# Patient Record
Sex: MALE | Race: WHITE | Employment: OTHER | URBAN - METROPOLITAN AREA
[De-identification: names, ages, dates, MRNs, and addresses within clinical notes are randomized per-mention and may not be internally consistent; named-entity substitution may affect disease eponyms.]

---

## 2017-04-04 ENCOUNTER — ALLSCRIPTS OFFICE VISIT (OUTPATIENT)
Dept: OTHER | Facility: OTHER | Age: 67
End: 2017-04-04

## 2017-04-08 ENCOUNTER — GENERIC CONVERSION - ENCOUNTER (OUTPATIENT)
Dept: OTHER | Facility: OTHER | Age: 67
End: 2017-04-08

## 2017-05-08 ENCOUNTER — ALLSCRIPTS OFFICE VISIT (OUTPATIENT)
Dept: OTHER | Facility: OTHER | Age: 67
End: 2017-05-08

## 2017-05-25 ENCOUNTER — GENERIC CONVERSION - ENCOUNTER (OUTPATIENT)
Dept: OTHER | Facility: OTHER | Age: 67
End: 2017-05-25

## 2017-05-26 ENCOUNTER — ALLSCRIPTS OFFICE VISIT (OUTPATIENT)
Dept: OTHER | Facility: OTHER | Age: 67
End: 2017-05-26

## 2017-05-26 ENCOUNTER — HOSPITAL ENCOUNTER (OUTPATIENT)
Dept: RADIOLOGY | Facility: CLINIC | Age: 67
Discharge: HOME | End: 2017-05-26
Payer: MEDICARE

## 2017-05-26 ENCOUNTER — TRANSCRIBE ORDERS (OUTPATIENT)
Dept: ADMINISTRATIVE | Facility: HOSPITAL | Age: 67
End: 2017-05-26

## 2017-05-26 DIAGNOSIS — E66.01 MORBID OBESITY, UNSPECIFIED OBESITY TYPE (HCC): ICD-10-CM

## 2017-05-26 DIAGNOSIS — R07.9 CHEST PAIN: ICD-10-CM

## 2017-05-26 DIAGNOSIS — H90.2 CONDUCTIVE HEARING LOSS: ICD-10-CM

## 2017-05-26 DIAGNOSIS — Z12.5 ENCOUNTER FOR SCREENING FOR MALIGNANT NEOPLASM OF PROSTATE: ICD-10-CM

## 2017-05-26 DIAGNOSIS — R06.02 SOB (SHORTNESS OF BREATH): Primary | ICD-10-CM

## 2017-05-26 DIAGNOSIS — E11.65 TYPE 2 DIABETES MELLITUS WITH HYPERGLYCEMIA (HCC): ICD-10-CM

## 2017-05-26 DIAGNOSIS — F41.9 ANXIETY DISORDER: ICD-10-CM

## 2017-05-26 DIAGNOSIS — E66.01 MORBID (SEVERE) OBESITY DUE TO EXCESS CALORIES (HCC): ICD-10-CM

## 2017-05-26 DIAGNOSIS — K21.9 GASTRO-ESOPHAGEAL REFLUX DISEASE WITHOUT ESOPHAGITIS: ICD-10-CM

## 2017-05-26 DIAGNOSIS — J45.901 ASTHMA WITH ACUTE EXACERBATION: ICD-10-CM

## 2017-05-26 DIAGNOSIS — J45.909 UNCOMPLICATED ASTHMA: ICD-10-CM

## 2017-05-26 DIAGNOSIS — I10 ESSENTIAL (PRIMARY) HYPERTENSION: ICD-10-CM

## 2017-05-26 DIAGNOSIS — J45.901 ASTHMA WITH ACUTE EXACERBATION, UNSPECIFIED ASTHMA SEVERITY: ICD-10-CM

## 2017-05-26 DIAGNOSIS — I47.1 SUPRAVENTRICULAR TACHYCARDIA (HCC): ICD-10-CM

## 2017-05-26 DIAGNOSIS — R05.9 COUGH: ICD-10-CM

## 2017-05-26 PROCEDURE — 71020 HB CHEST X-RAY 2VW FRONTAL&LATL: CPT

## 2017-05-27 ENCOUNTER — APPOINTMENT (OUTPATIENT)
Dept: LAB | Facility: CLINIC | Age: 67
End: 2017-05-27
Payer: MEDICARE

## 2017-05-27 DIAGNOSIS — E11.65 TYPE 2 DIABETES MELLITUS WITH HYPERGLYCEMIA (HCC): ICD-10-CM

## 2017-05-27 LAB
CREAT UR-MCNC: 120 MG/DL
EST. AVERAGE GLUCOSE BLD GHB EST-MCNC: 189 MG/DL
HBA1C MFR BLD: 8.2 % (ref 4.2–6.3)
MICROALBUMIN UR-MCNC: 20.7 MG/L (ref 0–20)
MICROALBUMIN/CREAT 24H UR: 17 MG/G CREATININE (ref 0–30)

## 2017-05-27 PROCEDURE — 82570 ASSAY OF URINE CREATININE: CPT

## 2017-05-27 PROCEDURE — 83036 HEMOGLOBIN GLYCOSYLATED A1C: CPT

## 2017-05-27 PROCEDURE — 36415 COLL VENOUS BLD VENIPUNCTURE: CPT

## 2017-05-27 PROCEDURE — 82043 UR ALBUMIN QUANTITATIVE: CPT

## 2017-05-30 ENCOUNTER — GENERIC CONVERSION - ENCOUNTER (OUTPATIENT)
Dept: OTHER | Facility: OTHER | Age: 67
End: 2017-05-30

## 2017-06-01 ENCOUNTER — ALLSCRIPTS OFFICE VISIT (OUTPATIENT)
Dept: OTHER | Facility: OTHER | Age: 67
End: 2017-06-01

## 2017-06-05 ENCOUNTER — GENERIC CONVERSION - ENCOUNTER (OUTPATIENT)
Dept: OTHER | Facility: OTHER | Age: 67
End: 2017-06-05

## 2017-06-06 ENCOUNTER — ALLSCRIPTS OFFICE VISIT (OUTPATIENT)
Dept: OTHER | Facility: OTHER | Age: 67
End: 2017-06-06

## 2017-06-13 ENCOUNTER — HOSPITAL ENCOUNTER (OUTPATIENT)
Dept: NON INVASIVE DIAGNOSTICS | Facility: CLINIC | Age: 67
Discharge: HOME | End: 2017-06-13
Payer: MEDICARE

## 2017-06-13 DIAGNOSIS — E66.01 MORBID (SEVERE) OBESITY DUE TO EXCESS CALORIES (HCC): ICD-10-CM

## 2017-06-13 DIAGNOSIS — K21.9 GASTRO-ESOPHAGEAL REFLUX DISEASE WITHOUT ESOPHAGITIS: ICD-10-CM

## 2017-06-13 DIAGNOSIS — R07.9 CHEST PAIN: ICD-10-CM

## 2017-06-13 DIAGNOSIS — J45.901 ASTHMA WITH ACUTE EXACERBATION: ICD-10-CM

## 2017-06-13 PROCEDURE — 93350 STRESS TTE ONLY: CPT

## 2017-06-14 LAB
MAX DIASTOLIC BP: 86 MMHG
MAX HEART RATE: 136 BPM
MAX PREDICTED HEART RATE: 153 BPM
MAX. SYSTOLIC BP: 188 MMHG
PROTOCOL NAME: NORMAL
TARGET HR FORMULA: NORMAL
TEST INDICATION: NORMAL
TIME IN EXERCISE PHASE: 359 S

## 2017-06-19 ENCOUNTER — GENERIC CONVERSION - ENCOUNTER (OUTPATIENT)
Dept: OTHER | Facility: OTHER | Age: 67
End: 2017-06-19

## 2017-06-20 ENCOUNTER — GENERIC CONVERSION - ENCOUNTER (OUTPATIENT)
Dept: OTHER | Facility: OTHER | Age: 67
End: 2017-06-20

## 2017-06-20 ENCOUNTER — ALLSCRIPTS OFFICE VISIT (OUTPATIENT)
Dept: OTHER | Facility: OTHER | Age: 67
End: 2017-06-20

## 2017-06-20 ENCOUNTER — APPOINTMENT (OUTPATIENT)
Dept: LAB | Facility: CLINIC | Age: 67
End: 2017-06-20
Payer: MEDICARE

## 2017-06-20 DIAGNOSIS — F41.9 ANXIETY DISORDER: ICD-10-CM

## 2017-06-20 DIAGNOSIS — J45.909 UNCOMPLICATED ASTHMA: ICD-10-CM

## 2017-06-20 DIAGNOSIS — I10 ESSENTIAL (PRIMARY) HYPERTENSION: ICD-10-CM

## 2017-06-20 DIAGNOSIS — I47.1 SUPRAVENTRICULAR TACHYCARDIA (HCC): ICD-10-CM

## 2017-06-20 DIAGNOSIS — H90.2 CONDUCTIVE HEARING LOSS: ICD-10-CM

## 2017-06-20 DIAGNOSIS — Z12.5 ENCOUNTER FOR SCREENING FOR MALIGNANT NEOPLASM OF PROSTATE: ICD-10-CM

## 2017-06-20 DIAGNOSIS — E11.65 TYPE 2 DIABETES MELLITUS WITH HYPERGLYCEMIA (HCC): ICD-10-CM

## 2017-06-20 LAB
ANION GAP SERPL CALCULATED.3IONS-SCNC: 8 MMOL/L (ref 4–13)
BUN SERPL-MCNC: 12 MG/DL (ref 5–25)
CALCIUM SERPL-MCNC: 9.1 MG/DL (ref 8.3–10.1)
CHLORIDE SERPL-SCNC: 103 MMOL/L (ref 100–108)
CO2 SERPL-SCNC: 28 MMOL/L (ref 21–32)
CREAT SERPL-MCNC: 0.97 MG/DL (ref 0.6–1.3)
GFR SERPL CREATININE-BSD FRML MDRD: >60 ML/MIN/1.73SQ M
GLUCOSE SERPL-MCNC: 308 MG/DL (ref 65–140)
POTASSIUM SERPL-SCNC: 4.2 MMOL/L (ref 3.5–5.3)
PSA SERPL-MCNC: 0.8 NG/ML (ref 0–4)
SODIUM SERPL-SCNC: 139 MMOL/L (ref 136–145)

## 2017-06-20 PROCEDURE — G0103 PSA SCREENING: HCPCS

## 2017-06-20 PROCEDURE — 80048 BASIC METABOLIC PNL TOTAL CA: CPT

## 2017-06-20 PROCEDURE — 36415 COLL VENOUS BLD VENIPUNCTURE: CPT

## 2017-06-30 ENCOUNTER — GENERIC CONVERSION - ENCOUNTER (OUTPATIENT)
Dept: OTHER | Facility: OTHER | Age: 67
End: 2017-06-30

## 2017-06-30 ENCOUNTER — HOSPITAL ENCOUNTER (OUTPATIENT)
Dept: PULMONOLOGY | Facility: HOSPITAL | Age: 67
Discharge: HOME | End: 2017-06-30
Attending: UROLOGY
Payer: MEDICARE

## 2017-06-30 DIAGNOSIS — R06.02 SOB (SHORTNESS OF BREATH): ICD-10-CM

## 2017-06-30 DIAGNOSIS — R05.9 COUGH: ICD-10-CM

## 2017-06-30 PROCEDURE — 94727 GAS DIL/WSHOT DETER LNG VOL: CPT

## 2017-06-30 PROCEDURE — 94729 DIFFUSING CAPACITY: CPT

## 2017-06-30 PROCEDURE — 94760 N-INVAS EAR/PLS OXIMETRY 1: CPT

## 2017-06-30 PROCEDURE — 94060 EVALUATION OF WHEEZING: CPT

## 2017-06-30 RX ORDER — ALBUTEROL SULFATE 2.5 MG/3ML
2.5 SOLUTION RESPIRATORY (INHALATION) ONCE
Status: COMPLETED | OUTPATIENT
Start: 2017-06-30 | End: 2017-06-30

## 2017-06-30 RX ADMIN — ALBUTEROL SULFATE 2.5 MG: 2.5 SOLUTION RESPIRATORY (INHALATION) at 09:43

## 2017-07-25 ENCOUNTER — ALLSCRIPTS OFFICE VISIT (OUTPATIENT)
Dept: OTHER | Facility: OTHER | Age: 67
End: 2017-07-25

## 2017-08-02 RX ORDER — CLOTRIMAZOLE AND BETAMETHASONE DIPROPIONATE 10; .64 MG/G; MG/G
CREAM TOPICAL 2 TIMES DAILY
COMMUNITY
End: 2018-01-06 | Stop reason: ALTCHOICE

## 2017-08-02 RX ORDER — ALBUTEROL SULFATE 90 UG/1
2 AEROSOL, METERED RESPIRATORY (INHALATION) EVERY 6 HOURS PRN
COMMUNITY
End: 2019-01-03 | Stop reason: SDUPTHER

## 2017-08-02 RX ORDER — FLUTICASONE PROPIONATE 50 MCG
1 SPRAY, SUSPENSION (ML) NASAL AS NEEDED
COMMUNITY
End: 2020-11-20

## 2017-08-02 RX ORDER — TRIAMCINOLONE ACETONIDE 0.25 MG/G
CREAM TOPICAL AS NEEDED
COMMUNITY

## 2017-08-08 ENCOUNTER — ANESTHESIA EVENT (OUTPATIENT)
Dept: PERIOP | Facility: HOSPITAL | Age: 67
End: 2017-08-08
Payer: MEDICARE

## 2017-08-09 ENCOUNTER — HOSPITAL ENCOUNTER (OUTPATIENT)
Facility: HOSPITAL | Age: 67
Setting detail: OUTPATIENT SURGERY
Discharge: HOME | End: 2017-08-09
Attending: INTERNAL MEDICINE | Admitting: INTERNAL MEDICINE
Payer: MEDICARE

## 2017-08-09 ENCOUNTER — ANESTHESIA (OUTPATIENT)
Dept: PERIOP | Facility: HOSPITAL | Age: 67
End: 2017-08-09
Payer: MEDICARE

## 2017-08-09 VITALS
OXYGEN SATURATION: 95 % | TEMPERATURE: 97.1 F | HEART RATE: 69 BPM | HEIGHT: 65 IN | DIASTOLIC BLOOD PRESSURE: 88 MMHG | RESPIRATION RATE: 20 BRPM | BODY MASS INDEX: 31.96 KG/M2 | WEIGHT: 191.8 LBS | SYSTOLIC BLOOD PRESSURE: 123 MMHG

## 2017-08-09 DIAGNOSIS — K21.9 GERD (GASTROESOPHAGEAL REFLUX DISEASE): ICD-10-CM

## 2017-08-09 LAB — GLUCOSE SERPL-MCNC: 178 MG/DL (ref 65–140)

## 2017-08-09 PROCEDURE — 88342 IMHCHEM/IMCYTCHM 1ST ANTB: CPT | Performed by: INTERNAL MEDICINE

## 2017-08-09 PROCEDURE — 82948 REAGENT STRIP/BLOOD GLUCOSE: CPT

## 2017-08-09 PROCEDURE — 88305 TISSUE EXAM BY PATHOLOGIST: CPT | Performed by: INTERNAL MEDICINE

## 2017-08-09 RX ORDER — SODIUM CHLORIDE, SODIUM LACTATE, POTASSIUM CHLORIDE, CALCIUM CHLORIDE 600; 310; 30; 20 MG/100ML; MG/100ML; MG/100ML; MG/100ML
75 INJECTION, SOLUTION INTRAVENOUS CONTINUOUS
Status: DISCONTINUED | OUTPATIENT
Start: 2017-08-09 | End: 2017-08-09 | Stop reason: HOSPADM

## 2017-08-09 RX ORDER — LIDOCAINE HYDROCHLORIDE 10 MG/ML
INJECTION, SOLUTION INFILTRATION; PERINEURAL AS NEEDED
Status: DISCONTINUED | OUTPATIENT
Start: 2017-08-09 | End: 2017-08-09 | Stop reason: SURG

## 2017-08-09 RX ORDER — SODIUM CHLORIDE, SODIUM LACTATE, POTASSIUM CHLORIDE, CALCIUM CHLORIDE 600; 310; 30; 20 MG/100ML; MG/100ML; MG/100ML; MG/100ML
125 INJECTION, SOLUTION INTRAVENOUS CONTINUOUS
Status: CANCELLED | OUTPATIENT
Start: 2017-08-09

## 2017-08-09 RX ORDER — PROPOFOL 10 MG/ML
INJECTION, EMULSION INTRAVENOUS AS NEEDED
Status: DISCONTINUED | OUTPATIENT
Start: 2017-08-09 | End: 2017-08-09 | Stop reason: SURG

## 2017-08-09 RX ADMIN — PROPOFOL 20 MG: 10 INJECTION, EMULSION INTRAVENOUS at 07:47

## 2017-08-09 RX ADMIN — PROPOFOL 20 MG: 10 INJECTION, EMULSION INTRAVENOUS at 07:48

## 2017-08-09 RX ADMIN — PROPOFOL 100 MG: 10 INJECTION, EMULSION INTRAVENOUS at 07:45

## 2017-08-09 RX ADMIN — LIDOCAINE HYDROCHLORIDE 50 MG: 10 INJECTION, SOLUTION INFILTRATION; PERINEURAL at 07:45

## 2017-08-09 RX ADMIN — SODIUM CHLORIDE, POTASSIUM CHLORIDE, SODIUM LACTATE AND CALCIUM CHLORIDE 75 ML/HR: 600; 310; 30; 20 INJECTION, SOLUTION INTRAVENOUS at 07:23

## 2017-08-10 ENCOUNTER — GENERIC CONVERSION - ENCOUNTER (OUTPATIENT)
Dept: OTHER | Facility: OTHER | Age: 67
End: 2017-08-10

## 2017-10-04 ENCOUNTER — ALLSCRIPTS OFFICE VISIT (OUTPATIENT)
Dept: OTHER | Facility: OTHER | Age: 67
End: 2017-10-04

## 2017-10-04 DIAGNOSIS — N64.4 MASTODYNIA: ICD-10-CM

## 2017-10-04 DIAGNOSIS — L98.9 DISORDER OF SKIN OR SUBCUTANEOUS TISSUE: ICD-10-CM

## 2017-10-05 NOTE — PROGRESS NOTES
Assessment  1  Breast pain (611 71) (N64 4)   2  Anxiety disorder (300 00) (F41 9)   3  Asthma (493 90) (J45 909)   4  Diabetes mellitus type 2, uncontrolled (250 02) (E11 65)   5  Organic impotence (607 84) (N52 9)   6  Paroxysmal supraventricular tachycardia (427 0) (I47 1)   7  Hypercholesterolemia (272 0) (E78 00)    Plan  Breast pain    · MAMMO DIAGNOSTIC BILATERAL W 3D & CAD; Status:Active; Requested MARGRET:[id number];   Diabetes mellitus type 2, uncontrolled    · Follow-up as previously scheduled Evaluation and Treatment  Follow-up  Status: Complete  Done:  99RCM1685    Discussion/Summary  Discussion Summary:   I can detect no masses his breasts are generally tender slightly  We'll check a mammogram continue regular follow-up  Medication SE Review and Pt Understands Tx: Possible side effects of new medications were reviewed with the patient/guardian today  The treatment plan was reviewed with the patient/guardian  The patient/guardian understands and agrees with the treatment plan      Chief Complaint  Chief Complaint Free Text Note Form: Lumpiness soreness right breast      History of Present Illness  HPI: He has noticed some lumpiness and soreness of his right breast over the past month or so no discrete masses  No nipple discharge  He thinks the soreness may be from his suspenders  He also has some irritation of his right breast  He is stable otherwise  Sugars in the 100s   Anxiety Disorder (Follow-Up): The patient is being seen for follow-up of anxiety  The patient reports doing well  He has no comorbid illnesses  He has had no significant interval events  Disease management:  the patient is doing well with his goals  Asthma (Follow-Up): The patient is being seen for a routine clinic follow-up of asthma  The patient's long-term asthma pattern has been classified as intermittent  The patient states he has been doing well with his asthma control since the last visit  He has no comorbid illnesses   He has no significant interval events  Asthma Control: Well controlled  Interval Symptoms: The patient is currently asymptomatic  Hyperlipidemia (Follow-Up): The patient states his hyperlipidemia has been under good control since the last visit  Comorbid Illnesses: diabetes mellitus  He has no significant interval events  Symptoms: The patient is currently asymptomatic  The patient is doing well with his hyperlipidemia goals  Diabetes Type II (Follow-Up): The patient states he has been doing well with his Type II Diabetes control since the last visit  He has no comorbid illnesses  He has no known diabetic complications  He has no significant interval events  Symptoms:   Home monitoring: The patient checks his blood sugars regularly  The patient is doing well with his diabetes goals  Review of Systems  Complete-Male:   Constitutional: as noted in HPI  Eyes: No complaints of eye pain, no red eyes, no discharge from eyes, no itchy eyes  ENT: no complaints of earache, no hearing loss, no nosebleeds, no nasal discharge, no sore throat, no hoarseness  Cardiovascular: No complaints of slow heart rate, no fast heart rate, no chest pain, no palpitations, no leg claudication, no lower extremity  Respiratory: No complaints of shortness of breath, no wheezing, no cough, no SOB on exertion, no orthopnea or PND  Gastrointestinal: No complaints of abdominal pain, no constipation, no nausea or vomiting, no diarrhea or bloody stools  Genitourinary: No complaints of dysuria, no incontinence, no hesitancy, no nocturia, no genital lesion, no testicular pain  Musculoskeletal: No complaints of arthralgia, no myalgias, no joint swelling or stiffness, no limb pain or swelling  Integumentary: as noted in HPI  Neurological: No compliants of headache, no confusion, no convulsions, no numbness or tingling, no dizziness or fainting, no limb weakness, no difficulty walking     Psychiatric: Is not suicidal, no sleep disturbances, no anxiety or depression, no change in personality, no emotional problems  Endocrine: No complaints of proptosis, no hot flashes, no muscle weakness, no erectile dysfunction, no deepening of the voice, no feelings of weakness  Hematologic/Lymphatic: No complaints of swollen glands, no swollen glands in the neck, does not bleed easily, no easy bruising  ROS Reviewed:   ROS reviewed  Active Problems  1  Actinic keratosis (702 0) (L57 0)   2  Acute URI (465 9) (J06 9)   3  Allergic rhinitis (477 9) (J30 9)   4  Anxiety disorder (300 00) (F41 9)   5  Asthma (493 90) (J45 909)   6  Asthma with exacerbation (493 92) (J45 901)   7  Benign essential hypertension (401 1) (I10)   8  Cellulitis of right upper extremity (682 3) (L03 113)   9  Cervical radiculopathy (723 4) (M54 12)   10  Changing skin lesion (709 9) (L98 9)   11  Chest pain (786 50) (R07 9)   12  Diabetes mellitus type 2, uncontrolled (250 02) (E11 65)   13  Encounter for prostate cancer screening (V76 44) (Z12 5)   14  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)   15  Esophageal reflux (530 81) (K21 9)   16  Fatigue (780 79) (R53 83)   17  Flu vaccine need (V04 81) (Z23)   18  Foot tendinitis (727 06) (M77 50)   19  Hearing loss, conductive (389 00) (H90 2)   20  Hypercholesterolemia (272 0) (E78 00)   21  Mitral valve disorder (424 0) (I05 9)   22  Morbid or severe obesity due to excess calories (278 01) (E66 01)   23  Need for prophylactic vaccination against Streptococcus pneumoniae (pneumococcus) (V03 82)    (Z23)   24  No advance directive on file (V49 89) (Z78 9)   25  Obesity (278 00) (E66 9)   26  Organic impotence (607 84) (N52 9)   27  Paroxysmal supraventricular tachycardia (427 0) (I47 1)   28  Peripheral neuropathy (356 9) (G62 9)   29  Rosacea (695 3) (L71 9)   30  Screening for skin condition (V82 0) (Z13 89)   31  Seborrheic dermatitis (690 10) (L21 9)   32  Seborrheic keratosis (702 19) (L82 1)   33  Sleep apnea (780 57) (G47 30)   34  Spasm of lumbar paraspinous muscle (724 8) (M62 830)   35  Supraventricular tachycardia (427 89) (I47 1)   36  Thoracic radiculopathy (724 4) (M54 14)   37  Tick bite (919 4,E906 4) (W57 XXXA)   38  Tinea pedis of left foot (110 4) (B35 3)   39  Urinary frequency (788 41) (R35 0)   40  Xerostomia (527 7) (R68 2)    Past Medical History  1  History of Abdominal pain, RUQ (789 01) (R10 11)   2  History of Acute sinusitis (461 9) (J01 90)   3  History of Adhesive capsulitis of shoulder, unspecified laterality (726 0) (M75 00)   4  History of Arthralgia Of Hand (719 44)   5  History of Back pain (724 5) (M54 9)   6  History of Chronic maxillary sinusitis (473 0) (J32 0)   7  History of Colonoscopy (Fiberoptic) Screening   8  History of Contact dermatitis (692 9) (L25 9)   9  History of Cough (786 2) (R05)   10  History of Diarrhea (787 91) (R19 7)   11  History of Dysuria (788 1) (R30 0)   12  History of Flu vaccine need (V04 81) (Z23)   13  History of Gout (274 9) (M10 9)   14  H/O nonmelanoma skin cancer (V10 83) (Z85 828)   15  History of atopic dermatitis (V13 3) (Z87 2)   16  History of chest pain (V13 89) (Z87 898)   17  History of herpes zoster (V12 09) (Z86 19)   18  History of sciatica (V12 49) (Z86 69)   19  History of shortness of breath (V13 89) (Z87 898)   20  History of Incisional hernia (553 21) (K43 2)   21  History of Malignant Skin Neoplasm (V10 83)   22  History of Palpitations (785 1) (R00 2)   23  History of Plantar fasciitis (728 71) (M72 2)   24  History of Squamous Cell Carcinoma Of The Skin Of The Scalp (173 42)   25  History of Tinnitus, unspecified laterality (388 30) (H93 19)   26  History of Type 2 diabetes mellitus with hyperglycemia (250 00) (E11 65)   27  History of Umbilical hernia (656 0) (K42 9)   28  History of Worms in stool (128 9) (B83 9)  Active Problems And Past Medical History Reviewed:    The active problems and past medical history were reviewed and updated today  Surgical History  1  History of Appendectomy   2  History of Excision Of Lesion Scalp Malignant   3  History of Incisional Hernia Repair   4  History of Partial Colectomy - Sigmoid   5  History of Tonsillectomy  Surgical History Reviewed: The surgical history was reviewed and updated today  Family History  Mother    1  Family history of Acute Myocardial Infarction (V17 3)   2  Family history of Adenocarcinoma Of The Breast (V16 3)   3  Family history of Adenocarcinoma Of The Skin   4  Family history of Congestive Heart Failure   5  Family history of Type 2 Diabetes Mellitus   6  Family history of Varicose Veins Of Lower Extremities  Father    7  Family history of Adenocarcinoma Of The Prostate Gland (V16 42)   8  Family history of Carcinoma Of The Skin   9  Family history of Non-Hodgkin's Lymphoma  Family History Reviewed: The family history was reviewed and updated today  Social History   · Being A Social Drinker   · Marital History - Currently    · Never A Smoker   · Never Used Drugs   · No advance directive on file (V49 89) (Z78 9)   · Social alcohol use (Z78 9)   · Tobacco non-user (V49 89) (Z78 9)   · Working Part-time  Social History Reviewed: The social history was reviewed and updated today  Current Meds   1  Aspir-Low 81 MG Oral Tablet Delayed Release; TAKE 1 TABLET DAILY; Therapy: 11CBP4995 to Recorded   2  Clotrimazole-Betamethasone 1-0 05 % External Cream; APPLY  AND RUB  IN A THIN FILM TO   AFFECTED AREAS TWICE DAILY  (AM AND PM); Therapy: 15CVW7330 to (Last Rx:28Nov2016)  Requested for: 28Nov2016 Ordered   3  Cyclobenzaprine HCl - 10 MG Oral Tablet; TAKE 1 TABLET THREE TIMES A DAY AS NEEDED FOR   BACK SPASM; Therapy: 45XTY5250 to (Jayesh Gave)  Requested for: 29Awb1842; Last Rx:39Ifa4591   Ordered   4  EpiPen 2-David 0 3 MG/0 3ML Injection Solution Auto-injector; Therapy: 13ICM1544 to (Evaluate:07Nov2014) Recorded   5   Fluticasone Propionate 50 MCG/ACT Nasal Suspension; PLACE 2 SPRAYS IN EACH NOSTRIL DAILY; Therapy: 79VXJ8827 to (Antonino Stewart)  Requested for: 03ECJ5803; Last Rx:28Ngh5396   Ordered   6  FreeStyle Lite Test In Vitro Strip; TEST THREE TIMES DAILY,DX E11 65; Therapy: 39DVB3642 to (Evaluate:86Jht8251)  Requested for: 62Rku2675; Last Rx:51Hew8679   Ordered   7  FreeStyle Unistick II Lancets Miscellaneous; TEST TWICE A DAY; Therapy: 57GCT2134 to (Last D62SHN9920)  Requested for: 61YJP3134 Ordered   8  Glimepiride 4 MG Oral Tablet; take one tablet by mouth twice daily; Therapy: 48GAV4580 to (Evaluate:2018)  Requested for: 2017; Last Rx:2017   Ordered   9  Januvia 100 MG Oral Tablet; take 1 tablet by mouth daily; Therapy: 88ACU1972 to (Evaluate:2017)  Requested for: 99AHD1238; Last Rx:60Cjn6481   Ordered   10  Ketoconazole 2 % External Cream; APPLY TO AFFECTED AREA TWICE A DAY; Therapy: 79YRR9519 to (Evaluate:39Svd5479)  Requested for: 95LVM0870; Last Rx:86Iyv3307    Ordered   11  Levalbuterol Tartrate 45 MCG/ACT Inhalation Aerosol; INHALE 1 PUFF EVERY 4 HOURS AS NEEDED; Therapy: 19Hrp1520 to (Last Rx:48Yrx0540)  Requested for: 11Jgz9513 Ordered   12  Loratadine 10 MG Oral Tablet Recorded   13  LORazepam 1 MG Oral Tablet; TAKE 1 TABLET BY MOUTH EVERY DAY AS NEEDED FOR ANXIETY; Therapy: 97ACY0263 to ([phone])  Requested for: 54NMR9757; Last Rx:2015    Ordered   14  MetFORMIN HCl - 1000 MG Oral Tablet; take 1 tablet by mouth twice a day with food; Therapy: 84OWU3456 to (Evaluate:2017)  Requested for: 01RYL4232; Last Rx:32Ovp9071    Ordered   15  Montelukast Sodium 10 MG Oral Tablet; take 1 tablet every day; Therapy: 23QCL4466 to [phone])  Requested for: 10WIM2608; Last OH:07ENJ1333    Ordered   16  Multivitamins Oral Capsule; TAKE 1 CAPSULE DAILY; Therapy: 66SPI6563 to Recorded   17   Omeprazole 20 MG Oral Capsule Delayed Release; TAKE ONE CAPSULE BY MOUTH EVERY DAY; Therapy: 26YCM8689 to (Evaluate:01Fao3122)  Requested for: 40Qiq1846; Last Rx:43Fuk0285    Ordered   18  ProAir  (90 Base) MCG/ACT Inhalation Aerosol Solution; INHALE 1 TO 2 PUFFS EVERY 4 TO    6 HOURS AS NEEDED; Therapy: 22ZFI8562 to (Last SI:52FLW7185)  Requested for: 95MVA7025 Ordered   19  Triamcinolone Acetonide 0 1 % External Ointment; APPLY AND GENTLY MASSAGE INTO AFFECTED    AREA(S) TWICE DAILY; Therapy: 82GEF9455 to (Last Rx:92Pvs8878)  Requested for: 66NWN6740 Ordered   20  Verapamil HCl  MG Oral Capsule Extended Release 24 Hour; TAKE ONE CAPSULE BY MOUTH    TWICE A DAY; Therapy: 93JAA8376 to (XVAJXTYP:37TOE8319)  Requested for: 42HBN8984; Last Rx:91Ahw4110    Ordered   21  Viagra 100 MG Oral Tablet; take as directed; Therapy: 17Rcy3081 to (Evaluate:23Mar2017)  Requested for: 38Rpt5561; Last Rx:25Gyt4380    Ordered  Medication List Reviewed: The medication list was reviewed and updated today  Allergies  1  IVP Dye   2  Shellfish    Vitals  Vital Signs    Recorded: 76AVK2245 11:47AM   Temperature 97 8 F   Heart Rate 76   Systolic 782   Diastolic 80   Height 5 ft 6 in   Weight 200 lb 6 oz   BMI Calculated 32 34   BSA Calculated 2     Physical Exam    Constitutional   General appearance: Abnormal   obese  Eyes   Conjunctiva and lids: No swelling, erythema, or discharge  Pupils and irises: Equal, round and reactive to light  Ears, Nose, Mouth, and Throat   External inspection of ears and nose: Normal     Otoscopic examination: Tympanic membrance translucent with normal light reflex  Canals patent without erythema  Nasal mucosa, septum, and turbinates: Normal without edema or erythema  Oropharynx: Normal with no erythema, edema, exudate or lesions  Pulmonary   Respiratory effort: No increased work of breathing or signs of respiratory distress      Auscultation of lungs: Clear to auscultation, equal breath sounds bilaterally, no wheezes, no rales, no rhonci  Cardiovascular   Palpation of heart: Normal PMI, no thrills  Auscultation of heart: Normal rate and rhythm, normal S1 and S2, without murmurs  Examination of extremities for edema and/or varicosities: Normal     Carotid pulses: Normal     Abdomen   Abdomen: Non-tender, no masses  Liver and spleen: No hepatomegaly or splenomegaly  Lymphatic   Palpation of lymph nodes in neck: No lymphadenopathy  Musculoskeletal   Gait and station: Normal     Digits and nails: Normal without clubbing or cyanosis  Inspection/palpation of joints, bones, and muscles: Normal     Skin   Skin and subcutaneous tissue: Normal without rashes or lesions  Neurologic   Cranial nerves: Cranial nerves 2-12 intact  Reflexes: 2+ and symmetric  Sensation: No sensory loss  Psychiatric   Orientation to person, place and time: Normal     Mood and affect: Normal          Health Management  History of Secondary DM with renal manifestations   *VB - Eye Exam; every 1 year; Last 06VPE9438; Next Due: 96Vtb6398; Near Due  Health Maintenance   COLONOSCOPY; every 6 years; Last 26CQR4757; Next Due: 07WTS8537; Active  Influenza (Split); every 1 year; Last 72JRD7378; Next Due: 07KCV5623; Overdue  Tdap (Adacel); every 10 years; Last 21IZG4083; Next Due: 00Kad9398; Active    Future Appointments    Date/Time Provider Specialty Site   11/16/2017 08:45 AM Brandon Cochran [facility]   12/04/2017 12:45 PM ELSA Burkett   Dermatology [facility]     Signatures   Electronically signed by : Dave Ugalde [facility]; Oct  4 2017  5:35PM EST                       (Author)    Electronically signed by : ELSA Mullen ; Oct  4 2017  6:48PM EST

## 2017-10-09 ENCOUNTER — HOSPITAL ENCOUNTER (OUTPATIENT)
Dept: MAMMOGRAPHY | Facility: CLINIC | Age: 67
Discharge: HOME | End: 2017-10-09
Payer: MEDICARE

## 2017-10-09 ENCOUNTER — HOSPITAL ENCOUNTER (OUTPATIENT)
Dept: ULTRASOUND IMAGING | Facility: CLINIC | Age: 67
Discharge: HOME | End: 2017-10-09
Payer: MEDICARE

## 2017-10-09 DIAGNOSIS — N63.0 LUMP OR MASS IN BREAST: ICD-10-CM

## 2017-10-09 DIAGNOSIS — N64.4 MASTODYNIA: ICD-10-CM

## 2017-10-09 PROCEDURE — G0204 DX MAMMO INCL CAD BI: HCPCS

## 2017-10-09 PROCEDURE — 76642 ULTRASOUND BREAST LIMITED: CPT

## 2017-10-20 ENCOUNTER — GENERIC CONVERSION - ENCOUNTER (OUTPATIENT)
Dept: OTHER | Facility: OTHER | Age: 67
End: 2017-10-20

## 2017-10-26 ENCOUNTER — ALLSCRIPTS OFFICE VISIT (OUTPATIENT)
Dept: OTHER | Facility: OTHER | Age: 67
End: 2017-10-26

## 2017-10-27 NOTE — PROGRESS NOTES
Assessment  1  Actinic keratosis (702 0) (L57 0)   2  Seborrheic dermatitis (690 10) (L21 9)   3  Seborrheic keratosis (702 19) (L82 1)   4  Screening for skin condition (V82 0) (Z13 89)   5  H/O nonmelanoma skin cancer (V10 83) (Z85 828)    Plan   · Ketoconazole 2 % External Cream; APPLY TO AFFECTED AREA SPARINGLY TWICE  DAILY Face and scalp for 3 weeks   · Wound care as instructed ; Status:Complete;   Done: 93QPT6228   · Use a sun block product with an SPF of 15 or more ; Status:Complete;   Done:  26Oct2017    Discussion/Summary  Discussion Summary- [facility]:   Assessment #1: Actinic keratosis  Care Plan:   Lesion on the scalp was biopsied before considering field therapy with 5% fluorouracil difficult for me to discern if there is more of a psoriasis picture versus actinic keratoses and will await results of biopsy before considering further treatment  Assessment #2: Seborrheic dermatitis  Care Plan:   Markedly involved also noted areas of involvement on his elbows and feet which may be more consistent with psoriasis suggested again with the ketoconazole cream to the facial areas and will re-evaluate again once we get the results of the biopsy  Assessment #3: Seborrheic keratosis  Care Plan:   Patient reassured these are normal growths we acquire with age no treatment needed  Assessment #4: History of skin cancer  Care Plan:   No recurrence nothing else atypical sunblock recommended continue to follow yearly  Assessment #5: Screening for dermatologic disorders  Care Plan:   Nothing else of concern noted on exam await results of biopsy before proceeding with further treatment as indicated        Chief Complaint  Chief Complaint Free Text Note Form: 6 month check up      History of Present Illness  HPI: 71-year-old male with history of seborrheic dermatitis and actinic keratosis and previous history of skin cancer presents for overall checkup notes increased scaling areas on his scalp and face especially when intermittently wearing a beard  also having a rash on his right foot which he is using a cream given to him by podiatrists      Review of Systems  Complete Male Dermatology Kiran Smith Patient:   Constitutional: Denies constitutional symptoms  Eyes: Denies eye symptoms  ENT:  denies ear symptoms, nasal symptoms, mouth or throat symptoms  Cardiovascular: Denies cardiovascular symptoms  Respiratory: Denies respiratory symptoms  Gastrointestinal: Denies gastrointestinal symptoms  Musculoskeletal: Denies musculoskeletal symptoms  Integumentary: Denies skin, hair and nail symptoms  Neurological: Denies neurologic symptoms  Psychiatric: Denies psychiatric symptoms  Endocrine: Denies endocrine symptoms  Hematologic/Lymphatic: Denies hematologic symptoms  Active Problems  1  Actinic keratosis (702 0) (L57 0)   2  Acute URI (465 9) (J06 9)   3  Allergic rhinitis (477 9) (J30 9)   4  Anxiety disorder (300 00) (F41 9)   5  Asthma (493 90) (J45 909)   6  Asthma with exacerbation (493 92) (J45 901)   7  Benign essential hypertension (401 1) (I10)   8  Breast pain (611 71) (N64 4)   9  Cellulitis of right upper extremity (682 3) (L03 113)   10  Cervical radiculopathy (723 4) (M54 12)   11  Changing skin lesion (709 9) (L98 9)   12  Chest pain (786 50) (R07 9)   13  Diabetes mellitus type 2, uncontrolled (250 02) (E11 65)   14  Encounter for prostate cancer screening (V76 44) (Z12 5)   15  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)   16  Esophageal reflux (530 81) (K21 9)   17  Fatigue (780 79) (R53 83)   18  Flu vaccine need (V04 81) (Z23)   19  Foot tendinitis (727 06) (M77 50)   20  Hearing loss, conductive (389 00) (H90 2)   21  Hypercholesterolemia (272 0) (E78 00)   22  Mitral valve disorder (424 0) (I05 9)   23  Morbid or severe obesity due to excess calories (278 01) (E66 01)   24   Need for prophylactic vaccination against Streptococcus pneumoniae (pneumococcus)    (V03 82) (Z23)   25  No advance directive on file (V49 89) (Z78 9)   26  Obesity (278 00) (E66 9)   27  Organic impotence (607 84) (N52 9)   28  Paroxysmal supraventricular tachycardia (427 0) (I47 1)   29  Peripheral neuropathy (356 9) (G62 9)   30  Rosacea (695 3) (L71 9)   31  Screening for skin condition (V82 0) (Z13 89)   32  Seborrheic dermatitis (690 10) (L21 9)   33  Seborrheic keratosis (702 19) (L82 1)   34  Sleep apnea (780 57) (G47 30)   35  Spasm of lumbar paraspinous muscle (724 8) (M62 830)   36  Supraventricular tachycardia (427 89) (I47 1)   37  Thoracic radiculopathy (724 4) (M54 14)   38  Tick bite (919 4,E906 4) (W57 XXXA)   39  Tinea pedis of left foot (110 4) (B35 3)   40  Urinary frequency (788 41) (R35 0)   41  Xerostomia (527 7) (R68 2)    Past Medical History  1  History of Abdominal pain, RUQ (789 01) (R10 11)   2  History of Acute sinusitis (461 9) (J01 90)   3  History of Adhesive capsulitis of shoulder, unspecified laterality (726 0) (M75 00)   4  History of Arthralgia Of Hand (719 44)   5  History of Back pain (724 5) (M54 9)   6  History of Chronic maxillary sinusitis (473 0) (J32 0)   7  History of Colonoscopy (Fiberoptic) Screening   8  History of Contact dermatitis (692 9) (L25 9)   9  History of Cough (786 2) (R05)   10  History of Diarrhea (787 91) (R19 7)   11  History of Dysuria (788 1) (R30 0)   12  History of Flu vaccine need (V04 81) (Z23)   13  History of Gout (274 9) (M10 9)   14  H/O nonmelanoma skin cancer (V10 83) (Z85 828)   15  History of atopic dermatitis (V13 3) (Z87 2)   16  History of chest pain (V13 89) (Z87 898)   17  History of herpes zoster (V12 09) (Z86 19)   18  History of sciatica (V12 49) (Z86 69)   19  History of shortness of breath (V13 89) (Z87 898)   20  History of Incisional hernia (553 21) (K43 2)   21  History of Malignant Skin Neoplasm (V10 83)   22  History of Palpitations (785 1) (R00 2)   23   History of Plantar fasciitis (728 71) (M72 2)   24  History of Squamous Cell Carcinoma Of The Skin Of The Scalp (173 42)   25  History of Tinnitus, unspecified laterality (388 30) (H93 19)   26  History of Type 2 diabetes mellitus with hyperglycemia (250 00) (E11 65)   27  History of Umbilical hernia (061 8) (K42 9)   28  History of Worms in stool (128 9) (B83 9)  Past Medical History Reviewed- Derm:   The past medical history was reviewed  Surgical History  1  History of Appendectomy   2  History of Excision Of Lesion Scalp Malignant   3  History of Incisional Hernia Repair   4  History of Partial Colectomy - Sigmoid   5  History of Tonsillectomy  Surgical History Reviewed [facility]:   Surgical History reviewed      Family History  Mother    1  Family history of Acute Myocardial Infarction (V17 3)   2  Family history of Adenocarcinoma Of The Breast (V16 3)   3  Family history of Adenocarcinoma Of The Skin   4  Family history of Congestive Heart Failure   5  Family history of Type 2 Diabetes Mellitus   6  Family history of Varicose Veins Of Lower Extremities  Father    7  Family history of Adenocarcinoma Of The Prostate Gland (V16 42)   8  Family history of Carcinoma Of The Skin   9  Family history of Non-Hodgkin's Lymphoma  Family History Reviewed- Derm:   Family History was reviewed      Social History   · Being A Social Drinker   · Marital History - Currently    · Never A Smoker   · Never Used Drugs   · No advance directive on file (V49 89) (Z78 9)   · Social alcohol use (Z78 9)   · Tobacco non-user (V49 89) (Z78 9)   · Working Part-time  Social History Reviewed [facility]: The social history was reviewed      Current Meds   1  Aspir-Low 81 MG Oral Tablet Delayed Release; TAKE 1 TABLET DAILY; Therapy: 09XZC0723 to Recorded   2  Clotrimazole-Betamethasone 1-0 05 % External Cream; APPLY  AND RUB  IN A THIN   FILM TO AFFECTED AREAS TWICE DAILY  (AM AND PM);    Therapy: 09HVH5049 to (Last Rx:28Nov2016)  Requested for: 58TNT5034 Ordered   3  Cyclobenzaprine HCl - 10 MG Oral Tablet; TAKE 1 TABLET THREE TIMES A DAY AS   NEEDED FOR BACK SPASM; Therapy: 73YHR8822 to (Agata Taylor)  Requested for: 24Ycn3690; Last   Rx:10Xva5472 Ordered   4  Doxycycline Hyclate 100 MG Oral Capsule; Take 1 capsule twice daily; Therapy: 23IDM1993 to (Evaluate:10Nov2017)  Requested for: 56Xay8174; Last   Rx:71Ljr7013 Ordered   5  EpiPen 2-David 0 3 MG/0 3ML Injection Solution Auto-injector; Therapy: 23LIB6853 to (Evaluate:07Nov2014) Recorded   6  Fluticasone Propionate 50 MCG/ACT Nasal Suspension; PLACE 2 SPRAYS IN EACH   NOSTRIL DAILY; Therapy: 94WVW7077 to (Bhavana Gonzalez)  Requested for: 04NXC2763; Last   Rx:95Ndd9580 Ordered   7  FreeStyle Lite Test In Vitro Strip; TEST THREE TIMES DAILY,DX E11 65; Therapy: 42ZSP6195 to (Evaluate:56Hth9964)  Requested for: 96Ldx4616; Last   Rx:55Iwm4419 Ordered   8  FreeStyle Unistick II Lancets Miscellaneous; TEST TWICE A DAY; Therapy: 50GMA0001 to (Last BV:15HOE4158)  Requested for: 88MMT2256 Ordered   9  Glimepiride 4 MG Oral Tablet; take one tablet by mouth twice daily; Therapy: 74FGL3187 to (Evaluate:16Jan2018)  Requested for: 20Jun2017; Last   Rx:35Lpv7056 Ordered   10  Januvia 100 MG Oral Tablet; take 1 tablet by mouth every day; Therapy: 84OCI0469 to (Evaluate:19Uxm5369)  Requested for: 04TVP0618; Last    Rx:57Gjm7405 Ordered   11  Ketoconazole 2 % External Cream; APPLY TO AFFECTED AREA TWICE A DAY; Therapy: 41UPO2865 to (Evaluate:64Iva5941)  Requested for: 32RDD7544; Last    Rx:47Wbj5410 Ordered   12  Levalbuterol Tartrate 45 MCG/ACT Inhalation Aerosol; INHALE 1 PUFF EVERY 4 HOURS    AS NEEDED; Therapy: 12Apv8030 to (Last Rx:50Ttv4973)  Requested for: 87Kvt2233 Ordered   13  Loratadine 10 MG Oral Tablet Recorded   14  LORazepam 1 MG Oral Tablet; TAKE 1 TABLET BY MOUTH EVERY DAY AS NEEDED FOR    ANXIETY;     Therapy: 51BSE4836 to ([phone])  Requested for: 85NEV0490; Last    Rx:10Nov2015 Ordered   15  MetFORMIN HCl - 1000 MG Oral Tablet; take 1 tablet by mouth twice a day with food; Therapy: 74LKM6609 to (Evaluate:04Gqu5381)  Requested for: 60GTC2182; Last    Rx:18Oct2017 Ordered   16  Montelukast Sodium 10 MG Oral Tablet; take 1 tablet every day; Therapy: 36HTJ2019 to (Evaluate:08Piv8988)  Requested for: 27HMU6250; Last    Rx:18Oct2017 Ordered   17  Multivitamins Oral Capsule; TAKE 1 CAPSULE DAILY; Therapy: 36FWW7640 to Recorded   18  Omeprazole 20 MG Oral Capsule Delayed Release; TAKE ONE CAPSULE BY MOUTH    EVERY DAY; Therapy: 73YXL3165 to (Evaluate:81Oyy2989)  Requested for: 55Tkj0181; Last    Rx:20Wyn2787 Ordered   19  ProAir  (90 Base) MCG/ACT Inhalation Aerosol Solution; INHALE 1 TO 2 PUFFS    EVERY 4 TO 6 HOURS AS NEEDED; Therapy: 84KGY2511 to (Last FAB:02FPC1361)  Requested for: 98OPD2450 Ordered   20  Triamcinolone Acetonide 0 1 % External Ointment; APPLY AND GENTLY MASSAGE INTO    AFFECTED AREA(S) TWICE DAILY; Therapy: 24TJL3469 to (Last Rx:58Xaa6371)  Requested for: 07AZG9841 Ordered   21  Verapamil HCl  MG Oral Capsule Extended Release 24 Hour; TAKE ONE    CAPSULE BY MOUTH TWICE A DAY; Therapy: 46PCJ5890 to (Evaluate:16Apr2018)  Requested for: 10UFX7746; Last    Rx:18Oct2017 Ordered   22  Viagra 100 MG Oral Tablet; take as directed; Therapy: 36Qqu2040 to (Evaluate:23Mar2017)  Requested for: 67Zuz5601; Last    Rx:69Ccr4929 Ordered  Medication List Reviewed: The medication list was reviewed and updated today  Allergies  1  IVP Dye   2  Shellfish    Physical Exam    Constitutional   General appearance: Appears healthy and well developed  Lymphatic   No visible disturbance  Musculoskeletal   Digits and nails: No clubbing, cyanosis or edema  Cutaneous and nail exam normal     Skin   Scalp skin texture and hair distribution: Abnormal     Head: Abnormal     Neck: Normal turgor, no rashes, no lesions      Chest: Normal turgor, no rashes, no lesions  Abdomen: Normal turgor, no rashes, no lesions  Back: Normal turgor, no rashes, no lesions  Right upper extremity: Abnormal     Left upper extremity: Abnormal     Right lower extremity: Abnormal     Left lower extremity: Normal turgor, no rashes, no lesions  Neuro/Psych   Alert and oriented x 3  Displays comfort and cooperation during encounterl  Affect is normal     Finding Scaling erythematous well-demarcated patches noted on the elbows and plantar surface of the right foot diffuse erythema scaling noted on the face and scalp some appear more papular nothing else remarkable noted exam normal keratotic papules with greasy stuck on appearance previous sites of skin cancer well healed  Procedure    Procedure: skin biopsy  Indications for the procedure include To differentiate between actinic keratosis versus seborrheic dermatitis  Risks, benefits, alternatives, infection risk, bleeding risk, risk of allergic reaction and risk of scarring were discussed with the patient--   verbal consent was obtained prior to the procedure  Procedure Note:   Anesthesia: 1 ml of lidocaine 1% with epinephrine  The lesion was located on the Scalp  The patient was prepped using alcohol  a shave biopsy of the lesion was taken  The hemostasis of the wound was achieved with aluminum chloride  Dressing: The wound was cleaned and petroleum jelly was applied and a sterile dressing was placed  Specimen: the excised lesion was place in buffered formalin and sent for pathology  Post-Procedure:   Patient Status: the patient tolerated the procedure well  Complications: there were no complications  Follow-up in the office  patient will be called in event skin cancer is found  -- Patient can call the office in 7-10 days for results if not contacted  Health Management  History of Secondary DM with renal manifestations   *VB - Eye Exam; every 1 year;  Last 42TSJ2006; Next Due: 80Rap5880; Near Due  Health Maintenance   COLONOSCOPY; every 6 years; Last 96UPN3247; Next Due: 12GPH1846; Active  Influenza (Split); every 1 year; Last 98QAG6168; Next Due: 72IML7298; Overdue  Tdap (Adacel); every 10 years; Last 83HHL2659; Next Due: 75Gpe8635; Active    Future Appointments    Date/Time Provider Specialty Site   11/16/2017 09:00 AM Minna Dominguez [facility] Internal Medicine [facility]   12/01/2017 08:40 ELSA Gonzalez  [facility]   01/23/2018 10:30 AM ELSA Mosqueda   Internal Medicine [facility]     Signatures   Electronically signed by : ELSA Tomlinson ; Oct 26 2017 10:56AM EST                       (Author)

## 2017-11-06 DIAGNOSIS — E78.00 PURE HYPERCHOLESTEROLEMIA: ICD-10-CM

## 2017-11-06 DIAGNOSIS — Z12.5 ENCOUNTER FOR SCREENING FOR MALIGNANT NEOPLASM OF PROSTATE: ICD-10-CM

## 2017-11-06 DIAGNOSIS — E11.65 TYPE 2 DIABETES MELLITUS WITH HYPERGLYCEMIA (HCC): ICD-10-CM

## 2017-11-09 ENCOUNTER — APPOINTMENT (OUTPATIENT)
Dept: LAB | Facility: CLINIC | Age: 67
End: 2017-11-09
Payer: MEDICARE

## 2017-11-09 DIAGNOSIS — Z12.5 ENCOUNTER FOR SCREENING FOR MALIGNANT NEOPLASM OF PROSTATE: ICD-10-CM

## 2017-11-09 DIAGNOSIS — E11.65 TYPE 2 DIABETES MELLITUS WITH HYPERGLYCEMIA (HCC): ICD-10-CM

## 2017-11-09 DIAGNOSIS — E78.00 PURE HYPERCHOLESTEROLEMIA: ICD-10-CM

## 2017-11-09 LAB
ALBUMIN SERPL BCP-MCNC: 3.9 G/DL (ref 3.5–5)
ALP SERPL-CCNC: 49 U/L (ref 46–116)
ALT SERPL W P-5'-P-CCNC: 29 U/L (ref 12–78)
ANION GAP SERPL CALCULATED.3IONS-SCNC: 8 MMOL/L (ref 4–13)
AST SERPL W P-5'-P-CCNC: 16 U/L (ref 5–45)
BASOPHILS # BLD AUTO: 0.03 THOUSANDS/ΜL (ref 0–0.1)
BASOPHILS NFR BLD AUTO: 0 % (ref 0–1)
BILIRUB SERPL-MCNC: 0.87 MG/DL (ref 0.2–1)
BILIRUB UR QL STRIP: NEGATIVE
BUN SERPL-MCNC: 17 MG/DL (ref 5–25)
CALCIUM SERPL-MCNC: 9.2 MG/DL (ref 8.3–10.1)
CHLORIDE SERPL-SCNC: 103 MMOL/L (ref 100–108)
CHOLEST SERPL-MCNC: 185 MG/DL (ref 50–200)
CLARITY UR: CLEAR
CO2 SERPL-SCNC: 28 MMOL/L (ref 21–32)
COLOR UR: YELLOW
CREAT SERPL-MCNC: 1.01 MG/DL (ref 0.6–1.3)
CREAT UR-MCNC: 96.2 MG/DL
EOSINOPHIL # BLD AUTO: 0.34 THOUSAND/ΜL (ref 0–0.61)
EOSINOPHIL NFR BLD AUTO: 4 % (ref 0–6)
ERYTHROCYTE [DISTWIDTH] IN BLOOD BY AUTOMATED COUNT: 12.8 % (ref 11.6–15.1)
EST. AVERAGE GLUCOSE BLD GHB EST-MCNC: 174 MG/DL
GFR SERPL CREATININE-BSD FRML MDRD: 77 ML/MIN/1.73SQ M
GLUCOSE P FAST SERPL-MCNC: 163 MG/DL (ref 65–99)
GLUCOSE UR STRIP-MCNC: NEGATIVE MG/DL
HBA1C MFR BLD: 7.7 % (ref 4.2–6.3)
HCT VFR BLD AUTO: 46.2 % (ref 36.5–49.3)
HDLC SERPL-MCNC: 42 MG/DL (ref 40–60)
HGB BLD-MCNC: 15.8 G/DL (ref 12–17)
HGB UR QL STRIP.AUTO: NEGATIVE
KETONES UR STRIP-MCNC: ABNORMAL MG/DL
LDLC SERPL CALC-MCNC: 113 MG/DL (ref 0–100)
LEUKOCYTE ESTERASE UR QL STRIP: NEGATIVE
LYMPHOCYTES # BLD AUTO: 2.08 THOUSANDS/ΜL (ref 0.6–4.47)
LYMPHOCYTES NFR BLD AUTO: 27 % (ref 14–44)
MCH RBC QN AUTO: 29.8 PG (ref 26.8–34.3)
MCHC RBC AUTO-ENTMCNC: 34.2 G/DL (ref 31.4–37.4)
MCV RBC AUTO: 87 FL (ref 82–98)
MICROALBUMIN UR-MCNC: 7.7 MG/L (ref 0–20)
MICROALBUMIN/CREAT 24H UR: 8 MG/G CREATININE (ref 0–30)
MONOCYTES # BLD AUTO: 0.71 THOUSAND/ΜL (ref 0.17–1.22)
MONOCYTES NFR BLD AUTO: 9 % (ref 4–12)
NEUTROPHILS # BLD AUTO: 4.67 THOUSANDS/ΜL (ref 1.85–7.62)
NEUTS SEG NFR BLD AUTO: 60 % (ref 43–75)
NITRITE UR QL STRIP: NEGATIVE
NRBC BLD AUTO-RTO: 0 /100 WBCS
PH UR STRIP.AUTO: 6.5 [PH] (ref 4.5–8)
PLATELET # BLD AUTO: 205 THOUSANDS/UL (ref 149–390)
PMV BLD AUTO: 11.1 FL (ref 8.9–12.7)
POTASSIUM SERPL-SCNC: 4.1 MMOL/L (ref 3.5–5.3)
PROT SERPL-MCNC: 7.5 G/DL (ref 6.4–8.2)
PROT UR STRIP-MCNC: NEGATIVE MG/DL
PSA SERPL-MCNC: 0.6 NG/ML (ref 0–4)
RBC # BLD AUTO: 5.3 MILLION/UL (ref 3.88–5.62)
SODIUM SERPL-SCNC: 139 MMOL/L (ref 136–145)
SP GR UR STRIP.AUTO: 1.02 (ref 1–1.03)
TRIGL SERPL-MCNC: 149 MG/DL
TSH SERPL DL<=0.05 MIU/L-ACNC: 1.02 UIU/ML (ref 0.36–3.74)
UROBILINOGEN UR QL STRIP.AUTO: 1 E.U./DL
WBC # BLD AUTO: 7.85 THOUSAND/UL (ref 4.31–10.16)

## 2017-11-09 PROCEDURE — 82043 UR ALBUMIN QUANTITATIVE: CPT

## 2017-11-09 PROCEDURE — 84443 ASSAY THYROID STIM HORMONE: CPT

## 2017-11-09 PROCEDURE — 82570 ASSAY OF URINE CREATININE: CPT

## 2017-11-09 PROCEDURE — 80061 LIPID PANEL: CPT

## 2017-11-09 PROCEDURE — 81003 URINALYSIS AUTO W/O SCOPE: CPT

## 2017-11-09 PROCEDURE — 36415 COLL VENOUS BLD VENIPUNCTURE: CPT

## 2017-11-09 PROCEDURE — 80053 COMPREHEN METABOLIC PANEL: CPT

## 2017-11-09 PROCEDURE — 83036 HEMOGLOBIN GLYCOSYLATED A1C: CPT

## 2017-11-09 PROCEDURE — 85025 COMPLETE CBC W/AUTO DIFF WBC: CPT

## 2017-11-09 PROCEDURE — G0103 PSA SCREENING: HCPCS

## 2017-11-16 ENCOUNTER — ALLSCRIPTS OFFICE VISIT (OUTPATIENT)
Dept: OTHER | Facility: OTHER | Age: 67
End: 2017-11-16

## 2017-12-01 ENCOUNTER — ALLSCRIPTS OFFICE VISIT (OUTPATIENT)
Dept: OTHER | Facility: OTHER | Age: 67
End: 2017-12-01

## 2017-12-05 NOTE — PROGRESS NOTES
Assessment  Assessed    1  Benign essential hypertension (401 1) (I10)   2  Hypercholesterolemia (272 0) (E78 00)   3  Paroxysmal supraventricular tachycardia (427 0) (I47 1)   4  Obesity (278 00) (E66 9)    Plan  Benign essential hypertension    · Follow-up visit in 6 months Evaluation and Treatment  Follow-up  Status: Hold For -  Scheduling  Requested for: 67KVL0497   Ordered; For: Benign essential hypertension; Ordered By: Zen Gross Performed:  Due: 99BIM3496    Discussion/Summary  Cardiology Discussion Summary Free Text Note Form [facility]:   Pt is cardiovascularly stable  fu with pcp  Continue all meds  Report any symptoms  All questions answered  Previous studies reviewed with patient, medications reviewed and possible side effects discussed  Continue risk factor modification  All questions answered  Safety measures reviewed  Patient advised to report any problems prompting medical attention  Return for follow up visit in 6 months or earlier if needed  Goals and Barriers: The patient has the current Goals: Good bp  good cholesterol  The patent has the current Barriers: None  Patient's Capacity to Self-Care: Patient is able to Self-Care  Patient Education: Educational resources provided: See [facility]    Medication SE Review and Pt Understands Tx: Possible side effects of new medications were reviewed with the patient/guardian today  The treatment plan was reviewed with the patient/guardian  The patient/guardian understands and agrees with the treatment plan   Counseling Documentation With Imm: The patient was counseled regarding diagnostic results, instructions for management, risk factor reductions, prognosis, patient and family education, risks and benefits of treatment options, importance of compliance with treatment        Chief Complaint  Chief Complaint Free Text Note Form: followup visit     Chief Complaint Chronic Condition [facility]: Patient is here today for follow up of chronic conditions described in HPI  History of Present Illness  Cardiology HPI Free Text Note Form [facility]: Patient presents for follow-up visit with history of hypertension, hyperlipidemia, paroxysmal SVT  Patient states he has been feeling well denies any chest pain, chest pressure, chest heaviness, shortness of breath  He denies any dizziness, syncope, presyncope  Patient is taking all medications and does not need refills at this time  He is planning a cross-country trip for next year  He follows with Dr Loc Nunez for primary care  Review of Systems  Cardiology Male ROS:     Cardiac: No complaints of chest pain, no palpitations, no fainiting  and as noted in HPI  Skin: No complaints of nonhealing sores or skin rash  Genitourinary: No complaints of recurrent urinary tract infections, frequent urination at night, difficult urination, blood in urine, kidney stones, loss of bladder control, no kidney or prostate problems, no erectile dysfunction  Psychological: No complaints of feeling depressed, anxiety, panic attacks, or difficulty concentrating  General: No complaints of trouble sleeping, lack of energy, fatigue, appetite changes, weight changes, fever, frequent infections, or night sweats  Respiratory: No complaints of shortness of breath, cough with sputum, or wheezing  HEENT: No complaints of serious problems, hearing problems, nose problems, throat problems, or snoring  Gastrointestinal: No complaints of liver problems, nausea, vomiting, heartburn, constipation, bloody stools, diarrhea, problems swallowing, adbominal pain, or rectal bleeding  Hematologic: No complaints of bleeding disorders, anemia, blood clots, or excessive brusing  Neurological: No complaints of numbness, tingling, dizziness, weakness, seizures, headaches, syncope or fainting, AM fatigue, daytime sleepiness, no witnessed apnea episodes  Musculoskeletal: No complaints of arthritis, back pain, or painfull swelling     ROS Reviewed:   ROS reviewed  Active Problems  Problems    1  Actinic keratosis (702 0) (L57 0)   2  Active advance directive (V49 89) (Z78 9)   3  Acute URI (465 9) (J06 9)   4  Allergic rhinitis (477 9) (J30 9)   5  Anxiety disorder (300 00) (F41 9)   6  Asthma (493 90) (J45 909)   7  Asthma with exacerbation (493 92) (J45 901)   8  Benign essential hypertension (401 1) (I10)   9  Breast pain (611 71) (N64 4)   10  Cellulitis of right upper extremity (682 3) (L03 113)   11  Cervical radiculopathy (723 4) (M54 12)   12  Changing skin lesion (709 9) (L98 9)   13  Chest pain (786 50) (R07 9)   14  Diabetes mellitus type 2, uncontrolled (250 02) (E11 65)   15  Encounter for prostate cancer screening (V76 44) (Z12 5)   16  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)   17  Esophageal reflux (530 81) (K21 9)   18  Fatigue (780 79) (R53 83)   19  Flu vaccine need (V04 81) (Z23)   20  Foot tendinitis (727 06) (M77 50)   21  Hearing loss, conductive (389 00) (H90 2)   22  Hypercholesterolemia (272 0) (E78 00)   23  Mitral valve disorder (424 0) (I05 9)   24  Morbid or severe obesity due to excess calories (278 01) (E66 01)   25  Need for prophylactic vaccination against Streptococcus pneumoniae (pneumococcus)    (V03 82) (Z23)   26  Obesity (278 00) (E66 9)   27  Organic impotence (607 84) (N52 9)   28  Paroxysmal supraventricular tachycardia (427 0) (I47 1)   29  Peripheral neuropathy (356 9) (G62 9)   30  Rosacea (695 3) (L71 9)   31  Screening for genitourinary condition (V81 6) (Z13 89)   32  Screening for skin condition (V82 0) (Z13 89)   33  Seborrheic dermatitis (690 10) (L21 9)   34  Seborrheic keratosis (702 19) (L82 1)   35  Sleep apnea (780 57) (G47 30)   36  Spasm of lumbar paraspinous muscle (724 8) (M62 830)   37  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 (232 4) (D04 4)   38  Supraventricular tachycardia (427 89) (I47 1)   39  Thoracic radiculopathy (724 4) (M54 14)   40   Tick bite (919 4,E906 4) (W57 XXXA)   41  Tinea pedis of left foot (110 4) (B35 3)   42  Urinary frequency (788 41) (R35 0)   43  Xerostomia (527 7) (R68 2)    Past Medical History  Problems    1  History of Abdominal pain, RUQ (789 01) (R10 11)   2  History of Acute sinusitis (461 9) (J01 90)   3  History of Adhesive capsulitis of shoulder, unspecified laterality (726 0) (M75 00)   4  History of Arthralgia Of Hand (719 44)   5  History of Back pain (724 5) (M54 9)   6  History of Chronic maxillary sinusitis (473 0) (J32 0)   7  History of Colonoscopy (Fiberoptic) Screening   8  History of Contact dermatitis (692 9) (L25 9)   9  History of Cough (786 2) (R05)   10  History of Diarrhea (787 91) (R19 7)   11  History of Dysuria (788 1) (R30 0)   12  History of Flu vaccine need (V04 81) (Z23)   13  History of Gout (274 9) (M10 9)   14  H/O nonmelanoma skin cancer (V10 83) (Z85 828)   15  History of atopic dermatitis (V13 3) (Z87 2)   16  History of chest pain (V13 89) (Z87 898)   17  History of herpes zoster (V12 09) (Z86 19)   18  History of sciatica (V12 49) (Z86 69)   19  History of shortness of breath (V13 89) (Z87 898)   20  History of Incisional hernia (553 21) (K43 2)   21  History of Malignant Skin Neoplasm (V10 83)   22  History of Palpitations (785 1) (R00 2)   23  History of Plantar fasciitis (728 71) (M72 2)   24  History of Squamous Cell Carcinoma Of The Skin Of The Scalp (173 42)   25  History of Tinnitus, unspecified laterality (388 30) (H93 19)   26  History of Type 2 diabetes mellitus with hyperglycemia (250 00) (E11 65)   27  History of Umbilical hernia (082 1) (K42 9)   28  History of Worms in stool (128 9) (B83 9)  Active Problems And Past Medical History Reviewed: The active problems and past medical history were reviewed and updated today  Surgical History  Problems    1  History of Appendectomy   2  History of Excision Of Lesion Scalp Malignant   3  History of Incisional Hernia Repair   4   History of Partial Colectomy - Sigmoid   5  History of Tonsillectomy  Surgical History Reviewed: The surgical history was reviewed and updated today  Family History  Mother    1  Family history of Acute Myocardial Infarction (V17 3)   2  Family history of Adenocarcinoma Of The Breast (V16 3)   3  Family history of Adenocarcinoma Of The Skin   4  Family history of Congestive Heart Failure   5  Family history of Type 2 Diabetes Mellitus   6  Family history of Varicose Veins Of Lower Extremities  Father    7  Family history of Adenocarcinoma Of The Prostate Gland (V16 42)   8  Family history of Carcinoma Of The Skin   9  Family history of Non-Hodgkin's Lymphoma  Family History Reviewed: The family history was reviewed and updated today  Social History  Problems    · Active advance directive (V49 89) (Z78 9)   · Being A Social Drinker   · Marital History - Currently    · Never A Smoker   · Never Used Drugs   · Non-tobacco user (V49 89) (Z78 9)   · Social alcohol use (Z78 9)   · Tobacco non-user (V49 89) (Z78 9)   · Working Part-time  Social History Reviewed: The social history was reviewed and updated today  The social history was reviewed and is unchanged  Current Meds   1  Aspir-Low 81 MG Oral Tablet Delayed Release; TAKE 1 TABLET DAILY; Therapy: 92UBW7886 to Recorded   2  Clotrimazole-Betamethasone 1-0 05 % External Cream; APPLY  AND RUB  IN A THIN   FILM TO AFFECTED AREAS TWICE DAILY  (AM AND PM); Therapy: 47KMZ4830 to (Last Rx:28Nov2016)  Requested for: 28Nov2016 Ordered   3  Cyclobenzaprine HCl - 10 MG Oral Tablet; TAKE 1 TABLET THREE TIMES A DAY AS   NEEDED FOR BACK SPASM; Therapy: 69KJE5202 to (Yong Duane)  Requested for: 42Lip2871; Last   Rx:11Eey5163 Ordered   4  Doxycycline Hyclate 100 MG Oral Capsule; Take 1 capsule twice daily; Therapy: 88XEQ8695 to (Evaluate:68Iob7862)  Requested for: 14DFA6824; Last   Rx:13Nov2017 Ordered   5   EpiPen 2-David 0 3 MG/0 3ML Injection Solution Auto-injector; Therapy: 23UEB4120 to (Evaluate:07Nov2014) Recorded   6  Fluticasone Propionate 50 MCG/ACT Nasal Suspension; PLACE 2 SPRAYS IN EACH   NOSTRIL DAILY; Therapy: 52XEZ8216 to (Kerrie Narayanan)  Requested for: 65HEI2328; Last   Rx:07Kgd3892 Ordered   7  FreeStyle Lite Test In Vitro Strip; TEST THREE TIMES DAILY,DX E11 65; Therapy: 72AUV8225 to (Evaluate:76Ksb4981)  Requested for: 07Ttx6873; Last   Rx:66Saq1539 Ordered   8  FreeStyle Unistick II Lancets Miscellaneous; TEST TWICE A DAY; Therapy: 56MXQ3056 to (Last GP:89ENZ8229)  Requested for: 99XHU2593 Ordered   9  Glimepiride 4 MG Oral Tablet; take one tablet by mouth twice daily; Therapy: 13BZD2815 to (Evaluate:16Jan2018)  Requested for: 36Rqg6465; Last   Rx:29Lei8014 Ordered   10  Januvia 100 MG Oral Tablet; take 1 tablet by mouth every day; Therapy: 49AZB1874 to (Evaluate:54Kxt4919)  Requested for: 66HAJ9991; Last    Rx:81Ttl2724 Ordered   11  Ketoconazole 2 % External Cream; APPLY TO AFFECTED AREA SPARINGLY TWICE    DAILY Face and scalp for 3 weeks; Therapy: 57GXD0137 to (Last Rx:54Nsw1066)  Requested for: 43Wyl6532 Ordered   12  Ketoconazole 2 % External Cream; APPLY TO AFFECTED AREA TWICE A DAY; Therapy: 72QRL1288 to (Evaluate:07Epo0236)  Requested for: 47TBN3591; Last    Rx:72Bzp4786 Ordered   13  Levalbuterol Tartrate 45 MCG/ACT Inhalation Aerosol; INHALE 1 PUFF EVERY 4 HOURS    AS NEEDED; Therapy: 45Vpv0966 to (Last Rx:87Qck4641)  Requested for: 99Dqr4007 Ordered   14  Loratadine 10 MG Oral Tablet Recorded   15  LORazepam 1 MG Oral Tablet; TAKE 1 TABLET BY MOUTH EVERY DAY AS NEEDED FOR    ANXIETY; Therapy: 25OGI6007 to (Dominick Saint)  Requested for: 82HOK5932; Last    Rx:10Nov2015 Ordered   16  MetFORMIN HCl - 1000 MG Oral Tablet; take 1 tablet by mouth twice a day with food; Therapy: 83UEN6220 to (Evaluate:16Apr2018)  Requested for: 28JLS7559; Last    Rx:18Oct2017 Ordered   17   Montelukast Sodium 10 MG Oral Tablet; take 1 tablet every day; Therapy: 75KFV1512 to (Evaluate:47Fad0759)  Requested for: 57JJB2306; Last    Rx:92Svj6485 Ordered   18  Multivitamins Oral Capsule; TAKE 1 CAPSULE DAILY; Therapy: 64JOQ9868 to Recorded   19  Omeprazole 20 MG Oral Capsule Delayed Release; TAKE ONE CAPSULE BY MOUTH    EVERY DAY; Therapy: 43FRP5315 to (Evaluate:28Hbn7955)  Requested for: 28Ufb9460; Last    Rx:64Dzb0211 Ordered   20  ProAir  (90 Base) MCG/ACT Inhalation Aerosol Solution; INHALE 1 TO 2 PUFFS    EVERY 4 TO 6 HOURS AS NEEDED; Therapy: 10KFL1199 to (Last XK:50GIL1222)  Requested for: 34QBC3365 Ordered   21  Triamcinolone Acetonide 0 1 % External Ointment; APPLY AND GENTLY MASSAGE INTO    AFFECTED AREA(S) TWICE DAILY; Therapy: 95ESI7162 to (Last Rx:13Qxx8754)  Requested for: 51NDA9236 Ordered   22  Verapamil HCl  MG Oral Capsule Extended Release 24 Hour; TAKE ONE    CAPSULE BY MOUTH TWICE A DAY; Therapy: 48IJH4890 to (Evaluate:05Uaq6404)  Requested for: 58JFE4969; Last    Rx:09Dqt6375 Ordered   23  Viagra 100 MG Oral Tablet; take as directed; Therapy: 32Ucx9282 to (Evaluate:23Mar2017)  Requested for: 27Qif9121; Last    Rx:10Fps1987 Ordered  Medication List Reviewed: The medication list was reviewed and updated today  Allergies  Non-Medication    1  IVP Dye   2  No Known Environmental Allergies   3  Shellfish    Vitals  Vital Signs    Recorded: 03CTF2031 08:45AM   Heart Rate 93   Systolic 689   Diastolic 88   Height 5 ft 6 in   Weight 197 lb    BMI Calculated 31 8   BSA Calculated 1 99   O2 Saturation 97     Physical Exam    Constitutional   General appearance: No acute distress, well appearing and well nourished  Eyes   Conjunctiva and Sclera examination: Conjunctiva pink, sclera anicteric  Ears, Nose, Mouth, and Throat - Oropharynx: Clear, nares are clear, mucous membranes are moist    Neck   Neck and thyroid: Normal, supple, trachea midline, no thyromegaly      Pulmonary Respiratory effort: No increased work of breathing or signs of respiratory distress  Auscultation of lungs: Clear to auscultation, no rales, no rhonchi, no wheezing, good air movement  Cardiovascular   Auscultation of heart: Normal rate and rhythm, normal S1 and S2, no murmurs  Carotid pulses: Normal, 2+ bilaterally  Peripheral vascular exam: Normal pulses throughout, no tenderness, erythema or swelling  Pedal pulses: Normal, 2+ bilaterally  Examination of extremities for edema and/or varicosities: Normal     Abdomen   Abdomen: Non-tender and no distention  Liver and spleen: No hepatomegaly or splenomegaly  Musculoskeletal Gait and station: Normal gait  Digits and nails: Normal without clubbing or cyanosis  Inspection/palpation of joints, bones, and muscles: Normal, ROM normal     Skin - Skin and subcutaneous tissue: Normal without rashes or lesions  Skin is warm and well perfused, normal turgor  Neurologic - Cranial nerves: II - XII intact  Speech: Normal     Psychiatric - Orientation to person, place, and time: Normal  Mood and affect: Normal       Health Management  History of Secondary DM with renal manifestations   *VB - Eye Exam; every 1 year; Last 70EHN0073; Next Due: 20WEI6232; Active  Health Maintenance   COLONOSCOPY; every 6 years; Last 46ZGK4986; Next Due: 01KBG4499; Active  Influenza (Split); every 1 year; Last 91YAM7247; Next Due: 52WXP0196; Overdue  Tdap (Adacel); every 10 years; Last 24PGU6074; Next Due: 85Erj7446; Active    Future Appointments    Date/Time Provider Specialty Site   01/23/2018 10:30 AM ELSA Hoyt   Internal Medicine [facility]   12/28/2017 09:00 AM Nurse Mercy Roberts  [facility]     Signatures   Electronically signed by : Kemi Weaver [facility]; Dec  1 2017  9:02AM EST                       (Author)

## 2017-12-13 LAB
LEFT EYE DIABETIC RETINOPATHY: NORMAL
RIGHT EYE DIABETIC RETINOPATHY: NORMAL

## 2017-12-14 DIAGNOSIS — E11.65 TYPE 2 DIABETES MELLITUS WITH HYPERGLYCEMIA (HCC): ICD-10-CM

## 2018-01-06 ENCOUNTER — HOSPITAL ENCOUNTER (EMERGENCY)
Facility: HOSPITAL | Age: 68
Discharge: HOME | End: 2018-01-06
Attending: EMERGENCY MEDICINE | Admitting: EMERGENCY MEDICINE
Payer: MEDICARE

## 2018-01-06 VITALS
HEART RATE: 107 BPM | TEMPERATURE: 97.9 F | SYSTOLIC BLOOD PRESSURE: 148 MMHG | HEIGHT: 66 IN | RESPIRATION RATE: 18 BRPM | WEIGHT: 190 LBS | OXYGEN SATURATION: 96 % | BODY MASS INDEX: 30.53 KG/M2 | DIASTOLIC BLOOD PRESSURE: 81 MMHG

## 2018-01-06 DIAGNOSIS — S60.455A FOREIGN BODY OF LEFT RING FINGER: Primary | ICD-10-CM

## 2018-01-06 PROCEDURE — 90715 TDAP VACCINE 7 YRS/> IM: CPT | Performed by: EMERGENCY MEDICINE

## 2018-01-06 PROCEDURE — 90471 IMMUNIZATION ADMIN: CPT

## 2018-01-06 PROCEDURE — 99283 EMERGENCY DEPT VISIT LOW MDM: CPT

## 2018-01-06 RX ORDER — LIDOCAINE HYDROCHLORIDE 10 MG/ML
5 INJECTION, SOLUTION EPIDURAL; INFILTRATION; INTRACAUDAL; PERINEURAL ONCE
Status: COMPLETED | OUTPATIENT
Start: 2018-01-06 | End: 2018-01-06

## 2018-01-06 RX ORDER — CEPHALEXIN 500 MG/1
500 CAPSULE ORAL 3 TIMES DAILY
Qty: 20 CAPSULE | Refills: 0 | Status: SHIPPED | OUTPATIENT
Start: 2018-01-06 | End: 2018-01-13

## 2018-01-06 RX ORDER — CEPHALEXIN 250 MG/1
500 CAPSULE ORAL ONCE
Status: COMPLETED | OUTPATIENT
Start: 2018-01-06 | End: 2018-01-06

## 2018-01-06 RX ADMIN — CEPHALEXIN 500 MG: 250 CAPSULE ORAL at 19:28

## 2018-01-06 RX ADMIN — LIDOCAINE HYDROCHLORIDE 5 ML: 10 INJECTION, SOLUTION EPIDURAL; INFILTRATION; INTRACAUDAL; PERINEURAL at 18:38

## 2018-01-06 RX ADMIN — TETANUS TOXOID, REDUCED DIPHTHERIA TOXOID AND ACELLULAR PERTUSSIS VACCINE, ADSORBED 0.5 ML: 5; 2.5; 8; 8; 2.5 SUSPENSION INTRAMUSCULAR at 19:29

## 2018-01-07 NOTE — DISCHARGE INSTRUCTIONS
Soak your hand in warm, soapy water for 10 minutes at a time twice a day until scabs over  Cover it with topical antibiotic and a bandage when you are doing anything where it might get dirty  Follow up with your primary care doctor to make sure it is doing better  Soft Tissue Foreign Body   WHAT YOU NEED TO KNOW:   A foreign body may dissolve or come out of your skin without treatment  It may take weeks or months for this to happen  Your skin may feel stretched and sore after the foreign body is removed  This is normal and should get better within a few days  DISCHARGE INSTRUCTIONS:   Return to the emergency department if:   · Blood soaks through your bandage  · Your stitches come apart  · You see red streaks on the skin near your wound  · You have bleeding that does not stop after 10 minutes of holding firm, direct pressure over the wound  Contact your healthcare provider if:   · You have a fever  · Your wound is red, swollen, and draining pus  · Your symptoms, such as pain, do not get better or get worse  · You have questions or concerns about your condition or care  Medicines: You may  need any of the following:  · Antibiotics  help prevent a bacterial infection  · Acetaminophen  decreases pain and fever  It is available without a doctor's order  Ask how much to take and how often to take it  Follow directions  Acetaminophen can cause liver damage if not taken correctly  · NSAIDs , such as ibuprofen, help decrease swelling, pain, and fever  This medicine is available with or without a doctor's order  NSAIDs can cause stomach bleeding or kidney problems in certain people  If you take blood thinner medicine, always ask your healthcare provider if NSAIDs are safe for you  Always read the medicine label and follow directions  · Prescription pain medicine  may be given  Ask your healthcare provider how to take this medicine safely   Some prescription pain medicines contain acetaminophen  Do not take other medicines that contain acetaminophen without talking to your healthcare provider  Too much acetaminophen may cause liver damage  Prescription pain medicine may cause constipation  Ask your healthcare provider how to prevent or treat constipation  · Take your medicine as directed  Contact your healthcare provider if you think your medicine is not helping or if you have side effects  Tell him of her if you are allergic to any medicine  Keep a list of the medicines, vitamins, and herbs you take  Include the amounts, and when and why you take them  Bring the list or the pill bottles to follow-up visits  Carry your medicine list with you in case of an emergency  Elevate  the injured area above the level of your heart as often as you can  This will help decrease swelling and pain  Prop the injured area on pillows or blankets to keep it elevated comfortably  Apply ice  on your wound for 15 to 20 minutes every hour or as directed  Use an ice pack, or put crushed ice in a plastic bag  Cover it with a towel before you apply it to your skin  Ice helps prevent tissue damage and decreases swelling and pain  Care for your wound as directed: Your skin may feel stretched and sore after the foreign body is removed  This is normal and should get better within a few days  Keep your wound clean and dry  Do not get your wound wet  Do not change the bandage for 48 hours or as directed  You can change your bandages before 48 hours if they get wet or dirty  If your wound is packed, remove and change the packing as directed  Cover the area with a bandage as directed  Apply firm, steady pressure for 5 to 10 minutes if your wound bleeds  Use a clean gauze or towel to apply pressure  Bathing:  Ask your healthcare provider when your wound can get wet  When he says it is okay, carefully wash around the wound with soap and water  Let soap and water run over your wound  Do not scrub your wound   Dry the area and put on new, clean bandages as directed  Follow up with your healthcare provider as directed: You may need to return in 48 hours to have your wound checked for infection  You may need x-ray, ultrasound, or CT pictures to make sure all of the foreign body has been removed  Write down your questions so you remember to ask them during your visits  © 2017 [employer] Information is for End User's use only and may not be sold, redistributed or otherwise used for commercial purposes  All illustrations and images included in CareNotes® are the copyrighted property of [employer] , [employer]  or Vnice Monsalve  The above information is an  only  It is not intended as medical advice for individual conditions or treatments  Talk to your doctor, nurse or pharmacist before following any medical regimen to see if it is safe and effective for you

## 2018-01-07 NOTE — ED PROVIDER NOTES
History  Chief Complaint   Patient presents with    Foreign Body in Skin     Patient states he has a splinter on his ring finger of his left hand, that he is unable to remove  HPI    Prior to Admission Medications   Prescriptions Last Dose Informant Patient Reported? Taking?    EPINEPHrine (EPIPEN 2-DAVID) 0 3 mg/0 3 mL SOAJ   Yes No   Sig: as needed   HYDROcodone-acetaminophen (NORCO) 5-325 mg per tablet   No No   Sig: Take 1-2 tablets by mouth every 6 (six) hours as needed for pain Max Daily Amount: 8 tablets   LEVALBUTEROL HCL IN   Yes No   Sig: Take 1 ampule by nebulization every 4 (four) hours as needed for wheezing   LORazepam (ATIVAN) 1 mg tablet   Yes No   Sig: Take by mouth as needed   Multiple Vitamin (MULTIVITAMINS PO)   Yes No   Sig: Take by mouth daily   Omeprazole 20 MG TBEC   Yes No   Sig: Take by mouth daily   albuterol (PROVENTIL HFA,VENTOLIN HFA) 90 mcg/act inhaler   Yes No   Sig: Inhale 2 puffs every 6 (six) hours as needed for wheezing   aspirin (ASPIR-LOW) 81 mg EC tablet   Yes No   Sig: Take by mouth daily   clotrimazole-betamethasone (LOTRISONE) 1-0 05 % cream   Yes No   Sig: Apply topically 2 (two) times a day   cyclobenzaprine (FLEXERIL) 10 mg tablet   Yes No   Sig: Take by mouth daily as needed   fluticasone (FLONASE) 50 mcg/act nasal spray   Yes No   Si spray into each nostril daily   glimepiride (AMARYL) 4 mg tablet   Yes No   Sig: Take by mouth 2 (two) times a day   ibuprofen (MOTRIN) 800 mg tablet   No No   Sig: Take 1 tablet by mouth every 6 (six) hours as needed (pain)   ketoconazole (NIZORAL) 2 % cream   Yes No   Sig: as needed   loratadine (CLARITIN) 10 mg tablet   Yes No   Sig: Take by mouth daily   metFORMIN (GLUCOPHAGE) 1000 MG tablet   Yes No   Sig: Take by mouth 2 (two) times a day   montelukast (SINGULAIR) 10 mg tablet   Yes No   Sig: Take by mouth daily   sildenafil (VIAGRA) 100 mg tablet   Yes No   Sig: Take by mouth as needed   sitaGLIPtin (JANUVIA) 100 mg tablet Yes No   Sig: Take 100 mg by mouth daily   triamcinolone (KENALOG) 0 025 % cream   Yes No   Sig: Apply topically 2 (two) times a day   verapamil (VERELAN PM) 180 MG 24 hr capsule   Yes No   Sig: Take by mouth 2 (two) times a day      Facility-Administered Medications: None       Past Medical History:   Diagnosis Date    Actinic keratosis     Anxiety     Asthma     Cardiac disease     Cellulitis of right upper extremity     Cervical radiculopathy     Diabetes mellitus (HCC)     Erectile dysfunction of non-organic origin     Esophageal reflux     Gout     Hearing loss, conductive     Heart murmur     Mitral Valve disorder    History of paroxysmal supraventricular tachycardia     Hypertension     Neoplasm of skin, malignant     Obesity     Sleep apnea        Past Surgical History:   Procedure Laterality Date    APPENDECTOMY      COLECTOMY      Partial, Sigmoid    COLONOSCOPY      HERNIA REPAIR      [place] ESOPHAGOGASTRODUODENOSCOPY TRANSORAL DIAGNOSTIC N/A 8/9/2017    Procedure: ESOPHAGOGASTRODUODENOSCOPY (EGD); Surgeon: Meka Ernst MD;  Location: [facility]; Service: Gastroenterology    SKIN BIOPSY      TONSILLECTOMY         Family History   Problem Relation Age of Onset    Cancer Mother     Diabetes Mother     Heart disease Mother     Cancer Father      I have reviewed and agree with the history as documented      Social History   Substance Use Topics    Smoking status: Former Smoker    Smokeless tobacco: Never Used    Alcohol use Yes      Comment: occasional        Review of Systems    Physical Exam  ED Triage Vitals   Temperature Pulse Respirations Blood Pressure SpO2   01/06/18 1818 01/06/18 1819 01/06/18 1819 01/06/18 1819 01/06/18 1819   97 9 °F (36 6 °C) (!) 107 18 148/81 96 %      Temp Source Heart Rate Source Patient Position - Orthostatic VS BP Location FiO2 (%)   01/06/18 1818 01/06/18 1819 -- -- --   Oral Monitor         Pain Score       01/06/18 1819       1 Orthostatic Vital Signs  Vitals:    01/06/18 1819   BP: 148/81   Pulse: (!) 107       Physical Exam   Constitutional: He is oriented to person, place, and time  He appears well-developed and well-nourished  No distress  HENT:   Head: Normocephalic and atraumatic  Eyes: Conjunctivae are normal  Pupils are equal, round, and reactive to light  Neck: Normal range of motion  No tracheal deviation present  Cardiovascular: Normal rate, regular rhythm and intact distal pulses  Pulses:       Radial pulses are 2+ on the left side  Pulmonary/Chest: Effort normal  No respiratory distress  Musculoskeletal:        Hands:  Neurological: He is alert and oriented to person, place, and time  GCS eye subscore is 4  GCS verbal subscore is 5  GCS motor subscore is 6  Skin: Skin is warm and dry  Psychiatric: He has a normal mood and affect  His behavior is normal    Nursing note and vitals reviewed        ED Medications  Medications   cephalexin (KEFLEX) capsule 500 mg (not administered)   tetanus-diphtheria-acellular pertussis (BOOSTRIX) IM injection 0 5 mL (not administered)   lidocaine (PF) (XYLOCAINE-MPF) 1 % injection 5 mL (5 mL Infiltration Given 1/6/18 1838)       Diagnostic Studies  Results Reviewed     None                 No orders to display              Procedures  Foreign Body - Embedded  Date/Time: 1/6/2018 7:00 PM  Performed by: Michel Antonio  Authorized by: Michel Antonio     Patient location:  ED and bedside  Other Assisting Provider: No    Consent:     Consent obtained:  Verbal    Consent given by:  Patient    Risks discussed:  Bleeding, incomplete removal, infection, nerve damage, pain, poor cosmetic result and worsening of condition    Alternatives discussed:  No treatment  Universal protocol:     Procedure explained and questions answered to patient or proxy's satisfaction: yes      Site/side marked: yes      Immediately prior to procedure, a time out was called: yes      Patient identity confirmed:  Verbally with patient  Location:     Location:  Finger    Finger location:  L ring finger    Depth:  Subcutaneous    Tendon involvement:  None  Pre-procedure details:     Imaging:  Ultrasound    Neurovascular status: intact      Preparation: Patient was prepped and draped in usual sterile fashion    Anesthesia (see MAR for exact dosages): Anesthesia method:  Nerve block    Block location:  Left ring finger    Block anesthetic:  Lidocaine 1% w/o epi    Block technique:  Digital block    Block injection procedure:  Anatomic landmarks identified, negative aspiration for blood, introduced needle, incremental injection and anatomic landmarks palpated    Block outcome:  Anesthesia achieved  Procedure details:     Scalpel size:  11    Incision length:  Puncture    Localization method:  Visualized    Bloodless field: no      Removal mechanism: Forceps    Removal Method:  Open    Guidance: ultrasound      Procedure complexity:  Simple    Foreign bodies recovered:  1    Description:  Wood    Intact foreign body removal: yes    Post-procedure details:     Neurovascular status: intact      Confirmation:  No additional foreign bodies on visualization and complete removal verified with imaging    Skin closure:  None    Dressing:  Antibiotic ointment and adhesive bandage    Patient tolerance of procedure: Tolerated well, no immediate complications  General Procedure  Date/Time: 1/6/2018 7:00 PM  Performed by: Sukhi Knight  Authorized by: Suhki Knight     Patient location:  ED and bedside  Assisting Provider(s): No    Consent:     Consent obtained:  Verbal    Consent given by:  Patient    Alternatives discussed:  No treatment  Indications:     Indications:  Evaluation of foreign body  Procedure Detail:     Equipment used:  Linear ultrasound probe    Procedure note (site, laterality, method, findings):  Left ring finger middle phalanx    0 4 centimeter linear foreign body with posterior shadowing visualized in the superficial soft tissues, 0 07 centimeters from the skin surface           Phone Contacts  ED Phone Contact    ED Course  ED Course                                MDM  Number of Diagnoses or Management Options  Foreign body of left ring finger: new and does not require workup  Diagnosis management comments: This is a 58-year-old male who presents here today with a foreign body in his left fourth finger  He states just prior to arrival he was working with [employer] when he got a splinter into his finger  He tried to remove it at home but was unsuccessful  He came here for removal   He states his last tetanus shot was about eight years ago  He does take baby aspirin, but does not take any other blood thinning medications  He has no other injuries or complaints  He is a diabetic, but denies problems with wound healing  ROS: Otherwise negative, unless stated as above  He is well-appearing, in no acute distress  He has visible wood on the radial side, with dried blood but no obvious puncture wound on the ulnar side of the finger  Initial attempts at removal with for sepsis resulted in sure riding of the wood  A puncture wound was made over the piece of wood with successful removal as above  Complete removal was confirmed with repeat ultrasound imaging  We will update his tetanus shot  Given as this was an organic foreign body, we will treat him with antibiotics to prevent infection  I discussed with the patient treatment at home, follow-up, and indications for return, and he expresses understanding with this plan         Amount and/or Complexity of Data Reviewed  Independent visualization of images, tracings, or specimens: yes      CritCare Time    Disposition  Final diagnoses:   Foreign body of left ring finger     Time reflects when diagnosis was documented in both MDM as applicable and the Disposition within this note     Time User Action Codes Description Comment    1/6/2018  7:20 PM Manny Holguin Add [C57 051U] Foreign body of left ring finger       ED Disposition     ED Disposition Condition Comment    Discharge  [place] discharge to home/self care  Condition at discharge: Good        Follow-up Information     Follow up With Specialties Details Why Contact Info    Jo Munoz MD Internal Medicine Schedule an appointment as soon as possible for a visit As needed, for wound re-check [address]          Patient's Medications   Discharge Prescriptions    CEPHALEXIN (KEFLEX) 500 MG CAPSULE    Take 1 capsule by mouth 3 (three) times a day for 7 days       Start Date: 1/6/2018  End Date: 1/13/2018       Order Dose: 500 mg       Quantity: 20 capsule    Refills: 0     No discharge procedures on file      ED Provider  Electronically Signed by           Estrella Perez MD  01/06/18 5170

## 2018-01-12 VITALS
BODY MASS INDEX: 31.04 KG/M2 | SYSTOLIC BLOOD PRESSURE: 132 MMHG | WEIGHT: 193.13 LBS | DIASTOLIC BLOOD PRESSURE: 70 MMHG | HEART RATE: 80 BPM | HEIGHT: 66 IN

## 2018-01-13 VITALS
BODY MASS INDEX: 30.28 KG/M2 | HEIGHT: 66 IN | HEART RATE: 60 BPM | DIASTOLIC BLOOD PRESSURE: 76 MMHG | WEIGHT: 188.38 LBS | TEMPERATURE: 97.8 F | SYSTOLIC BLOOD PRESSURE: 130 MMHG

## 2018-01-13 VITALS
TEMPERATURE: 98.3 F | BODY MASS INDEX: 30.9 KG/M2 | OXYGEN SATURATION: 95 % | SYSTOLIC BLOOD PRESSURE: 128 MMHG | HEIGHT: 66 IN | WEIGHT: 192.25 LBS | HEART RATE: 86 BPM | DIASTOLIC BLOOD PRESSURE: 90 MMHG

## 2018-01-13 VITALS
OXYGEN SATURATION: 97 % | DIASTOLIC BLOOD PRESSURE: 80 MMHG | HEART RATE: 67 BPM | SYSTOLIC BLOOD PRESSURE: 122 MMHG | BODY MASS INDEX: 30.37 KG/M2 | WEIGHT: 189 LBS | HEIGHT: 66 IN

## 2018-01-13 VITALS
HEIGHT: 66 IN | WEIGHT: 200.25 LBS | BODY MASS INDEX: 32.18 KG/M2 | DIASTOLIC BLOOD PRESSURE: 78 MMHG | SYSTOLIC BLOOD PRESSURE: 128 MMHG | TEMPERATURE: 96.9 F | OXYGEN SATURATION: 95 % | HEART RATE: 69 BPM

## 2018-01-13 NOTE — MISCELLANEOUS
Message   Date: 08 Apr 2017 1:54 PM EST, Recorded By: Estuardo Leigh For: Danilo Mckeon, Self   Phone: [phone] (Home), [phone] (Work)   Reason: Medical Complaint   Answering service 4/8//2017  1:54 pm   Pt  saw Dr  earlier in the week and he said he is not better to call back and a prescription would be called in  Discussion/Summary  Discussion Summary:   A lot of coughing of yellow phlegm for the past 4 days  No chest pain hemoptysis I sent in Zithromax  Signatures   Electronically signed by : TRESA Recio;  Apr 10 2017 11:45AM EST                       (Author)

## 2018-01-14 VITALS
TEMPERATURE: 97.8 F | SYSTOLIC BLOOD PRESSURE: 140 MMHG | DIASTOLIC BLOOD PRESSURE: 80 MMHG | WEIGHT: 200.38 LBS | HEART RATE: 76 BPM | HEIGHT: 66 IN | BODY MASS INDEX: 32.2 KG/M2

## 2018-01-14 VITALS
BODY MASS INDEX: 31.26 KG/M2 | HEART RATE: 84 BPM | DIASTOLIC BLOOD PRESSURE: 82 MMHG | HEIGHT: 66 IN | WEIGHT: 194.5 LBS | SYSTOLIC BLOOD PRESSURE: 132 MMHG | OXYGEN SATURATION: 96 % | TEMPERATURE: 98.3 F

## 2018-01-14 VITALS
OXYGEN SATURATION: 95 % | HEIGHT: 66 IN | DIASTOLIC BLOOD PRESSURE: 90 MMHG | SYSTOLIC BLOOD PRESSURE: 138 MMHG | BODY MASS INDEX: 30.53 KG/M2 | HEART RATE: 88 BPM | WEIGHT: 190 LBS | TEMPERATURE: 98.4 F

## 2018-01-15 VITALS
BODY MASS INDEX: 30.7 KG/M2 | HEIGHT: 66 IN | SYSTOLIC BLOOD PRESSURE: 128 MMHG | WEIGHT: 191 LBS | OXYGEN SATURATION: 96 % | HEART RATE: 81 BPM | DIASTOLIC BLOOD PRESSURE: 80 MMHG

## 2018-01-15 NOTE — RESULT NOTES
Verified Results  (1) HEMOGLOBIN A1C [id number] 08:46AM José Miguel Frieda Order Number: [id number]     Test Name Result Flag Reference   HEMOGLOBIN A1C 8 2 % H 4 2-6 3   EST  AVG   GLUCOSE 189 mg/dl       (1) MICROALBUMIN CREATININE RATIO, RANDOM URINE [id number] 08:46AM Adonis CHOI Order Number: [id number]     Test Name Result Flag Reference   MICROALBUMIN/ CREAT R 17 mg/g creatinine  0-30   MICROALBUMIN,URINE 20 7 mg/L H 0 0-20 0   CREATININE URINE 120 0 mg/dL

## 2018-01-15 NOTE — MISCELLANEOUS
Message  Message Free Text Note Form: The patient was in earlier today  His blood sugar was over 300  I told him to start checking his blood sugars regularly at least twice a day  He is to see me in 2 or 3 weeks with a list of blood sugar readings        Signatures   Electronically signed by : Adán Perdomo DO; Jun 20 2017  7:06PM EST                       (Author)

## 2018-01-22 VITALS
DIASTOLIC BLOOD PRESSURE: 84 MMHG | BODY MASS INDEX: 32.47 KG/M2 | SYSTOLIC BLOOD PRESSURE: 122 MMHG | TEMPERATURE: 97.7 F | HEART RATE: 69 BPM | HEIGHT: 66 IN | WEIGHT: 202 LBS | OXYGEN SATURATION: 95 %

## 2018-01-23 ENCOUNTER — GENERIC CONVERSION - ENCOUNTER (OUTPATIENT)
Dept: OTHER | Facility: OTHER | Age: 68
End: 2018-01-23

## 2018-01-23 VITALS
HEART RATE: 93 BPM | WEIGHT: 197 LBS | SYSTOLIC BLOOD PRESSURE: 132 MMHG | OXYGEN SATURATION: 97 % | BODY MASS INDEX: 31.66 KG/M2 | HEIGHT: 66 IN | DIASTOLIC BLOOD PRESSURE: 88 MMHG

## 2018-02-11 DIAGNOSIS — E11.9 TYPE 2 DIABETES MELLITUS WITHOUT COMPLICATION, UNSPECIFIED LONG TERM INSULIN USE STATUS: Primary | ICD-10-CM

## 2018-02-12 RX ORDER — SITAGLIPTIN 100 MG/1
TABLET, FILM COATED ORAL
Qty: 30 TABLET | Refills: 3 | Status: SHIPPED | OUTPATIENT
Start: 2018-02-12 | End: 2018-06-14 | Stop reason: SDUPTHER

## 2018-02-13 DIAGNOSIS — J44.9 CHRONIC OBSTRUCTIVE PULMONARY DISEASE, UNSPECIFIED COPD TYPE (HCC): Primary | ICD-10-CM

## 2018-02-13 RX ORDER — MONTELUKAST SODIUM 10 MG/1
TABLET ORAL
Qty: 30 TABLET | Refills: 3 | Status: SHIPPED | OUTPATIENT
Start: 2018-02-13 | End: 2018-04-12 | Stop reason: SDUPTHER

## 2018-04-12 DIAGNOSIS — E11.8 TYPE 2 DIABETES MELLITUS WITH COMPLICATION, UNSPECIFIED WHETHER LONG TERM INSULIN USE: Primary | ICD-10-CM

## 2018-04-12 DIAGNOSIS — J44.9 CHRONIC OBSTRUCTIVE PULMONARY DISEASE, UNSPECIFIED COPD TYPE (HCC): ICD-10-CM

## 2018-04-12 RX ORDER — GLIMEPIRIDE 4 MG/1
TABLET ORAL
Qty: 60 TABLET | Refills: 3 | Status: SHIPPED | OUTPATIENT
Start: 2018-04-12 | End: 2018-07-10 | Stop reason: SDUPTHER

## 2018-04-12 RX ORDER — MONTELUKAST SODIUM 10 MG/1
TABLET ORAL
Qty: 30 TABLET | Refills: 2 | Status: SHIPPED | OUTPATIENT
Start: 2018-04-12 | End: 2018-07-11 | Stop reason: SDUPTHER

## 2018-04-13 DIAGNOSIS — I47.1 SUPRAVENTRICULAR TACHYCARDIA (HCC): Primary | ICD-10-CM

## 2018-04-13 RX ORDER — VERAPAMIL HYDROCHLORIDE 180 MG/1
CAPSULE, EXTENDED RELEASE ORAL
Qty: 60 CAPSULE | Refills: 1 | Status: SHIPPED | OUTPATIENT
Start: 2018-04-13 | End: 2018-07-10 | Stop reason: SDUPTHER

## 2018-06-11 DIAGNOSIS — E78.00 PURE HYPERCHOLESTEROLEMIA: ICD-10-CM

## 2018-06-11 DIAGNOSIS — K21.9 GASTRO-ESOPHAGEAL REFLUX DISEASE WITHOUT ESOPHAGITIS: ICD-10-CM

## 2018-06-11 DIAGNOSIS — E66.9 OBESITY: ICD-10-CM

## 2018-06-11 DIAGNOSIS — I10 ESSENTIAL (PRIMARY) HYPERTENSION: ICD-10-CM

## 2018-06-11 DIAGNOSIS — Z12.11 ENCOUNTER FOR SCREENING FOR MALIGNANT NEOPLASM OF COLON: ICD-10-CM

## 2018-06-11 DIAGNOSIS — E11.65 TYPE 2 DIABETES MELLITUS WITH HYPERGLYCEMIA (HCC): ICD-10-CM

## 2018-06-14 DIAGNOSIS — E11.9 TYPE 2 DIABETES MELLITUS WITHOUT COMPLICATION (HCC): ICD-10-CM

## 2018-06-14 RX ORDER — SITAGLIPTIN 100 MG/1
TABLET, FILM COATED ORAL
Qty: 30 TABLET | Refills: 3 | Status: SHIPPED | OUTPATIENT
Start: 2018-06-14 | End: 2018-10-10 | Stop reason: SDUPTHER

## 2018-06-29 ENCOUNTER — OFFICE VISIT (OUTPATIENT)
Dept: DERMATOLOGY | Facility: CLINIC | Age: 68
End: 2018-06-29
Payer: MEDICARE

## 2018-06-29 DIAGNOSIS — Z85.828 HISTORY OF SKIN CANCER: ICD-10-CM

## 2018-06-29 DIAGNOSIS — L82.1 SEBORRHEIC KERATOSIS: ICD-10-CM

## 2018-06-29 DIAGNOSIS — L57.0 ACTINIC KERATOSIS: Primary | ICD-10-CM

## 2018-06-29 DIAGNOSIS — Z12.83 SCREENING FOR SKIN CANCER: ICD-10-CM

## 2018-06-29 PROCEDURE — 99213 OFFICE O/P EST LOW 20 MIN: CPT | Performed by: DERMATOLOGY

## 2018-06-29 RX ORDER — FLUOROURACIL 50 MG/G
CREAM TOPICAL 2 TIMES DAILY
Qty: 40 G | Refills: 0 | Status: SHIPPED | OUTPATIENT
Start: 2018-06-29 | End: 2019-06-24 | Stop reason: CLARIF

## 2018-06-29 NOTE — PROGRESS NOTES
[address] [facility] [address] [facility] DERMATOLOGY  [address] [address]     MRN: [id number] : 1950  Encounter: [id number]  Patient Information: Pablito Foote  Chief complaint: facial skin scaling    History of present illness:  31-year-old with history of actinic keratosis  Skin cancer and recent treatment with 5% fluorouracil to the scalp now complains of more increase scaling on the face at present have a was considered the face as a manifestation of either seborrheic dermatitis of psoriasis but has not improved at all with ketoconazole cream and continues to be problematic    Nothing else of concern noted  Past Medical History:   Diagnosis Date    Actinic keratosis     Adhesive capsulitis of unspecified shoulder     Anxiety     Asthma     Atopic dermatitis     Cardiac disease     Cellulitis of right upper extremity     Cervical radiculopathy     Chest pain     Chronic maxillary sinusitis     last assessed 14    Diabetes mellitus ([address] )     Erectile dysfunction of non-organic origin     Esophageal reflux     Gout     last assessed 08/26/15    Hearing loss, conductive     Heart murmur     Mitral Valve disorder    Herpes zoster     History of paroxysmal supraventricular tachycardia     Hypertension     Neoplasm of skin, malignant     Non-melanoma skin cancer     last assessed 10/26/17    Obesity     Palpitations     last assessed 03/05/15    Plantar fasciitis     last assessed 05/28/15    Sciatica     Sleep apnea     Squamous cell carcinoma of skin of scalp     Tinnitus, unspecified ear     Type 2 diabetes mellitus with hyperglycemia ([address] )     last assessed     Umbilical hernia     Worms in stool     last assessed 08/26/15     Past Surgical History:   Procedure Laterality Date    APPENDECTOMY      COLECTOMY      Partial, Sigmoid    COLONOSCOPY      HEAD & NECK SKIN LESION EXCISIONAL BIOPSY      10/11/10 SCC -- 10/17/11 SCC  --  Location Scalp    HERNIA REPAIR      [place] ESOPHAGOGASTRODUODENOSCOPY TRANSORAL DIAGNOSTIC N/A 8/9/2017    Procedure: ESOPHAGOGASTRODUODENOSCOPY (EGD); Surgeon: Ana Manriquez MD;  Location: [facility]; Service: Gastroenterology    SKIN BIOPSY      TONSILLECTOMY       Social History   History   Alcohol Use    Yes     Comment: occasional     History   Drug Use No     History   Smoking Status    Former Smoker   Smokeless Tobacco    Never Used     Comment: never smoker as per AS     Family History   Problem Relation Age of Onset    Cancer Mother     Diabetes Mother     Heart disease Mother     Heart attack Mother     Breast cancer Mother         Adenocarcinoma    Skin cancer Mother         Adenocarcinoma    Heart failure Mother     Diabetes type II Mother     Varicose Veins Mother         Lower Extremities    Cancer Father     Prostate cancer Father         Adenocarcinoma    Skin cancer Father     Lymphoma Father         Non-Hodgkin's     Meds/Allergies   Allergies   Allergen Reactions    Iodinated Diagnostic Agents Anaphylaxis    Shellfish Allergy Anaphylaxis    Shellfish-Derived Products        Meds:  Prior to Admission medications    Medication Sig Start Date End Date Taking?  Authorizing Provider   albuterol (PROVENTIL HFA,VENTOLIN HFA) 90 mcg/act inhaler Inhale 2 puffs every 6 (six) hours as needed for wheezing   Yes Historical Provider, MD   aspirin (ASPIR-LOW) 81 mg EC tablet Take by mouth daily 1/21/14  Yes Historical Provider, MD   cyclobenzaprine (FLEXERIL) 10 mg tablet Take by mouth daily as needed 1/14/15  Yes Historical Provider, MD   glimepiride (AMARYL) 4 mg tablet TAKE 1 TABLET BY MOUTH TWICE A DAY 4/12/18  Yes Cal Mercedes MD   ibuprofen (MOTRIN) 800 mg tablet Take 1 tablet by mouth every 6 (six) hours as needed (pain) 11/23/16  Yes Bhupinder Dougherty MD   JANUVIA 100 MG tablet TAKE 1 TABLET BY MOUTH DAILY 6/14/18  Yes Camille Garcia PA-C   ketoconazole (NIZORAL) 2 % cream as needed 1/21/14  Yes Historical Provider, MD   loratadine (CLARITIN) 10 mg tablet Take by mouth daily   Yes Historical Provider, MD   LORazepam (ATIVAN) 1 mg tablet Take by mouth as needed 3/26/14  Yes Historical Provider, MD   metFORMIN (GLUCOPHAGE) 1000 MG tablet Take by mouth 2 (two) times a day 3/10/14  Yes Historical Provider, MD   metFORMIN (GLUCOPHAGE) 500 mg tablet TAKE 1 TABLET BY MOUTH EVERY MORNING WITH 1,000 MG TABLET ** WITH FOOD 4/12/18  Yes Rula Avilez MD   montelukast (SINGULAIR) 10 mg tablet TAKE 1 TABLET EVERY DAY 4/12/18  Yes Traci Jimenez PA-C   Multiple Vitamin (MULTIVITAMINS PO) Take by mouth daily 1/21/14  Yes Historical Provider, MD   Omeprazole 20 MG TBEC Take by mouth daily 1/16/14  Yes Historical Provider, MD   sildenafil (VIAGRA) 100 mg tablet Take by mouth as needed 8/16/16  Yes Historical Provider, MD   verapamil (VERELAN PM) 180 MG 24 hr capsule TAKE ONE CAPSULE BY MOUTH TWICE A DAY 4/13/18  Yes Traci Jimenez PA-C   fluticasone (FLONASE) 50 mcg/act nasal spray 1 spray into each nostril daily    Historical Provider, MD   HYDROcodone-acetaminophen (NORCO) 5-325 mg per tablet Take 1-2 tablets by mouth every 6 (six) hours as needed for pain Max Daily Amount: 8 tablets 11/23/16   Jacob Rodríguez MD   LEVALBUTEROL HCL IN Take 1 ampule by nebulization every 4 (four) hours as needed for wheezing    Historical Provider, MD   triamcinolone (KENALOG) 0 025 % cream Apply topically 2 (two) times a day    Historical Provider, MD       Subjective:     Review of Systems:    General: negative for - chills, fatigue, fever,  weight gain or weight loss  Psychological: negative for - anxiety, behavioral disorder, concentration difficulties, decreased libido, depression, irritability, memory difficulties, mood swings, sleep disturbances or suicidal ideation  ENT: negative for - hearing difficulties , nasal congestion, nasal discharge, oral lesions, sinus pain, sneezing, sore throat  Allergy and Immunology: negative for - hives, insect bite sensitivity,  Hematological and Lymphatic: negative for - bleeding problems, blood clots,bruising, swollen lymph nodes  Endocrine: negative for - hair pattern changes, hot flashes, malaise/lethargy, mood swings, palpitations, polydipsia/polyuria, skin changes, temperature intolerance or unexpected weight change  Respiratory: negative for - cough, hemoptysis, orthopnea, shortness of breath, or wheezing  Cardiovascular: negative for - chest pain, dyspnea on exertion, edema,  Gastrointestinal: negative for - abdominal pain, nausea/vomiting  Genito-Urinary: negative for - dysuria, incontinence, irregular/heavy menses or urinary frequency/urgency  Musculoskeletal: negative for - gait disturbance, joint pain, joint stiffness, joint swelling, muscle pain, muscular weakness  Dermatological:  As in HPI  Neurological: negative for confusion, dizziness, headaches, impaired coordination/balance, memory loss, numbness/tingling, seizures, speech problems, tremors or weakness       Objective: There were no vitals taken for this visit  Physical Exam:    General Appearance:    Alert, cooperative, no distress   Head:    Normocephalic, without obvious abnormality, atraumatic           Skin:   A full skin exam was performed including scalp, head scalp, eyes, ears, nose, lips, neck, chest, axilla, abdomen, back, buttocks, bilateral upper extremities, bilateral lower extremities, hands, feet, fingers, toes, fingernails, and toenails  Scaling erythematous areas diffusely present on a good portion of his face is scalp is clear from his previous treatment normal keratotic papules greasy stuck on appearance previous sites of skin cancer well healed without recurrence nothing else atypical noted on exam     Assessment:     1  Actinic keratosis  fluorouracil (EFUDEX) 5 % cream   2  Screening for skin cancer     3  Seborrheic keratosis     4   History of skin cancer Plan:    actinic keratosis since the scaling has not responded to ketoconazole and patient had an excellent results with the Efudex the scalp will go ahead and treat this area also with Efudex to see if this will resolve will recheck in 3 weeks  Seborrheic keratosis patient reassured these are normal growths we acquire with age no treatment needed  History of skin cancer in no recurrence nothing else atypical sunblock recommended follow-up in 6 months  Screening for dermatologic disorders nothing else of concern noted on complete exam follow-up in 6 months  Tess Lynch MD  6/29/2018,10:12 AM    Portions of the record may have been created with voice recognition software   Occasional wrong word or "sound a like" substitutions may have occurred due to the inherent limitations of voice recognition software   Read the chart carefully and recognize, using context, where substitutions have occurred

## 2018-06-29 NOTE — PATIENT INSTRUCTIONS
actinic keratosis since the scaling has not responded to ketoconazole and patient had an excellent results with the Efudex the scalp will go ahead and treat this area also with Efudex to see if this will resolve will recheck in 3 weeks    Seborrheic keratosis patient reassured these are normal growths we acquire with age no treatment needed  History of skin cancer in no recurrence nothing else atypical sunblock recommended follow-up in 6 months  Screening for dermatologic disorders nothing else of concern noted on complete exam follow-up in 6 months

## 2018-07-05 ENCOUNTER — APPOINTMENT (OUTPATIENT)
Dept: LAB | Facility: CLINIC | Age: 68
End: 2018-07-05
Payer: MEDICARE

## 2018-07-05 ENCOUNTER — TRANSCRIBE ORDERS (OUTPATIENT)
Dept: LAB | Facility: CLINIC | Age: 68
End: 2018-07-05

## 2018-07-05 DIAGNOSIS — K21.9 GASTRO-ESOPHAGEAL REFLUX DISEASE WITHOUT ESOPHAGITIS: ICD-10-CM

## 2018-07-05 DIAGNOSIS — E78.00 PURE HYPERCHOLESTEROLEMIA: ICD-10-CM

## 2018-07-05 DIAGNOSIS — E66.9 OBESITY, UNSPECIFIED CLASSIFICATION, UNSPECIFIED OBESITY TYPE, UNSPECIFIED WHETHER SERIOUS COMORBIDITY PRESENT: ICD-10-CM

## 2018-07-05 DIAGNOSIS — I10 ESSENTIAL HYPERTENSION, MALIGNANT: ICD-10-CM

## 2018-07-05 DIAGNOSIS — I10 ESSENTIAL (PRIMARY) HYPERTENSION: ICD-10-CM

## 2018-07-05 DIAGNOSIS — E11.65 TYPE 2 DIABETES MELLITUS WITH HYPERGLYCEMIA (HCC): ICD-10-CM

## 2018-07-05 DIAGNOSIS — E66.9 OBESITY, UNSPECIFIED CLASSIFICATION, UNSPECIFIED OBESITY TYPE, UNSPECIFIED WHETHER SERIOUS COMORBIDITY PRESENT: Primary | ICD-10-CM

## 2018-07-05 DIAGNOSIS — Z12.11 ENCOUNTER FOR SCREENING FOR MALIGNANT NEOPLASM OF COLON: ICD-10-CM

## 2018-07-05 DIAGNOSIS — E66.9 OBESITY: ICD-10-CM

## 2018-07-05 LAB
ALBUMIN SERPL BCP-MCNC: 3.7 G/DL (ref 3.5–5)
ALP SERPL-CCNC: 59 U/L (ref 46–116)
ALT SERPL W P-5'-P-CCNC: 31 U/L (ref 12–78)
ANION GAP SERPL CALCULATED.3IONS-SCNC: 6 MMOL/L (ref 4–13)
AST SERPL W P-5'-P-CCNC: 14 U/L (ref 5–45)
BILIRUB SERPL-MCNC: 0.46 MG/DL (ref 0.2–1)
BUN SERPL-MCNC: 17 MG/DL (ref 5–25)
CALCIUM SERPL-MCNC: 8.9 MG/DL (ref 8.3–10.1)
CHLORIDE SERPL-SCNC: 104 MMOL/L (ref 100–108)
CHOLEST SERPL-MCNC: 169 MG/DL (ref 50–200)
CO2 SERPL-SCNC: 30 MMOL/L (ref 21–32)
CREAT SERPL-MCNC: 1.03 MG/DL (ref 0.6–1.3)
EST. AVERAGE GLUCOSE BLD GHB EST-MCNC: 183 MG/DL
GFR SERPL CREATININE-BSD FRML MDRD: 74 ML/MIN/1.73SQ M
GLUCOSE P FAST SERPL-MCNC: 168 MG/DL (ref 65–99)
HBA1C MFR BLD: 8 % (ref 4.2–6.3)
HDLC SERPL-MCNC: 37 MG/DL (ref 40–60)
HEMOCCULT STL QL IA: NEGATIVE
LDLC SERPL CALC-MCNC: 95 MG/DL (ref 0–100)
POTASSIUM SERPL-SCNC: 4 MMOL/L (ref 3.5–5.3)
PROT SERPL-MCNC: 6.9 G/DL (ref 6.4–8.2)
SODIUM SERPL-SCNC: 140 MMOL/L (ref 136–145)
TRIGL SERPL-MCNC: 183 MG/DL

## 2018-07-05 PROCEDURE — G0328 FECAL BLOOD SCRN IMMUNOASSAY: HCPCS

## 2018-07-05 PROCEDURE — 80053 COMPREHEN METABOLIC PANEL: CPT

## 2018-07-05 PROCEDURE — 83036 HEMOGLOBIN GLYCOSYLATED A1C: CPT

## 2018-07-05 PROCEDURE — 36415 COLL VENOUS BLD VENIPUNCTURE: CPT

## 2018-07-05 PROCEDURE — 80061 LIPID PANEL: CPT

## 2018-07-06 RX ORDER — LANCETS 21 GAUGE
EACH MISCELLANEOUS 2 TIMES DAILY
COMMUNITY
Start: 2017-01-24 | End: 2018-08-24 | Stop reason: SDUPTHER

## 2018-07-06 RX ORDER — DOXYCYCLINE HYCLATE 100 MG/1
1 CAPSULE ORAL 2 TIMES DAILY
COMMUNITY
Start: 2017-05-12 | End: 2018-12-24 | Stop reason: ALTCHOICE

## 2018-07-06 RX ORDER — OMEPRAZOLE 20 MG/1
1 CAPSULE, DELAYED RELEASE ORAL DAILY
COMMUNITY
Start: 2014-01-16 | End: 2018-07-18

## 2018-07-06 RX ORDER — CLOTRIMAZOLE AND BETAMETHASONE DIPROPIONATE 10; .64 MG/G; MG/G
CREAM TOPICAL AS NEEDED
COMMUNITY
Start: 2016-11-28 | End: 2021-08-30 | Stop reason: SDUPTHER

## 2018-07-06 RX ORDER — EPINEPHRINE 0.3 MG/.3ML
INJECTION SUBCUTANEOUS
COMMUNITY
Start: 2014-07-13 | End: 2020-12-14 | Stop reason: SDUPTHER

## 2018-07-06 RX ORDER — LEVALBUTEROL TARTRATE 45 UG/1
1 AEROSOL, METERED ORAL EVERY 4 HOURS PRN
COMMUNITY
Start: 2017-04-24 | End: 2018-12-24 | Stop reason: ALTCHOICE

## 2018-07-10 ENCOUNTER — OFFICE VISIT (OUTPATIENT)
Dept: INTERNAL MEDICINE CLINIC | Facility: CLINIC | Age: 68
End: 2018-07-10
Payer: MEDICARE

## 2018-07-10 VITALS
DIASTOLIC BLOOD PRESSURE: 72 MMHG | SYSTOLIC BLOOD PRESSURE: 128 MMHG | HEART RATE: 78 BPM | HEIGHT: 66 IN | OXYGEN SATURATION: 93 % | WEIGHT: 195 LBS | BODY MASS INDEX: 31.34 KG/M2

## 2018-07-10 DIAGNOSIS — E78.00 HYPERCHOLESTEROLEMIA: ICD-10-CM

## 2018-07-10 DIAGNOSIS — Z20.89 RECENTLY IN LYME DISEASE ENDEMIC AREA: ICD-10-CM

## 2018-07-10 DIAGNOSIS — I47.1 PAROXYSMAL SUPRAVENTRICULAR TACHYCARDIA (HCC): ICD-10-CM

## 2018-07-10 DIAGNOSIS — J00 ACUTE NASOPHARYNGITIS: ICD-10-CM

## 2018-07-10 DIAGNOSIS — Z00.00 ENCOUNTER FOR MEDICARE ANNUAL WELLNESS EXAM: ICD-10-CM

## 2018-07-10 DIAGNOSIS — F41.9 ANXIETY DISORDER, UNSPECIFIED TYPE: ICD-10-CM

## 2018-07-10 DIAGNOSIS — E11.8 TYPE 2 DIABETES MELLITUS WITH COMPLICATION, UNSPECIFIED WHETHER LONG TERM INSULIN USE: ICD-10-CM

## 2018-07-10 DIAGNOSIS — E66.01 MORBID OBESITY (HCC): ICD-10-CM

## 2018-07-10 DIAGNOSIS — N52.1 ERECTILE DYSFUNCTION DUE TO DISEASES CLASSIFIED ELSEWHERE: ICD-10-CM

## 2018-07-10 DIAGNOSIS — IMO0001 UNCONTROLLED TYPE 2 DIABETES MELLITUS WITHOUT COMPLICATION, WITHOUT LONG-TERM CURRENT USE OF INSULIN: Primary | ICD-10-CM

## 2018-07-10 DIAGNOSIS — I10 BENIGN ESSENTIAL HYPERTENSION: ICD-10-CM

## 2018-07-10 DIAGNOSIS — I47.1 SUPRAVENTRICULAR TACHYCARDIA (HCC): ICD-10-CM

## 2018-07-10 PROBLEM — D04.4 SQUAMOUS CELL CARCINOMA IN SITU OF SCALP: Status: ACTIVE | Noted: 2017-11-02

## 2018-07-10 PROBLEM — Z91.89 RECENTLY IN LYME DISEASE ENDEMIC AREA: Status: ACTIVE | Noted: 2018-07-10

## 2018-07-10 PROCEDURE — G0438 PPPS, INITIAL VISIT: HCPCS | Performed by: INTERNAL MEDICINE

## 2018-07-10 PROCEDURE — 99214 OFFICE O/P EST MOD 30 MIN: CPT | Performed by: INTERNAL MEDICINE

## 2018-07-10 RX ORDER — SILDENAFIL 100 MG/1
100 TABLET, FILM COATED ORAL AS NEEDED
Qty: 30 TABLET | Refills: 3 | Status: SHIPPED | OUTPATIENT
Start: 2018-07-10 | End: 2018-07-31 | Stop reason: SDUPTHER

## 2018-07-10 RX ORDER — AZITHROMYCIN 250 MG/1
TABLET, FILM COATED ORAL
Qty: 6 TABLET | Refills: 0 | Status: SHIPPED | OUTPATIENT
Start: 2018-07-10 | End: 2018-07-14

## 2018-07-10 RX ORDER — GLIMEPIRIDE 4 MG/1
4 TABLET ORAL 2 TIMES DAILY
Qty: 180 TABLET | Refills: 3 | Status: SHIPPED | OUTPATIENT
Start: 2018-07-10 | End: 2019-06-24

## 2018-07-10 RX ORDER — LORAZEPAM 1 MG/1
1 TABLET ORAL
Qty: 90 TABLET | Refills: 1 | Status: SHIPPED | OUTPATIENT
Start: 2018-07-10 | End: 2019-06-07 | Stop reason: SDUPTHER

## 2018-07-10 RX ORDER — PIOGLITAZONEHYDROCHLORIDE 15 MG/1
15 TABLET ORAL DAILY
Qty: 90 TABLET | Refills: 3 | Status: SHIPPED | OUTPATIENT
Start: 2018-07-10 | End: 2018-12-24 | Stop reason: SDUPTHER

## 2018-07-10 RX ORDER — VERAPAMIL HYDROCHLORIDE 180 MG/1
180 CAPSULE, EXTENDED RELEASE ORAL 2 TIMES DAILY
Qty: 180 CAPSULE | Refills: 3 | Status: SHIPPED | OUTPATIENT
Start: 2018-07-10 | End: 2019-06-24 | Stop reason: SDUPTHER

## 2018-07-10 NOTE — PROGRESS NOTES
Assessment and Plan:    Problem List Items Addressed This Visit     None        Health Maintenance Due   Topic Date Due    Hepatitis C Screening  1950    Medicare Annual Wellness Visit (AWV)  1950    CRC Screening: Colonoscopy  1950    ABDOMINAL AORTIC ANEURYSM (AAA) SCREEN  02/04/2015    PNEUMOCOCCAL POLYSACCHARIDE VACCINE AGE 72 AND OVER  02/04/2015    GLAUCOMA SCREENING 72 + YR  02/04/2017    DM Eye Exam  12/01/2017         HPI:  Jane Chung is a 76 y o  male here for his Subsequent Wellness Visit      Patient Active Problem List   Diagnosis    Actinic keratosis    Screening for skin cancer    Seborrheic keratosis    History of skin cancer    Allergic rhinitis    Anxiety disorder    Asthma    Benign essential hypertension    Diabetes mellitus type 2, uncontrolled ([address] )    Erectile dysfunction of non-organic origin    Esophageal reflux    Hearing loss, conductive    Hypercholesterolemia    Mitral valve disorder    Morbid obesity ([address] )    Paroxysmal supraventricular tachycardia (HCC)    Peripheral neuropathy    Rosacea    Sleep apnea    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    Thoracic radiculopathy    Xerostomia     Past Medical History:   Diagnosis Date    Actinic keratosis     Adhesive capsulitis of unspecified shoulder     Anxiety     Asthma     Atopic dermatitis     Cardiac disease     Cellulitis of right upper extremity     Cervical radiculopathy     Chest pain     Chronic maxillary sinusitis     last assessed 01/21/14    Diabetes mellitus ([address] )     Erectile dysfunction of non-organic origin     Esophageal reflux     Gout     last assessed 08/26/15    Hearing loss, conductive     Heart murmur     Mitral Valve disorder    Herpes zoster     History of paroxysmal supraventricular tachycardia     Hypertension     Mitral valve disorder     Neoplasm of skin, malignant     Non-melanoma skin cancer     last assessed 10/26/17    Obesity     Palpitations     last assessed 03/05/15    Plantar fasciitis     last assessed 05/28/15    PSVT (paroxysmal supraventricular tachycardia) (HCC)     Sciatica     Sleep apnea     Squamous cell carcinoma of skin of scalp     Tinnitus, unspecified ear     Type 2 diabetes mellitus with hyperglycemia ([facility] [address] )     last assessed 62/82/92    Umbilical hernia     Worms in stool     last assessed 08/26/15     Past Surgical History:   Procedure Laterality Date    APPENDECTOMY      COLECTOMY      Partial, Sigmoid    COLONOSCOPY      HEAD & NECK SKIN LESION EXCISIONAL BIOPSY      10/11/10 SCC --  10/17/11 SCC  --  Location Scalp    HERNIA REPAIR      SD ESOPHAGOGASTRODUODENOSCOPY TRANSORAL DIAGNOSTIC N/A 8/9/2017    Procedure: ESOPHAGOGASTRODUODENOSCOPY (EGD); Surgeon: Kristine Liriano MD;  Location: [facility];   Service: Gastroenterology    SKIN BIOPSY      TONSILLECTOMY       Family History   Problem Relation Age of Onset    Cancer Mother     Diabetes Mother     Heart disease Mother     Heart attack Mother     Breast cancer Mother         Adenocarcinoma    Skin cancer Mother         Adenocarcinoma    Heart failure Mother     Diabetes type II Mother     Varicose Veins Mother         Lower Extremities    Cancer Father     Prostate cancer Father         Adenocarcinoma    Skin cancer Father     Lymphoma Father         Non-Hodgkin's     History   Smoking Status    Former Smoker   Smokeless Tobacco    Never Used     Comment: never smoker as per AS     History   Alcohol Use    Yes     Comment: occasional      History   Drug Use No       Current Outpatient Prescriptions   Medication Sig Dispense Refill    albuterol (PROVENTIL HFA,VENTOLIN HFA) 90 mcg/act inhaler Inhale 2 puffs every 6 (six) hours as needed for wheezing      aspirin (ASPIR-LOW) 81 mg EC tablet Take by mouth daily      clotrimazole-betamethasone (LOTRISONE) 1-0 05 % cream Apply topically Twice daily      cyclobenzaprine (FLEXERIL) 10 mg tablet Take by mouth daily as needed      doxycycline hyclate (VIBRAMYCIN) 100 mg capsule Take 1 capsule by mouth 2 (two) times a day      EPINEPHrine (EPIPEN 2-DAVID) 0 3 mg/0 3 mL SOAJ Inject as directed      fluorouracil (EFUDEX) 5 % cream Apply topically 2 (two) times a day To face twice daily 40 g 0    fluticasone (FLONASE) 50 mcg/act nasal spray 1 spray into each nostril daily      FREESTYLE UNISTICK II LANCETS MISC by Does not apply route 2 (two) times a day      glimepiride (AMARYL) 4 mg tablet TAKE 1 TABLET BY MOUTH TWICE A DAY 60 tablet 3    glucose blood (FREESTYLE LITE) test strip by In Vitro route      HYDROcodone-acetaminophen (NORCO) 5-325 mg per tablet Take 1-2 tablets by mouth every 6 (six) hours as needed for pain Max Daily Amount: 8 tablets 20 tablet 0    ibuprofen (MOTRIN) 800 mg tablet Take 1 tablet by mouth every 6 (six) hours as needed (pain) 30 tablet 0    JANUVIA 100 MG tablet TAKE 1 TABLET BY MOUTH DAILY 30 tablet 3    ketoconazole (NIZORAL) 2 % cream as needed      levalbuterol (XOPENEX HFA) 45 mcg/act inhaler Inhale 1 puff every 4 (four) hours as needed      LEVALBUTEROL HCL IN Take 1 ampule by nebulization every 4 (four) hours as needed for wheezing      loratadine (CLARITIN) 10 mg tablet Take by mouth daily      LORazepam (ATIVAN) 1 mg tablet Take by mouth as needed      metFORMIN (GLUCOPHAGE) 1000 MG tablet Take by mouth 2 (two) times a day      metFORMIN (GLUCOPHAGE) 500 mg tablet TAKE 1 TABLET BY MOUTH EVERY MORNING WITH 1,000 MG TABLET ** WITH FOOD 30 tablet 3    montelukast (SINGULAIR) 10 mg tablet TAKE 1 TABLET EVERY DAY 30 tablet 2    Multiple Vitamin (MULTIVITAMINS PO) Take by mouth daily      Multiple Vitamin (MULTIVITAMINS PO) Take 1 capsule by mouth daily      omeprazole (PriLOSEC) 20 mg delayed release capsule Take 1 capsule by mouth daily      Omeprazole 20 MG TBEC Take by mouth daily      sildenafil (VIAGRA) 100 mg tablet Take by mouth as needed  triamcinolone (KENALOG) 0 025 % cream Apply topically 2 (two) times a day      verapamil (VERELAN PM) 180 MG 24 hr capsule TAKE ONE CAPSULE BY MOUTH TWICE A DAY 60 capsule 1     No current facility-administered medications for this visit        Allergies   Allergen Reactions    Iodinated Diagnostic Agents Anaphylaxis    Shellfish Allergy Anaphylaxis    Shellfish-Derived Products     Shrimp Extract Allergy Skin Test      Immunization History   Administered Date(s) Administered    Influenza 10/28/2013, 11/25/2013, 10/27/2015, 10/21/2016, 10/18/2017    Influenza Quadrivalent, 6-35 Months IM 10/22/2014, 10/27/2015    Influenza Split High Dose Preservative Free IM 10/21/2016, 10/18/2017    Pneumococcal Conjugate 13-Valent 11/16/2016    Pneumococcal Polysaccharide PPV23 11/30/2017    Tdap 12/15/2009, 01/06/2018    Zoster 06/15/2012       Patient Care Team:  Karissa Edwards MD as PCP - General  MD Min Sandoval MD      Medicare Screening Tests and Risk Assessments:  AWV Clinical     ISAR:       Once in a Lifetime Medicare Screening:       Medicare Screening Tests and Risk Assessment:   AAA Risk Assessment    None Indicated:  Yes    Osteoporosis Risk Assessment    None indicated:  Yes    HIV Risk Assessment    None indicated:  Yes        Drug and Alcohol Use:   Tobacco use    Cigarettes:  former smoker    Quit date:  1/1/1979   Tobacco use duration    Tobacco Cessation Readiness    Alcohol use    Alcohol use:  occasional use    Concern about alcohol use:  No Tolerance to alcohol:  No   Attempts to cut down:  No Guilt about use:  No   Patient concern:  No Annoyed by criticism:  No   Morning drinking:  No Family concern:  No   Alcohol Treatment Readiness   Readiness to quit:  declined    Illicit Drug Use    Drug use:  never    Drug type:  no sedative use       Diet & Exercise:   Diet   What is your diet?:  Regular   How many servings a day of the following:   Fruits and Vegetables:  1-2 Meat: 1-2   Whole Grains:  2     Soda:  2   Coffee:  2    Exercise    Do you currently exercise?:  yes    Frequency:  occasional   Times per week:  2     Type of exercise:  walking   water exercise        Cognitive Impairment Screening:   Depression screening preformed:  Yes     PHQ-9 Depression scale score:  5   Cognitive Impairment Screening    Do you have difficulty learning or retaining new information?:  No Do you have difficulty handling new tasks?:  No   Do you have difficulty with reasoning?:  No Do you have difficulty with spatial ability and orientation?:  No   Do you have difficulty with language?:  No Do you have difficulty with behavior?:  No       Functional Ability/Level of Safety:   Hearing    Hearing difficulties:  Yes Bilateral:  significantly decreased   Hearing aid:  Yes    Hearing Impairment Assessment    Hearing status:  Hard of hearing    Do you find that other people have to repeat themselves when talking to you?:  No   Do you have difficulty hearing while talking on the phone?:  Yes Has anyone ever told you that you are speaking too loudly when talking with them?:  No   Do you have trouble hearing the doorbell or phone ringing?:  No Do you have difficulty hearing such that you feel frustrated talking to people?:  No   Do you feel sad because you cannot hear well?:  No Do you feel inconvienced due to your hearing problem?:  No   Do you think you would be a happier person if you could hear better?:  No    Current Activities    Status:  unlimited ADL's, unlimited driving, unlimited IADL's   Help needed with the folllowing:    Using the phone:  No Transportation:  No   Shopping:  No Preparing Meals:  No   Doing Housework:  No Doing Laundry:  No   Managing Medications:  No Managing Money:  No   ADL    Fall Risk   Have you fallen in the last 12 months?:  No Are you unsteady on your feet?:  No    Are you taking any medications that may cause fatigue or dizziness?:  No    Do you rush to the bathroom potentially risking a fall?:  No   Injury History   Polypharmacy:  No Antidepressant Use:  No   Sedative Use:  No Antihypertensive Use:  No   Previous Fall:  No Alcohol Use:  No   Deconditioning:  No Visual Impairment:  No   Cogitive Impairment:  No    Postural Hypotension:  No Urinary Incontinence:  No       Home Safety:   Are there hazards in your environment?:  No   If you fell, would you need help to get back up from the ground?:  No Do you have problems or concerns getting in/out of a bed, chair, tub, or toilet?:  No   Do you feel unsteady when walking?:  No Is your activity limited by pain?:  No   Do you have handrails and grab-bars in the home?:  No Are emergency numbers kept by the phone and regularly updated?:  No   Are you and/or family members aware of the dangers of smoking in bed?:  No Are firearms stored securely?:  No   Do you have working smoke alarms and fire extinguisher?:  No    Have you left the stove on unsupervised?:  No    Home Safety Risk Factors   Unfamilar with surroundings:  No Uneven floors:  No   Stairs or handrail saftey risk:  No Loose rugs:  No   Household clutter:  No Poor household lighting:  No   No grab bars in bathroom:  No Further evaluation needed:  No       Advanced Directives:   Advanced Directives    Living Will:  Yes Durable POA for healthcare:  No   Advanced directive:  No    Patient's End of Life Decisions        Urinary Incontinence:       Glaucoma:            Provider Screening    No data filed

## 2018-07-10 NOTE — PROGRESS NOTES
Assessment/Plan:    Recently in Lyme disease endemic area  Check lyme titer    Morbid obesity ([address] )  He needs to commit to a diet and exercise regimen  Anxiety disorder  Doing well with prn lorazepam     Hypercholesterolemia  ldl at goal  Ck and lfts wnl  Triglycerides elevated due to uncontrolled dm    Acute nasopharyngitis  claritin and nasal spray and will start abx if he fails to improve  Encounter for Medicare annual wellness exam  Wellness form reviewed and visit completed       Diagnoses and all orders for this visit:    Uncontrolled type 2 diabetes mellitus without complication, without long-term current use of insulin (HCC)  -     pioglitazone (ACTOS) 15 mg tablet; Take 1 tablet (15 mg total) by mouth daily  -     Comprehensive metabolic panel; Future  -     Microalbumin / creatinine urine ratio  -     Hemoglobin A1C; Future    Benign essential hypertension  -     Comprehensive metabolic panel; Future  -     TSH, 3rd generation; Future  -     CBC and differential; Future    Paroxysmal supraventricular tachycardia (HCC)    Morbid obesity (HCC)    Type 2 diabetes mellitus with complication, unspecified whether long term insulin use (HCC)  -     glimepiride (AMARYL) 4 mg tablet; Take 1 tablet (4 mg total) by mouth 2 (two) times a day    Supraventricular tachycardia (HCC)  -     verapamil (VERELAN PM) 180 MG 24 hr capsule; Take 1 capsule (180 mg total) by mouth 2 (two) times a day    Erectile dysfunction due to diseases classified elsewhere  -     sildenafil (VIAGRA) 100 mg tablet; Take 1 tablet (100 mg total) by mouth as needed for erectile dysfunction    Recently in Lyme disease endemic area  -     Lyme Antibody Profile with reflex to WB; Future    Anxiety disorder, unspecified type  -     LORazepam (ATIVAN) 1 mg tablet; Take 1 tablet (1 mg total) by mouth daily at bedtime    Hypercholesterolemia  -     Comprehensive metabolic panel; Future  -     CK;  Future  -     Lipid Panel with Direct LDL reflex; Future    Acute nasopharyngitis  -     azithromycin (ZITHROMAX) 250 mg tablet; 2 pills day one then one pill daily    Encounter for Medicare annual wellness exam    Other orders  -     levalbuterol (XOPENEX HFA) 45 mcg/act inhaler; Inhale 1 puff every 4 (four) hours as needed  -     clotrimazole-betamethasone (LOTRISONE) 1-0 05 % cream; Apply topically Twice daily  -     doxycycline hyclate (VIBRAMYCIN) 100 mg capsule; Take 1 capsule by mouth 2 (two) times a day  -     EPINEPHrine (EPIPEN 2-DAVID) 0 3 mg/0 3 mL SOAJ; Inject as directed  -     glucose blood (FREESTYLE LITE) test strip; by In Vitro route  -     FREESTYLE UNISTICK II LANCETS MISC; by Does not apply route 2 (two) times a day  -     Multiple Vitamin (MULTIVITAMINS PO); Take 1 capsule by mouth daily  -     omeprazole (PriLOSEC) 20 mg delayed release capsule; Take 1 capsule by mouth daily          Subjective:      Patient ID: Basil Patino is a 76 y o  male  Patient presents in follow up for his medical problems and to review recent lab work  He complains of a several day hx of subjective fever, cough which is non productive with postnasal drip and no sob   He took benadryl which helped dry up his secretions        The following portions of the patient's history were reviewed and updated as appropriate: allergies, current medications, past family history, past medical history, past social history, past surgical history and problem list     Review of Systems      Objective:      /72   Pulse 78   Ht 5' 6" (1 676 m)   Wt 88 5 kg (195 lb)   SpO2 93%   BMI 31 47 kg/m²          Physical Exam

## 2018-07-11 DIAGNOSIS — J44.9 CHRONIC OBSTRUCTIVE PULMONARY DISEASE, UNSPECIFIED COPD TYPE (HCC): ICD-10-CM

## 2018-07-11 RX ORDER — MONTELUKAST SODIUM 10 MG/1
TABLET ORAL
Qty: 30 TABLET | Refills: 0 | Status: SHIPPED | OUTPATIENT
Start: 2018-07-11 | End: 2018-08-07 | Stop reason: SDUPTHER

## 2018-07-17 ENCOUNTER — TELEPHONE (OUTPATIENT)
Dept: INTERNAL MEDICINE CLINIC | Facility: CLINIC | Age: 68
End: 2018-07-17

## 2018-07-18 ENCOUNTER — TELEPHONE (OUTPATIENT)
Dept: INTERNAL MEDICINE CLINIC | Facility: CLINIC | Age: 68
End: 2018-07-18

## 2018-07-18 ENCOUNTER — OFFICE VISIT (OUTPATIENT)
Dept: CARDIOLOGY CLINIC | Facility: CLINIC | Age: 68
End: 2018-07-18
Payer: MEDICARE

## 2018-07-18 VITALS
HEART RATE: 96 BPM | WEIGHT: 189.8 LBS | BODY MASS INDEX: 30.5 KG/M2 | SYSTOLIC BLOOD PRESSURE: 124 MMHG | HEIGHT: 66 IN | OXYGEN SATURATION: 96 % | DIASTOLIC BLOOD PRESSURE: 80 MMHG

## 2018-07-18 DIAGNOSIS — I05.9 MITRAL VALVE DISORDER: ICD-10-CM

## 2018-07-18 DIAGNOSIS — I47.1 PAROXYSMAL SUPRAVENTRICULAR TACHYCARDIA (HCC): ICD-10-CM

## 2018-07-18 DIAGNOSIS — I10 BENIGN ESSENTIAL HYPERTENSION: Primary | ICD-10-CM

## 2018-07-18 PROCEDURE — 99213 OFFICE O/P EST LOW 20 MIN: CPT | Performed by: INTERNAL MEDICINE

## 2018-07-18 NOTE — PROGRESS NOTES
[facility] [place]  [address] [facility]  [address]  Cardiology Follow Up    Allison Dural  1950  [id number]      1  Benign essential hypertension     2  Mitral valve disorder     3  Paroxysmal supraventricular tachycardia [facility])         Chief Complaint   Patient presents with    Follow-up       Interval History:   Patient presents for follow-up visit  Patient denies any history of chest pain shortness of breath  Patient denies any history of leg edema or orthopnea PND  No history of presyncope syncope  Patient states compliance with the present list of medications    No history of palpitations    Patient Active Problem List   Diagnosis    Actinic keratosis    Screening for skin cancer    Seborrheic keratosis    History of skin cancer    Allergic rhinitis    Anxiety disorder    Asthma    Benign essential hypertension    Diabetes mellitus type 2, uncontrolled ([address] )    Erectile dysfunction of non-organic origin    Esophageal reflux    Hearing loss, conductive    Hypercholesterolemia    Mitral valve disorder    Morbid obesity ([address] )    Paroxysmal supraventricular tachycardia (HCC)    Peripheral neuropathy    Rosacea    Sleep apnea    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    Thoracic radiculopathy    Xerostomia    Recently in Lyme disease endemic area    Acute nasopharyngitis    Encounter for Medicare annual wellness exam     Past Medical History:   Diagnosis Date    Actinic keratosis     Adhesive capsulitis of unspecified shoulder     Anxiety     Asthma     Atopic dermatitis     Cardiac disease     Cellulitis of right upper extremity     Cervical radiculopathy     Chest pain     Chronic maxillary sinusitis     last assessed 01/21/14    Diabetes mellitus ([address] )     Erectile dysfunction of non-organic origin     Esophageal reflux     Gout     last assessed 08/26/15    Hearing loss, conductive     Heart murmur     Mitral Valve disorder    Herpes zoster     History of paroxysmal supraventricular tachycardia     Hypertension     Mitral valve disorder     Neoplasm of skin, malignant     Non-melanoma skin cancer     last assessed 10/26/17    Obesity     Palpitations     last assessed 03/05/15    Plantar fasciitis     last assessed 05/28/15    PSVT (paroxysmal supraventricular tachycardia) (HCC)     Sciatica     Sleep apnea     Squamous cell carcinoma of skin of scalp     Tinnitus, unspecified ear     Type 2 diabetes mellitus with hyperglycemia ([facility] [address] )     last assessed 25/74/43    Umbilical hernia     Worms in stool     last assessed 08/26/15     Social History     Social History    Marital status: /Civil Union     Spouse name: N/A    Number of children: N/A    Years of education: N/A     Occupational History    Working Part Time       Social History Main Topics    Smoking status: Former Smoker    Smokeless tobacco: Never Used      Comment: never smoker as per AS    Alcohol use Yes      Comment: occasional    Drug use: No    Sexual activity: Not on file     Other Topics Concern    Not on file     Social History Narrative    Active Advance Directive      Family History   Problem Relation Age of Onset    Cancer Mother     Diabetes Mother     Heart disease Mother     Heart attack Mother     Breast cancer Mother         Adenocarcinoma    Skin cancer Mother         Adenocarcinoma    Heart failure Mother     Diabetes type II Mother     Varicose Veins Mother         Lower Extremities    Cancer Father     Prostate cancer Father         Adenocarcinoma    Skin cancer Father     Lymphoma Father         Non-Hodgkin's     Past Surgical History:   Procedure Laterality Date    APPENDECTOMY      COLECTOMY      Partial, Sigmoid    COLONOSCOPY      HEAD & NECK SKIN LESION EXCISIONAL BIOPSY      10/11/10 SCC --  10/17/11 SCC  --  Location Scalp    HERNIA REPAIR      OH ESOPHAGOGASTRODUODENOSCOPY TRANSORAL DIAGNOSTIC N/A 8/9/2017    Procedure: ESOPHAGOGASTRODUODENOSCOPY (EGD); Surgeon: Ilene Carey MD;  Location: [facility];   Service: Gastroenterology    SKIN BIOPSY      TONSILLECTOMY         Current Outpatient Prescriptions:     albuterol (PROVENTIL HFA,VENTOLIN HFA) 90 mcg/act inhaler, Inhale 2 puffs every 6 (six) hours as needed for wheezing, Disp: , Rfl:     aspirin (ASPIR-LOW) 81 mg EC tablet, Take by mouth daily, Disp: , Rfl:     clotrimazole-betamethasone (LOTRISONE) 1-0 05 % cream, Apply topically Twice daily, Disp: , Rfl:     cyclobenzaprine (FLEXERIL) 10 mg tablet, Take by mouth daily as needed, Disp: , Rfl:     doxycycline hyclate (VIBRAMYCIN) 100 mg capsule, Take 1 capsule by mouth 2 (two) times a day, Disp: , Rfl:     EPINEPHrine (EPIPEN 2-DAVID) 0 3 mg/0 3 mL SOAJ, Inject as directed, Disp: , Rfl:     fluorouracil (EFUDEX) 5 % cream, Apply topically 2 (two) times a day To face twice daily, Disp: 40 g, Rfl: 0    fluticasone (FLONASE) 50 mcg/act nasal spray, 1 spray into each nostril daily, Disp: , Rfl:     FREESTYLE UNISTICK II LANCETS MISC, by Does not apply route 2 (two) times a day, Disp: , Rfl:     glimepiride (AMARYL) 4 mg tablet, Take 1 tablet (4 mg total) by mouth 2 (two) times a day, Disp: 180 tablet, Rfl: 3    glucose blood (FREESTYLE LITE) test strip, by In Vitro route, Disp: , Rfl:     HYDROcodone-acetaminophen (NORCO) 5-325 mg per tablet, Take 1-2 tablets by mouth every 6 (six) hours as needed for pain Max Daily Amount: 8 tablets, Disp: 20 tablet, Rfl: 0    ibuprofen (MOTRIN) 800 mg tablet, Take 1 tablet by mouth every 6 (six) hours as needed (pain), Disp: 30 tablet, Rfl: 0    JANUVIA 100 MG tablet, TAKE 1 TABLET BY MOUTH DAILY, Disp: 30 tablet, Rfl: 3    ketoconazole (NIZORAL) 2 % cream, as needed, Disp: , Rfl:     levalbuterol (XOPENEX HFA) 45 mcg/act inhaler, Inhale 1 puff every 4 (four) hours as needed, Disp: , Rfl:     LEVALBUTEROL HCL IN, Take 1 ampule by nebulization every 4 (four) hours as needed for wheezing, Disp: , Rfl:     loratadine (CLARITIN) 10 mg tablet, Take by mouth daily, Disp: , Rfl:     LORazepam (ATIVAN) 1 mg tablet, Take 1 tablet (1 mg total) by mouth daily at bedtime, Disp: 90 tablet, Rfl: 1    metFORMIN (GLUCOPHAGE) 1000 MG tablet, Take by mouth 2 (two) times a day, Disp: , Rfl:     metFORMIN (GLUCOPHAGE) 500 mg tablet, TAKE 1 TABLET BY MOUTH EVERY MORNING WITH 1,000 MG TABLET ** WITH FOOD, Disp: 30 tablet, Rfl: 3    montelukast (SINGULAIR) 10 mg tablet, TAKE 1 TABLET BY MOUTH EVERY DAY, Disp: 30 tablet, Rfl: 0    Multiple Vitamin (MULTIVITAMINS PO), Take by mouth daily, Disp: , Rfl:     Multiple Vitamin (MULTIVITAMINS PO), Take 1 capsule by mouth daily, Disp: , Rfl:     Omeprazole 20 MG TBEC, Take by mouth daily, Disp: , Rfl:     pioglitazone (ACTOS) 15 mg tablet, Take 1 tablet (15 mg total) by mouth daily, Disp: 90 tablet, Rfl: 3    sildenafil (VIAGRA) 100 mg tablet, Take 1 tablet (100 mg total) by mouth as needed for erectile dysfunction, Disp: 30 tablet, Rfl: 3    triamcinolone (KENALOG) 0 025 % cream, Apply topically 2 (two) times a day, Disp: , Rfl:     verapamil (VERELAN PM) 180 MG 24 hr capsule, Take 1 capsule (180 mg total) by mouth 2 (two) times a day, Disp: 180 capsule, Rfl: 3  Allergies   Allergen Reactions    Iodinated Diagnostic Agents Anaphylaxis    Shellfish Allergy Anaphylaxis    Shellfish-Derived Products     Shrimp Extract Allergy Skin Test        Labs:  Appointment on 07/05/2018   Component Date Value    OCCULT BLD, FECAL IMMUNO* 07/05/2018 Negative    Appointment on 07/05/2018   Component Date Value    Hemoglobin A1C 07/05/2018 8 0*    EAG 07/05/2018 183     Sodium 07/05/2018 140     Potassium 07/05/2018 4 0     Chloride 07/05/2018 104     CO2 07/05/2018 30     Anion Gap 07/05/2018 6     BUN 07/05/2018 17     Creatinine 07/05/2018 1 03     Glucose, Fasting 07/05/2018 168*    Calcium 07/05/2018 8 9     AST 07/05/2018 14  ALT 07/05/2018 31     Alkaline Phosphatase 07/05/2018 59     Total Protein 07/05/2018 6 9     Albumin 07/05/2018 3 7     Total Bilirubin 07/05/2018 0 46     eGFR 07/05/2018 74     Cholesterol 07/05/2018 169     Triglycerides 07/05/2018 183*    HDL, Direct 07/05/2018 37*    LDL Calculated 07/05/2018 95      Imaging: No results found  Review of Systems:  Review of Systems  REVIEW OF SYSTEMS:  Constitutional:  Denies fever or chills   Eyes:  Denies change in visual acuity   HENT:  Denies nasal congestion or sore throat   Respiratory:  Denies cough or shortness of breath   Cardiovascular:  Denies chest pain or edema   GI:  Denies abdominal pain, nausea, vomiting, bloody stools or diarrhea   :  Denies dysuria, frequency, difficulty in micturition and nocturia  Musculoskeletal:  Denies back pain or joint pain   Neurologic:  Denies headache, focal weakness or sensory changes   Endocrine:  Denies polyuria or polydipsia   Lymphatic:  Denies swollen glands   Psychiatric:  Denies depression or anxiety   Physical Exam:    /80   Pulse 96   Ht 5' 6" (1 676 m)   Wt 86 1 kg (189 lb 12 8 oz)   SpO2 96%   BMI 30 63 kg/m²     Physical Exam   PHYSICAL EXAM:  General:  Patient is not in acute distress   Head: Normocephalic, Atraumatic  HEENT:  Both pupils normal-size atraumatic, normocephalic, nonicteric  Neck:  JVP not raised  Trachea central  No carotid bruit  Respiratory:  normal breath sounds no crackles  no rhonchi  Cardiovascular:  Regular rate and rhythm no S3 no murmurs  GI:  Abdomen soft nontender  No organomegaly  Lymphatic:  No cervical or inguinal lymphadenopathy  Neurologic:  Patient is awake alert, oriented   Grossly nonfocal    Discussion/Summary:  Patient overall doing well from a cardiovascular standpoint  Continue present medications  Patient does have history of supraventricular tachycardia which is well controlled on verapamil    Patient did have a stress test last year which was reviewed with the patient  Symptoms to watch out from cardiac standpoint which would indicate the need for further cardiac evaluation also discussed with patient  Follow-up in 6 months or earlier as needed  Follow-up with primary care physician

## 2018-07-19 ENCOUNTER — TELEPHONE (OUTPATIENT)
Dept: INTERNAL MEDICINE CLINIC | Facility: CLINIC | Age: 68
End: 2018-07-19

## 2018-07-19 NOTE — TELEPHONE ENCOUNTER
Have him call insurance first and see if cialis is covered    If not, get other options from insurance company

## 2018-07-19 NOTE — TELEPHONE ENCOUNTER
Pt called back, he spoke to his insurance company  Only other medication would have a copay of $100   Sildenafil is only a $10 copay  He would rather wait until medication is in stock       cb -[phone]

## 2018-07-20 ENCOUNTER — CLINICAL SUPPORT (OUTPATIENT)
Dept: DERMATOLOGY | Facility: CLINIC | Age: 68
End: 2018-07-20
Payer: MEDICARE

## 2018-07-20 DIAGNOSIS — L57.0 ACTINIC KERATOSIS: Primary | ICD-10-CM

## 2018-07-20 PROCEDURE — 99213 OFFICE O/P EST LOW 20 MIN: CPT | Performed by: DERMATOLOGY

## 2018-07-20 NOTE — PROGRESS NOTES
[facility]  [address] [address]     MRN: [id number] : 1950  Encounter: [id number]  Patient Information: Brendan Goldstein    Subjective:     80-year-old male presents for follow-up for  actinic keratosis  Patient notes that he had a severe  reaction and stop therapy after 2 weeks                       Objective: There were no vitals taken for this visit  Physical Exam:    General Appearance:    Alert, cooperative, no distress   Skin:     Erythema still noted on the face no scaling patches noted at this time     Assessment:     1  Actinic keratosis           Plan:    patient is status post treatment with 5% fluorouracil for 2 weeks hopefully this will be  A no further treatment that will eradicate most if not all the actinic keratosis however since he did not complete a full course possibility of this reccurring quickly is possible will recheck in 6 months      Prior to Admission medications    Medication Sig Start Date End Date Taking?  Authorizing Provider   albuterol (PROVENTIL HFA,VENTOLIN HFA) 90 mcg/act inhaler Inhale 2 puffs every 6 (six) hours as needed for wheezing    Historical Provider, MD   aspirin (ASPIR-LOW) 81 mg EC tablet Take by mouth daily 14   Historical Provider, MD   clotrimazole-betamethasone (LOTRISONE) 1-0 05 % cream Apply topically Twice daily 16   Historical Provider, MD   cyclobenzaprine (FLEXERIL) 10 mg tablet Take by mouth daily as needed 1/14/15   Historical Provider, MD   doxycycline hyclate (VIBRAMYCIN) 100 mg capsule Take 1 capsule by mouth 2 (two) times a day 17   Historical Provider, MD   EPINEPHrine (EPIPEN 2-DAVID) 0 3 mg/0 3 mL SOAJ Inject as directed 14   Historical Provider, MD   fluorouracil (EFUDEX) 5 % cream Apply topically 2 (two) times a day To face twice daily 18   Mazin Deshpande MD   fluticasone (FLONASE) 50 mcg/act nasal spray 1 spray into each nostril daily    Historical Provider, MD MORENOYLE UNISTICK II LANCETS MISC by Does not apply route 2 (two) times a day 1/24/17   Historical Provider, MD   glimepiride (AMARYL) 4 mg tablet Take 1 tablet (4 mg total) by mouth 2 (two) times a day 7/10/18   Aide Marion MD   glucose blood (FREESTYLE LITE) test strip by In Vitro route 1/21/14   Historical Provider, MD   HYDROcodone-acetaminophen (NORCO) 5-325 mg per tablet Take 1-2 tablets by mouth every 6 (six) hours as needed for pain Max Daily Amount: 8 tablets 11/23/16   Dorian Runner, MD   ibuprofen (MOTRIN) 800 mg tablet Take 1 tablet by mouth every 6 (six) hours as needed (pain) 11/23/16   Dorian Runner, MD   JANUVIA 100 MG tablet TAKE 1 TABLET BY MOUTH DAILY 6/14/18   Tez Sánchez PA-C   ketoconazole (NIZORAL) 2 % cream as needed 1/21/14   Historical Provider, MD   levalbuterol [place] HFA) 45 mcg/act inhaler Inhale 1 puff every 4 (four) hours as needed 4/24/17   Historical Provider, MD   LEVALBUTEROL HCL IN Take 1 ampule by nebulization every 4 (four) hours as needed for wheezing    Historical Provider, MD   loratadine (CLARITIN) 10 mg tablet Take by mouth daily    Historical Provider, MD   LORazepam (ATIVAN) 1 mg tablet Take 1 tablet (1 mg total) by mouth daily at bedtime 7/10/18   Aide Marion MD   metFORMIN (GLUCOPHAGE) 1000 MG tablet Take by mouth 2 (two) times a day 3/10/14   Historical Provider, MD   metFORMIN (GLUCOPHAGE) 500 mg tablet TAKE 1 TABLET BY MOUTH EVERY MORNING WITH 1,000 MG TABLET ** WITH FOOD 4/12/18   Aide Marion MD   montelukast (SINGULAIR) 10 mg tablet TAKE 1 TABLET BY MOUTH EVERY DAY 7/11/18   Aide Marion MD   Multiple Vitamin (MULTIVITAMINS PO) Take by mouth daily 1/21/14   Historical Provider, MD   Multiple Vitamin (MULTIVITAMINS PO) Take 1 capsule by mouth daily 1/21/14   Historical Provider, MD   Omeprazole 20 MG TBEC Take by mouth daily 1/16/14   Historical Provider, MD   pioglitazone (ACTOS) 15 mg tablet Take 1 tablet (15 mg total) by mouth daily 7/10/18   Sabas Vang MD   sildenafil (VIAGRA) 100 mg tablet Take 1 tablet (100 mg total) by mouth as needed for erectile dysfunction 7/10/18   Sabas Vang MD   triamcinolone (KENALOG) 0 025 % cream Apply topically 2 (two) times a day    Historical Provider, MD   verapamil (VERELAN PM) 180 MG 24 hr capsule Take 1 capsule (180 mg total) by mouth 2 (two) times a day 7/10/18   Sabas Vang MD     Allergies   Allergen Reactions    Iodinated Diagnostic Agents Anaphylaxis    Shellfish Allergy Anaphylaxis    Shellfish-Derived Products     Shrimp Extract Allergy Skin Test        Kenia Castellano MD  8/81/5649,3:72 AM    Portions of the record may have been created with voice recognition software   Occasional wrong word or "sound a like" substitutions may have occurred due to the inherent limitations of voice recognition software   Read the chart carefully and recognize, using context, where substitutions have occurred

## 2018-07-20 NOTE — PATIENT INSTRUCTIONS
patient is status post treatment with 5% fluorouracil for 2 weeks hopefully this will be  A no further treatment that will eradicate most if not all the actinic keratosis however since he did not complete a full course possibility of this reccurring quickly is possible will recheck in 6 months

## 2018-07-30 ENCOUNTER — TELEPHONE (OUTPATIENT)
Dept: INTERNAL MEDICINE CLINIC | Facility: CLINIC | Age: 68
End: 2018-07-30

## 2018-07-30 NOTE — TELEPHONE ENCOUNTER
Patient says that [facility] does not have the l00 mg of sildenafil it is on back order  They do have 50 mg   Patient requesting that the script be changed to the 50 mg dosage so that it can be filled at [facility]

## 2018-07-31 DIAGNOSIS — N52.1 ERECTILE DYSFUNCTION DUE TO DISEASES CLASSIFIED ELSEWHERE: ICD-10-CM

## 2018-07-31 RX ORDER — SILDENAFIL 50 MG/1
50 TABLET, FILM COATED ORAL AS NEEDED
Qty: 60 TABLET | Refills: 4 | Status: SHIPPED | OUTPATIENT
Start: 2018-07-31 | End: 2019-04-07 | Stop reason: SDUPTHER

## 2018-08-06 DIAGNOSIS — E11.8 TYPE 2 DIABETES MELLITUS WITH COMPLICATION, UNSPECIFIED WHETHER LONG TERM INSULIN USE: ICD-10-CM

## 2018-08-07 DIAGNOSIS — J44.9 CHRONIC OBSTRUCTIVE PULMONARY DISEASE, UNSPECIFIED COPD TYPE (HCC): ICD-10-CM

## 2018-08-07 RX ORDER — MONTELUKAST SODIUM 10 MG/1
TABLET ORAL
Qty: 30 TABLET | Refills: 0 | Status: SHIPPED | OUTPATIENT
Start: 2018-08-07 | End: 2018-09-08 | Stop reason: SDUPTHER

## 2018-08-24 DIAGNOSIS — E11.8 TYPE 2 DIABETES MELLITUS WITH COMPLICATION, UNSPECIFIED WHETHER LONG TERM INSULIN USE: Primary | ICD-10-CM

## 2018-08-24 RX ORDER — LANCETS 28 GAUGE
EACH MISCELLANEOUS
Qty: 200 EACH | Refills: 0 | Status: SHIPPED | OUTPATIENT
Start: 2018-08-24 | End: 2018-11-19 | Stop reason: SDUPTHER

## 2018-08-24 RX ORDER — BLOOD-GLUCOSE METER
KIT MISCELLANEOUS
Qty: 100 EACH | Refills: 3 | Status: SHIPPED | OUTPATIENT
Start: 2018-08-24 | End: 2018-12-05 | Stop reason: SDUPTHER

## 2018-08-28 ENCOUNTER — TELEPHONE (OUTPATIENT)
Dept: INTERNAL MEDICINE CLINIC | Facility: CLINIC | Age: 68
End: 2018-08-28

## 2018-09-08 DIAGNOSIS — J44.9 CHRONIC OBSTRUCTIVE PULMONARY DISEASE, UNSPECIFIED COPD TYPE (HCC): ICD-10-CM

## 2018-09-08 DIAGNOSIS — E11.65 UNCONTROLLED TYPE 2 DIABETES MELLITUS WITH HYPERGLYCEMIA, WITHOUT LONG-TERM CURRENT USE OF INSULIN (HCC): Primary | ICD-10-CM

## 2018-09-10 RX ORDER — MONTELUKAST SODIUM 10 MG/1
TABLET ORAL
Qty: 30 TABLET | Refills: 0 | Status: SHIPPED | OUTPATIENT
Start: 2018-09-10 | End: 2018-10-10 | Stop reason: SDUPTHER

## 2018-09-19 LAB
LEFT EYE DIABETIC RETINOPATHY: NORMAL
RIGHT EYE DIABETIC RETINOPATHY: NORMAL

## 2018-10-02 ENCOUNTER — CLINICAL SUPPORT (OUTPATIENT)
Dept: INTERNAL MEDICINE CLINIC | Facility: CLINIC | Age: 68
End: 2018-10-02
Payer: MEDICARE

## 2018-10-02 DIAGNOSIS — Z23 NEED FOR INFLUENZA VACCINATION: Primary | ICD-10-CM

## 2018-10-02 PROCEDURE — 90662 IIV NO PRSV INCREASED AG IM: CPT

## 2018-10-02 PROCEDURE — G0008 ADMIN INFLUENZA VIRUS VAC: HCPCS

## 2018-10-10 DIAGNOSIS — K21.9 GASTROESOPHAGEAL REFLUX DISEASE, ESOPHAGITIS PRESENCE NOT SPECIFIED: Primary | ICD-10-CM

## 2018-10-10 DIAGNOSIS — E11.8 TYPE 2 DIABETES MELLITUS WITH COMPLICATION, UNSPECIFIED WHETHER LONG TERM INSULIN USE: ICD-10-CM

## 2018-10-10 DIAGNOSIS — E11.9 TYPE 2 DIABETES MELLITUS WITHOUT COMPLICATION (HCC): ICD-10-CM

## 2018-10-10 DIAGNOSIS — J44.9 CHRONIC OBSTRUCTIVE PULMONARY DISEASE, UNSPECIFIED COPD TYPE (HCC): ICD-10-CM

## 2018-10-10 RX ORDER — OMEPRAZOLE 20 MG/1
CAPSULE, DELAYED RELEASE ORAL
Qty: 90 CAPSULE | Refills: 0 | Status: SHIPPED | OUTPATIENT
Start: 2018-10-10 | End: 2018-12-31 | Stop reason: SDUPTHER

## 2018-10-10 RX ORDER — SITAGLIPTIN 100 MG/1
TABLET, FILM COATED ORAL
Qty: 30 TABLET | Refills: 3 | Status: SHIPPED | OUTPATIENT
Start: 2018-10-10 | End: 2018-12-05 | Stop reason: SDUPTHER

## 2018-10-10 RX ORDER — MONTELUKAST SODIUM 10 MG/1
TABLET ORAL
Qty: 30 TABLET | Refills: 0 | Status: SHIPPED | OUTPATIENT
Start: 2018-10-10 | End: 2018-11-03 | Stop reason: SDUPTHER

## 2018-10-15 ENCOUNTER — TELEPHONE (OUTPATIENT)
Dept: INTERNAL MEDICINE CLINIC | Facility: CLINIC | Age: 68
End: 2018-10-15

## 2018-10-15 NOTE — TELEPHONE ENCOUNTER
Patient is going to be out of town Jan to March 2019    But his A1C is not do till Jan  inquiry if he can get a new order for Dec instead   Pal Jin Please call and advice        [phone]

## 2018-10-16 DIAGNOSIS — E11.65 UNCONTROLLED TYPE 2 DIABETES MELLITUS WITH HYPERGLYCEMIA (HCC): Primary | ICD-10-CM

## 2018-10-16 DIAGNOSIS — E78.2 MIXED HYPERLIPIDEMIA: ICD-10-CM

## 2018-11-03 DIAGNOSIS — E11.65 UNCONTROLLED TYPE 2 DIABETES MELLITUS WITH HYPERGLYCEMIA, WITHOUT LONG-TERM CURRENT USE OF INSULIN (HCC): ICD-10-CM

## 2018-11-03 DIAGNOSIS — J44.9 CHRONIC OBSTRUCTIVE PULMONARY DISEASE, UNSPECIFIED COPD TYPE (HCC): ICD-10-CM

## 2018-11-05 RX ORDER — MONTELUKAST SODIUM 10 MG/1
TABLET ORAL
Qty: 30 TABLET | Refills: 0 | Status: SHIPPED | OUTPATIENT
Start: 2018-11-05 | End: 2019-03-31 | Stop reason: SDUPTHER

## 2018-11-08 ENCOUNTER — TELEPHONE (OUTPATIENT)
Dept: INTERNAL MEDICINE CLINIC | Facility: CLINIC | Age: 68
End: 2018-11-08

## 2018-11-08 DIAGNOSIS — E11.65 UNCONTROLLED TYPE 2 DIABETES MELLITUS WITH HYPERGLYCEMIA, WITHOUT LONG-TERM CURRENT USE OF INSULIN (HCC): ICD-10-CM

## 2018-11-08 NOTE — TELEPHONE ENCOUNTER
Metformin 1000mg we need a new script for it the insurance only covers if its 2tablet per day the one we have is 2 5 daily

## 2018-11-19 DIAGNOSIS — E11.8 TYPE 2 DIABETES MELLITUS WITH COMPLICATION, UNSPECIFIED WHETHER LONG TERM INSULIN USE: ICD-10-CM

## 2018-11-19 RX ORDER — LANCETS 28 GAUGE
EACH MISCELLANEOUS
Qty: 200 EACH | Refills: 0 | Status: SHIPPED | OUTPATIENT
Start: 2018-11-19 | End: 2019-02-20 | Stop reason: SDUPTHER

## 2018-12-05 DIAGNOSIS — E11.9 TYPE 2 DIABETES MELLITUS WITHOUT COMPLICATION, UNSPECIFIED WHETHER LONG TERM INSULIN USE (HCC): ICD-10-CM

## 2018-12-05 DIAGNOSIS — E11.8 TYPE 2 DIABETES MELLITUS WITH COMPLICATION, UNSPECIFIED WHETHER LONG TERM INSULIN USE: ICD-10-CM

## 2018-12-17 ENCOUNTER — APPOINTMENT (OUTPATIENT)
Dept: LAB | Facility: CLINIC | Age: 68
End: 2018-12-17
Payer: MEDICARE

## 2018-12-17 DIAGNOSIS — E11.65 UNCONTROLLED TYPE 2 DIABETES MELLITUS WITH HYPERGLYCEMIA (HCC): ICD-10-CM

## 2018-12-17 DIAGNOSIS — I10 BENIGN ESSENTIAL HYPERTENSION: ICD-10-CM

## 2018-12-17 DIAGNOSIS — E78.2 MIXED HYPERLIPIDEMIA: ICD-10-CM

## 2018-12-17 DIAGNOSIS — E78.00 HYPERCHOLESTEROLEMIA: ICD-10-CM

## 2018-12-17 DIAGNOSIS — IMO0001 UNCONTROLLED TYPE 2 DIABETES MELLITUS WITHOUT COMPLICATION, WITHOUT LONG-TERM CURRENT USE OF INSULIN: ICD-10-CM

## 2018-12-17 LAB
ALBUMIN SERPL BCP-MCNC: 3.9 G/DL (ref 3.5–5)
ALP SERPL-CCNC: 48 U/L (ref 46–116)
ALT SERPL W P-5'-P-CCNC: 30 U/L (ref 12–78)
ANION GAP SERPL CALCULATED.3IONS-SCNC: 7 MMOL/L (ref 4–13)
AST SERPL W P-5'-P-CCNC: 17 U/L (ref 5–45)
BILIRUB SERPL-MCNC: 0.66 MG/DL (ref 0.2–1)
BUN SERPL-MCNC: 17 MG/DL (ref 5–25)
CALCIUM SERPL-MCNC: 9.5 MG/DL (ref 8.3–10.1)
CHLORIDE SERPL-SCNC: 104 MMOL/L (ref 100–108)
CHOLEST SERPL-MCNC: 172 MG/DL (ref 50–200)
CK SERPL-CCNC: 64 U/L (ref 39–308)
CO2 SERPL-SCNC: 27 MMOL/L (ref 21–32)
CREAT SERPL-MCNC: 1.09 MG/DL (ref 0.6–1.3)
CREAT UR-MCNC: 106 MG/DL
EST. AVERAGE GLUCOSE BLD GHB EST-MCNC: 171 MG/DL
GFR SERPL CREATININE-BSD FRML MDRD: 69 ML/MIN/1.73SQ M
GLUCOSE P FAST SERPL-MCNC: 160 MG/DL (ref 65–99)
HBA1C MFR BLD: 7.6 % (ref 4.2–6.3)
HDLC SERPL-MCNC: 42 MG/DL (ref 40–60)
LDLC SERPL CALC-MCNC: 105 MG/DL (ref 0–100)
MICROALBUMIN UR-MCNC: 9.5 MG/L (ref 0–20)
MICROALBUMIN/CREAT 24H UR: 9 MG/G CREATININE (ref 0–30)
POTASSIUM SERPL-SCNC: 4.1 MMOL/L (ref 3.5–5.3)
PROT SERPL-MCNC: 7.6 G/DL (ref 6.4–8.2)
SODIUM SERPL-SCNC: 138 MMOL/L (ref 136–145)
TRIGL SERPL-MCNC: 125 MG/DL

## 2018-12-17 PROCEDURE — 83036 HEMOGLOBIN GLYCOSYLATED A1C: CPT

## 2018-12-17 PROCEDURE — 80061 LIPID PANEL: CPT

## 2018-12-17 PROCEDURE — 80053 COMPREHEN METABOLIC PANEL: CPT

## 2018-12-17 PROCEDURE — 36415 COLL VENOUS BLD VENIPUNCTURE: CPT

## 2018-12-17 PROCEDURE — 82550 ASSAY OF CK (CPK): CPT

## 2018-12-17 PROCEDURE — 82043 UR ALBUMIN QUANTITATIVE: CPT | Performed by: INTERNAL MEDICINE

## 2018-12-17 PROCEDURE — 82570 ASSAY OF URINE CREATININE: CPT | Performed by: INTERNAL MEDICINE

## 2018-12-21 ENCOUNTER — OFFICE VISIT (OUTPATIENT)
Dept: DERMATOLOGY | Facility: CLINIC | Age: 68
End: 2018-12-21
Payer: MEDICARE

## 2018-12-21 DIAGNOSIS — L82.1 SEBORRHEIC KERATOSIS: ICD-10-CM

## 2018-12-21 DIAGNOSIS — L21.9 SEBORRHEIC DERMATITIS: ICD-10-CM

## 2018-12-21 DIAGNOSIS — Z13.89 SCREENING FOR SKIN CONDITION: ICD-10-CM

## 2018-12-21 DIAGNOSIS — Z85.828 HISTORY OF SKIN CANCER: ICD-10-CM

## 2018-12-21 DIAGNOSIS — L98.9 UNKNOWN SKIN LESION: Primary | ICD-10-CM

## 2018-12-21 PROCEDURE — 11100 PR BIOPSY OF SKIN LESION: CPT | Performed by: DERMATOLOGY

## 2018-12-21 PROCEDURE — 99213 OFFICE O/P EST LOW 20 MIN: CPT | Performed by: DERMATOLOGY

## 2018-12-21 PROCEDURE — 88305 TISSUE EXAM BY PATHOLOGIST: CPT | Performed by: PATHOLOGY

## 2018-12-21 RX ORDER — KETOCONAZOLE 20 MG/G
CREAM TOPICAL 2 TIMES DAILY
Qty: 30 G | Refills: 3 | Status: SHIPPED | OUTPATIENT
Start: 2018-12-21 | End: 2019-04-07 | Stop reason: SDUPTHER

## 2018-12-21 NOTE — PROGRESS NOTES
[address]  [address] [place] [address]  [phone]     MRN: [id number] : 1950  Encounter: [id number]  Patient Information: Zulay Davidson  Chief complaint:  6 month skin checkup    History of present illness:  80-year-old male with previous history of skin cancer actinic keratosis presents for overall checkup patient notably concerned regarding lesion on left cheek  Patient notes that this lesion has been present despite using ketoconazole cream without any improvement    Past Medical History:   Diagnosis Date    Actinic keratosis     Adhesive capsulitis of unspecified shoulder     Anxiety     Asthma     Atopic dermatitis     Cardiac disease     Cellulitis of right upper extremity     Cervical radiculopathy     Chest pain     Chronic maxillary sinusitis     last assessed 14    Diabetes mellitus ([address] )     Erectile dysfunction of non-organic origin     Esophageal reflux     Gout     last assessed 08/26/15    Hearing loss, conductive     Heart murmur     Mitral Valve disorder    Herpes zoster     History of paroxysmal supraventricular tachycardia     Hypertension     Mitral valve disorder     Neoplasm of skin, malignant     Non-melanoma skin cancer     last assessed 10/26/17    Obesity     Palpitations     last assessed 03/05/15    Plantar fasciitis     last assessed 05/28/15    PSVT (paroxysmal supraventricular tachycardia) (HCC)     Sciatica     Sleep apnea     Squamous cell carcinoma of skin of scalp     Tinnitus, unspecified ear     Type 2 diabetes mellitus with hyperglycemia ([address] )     last assessed     Umbilical hernia     Worms in stool     last assessed 08/26/15     Past Surgical History:   Procedure Laterality Date    APPENDECTOMY      COLECTOMY      Partial, Sigmoid    COLONOSCOPY      HEAD & NECK SKIN LESION EXCISIONAL BIOPSY      10/11/10 SCC --  10/17/11 SCC  -- Location Scalp    HERNIA REPAIR      [facility] ESOPHAGOGASTRODUODENOSCOPY TRANSORAL DIAGNOSTIC N/A 8/9/2017    Procedure: ESOPHAGOGASTRODUODENOSCOPY (EGD); Surgeon: Dago Cohen MD;  Location: [facility]; Service: Gastroenterology    SKIN BIOPSY      TONSILLECTOMY       Social History   History   Alcohol Use    Yes     Comment: occasional     History   Drug Use No     History   Smoking Status    Former Smoker   Smokeless Tobacco    Never Used     Comment: never smoker as per AS     Family History   Problem Relation Age of Onset    Cancer Mother     Diabetes Mother     Heart disease Mother     Heart attack Mother     Breast cancer Mother         Adenocarcinoma    Skin cancer Mother         Adenocarcinoma    Heart failure Mother     Diabetes type II Mother     Varicose Veins Mother         Lower Extremities    Cancer Father     Prostate cancer Father         Adenocarcinoma    Skin cancer Father     Lymphoma Father         Non-Hodgkin's     Meds/Allergies   Allergies   Allergen Reactions    Iodinated Diagnostic Agents Anaphylaxis    Shellfish Allergy Anaphylaxis    Shellfish-Derived Products     Shrimp Extract Allergy Skin Test        Meds:  Prior to Admission medications    Medication Sig Start Date End Date Taking?  Authorizing Provider   albuterol (PROVENTIL HFA,VENTOLIN HFA) 90 mcg/act inhaler Inhale 2 puffs every 6 (six) hours as needed for wheezing   Yes Historical Provider, MD   aspirin (ASPIR-LOW) 81 mg EC tablet Take by mouth daily 1/21/14  Yes Historical Provider, MD   clotrimazole-betamethasone (Miguel Beagle) 1-0 05 % cream Apply topically Twice daily 11/28/16  Yes Historical Provider, MD   cyclobenzaprine (FLEXERIL) 10 mg tablet Take by mouth daily as needed 1/14/15  Yes Historical Provider, MD   doxycycline hyclate (VIBRAMYCIN) 100 mg capsule Take 1 capsule by mouth 2 (two) times a day 5/12/17  Yes Historical Provider, MD   EPINEPHrine (EPIPEN 2-DAVID) 0 3 mg/0 3 mL SOAJ Inject as directed 7/13/14  Yes Historical Provider, MD   fluorouracil (EFUDEX) 5 % cream Apply topically 2 (two) times a day To face twice daily 6/29/18  Yes Cali Meyers MD   fluticasone (FLONASE) 50 mcg/act nasal spray 1 spray into each nostril daily   Yes Historical Provider, MD   glimepiride (AMARYL) 4 mg tablet Take 1 tablet (4 mg total) by mouth 2 (two) times a day 7/10/18  Yes Ariana Hills MD   glucose blood (FREESTYLE LITE) test strip 1 each by Other route as needed (sugar) Use as instructed 12/5/18  Yes Bettina Ramos PA-C   HYDROcodone-acetaminophen [facility]) 5-325 mg per tablet Take 1-2 tablets by mouth every 6 (six) hours as needed for pain Max Daily Amount: 8 tablets 11/23/16  Yes Chuy Sue MD   ibuprofen (MOTRIN) 800 mg tablet Take 1 tablet by mouth every 6 (six) hours as needed (pain) 11/23/16  Yes Chuy Sue MD   ketoconazole (NIZORAL) 2 % cream as needed 1/21/14  Yes Historical Provider, MD   Lancets (FREESTYLE) lancets TEST 2 TIMES A DAY 11/19/18  Yes Bettina Ramos PA-C   levalbuterol Margarita Momo HFA) 45 mcg/act inhaler Inhale 1 puff every 4 (four) hours as needed 4/24/17  Yes Historical Provider, MD   LEVALBUTEROL HCL IN Take 1 ampule by nebulization every 4 (four) hours as needed for wheezing   Yes Historical Provider, MD   loratadine (CLARITIN) 10 mg tablet Take by mouth daily   Yes Historical Provider, MD   LORazepam (ATIVAN) 1 mg tablet Take 1 tablet (1 mg total) by mouth daily at bedtime 7/10/18  Yes Ariana Hills MD   metFORMIN (GLUCOPHAGE) 1000 MG tablet Take 1 tablet (1,000 mg total) by mouth 2 (two) times a day with meals 11/8/18  Yes Bettina Ramos PA-C   montelukast (SINGULAIR) 10 mg tablet TAKE 1 TABLET BY MOUTH EVERY DAY 11/5/18  Yes Alferd Dials, PA-C   Multiple Vitamin (MULTIVITAMINS PO) Take by mouth daily 1/21/14  Yes Historical Provider, MD   Multiple Vitamin (MULTIVITAMINS PO) Take 1 capsule by mouth daily 1/21/14  Yes Historical Provider, MD   omeprazole (PriLOSEC) 20 mg delayed release capsule TAKE ONE CAPSULE BY MOUTH EVERY DAY 10/10/18  Yes Tylor Webster PA-C   Omeprazole 20 MG TBEC Take by mouth daily 1/16/14  Yes Historical Provider, MD   pioglitazone (ACTOS) 15 mg tablet Take 1 tablet (15 mg total) by mouth daily 7/10/18  Yes Tavares Shepard MD   sildenafil (VIAGRA) 50 MG tablet Take 1 tablet (50 mg total) by mouth as needed for erectile dysfunction 7/31/18  Yes Tavares Shepard MD   sitaGLIPtin (JANUVIA) 100 mg tablet Take 1 tablet (100 mg total) by mouth daily 12/5/18  Yes Tylor Webster PA-C   triamcinolone (KENALOG) 0 025 % cream Apply topically 2 (two) times a day   Yes Historical Provider, MD   verapamil (VERELAN PM) 180 MG 24 hr capsule Take 1 capsule (180 mg total) by mouth 2 (two) times a day 7/10/18  Yes Tavares Shepard MD       Subjective:     Review of Systems:    General: negative for - chills, fatigue, fever,  weight gain or weight loss  Psychological: negative for - anxiety, behavioral disorder, concentration difficulties, decreased libido, depression, irritability, memory difficulties, mood swings, sleep disturbances or suicidal ideation  ENT: negative for - hearing difficulties , nasal congestion, nasal discharge, oral lesions, sinus pain, sneezing, sore throat  Allergy and Immunology: negative for - hives, insect bite sensitivity,  Hematological and Lymphatic: negative for - bleeding problems, blood clots,bruising, swollen lymph nodes  Endocrine: negative for - hair pattern changes, hot flashes, malaise/lethargy, mood swings, palpitations, polydipsia/polyuria, skin changes, temperature intolerance or unexpected weight change  Respiratory: negative for - cough, hemoptysis, orthopnea, shortness of breath, or wheezing  Cardiovascular: negative for - chest pain, dyspnea on exertion, edema,  Gastrointestinal: negative for - abdominal pain, nausea/vomiting  Genito-Urinary: negative for - dysuria, incontinence, irregular/heavy menses or urinary frequency/urgency  Musculoskeletal: negative for - gait disturbance, joint pain, joint stiffness, joint swelling, muscle pain, muscular weakness  Dermatological:  As in HPI  Neurological: negative for confusion, dizziness, headaches, impaired coordination/balance, memory loss, numbness/tingling, seizures, speech problems, tremors or weakness       Objective: There were no vitals taken for this visit  Physical Exam:    General Appearance:    Alert, cooperative, no distress   Head:    Normocephalic, without obvious abnormality, atraumatic           Skin:   A full skin exam was performed including scalp, head scalp, eyes, ears, nose, lips, neck, chest, axilla, abdomen, back, buttocks, bilateral upper extremities, bilateral lower extremities, hands, feet, fingers, toes, fingernails, and toenails 11 mm a shiny scaly macule noted on the left cheek scaling erythematous areas noted on the eyebrows a scattered on the scalp normal keratotic papules with greasy stuck on appearance previous sites of skin cancer well healed without recurrence    Shave Biopsy Procedure Note    Pre-operative Diagnosis:  Rule out basal cell carcinoma    Plan:  1  Instructed to keep the wound dry and covered for 24 and clean thereafter  2  Warning signs of infection were reviewed  3  Recommended that the patient use OTC acetaminophen as needed for pain  4  Return  Pending results of biopsy(ies)    Locations:  Left cheek   Indications:  Nonhealing lesion    Anesthesia: Lidocaine 1% without epinephrine without added sodium bicarbonate    Procedure Details     Patient informed of the risks (including bleeding and infection) and benefits of the   procedure and Verbal informed consent obtained  The lesion and surrounding area were given a sterile prep using alcohol and draped in the usual sterile fashion  A Blue blade razor was used to obtain a specimen  Hemostasis achieved with aluminum chloride  Petrolatum and a sterile dressing applied    The specimen was sent for pathologic examination  The patient tolerated the procedure(s) well  Complications:  none  Assessment:     1  Unknown skin lesion     2  Seborrheic dermatitis     3  Seborrheic keratosis     4  Screening for skin condition     5  History of skin cancer           Plan:   Skin lesion probable psoriasiform process will await results of biopsy if further treatment needed  Seborrheic dermatitis still active continue ketoconazole cream use it twice daily for about 2-3 weeks to get this under    Control then discontinue until next episode  Seborrheic keratosis patient reassured these are normal growths we acquire with age no treatment needed  History of skin cancer in no recurrence nothing else atypical sunblock recommended follow-up in 6 months  Screening for dermatologic disorders nothing else of concern noted on complete exam follow-up in 6 months    Blanca Braswell MD  12/21/2018,8:44 AM    Portions of the record may have been created with voice recognition software   Occasional wrong word or "sound a like" substitutions may have occurred due to the inherent limitations of voice recognition software   Read the chart carefully and recognize, using context, where substitutions have occurred

## 2018-12-21 NOTE — PATIENT INSTRUCTIONS
Skin lesion probable psoriasiform process will await results of biopsy if further treatment needed  Seborrheic dermatitis still active continue ketoconazole cream use it twice daily for about 2-3 weeks to get this under      Control then discontinue until next episode  Seborrheic keratosis patient reassured these are normal growths we acquire with age no treatment needed  History of skin cancer in no recurrence nothing else atypical sunblock recommended follow-up in 6 months  Screening for dermatologic disorders nothing else of concern noted on complete exam follow-up in 6 months  Wound care instructions given to patient

## 2018-12-24 ENCOUNTER — OFFICE VISIT (OUTPATIENT)
Dept: INTERNAL MEDICINE CLINIC | Facility: CLINIC | Age: 68
End: 2018-12-24
Payer: MEDICARE

## 2018-12-24 VITALS
SYSTOLIC BLOOD PRESSURE: 128 MMHG | DIASTOLIC BLOOD PRESSURE: 76 MMHG | HEART RATE: 88 BPM | OXYGEN SATURATION: 97 % | BODY MASS INDEX: 31.34 KG/M2 | WEIGHT: 195 LBS | HEIGHT: 66 IN

## 2018-12-24 DIAGNOSIS — I10 BENIGN ESSENTIAL HYPERTENSION: ICD-10-CM

## 2018-12-24 DIAGNOSIS — M54.6 CHRONIC LEFT-SIDED THORACIC BACK PAIN: ICD-10-CM

## 2018-12-24 DIAGNOSIS — F41.9 ANXIETY DISORDER, UNSPECIFIED TYPE: ICD-10-CM

## 2018-12-24 DIAGNOSIS — Z12.5 SCREENING FOR PROSTATE CANCER: ICD-10-CM

## 2018-12-24 DIAGNOSIS — E11.65 UNCONTROLLED TYPE 2 DIABETES MELLITUS WITH HYPERGLYCEMIA (HCC): Primary | ICD-10-CM

## 2018-12-24 DIAGNOSIS — G89.29 CHRONIC LEFT-SIDED THORACIC BACK PAIN: ICD-10-CM

## 2018-12-24 DIAGNOSIS — K59.1 FUNCTIONAL DIARRHEA: ICD-10-CM

## 2018-12-24 DIAGNOSIS — IMO0001 UNCONTROLLED TYPE 2 DIABETES MELLITUS WITHOUT COMPLICATION, WITHOUT LONG-TERM CURRENT USE OF INSULIN: ICD-10-CM

## 2018-12-24 DIAGNOSIS — E78.00 HYPERCHOLESTEROLEMIA: ICD-10-CM

## 2018-12-24 PROCEDURE — 99214 OFFICE O/P EST MOD 30 MIN: CPT | Performed by: INTERNAL MEDICINE

## 2018-12-24 RX ORDER — PIOGLITAZONEHYDROCHLORIDE 15 MG/1
30 TABLET ORAL DAILY
Qty: 90 TABLET | Refills: 3 | Status: SHIPPED | OUTPATIENT
Start: 2018-12-24 | End: 2019-03-12 | Stop reason: SDUPTHER

## 2018-12-24 RX ORDER — CYCLOBENZAPRINE HCL 10 MG
10 TABLET ORAL 3 TIMES DAILY PRN
Qty: 90 TABLET | Refills: 0 | Status: SHIPPED | OUTPATIENT
Start: 2018-12-24 | End: 2020-11-24

## 2018-12-24 NOTE — ASSESSMENT & PLAN NOTE
Lab Results   Component Value Date    HGBA1C 7 6 (H) 12/17/2018       No results for input(s): POCGLU in the last 72 hours  Blood Sugar Average: Last 72 hrs:    a1c 7 6   Will increase actos to 30 mg daily

## 2018-12-24 NOTE — ASSESSMENT & PLAN NOTE
ldl elevated  He doesn't want to take meds and would like to do dietary modification  Will give a low fat low chol diet

## 2018-12-24 NOTE — PROGRESS NOTES
Assessment/Plan:    Diabetes mellitus type 2, uncontrolled ([facility] )  Lab Results   Component Value Date    HGBA1C 7 6 (H) 12/17/2018       No results for input(s): POCGLU in the last 72 hours  Blood Sugar Average: Last 72 hrs:    a1c 7 6  Will increase actos to 30 mg daily       Benign essential hypertension  Well controlled  Bmp wnl     Hypercholesterolemia  ldl elevated  He doesn't want to take meds and would like to do dietary modification  Will give a low fat low chol diet  Anxiety disorder  Continue with lorazepam prn     Screening for prostate cancer  psa and michele at follow up    Functional diarrhea  Likely due to metformin  I reassured        Diagnoses and all orders for this visit:    Uncontrolled type 2 diabetes mellitus with hyperglycemia ([facility] )  -     Comprehensive metabolic panel; Future  -     Hemoglobin A1C; Future  -     Microalbumin / creatinine urine ratio  -     POCT diabetic eye exam    Benign essential hypertension  -     Comprehensive metabolic panel; Future    Hypercholesterolemia  -     Comprehensive metabolic panel; Future  -     Lipid Panel with Direct LDL reflex; Future    Uncontrolled type 2 diabetes mellitus without complication, without long-term current use of insulin (HCC)  -     pioglitazone (ACTOS) 15 mg tablet; Take 2 tablets (30 mg total) by mouth daily    Chronic left-sided thoracic back pain  -     cyclobenzaprine (FLEXERIL) 10 mg tablet; Take 1 tablet (10 mg total) by mouth 3 (three) times a day as needed for muscle spasms    Screening for prostate cancer  -     PSA, Total Screen; Future    Anxiety disorder, unspecified type    Functional diarrhea          Subjective:      Patient ID: Bisi Paula is a 76 y o  male  Patient presents in follow up for his medical problems and to review recent lab work  He complains that every 4-5 days he has some loose stools  They start out as formed stools and then has several loose bowel movements   He says that after that one day everything returns to nl  No brbpr or black tarry stools         The following portions of the patient's history were reviewed and updated as appropriate: allergies, current medications, past family history, past medical history, past social history, past surgical history and problem list     Review of Systems   Constitutional: Negative for activity change, appetite change, chills, diaphoresis, fatigue, fever and unexpected weight change  HENT: Negative for congestion, dental problem, drooling, ear discharge, ear pain, facial swelling, hearing loss, mouth sores, nosebleeds, postnasal drip, rhinorrhea, sinus pain, sinus pressure, sneezing, sore throat, tinnitus, trouble swallowing and voice change  Eyes: Negative for photophobia, pain, discharge, redness, itching and visual disturbance  Respiratory: Negative for apnea, cough, choking, chest tightness, shortness of breath, wheezing and stridor  Cardiovascular: Negative for chest pain, palpitations and leg swelling  Gastrointestinal: Positive for diarrhea  Negative for abdominal distention, abdominal pain, anal bleeding, blood in stool, constipation, nausea, rectal pain and vomiting  Endocrine: Negative for cold intolerance, heat intolerance, polydipsia, polyphagia and polyuria  Genitourinary: Negative for decreased urine volume, difficulty urinating, dysuria, enuresis, flank pain, frequency, genital sores, hematuria and urgency  Musculoskeletal: Negative for arthralgias, back pain, gait problem, joint swelling, myalgias, neck pain and neck stiffness  Skin: Negative for color change, pallor, rash and wound  Allergic/Immunologic: Negative for environmental allergies, food allergies and immunocompromised state  Neurological: Negative for dizziness, tremors, seizures, syncope, facial asymmetry, speech difficulty, weakness, light-headedness, numbness and headaches  Hematological: Negative for adenopathy  Does not bruise/bleed easily  Psychiatric/Behavioral: Negative for agitation, self-injury, sleep disturbance and suicidal ideas  The patient is not nervous/anxious  Objective:      /76   Pulse 88   Ht 5' 6" (1 676 m)   Wt 88 5 kg (195 lb)   SpO2 97%   BMI 31 47 kg/m²          Physical Exam   Constitutional: He is oriented to person, place, and time  He appears well-developed and well-nourished  No distress  HENT:   Head: Normocephalic and atraumatic  Right Ear: External ear normal    Left Ear: External ear normal    Mouth/Throat: Oropharynx is clear and moist    Eyes: Pupils are equal, round, and reactive to light  Conjunctivae and EOM are normal  No scleral icterus  Neck: Normal range of motion  Neck supple  No JVD present  No tracheal deviation present  No thyromegaly present  Cardiovascular: Normal rate, regular rhythm and intact distal pulses  Exam reveals no gallop and no friction rub  No murmur heard  Pulmonary/Chest: Effort normal and breath sounds normal  No respiratory distress  He has no wheezes  He has no rales  He exhibits no tenderness  Abdominal: Soft  Bowel sounds are normal  He exhibits no distension and no mass  There is no tenderness  There is no rebound and no guarding  Musculoskeletal: Normal range of motion  He exhibits no edema, tenderness or deformity  Lymphadenopathy:     He has no cervical adenopathy  Neurological: He is alert and oriented to person, place, and time  He has normal reflexes  No cranial nerve deficit  Coordination normal    Skin: Skin is warm and dry  No rash noted  He is not diaphoretic  No erythema  No pallor  Psychiatric: He has a normal mood and affect   His behavior is normal  Judgment and thought content normal

## 2018-12-31 DIAGNOSIS — K21.9 GASTROESOPHAGEAL REFLUX DISEASE, ESOPHAGITIS PRESENCE NOT SPECIFIED: ICD-10-CM

## 2019-01-02 RX ORDER — OMEPRAZOLE 20 MG/1
CAPSULE, DELAYED RELEASE ORAL
Qty: 90 CAPSULE | Refills: 0 | Status: SHIPPED | OUTPATIENT
Start: 2019-01-02 | End: 2019-04-02 | Stop reason: SDUPTHER

## 2019-01-03 ENCOUNTER — OFFICE VISIT (OUTPATIENT)
Dept: INTERNAL MEDICINE CLINIC | Facility: CLINIC | Age: 69
End: 2019-01-03
Payer: MEDICARE

## 2019-01-03 ENCOUNTER — TELEPHONE (OUTPATIENT)
Dept: INTERNAL MEDICINE CLINIC | Facility: CLINIC | Age: 69
End: 2019-01-03

## 2019-01-03 VITALS
DIASTOLIC BLOOD PRESSURE: 84 MMHG | OXYGEN SATURATION: 92 % | BODY MASS INDEX: 30.67 KG/M2 | TEMPERATURE: 98.4 F | HEART RATE: 100 BPM | SYSTOLIC BLOOD PRESSURE: 120 MMHG | HEIGHT: 66 IN | WEIGHT: 190.8 LBS

## 2019-01-03 DIAGNOSIS — J40 BRONCHITIS: Primary | ICD-10-CM

## 2019-01-03 DIAGNOSIS — E11.65 UNCONTROLLED TYPE 2 DIABETES MELLITUS WITH HYPERGLYCEMIA (HCC): ICD-10-CM

## 2019-01-03 DIAGNOSIS — I10 BENIGN ESSENTIAL HYPERTENSION: ICD-10-CM

## 2019-01-03 DIAGNOSIS — J45.901 EXACERBATION OF ASTHMA, UNSPECIFIED ASTHMA SEVERITY, UNSPECIFIED WHETHER PERSISTENT: ICD-10-CM

## 2019-01-03 PROCEDURE — 99214 OFFICE O/P EST MOD 30 MIN: CPT | Performed by: PHYSICIAN ASSISTANT

## 2019-01-03 RX ORDER — PREDNISONE 10 MG/1
TABLET ORAL
Qty: 20 TABLET | Refills: 0 | Status: SHIPPED | OUTPATIENT
Start: 2019-01-03 | End: 2019-01-11 | Stop reason: CLARIF

## 2019-01-03 RX ORDER — ALBUTEROL SULFATE 90 UG/1
2 AEROSOL, METERED RESPIRATORY (INHALATION) EVERY 6 HOURS PRN
Qty: 1 INHALER | Refills: 0 | Status: SHIPPED | OUTPATIENT
Start: 2019-01-03 | End: 2019-01-31 | Stop reason: SDUPTHER

## 2019-01-03 RX ORDER — AZITHROMYCIN 250 MG/1
TABLET, FILM COATED ORAL
Qty: 6 TABLET | Refills: 0 | Status: SHIPPED | OUTPATIENT
Start: 2019-01-03 | End: 2019-01-07

## 2019-01-03 NOTE — TELEPHONE ENCOUNTER
Patient saw Chantal Franco today, He was prescribed     PREDNISONE 10MG &  ZITHROMAX 250MG    Pharmacy is telling him that the insurance company is requesting more info before giving these meds    Uses [facility] # [phone]    Patients contact info # [phone]

## 2019-01-03 NOTE — TELEPHONE ENCOUNTER
I have reviewed both [employer] and [employer] and need your assistance in locating Diabetic Eye exam  Per Patient, testing was done by [address] RITESH Galan on 2018 Thank you

## 2019-01-03 NOTE — PROGRESS NOTES
Assessment/Plan:  Asthmatic bronchitis management as below  Return if he worsens       Diagnoses and all orders for this visit:    Bronchitis  -     predniSONE 10 mg tablet; Take 40 milligrams for 2 days, 30 milligrams for 2 days, 20 milligrams for 2 days, 10 milligrams for 2 days, then stop  -     azithromycin (ZITHROMAX) 250 mg tablet; Take 2 tablets day 1 then  1 a day for 4 days    Exacerbation of asthma, unspecified asthma severity, unspecified whether persistent  -     predniSONE 10 mg tablet; Take 40 milligrams for 2 days, 30 milligrams for 2 days, 20 milligrams for 2 days, 10 milligrams for 2 days, then stop  -     azithromycin (ZITHROMAX) 250 mg tablet; Take 2 tablets day 1 then  1 a day for 4 days  -     albuterol (PROVENTIL HFA,VENTOLIN HFA) 90 mcg/act inhaler; Inhale 2 puffs every 6 (six) hours as needed for wheezing    Uncontrolled type 2 diabetes mellitus with hyperglycemia (HCC)    Benign essential hypertension    Other orders  -     metFORMIN (GLUCOPHAGE) 500 mg tablet; Take 500 mg by mouth daily with breakfast        No problem-specific Assessment & Plan notes found for this encounter  Subjective:      Patient ID: Taylor Monroe is a 76 y o  male  He has had cold symptoms for 2 weeks with scratchy throat head congestion and coughing over the past week he has had increased wheezing and coughing of dark yellow phlegm sometimes short of breath with exertion no orthopnea PND or hemoptysis  He has a previous history of asthma and bronchitis  History of diabetes on oral agents sugars in the 100s he has been normally active and well        The following portions of the patient's history were reviewed and updated as appropriate:   He has a past medical history of Actinic keratosis; Adhesive capsulitis of unspecified shoulder; Anxiety; Asthma;  Atopic dermatitis; Cardiac disease; Cellulitis of right upper extremity; Cervical radiculopathy; Chest pain; Chronic maxillary sinusitis; Diabetes mellitus ([facility] ); Erectile dysfunction of non-organic origin; Esophageal reflux; Gout; Hearing loss, conductive; Heart murmur; Herpes zoster; History of paroxysmal supraventricular tachycardia; Hypertension; Mitral valve disorder; Neoplasm of skin, malignant; Non-melanoma skin cancer; Obesity; Palpitations; Plantar fasciitis; PSVT (paroxysmal supraventricular tachycardia) ([facility] [address] ); Sciatica; Sleep apnea; Squamous cell carcinoma of skin of scalp; Tinnitus, unspecified ear; Type 2 diabetes mellitus with hyperglycemia ([facility] ); Umbilical hernia; and Worms in stool  ,   does not have any pertinent problems on file  ,   has a past surgical history that includes Appendectomy; Hernia repair; Colonoscopy; Colectomy; Skin biopsy; Tonsillectomy; pr esophagogastroduodenoscopy transoral diagnostic (N/A, 8/9/2017); and Head & neck skin lesion excisional biopsy  ,  family history includes Breast cancer in his mother; Cancer in his father and mother; Diabetes in his mother; Diabetes type II in his mother; Heart attack in his mother; Heart disease in his mother; Heart failure in his mother; Lymphoma in his father; Prostate cancer in his father; Skin cancer in his father and mother; Varicose Veins in his mother  ,   reports that he quit smoking about 42 years ago  He has a 10 00 pack-year smoking history  He has never used smokeless tobacco  He reports that he drinks alcohol  He reports that he does not use drugs  ,  is allergic to iodinated diagnostic agents; shellfish allergy; shellfish-derived products; and shrimp extract allergy skin test   Current Outpatient Prescriptions   Medication Sig Dispense Refill    albuterol (PROVENTIL HFA,VENTOLIN HFA) 90 mcg/act inhaler Inhale 2 puffs every 6 (six) hours as needed for wheezing 1 Inhaler 0    aspirin (ASPIR-LOW) 81 mg EC tablet Take by mouth daily      clotrimazole-betamethasone (LOTRISONE) 1-0 05 % cream Apply topically Twice daily      cyclobenzaprine (FLEXERIL) 10 mg tablet Take 1 tablet (10 mg total) by mouth 3 (three) times a day as needed for muscle spasms 90 tablet 0    fluorouracil (EFUDEX) 5 % cream Apply topically 2 (two) times a day To face twice daily 40 g 0    fluticasone (FLONASE) 50 mcg/act nasal spray 1 spray into each nostril daily      glimepiride (AMARYL) 4 mg tablet Take 1 tablet (4 mg total) by mouth 2 (two) times a day 180 tablet 3    glucose blood (FREESTYLE LITE) test strip 1 each by Other route as needed (sugar) Use as instructed 100 each 0    ibuprofen (MOTRIN) 800 mg tablet Take 1 tablet by mouth every 6 (six) hours as needed (pain) 30 tablet 0    ketoconazole (NIZORAL) 2 % cream Apply topically 2 (two) times a day To rash on face and scalp until resolved 30 g 3    Lancets (FREESTYLE) lancets TEST 2 TIMES A  each 0    loratadine (CLARITIN) 10 mg tablet Take by mouth daily      LORazepam (ATIVAN) 1 mg tablet Take 1 tablet (1 mg total) by mouth daily at bedtime 90 tablet 1    metFORMIN (GLUCOPHAGE) 1000 MG tablet Take 1 tablet (1,000 mg total) by mouth 2 (two) times a day with meals 60 tablet 0    metFORMIN (GLUCOPHAGE) 500 mg tablet Take 500 mg by mouth daily with breakfast      montelukast (SINGULAIR) 10 mg tablet TAKE 1 TABLET BY MOUTH EVERY DAY 30 tablet 0    Multiple Vitamin (MULTIVITAMINS PO) Take by mouth daily      Multiple Vitamin (MULTIVITAMINS PO) Take 1 capsule by mouth daily      pioglitazone (ACTOS) 15 mg tablet Take 2 tablets (30 mg total) by mouth daily 90 tablet 3    sildenafil (VIAGRA) 50 MG tablet Take 1 tablet (50 mg total) by mouth as needed for erectile dysfunction 60 tablet 4    sitaGLIPtin (JANUVIA) 100 mg tablet Take 1 tablet (100 mg total) by mouth daily 90 tablet 0    triamcinolone (KENALOG) 0 025 % cream Apply topically 2 (two) times a day      verapamil (VERELAN PM) 180 MG 24 hr capsule Take 1 capsule (180 mg total) by mouth 2 (two) times a day 180 capsule 3    azithromycin (ZITHROMAX) 250 mg tablet Take 2 tablets day 1 then  1 a day for 4 days 6 tablet 0    EPINEPHrine (EPIPEN 2-DAVID) 0 3 mg/0 3 mL SOAJ Inject as directed      omeprazole (PriLOSEC) 20 mg delayed release capsule TAKE ONE CAPSULE BY MOUTH EVERY DAY 90 capsule 0    predniSONE 10 mg tablet Take 40 milligrams for 2 days, 30 milligrams for 2 days, 20 milligrams for 2 days, 10 milligrams for 2 days, then stop 20 tablet 0     No current facility-administered medications for this visit  Review of Systems   Constitutional: Negative for activity change, appetite change, chills, diaphoresis, fatigue, fever and unexpected weight change  HENT: Negative for congestion, dental problem, drooling, ear discharge, ear pain, facial swelling, hearing loss, nosebleeds, postnasal drip, rhinorrhea, sinus pain, sinus pressure, sneezing, sore throat, tinnitus, trouble swallowing and voice change  Eyes: Negative for photophobia, pain, discharge, redness, itching and visual disturbance  Respiratory: Positive for cough, shortness of breath and wheezing  Negative for apnea, choking, chest tightness and stridor  Cardiovascular: Negative for chest pain, palpitations and leg swelling  Gastrointestinal: Negative for abdominal distention, abdominal pain, anal bleeding, blood in stool, constipation, diarrhea, nausea, rectal pain and vomiting  Endocrine: Negative for cold intolerance, heat intolerance, polydipsia, polyphagia and polyuria  Genitourinary: Negative for decreased urine volume, difficulty urinating, dysuria, enuresis, flank pain, frequency, genital sores, hematuria and urgency  Musculoskeletal: Negative for arthralgias, back pain, gait problem, joint swelling, myalgias, neck pain and neck stiffness  Skin: Negative for color change, pallor, rash and wound  Neurological: Negative for dizziness, tremors, seizures, syncope, facial asymmetry, speech difficulty, weakness, light-headedness, numbness and headaches  Hematological: Negative for adenopathy   Does not bruise/bleed easily  Psychiatric/Behavioral: Negative for agitation, behavioral problems, confusion, decreased concentration, dysphoric mood, hallucinations, self-injury, sleep disturbance and suicidal ideas  The patient is not nervous/anxious and is not hyperactive  Objective:  Vitals:    01/03/19 0841   BP: 120/84   Pulse: 100   Temp: 98 4 °F (36 9 °C)   TempSrc: Tympanic   SpO2: 92%   Weight: 86 5 kg (190 lb 12 8 oz)   Height: 5' 6" (1 676 m)     Body mass index is 30 8 kg/m²  Physical Exam   Constitutional: He is oriented to person, place, and time  He appears well-developed  Obese   HENT:   Head: Normocephalic  Right Ear: External ear normal    Left Ear: External ear normal    Mouth/Throat: Oropharynx is clear and moist    Eyes: Pupils are equal, round, and reactive to light  Conjunctivae are normal    Neck: Neck supple  No thyromegaly present  Cardiovascular: Normal rate, regular rhythm, normal heart sounds and intact distal pulses  Pulses:       Dorsalis pedis pulses are 2+ on the right side, and 2+ on the left side  Posterior tibial pulses are 2+ on the right side, and 2+ on the left side  Pulmonary/Chest: Effort normal  No respiratory distress  He has wheezes (Scattered wheezing and rhonchi)  He has no rales  He exhibits no tenderness  Abdominal: Soft  Bowel sounds are normal  He exhibits no distension  There is no tenderness  Musculoskeletal: Normal range of motion  Feet:   Right Foot:   Skin Integrity: Negative for ulcer, skin breakdown, erythema, warmth, callus or dry skin  Left Foot:   Skin Integrity: Negative for ulcer, skin breakdown, erythema, warmth, callus or dry skin  Lymphadenopathy:     He has no cervical adenopathy  Neurological: He is alert and oriented to person, place, and time  He has normal reflexes  Skin: Skin is warm and dry  Patient's shoes and socks removed  Right Foot/Ankle   Right Foot Inspection  Skin Exam: skin normal and skin intact no dry skin, no warmth, no callus, no erythema, no maceration, no abnormal color, no pre-ulcer, no ulcer and no callus                          Toe Exam: ROM and strength within normal limits  Sensory       Monofilament testing: intact  Vascular    The right DP pulse is 2+  The right PT pulse is 2+  Left Foot/Ankle  Left Foot Inspection  Skin Exam: skin normal and skin intactno dry skin, no warmth, no erythema, no maceration, normal color, no pre-ulcer, no ulcer and no callus                         Toe Exam: ROM and strength within normal limits                   Sensory       Monofilament: intact  Vascular    The left DP pulse is 2+  The left PT pulse is 2+  Assign Risk Category:  No deformity present;  No loss of protective sensation;        Risk: 0

## 2019-01-11 ENCOUNTER — OFFICE VISIT (OUTPATIENT)
Dept: INTERNAL MEDICINE CLINIC | Facility: CLINIC | Age: 69
End: 2019-01-11
Payer: MEDICARE

## 2019-01-11 VITALS
SYSTOLIC BLOOD PRESSURE: 130 MMHG | OXYGEN SATURATION: 96 % | WEIGHT: 199.4 LBS | HEIGHT: 66 IN | HEART RATE: 90 BPM | BODY MASS INDEX: 32.05 KG/M2 | TEMPERATURE: 98.3 F | DIASTOLIC BLOOD PRESSURE: 90 MMHG

## 2019-01-11 DIAGNOSIS — J45.901 EXACERBATION OF ASTHMA, UNSPECIFIED ASTHMA SEVERITY, UNSPECIFIED WHETHER PERSISTENT: ICD-10-CM

## 2019-01-11 DIAGNOSIS — E11.65 UNCONTROLLED TYPE 2 DIABETES MELLITUS WITH HYPERGLYCEMIA (HCC): ICD-10-CM

## 2019-01-11 DIAGNOSIS — J40 BRONCHITIS: Primary | ICD-10-CM

## 2019-01-11 DIAGNOSIS — I10 BENIGN ESSENTIAL HYPERTENSION: ICD-10-CM

## 2019-01-11 PROCEDURE — 99214 OFFICE O/P EST MOD 30 MIN: CPT | Performed by: PHYSICIAN ASSISTANT

## 2019-01-11 RX ORDER — PROMETHAZINE HYDROCHLORIDE AND CODEINE PHOSPHATE 6.25; 1 MG/5ML; MG/5ML
5 SYRUP ORAL EVERY 4 HOURS PRN
Qty: 240 ML | Refills: 0 | Status: SHIPPED | OUTPATIENT
Start: 2019-01-11 | End: 2019-06-10

## 2019-01-11 NOTE — PROGRESS NOTES
Assessment/Plan:  Persistent cough although it is getting better his keeping it up at night physical exam is unremarkable cough syrup for his symptoms       Diagnoses and all orders for this visit:    Bronchitis  -     promethazine-codeine (PHENERGAN WITH CODEINE) 6 25-10 mg/5 mL syrup; Take 5 mL by mouth every 4 (four) hours as needed for cough    Exacerbation of asthma, unspecified asthma severity, unspecified whether persistent  -     promethazine-codeine (PHENERGAN WITH CODEINE) 6 25-10 mg/5 mL syrup; Take 5 mL by mouth every 4 (four) hours as needed for cough    Uncontrolled type 2 diabetes mellitus with hyperglycemia (HCC)    Benign essential hypertension        No problem-specific Assessment & Plan notes found for this encounter  Subjective:      Patient ID: Teresa Burt is a 76 y o  male  He has a persistent wheezy cough especially at night  He is treated for asthmatic bronchitis with steroids antibiotics 2 weeks ago his coughing will go away it bothers him mostly at night and keeps him up no shortness of breath or chest pain coughing generally whitish phlegm if any  No chest pain hemoptysis shortness of breath sugars have been in the 100s        The following portions of the patient's history were reviewed and updated as appropriate:   He has a past medical history of Actinic keratosis; Adhesive capsulitis of unspecified shoulder; Anxiety; Asthma; Atopic dermatitis; Cardiac disease; Cellulitis of right upper extremity; Cervical radiculopathy; Chest pain; Chronic maxillary sinusitis; Diabetes mellitus ([address] ); Erectile dysfunction of non-organic origin; Esophageal reflux; Gout; Hearing loss, conductive; Heart murmur; Herpes zoster; History of paroxysmal supraventricular tachycardia; Hypertension; Mitral valve disorder; Neoplasm of skin, malignant; Non-melanoma skin cancer; Obesity; Palpitations; Plantar fasciitis; PSVT (paroxysmal supraventricular tachycardia) ([address] ); Sciatica;  Sleep apnea; Squamous cell carcinoma of skin of scalp; Tinnitus, unspecified ear; Type 2 diabetes mellitus with hyperglycemia ([facility] ); Umbilical hernia; and Worms in stool  ,   does not have any pertinent problems on file  ,   has a past surgical history that includes Appendectomy; Hernia repair; Colonoscopy; Colectomy; Skin biopsy; Tonsillectomy; pr esophagogastroduodenoscopy transoral diagnostic (N/A, 8/9/2017); and Head & neck skin lesion excisional biopsy  ,  family history includes Breast cancer in his mother; Cancer in his father and mother; Diabetes in his mother; Diabetes type II in his mother; Heart attack in his mother; Heart disease in his mother; Heart failure in his mother; Lymphoma in his father; Prostate cancer in his father; Skin cancer in his father and mother; Varicose Veins in his mother  ,   reports that he quit smoking about 42 years ago  He has a 10 00 pack-year smoking history  He has never used smokeless tobacco  He reports that he drinks alcohol  He reports that he does not use drugs  ,  is allergic to iodinated diagnostic agents; shellfish allergy; shellfish-derived products; and shrimp extract allergy skin test   Current Outpatient Prescriptions   Medication Sig Dispense Refill    albuterol (PROVENTIL HFA,VENTOLIN HFA) 90 mcg/act inhaler Inhale 2 puffs every 6 (six) hours as needed for wheezing 1 Inhaler 0    aspirin (ASPIR-LOW) 81 mg EC tablet Take by mouth daily      clotrimazole-betamethasone (LOTRISONE) 1-0 05 % cream Apply topically Twice daily      cyclobenzaprine (FLEXERIL) 10 mg tablet Take 1 tablet (10 mg total) by mouth 3 (three) times a day as needed for muscle spasms 90 tablet 0    EPINEPHrine (EPIPEN 2-DAVID) 0 3 mg/0 3 mL SOAJ Inject as directed      fluorouracil (EFUDEX) 5 % cream Apply topically 2 (two) times a day To face twice daily 40 g 0    fluticasone (FLONASE) 50 mcg/act nasal spray 1 spray into each nostril daily      glimepiride (AMARYL) 4 mg tablet Take 1 tablet (4 mg total) by mouth 2 (two) times a day 180 tablet 3    glucose blood (FREESTYLE LITE) test strip 1 each by Other route as needed (sugar) Use as instructed 100 each 0    ketoconazole (NIZORAL) 2 % cream Apply topically 2 (two) times a day To rash on face and scalp until resolved 30 g 3    Lancets (FREESTYLE) lancets TEST 2 TIMES A  each 0    loratadine (CLARITIN) 10 mg tablet Take by mouth daily      LORazepam (ATIVAN) 1 mg tablet Take 1 tablet (1 mg total) by mouth daily at bedtime 90 tablet 1    metFORMIN (GLUCOPHAGE) 1000 MG tablet Take 1 tablet (1,000 mg total) by mouth 2 (two) times a day with meals 60 tablet 0    metFORMIN (GLUCOPHAGE) 500 mg tablet Take 500 mg by mouth daily with breakfast      montelukast (SINGULAIR) 10 mg tablet TAKE 1 TABLET BY MOUTH EVERY DAY 30 tablet 0    Multiple Vitamin (MULTIVITAMINS PO) Take by mouth daily      Multiple Vitamin (MULTIVITAMINS PO) Take 1 capsule by mouth daily      omeprazole (PriLOSEC) 20 mg delayed release capsule TAKE ONE CAPSULE BY MOUTH EVERY DAY 90 capsule 0    pioglitazone (ACTOS) 15 mg tablet Take 2 tablets (30 mg total) by mouth daily 90 tablet 3    sildenafil (VIAGRA) 50 MG tablet Take 1 tablet (50 mg total) by mouth as needed for erectile dysfunction 60 tablet 4    sitaGLIPtin (JANUVIA) 100 mg tablet Take 1 tablet (100 mg total) by mouth daily 90 tablet 0    triamcinolone (KENALOG) 0 025 % cream Apply topically 2 (two) times a day      verapamil (VERELAN PM) 180 MG 24 hr capsule Take 1 capsule (180 mg total) by mouth 2 (two) times a day 180 capsule 3    ibuprofen (MOTRIN) 800 mg tablet Take 1 tablet by mouth every 6 (six) hours as needed (pain) 30 tablet 0    promethazine-codeine (PHENERGAN WITH CODEINE) 6 25-10 mg/5 mL syrup Take 5 mL by mouth every 4 (four) hours as needed for cough 240 mL 0     No current facility-administered medications for this visit          Review of Systems   Constitutional: Negative for activity change, appetite change, chills, diaphoresis, fatigue, fever and unexpected weight change  HENT: Negative for congestion, dental problem, drooling, ear discharge, ear pain, facial swelling, hearing loss, nosebleeds, postnasal drip, rhinorrhea, sinus pain, sinus pressure, sneezing, sore throat, tinnitus, trouble swallowing and voice change  Eyes: Negative for photophobia, pain, discharge, redness, itching and visual disturbance  Respiratory: Positive for cough  Negative for apnea, choking, chest tightness, shortness of breath, wheezing and stridor  Cardiovascular: Negative for chest pain, palpitations and leg swelling  Gastrointestinal: Negative for abdominal distention, abdominal pain, anal bleeding, blood in stool, constipation, diarrhea, nausea, rectal pain and vomiting  Endocrine: Negative for cold intolerance, heat intolerance, polydipsia, polyphagia and polyuria  Genitourinary: Negative for decreased urine volume, difficulty urinating, dysuria, enuresis, flank pain, frequency, genital sores, hematuria and urgency  Musculoskeletal: Negative for arthralgias, back pain, gait problem, joint swelling, myalgias, neck pain and neck stiffness  Skin: Negative for color change, pallor, rash and wound  Neurological: Negative for dizziness, tremors, seizures, syncope, facial asymmetry, speech difficulty, weakness, light-headedness, numbness and headaches  Hematological: Negative for adenopathy  Does not bruise/bleed easily  Psychiatric/Behavioral: Negative for agitation, behavioral problems, confusion, decreased concentration, dysphoric mood, hallucinations, self-injury, sleep disturbance and suicidal ideas  The patient is not nervous/anxious and is not hyperactive            Objective:  Vitals:    01/11/19 1553   BP: 130/90   BP Location: Left arm   Patient Position: Sitting   Pulse: 90   Temp: 98 3 °F (36 8 °C)   TempSrc: Oral   SpO2: 96%   Weight: 90 4 kg (199 lb 6 4 oz)   Height: 5' 6" (1 676 m)     Body mass index is 32 18 kg/m²  Physical Exam   Constitutional: He is oriented to person, place, and time  He appears well-developed  Obese   HENT:   Head: Normocephalic  Right Ear: External ear normal    Left Ear: External ear normal    Mouth/Throat: Oropharynx is clear and moist    Eyes: Pupils are equal, round, and reactive to light  Conjunctivae are normal    Neck: Neck supple  No thyromegaly present  Cardiovascular: Normal rate, regular rhythm, normal heart sounds and intact distal pulses  Pulmonary/Chest: Effort normal and breath sounds normal    Abdominal: Soft  Bowel sounds are normal    Musculoskeletal: Normal range of motion  Neurological: He is alert and oriented to person, place, and time  He has normal reflexes  Skin: Skin is warm and dry

## 2019-01-12 ENCOUNTER — TELEPHONE (OUTPATIENT)
Dept: INTERNAL MEDICINE CLINIC | Facility: CLINIC | Age: 69
End: 2019-01-12

## 2019-01-12 DIAGNOSIS — G89.29 CHRONIC LEFT-SIDED THORACIC BACK PAIN: ICD-10-CM

## 2019-01-12 DIAGNOSIS — M54.6 CHRONIC LEFT-SIDED THORACIC BACK PAIN: ICD-10-CM

## 2019-01-12 NOTE — TELEPHONE ENCOUNTER
If this warning is coming up it is most likely related to all muscle relaxers and there is no replacement   This will have to wait for Dr pepper to evaluate  It is likely that it is safe to take, but I will leave this up to Dr Jacky Dale  Please send to him

## 2019-01-12 NOTE — TELEPHONE ENCOUNTER
They received an RX for cyclobenzaprine     and it is coming up that this shouldn't be taken by patient with cardiac arhythmia   Shara Mcgregor Which he has had and is asking for a replacement RX to be sent    Patient called pharmacy today to get this filled

## 2019-01-14 RX ORDER — CYCLOBENZAPRINE HCL 10 MG
10 TABLET ORAL 3 TIMES DAILY PRN
Qty: 90 TABLET | Refills: 0 | Status: CANCELLED | OUTPATIENT
Start: 2019-01-14

## 2019-01-31 DIAGNOSIS — J45.901 EXACERBATION OF ASTHMA, UNSPECIFIED ASTHMA SEVERITY, UNSPECIFIED WHETHER PERSISTENT: ICD-10-CM

## 2019-01-31 RX ORDER — ALBUTEROL SULFATE 90 UG/1
AEROSOL, METERED RESPIRATORY (INHALATION)
Qty: 8.5 INHALER | Refills: 0 | Status: SHIPPED | OUTPATIENT
Start: 2019-01-31 | End: 2019-05-02 | Stop reason: CLARIF

## 2019-02-03 DIAGNOSIS — E11.65 UNCONTROLLED TYPE 2 DIABETES MELLITUS WITH HYPERGLYCEMIA, WITHOUT LONG-TERM CURRENT USE OF INSULIN (HCC): ICD-10-CM

## 2019-02-20 DIAGNOSIS — E11.8 TYPE 2 DIABETES MELLITUS WITH COMPLICATION, UNSPECIFIED WHETHER LONG TERM INSULIN USE: ICD-10-CM

## 2019-02-21 RX ORDER — LANCETS 28 GAUGE
EACH MISCELLANEOUS
Qty: 200 EACH | Refills: 0 | Status: SHIPPED | OUTPATIENT
Start: 2019-02-21 | End: 2020-11-24

## 2019-03-07 DIAGNOSIS — E11.9 TYPE 2 DIABETES MELLITUS WITHOUT COMPLICATION, UNSPECIFIED WHETHER LONG TERM INSULIN USE (HCC): ICD-10-CM

## 2019-03-07 RX ORDER — SITAGLIPTIN 100 MG/1
TABLET, FILM COATED ORAL
Qty: 90 TABLET | Refills: 0 | Status: SHIPPED | OUTPATIENT
Start: 2019-03-07 | End: 2019-03-12 | Stop reason: ALTCHOICE

## 2019-03-12 ENCOUNTER — OFFICE VISIT (OUTPATIENT)
Dept: INTERNAL MEDICINE CLINIC | Facility: CLINIC | Age: 69
End: 2019-03-12
Payer: MEDICARE

## 2019-03-12 VITALS
BODY MASS INDEX: 32.78 KG/M2 | DIASTOLIC BLOOD PRESSURE: 80 MMHG | OXYGEN SATURATION: 92 % | SYSTOLIC BLOOD PRESSURE: 130 MMHG | HEIGHT: 66 IN | WEIGHT: 204 LBS | HEART RATE: 96 BPM

## 2019-03-12 DIAGNOSIS — E11.65 UNCONTROLLED TYPE 2 DIABETES MELLITUS WITH HYPERGLYCEMIA (HCC): Primary | ICD-10-CM

## 2019-03-12 DIAGNOSIS — IMO0001 UNCONTROLLED TYPE 2 DIABETES MELLITUS WITHOUT COMPLICATION, WITHOUT LONG-TERM CURRENT USE OF INSULIN: ICD-10-CM

## 2019-03-12 DIAGNOSIS — I10 BENIGN ESSENTIAL HYPERTENSION: ICD-10-CM

## 2019-03-12 PROCEDURE — 99213 OFFICE O/P EST LOW 20 MIN: CPT | Performed by: INTERNAL MEDICINE

## 2019-03-12 RX ORDER — PIOGLITAZONEHYDROCHLORIDE 15 MG/1
45 TABLET ORAL DAILY
Qty: 90 TABLET | Refills: 3 | Status: SHIPPED | OUTPATIENT
Start: 2019-03-12 | End: 2019-03-13 | Stop reason: SDUPTHER

## 2019-03-12 NOTE — ASSESSMENT & PLAN NOTE
Lab Results   Component Value Date    HGBA1C 7 6 (H) 12/17/2018       No results for input(s): POCGLU in the last 72 hours  Blood Sugar Average: Last 72 hrs: Will dc januvia and try him on actos 45 mg daily

## 2019-03-12 NOTE — PROGRESS NOTES
Assessment/Plan:    Diabetes mellitus type 2, uncontrolled ([facility] )  Lab Results   Component Value Date    HGBA1C 7 6 (H) 12/17/2018       No results for input(s): POCGLU in the last 72 hours  Blood Sugar Average: Last 72 hrs: Will dc januvia and try him on actos 45 mg daily  Benign essential hypertension  Well controlled on current med  Diagnoses and all orders for this visit:    Uncontrolled type 2 diabetes mellitus with hyperglycemia ([facility] )    Benign essential hypertension    Uncontrolled type 2 diabetes mellitus without complication, without long-term current use of insulin (HCC)  -     pioglitazone (ACTOS) 15 mg tablet; Take 3 tablets (45 mg total) by mouth daily           Subjective:      Patient ID: Romina Francis is a 71 y o  male  Patient presents in follow up for his medical problems  He has been feeling well and is without acute complaints  He says that his Joanne Rad is very expensive and he wonders if he can have a change in his regimen  The following portions of the patient's history were reviewed and updated as appropriate: allergies, current medications, past family history, past medical history, past social history, past surgical history and problem list     Review of Systems   Constitutional: Negative for activity change, appetite change, chills, diaphoresis, fatigue, fever and unexpected weight change  HENT: Negative for congestion, dental problem, drooling, ear discharge, ear pain, facial swelling, hearing loss, mouth sores, nosebleeds, postnasal drip, rhinorrhea, sinus pressure, sinus pain, sneezing, sore throat, tinnitus, trouble swallowing and voice change  Eyes: Negative for photophobia, pain, discharge, redness, itching and visual disturbance  Respiratory: Negative for apnea, cough, choking, chest tightness, shortness of breath, wheezing and stridor  Cardiovascular: Negative for chest pain, palpitations and leg swelling     Gastrointestinal: Negative for abdominal distention, abdominal pain, anal bleeding, blood in stool, constipation, diarrhea, nausea, rectal pain and vomiting  Endocrine: Negative for cold intolerance, heat intolerance, polydipsia, polyphagia and polyuria  Genitourinary: Negative for decreased urine volume, difficulty urinating, dysuria, enuresis, flank pain, frequency, genital sores, hematuria and urgency  Musculoskeletal: Negative for arthralgias, back pain, gait problem, joint swelling, myalgias, neck pain and neck stiffness  Skin: Negative for color change, pallor, rash and wound  Allergic/Immunologic: Negative for environmental allergies, food allergies and immunocompromised state  Neurological: Negative for dizziness, tremors, seizures, syncope, facial asymmetry, speech difficulty, weakness, light-headedness, numbness and headaches  Hematological: Negative for adenopathy  Does not bruise/bleed easily  Psychiatric/Behavioral: Negative for agitation, self-injury, sleep disturbance and suicidal ideas  The patient is not nervous/anxious  Objective:      /80   Pulse 96   Ht 5' 6" (1 676 m)   Wt 92 5 kg (204 lb)   SpO2 92%   BMI 32 93 kg/m²          Physical Exam   Constitutional: He is oriented to person, place, and time  He appears well-developed and well-nourished  No distress  HENT:   Head: Normocephalic and atraumatic  Right Ear: External ear normal    Left Ear: External ear normal    Mouth/Throat: Oropharynx is clear and moist    Eyes: Pupils are equal, round, and reactive to light  Conjunctivae and EOM are normal  No scleral icterus  Neck: Normal range of motion  Neck supple  No JVD present  No tracheal deviation present  No thyromegaly present  Cardiovascular: Normal rate, regular rhythm and intact distal pulses  Exam reveals no gallop and no friction rub  No murmur heard  Pulmonary/Chest: Effort normal and breath sounds normal  No respiratory distress  He has no wheezes  He has no rales   He exhibits no tenderness  Abdominal: Soft  Bowel sounds are normal  He exhibits no distension and no mass  There is no tenderness  There is no rebound and no guarding  Musculoskeletal: Normal range of motion  He exhibits no edema, tenderness or deformity  Lymphadenopathy:     He has no cervical adenopathy  Neurological: He is alert and oriented to person, place, and time  He has normal reflexes  No cranial nerve deficit  Coordination normal    Skin: Skin is warm and dry  No rash noted  He is not diaphoretic  No erythema  No pallor  Psychiatric: He has a normal mood and affect   His behavior is normal  Judgment and thought content normal

## 2019-03-13 ENCOUNTER — TELEPHONE (OUTPATIENT)
Dept: INTERNAL MEDICINE CLINIC | Facility: CLINIC | Age: 69
End: 2019-03-13

## 2019-03-13 DIAGNOSIS — IMO0001 UNCONTROLLED TYPE 2 DIABETES MELLITUS WITHOUT COMPLICATION, WITHOUT LONG-TERM CURRENT USE OF INSULIN: ICD-10-CM

## 2019-03-13 RX ORDER — PIOGLITAZONEHYDROCHLORIDE 45 MG/1
45 TABLET ORAL DAILY
Qty: 90 TABLET | Refills: 3 | Status: SHIPPED | OUTPATIENT
Start: 2019-03-13 | End: 2019-06-24

## 2019-03-13 RX ORDER — PIOGLITAZONEHYDROCHLORIDE 15 MG/1
45 TABLET ORAL DAILY
Qty: 270 TABLET | Refills: 0 | Status: SHIPPED | OUTPATIENT
Start: 2019-03-13 | End: 2019-03-13 | Stop reason: SDUPTHER

## 2019-03-15 DIAGNOSIS — IMO0001 UNCONTROLLED TYPE 2 DIABETES MELLITUS WITHOUT COMPLICATION, WITHOUT LONG-TERM CURRENT USE OF INSULIN: Primary | ICD-10-CM

## 2019-03-31 DIAGNOSIS — J44.9 CHRONIC OBSTRUCTIVE PULMONARY DISEASE, UNSPECIFIED COPD TYPE (HCC): ICD-10-CM

## 2019-04-01 RX ORDER — MONTELUKAST SODIUM 10 MG/1
TABLET ORAL
Qty: 30 TABLET | Refills: 4 | Status: SHIPPED | OUTPATIENT
Start: 2019-04-01 | End: 2019-07-03 | Stop reason: SDUPTHER

## 2019-04-02 DIAGNOSIS — K21.9 GASTROESOPHAGEAL REFLUX DISEASE, ESOPHAGITIS PRESENCE NOT SPECIFIED: ICD-10-CM

## 2019-04-03 RX ORDER — OMEPRAZOLE 20 MG/1
CAPSULE, DELAYED RELEASE ORAL
Qty: 90 CAPSULE | Refills: 0 | Status: SHIPPED | OUTPATIENT
Start: 2019-04-03 | End: 2019-07-02 | Stop reason: SDUPTHER

## 2019-04-07 DIAGNOSIS — N52.1 ERECTILE DYSFUNCTION DUE TO DISEASES CLASSIFIED ELSEWHERE: ICD-10-CM

## 2019-04-07 DIAGNOSIS — E11.65 UNCONTROLLED TYPE 2 DIABETES MELLITUS WITH HYPERGLYCEMIA, WITHOUT LONG-TERM CURRENT USE OF INSULIN (HCC): ICD-10-CM

## 2019-04-07 DIAGNOSIS — L21.9 SEBORRHEIC DERMATITIS: ICD-10-CM

## 2019-04-08 RX ORDER — SILDENAFIL 50 MG/1
TABLET, FILM COATED ORAL
Qty: 60 TABLET | Refills: 0 | Status: SHIPPED | OUTPATIENT
Start: 2019-04-08 | End: 2020-12-08

## 2019-04-09 RX ORDER — KETOCONAZOLE 20 MG/G
CREAM TOPICAL
Qty: 30 G | Refills: 3 | Status: SHIPPED | OUTPATIENT
Start: 2019-04-09 | End: 2019-11-13 | Stop reason: SDUPTHER

## 2019-04-17 ENCOUNTER — OFFICE VISIT (OUTPATIENT)
Dept: CARDIOLOGY CLINIC | Facility: CLINIC | Age: 69
End: 2019-04-17
Payer: MEDICARE

## 2019-04-17 VITALS
HEART RATE: 99 BPM | DIASTOLIC BLOOD PRESSURE: 80 MMHG | SYSTOLIC BLOOD PRESSURE: 132 MMHG | BODY MASS INDEX: 32.47 KG/M2 | WEIGHT: 202 LBS | HEIGHT: 66 IN | OXYGEN SATURATION: 96 %

## 2019-04-17 DIAGNOSIS — I47.1 PAROXYSMAL SUPRAVENTRICULAR TACHYCARDIA (HCC): ICD-10-CM

## 2019-04-17 DIAGNOSIS — I10 BENIGN ESSENTIAL HYPERTENSION: Primary | ICD-10-CM

## 2019-04-17 DIAGNOSIS — E78.00 HYPERCHOLESTEROLEMIA: ICD-10-CM

## 2019-04-17 DIAGNOSIS — I05.9 MITRAL VALVE DISORDER: ICD-10-CM

## 2019-04-17 PROCEDURE — 99214 OFFICE O/P EST MOD 30 MIN: CPT | Performed by: INTERNAL MEDICINE

## 2019-05-02 ENCOUNTER — OFFICE VISIT (OUTPATIENT)
Dept: INTERNAL MEDICINE CLINIC | Facility: CLINIC | Age: 69
End: 2019-05-02
Payer: MEDICARE

## 2019-05-02 VITALS
BODY MASS INDEX: 31.88 KG/M2 | WEIGHT: 198.4 LBS | HEIGHT: 66 IN | TEMPERATURE: 97.3 F | SYSTOLIC BLOOD PRESSURE: 130 MMHG | OXYGEN SATURATION: 96 % | HEART RATE: 90 BPM | DIASTOLIC BLOOD PRESSURE: 80 MMHG

## 2019-05-02 DIAGNOSIS — K59.1 FUNCTIONAL DIARRHEA: Primary | ICD-10-CM

## 2019-05-02 DIAGNOSIS — E11.65 UNCONTROLLED TYPE 2 DIABETES MELLITUS WITH HYPERGLYCEMIA (HCC): ICD-10-CM

## 2019-05-02 DIAGNOSIS — E11.65 UNCONTROLLED TYPE 2 DIABETES MELLITUS WITH HYPERGLYCEMIA, WITHOUT LONG-TERM CURRENT USE OF INSULIN (HCC): ICD-10-CM

## 2019-05-02 DIAGNOSIS — I10 BENIGN ESSENTIAL HYPERTENSION: ICD-10-CM

## 2019-05-02 DIAGNOSIS — E66.01 MORBID OBESITY (HCC): ICD-10-CM

## 2019-05-02 DIAGNOSIS — E78.00 HYPERCHOLESTEROLEMIA: ICD-10-CM

## 2019-05-02 PROCEDURE — 99214 OFFICE O/P EST MOD 30 MIN: CPT | Performed by: PHYSICIAN ASSISTANT

## 2019-05-08 ENCOUNTER — OFFICE VISIT (OUTPATIENT)
Dept: DERMATOLOGY | Facility: CLINIC | Age: 69
End: 2019-05-08
Payer: MEDICARE

## 2019-05-08 DIAGNOSIS — L82.1 SEBORRHEIC KERATOSIS: ICD-10-CM

## 2019-05-08 DIAGNOSIS — Z13.89 SCREENING FOR SKIN CONDITION: ICD-10-CM

## 2019-05-08 DIAGNOSIS — L57.0 ACTINIC KERATOSIS: Primary | ICD-10-CM

## 2019-05-08 DIAGNOSIS — L21.9 SEBORRHEIC DERMATITIS: ICD-10-CM

## 2019-05-08 DIAGNOSIS — Z85.828 HISTORY OF SKIN CANCER: ICD-10-CM

## 2019-05-08 PROCEDURE — 99213 OFFICE O/P EST LOW 20 MIN: CPT | Performed by: DERMATOLOGY

## 2019-05-08 PROCEDURE — 17000 DESTRUCT PREMALG LESION: CPT | Performed by: DERMATOLOGY

## 2019-05-09 ENCOUNTER — OFFICE VISIT (OUTPATIENT)
Dept: INTERNAL MEDICINE CLINIC | Facility: CLINIC | Age: 69
End: 2019-05-09
Payer: MEDICARE

## 2019-05-09 VITALS
HEART RATE: 85 BPM | HEIGHT: 64 IN | BODY MASS INDEX: 33.39 KG/M2 | DIASTOLIC BLOOD PRESSURE: 82 MMHG | SYSTOLIC BLOOD PRESSURE: 138 MMHG | OXYGEN SATURATION: 96 % | WEIGHT: 195.6 LBS

## 2019-05-09 DIAGNOSIS — IMO0001 UNCONTROLLED TYPE 2 DIABETES MELLITUS WITHOUT COMPLICATION, WITHOUT LONG-TERM CURRENT USE OF INSULIN: ICD-10-CM

## 2019-05-09 DIAGNOSIS — I10 BENIGN ESSENTIAL HYPERTENSION: ICD-10-CM

## 2019-05-09 DIAGNOSIS — E11.65 UNCONTROLLED TYPE 2 DIABETES MELLITUS WITH HYPERGLYCEMIA (HCC): Primary | ICD-10-CM

## 2019-05-09 DIAGNOSIS — I47.1 PAROXYSMAL SUPRAVENTRICULAR TACHYCARDIA (HCC): ICD-10-CM

## 2019-05-09 DIAGNOSIS — E66.01 MORBID OBESITY (HCC): ICD-10-CM

## 2019-05-09 DIAGNOSIS — R04.0 BLEEDING FROM THE NOSE: ICD-10-CM

## 2019-05-09 PROCEDURE — 99214 OFFICE O/P EST MOD 30 MIN: CPT | Performed by: PHYSICIAN ASSISTANT

## 2019-05-28 DIAGNOSIS — E11.65 UNCONTROLLED TYPE 2 DIABETES MELLITUS WITH HYPERGLYCEMIA, WITHOUT LONG-TERM CURRENT USE OF INSULIN (HCC): ICD-10-CM

## 2019-06-03 ENCOUNTER — TRANSCRIBE ORDERS (OUTPATIENT)
Dept: NEUROSURGERY | Facility: CLINIC | Age: 69
End: 2019-06-03

## 2019-06-03 DIAGNOSIS — R04.0 BLEEDING FROM THE NOSE: Primary | ICD-10-CM

## 2019-06-05 PROCEDURE — 99283 EMERGENCY DEPT VISIT LOW MDM: CPT | Performed by: EMERGENCY MEDICINE

## 2019-06-05 PROCEDURE — 99283 EMERGENCY DEPT VISIT LOW MDM: CPT

## 2019-06-06 ENCOUNTER — HOSPITAL ENCOUNTER (EMERGENCY)
Facility: HOSPITAL | Age: 69
Discharge: HOME | End: 2019-06-06
Attending: EMERGENCY MEDICINE | Admitting: EMERGENCY MEDICINE
Payer: MEDICARE

## 2019-06-06 VITALS
BODY MASS INDEX: 31.82 KG/M2 | OXYGEN SATURATION: 97 % | SYSTOLIC BLOOD PRESSURE: 159 MMHG | RESPIRATION RATE: 20 BRPM | DIASTOLIC BLOOD PRESSURE: 85 MMHG | TEMPERATURE: 98 F | WEIGHT: 191 LBS | HEIGHT: 65 IN | HEART RATE: 90 BPM

## 2019-06-06 DIAGNOSIS — R04.0 LEFT-SIDED EPISTAXIS: Primary | ICD-10-CM

## 2019-06-06 RX ORDER — OXYMETAZOLINE HYDROCHLORIDE 0.05 G/100ML
2 SPRAY NASAL ONCE
Status: COMPLETED | OUTPATIENT
Start: 2019-06-06 | End: 2019-06-06

## 2019-06-06 RX ADMIN — OXYMETAZOLINE HYDROCHLORIDE 2 SPRAY: 0.05 SPRAY NASAL at 02:45

## 2019-06-07 DIAGNOSIS — F41.9 ANXIETY DISORDER, UNSPECIFIED TYPE: ICD-10-CM

## 2019-06-10 ENCOUNTER — CONSULT (OUTPATIENT)
Dept: NEUROSURGERY | Facility: CLINIC | Age: 69
End: 2019-06-10
Payer: MEDICARE

## 2019-06-10 VITALS
BODY MASS INDEX: 31.49 KG/M2 | DIASTOLIC BLOOD PRESSURE: 82 MMHG | RESPIRATION RATE: 16 BRPM | HEART RATE: 74 BPM | SYSTOLIC BLOOD PRESSURE: 123 MMHG | TEMPERATURE: 98 F | HEIGHT: 65 IN | WEIGHT: 189 LBS

## 2019-06-10 DIAGNOSIS — R04.0 BLEEDING FROM THE NOSE: Primary | ICD-10-CM

## 2019-06-10 PROCEDURE — 99205 OFFICE O/P NEW HI 60 MIN: CPT | Performed by: RADIOLOGY

## 2019-06-10 RX ORDER — LORAZEPAM 1 MG/1
TABLET ORAL
Qty: 90 TABLET | Refills: 1 | Status: SHIPPED | OUTPATIENT
Start: 2019-06-10 | End: 2020-05-01 | Stop reason: SDUPTHER

## 2019-06-10 RX ORDER — ECHINACEA PURPUREA EXTRACT 125 MG
1 TABLET ORAL AS NEEDED
COMMUNITY

## 2019-06-11 DIAGNOSIS — E11.8 TYPE 2 DIABETES MELLITUS WITH COMPLICATION, UNSPECIFIED WHETHER LONG TERM INSULIN USE: ICD-10-CM

## 2019-06-12 DIAGNOSIS — E11.8 TYPE 2 DIABETES MELLITUS WITH COMPLICATION, UNSPECIFIED WHETHER LONG TERM INSULIN USE: ICD-10-CM

## 2019-06-24 ENCOUNTER — OFFICE VISIT (OUTPATIENT)
Dept: INTERNAL MEDICINE CLINIC | Facility: CLINIC | Age: 69
End: 2019-06-24
Payer: MEDICARE

## 2019-06-24 ENCOUNTER — APPOINTMENT (OUTPATIENT)
Dept: LAB | Facility: HOSPITAL | Age: 69
End: 2019-06-24
Payer: MEDICARE

## 2019-06-24 ENCOUNTER — APPOINTMENT (OUTPATIENT)
Dept: LAB | Facility: CLINIC | Age: 69
End: 2019-06-24
Payer: MEDICARE

## 2019-06-24 ENCOUNTER — TRANSCRIBE ORDERS (OUTPATIENT)
Dept: LAB | Facility: CLINIC | Age: 69
End: 2019-06-24

## 2019-06-24 VITALS
DIASTOLIC BLOOD PRESSURE: 72 MMHG | WEIGHT: 181 LBS | SYSTOLIC BLOOD PRESSURE: 122 MMHG | HEIGHT: 64 IN | OXYGEN SATURATION: 94 % | HEART RATE: 85 BPM | BODY MASS INDEX: 30.9 KG/M2 | TEMPERATURE: 97 F

## 2019-06-24 DIAGNOSIS — E11.65 UNCONTROLLED TYPE 2 DIABETES MELLITUS WITH HYPERGLYCEMIA (HCC): ICD-10-CM

## 2019-06-24 DIAGNOSIS — E78.00 HYPERCHOLESTEROLEMIA: ICD-10-CM

## 2019-06-24 DIAGNOSIS — E11.65 TYPE 2 DIABETES MELLITUS WITH HYPERGLYCEMIA, WITHOUT LONG-TERM CURRENT USE OF INSULIN (HCC): Chronic | ICD-10-CM

## 2019-06-24 DIAGNOSIS — I10 BENIGN ESSENTIAL HYPERTENSION: Chronic | ICD-10-CM

## 2019-06-24 DIAGNOSIS — L95.8 HISTIOCYTIC VASCULITIS OF SKIN: ICD-10-CM

## 2019-06-24 DIAGNOSIS — R23.3 PETECHIAL RASH: Primary | ICD-10-CM

## 2019-06-24 DIAGNOSIS — R21 RASH AND OTHER NONSPECIFIC SKIN ERUPTION: Primary | ICD-10-CM

## 2019-06-24 DIAGNOSIS — E78.2 MIXED HYPERLIPIDEMIA: ICD-10-CM
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
## 2019-06-24 DIAGNOSIS — R21 CUTANEOUS ERUPTION: ICD-10-CM

## 2019-06-24 DIAGNOSIS — Z12.5 SCREENING FOR PROSTATE CANCER: ICD-10-CM

## 2019-06-24 DIAGNOSIS — S30.860A TICK BITE OF BACK, INITIAL ENCOUNTER: ICD-10-CM

## 2019-06-24 DIAGNOSIS — R21 RASH AND OTHER NONSPECIFIC SKIN ERUPTION: ICD-10-CM

## 2019-06-24 DIAGNOSIS — I10 BENIGN ESSENTIAL HYPERTENSION: ICD-10-CM

## 2019-06-24 PROBLEM — Z91.89 RECENTLY IN LYME DISEASE ENDEMIC AREA: Status: RESOLVED | Noted: 2018-07-10 | Resolved: 2019-06-24

## 2019-06-24 PROBLEM — J00 ACUTE NASOPHARYNGITIS: Status: RESOLVED | Noted: 2018-07-10 | Resolved: 2019-06-24

## 2019-06-24 PROBLEM — Z00.00 ENCOUNTER FOR MEDICARE ANNUAL WELLNESS EXAM: Status: RESOLVED | Noted: 2018-07-10 | Resolved: 2019-06-24

## 2019-06-24 PROBLEM — K59.1 FUNCTIONAL DIARRHEA: Status: RESOLVED | Noted: 2018-12-24 | Resolved: 2019-06-24

## 2019-06-24 PROBLEM — Z20.89 RECENTLY IN LYME DISEASE ENDEMIC AREA: Status: RESOLVED | Noted: 2018-07-10 | Resolved: 2019-06-24

## 2019-06-24 PROBLEM — Z85.828 HISTORY OF SKIN CANCER: Chronic | Status: ACTIVE | Noted: 2018-06-29

## 2019-06-24 PROBLEM — Z12.83 SCREENING FOR SKIN CANCER: Status: RESOLVED | Noted: 2018-06-29 | Resolved: 2019-06-24

## 2019-06-24 LAB
ALBUMIN SERPL BCP-MCNC: 3.9 G/DL (ref 3.5–5)
ALBUMIN SERPL BCP-MCNC: 4.2 G/DL (ref 3.5–5)
ALP SERPL-CCNC: 55 U/L (ref 46–116)
ALP SERPL-CCNC: 60 U/L (ref 46–116)
ALT SERPL W P-5'-P-CCNC: 27 U/L (ref 12–78)
ALT SERPL W P-5'-P-CCNC: 28 U/L (ref 12–78)
ANION GAP SERPL CALCULATED.3IONS-SCNC: 10 MMOL/L (ref 4–13)
AST SERPL W P-5'-P-CCNC: 14 U/L (ref 5–45)
AST SERPL W P-5'-P-CCNC: 19 U/L (ref 5–45)
BACTERIA UR QL AUTO: ABNORMAL /HPF
BASOPHILS # BLD AUTO: 0.05 THOUSANDS/ΜL (ref 0–0.1)
BASOPHILS NFR BLD AUTO: 1 % (ref 0–1)
BILIRUB DIRECT SERPL-MCNC: 0.21 MG/DL (ref 0–0.2)
BILIRUB SERPL-MCNC: 0.8 MG/DL (ref 0.2–1)
BILIRUB SERPL-MCNC: 0.84 MG/DL (ref 0.2–1)
BILIRUB UR QL STRIP: NEGATIVE
BUN SERPL-MCNC: 13 MG/DL (ref 5–25)
BUN SERPL-MCNC: 15 MG/DL (ref 5–25)
C3 SERPL-MCNC: 133 MG/DL (ref 90–180)
C4 SERPL-MCNC: 32 MG/DL (ref 10–40)
CALCIUM SERPL-MCNC: 9.5 MG/DL (ref 8.3–10.1)
CHLORIDE SERPL-SCNC: 103 MMOL/L (ref 100–108)
CHOLEST SERPL-MCNC: 145 MG/DL (ref 50–200)
CK SERPL-CCNC: 140 U/L (ref 39–308)
CLARITY UR: CLEAR
CO2 SERPL-SCNC: 24 MMOL/L (ref 21–32)
COLOR UR: ABNORMAL
CREAT SERPL-MCNC: 1.07 MG/DL (ref 0.6–1.3)
CREAT SERPL-MCNC: 1.1 MG/DL (ref 0.6–1.3)
CREAT UR-MCNC: 88.5 MG/DL
EOSINOPHIL # BLD AUTO: 0.51 THOUSAND/ΜL (ref 0–0.61)
EOSINOPHIL NFR BLD AUTO: 6 % (ref 0–6)
ERYTHROCYTE [DISTWIDTH] IN BLOOD BY AUTOMATED COUNT: 12.3 % (ref 11.6–15.1)
ERYTHROCYTE [SEDIMENTATION RATE] IN BLOOD: 10 MM/HOUR (ref 0–10)
EST. AVERAGE GLUCOSE BLD GHB EST-MCNC: 183 MG/DL
GFR SERPL CREATININE-BSD FRML MDRD: 68 ML/MIN/1.73SQ M
GFR SERPL CREATININE-BSD FRML MDRD: 70 ML/MIN/1.73SQ M
GLUCOSE P FAST SERPL-MCNC: 135 MG/DL (ref 65–99)
GLUCOSE UR STRIP-MCNC: ABNORMAL MG/DL
HAV AB SER QL IA: NORMAL
HBA1C MFR BLD: 8 % (ref 4.2–6.3)
HBV SURFACE AG SER QL: NORMAL
HCT VFR BLD AUTO: 45.7 % (ref 36.5–49.3)
HCV AB SER QL: NORMAL
HDLC SERPL-MCNC: 37 MG/DL (ref 40–60)
HGB BLD-MCNC: 15.3 G/DL (ref 12–17)
HGB UR QL STRIP.AUTO: NEGATIVE
IMM GRANULOCYTES # BLD AUTO: 0.05 THOUSAND/UL (ref 0–0.2)
IMM GRANULOCYTES NFR BLD AUTO: 1 % (ref 0–2)
KETONES UR STRIP-MCNC: ABNORMAL MG/DL
LDLC SERPL CALC-MCNC: 83 MG/DL (ref 0–100)
LEUKOCYTE ESTERASE UR QL STRIP: ABNORMAL
LYMPHOCYTES # BLD AUTO: 1.88 THOUSANDS/ΜL (ref 0.6–4.47)
LYMPHOCYTES NFR BLD AUTO: 24 % (ref 14–44)
MCH RBC QN AUTO: 30.4 PG (ref 26.8–34.3)
MCHC RBC AUTO-ENTMCNC: 33.5 G/DL (ref 31.4–37.4)
MCV RBC AUTO: 91 FL (ref 82–98)
MICROALBUMIN UR-MCNC: <5 MG/L (ref 0–20)
MICROALBUMIN/CREAT 24H UR: <6 MG/G CREATININE (ref 0–30)
MONOCYTES # BLD AUTO: 0.54 THOUSAND/ΜL (ref 0.17–1.22)
MONOCYTES NFR BLD AUTO: 7 % (ref 4–12)
NEUTROPHILS # BLD AUTO: 4.98 THOUSANDS/ΜL (ref 1.85–7.62)
NEUTS SEG NFR BLD AUTO: 61 % (ref 43–75)
NITRITE UR QL STRIP: NEGATIVE
NON-SQ EPI CELLS URNS QL MICRO: ABNORMAL /HPF
NRBC BLD AUTO-RTO: 0 /100 WBCS
PH UR STRIP.AUTO: 5.5 [PH]
PLATELET # BLD AUTO: 237 THOUSANDS/UL (ref 149–390)
PMV BLD AUTO: 11.3 FL (ref 8.9–12.7)
POTASSIUM SERPL-SCNC: 3.7 MMOL/L (ref 3.5–5.3)
PROT SERPL-MCNC: 7.4 G/DL (ref 6.4–8.2)
PROT SERPL-MCNC: 8.1 G/DL (ref 6.4–8.2)
PROT UR STRIP-MCNC: NEGATIVE MG/DL
PSA SERPL-MCNC: 0.9 NG/ML (ref 0–4)
RBC # BLD AUTO: 5.03 MILLION/UL (ref 3.88–5.62)
RBC #/AREA URNS AUTO: ABNORMAL /HPF
SODIUM SERPL-SCNC: 137 MMOL/L (ref 136–145)
SP GR UR STRIP.AUTO: <=1.005 (ref 1–1.03)
TRIGL SERPL-MCNC: 127 MG/DL
UROBILINOGEN UR QL STRIP.AUTO: 0.2 E.U./DL
WBC # BLD AUTO: 8.01 THOUSAND/UL (ref 4.31–10.16)
WBC #/AREA URNS AUTO: ABNORMAL /HPF

## 2019-06-24 PROCEDURE — 82565 ASSAY OF CREATININE: CPT

## 2019-06-24 PROCEDURE — 86708 HEPATITIS A ANTIBODY: CPT

## 2019-06-24 PROCEDURE — 36415 COLL VENOUS BLD VENIPUNCTURE: CPT

## 2019-06-24 PROCEDURE — 80061 LIPID PANEL: CPT

## 2019-06-24 PROCEDURE — 85652 RBC SED RATE AUTOMATED: CPT

## 2019-06-24 PROCEDURE — 99214 OFFICE O/P EST MOD 30 MIN: CPT | Performed by: INTERNAL MEDICINE

## 2019-06-24 PROCEDURE — 85025 COMPLETE CBC W/AUTO DIFF WBC: CPT

## 2019-06-24 PROCEDURE — 82550 ASSAY OF CK (CPK): CPT

## 2019-06-24 PROCEDURE — 86038 ANTINUCLEAR ANTIBODIES: CPT

## 2019-06-24 PROCEDURE — G0103 PSA SCREENING: HCPCS

## 2019-06-24 PROCEDURE — 80076 HEPATIC FUNCTION PANEL: CPT

## 2019-06-24 PROCEDURE — 84520 ASSAY OF UREA NITROGEN: CPT

## 2019-06-24 PROCEDURE — 82595 ASSAY OF CRYOGLOBULIN: CPT

## 2019-06-24 PROCEDURE — 86430 RHEUMATOID FACTOR TEST QUAL: CPT

## 2019-06-24 PROCEDURE — 81001 URINALYSIS AUTO W/SCOPE: CPT | Performed by: ALLERGY & IMMUNOLOGY

## 2019-06-24 PROCEDURE — 82570 ASSAY OF URINE CREATININE: CPT | Performed by: INTERNAL MEDICINE

## 2019-06-24 PROCEDURE — 86162 COMPLEMENT TOTAL (CH50): CPT

## 2019-06-24 PROCEDURE — 83036 HEMOGLOBIN GLYCOSYLATED A1C: CPT

## 2019-06-24 PROCEDURE — 86255 FLUORESCENT ANTIBODY SCREEN: CPT

## 2019-06-24 PROCEDURE — 80053 COMPREHEN METABOLIC PANEL: CPT

## 2019-06-24 PROCEDURE — 82043 UR ALBUMIN QUANTITATIVE: CPT | Performed by: INTERNAL MEDICINE

## 2019-06-24 PROCEDURE — 86160 COMPLEMENT ANTIGEN: CPT

## 2019-06-24 PROCEDURE — 87340 HEPATITIS B SURFACE AG IA: CPT

## 2019-06-24 PROCEDURE — 86803 HEPATITIS C AB TEST: CPT

## 2019-06-24 RX ORDER — VERAPAMIL HYDROCHLORIDE 180 MG/1
180 CAPSULE, EXTENDED RELEASE ORAL 2 TIMES DAILY
Qty: 180 CAPSULE | Refills: 3 | Status: SHIPPED | OUTPATIENT
Start: 2019-06-24 | End: 2020-06-10

## 2019-06-24 RX ORDER — EMPAGLIFLOZIN 25 MG/1
25 TABLET, FILM COATED ORAL DAILY
COMMUNITY
Start: 2019-06-20 | End: 2019-10-15 | Stop reason: CLARIF

## 2019-06-24 RX ORDER — METFORMIN HYDROCHLORIDE 500 MG/1
1000 TABLET, EXTENDED RELEASE ORAL 2 TIMES DAILY
COMMUNITY
End: 2020-05-14

## 2019-06-25 ENCOUNTER — OFFICE VISIT (OUTPATIENT)
Dept: DERMATOLOGY | Facility: CLINIC | Age: 69
End: 2019-06-25
Payer: MEDICARE

## 2019-06-25 DIAGNOSIS — M31.0 LEUKOCYTOCLASTIC VASCULITIS (HCC): Primary | ICD-10-CM

## 2019-06-25 LAB — RHEUMATOID FACT SER QL LA: NEGATIVE

## 2019-06-25 PROCEDURE — 99214 OFFICE O/P EST MOD 30 MIN: CPT | Performed by: DERMATOLOGY

## 2019-06-25 PROCEDURE — 11104 PUNCH BX SKIN SINGLE LESION: CPT | Performed by: DERMATOLOGY

## 2019-06-25 PROCEDURE — 88305 TISSUE EXAM BY PATHOLOGIST: CPT | Performed by: PATHOLOGY

## 2019-06-26 LAB
C-ANCA TITR SER IF: NORMAL TITER
CH50 SERPL-ACNC: >60 U/ML
MYELOPEROXIDASE AB SER IA-ACNC: <9 U/ML (ref 0–9)
P-ANCA ATYPICAL TITR SER IF: NORMAL TITER
P-ANCA TITR SER IF: NORMAL TITER
PROTEINASE3 AB SER IA-ACNC: <3.5 U/ML (ref 0–3.5)
RYE IGE QN: NEGATIVE

## 2019-06-27 ENCOUNTER — CLINICAL SUPPORT (OUTPATIENT)
Dept: DERMATOLOGY | Facility: CLINIC | Age: 69
End: 2019-06-27
Payer: MEDICARE

## 2019-06-27 DIAGNOSIS — M31.0 LEUKOCYTOCLASTIC VASCULITIS (HCC): Primary | ICD-10-CM

## 2019-06-27 PROCEDURE — 99213 OFFICE O/P EST LOW 20 MIN: CPT | Performed by: DERMATOLOGY

## 2019-06-27 PROCEDURE — 88300 SURGICAL PATH GROSS: CPT | Performed by: PATHOLOGY

## 2019-06-27 PROCEDURE — 11104 PUNCH BX SKIN SINGLE LESION: CPT | Performed by: DERMATOLOGY

## 2019-06-28 ENCOUNTER — APPOINTMENT (OUTPATIENT)
Dept: LAB | Facility: HOSPITAL | Age: 69
End: 2019-06-28
Payer: MEDICARE

## 2019-06-28 LAB — CRYOGLOB SER QL 1D COLD INC: NORMAL

## 2019-06-28 PROCEDURE — 88346 IMFLUOR 1ST 1ANTB STAIN PX: CPT

## 2019-07-02 ENCOUNTER — APPOINTMENT (OUTPATIENT)
Dept: LAB | Facility: HOSPITAL | Age: 69
End: 2019-07-02
Payer: MEDICARE

## 2019-07-02 DIAGNOSIS — R21 CUTANEOUS ERUPTION: ICD-10-CM

## 2019-07-02 DIAGNOSIS — K21.9 GASTROESOPHAGEAL REFLUX DISEASE, ESOPHAGITIS PRESENCE NOT SPECIFIED: ICD-10-CM

## 2019-07-02 DIAGNOSIS — L95.8 HISTIOCYTIC VASCULITIS OF SKIN: ICD-10-CM

## 2019-07-02 DIAGNOSIS — R21 RASH AND OTHER NONSPECIFIC SKIN ERUPTION: ICD-10-CM

## 2019-07-02 PROCEDURE — 82565 ASSAY OF CREATININE: CPT

## 2019-07-02 PROCEDURE — 83516 IMMUNOASSAY NONANTIBODY: CPT

## 2019-07-02 PROCEDURE — 36415 COLL VENOUS BLD VENIPUNCTURE: CPT

## 2019-07-02 RX ORDER — OMEPRAZOLE 20 MG/1
CAPSULE, DELAYED RELEASE ORAL
Qty: 90 CAPSULE | Refills: 0 | Status: SHIPPED | OUTPATIENT
Start: 2019-07-02 | End: 2019-09-29 | Stop reason: SDUPTHER

## 2019-07-03 ENCOUNTER — OFFICE VISIT (OUTPATIENT)
Dept: DERMATOLOGY | Facility: CLINIC | Age: 69
End: 2019-07-03
Payer: MEDICARE

## 2019-07-03 DIAGNOSIS — M31.0 LEUKOCYTOCLASTIC VASCULITIS (HCC): Primary | ICD-10-CM

## 2019-07-03 DIAGNOSIS — J44.9 CHRONIC OBSTRUCTIVE PULMONARY DISEASE, UNSPECIFIED COPD TYPE (HCC): ICD-10-CM

## 2019-07-03 PROCEDURE — 99213 OFFICE O/P EST LOW 20 MIN: CPT | Performed by: DERMATOLOGY

## 2019-07-03 RX ORDER — MONTELUKAST SODIUM 10 MG/1
10 TABLET ORAL AS NEEDED
Qty: 90 TABLET | Refills: 3 | Status: SHIPPED | OUTPATIENT
Start: 2019-07-03 | End: 2019-10-15 | Stop reason: CLARIF

## 2019-07-03 RX ORDER — REPAGLINIDE 2 MG/1
2 TABLET ORAL DAILY
Refills: 3 | COMMUNITY
Start: 2019-06-27 | End: 2020-08-05 | Stop reason: SDUPTHER

## 2019-07-03 NOTE — PATIENT INSTRUCTIONS
Appears relatively stable will continue same treatment await results of biopsy to confirm diagnosis no need to consider systemic steroids at this time since there appears to be improving

## 2019-07-03 NOTE — PROGRESS NOTES
[address] [facility] [address]  [phone]  [phone]     MRN: [id number] : 1950  Encounter: [id number]  Patient Information: Ancil Files  Chief complaint:  Follow-up for vasculitis    History of present illness:  28-year-old male presents for follow-up secondary to his vasculitis patient has been taking the Aleve which is causing  less symptomatology however getting new lesions but  still appears appears to be less   Biopsy confirms diagnosis and immunofluorescence studies are still not available    Past Medical History:   Diagnosis Date    Actinic keratosis     Adhesive capsulitis of unspecified shoulder     Anxiety     Asthma     Atopic dermatitis     Cardiac disease     Cellulitis of right upper extremity     Cervical radiculopathy     Chest pain     Chronic maxillary sinusitis     last assessed 14    Diabetes mellitus ([address] )     Erectile dysfunction of non-organic origin     Esophageal reflux     Gout     last assessed 08/26/15    Hearing loss, conductive     Heart murmur     Mitral Valve disorder    Herpes zoster     History of paroxysmal supraventricular tachycardia     Hypertension     Mitral valve disorder     Neoplasm of skin, malignant     Non-melanoma skin cancer     last assessed 10/26/17    Obesity     Palpitations     last assessed 03/05/15    Paroxysmal supraventricular tachycardia ([address] ) 2014    Plantar fasciitis     last assessed 05/28/15    PSVT (paroxysmal supraventricular tachycardia) ([facility])     Sciatica     Sleep apnea     Squamous cell carcinoma of skin of scalp     Tinnitus, unspecified ear     Type 2 diabetes mellitus with hyperglycemia ([address] )     last assessed     Umbilical hernia     Worms in stool     last assessed 08/26/15     Past Surgical History:   Procedure Laterality Date    APPENDECTOMY      COLECTOMY      Partial, Sigmoid    COLONOSCOPY  HEAD & NECK SKIN LESION EXCISIONAL BIOPSY      10/11/10 SCC --  10/17/11 SCC  --  Location Scalp    HERNIA REPAIR      CA ESOPHAGOGASTRODUODENOSCOPY TRANSORAL DIAGNOSTIC N/A 2017    Procedure: ESOPHAGOGASTRODUODENOSCOPY (EGD); Surgeon: Aung Mejia MD;  Location: [facility]; Service: Gastroenterology    SKIN BIOPSY      TONSILLECTOMY       Social History   Social History     Substance and Sexual Activity   Alcohol Use Yes    Frequency: Monthly or less    Drinks per session: 1 or 2    Binge frequency: Never     Social History     Substance and Sexual Activity   Drug Use No     Social History     Tobacco Use   Smoking Status Former Smoker    Packs/day:     Years: 10 00    Pack years: 10 00    Types: Cigarettes    Last attempt to quit:     Years since quittin 5   Smokeless Tobacco Never Used     Family History   Problem Relation Age of Onset    Cancer Mother     Diabetes Mother     Heart disease Mother     Heart attack Mother     Breast cancer Mother         Adenocarcinoma    Skin cancer Mother         Adenocarcinoma    Heart failure Mother     Diabetes type II Mother     Varicose Veins Mother         Lower Extremities    Cancer Father     Prostate cancer Father         Adenocarcinoma    Skin cancer Father     Lymphoma Father         Non-Hodgkin's    Arthritis Family      Meds/Allergies   Allergies   Allergen Reactions    Iodinated Diagnostic Agents Anaphylaxis    Shellfish Allergy Anaphylaxis    Shellfish-Derived Products     Shrimp Extract Allergy Skin Test Anaphylaxis    Empagliflozin Hives       Meds:  Prior to Admission medications    Medication Sig Start Date End Date Taking?  Authorizing Provider   clotrimazole-betamethasone (LOTRISONE) 1-0 05 % cream Apply topically as needed  16  Yes Historical Provider, MD   cyclobenzaprine (FLEXERIL) 10 mg tablet Take 1 tablet (10 mg total) by mouth 3 (three) times a day as needed for muscle spasms 18 Yes Poncho Stanford MD   fluticasone [facility]) 50 mcg/act nasal spray 1 spray into each nostril as needed    Yes Historical Provider, MD   glucose blood (FREESTYLE LITE) test strip CHECK BLOOD SUGAR ONCE DAILY DX: E11 65 6/12/19  Yes David De La Garza,    ketoconazole (NIZORAL) 2 % cream APPLY TOPICALLY TWICE DAILY TO RASH ON FACE AND SCALP UNTIL RESOLVED 4/9/19  Yes Cristi Law MD   Lancets (FREESTYLE) lancets TEST 2 TIMES A DAY 2/21/19  Yes Evelyn Grimes PA-C   LORazepam (ATIVAN) 1 mg tablet TAKE 1 TABLET BY MOUTH EVERY DAY AT BEDTIME  Patient taking differently: TAKE 1 TABLET BY MOUTH EVERY DAY AT BEDTIME PRN 6/10/19  Yes David De La Garza DO   metFORMIN (GLUCOPHAGE-XR) 500 mg 24 hr tablet Take 1 tablet by mouth 2 (two) times a day   Yes Historical Provider, MD   montelukast (SINGULAIR) 10 mg tablet Take 1 tablet (10 mg total) by mouth as needed (sob) 7/3/19  Yes Evelyn Grimes PA-C   Multiple Vitamin (MULTIVITAMINS PO) Take by mouth daily 1/21/14  Yes Historical Provider, MD   omeprazole (PriLOSEC) 20 mg delayed release capsule TAKE ONE CAPSULE BY MOUTH EVERY DAY 7/2/19  Yes Evelyn Grimes PA-C   repaglinide (PRANDIN) 1 mg tablet TAKE 1 TABLET (1 MG TOTAL) BY MOUTH 3 (THREE) TIMES A DAY BEFORE MEALS   6/27/19  Yes Historical Provider, MD   sildenafil (VIAGRA) 50 MG tablet TAKE 1 TABLET BY MOUTH AS NEEDED FOR ERECTILE DYSFUNCTION 4/8/19  Yes Evelyn Grimes PA-C   sodium chloride (OCEAN) 0 65 % nasal spray 1 spray into each nostril as needed for congestion   Yes Historical Provider, MD   triamcinolone (KENALOG) 0 025 % cream Apply topically 2 (two) times a day   Yes Historical Provider, MD   verapamil (VERELAN PM) 180 MG 24 hr capsule Take 1 capsule (180 mg total) by mouth 2 (two) times a day 6/24/19  Yes David De La Garza DO   aspirin (ASPIR-LOW) 81 mg EC tablet Take by mouth daily 1/21/14   Historical Provider, MD   EPINEPHrine (EPIPEN 2-DAVID) 0 3 mg/0 3 mL SOAJ Inject as directed 7/13/14   Historical Provider, MD JARDIANCE 25 MG TABS Take 25 mg by mouth daily  6/20/19   Historical Provider, MD álvarez (SINGULAIR) 10 mg tablet TAKE 1 TABLET BY MOUTH EVERY DAY  Patient taking differently: TAKE 1 TABLET BY MOUTH AS NEEDED 4/1/19 7/3/19  Dave Ugalde PA-C       Subjective:     Review of Systems:    General: negative for - chills, fatigue, fever,  weight gain or weight loss  Psychological: negative for - anxiety, behavioral disorder, concentration difficulties, decreased libido, depression, irritability, memory difficulties, mood swings, sleep disturbances or suicidal ideation  ENT: negative for - hearing difficulties , nasal congestion, nasal discharge, oral lesions, sinus pain, sneezing, sore throat  Allergy and Immunology: negative for - hives, insect bite sensitivity,  Hematological and Lymphatic: negative for - bleeding problems, blood clots,bruising, swollen lymph nodes  Endocrine: negative for - hair pattern changes, hot flashes, malaise/lethargy, mood swings, palpitations, polydipsia/polyuria, skin changes, temperature intolerance or unexpected weight change  Respiratory: negative for - cough, hemoptysis, orthopnea, shortness of breath, or wheezing  Cardiovascular: negative for - chest pain, dyspnea on exertion, edema,  Gastrointestinal: negative for - abdominal pain, nausea/vomiting  Genito-Urinary: negative for - dysuria, incontinence, irregular/heavy menses or urinary frequency/urgency  Musculoskeletal: negative for - gait disturbance, joint pain, joint stiffness, joint swelling, muscle pain, muscular weakness  Dermatological:  As in HPI  Neurological: negative for confusion, dizziness, headaches, impaired coordination/balance, memory loss, numbness/tingling, seizures, speech problems, tremors or weakness       Objective: There were no vitals taken for this visit      Physical Exam:    General Appearance:    Alert, cooperative, no distress   Head:    Normocephalic, without obvious abnormality, atraumatic Skin:   A full skin exam was performed including scalp, head scalp, eyes, ears, nose, lips, neck, chest, axilla, abdomen, back, buttocks, bilateral upper extremities, bilateral lower extremities, hands, feet, fingers, toes, fingernails, and toenails palpable purpura seen appears to be less active     Assessment:     1  Leukocytoclastic vasculitis ([address] )           Plan:   Appears relatively stable will continue same treatment await results of biopsy to confirm diagnosis no need to consider systemic steroids at this time since there appears to be improving  Deniz Haynes MD  7/3/2019,11:10 AM    Portions of the record may have been created with voice recognition software   Occasional wrong word or "sound a like" substitutions may have occurred due to the inherent limitations of voice recognition software   Read the chart carefully and recognize, using context, where substitutions have occurred

## 2019-07-06 LAB — MISCELLANEOUS LAB TEST RESULT: NORMAL

## 2019-07-08 ENCOUNTER — OFFICE VISIT (OUTPATIENT)
Dept: INTERNAL MEDICINE CLINIC | Facility: CLINIC | Age: 69
End: 2019-07-08
Payer: MEDICARE

## 2019-07-08 VITALS
WEIGHT: 182.4 LBS | BODY MASS INDEX: 31.14 KG/M2 | DIASTOLIC BLOOD PRESSURE: 74 MMHG | HEIGHT: 64 IN | HEART RATE: 62 BPM | OXYGEN SATURATION: 96 % | SYSTOLIC BLOOD PRESSURE: 116 MMHG

## 2019-07-08 DIAGNOSIS — N18.2 TYPE 2 DIABETES MELLITUS WITH STAGE 2 CHRONIC KIDNEY DISEASE, WITHOUT LONG-TERM CURRENT USE OF INSULIN (HCC): Primary | Chronic | ICD-10-CM

## 2019-07-08 DIAGNOSIS — M31.0 LEUKOCYTOCLASTIC VASCULITIS (HCC): Chronic | ICD-10-CM

## 2019-07-08 DIAGNOSIS — E78.2 MIXED HYPERLIPIDEMIA: Chronic | ICD-10-CM

## 2019-07-08 DIAGNOSIS — I10 BENIGN ESSENTIAL HYPERTENSION: Chronic | ICD-10-CM

## 2019-07-08 DIAGNOSIS — E11.22 TYPE 2 DIABETES MELLITUS WITH STAGE 2 CHRONIC KIDNEY DISEASE, WITHOUT LONG-TERM CURRENT USE OF INSULIN (HCC): Primary | Chronic | ICD-10-CM

## 2019-07-08 PROCEDURE — 99214 OFFICE O/P EST MOD 30 MIN: CPT | Performed by: INTERNAL MEDICINE

## 2019-07-08 NOTE — PATIENT INSTRUCTIONS
Type 2 Diabetes in Adults   AMBULATORY CARE:   Type 2 diabetes  is a disease that affects how your body uses glucose (sugar)  Normally, when the blood sugar level increases, the pancreas makes more insulin  Insulin helps move sugar out of the blood so it can be used for energy  Type 2 diabetes develops because either the body cannot make enough insulin, or it cannot use the insulin correctly  After many years, your pancreas may stop making insulin  Common symptoms include the following:   · More hunger or thirst than usual     · Frequent urination     · Weight loss without trying     · Blurred vision  Call 911 if you have any of the following:   · You have any of the following signs of a stroke:      ¨ Numbness or drooping on one side of your face     ¨ Weakness in an arm or leg    ¨ Confusion or difficulty speaking    ¨ Dizziness, a severe headache, or vision loss    · You have any of the following signs of a heart attack:      ¨ Squeezing, pressure, or pain in your chest that lasts longer than 5 minutes or returns    ¨ Discomfort or pain in your back, neck, jaw, stomach, or arm     ¨ Trouble breathing    ¨ Nausea or vomiting    ¨ Lightheadedness or a sudden cold sweat, especially with chest pain or trouble breathing  Seek care immediately if:   · You have severe abdominal pain, or the pain spreads to your back  You may also be vomiting  · You have trouble staying awake or focusing  · You are shaking or sweating  · You have blurred or double vision  · Your breath has a fruity, sweet smell  · Your breathing is deep and labored, or rapid and shallow  · Your heartbeat is fast and weak  Contact your healthcare provider if:   · You are vomiting or have diarrhea  · You have an upset stomach and cannot eat the foods on your meal plan  · You feel weak or more tired than usual      · You feel dizzy, have headaches, or are easily irritated  · Your skin is red, warm, dry, or swollen  · You have a wound that does not heal      · You have numbness in your arms or legs  · You have trouble coping with your illness, or you feel anxious or depressed  · You have questions or concerns about your condition or care  Treatment for type 2 diabetes  includes keeping your blood sugar at a normal level  You must eat the right foods, and exercise regularly  You may need medicine if you cannot control your blood sugar level with nutrition and exercise  You may also need medicine to prevent heart disease, a complication of type 2 diabetes  You may  need any of the following:  · Hypoglycemic medicines or insulin  may be given to decrease the amount of sugar in your blood  · Blood pressure medicine  may be given to lower your blood pressure  Your blood pressure should be less than 140/90  · Cholesterol lowering medicine  may be given to prevent heart disease  · Antiplatelets , such as aspirin, help prevent blood clots  Take your antiplatelet medicine exactly as directed  These medicines make it more likely for you to bleed or bruise  If you are told to take aspirin, do not take acetaminophen or ibuprofen instead  · Take your medicine as directed  Contact your healthcare provider if you think your medicine is not helping or if you have side effects  Tell him or her if you are allergic to any medicine  Keep a list of the medicines, vitamins, and herbs you take  Include the amounts, and when and why you take them  Bring the list or the pill bottles to follow-up visits  Carry your medicine list with you in case of an emergency  Check your blood sugar level: You will be taught how to check a small drop of blood in a glucose monitor  You will need to check your blood sugar level at least 3 times each day if you are on insulin  Ask your healthcare provider when and how often to check during the day  If you check your blood sugar level before a meal , it should be between 80 and 130 mg/dL   If you check your blood sugar level 1 to 2 hours after a meal , it should be less than 180 mg/dL  Ask your healthcare provider if these are good goals for you  Write down your results, and show them to your healthcare provider  He may use the results to make changes to your medicine, food, and exercise schedules  If your blood sugar level is too low: Your blood sugar level is too low if it goes below 70 mg/dL  If the level is too low, eat or drink 15 grams of fast-acting carbohydrate  These are found naturally in fruits  Fast-acting carbohydrates will raise your blood sugar level quickly  Examples of 15 grams of fast-acting carbohydrate are 4 ounces (½ cup) of fruit juice or 4 ounces of regular soda  Other examples are 2 tablespoons of raisins or 3 to 4 glucose tablets  Check your blood sugar level 15 minutes later  If the level is still low (less than 100 mg/dL), eat another 15 grams of carbohydrate  When the level returns to 100 mg/dL, eat a snack or meal that contains carbohydrates  This will help prevent another drop in blood sugar  Always carefully follow your healthcare provider's instructions on how to treat low blood sugar levels  Check your feet each day for sores:  Wear shoes and socks that fit correctly  Do not trim your toenails  Ask your healthcare provider for more information about foot care  Follow your meal plan:  A dietitian will help you make a meal plan to keep your blood sugar level steady  Do not skip meals  Your blood sugar level may drop too low if you have taken diabetes medicine and do not eat  · Keep track of carbohydrates (sugar and starchy foods)  Your blood sugar level can get too high if you eat too many carbohydrates  Your dietitian will help you plan meals and snacks that have the right amount of carbohydrates  · Eat low-fat foods , such as skinless chicken and low-fat milk  · Eat less sodium (salt)    Limit high-sodium foods, such as soy sauce, potato chips, and soup  Do not add salt to food you cook  Limit your use of table salt  You should have less than 2,300 mg of sodium per day  · Eat high-fiber foods , such as vegetables, whole grain breads, and beans  · Limit alcohol  Alcohol affects your blood sugar level and can make it harder to manage your diabetes  Limit alcohol to 1 drink a day if you are a woman  Limit alcohol to 2 drinks a day if you are a man  A drink of alcohol is 12 ounces of beer, 5 ounces of wine, or 1½ ounces of liquor  Maintain a healthy weight:  Ask your healthcare provider how much you should weigh  A healthy weight can help you control your diabetes  Ask your provider to help you create a weight loss plan if you are overweight  Together you can set manageable weight loss goals  Exercise as directed:  Exercise can help keep your blood sugar level steady, decrease your risk of heart disease, and help you lose weight  Stretch before and after you exercise  Exercise for at least 150 minutes every week  Spread this amount of exercise over at least 3 days a week  Do not skip exercise more than 2 days in a row  Include muscle strengthening activities 2 to 3 days each week  Older adults should include balance training 2 to 3 times each week  Activities that help increase balance include yoga and cheo chi  Work with your healthcare provider to create an exercise plan  · Check your blood sugar level before and after exercise  Healthcare providers may tell you to change the amount of insulin you take or food you eat  If your blood sugar level is high, check your blood or urine for ketones before you exercise  Do not exercise if your blood sugar level is high and you have ketones  · If your blood sugar level is less than 100 mg/dL, have a carbohydrate snack before you exercise  Examples are 4 to 6 crackers, ½ banana, 8 ounces (1 cup) of milk, or 4 ounces (½ cup) of juice   Drink water or liquids that do not contain sugar before, during, and after exercise  Ask your dietitian or healthcare provider which liquids you should drink when you exercise  · Do not sit for longer than 30 minutes  If you cannot walk around, at least stand up  This will help you stay active and keep your blood circulating  Do not smoke:  Nicotine and other chemicals in cigarettes and cigars can cause lung damage and make it more difficult to manage your diabetes  Ask your healthcare provider for information if you currently smoke and need help to quit  Do not use e-cigarettes or smokeless tobacco in place of cigarettes or to help you quit  They still contain nicotine  Check your blood pressure as directed:  Ask your healthcare provider what your blood pressure should be  Most adults with diabetes and high blood pressure should have a systolic blood pressure (first number) less than 140  Your diastolic blood pressure (second number) should be less than 90  Wear medical alert identification:  Wear medical alert jewelry or carry a card that says you have diabetes  Ask your healthcare provider where to get these items  Ask about vaccines: You have a higher risk for serious illness if you get the flu, pneumonia, or hepatitis  Ask your healthcare provider if you should get a flu, pneumonia, or hepatitis B vaccine, and when to get the vaccine  Follow up with your healthcare provider as directed: You may need to return to have your A1c checked every 3 months  You will need to return at least once each year to have your feet checked  You will need an eye exam once a year to check for retinopathy  You will also need urine tests every year to check for kidney problems  You may need tests to monitor for heart disease such as an EKG, stress test, blood pressure monitoring, and blood tests  Write down your questions so you remember to ask them during your visits     © 2017 [employer] Information is for End User's use only and may not be sold, redistributed or otherwise used for commercial purposes  All illustrations and images included in CareNotes® are the copyrighted property of [employer]  or Vince Monsalve  The above information is an  only  It is not intended as medical advice for individual conditions or treatments  Talk to your doctor, nurse or pharmacist before following any medical regimen to see if it is safe and effective for you

## 2019-07-08 NOTE — ASSESSMENT & PLAN NOTE
Lab Results   Component Value Date    HGBA1C 8 0 (H) 06/24/2019       Continue prandin and metformin  Follow-up with endocrinology  May consider restarting jardiance as rash not caused from jardiance

## 2019-07-08 NOTE — PROGRESS NOTES
INTERNAL MEDICINE FOLLOW-UP OFFICE VISIT  [facility] - [facility] [address] [place] [address]    NAME: Jane Chung  AGE: 71 y o  SEX: male  : 1950     DATE: 2019     Assessment and Plan:     Problem List Items Addressed This Visit        Endocrine    Type 2 diabetes mellitus with stage 2 chronic kidney disease, without long-term current use of insulin (HCC) - Primary (Chronic)     Lab Results   Component Value Date    HGBA1C 8 0 (H) 2019       Continue prandin and metformin  Follow-up with endocrinology  May consider restarting jardiance as rash not caused from jardiance  Relevant Medications    glucose blood (FREESTYLE LITE) test strip       Cardiovascular and Mediastinum    Benign essential hypertension (Chronic)     Patient's blood pressure is well controlled on current medication         Leukocytoclastic vasculitis (HCC) (Chronic)     Awaiting some further pathology studies to come back  Follow-up with Dr Ashok Grant  Other    Mixed hyperlipidemia (Chronic)     Lab Results   Component Value Date    LDLCALC 83 2019     Continue current diet  BMI Counseling: Body mass index is 31 07 kg/m²  Discussed the patient's BMI with him  The BMI is above average  BMI counseling and education was provided to the patient  Nutrition recommendations include reducing portion sizes, decreasing overall calorie intake, moderation in carbohydrate intake and increasing intake of lean protein  Chief Complaint:     Chief Complaint   Patient presents with    Follow-up     3 month      History of Present Illness:     Patient presents for follow-up  Diagnosed with leukocytoclastic vasculitis  So far serologies not revealing an exact cause  He worries about what the cause of it is  Further biopsy results still pending  Dr Ashok Grant not recommending steroids at this time   Endocrin[facility] from [address] [place] [address] [facility] though I discussed with patient that jardiance is a better drug and was not the cause of his rash  The following portions of the patient's history were reviewed and updated as appropriate: allergies, current medications, past family history, past medical history, past social history, past surgical history and problem list      Review of Systems:     Review of Systems   Constitutional: Negative for activity change, appetite change and fatigue  Respiratory: Negative for apnea, cough, chest tightness, shortness of breath and wheezing  Cardiovascular: Negative for chest pain, palpitations and leg swelling  Gastrointestinal: Negative for abdominal distention, abdominal pain, blood in stool, constipation, diarrhea, nausea and vomiting  Musculoskeletal: Negative for arthralgias, back pain, gait problem, joint swelling and myalgias  Skin: Positive for rash  Negative for wound  Neurological: Negative for dizziness, weakness, light-headedness, numbness and headaches  Psychiatric/Behavioral: Negative for behavioral problems, confusion, hallucinations, sleep disturbance and suicidal ideas  The patient is not nervous/anxious         Problem List:     Patient Active Problem List   Diagnosis    Actinic keratosis    Seborrheic keratosis    History of skin cancer    Anxiety disorder    Benign essential hypertension    Erectile dysfunction of non-organic origin    Esophageal reflux    Mixed hyperlipidemia    Mitral valve disorder    Rosacea    Sleep apnea    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    Thoracic radiculopathy    Bleeding from the nose    Type 2 diabetes mellitus with stage 2 chronic kidney disease, without long-term current use of insulin (HCC)    Leukocytoclastic vasculitis (HCC)      Objective:     /74 (BP Location: Left arm, Patient Position: Sitting, Cuff Size: Standard)   Pulse 62   Ht 5' 4 25" (1 632 m)   Wt 82 7 kg (182 lb 6 4 oz)   SpO2 96%   BMI 31 07 kg/m²     Physical Exam   Constitutional: He is oriented to person, place, and time  He appears well-developed and well-nourished  No distress  Neck: Neck supple  No JVD present  No thyromegaly present  Cardiovascular: Normal rate, regular rhythm and normal heart sounds  No murmur heard  Pulmonary/Chest: Effort normal and breath sounds normal  No respiratory distress  He has no wheezes  He has no rales  He exhibits no tenderness  Abdominal: Soft  Bowel sounds are normal  He exhibits no distension  There is no tenderness  Musculoskeletal: He exhibits no edema  Lymphadenopathy:     He has no cervical adenopathy  Neurological: He is alert and oriented to person, place, and time  Skin: Skin is warm and dry  Rash (petechial rash) noted  He is not diaphoretic  Psychiatric: He has a normal mood and affect  His behavior is normal    Vitals reviewed      Francie Jaimes DO  [facility]

## 2019-07-12 ENCOUNTER — TELEPHONE (OUTPATIENT)
Dept: INTERNAL MEDICINE CLINIC | Facility: CLINIC | Age: 69
End: 2019-07-12

## 2019-07-12 DIAGNOSIS — E11.22 TYPE 2 DIABETES MELLITUS WITH STAGE 2 CHRONIC KIDNEY DISEASE, WITHOUT LONG-TERM CURRENT USE OF INSULIN (HCC): Chronic | ICD-10-CM

## 2019-07-12 DIAGNOSIS — N18.2 TYPE 2 DIABETES MELLITUS WITH STAGE 2 CHRONIC KIDNEY DISEASE, WITHOUT LONG-TERM CURRENT USE OF INSULIN (HCC): Chronic | ICD-10-CM

## 2019-07-12 NOTE — TELEPHONE ENCOUNTER
Patient called back and was notified  He stated that it is his endocrinologist that says he should be testing 3 times a day  I advised patient to have them submit documentation to insurance company and see if they will approve for more test strips   Did also inform patient that test strips were sent over this afternoon

## 2019-07-12 NOTE — TELEPHONE ENCOUNTER
Patient called for refill on glucose blood (FREESTYLE LITE) test strip     States that Medicare only pays for strips to test 1 x per day, but he has to test 3 to 4 times a day  Would like to know if his supplemental insurance which is [email or web address] will cover the difference so that he can get more test strips  Currently he has about 10 strips left    Please advise patient at [phone]

## 2019-07-12 NOTE — TELEPHONE ENCOUNTER
I can't answer this question  I don't know the answer  Medicare sometimes covers more than twice per day if you have fluctuating blood sugars  But won't pay for 3-4 times since he isn't on insulin  I will send in to check twice per day  I don't think he needs to check it 3-4 times per day

## 2019-07-15 ENCOUNTER — TELEPHONE (OUTPATIENT)
Dept: INTERNAL MEDICINE CLINIC | Facility: CLINIC | Age: 69
End: 2019-07-15

## 2019-07-15 DIAGNOSIS — N18.2 TYPE 2 DIABETES MELLITUS WITH STAGE 2 CHRONIC KIDNEY DISEASE, WITHOUT LONG-TERM CURRENT USE OF INSULIN (HCC): Primary | Chronic | ICD-10-CM

## 2019-07-15 DIAGNOSIS — M31.0 LEUKOCYTOCLASTIC VASCULITIS (HCC): Chronic | ICD-10-CM

## 2019-07-15 DIAGNOSIS — I10 BENIGN ESSENTIAL HYPERTENSION: Chronic | ICD-10-CM

## 2019-07-15 DIAGNOSIS — E11.22 TYPE 2 DIABETES MELLITUS WITH STAGE 2 CHRONIC KIDNEY DISEASE, WITHOUT LONG-TERM CURRENT USE OF INSULIN (HCC): Primary | Chronic | ICD-10-CM

## 2019-07-15 NOTE — TELEPHONE ENCOUNTER
As it is now, there really isn't a diagnosis reason for him to check more than once per day by Medicare guidelines

## 2019-07-15 NOTE — TELEPHONE ENCOUNTER
CALLED PT  AND WAS NOTIFIED AND WILL CONTACT HIS INSURANCE COMPANY TO SEE IF THEY WILL COVER FOR 3X'S A DAY AS DENIS   WANTS THIS AND WILL SWITCH PHARMACY NEED TO

## 2019-07-15 NOTE — TELEPHONE ENCOUNTER
PT  CALLED BACK AFTER SPEAKING TO HIS INSURANCE COMPANY BC/BS HE NEEDS TO GO THRU A DURABLE MEDICAL SUPPLIER FOR TEST STRIPS TO BE TID, SCRIPT NEEDS TO BE SENT TO [facility] [address] [facility] [address] [place]  [id number] [phone], THEY WILL PAY  STRIPS UP TO 34 DAYS

## 2019-07-15 NOTE — TELEPHONE ENCOUNTER
Normally they approve 2-3x daily if pt on insulin, only one if on po med  Need a diagnosis and/or documentation  in an office note w/ reason pt requires 3x daily  We can try a preauth with the dx in chart

## 2019-07-15 NOTE — TELEPHONE ENCOUNTER
[facility] called stating that Medicare will only pay for test strips for 1x per day  They cannot do an override on their end  Patient is telling pharmacy that he needs more strips because he tests 3 x per day  Pharmacy is asking that the doctor inform patient that insurance only pays for 1 x per day

## 2019-07-17 ENCOUNTER — APPOINTMENT (OUTPATIENT)
Dept: LAB | Facility: CLINIC | Age: 69
End: 2019-07-17
Payer: MEDICARE
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
## 2019-07-17 DIAGNOSIS — S30.860A TICK BITE OF BACK, INITIAL ENCOUNTER: ICD-10-CM

## 2019-07-17 PROCEDURE — 86618 LYME DISEASE ANTIBODY: CPT

## 2019-07-17 PROCEDURE — 36415 COLL VENOUS BLD VENIPUNCTURE: CPT

## 2019-07-20 LAB
B BURGDOR IGG SER IA-ACNC: 0.14
B BURGDOR IGM SER IA-ACNC: 0.15

## 2019-08-04 ENCOUNTER — HOSPITAL ENCOUNTER (EMERGENCY)
Facility: HOSPITAL | Age: 69
Discharge: HOME | End: 2019-08-04
Attending: EMERGENCY MEDICINE | Admitting: EMERGENCY MEDICINE
Payer: MEDICARE

## 2019-08-04 VITALS
SYSTOLIC BLOOD PRESSURE: 133 MMHG | WEIGHT: 174 LBS | RESPIRATION RATE: 18 BRPM | TEMPERATURE: 98.3 F | HEART RATE: 92 BPM | OXYGEN SATURATION: 97 % | BODY MASS INDEX: 28.73 KG/M2 | DIASTOLIC BLOOD PRESSURE: 81 MMHG

## 2019-08-04 DIAGNOSIS — R04.0 EPISTAXIS: Primary | ICD-10-CM

## 2019-08-04 PROCEDURE — 99283 EMERGENCY DEPT VISIT LOW MDM: CPT | Performed by: OTOLARYNGOLOGY

## 2019-08-04 PROCEDURE — 99283 EMERGENCY DEPT VISIT LOW MDM: CPT | Performed by: EMERGENCY MEDICINE

## 2019-08-04 PROCEDURE — 30903 CONTROL OF NOSEBLEED: CPT | Performed by: OTOLARYNGOLOGY

## 2019-08-04 PROCEDURE — 99281 EMR DPT VST MAYX REQ PHY/QHP: CPT

## 2019-08-04 RX ORDER — GINSENG 100 MG
1 CAPSULE ORAL 2 TIMES DAILY
Qty: 28 G | Refills: 0 | Status: SHIPPED | OUTPATIENT
Start: 2019-08-04

## 2019-08-04 RX ORDER — LIDOCAINE HYDROCHLORIDE AND EPINEPHRINE 10; 10 MG/ML; UG/ML
1 INJECTION, SOLUTION INFILTRATION; PERINEURAL ONCE
Status: COMPLETED | OUTPATIENT
Start: 2019-08-04 | End: 2019-08-04

## 2019-08-04 RX ADMIN — LIDOCAINE HYDROCHLORIDE AND EPINEPHRINE 1 ML: 10; 10 INJECTION, SOLUTION INFILTRATION; PERINEURAL at 15:15

## 2019-08-04 RX ADMIN — SILVER NITRATE APPLICATORS 1 APPLICATOR: 25; 75 STICK TOPICAL at 15:10

## 2019-08-04 NOTE — ED PROVIDER NOTES
History  No chief complaint on file  History provided by:  Patient   used: No      Patient is a 80-year-old male presenting to ER for recurrent nosebleed  Called ENT and was told to tony Campo is here to evaluate patient  Patient has no chest pain, shortness of breath, lightheadedness or dizziness  No trauma to the nose  Not On blood thinners  MDM ENT here to cauterize bleeding  Recommends follow-up with Hematology for evaluation of purpura      Prior to Admission Medications   Prescriptions Last Dose Informant Patient Reported? Taking?    EPINEPHrine (EPIPEN 2-DAVID) 0 3 mg/0 3 mL SOAJ  Self Yes No   Sig: Inject as directed   JARDIANCE 25 MG TABS  Self Yes No   Sig: Take 25 mg by mouth daily    LORazepam (ATIVAN) 1 mg tablet  Self No No   Sig: TAKE 1 TABLET BY MOUTH EVERY DAY AT BEDTIME   Patient taking differently: TAKE 1 TABLET BY MOUTH EVERY DAY AT BEDTIME PRN   Lancets (FREESTYLE) lancets  Self No No   Sig: TEST 2 TIMES A DAY   Multiple Vitamin (MULTIVITAMINS PO)  Self Yes No   Sig: Take by mouth daily   clotrimazole-betamethasone (LOTRISONE) 1-0 05 % cream  Self Yes No   Sig: Apply topically as needed    cyclobenzaprine (FLEXERIL) 10 mg tablet  Self No No   Sig: Take 1 tablet (10 mg total) by mouth 3 (three) times a day as needed for muscle spasms   fluticasone (FLONASE) 50 mcg/act nasal spray  Self Yes No   Si spray into each nostril as needed    glucose blood (FREESTYLE LITE) test strip   No No   Sig: CHECK BLOOD SUGAR THREE TIMES DAILY (DX: E11 65, FLUCTUATING BLOOD SUGARS)   ketoconazole (NIZORAL) 2 % cream  Self No No   Sig: APPLY TOPICALLY TWICE DAILY TO RASH ON FACE AND SCALP UNTIL RESOLVED   Patient taking differently: APPLY TOPICALLY TWICE DAILY TO RASH ON FACE AND SCALP UNTIL RESOLVED PRN   metFORMIN (GLUCOPHAGE-XR) 500 mg 24 hr tablet  Self Yes No   Sig: Take 1 tablet by mouth 2 (two) times a day   montelukast (SINGULAIR) 10 mg tablet  Self No No Sig: Take 1 tablet (10 mg total) by mouth as needed (sob)   omeprazole (PriLOSEC) 20 mg delayed release capsule  Self No No   Sig: TAKE ONE CAPSULE BY MOUTH EVERY DAY   repaglinide (PRANDIN) 1 mg tablet  Self Yes No   Sig: TAKE 1 TABLET (1 MG TOTAL) BY MOUTH 3 (THREE) TIMES A DAY BEFORE MEALS    sildenafil (VIAGRA) 50 MG tablet  Self No No   Sig: TAKE 1 TABLET BY MOUTH AS NEEDED FOR ERECTILE DYSFUNCTION   sodium chloride (OCEAN) 0 65 % nasal spray  Self Yes No   Si spray into each nostril as needed for congestion   triamcinolone (KENALOG) 0 025 % cream  Self Yes No   Sig: Apply topically 2 (two) times a day   verapamil (VERELAN PM) 180 MG 24 hr capsule  Self No No   Sig: Take 1 capsule (180 mg total) by mouth 2 (two) times a day      Facility-Administered Medications: None       Past Medical History:   Diagnosis Date    Actinic keratosis     Adhesive capsulitis of unspecified shoulder     Anxiety     Asthma     Atopic dermatitis     Cardiac disease     Cellulitis of right upper extremity     Cervical radiculopathy     Chest pain     Chronic maxillary sinusitis     last assessed 14    Diabetes mellitus ([facility] [address] )     Erectile dysfunction of non-organic origin     Esophageal reflux     Gout     last assessed 08/26/15    Hearing loss, conductive     Heart murmur     Mitral Valve disorder    Herpes zoster     History of paroxysmal supraventricular tachycardia     Hypertension     Mitral valve disorder     Neoplasm of skin, malignant     Non-melanoma skin cancer     last assessed 10/26/17    Obesity     Palpitations     last assessed 03/05/15    Paroxysmal supraventricular tachycardia ([facility] [address] ) 2014    Plantar fasciitis     last assessed 05/28/15    PSVT (paroxysmal supraventricular tachycardia) ([facility])     Sciatica     Sleep apnea     Squamous cell carcinoma of skin of scalp     Tinnitus, unspecified ear     Type 2 diabetes mellitus with hyperglycemia (HCC)     last assessed     Umbilical hernia     Worms in stool     last assessed 08/26/15       Past Surgical History:   Procedure Laterality Date    APPENDECTOMY      COLECTOMY      Partial, Sigmoid    COLONOSCOPY      HEAD & NECK SKIN LESION EXCISIONAL BIOPSY      10/11/10 SCC --  10/17/11 SCC  --  Location Scalp    HERNIA REPAIR      DE ESOPHAGOGASTRODUODENOSCOPY TRANSORAL DIAGNOSTIC N/A 2017    Procedure: ESOPHAGOGASTRODUODENOSCOPY (EGD); Surgeon: Robbie Velasquez MD;  Location: [facility]; Service: Gastroenterology    SKIN BIOPSY      TONSILLECTOMY         Family History   Problem Relation Age of Onset    Cancer Mother     Diabetes Mother     Heart disease Mother     Heart attack Mother     Breast cancer Mother         Adenocarcinoma    Skin cancer Mother         Adenocarcinoma    Heart failure Mother     Diabetes type II Mother     Varicose Veins Mother         Lower Extremities    Cancer Father     Prostate cancer Father         Adenocarcinoma    Skin cancer Father     Lymphoma Father         Non-Hodgkin's    Arthritis Family      I have reviewed and agree with the history as documented  Social History     Tobacco Use    Smoking status: Former Smoker     Packs/day: 1 00     Years: 10      Pack years: 10 00     Types: Cigarettes     Last attempt to quit:      Years since quittin 6    Smokeless tobacco: Never Used   Substance Use Topics    Alcohol use: Yes     Frequency: Monthly or less     Drinks per session: 1 or 2     Binge frequency: Never    Drug use: No        Review of Systems   Constitutional: Negative for chills, diaphoresis and fever  HENT: Positive for nosebleeds  Respiratory: Negative for cough, shortness of breath, wheezing and stridor  Cardiovascular: Negative for chest pain, palpitations and leg swelling  Gastrointestinal: Negative for abdominal pain, blood in stool, diarrhea, nausea and vomiting     Genitourinary: Negative for dysuria, frequency and urgency  Musculoskeletal: Negative for neck stiffness  Skin: Negative for pallor and rash  Neurological: Negative for dizziness, syncope, weakness, light-headedness and headaches  All other systems reviewed and are negative  Physical Exam  Physical Exam   Constitutional: He is oriented to person, place, and time  He appears well-developed and well-nourished  No distress  HENT:   Head: Normocephalic and atraumatic  Eyes: Pupils are equal, round, and reactive to light  EOM are normal    Neck: Neck supple  Cardiovascular: Normal rate and regular rhythm  Pulmonary/Chest: Effort normal    Musculoskeletal: Normal range of motion  He exhibits no tenderness or deformity  Neurological: He is alert and oriented to person, place, and time  Skin: Skin is warm  Capillary refill takes less than 2 seconds  No rash noted  He is not diaphoretic  No erythema  No pallor         Vital Signs  ED Triage Vitals [08/04/19 1459]   Temperature Pulse Respirations Blood Pressure SpO2   98 3 °F (36 8 °C) 92 18 133/81 97 %      Temp Source Heart Rate Source Patient Position - Orthostatic VS BP Location FiO2 (%)   Oral Monitor -- Right arm --      Pain Score       No Pain           Vitals:    08/04/19 1459   BP: 133/81   Pulse: 92         Visual Acuity      ED Medications  Medications   silver nitrate-potassium nitrate (ARZOL SILVER NITRATE) 31-80 % applicator 1 applicator (1 applicator Topical Given 8/4/19 1510)   lidocaine-epinephrine (XYLOCAINE/EPINEPHRINE) 1 %-1:100,000 injection 1 mL (1 mL Infiltration Given 8/4/19 1515)       Diagnostic Studies  Results Reviewed     None                 No orders to display              Procedures  Procedures       ED Course                               MDM    Disposition  Final diagnoses:   Epistaxis     Time reflects when diagnosis was documented in both MDM as applicable and the Disposition within this note     Time User Action Codes Description Comment    8/4/2019  3:48 PM Luke Fraser Add [R04 0] Epistaxis       ED Disposition     ED Disposition Condition Date/Time Comment    Discharge Stable Sun Aug 4, 2019  3:48 PM Luis Alfredo Mishra discharge to home/self care  Follow-up Information     Follow up With Specialties Details Why Contact Info Additional Information    Jaswinder Coe, DO Internal Medicine  If symptoms worsen [address] [address] Emergency Department Emergency Medicine  As needed, If symptoms worsen [address] [address]  [phone] [facility] ED, [address], [place], [place], [address] [facility] Ent Otolaryngology Schedule an appointment as soon as possible for a visit in 1 week Re-evaluation [address] [place] [address] [address]       [facility] Hematology and Oncology Schedule an appointment as soon as possible for a visit in 3 days ally, ent recommendation [address] [phone]  [address] [facility] Hematology [facility], [address] [facility], [place], [place], [address]          Discharge Medication List as of 8/4/2019  3:52 PM      START taking these medications    Details   !! sodium chloride (OCEAN) 0 65 % nasal spray 1 spray into each nostril 2 (two) times a day, Starting Sun 8/4/2019, Print      bacitracin-polymyxin b (POLYSPORIN) ophthalmic ointment Apply to eye 2 (two) times a day, Starting Sun 8/4/2019, Print       !! - Potential duplicate medications found  Please discuss with provider        CONTINUE these medications which have NOT CHANGED    Details   clotrimazole-betamethasone (LOTRISONE) 1-0 05 % cream Apply topically as needed , Starting Mon 11/28/2016, Historical Med      cyclobenzaprine (FLEXERIL) 10 mg tablet Take 1 tablet (10 mg total) by mouth 3 (three) times a day as needed for muscle spasms, Starting Mon 12/24/2018, Normal      EPINEPHrine (EPIPEN 2-DAVID) 0 3 mg/0 3 mL SOAJ Inject as directed, Starting Sun 7/13/2014, Historical Med      fluticasone (FLONASE) 50 mcg/act nasal spray 1 spray into each nostril as needed , Historical Med      glucose blood (FREESTYLE LITE) test strip CHECK BLOOD SUGAR THREE TIMES DAILY (DX: E11 65, FLUCTUATING BLOOD SUGARS), Normal      JARDIANCE 25 MG TABS Take 25 mg by mouth daily , Starting Thu 6/20/2019, Historical Med      ketoconazole (NIZORAL) 2 % cream APPLY TOPICALLY TWICE DAILY TO RASH ON FACE AND SCALP UNTIL RESOLVED, Normal      Lancets (FREESTYLE) lancets TEST 2 TIMES A DAY, Normal      LORazepam (ATIVAN) 1 mg tablet TAKE 1 TABLET BY MOUTH EVERY DAY AT BEDTIME, Normal      metFORMIN (GLUCOPHAGE-XR) 500 mg 24 hr tablet Take 1 tablet by mouth 2 (two) times a day, Historical Med      montelukast (SINGULAIR) 10 mg tablet Take 1 tablet (10 mg total) by mouth as needed (sob), Starting Wed 7/3/2019, Normal      Multiple Vitamin (MULTIVITAMINS PO) Take by mouth daily, Starting Tue 1/21/2014, Historical Med      omeprazole (PriLOSEC) 20 mg delayed release capsule TAKE ONE CAPSULE BY MOUTH EVERY DAY, Normal      repaglinide (PRANDIN) 1 mg tablet TAKE 1 TABLET (1 MG TOTAL) BY MOUTH 3 (THREE) TIMES A DAY BEFORE MEALS , Historical Med      sildenafil (VIAGRA) 50 MG tablet TAKE 1 TABLET BY MOUTH AS NEEDED FOR ERECTILE DYSFUNCTION, Normal      !! sodium chloride (OCEAN) 0 65 % nasal spray 1 spray into each nostril as needed for congestion, Historical Med      triamcinolone (KENALOG) 0 025 % cream Apply topically 2 (two) times a day, Historical Med      verapamil (VERELAN PM) 180 MG 24 hr capsule Take 1 capsule (180 mg total) by mouth 2 (two) times a day, Starting Mon 6/24/2019, Normal       !! - Potential duplicate medications found  Please discuss with provider  No discharge procedures on file      ED Provider  Electronically Signed by           Oscar Raines MD  08/04/19 [address]

## 2019-08-04 NOTE — PROGRESS NOTES
Otolaryngology HN/FPRS Progress Note:    Subjective: Pt with history of severe epistaxis left nasal cavity, treated with cautery on 6/19/19 (Jermain Parham)  No epistaxis since then, In the interval he has developed petechiae type lesions and w/u is consistent with possible Henoch Schonlein purpura  Has been having skin ecchymoses with minor trauma  Objective:   /81 (BP Location: Right arm)   Pulse 92   Temp 98 3 °F (36 8 °C) (Oral)   Resp 18   Wt 78 9 kg (174 lb)   SpO2 97%   BMI 28 73 kg/m²     Physical Exam   Gen: NAD, A/O x 3  Nose : left nasal cavity clear, no clots or trace of blood, no discharge  Right nasal cavity with cloths/blood  Topical anesthesia ( 1% lido with epi 1: 100 000) applied  multiple small telangiectatic vessels noted, with slow bleed from one vessel  All small dilated vessels cauterized  Neuro: CN 2-12 intact except as below  Lungs: Breathing easily  No stertor or stridor  CV: RRR  Good distal perfusion  Neck: Soft and flat  Abd: Soft NTND      Assessment/Plan: epistaxis in the context of purpura (henoch Schonlein)   1  Topical lubrication   Patient needs follow up with hematology/ immunology    2   Follow up in office

## 2019-08-05 ENCOUNTER — TELEPHONE (OUTPATIENT)
Dept: HEMATOLOGY ONCOLOGY | Facility: CLINIC | Age: 69
End: 2019-08-05

## 2019-08-05 NOTE — TELEPHONE ENCOUNTER
New Patient Encounter      Allison Dural  1950  [id number]      Calling Information:  Caller: RENITA    Relationship to Patient: Grant Clark     Is Caller Listed On Consent Form: yes     Diagnosis: Dean Stagers,   EPISTAXIS    When was the diagnosis?: COLLECTED 2019,  FINAL 2019      Referring Provider: Sophia Hurst    Reason for Visit: New Diagnosis    Have you had any testing done?: yes    If yes, where was testing done: [facility]    Date of testin2019    Are records in [address] [place] [address] Rd?: yes    Was patient told to bring disk?: no    Preferred [place]: [place]    Requesting Specific Provider?: Yes    Are there any dates/time the patient cannot be seen?: No      Insurance:  Insurance checked: yes    RTE ran: yes    Insurance Accepted: yes    Miscellaneous:  Any day is fine, and they're willing to come down to [place] or [place]   To see Rony Gillette or Ab

## 2019-08-06 ENCOUNTER — ANESTHESIA EVENT (EMERGENCY)
Dept: PERIOP | Facility: HOSPITAL | Age: 69
End: 2019-08-06
Payer: MEDICARE

## 2019-08-06 ENCOUNTER — HOSPITAL ENCOUNTER (EMERGENCY)
Facility: HOSPITAL | Age: 69
Discharge: HOME | End: 2019-08-06
Admitting: OTOLARYNGOLOGY
Payer: MEDICARE

## 2019-08-06 ENCOUNTER — CONSULT (OUTPATIENT)
Dept: HEMATOLOGY ONCOLOGY | Facility: CLINIC | Age: 69
End: 2019-08-06
Payer: MEDICARE

## 2019-08-06 ENCOUNTER — ANESTHESIA (EMERGENCY)
Dept: PERIOP | Facility: HOSPITAL | Age: 69
End: 2019-08-06
Payer: MEDICARE

## 2019-08-06 VITALS
OXYGEN SATURATION: 94 % | SYSTOLIC BLOOD PRESSURE: 132 MMHG | HEART RATE: 94 BPM | DIASTOLIC BLOOD PRESSURE: 84 MMHG | RESPIRATION RATE: 18 BRPM | TEMPERATURE: 97.1 F | BODY MASS INDEX: 30.52 KG/M2 | HEIGHT: 65 IN | WEIGHT: 183.2 LBS

## 2019-08-06 VITALS
HEART RATE: 95 BPM | SYSTOLIC BLOOD PRESSURE: 130 MMHG | BODY MASS INDEX: 28.98 KG/M2 | WEIGHT: 173.94 LBS | OXYGEN SATURATION: 94 % | HEIGHT: 65 IN | DIASTOLIC BLOOD PRESSURE: 70 MMHG | RESPIRATION RATE: 16 BRPM | TEMPERATURE: 97 F

## 2019-08-06 DIAGNOSIS — I10 BENIGN ESSENTIAL HYPERTENSION: Chronic | ICD-10-CM

## 2019-08-06 DIAGNOSIS — R04.0 BLEEDING FROM THE NOSE: Primary | ICD-10-CM

## 2019-08-06 DIAGNOSIS — D69.0 HENOCH-SCHONLEIN PURPURA (HCC): Primary | ICD-10-CM

## 2019-08-06 DIAGNOSIS — N18.2 TYPE 2 DIABETES MELLITUS WITH STAGE 2 CHRONIC KIDNEY DISEASE, WITHOUT LONG-TERM CURRENT USE OF INSULIN (HCC): Chronic | ICD-10-CM

## 2019-08-06 DIAGNOSIS — R63.4 WEIGHT LOSS: ICD-10-CM

## 2019-08-06 DIAGNOSIS — D69.0 IGA MEDIATED LEUKOCYTOCLASTIC VASCULITIS (HCC): ICD-10-CM

## 2019-08-06 DIAGNOSIS — E11.22 TYPE 2 DIABETES MELLITUS WITH STAGE 2 CHRONIC KIDNEY DISEASE, WITHOUT LONG-TERM CURRENT USE OF INSULIN (HCC): Chronic | ICD-10-CM

## 2019-08-06 DIAGNOSIS — R04.0 EPISTAXIS: ICD-10-CM

## 2019-08-06 DIAGNOSIS — D50.0 CHRONIC BLOOD LOSS ANEMIA: ICD-10-CM

## 2019-08-06 LAB
ABO GROUP BLD: NORMAL
APTT PPP: 27 SECONDS (ref 23–37)
BASOPHILS # BLD AUTO: 0.06 THOUSANDS/ΜL (ref 0–0.1)
BASOPHILS NFR BLD AUTO: 1 % (ref 0–1)
BLD GP AB SCN SERPL QL: NEGATIVE
EOSINOPHIL # BLD AUTO: 0.88 THOUSAND/ΜL (ref 0–0.61)
EOSINOPHIL NFR BLD AUTO: 7 % (ref 0–6)
ERYTHROCYTE [DISTWIDTH] IN BLOOD BY AUTOMATED COUNT: 12.6 % (ref 11.6–15.1)
GLUCOSE SERPL-MCNC: 190 MG/DL (ref 65–140)
HCT VFR BLD AUTO: 44.2 % (ref 36.5–49.3)
HGB BLD-MCNC: 14.7 G/DL (ref 12–17)
IMM GRANULOCYTES # BLD AUTO: 0.04 THOUSAND/UL (ref 0–0.2)
IMM GRANULOCYTES NFR BLD AUTO: 0 % (ref 0–2)
INR PPP: 0.99 (ref 0.84–1.19)
LYMPHOCYTES # BLD AUTO: 2.9 THOUSANDS/ΜL (ref 0.6–4.47)
LYMPHOCYTES NFR BLD AUTO: 24 % (ref 14–44)
MCH RBC QN AUTO: 30.3 PG (ref 26.8–34.3)
MCHC RBC AUTO-ENTMCNC: 33.3 G/DL (ref 31.4–37.4)
MCV RBC AUTO: 91 FL (ref 82–98)
MONOCYTES # BLD AUTO: 1.02 THOUSAND/ΜL (ref 0.17–1.22)
MONOCYTES NFR BLD AUTO: 8 % (ref 4–12)
NEUTROPHILS # BLD AUTO: 7.25 THOUSANDS/ΜL (ref 1.85–7.62)
NEUTS SEG NFR BLD AUTO: 60 % (ref 43–75)
NRBC BLD AUTO-RTO: 0 /100 WBCS
PLATELET # BLD AUTO: 245 THOUSANDS/UL (ref 149–390)
PMV BLD AUTO: 11 FL (ref 8.9–12.7)
PROTHROMBIN TIME: 12.7 SECONDS (ref 11.6–14.5)
RBC # BLD AUTO: 4.85 MILLION/UL (ref 3.88–5.62)
RH BLD: POSITIVE
SPECIMEN EXPIRATION DATE: NORMAL
WBC # BLD AUTO: 12.15 THOUSAND/UL (ref 4.31–10.16)

## 2019-08-06 PROCEDURE — 82948 REAGENT STRIP/BLOOD GLUCOSE: CPT

## 2019-08-06 PROCEDURE — 31238 NSL/SINS NDSC SRG NSL HEMRRG: CPT | Performed by: OTOLARYNGOLOGY

## 2019-08-06 PROCEDURE — 99221 1ST HOSP IP/OBS SF/LOW 40: CPT | Performed by: OTOLARYNGOLOGY

## 2019-08-06 PROCEDURE — 36415 COLL VENOUS BLD VENIPUNCTURE: CPT | Performed by: OTOLARYNGOLOGY

## 2019-08-06 PROCEDURE — 99204 OFFICE O/P NEW MOD 45 MIN: CPT | Performed by: INTERNAL MEDICINE

## 2019-08-06 PROCEDURE — 99284 EMERGENCY DEPT VISIT MOD MDM: CPT

## 2019-08-06 PROCEDURE — 85730 THROMBOPLASTIN TIME PARTIAL: CPT | Performed by: OTOLARYNGOLOGY

## 2019-08-06 PROCEDURE — 86900 BLOOD TYPING SEROLOGIC ABO: CPT | Performed by: OTOLARYNGOLOGY

## 2019-08-06 PROCEDURE — 86850 RBC ANTIBODY SCREEN: CPT | Performed by: OTOLARYNGOLOGY

## 2019-08-06 PROCEDURE — 85610 PROTHROMBIN TIME: CPT | Performed by: OTOLARYNGOLOGY

## 2019-08-06 PROCEDURE — 86901 BLOOD TYPING SEROLOGIC RH(D): CPT | Performed by: OTOLARYNGOLOGY

## 2019-08-06 PROCEDURE — 85025 COMPLETE CBC W/AUTO DIFF WBC: CPT | Performed by: OTOLARYNGOLOGY

## 2019-08-06 RX ORDER — MEPERIDINE HYDROCHLORIDE 25 MG/ML
12.5 INJECTION INTRAMUSCULAR; INTRAVENOUS; SUBCUTANEOUS AS NEEDED
Status: DISCONTINUED | OUTPATIENT
Start: 2019-08-06 | End: 2019-08-06 | Stop reason: HOSPADM

## 2019-08-06 RX ORDER — FENTANYL CITRATE 50 UG/ML
INJECTION, SOLUTION INTRAMUSCULAR; INTRAVENOUS AS NEEDED
Status: DISCONTINUED | OUTPATIENT
Start: 2019-08-06 | End: 2019-08-06 | Stop reason: SURG

## 2019-08-06 RX ORDER — MAGNESIUM HYDROXIDE 1200 MG/15ML
LIQUID ORAL AS NEEDED
Status: DISCONTINUED | OUTPATIENT
Start: 2019-08-06 | End: 2019-08-06 | Stop reason: HOSPADM

## 2019-08-06 RX ORDER — ROCURONIUM BROMIDE 10 MG/ML
INJECTION, SOLUTION INTRAVENOUS AS NEEDED
Status: DISCONTINUED | OUTPATIENT
Start: 2019-08-06 | End: 2019-08-06 | Stop reason: SURG

## 2019-08-06 RX ORDER — SODIUM CHLORIDE, SODIUM LACTATE, POTASSIUM CHLORIDE, CALCIUM CHLORIDE 600; 310; 30; 20 MG/100ML; MG/100ML; MG/100ML; MG/100ML
INJECTION, SOLUTION INTRAVENOUS CONTINUOUS PRN
Status: DISCONTINUED | OUTPATIENT
Start: 2019-08-06 | End: 2019-08-06 | Stop reason: SURG

## 2019-08-06 RX ORDER — PROPOFOL 10 MG/ML
INJECTION, EMULSION INTRAVENOUS AS NEEDED
Status: DISCONTINUED | OUTPATIENT
Start: 2019-08-06 | End: 2019-08-06 | Stop reason: SURG

## 2019-08-06 RX ORDER — OXYMETAZOLINE HYDROCHLORIDE 0.05 G/100ML
SPRAY NASAL AS NEEDED
Status: DISCONTINUED | OUTPATIENT
Start: 2019-08-06 | End: 2019-08-06 | Stop reason: HOSPADM

## 2019-08-06 RX ORDER — NEOSTIGMINE METHYLSULFATE 1 MG/ML
INJECTION INTRAVENOUS AS NEEDED
Status: DISCONTINUED | OUTPATIENT
Start: 2019-08-06 | End: 2019-08-06 | Stop reason: SURG

## 2019-08-06 RX ORDER — SODIUM CHLORIDE, SODIUM LACTATE, POTASSIUM CHLORIDE, CALCIUM CHLORIDE 600; 310; 30; 20 MG/100ML; MG/100ML; MG/100ML; MG/100ML
125 INJECTION, SOLUTION INTRAVENOUS CONTINUOUS
Status: DISCONTINUED | OUTPATIENT
Start: 2019-08-06 | End: 2019-08-06 | Stop reason: HOSPADM

## 2019-08-06 RX ORDER — LIDOCAINE HYDROCHLORIDE 10 MG/ML
INJECTION, SOLUTION INFILTRATION; PERINEURAL AS NEEDED
Status: DISCONTINUED | OUTPATIENT
Start: 2019-08-06 | End: 2019-08-06 | Stop reason: SURG

## 2019-08-06 RX ORDER — ONDANSETRON 2 MG/ML
INJECTION INTRAMUSCULAR; INTRAVENOUS AS NEEDED
Status: DISCONTINUED | OUTPATIENT
Start: 2019-08-06 | End: 2019-08-06 | Stop reason: SURG

## 2019-08-06 RX ORDER — ONDANSETRON 2 MG/ML
4 INJECTION INTRAMUSCULAR; INTRAVENOUS EVERY 6 HOURS PRN
Status: CANCELLED | OUTPATIENT
Start: 2019-08-06

## 2019-08-06 RX ORDER — LIDOCAINE HYDROCHLORIDE AND EPINEPHRINE 10; 10 MG/ML; UG/ML
INJECTION, SOLUTION INFILTRATION; PERINEURAL AS NEEDED
Status: DISCONTINUED | OUTPATIENT
Start: 2019-08-06 | End: 2019-08-06 | Stop reason: HOSPADM

## 2019-08-06 RX ORDER — FENTANYL CITRATE/PF 50 MCG/ML
25 SYRINGE (ML) INJECTION
Status: DISCONTINUED | OUTPATIENT
Start: 2019-08-06 | End: 2019-08-06 | Stop reason: HOSPADM

## 2019-08-06 RX ORDER — ACETAMINOPHEN 325 MG/1
650 TABLET ORAL EVERY 6 HOURS PRN
Status: DISCONTINUED | OUTPATIENT
Start: 2019-08-06 | End: 2019-08-06 | Stop reason: HOSPADM

## 2019-08-06 RX ORDER — CEFAZOLIN SODIUM 1 G/3ML
INJECTION, POWDER, FOR SOLUTION INTRAMUSCULAR; INTRAVENOUS AS NEEDED
Status: DISCONTINUED | OUTPATIENT
Start: 2019-08-06 | End: 2019-08-06 | Stop reason: SURG

## 2019-08-06 RX ORDER — ACETAMINOPHEN 500 MG
500 TABLET ORAL EVERY 6 HOURS PRN
Qty: 30 TABLET | Refills: 0 | Status: SHIPPED | OUTPATIENT
Start: 2019-08-06 | End: 2019-08-11

## 2019-08-06 RX ORDER — ALBUTEROL SULFATE 2.5 MG/3ML
2.5 SOLUTION RESPIRATORY (INHALATION) ONCE AS NEEDED
Status: DISCONTINUED | OUTPATIENT
Start: 2019-08-06 | End: 2019-08-06 | Stop reason: HOSPADM

## 2019-08-06 RX ORDER — PROMETHAZINE HYDROCHLORIDE 25 MG/ML
12.5 INJECTION, SOLUTION INTRAMUSCULAR; INTRAVENOUS ONCE AS NEEDED
Status: DISCONTINUED | OUTPATIENT
Start: 2019-08-06 | End: 2019-08-06 | Stop reason: HOSPADM

## 2019-08-06 RX ORDER — LABETALOL 20 MG/4 ML (5 MG/ML) INTRAVENOUS SYRINGE
5
Status: DISCONTINUED | OUTPATIENT
Start: 2019-08-06 | End: 2019-08-06 | Stop reason: HOSPADM

## 2019-08-06 RX ORDER — SODIUM CHLORIDE, SODIUM LACTATE, POTASSIUM CHLORIDE, CALCIUM CHLORIDE 600; 310; 30; 20 MG/100ML; MG/100ML; MG/100ML; MG/100ML
125 INJECTION, SOLUTION INTRAVENOUS CONTINUOUS
Status: CANCELLED | OUTPATIENT
Start: 2019-08-06

## 2019-08-06 RX ORDER — GLYCOPYRROLATE 0.2 MG/ML
INJECTION INTRAMUSCULAR; INTRAVENOUS AS NEEDED
Status: DISCONTINUED | OUTPATIENT
Start: 2019-08-06 | End: 2019-08-06 | Stop reason: SURG

## 2019-08-06 RX ORDER — SUCCINYLCHOLINE/SOD CL,ISO/PF 100 MG/5ML
SYRINGE (ML) INTRAVENOUS AS NEEDED
Status: DISCONTINUED | OUTPATIENT
Start: 2019-08-06 | End: 2019-08-06 | Stop reason: SURG

## 2019-08-06 RX ORDER — PROMETHAZINE HYDROCHLORIDE 25 MG/ML
12.5 INJECTION, SOLUTION INTRAMUSCULAR; INTRAVENOUS ONCE AS NEEDED
Status: DISCONTINUED | OUTPATIENT
Start: 2019-08-06 | End: 2019-08-06

## 2019-08-06 RX ORDER — HYDROMORPHONE HCL/PF 1 MG/ML
0.5 SYRINGE (ML) INJECTION
Status: DISCONTINUED | OUTPATIENT
Start: 2019-08-06 | End: 2019-08-06 | Stop reason: HOSPADM

## 2019-08-06 RX ORDER — ONDANSETRON 2 MG/ML
4 INJECTION INTRAMUSCULAR; INTRAVENOUS ONCE AS NEEDED
Status: DISCONTINUED | OUTPATIENT
Start: 2019-08-06 | End: 2019-08-06 | Stop reason: HOSPADM

## 2019-08-06 RX ORDER — DEXAMETHASONE SODIUM PHOSPHATE 10 MG/ML
INJECTION, SOLUTION INTRAMUSCULAR; INTRAVENOUS AS NEEDED
Status: DISCONTINUED | OUTPATIENT
Start: 2019-08-06 | End: 2019-08-06 | Stop reason: SURG

## 2019-08-06 RX ADMIN — CEFAZOLIN 1000 MG: 1 INJECTION, POWDER, FOR SOLUTION INTRAVENOUS at 04:55

## 2019-08-06 RX ADMIN — ONDANSETRON 4 MG: 2 INJECTION INTRAMUSCULAR; INTRAVENOUS at 04:58

## 2019-08-06 RX ADMIN — ROCURONIUM BROMIDE 10 MG: 10 INJECTION INTRAVENOUS at 05:14

## 2019-08-06 RX ADMIN — Medication 100 MG: at 04:45

## 2019-08-06 RX ADMIN — FENTANYL CITRATE 50 MCG: 50 INJECTION, SOLUTION INTRAMUSCULAR; INTRAVENOUS at 04:53

## 2019-08-06 RX ADMIN — SODIUM CHLORIDE, SODIUM LACTATE, POTASSIUM CHLORIDE, AND CALCIUM CHLORIDE: .6; .31; .03; .02 INJECTION, SOLUTION INTRAVENOUS at 04:36

## 2019-08-06 RX ADMIN — LIDOCAINE HYDROCHLORIDE 50 MG: 10 INJECTION, SOLUTION INFILTRATION; PERINEURAL at 04:45

## 2019-08-06 RX ADMIN — ROCURONIUM BROMIDE 20 MG: 10 INJECTION INTRAVENOUS at 04:55

## 2019-08-06 RX ADMIN — NEOSTIGMINE METHYLSULFATE 3 MG: 1 INJECTION, SOLUTION INTRAVENOUS at 05:37

## 2019-08-06 RX ADMIN — FENTANYL CITRATE 50 MCG: 50 INJECTION, SOLUTION INTRAMUSCULAR; INTRAVENOUS at 05:03

## 2019-08-06 RX ADMIN — GLYCOPYRROLATE 0.4 MG: 0.2 INJECTION, SOLUTION INTRAMUSCULAR; INTRAVENOUS at 05:37

## 2019-08-06 RX ADMIN — PROPOFOL 180 MG: 10 INJECTION, EMULSION INTRAVENOUS at 04:45

## 2019-08-06 RX ADMIN — DEXAMETHASONE SODIUM PHOSPHATE 10 MG: 10 INJECTION, SOLUTION INTRAMUSCULAR; INTRAVENOUS at 04:58

## 2019-08-06 NOTE — ANESTHESIA PREPROCEDURE EVALUATION
Review of Systems/Medical History  Patient summary reviewed  Chart reviewed  No history of anesthetic complications     Cardiovascular  Negative cardio ROS Hyperlipidemia, Hypertension , Dysrhythmias , atrial fibrillation,   Comment: Recent negative stress test 6/17  ,  Pulmonary  Negative pulmonary ROS Asthma , Sleep apnea ,        GI/Hepatic  Negative GI/hepatic ROS   GERD poorly controlled,        Negative  ROS        Endo/Other  Negative endo/other ROS Diabetes well controlled type 2 Oral agent,      GYN  Negative gynecology ROS          Hematology  Negative hematology ROS      Musculoskeletal  Negative musculoskeletal ROS Back pain ,        Neurology  Negative neurology ROS   Paresthesias,    Psychology   Negative psychology ROS Anxiety,              Physical Exam    Airway    Mallampati score: II  TM Distance: >3 FB  Neck ROM: full     Dental   No notable dental hx     Cardiovascular  Comment: Negative ROS, Rhythm: regular, Rate: normal,     Pulmonary  Pulmonary exam normal     Other Findings        Anesthesia Plan  ASA Score- 3 Emergent    Anesthesia Type- general with ASA Monitors  Additional Monitors:   Airway Plan: ETT  Comment: Patient seen and examined  History reviewed  Patient to be done under general anesthesia with ETT and routine monitors  Risks discussed with the patient  Consent obtained        Plan Factors-    Induction- intravenous and rapid sequence induction  Postoperative Plan-     Informed Consent- Anesthetic plan and risks discussed with patient  I personally reviewed this patient with the CRNA  Discussed and agreed on the Anesthesia Plan with the CRNA  Sarwat Khan

## 2019-08-06 NOTE — OP NOTE
OPERATIVE REPORT  PATIENT NAME: Mireille Lewis    :  1950  MRN: [id number]  Pt Location: [facility] OR ROOM 09    SURGERY DATE: 2019    Surgeon(s) and Role:     Arabella Martinez MD - Primary    Preop Diagnosis:  Epistaxis [R04 0]    Post-Op Diagnosis Codes:     * Epistaxis [R04 0]    Procedure(s) (LRB):  FUNCTIONAL ENDOSCOPIC SINUS SURGERY (FESS) IMAGED GUIDED for control of epistaxis (N/A)    Specimen(s):  * No specimens in log *    Estimated Blood Loss:   Minimal    Drains:  * No LDAs found *    Anesthesia Type:   General    Operative Indications:  Epistaxis [R04 0]  Henoch schoelein purpura  71years old man with severe recurrent epistaxis alternating sides  Different locations have been found to bleed on the each episode  Today bleeding from right nasal cavity, failure to reduce for control the bleeding with topical vasoconstriction applied multiple times  The bleeding is brisk with bright red blood  Endoscopic control of bleeding versus packing reviewed with the patient  Risks benefits and alternatives reviewed in detail with the patient, questions answered as needed  Patient opts for surgery and informed consent is obtained  Operative Findings:  Arterial bleed on posterior lateral wall of the right nasal cavity  Additional small bleed on the anterior septum right side,    Complications:   None    Procedure and Technique:    Patient was met in holding area, positively identified and risks, benefits and alternatives discussed, Patient signed informed consent  The patient was transferred onto the operating table in the supine position  Appropriate monitoring devices were put in place, general anesthesia was administered by anesthesiologist and patient intubated without complications, tubetaped in midline  The patient was prepped and draped in the usual clean fashion    Before proceeding further, a time-out was taken during which the patients identification and planned surgical procedure were confirmed  Examination with a speculum confirmed significant bleed on the right nasal cavity that was draining down the right side of the face and into the posterior neck  Right nasal cavity was suction trying to remove as much blood and clots as possible and subsequently pledgets with Afrin were placed, after waiting for vasoconstriction  to take effect, the pledgets were removed additional examination with a 0 degree endoscope revealed pulsatile blood on the posterior half of the nose  New pledgets with Afrin were then placed under endoscopic visualization on the posterior nose advancing a pledget to the superior meatus and another pledget into the middle meatus  Two additional pledgets were placed along the septum and inferior turbinates  Pledgets of the left nasal cavity were removed and under endoscopic visualization blood was suctioned of the nasal cavity  This was done to the level of the nasopharynx where adenoidectomy scar was noticed  Clots were removed from the nasopharynx  The endoscope was then withdrawn and a new pledgets were placed on the left nasal cavity again  Attention was directed again to the right nasal cavity  All the pledgets were then removed, and examination revealed active bleeding from the posterior right nasal cavity  The bleeding area was then cauterized with a suction Bovie until proper hemostasis was obtained  Examination of the nose at this point revealed small trickle of blood coming along the septum, the endoscope was carefully withdrawn with direct visualization of the suction and a bleeder was noticed on anterior septum near the floor of the nose, this was also cauterized  The nasal cavity was noticed to be relatively clear  Nasal cavity was then irrigated with saline solution and subsequently the solution was suctioned  No additional bleed was noticed from the right nasal cavity  Despite this, blood slowly accumulated on the nasopharynx      Attention was then directed to the left nasal cavity, all pledgets were removed, areas previously cauterized were noticed to be slowly bleeding, most significantly tail of inferior turbinate, the area was also cauterized with suction cautery until bleeding subsided  Nasal cavities were then irrigated with saline and subsequently the saline suction confirming proper hemostasis  Judith hemostatic powder was then insufflated to both nasal cavities  The nasopharynx was then suctioned free of blood and secretions  All counts were correct at the end of the case, and no complications were encountered  Patient was weaned from anesthesia, drapes were removed, patient awakened, extubated and taken to the recovery room in stable condition       I was present for the entire procedure    Patient Disposition:  PACU     SIGNATURE: Al Correa MD  DATE: August 6, 2019  TIME: 6:01 AM

## 2019-08-06 NOTE — DISCHARGE INSTRUCTIONS
Epistaxis   WHAT YOU SHOULD KNOW:   Epistaxis is a nosebleed  A nosebleed occurs when the blood vessels near the surface of the nasal cavity are injured or damaged  AFTER YOU LEAVE:   Medicines:   · Nasal sprays:  Vasoconstrictor nasal spray is a medicine that helps make nasal blood vessels narrower  This limits the blood flow and stops the bleeding  This medicine also decreases the swelling inside your nose and helps you breathe easier  You may also be directed to use saline or other nasal sprays to add moisture to your nose  · Antibiotics: This medicine is given to help treat or prevent an infection caused by bacteria  · Take your medicine as directed  Call your healthcare provider if you think your medicine is not helping or if you have side effects  Tell him if you are allergic to any medicine  Keep a list of the medicines, vitamins, and herbs you take  Include the amounts, and when and why you take them  Bring the list or the pill bottles to follow-up visits  Carry your medicine list with you in case of an emergency  Follow up with your primary healthcare provider or otolaryngologist within 2 to 3 days or as directed: Any packing in your nose should be removed within 2 to 3 days  Write down your questions so you remember to ask them during your visits  First aid:   · Sit up and lean forward: This will help prevent you from swallowing blood  Spit blood and saliva into a bowl  · Apply pressure to your nose:  Use 2 fingers to pinch your nose shut for 10 minutes  This will help stop the bleeding  Breathe through your mouth  · Apply ice:  Use a cold pack or put crushed ice in a bag, cover with a towel, and place on the bridge of your nose  · Nasal packing:  Pack your nose with a cotton ball, tissue, tampon, or gauze bandage to stop the bleeding  Prevent epistaxis:  · Avoid nose picking and blowing your nose too hard: You can irritate or damage your nose if you pick it  Blowing your nose too hard may cause the bleeding to start again  · Avoid irritants:  Substances that can irritate your nose should be avoided  These include tobacco smoke and chemical sprays such as  that contain ammonia  · Use a cool mist humidifier in your home: This will add the moisture to the air and help keep your nose moist      · Put a small amount of petroleum jelly inside your nostrils: You may apply a small amount of petroleum jelly if you do not have a nasal packing  This will help keep your nose from drying out or getting irritated  Do not put anything else inside your nose unless your primary healthcare provider tells you to do so  Contact your primary healthcare provider or otolaryngologist if:   · You have a fever and are vomiting  · You have pain in and around your nose that is getting worse even after you take pain medicines  · Your nasal pack is loose  · You have questions or concerns about your condition or care  Seek care immediately if:   · Your nasal packing is soaked with blood  · Your nose is still bleeding after 20 minutes, even after you pinch it  · You have a foul-smelling discharge coming out of your nose  · You feel so weak and dizzy that you have trouble standing up  · You have trouble breathing or talking  © 2014 [employer] is for End User's use only and may not be sold, redistributed or otherwise used for commercial purposes  All illustrations and images included in CareNotes® are the copyrighted property of [employer]  or Vince Monsalve  The above information is an  only  It is not intended as medical advice for individual conditions or treatments  Talk to your doctor, nurse or pharmacist before following any medical regimen to see if it is safe and effective for you

## 2019-08-06 NOTE — H&P
Angel Arias 71 y o  male MRN: [id number]  Unit/Bed#: OR [place] Encounter: [id number]            History of Present Illness     Reason for Visit:epistaxis  HPI: Angel Arias is a 71y o  year old male who presents with new severe epistaxis, intermittent since 10 pm, now right nasal cavity  He has Henoch Schonlein type purpura  Cauterized L nose 6/19/19, then right 8/4/19, yesterday morning left side again, now right nasal cavity       Review of Systems  Revision of Systems:    Complete review done, only positive for the symptoms described in the H&P section above      Historical Information   Past Medical History:   Diagnosis Date    Actinic keratosis     Adhesive capsulitis of unspecified shoulder     Anxiety     Asthma     Atopic dermatitis     Cardiac disease     Cellulitis of right upper extremity     Cervical radiculopathy     Chest pain     Chronic maxillary sinusitis     last assessed 01/21/14    Diabetes mellitus ([address] )     Erectile dysfunction of non-organic origin     Esophageal reflux     Gout     last assessed 08/26/15    Hearing loss, conductive     Heart murmur     Mitral Valve disorder    Herpes zoster     History of paroxysmal supraventricular tachycardia     Hypertension     Mitral valve disorder     Neoplasm of skin, malignant     Non-melanoma skin cancer     last assessed 10/26/17    Obesity     Palpitations     last assessed 03/05/15    Paroxysmal supraventricular tachycardia ([address] ) 1/21/2014    Plantar fasciitis     last assessed 05/28/15    PSVT (paroxysmal supraventricular tachycardia) (HCC)     Sciatica     Sleep apnea     Squamous cell carcinoma of skin of scalp     Tinnitus, unspecified ear     Type 2 diabetes mellitus with hyperglycemia ([address] )     last assessed 42/99/35    Umbilical hernia     Worms in stool     last assessed 08/26/15     Past Surgical History:   Procedure Laterality Date    APPENDECTOMY      COLECTOMY      Partial, Sigmoid    COLONOSCOPY      HEAD & NECK SKIN LESION EXCISIONAL BIOPSY      10/11/10 SCC --  10/17/11 SCC  --  Location Scalp    HERNIA REPAIR      DE ESOPHAGOGASTRODUODENOSCOPY TRANSORAL DIAGNOSTIC N/A 2017    Procedure: ESOPHAGOGASTRODUODENOSCOPY (EGD); Surgeon: Luke Christine MD;  Location: [facility]; Service: Gastroenterology    SKIN BIOPSY      TONSILLECTOMY       Social History   Social History     Substance and Sexual Activity   Alcohol Use Yes    Frequency: Monthly or less    Drinks per session: 1 or 2    Binge frequency: Never     Social History     Substance and Sexual Activity   Drug Use No     Social History     Tobacco Use   Smoking Status Former Smoker    Packs/day:     Years: 10 00    Pack years: 10 00    Types: Cigarettes    Last attempt to quit:     Years since quittin 6   Smokeless Tobacco Never Used     Family History:   Family History   Problem Relation Age of Onset    Cancer Mother     Diabetes Mother     Heart disease Mother     Heart attack Mother     Breast cancer Mother         Adenocarcinoma    Skin cancer Mother         Adenocarcinoma    Heart failure Mother     Diabetes type II Mother     Varicose Veins Mother         Lower Extremities    Cancer Father     Prostate cancer Father         Adenocarcinoma    Skin cancer Father     Lymphoma Father         Non-Hodgkin's    Arthritis Family        Meds/Allergies   No current facility-administered medications for this encounter  Allergies   Allergen Reactions    Iodinated Diagnostic Agents Anaphylaxis    Shellfish Allergy Anaphylaxis    Shellfish-Derived Products     Shrimp Extract Allergy Skin Test Anaphylaxis    Empagliflozin Hives       Objective       Physical Exam   Blood pressure 152/84, pulse 91, temperature 97 9 °F (36 6 °C), temperature source Oral, resp  rate 20, height 5' 5" (1 651 m), weight 78 9 kg (173 lb 15 1 oz), SpO2 98 %  Constitutional: Oriented to person, place, and time  Well-developed and well-nourished, no apparent distress, non-toxic appearance  Cooperative, able to hear and answer questions without difficulty  Voice: Normal voice quality  Head: Normocephalic, atraumatic  No scars, masses or lesions  Face: Symmetric, no edema, no sinus tenderness  Eyes: Vision grossly intact, extra-ocular movement intact  Right Ear: External ear normal   Auditory canal clear  Tympanic membrane well-appearing, without retraction or scarring  No fluid present  No post-auricular erythema or tenderness  Left Ear: External ear normal   Auditory canal clear  Tympanic membrane well-appearing, without retraction or scarring  No fluid present  No post-auricular erythema or tenderness  Nose: Septum midline, blood and traces of fresh blood right nasal cavity  Oral cavity:  Lips normal, no mucosal lesions  Dentition intact, gingiva normal in appearance  Mucosa moist,  Tongue mobile, floor of mouth normal   Hard palate unremarkable  No masses or lesions  Oropharynx: Uvula is midline, sot palate normal   Unremarkable oropharyngeal inlet  Tonsils unremarkable  Posterior pharyngeal wall clear  No masses or lesions  Salivary glands:  Parotid glands and submandibular glands symmetric, no enlargement or tenderness  Neck: Normal laryngeal elevation with swallow  Trachea midline  No masses or lesions  No palpable adenopathy  Thyroid: normal in size, and consistency, unremarkable without tenderness or palpable nodules  Pulmonary/Chest: Normal effort and rate  No respiratory distress  Musculoskeletal: Normal range of motion  Neurological: Cranial nerves 2-12 intact  Skin: Skin is warm and dry  Psychiatric: Normal mood and affect        Lab Results: CBC:   Lab Results   Component Value Date    WBC 12 15 (H) 08/06/2019    HGB 14 7 08/06/2019    HCT 44 2 08/06/2019    MCV 91 08/06/2019     08/06/2019    MCH 30 3 08/06/2019    MCHC 33 3 08/06/2019    RDW 12 6 08/06/2019    MPV 11 0 08/06/2019    NRBC 0 08/06/2019   , CMP: No results found for: NA, K, CL, CO2, ANIONGAP, BUN, CREATININE, GLUCOSE, CALCIUM, AST, ALT, ALKPHOS, PROT, BILITOT, EGFR     Imaging Studies: I have personally reviewed images on the PACS system and :  Noncontributory  EKG, Pathology, and   Other Studies: I have personally reviewed pertinent reports and other labs were reviewed on prior visit, PT and PTT are normal    Assessment:  Severe recurrent epistaxis on the context of an underlying IgA vasculitis      Plan:  Endoscopic control of bleeding

## 2019-08-06 NOTE — ANESTHESIA POSTPROCEDURE EVALUATION
Post-Op Assessment Note    CV Status:  Stable  Pain Score: 0    Pain management: adequate     Mental Status:  Alert and awake   Hydration Status:  Euvolemic   PONV Controlled:  Controlled   Airway Patency:  Patent   Post Op Vitals Reviewed: Yes      Staff: CRNA           BP   141/74   Temp   97 1   Pulse  90   Resp   15   SpO2   99%

## 2019-08-07 ENCOUNTER — HOSPITAL ENCOUNTER (EMERGENCY)
Facility: HOSPITAL | Age: 69
Discharge: HOME | End: 2019-08-07
Attending: EMERGENCY MEDICINE
Payer: MEDICARE

## 2019-08-07 VITALS
RESPIRATION RATE: 16 BRPM | WEIGHT: 183.2 LBS | BODY MASS INDEX: 30.25 KG/M2 | HEART RATE: 80 BPM | TEMPERATURE: 98 F | SYSTOLIC BLOOD PRESSURE: 130 MMHG | DIASTOLIC BLOOD PRESSURE: 69 MMHG | OXYGEN SATURATION: 97 %

## 2019-08-07 DIAGNOSIS — R04.0 RIGHT-SIDED EPISTAXIS: Primary | ICD-10-CM

## 2019-08-07 PROCEDURE — 99283 EMERGENCY DEPT VISIT LOW MDM: CPT

## 2019-08-07 PROCEDURE — 99283 EMERGENCY DEPT VISIT LOW MDM: CPT | Performed by: EMERGENCY MEDICINE

## 2019-08-07 NOTE — ED PROVIDER NOTES
History  Chief Complaint   Patient presents with    Nose Bleed     Per Pt " my nose been bleeding for over an hour  I had nose surgery yesterday "      70-year-old male presents with epistaxis after nasal surgery earlier today where he had 2 posterior arteries cauterized under general anesthesia at [facility]  Patient states following the surgery, his symptoms had improved however all approximately 2230 hrs he noted vigorous bleeding from the right nares that lasted for approximately 1 hour but has mostly improved since arrival to the emergency room  Patient notes occasional blood in the oropharynx but no active bleeding from the nares  Patient recently diagnosed with likely HSP and has seen Hematology  I discussed the case with Dr Wanda Weiner of ENT who advised to pack the nose and have the patient follow up in the office later today  Patient is hesitant to have his nose packed as the bleeding has mostly subsided but is concerned the bleeding may return prior to him being able to be evaluated by ENT  After extensive discussion of possible alternatives, patient is amenable to observation in the emergency room and packing if his symptoms worsen until he is able to go to ENT this morning  Will monitor and re-evaluate patient  Impression and plan:  Epistaxis, likely secondary to surgery compounded by patient's underlying HSP  Monitor patient emergency room until able to transfer patient to ENT for repeat cautery        Nose Bleed   Location:  R nare  Severity:  Severe  Timing:  Constant  Progression:  Partially resolved  Relieved by:  Applying pressure  Worsened by:  Nothing  Ineffective treatments:  None tried  Associated symptoms: blood in oropharynx    Associated symptoms: no congestion, no cough, no dizziness, no facial pain, no fever, no headaches, no sinus pain, no sneezing, no sore throat and no syncope    Risk factors: recent nasal surgery        Prior to Admission Medications Prescriptions Last Dose Informant Patient Reported? Taking?    EPINEPHrine (EPIPEN 2-DAVID) 0 3 mg/0 3 mL SOAJ  Self Yes No   Sig: Inject as directed   JARDIANCE 25 MG TABS Not Taking at Unknown time Self Yes No   Sig: Take 25 mg by mouth daily    LORazepam (ATIVAN) 1 mg tablet  Self No Yes   Sig: TAKE 1 TABLET BY MOUTH EVERY DAY AT BEDTIME   Patient taking differently: TAKE 1 TABLET BY MOUTH EVERY DAY AT BEDTIME PRN   Lancets (FREESTYLE) lancets  Self No No   Sig: TEST 2 TIMES A DAY   Multiple Vitamin (MULTIVITAMINS PO)  Self Yes No   Sig: Take by mouth daily   Multiple Vitamins-Minerals (PRESERVISION AREDS PO)  Self Yes Yes   Sig: Take by mouth   acetaminophen (TYLENOL) 500 mg tablet  Self No Yes   Sig: Take 1 tablet (500 mg total) by mouth every 6 (six) hours as needed for mild pain for up to 5 days   bacitracin topical ointment 500 units/g topical ointment  Self No Yes   Sig: Apply 1 large application topically 2 (two) times a day   clotrimazole-betamethasone (LOTRISONE) 1-0 05 % cream  Self Yes Yes   Sig: Apply topically as needed    cyclobenzaprine (FLEXERIL) 10 mg tablet  Self No Yes   Sig: Take 1 tablet (10 mg total) by mouth 3 (three) times a day as needed for muscle spasms   fluticasone (FLONASE) 50 mcg/act nasal spray Not Taking at Unknown time Self Yes No   Si spray into each nostril as needed    glucose blood (FREESTYLE LITE) test strip  Self No No   Sig: CHECK BLOOD SUGAR THREE TIMES DAILY (DX: E11 65, FLUCTUATING BLOOD SUGARS)   ketoconazole (NIZORAL) 2 % cream  Self No Yes   Sig: APPLY TOPICALLY TWICE DAILY TO RASH ON FACE AND SCALP UNTIL RESOLVED   Patient taking differently: APPLY TOPICALLY TWICE DAILY TO RASH ON FACE AND SCALP UNTIL RESOLVED PRN   metFORMIN (GLUCOPHAGE-XR) 500 mg 24 hr tablet  Self Yes Yes   Sig: Take 1 tablet by mouth 2 (two) times a day   montelukast (SINGULAIR) 10 mg tablet  Self No Yes   Sig: Take 1 tablet (10 mg total) by mouth as needed (sob)   omeprazole (PriLOSEC) 20 mg delayed release capsule  Self No Yes   Sig: TAKE ONE CAPSULE BY MOUTH EVERY DAY   repaglinide (PRANDIN) 1 mg tablet  Self Yes Yes   Sig: TAKE 1 TABLET (1 MG TOTAL) BY MOUTH 3 (THREE) TIMES A DAY BEFORE MEALS    sildenafil (VIAGRA) 50 MG tablet  Self No Yes   Sig: TAKE 1 TABLET BY MOUTH AS NEEDED FOR ERECTILE DYSFUNCTION   sodium chloride (OCEAN) 0 65 % nasal spray  Self Yes No   Si spray into each nostril as needed for congestion   sodium chloride (OCEAN) 0 65 % nasal spray  Self No Yes   Si spray into each nostril 2 (two) times a day   triamcinolone (KENALOG) 0 025 % cream  Self Yes Yes   Sig: Apply topically 2 (two) times a day   verapamil (VERELAN PM) 180 MG 24 hr capsule  Self No Yes   Sig: Take 1 capsule (180 mg total) by mouth 2 (two) times a day      Facility-Administered Medications: None       Past Medical History:   Diagnosis Date    Actinic keratosis     Adhesive capsulitis of unspecified shoulder     Anxiety     Asthma     Atopic dermatitis     Cardiac disease     Cellulitis of right upper extremity     Cervical radiculopathy     Chest pain     Chronic maxillary sinusitis     last assessed 14    Diabetes mellitus [facility] [facility]     Erectile dysfunction of non-organic origin     Esophageal reflux     Gout     last assessed 08/26/15    Hearing loss, conductive     Heart murmur     Mitral Valve disorder    Herpes zoster     History of paroxysmal supraventricular tachycardia     Hypertension     Mitral valve disorder     Neoplasm of skin, malignant     Non-melanoma skin cancer     last assessed 10/26/17    Obesity     Palpitations     last assessed 03/05/15    Paroxysmal supraventricular tachycardia ([facility] [address] [facility] ) 2014    Plantar fasciitis     last assessed 05/28/15    [facility]ricular [facility]cardia) [facility]     Sciatica     Sleep apnea     Squamous cell carcinoma of skin of scalp     Tinnitus, unspecified ear     Type 2 diabetes mellitus with hyperglycemia ([facility] [address] ) last assessed     Umbilical hernia     Worms in stool     last assessed 08/26/15       Past Surgical History:   Procedure Laterality Date    APPENDECTOMY      COLECTOMY      Partial, Sigmoid    COLONOSCOPY      HEAD & NECK SKIN LESION EXCISIONAL BIOPSY      10/11/10 SCC --  10/17/11 SCC  --  Location Scalp    HERNIA REPAIR      NASAL/SINUS ENDOSCOPY N/A 2019    Procedure: FUNCTIONAL ENDOSCOPIC SINUS SURGERY (FESS) IMAGED GUIDED for control of epistaxis; Surgeon: Mackenzie Graves MD;  [facility]: [facility];  Service: ENT    SD ESOPHAGOGASTRODUODENOSCOPY TRANSORAL DIAGNOSTIC N/A 2017    Procedure: ESOPHAGOGASTRODUODENOSCOPY (EGD); Surgeon: Fidel Sahu MD;  Location: [facility]; Service: Gastroenterology    SKIN BIOPSY      TONSILLECTOMY         Family History   Problem Relation Age of Onset    Cancer Mother     Diabetes Mother     Heart disease Mother     Heart attack Mother     Breast cancer Mother         Adenocarcinoma    Skin cancer Mother         Adenocarcinoma    Heart failure Mother     Diabetes type II Mother     Varicose Veins Mother         Lower Extremities    Cancer Father     Prostate cancer Father         Adenocarcinoma    Skin cancer Father     Lymphoma Father         Non-Hodgkin's    Arthritis Family      I have reviewed and agree with the history as documented  Social History     Tobacco Use    Smoking status: Former Smoker     Packs/day: 1 00     Years: 10 00     Pack years: 10 00     Types: Cigarettes     Last attempt to quit:      Years since quittin 6    Smokeless tobacco: Never Used   Substance Use Topics    Alcohol use: Yes     Frequency: Monthly or less     Drinks per session: 1 or 2     Binge frequency: Never    Drug use: No        Review of Systems   Constitutional: Negative for fever  HENT: Positive for nosebleeds  Negative for congestion, sinus pain, sneezing and sore throat  Respiratory: Negative for cough  Cardiovascular: Negative for syncope  Neurological: Negative for dizziness and headaches  Physical Exam  Physical Exam   Constitutional: He appears well-developed and well-nourished  No distress  HENT:   Head: Normocephalic and atraumatic  Nose: Epistaxis is observed  Right and left nares both without active bleeding but fresh blood noted, mainly pooled in the right nares  Unable to visualize the posterior aspect right nares fully though there is significant edema in the area visualized  No nasal septal hematoma  Eyes: Pupils are equal, round, and reactive to light  EOM are normal    Neck: Neck supple  Cardiovascular: Normal rate  Abdominal: He exhibits no distension  Musculoskeletal: He exhibits no deformity  Neurological: He is alert  Skin: Skin is dry  He is not diaphoretic  Psychiatric: He has a normal mood and affect  Vitals reviewed  Vital Signs  ED Triage Vitals [08/07/19 0115]   Temperature Pulse Respirations Blood Pressure SpO2   98 °F (36 7 °C) 102 20 125/74 95 %      Temp Source Heart Rate Source Patient Position - Orthostatic VS BP Location FiO2 (%)   Oral Monitor Sitting Right arm --      Pain Score       No Pain           Vitals:    08/07/19 0115 08/07/19 0559 08/07/19 0910   BP: 125/74 131/70 130/69   Pulse: 102 88 80   Patient Position - Orthostatic VS: Sitting Sitting Lying         Visual Acuity      ED Medications  Medications - No data to display    Diagnostic Studies  Results Reviewed     None                 No orders to display              Procedures  Procedures       ED Course  ED Course as of Aug 08 0539   Wed Aug 07, 2019   0203 Discussed with Dr Josesito Lopez of ENT who suggested packing and followup in the office today                                    MDM    Disposition  Final diagnoses:   Right-sided epistaxis     Time reflects when diagnosis was documented in both MDM as applicable and the Disposition within this note     Time User Action Codes Description Comment    8/7/2019  2:38 AM Lorenzo Dunbar Add [R04 0] Right-sided epistaxis       ED Disposition     ED Disposition Condition Date/Time Comment    Discharge Stable Wed Aug 7, 2019  2:38 AM Lelia Davis discharge to home/self care              Follow-up Information     Follow up With Specialties Details Why Contact Info Additional Information    [facility] Go to  If symptoms worsen [address]  [facility], [address], [address], [address] [facility] [address] [place], [address] [place] [address]    [address] [place] [address] Emergency Department Emergency Medicine Go to  If symptoms worsen [address] ED, [address] [facility], [facility], [address] [facility], [address]          Discharge Medication List as of 8/7/2019  2:39 AM      CONTINUE these medications which have NOT CHANGED    Details   acetaminophen (TYLENOL) 500 mg tablet Take 1 tablet (500 mg total) by mouth every 6 (six) hours as needed for mild pain for up to 5 days, Starting Tue 8/6/2019, Until Sun 8/11/2019, Normal      bacitracin topical ointment 500 units/g topical ointment Apply 1 large application topically 2 (two) times a day, Starting Sun 8/4/2019, Print      clotrimazole-betamethasone (LOTRISONE) 1-0 05 % cream Apply topically as needed , Starting Mon 11/28/2016, Historical Med      cyclobenzaprine (FLEXERIL) 10 mg tablet Take 1 tablet (10 mg total) by mouth 3 (three) times a day as needed for muscle spasms, Starting Mon 12/24/2018, Normal      EPINEPHrine (EPIPEN 2-DAVID) 0 3 mg/0 3 mL SOAJ Inject as directed, Starting Sun 7/13/2014, Historical Med      fluticasone (FLONASE) 50 mcg/act nasal spray 1 spray into each nostril as needed , Historical Med      glucose blood (FREESTYLE LITE) test strip CHECK BLOOD SUGAR THREE TIMES DAILY (DX: E11 65, FLUCTUATING BLOOD SUGARS), Normal      JARDIANCE 25 MG TABS Take 25 mg by mouth daily , Starting Thu 6/20/2019, Historical Med      ketoconazole (NIZORAL) 2 % cream APPLY TOPICALLY TWICE DAILY TO RASH ON FACE AND SCALP UNTIL RESOLVED, Normal      Lancets (FREESTYLE) lancets TEST 2 TIMES A DAY, Normal      LORazepam (ATIVAN) 1 mg tablet TAKE 1 TABLET BY MOUTH EVERY DAY AT BEDTIME, Normal      metFORMIN (GLUCOPHAGE-XR) 500 mg 24 hr tablet Take 1 tablet by mouth 2 (two) times a day, Historical Med      montelukast (SINGULAIR) 10 mg tablet Take 1 tablet (10 mg total) by mouth as needed (sob), Starting Wed 7/3/2019, Normal      Multiple Vitamin (MULTIVITAMINS PO) Take by mouth daily, Starting Tue 1/21/2014, Historical Med      Multiple Vitamins-Minerals (PRESERVISION AREDS PO) Take by mouth, Historical Med      omeprazole (PriLOSEC) 20 mg delayed release capsule TAKE ONE CAPSULE BY MOUTH EVERY DAY, Normal      repaglinide (PRANDIN) 1 mg tablet TAKE 1 TABLET (1 MG TOTAL) BY MOUTH 3 (THREE) TIMES A DAY BEFORE MEALS , Historical Med      sildenafil (VIAGRA) 50 MG tablet TAKE 1 TABLET BY MOUTH AS NEEDED FOR ERECTILE DYSFUNCTION, Normal      !! sodium chloride (OCEAN) 0 65 % nasal spray 1 spray into each nostril as needed for congestion, Historical Med      !! sodium chloride (OCEAN) 0 65 % nasal spray 1 spray into each nostril 2 (two) times a day, Starting Sun 8/4/2019, Print      triamcinolone (KENALOG) 0 025 % cream Apply topically 2 (two) times a day, Historical Med      verapamil (VERELAN PM) 180 MG 24 hr capsule Take 1 capsule (180 mg total) by mouth 2 (two) times a day, Starting Mon 6/24/2019, Normal       !! - Potential duplicate medications found  Please discuss with provider  No discharge procedures on file      ED Provider  Electronically Signed by           Adrianna Galindo MD  08/08/19 2622

## 2019-08-07 NOTE — PROGRESS NOTES
Consultation - Medical Oncology   Nish Romano 71 y o  male MRN: [id number]  Unit/Bed#:  Encounter: [id number]    Referring physician:  Lary Opitz  Reason for Consult:  IgA mediated vasculitis  HPI: Nish Romano is a 71y o  year old male   He is here with his  wife  Since April he has vascular purpura on his lower legs and lower anterior abdomen  Also since April he has been having epistaxis and he had cauterization of the nose this morning  He was having posterior nose bleed  Also since April he has been on diet and has lost 29 lb  He was seen by his allergy specialist and he had number of rheumatological tests and they came back negative or normal   He had biopsy of palpable vascular purpura that showed IgA mediated vasculitis ( Henoch-Schonlein purpura )  No history of internal bleeding  Negative urinalysis for blood  No hemoptysis  No blood in stool  Kidney functions are preserved  No abdominal pain  He has minor arthritic symptoms  History of GERD and he has been taking omeprazole that he plans to stop  Has tiredness  Minimal nonproductive cough    ROS:  08/06/19 Reviewed 13 systems:  Presently no headaches, seizures, dizziness, diplopia, dysphagia, hoarseness, chest pain, palpitations, shortness of breath,  hemoptysis,  nausea, vomiting, change in bowel habits, melena, hematuria, fever, chills,  bone pains,  weakness, numbness, claudication and gait problem  No frequent infections  Not unusually sensitive to heat or cold  No swelling of the ankles  No swollen glands  Patient is anxious   Other symptoms are in HPI        Historical Information   Past Medical History:   Diagnosis Date    Actinic keratosis     Adhesive capsulitis of unspecified shoulder     Anxiety     Asthma     Atopic dermatitis     Cardiac disease     Cellulitis of right upper extremity     Cervical radiculopathy     Chest pain     Chronic maxillary sinusitis     last assessed 01/21/14    Diabetes mellitus ([facility] )     Erectile dysfunction of non-organic origin     Esophageal reflux     Gout     last assessed 08/26/15    Hearing loss, conductive     Heart murmur     Mitral Valve disorder    Herpes zoster     History of paroxysmal supraventricular tachycardia     Hypertension     Mitral valve disorder     Neoplasm of skin, malignant     Non-melanoma skin cancer     last assessed 10/26/17    Obesity     Palpitations     last assessed 03/05/15    Paroxysmal supraventricular tachycardia ([facility] ) 2014    Plantar fasciitis     last assessed 05/28/15    PSVT (paroxysmal supraventricular tachycardia) ([facility] )     Sciatica     Sleep apnea     Squamous cell carcinoma of skin of scalp     Tinnitus, unspecified ear     Type 2 diabetes mellitus with hyperglycemia ([facility] )     last assessed 45    Umbilical hernia     Worms in stool     last assessed 08/26/15     Past Surgical History:   Procedure Laterality Date    APPENDECTOMY      COLECTOMY      Partial, Sigmoid    COLONOSCOPY      HEAD & NECK SKIN LESION EXCISIONAL BIOPSY      10/11/10 SCC --  10/17/11 SCC  --  Location Scalp    HERNIA REPAIR      VT ESOPHAGOGASTRODUODENOSCOPY TRANSORAL DIAGNOSTIC N/A 2017    Procedure: ESOPHAGOGASTRODUODENOSCOPY (EGD); Surgeon: Oral Lopez MD;  Location: [facility];   Service: Gastroenterology    SKIN BIOPSY      TONSILLECTOMY       Social History   Social History     Substance and Sexual Activity   Alcohol Use Yes    Frequency: Monthly or less    Drinks per session: 1 or 2    Binge frequency: Never     Social History     Substance and Sexual Activity   Drug Use No     Social History     Tobacco Use   Smoking Status Former Smoker    Packs/day: 1 00    Years: 10 00    Pack years: 10 00    Types: Cigarettes    Last attempt to quit:     Years since quittin 6   Smokeless Tobacco Never Used     Family History:   Family History   Problem Relation Age of Onset    Cancer Mother    [place] Diabetes Mother     Heart disease Mother     Heart attack Mother     Breast cancer Mother         Adenocarcinoma    Skin cancer Mother         Adenocarcinoma    Heart failure Mother     Diabetes type II Mother     Varicose Veins Mother         Lower Extremities    Cancer Father     Prostate cancer Father         Adenocarcinoma    Skin cancer Father     Lymphoma Father         Non-Hodgkin's    Arthritis Family          Current Outpatient Medications:     acetaminophen (TYLENOL) 500 mg tablet, Take 1 tablet (500 mg total) by mouth every 6 (six) hours as needed for mild pain for up to 5 days, Disp: 30 tablet, Rfl: 0    bacitracin topical ointment 500 units/g topical ointment, Apply 1 large application topically 2 (two) times a day, Disp: 28 g, Rfl: 0    clotrimazole-betamethasone (LOTRISONE) 1-0 05 % cream, Apply topically as needed , Disp: , Rfl:     cyclobenzaprine (FLEXERIL) 10 mg tablet, Take 1 tablet (10 mg total) by mouth 3 (three) times a day as needed for muscle spasms, Disp: 90 tablet, Rfl: 0    EPINEPHrine (EPIPEN 2-DAVID) 0 3 mg/0 3 mL SOAJ, Inject as directed, Disp: , Rfl:     fluticasone (FLONASE) 50 mcg/act nasal spray, 1 spray into each nostril as needed , Disp: , Rfl:     glucose blood (FREESTYLE LITE) test strip, CHECK BLOOD SUGAR THREE TIMES DAILY (DX: E11 65, FLUCTUATING BLOOD SUGARS), Disp: 200 each, Rfl: 3    ketoconazole (NIZORAL) 2 % cream, APPLY TOPICALLY TWICE DAILY TO RASH ON FACE AND SCALP UNTIL RESOLVED (Patient taking differently: APPLY TOPICALLY TWICE DAILY TO RASH ON FACE AND SCALP UNTIL RESOLVED PRN), Disp: 30 g, Rfl: 3    Lancets (FREESTYLE) lancets, TEST 2 TIMES A DAY, Disp: 200 each, Rfl: 0    LORazepam (ATIVAN) 1 mg tablet, TAKE 1 TABLET BY MOUTH EVERY DAY AT BEDTIME (Patient taking differently: TAKE 1 TABLET BY MOUTH EVERY DAY AT BEDTIME PRN), Disp: 90 tablet, Rfl: 1    metFORMIN (GLUCOPHAGE-XR) 500 mg 24 hr tablet, Take 1 tablet by mouth 2 (two) times a day, Disp: , Rfl:     montelukast (SINGULAIR) 10 mg tablet, Take 1 tablet (10 mg total) by mouth as needed (sob), Disp: 90 tablet, Rfl: 3    Multiple Vitamins-Minerals (PRESERVISION AREDS PO), Take by mouth, Disp: , Rfl:     omeprazole (PriLOSEC) 20 mg delayed release capsule, TAKE ONE CAPSULE BY MOUTH EVERY DAY, Disp: 90 capsule, Rfl: 0    repaglinide (PRANDIN) 1 mg tablet, TAKE 1 TABLET (1 MG TOTAL) BY MOUTH 3 (THREE) TIMES A DAY BEFORE MEALS , Disp: , Rfl: 3    sildenafil (VIAGRA) 50 MG tablet, TAKE 1 TABLET BY MOUTH AS NEEDED FOR ERECTILE DYSFUNCTION, Disp: 60 tablet, Rfl: 0    sodium chloride (OCEAN) 0 65 % nasal spray, 1 spray into each nostril as needed for congestion, Disp: , Rfl:     sodium chloride (OCEAN) 0 65 % nasal spray, 1 spray into each nostril 2 (two) times a day, Disp: 15 mL, Rfl: 0    triamcinolone (KENALOG) 0 025 % cream, Apply topically 2 (two) times a day, Disp: , Rfl:     verapamil (VERELAN PM) 180 MG 24 hr capsule, Take 1 capsule (180 mg total) by mouth 2 (two) times a day, Disp: 180 capsule, Rfl: 3    JARDIANCE 25 MG TABS, Take 25 mg by mouth daily , Disp: , Rfl:     Multiple Vitamin (MULTIVITAMINS PO), Take by mouth daily, Disp: , Rfl:   No current facility-administered medications for this visit       Allergies   Allergen Reactions    Iodinated Diagnostic Agents Anaphylaxis    Shellfish Allergy Anaphylaxis    Shellfish-Derived Products     Shrimp Extract Allergy Skin Test Anaphylaxis    Empagliflozin Hives     @ ROS@  Physical Exam:  Vitals:    08/06/19 1105   BP: 132/84   BP Location: Left arm   Patient Position: Sitting   Cuff Size: Adult   Pulse: 94   Resp: 18   Temp: (!) 97 1 °F (36 2 °C)   SpO2: 94%   Weight: 83 1 kg (183 lb 3 2 oz)   Height: 5' 5 25" (1 657 m)     Alert, oriented, not in distress, no icterus, no oral thrush, no palpable neck mass, clear lung fields, regular heart rate, abdomen  soft and non tender, no palpable abdominal mass, no ascites, no edema of ankles, no calf tenderness, no focal neurological deficit, has palpable vascular purpura involving extensively his lower legs and also at lower abdomen, no palpable lymphadenopathy, good arterial pulses, no clubbing  Patient is anxious  Performance status 1  Lab Results: I have reviewed all pertinent labs  LABS:  Results for orders placed or performed during the hospital encounter of 08/06/19   Protime-INR   Result Value Ref Range    Protime 12 7 11 6 - 14 5 seconds    INR 0 99 0 84 - 1 19   APTT   Result Value Ref Range    PTT 27 23 - 37 seconds   CBC and differential   Result Value Ref Range    WBC 12 15 (H) 4 31 - 10 16 Thousand/uL    RBC 4 85 3 88 - 5 62 Million/uL    Hemoglobin 14 7 12 0 - 17 0 g/dL    Hematocrit 44 2 36 5 - 49 3 %    MCV 91 82 - 98 fL    MCH 30 3 26 8 - 34 3 pg    MCHC 33 3 31 4 - 37 4 g/dL    RDW 12 6 11 6 - 15 1 %    MPV 11 0 8 9 - 12 7 fL    Platelets 409 802 - 057 Thousands/uL    nRBC 0 /100 WBCs    Neutrophils Relative 60 43 - 75 %    Immat GRANS % 0 0 - 2 %    Lymphocytes Relative 24 14 - 44 %    Monocytes Relative 8 4 - 12 %    Eosinophils Relative 7 (H) 0 - 6 %    Basophils Relative 1 0 - 1 %    Neutrophils Absolute 7 25 1 85 - 7 62 Thousands/µL    Immature Grans Absolute 0 04 0 00 - 0 20 Thousand/uL    Lymphocytes Absolute 2 90 0 60 - 4 47 Thousands/µL    Monocytes Absolute 1 02 0 17 - 1 22 Thousand/µL    Eosinophils Absolute 0 88 (H) 0 00 - 0 61 Thousand/µL    Basophils Absolute 0 06 0 00 - 0 10 Thousands/µL   Type and screen   Result Value Ref Range    ABO Grouping O     Rh Factor Positive     Antibody Screen Negative     Specimen Expiration Date [id number]    Fingerstick Glucose (POCT)   Result Value Ref Range    POC Glucose 190 (H) 65 - 140 mg/dl         Imaging Studies: I have personally reviewed pertinent reports  Pathology, and Other Studies: I have personally reviewed pertinent reports  Assessment and Plan:   Brendan Goldstein is a 71y o  year old male     He is here with his  wife  Since April he has vascular purpura on his lower legs and lower anterior abdomen  Also since April he has been having epistaxis and he had cauterization of the nose this morning  He was having posterior nose bleed  Also since April he has been on diet and has lost 29 lb  He was seen by his allergy specialist and he had number of rheumatological tests and they came back negative or normal   He had biopsy of palpable vascular purpura that showed IgA mediated vasculitis ( Henoch-Schonlein purpura )  No history of internal bleeding  Negative urinalysis for blood  No hemoptysis  No blood in stool  Kidney functions are preserved  No abdominal pain  He has minor arthritic symptoms  History of GERD and he has been taking omeprazole that he plans to stop  Has tiredness  Minimal nonproductive cough    Physical examination and test results are as recorded and discussed  Reviewed patient's medical records  Patient states he already had EGD and colonoscopy within last 3 years  Rheumatological test results are negative or normal   See additional tests below  Discussed this particular condition, aggravating factors like acute and chronic infections, medications, malignancy etc   Symptomatic treatments and if needed steroids and not prophylactic steroids  All discussed in detail  Questions answered  Goal will be to prevent bleeding  Patient will not be taking aspirin, NSAIDs and other blood thinners  Discussed the importance of eating healthy foods and health screening tests  Patient appears to be capable of self-care at this time  1  Henoch-Schonlein purpura ([facility] )    - CT chest abdomen pelvis wo contrast; Future  - IgG, IgA, IgM; Future  - C-reactive protein; Future  - Beta 2 microglobulin, serum; Future  - Protein electrophoresis, serum; Future    2  IgA mediated leukocytoclastic vasculitis (HCC)    - CT chest abdomen pelvis wo contrast; Future  - IgG, IgA, IgM;  Future  - C-reactive protein; Future  - Beta 2 microglobulin, serum; Future  - Protein electrophoresis, serum; Future    3  Type 2 diabetes mellitus with stage 2 chronic kidney disease, without long-term current use of insulin ([facility] )      4  Weight loss    - CT chest abdomen pelvis wo contrast; Future  - TSH, 3rd generation; Future  - T4, free; Future    5  Epistaxis    - Ferritin; Future    6  Chronic blood loss anemia    - Ferritin; Future  - Occult Blood, Fecal Immunochemical    Patient voiced understanding and agreement in the discussion  Counseling / Coordination of Care    Greater than 50% of total time was spent with the patient and / or family counseling and / or coordination of care

## 2019-08-07 NOTE — ED NOTES
124 patient informed that the office of the ENT will be called at 9 am and he will go from here over there  We will monitor him until that time   Patient verbally agreed     Maru Roman RN  08/07/19 [id number]

## 2019-08-07 NOTE — ED NOTES
Patient instructed to go directly to the ENT office and not stop anywhere        Jose Varner RN  08/07/19 3719

## 2019-08-08 ENCOUNTER — OFFICE VISIT (OUTPATIENT)
Dept: DERMATOLOGY | Facility: CLINIC | Age: 69
End: 2019-08-08
Payer: MEDICARE

## 2019-08-08 DIAGNOSIS — D69.0 HENOCH-SCHONLEIN PURPURA (HCC): Primary | ICD-10-CM

## 2019-08-08 PROCEDURE — 99214 OFFICE O/P EST MOD 30 MIN: CPT | Performed by: DERMATOLOGY

## 2019-08-08 NOTE — PROGRESS NOTES
[address]  [address] [address]  [phone]  [phone]     MRN: [id number] : 1950  Encounter: [id number]  Patient Information: Branden Triplett  Chief complaint:  Follow-up for Henoch-Schoenlein purpura    History of present illness:  70-year-old male presents for follow-up for his vasculitis patient subsequently for me seen and has continued to have skin lesions however he has had a massive nose bleed and had have surgery on a arterial bleed  Patient has been stop from his nonsteroidals  Also seen by Dr Fransisca Bowser for further evaluation      Past Medical History:   Diagnosis Date    Actinic keratosis     Adhesive capsulitis of unspecified shoulder     Anxiety     Asthma     Atopic dermatitis     Cardiac disease     Cellulitis of right upper extremity     Cervical radiculopathy     Chest pain     Chronic maxillary sinusitis     last assessed 14    Diabetes mellitus ([address] )     Erectile dysfunction of non-organic origin     Esophageal reflux     Gout     last assessed 08/26/15    Hearing loss, conductive     Heart murmur     Mitral Valve disorder    Herpes zoster     History of paroxysmal supraventricular tachycardia     Hypertension     Mitral valve disorder     Neoplasm of skin, malignant     Non-melanoma skin cancer     last assessed 10/26/17    Obesity     Palpitations     last assessed 03/05/15    Paroxysmal supraventricular tachycardia ([address] ) 2014    Plantar fasciitis     last assessed 05/28/15    PSVT (paroxysmal supraventricular tachycardia) (HCC)     Sciatica     Sleep apnea     Squamous cell carcinoma of skin of scalp     Tinnitus, unspecified ear     Type 2 diabetes mellitus with hyperglycemia ([address] )     last assessed 10/23/34    Umbilical hernia     Worms in stool     last assessed 08/26/15     Past Surgical History:   Procedure Laterality Date    APPENDECTOMY      COLECTOMY Partial, Sigmoid    COLONOSCOPY      HEAD & NECK SKIN LESION EXCISIONAL BIOPSY      10/11/10 SCC --  10/17/11 SCC  --  Location Scalp    HERNIA REPAIR      NASAL/SINUS ENDOSCOPY N/A 2019    Procedure: FUNCTIONAL ENDOSCOPIC SINUS SURGERY (FESS) IMAGED GUIDED for control of epistaxis; Surgeon: Cheryl Mckee MD;  [facility];  Service: ENT    VT ESOPHAGOGASTRODUODENOSCOPY TRANSORAL DIAGNOSTIC N/A 2017    Procedure: ESOPHAGOGASTRODUODENOSCOPY (EGD); Surgeon: Cachorro Iniguez MD;  Location: [facility]; Service: Gastroenterology    SKIN BIOPSY      TONSILLECTOMY       Social History   Social History     Substance and Sexual Activity   Alcohol Use Yes    Frequency: Monthly or less    Drinks per session: 1 or 2    Binge frequency: Never     Social History     Substance and Sexual Activity   Drug Use No     Social History     Tobacco Use   Smoking Status Former Smoker    Packs/day: 1 00    Years: 10 00    Pack years: 10 00    Types: Cigarettes    Last attempt to quit:     Years since quittin 6   Smokeless Tobacco Never Used     Family History   Problem Relation Age of Onset    Cancer Mother     Diabetes Mother     Heart disease Mother     Heart attack Mother     Breast cancer Mother         Adenocarcinoma    Skin cancer Mother         Adenocarcinoma    Heart failure Mother     Diabetes type II Mother     Varicose Veins Mother         Lower Extremities    Cancer Father     Prostate cancer Father         Adenocarcinoma    Skin cancer Father     Lymphoma Father         Non-Hodgkin's    Arthritis Family      Meds/Allergies   Allergies   Allergen Reactions    Iodinated Diagnostic Agents Anaphylaxis    Shellfish Allergy Anaphylaxis    Shellfish-Derived Products     Shrimp Extract Allergy Skin Test Anaphylaxis    Empagliflozin Hives       Meds:  Prior to Admission medications    Medication Sig Start Date End Date Taking?  Authorizing Provider   acetaminophen (TYLENOL) 500 mg tablet Take 1 tablet (500 mg total) by mouth every 6 (six) hours as needed for mild pain for up to 5 days 8/6/19 8/11/19 Yes Al Correa MD   bacitracin topical ointment 500 units/g topical ointment Apply 1 large application topically 2 (two) times a day 8/4/19  Yes Tamica Tse MD   clotrimazole-betamethasone (LOTRISONE) 1-0 05 % cream Apply topically as needed  11/28/16  Yes Historical Provider, MD   cyclobenzaprine (FLEXERIL) 10 mg tablet Take 1 tablet (10 mg total) by mouth 3 (three) times a day as needed for muscle spasms 12/24/18  Yes Cleo Agent, MD   EPINEPHrine (EPIPEN 2-DAVID) 0 3 mg/0 3 mL SOAJ Inject as directed 7/13/14  Yes Historical Provider, MD   fluticasone (FLONASE) 50 mcg/act nasal spray 1 spray into each nostril as needed    Yes Historical Provider, MD   glucose blood (FREESTYLE LITE) test strip CHECK BLOOD SUGAR THREE TIMES DAILY (DX: E11 65, FLUCTUATING BLOOD SUGARS) 7/15/19  Yes David De La Garza,    JARDIANCE 25 MG TABS Take 25 mg by mouth daily  6/20/19  Yes Historical Provider, MD   ketoconazole (NIZORAL) 2 % cream APPLY TOPICALLY TWICE DAILY TO RASH ON FACE AND SCALP UNTIL RESOLVED  Patient taking differently: APPLY TOPICALLY TWICE DAILY TO RASH ON FACE AND SCALP UNTIL RESOLVED PRN 4/9/19  Yes Loreta Ronquillo MD   Lancets (FREESTYLE) lancets TEST 2 TIMES A DAY 2/21/19  Yes Antione Townsend PA-C   LORazepam (ATIVAN) 1 mg tablet TAKE 1 TABLET BY MOUTH EVERY DAY AT BEDTIME  Patient taking differently: TAKE 1 TABLET BY MOUTH EVERY DAY AT BEDTIME PRN 6/10/19  Yes David De La Garza DO   metFORMIN (GLUCOPHAGE-XR) 500 mg 24 hr tablet Take 1 tablet by mouth 2 (two) times a day   Yes Historical Provider, MD   montelukast (SINGULAIR) 10 mg tablet Take 1 tablet (10 mg total) by mouth as needed (sob) 7/3/19  Yes Antione Townsend PA-C   Multiple Vitamin (MULTIVITAMINS PO) Take by mouth daily 1/21/14  Yes Historical Provider, MD   Multiple Vitamins-Minerals (PRESERVISION AREDS PO) Take by mouth   Yes Historical Provider, MD   omeprazole (PriLOSEC) 20 mg delayed release capsule TAKE ONE CAPSULE BY MOUTH EVERY DAY 7/2/19  Yes Karl Dominguez PA-C   repaglinide (PRANDIN) 1 mg tablet TAKE 1 TABLET (1 MG TOTAL) BY MOUTH 3 (THREE) TIMES A DAY BEFORE MEALS   6/27/19  Yes Historical Provider, MD   sildenafil (VIAGRA) 50 MG tablet TAKE 1 TABLET BY MOUTH AS NEEDED FOR ERECTILE DYSFUNCTION 4/8/19  Yes Karl Dominguez PA-C   sodium chloride (OCEAN) 0 65 % nasal spray 1 spray into each nostril as needed for congestion   Yes Historical Provider, MD   sodium chloride (OCEAN) 0 65 % nasal spray 1 spray into each nostril 2 (two) times a day 8/4/19  Yes Citlali Steward MD   triamcinolone (KENALOG) 0 025 % cream Apply topically 2 (two) times a day   Yes Historical Provider, MD   verapamil (VERELAN PM) 180 MG 24 hr capsule Take 1 capsule (180 mg total) by mouth 2 (two) times a day 6/24/19  Yes David De La Garza, DO       Subjective:     Review of Systems:    General: negative for - chills, fatigue, fever,  weight gain or weight loss  Psychological: negative for - anxiety, behavioral disorder, concentration difficulties, decreased libido, depression, irritability, memory difficulties, mood swings, sleep disturbances or suicidal ideation  ENT: negative for - hearing difficulties , nasal congestion, nasal discharge, oral lesions, sinus pain, sneezing, sore throat  Allergy and Immunology: negative for - hives, insect bite sensitivity,  Hematological and Lymphatic: negative for - bleeding problems, blood clots,bruising, swollen lymph nodes  Endocrine: negative for - hair pattern changes, hot flashes, malaise/lethargy, mood swings, palpitations, polydipsia/polyuria, skin changes, temperature intolerance or unexpected weight change  Respiratory: negative for - cough, hemoptysis, orthopnea, shortness of breath, or wheezing  Cardiovascular: negative for - chest pain, dyspnea on exertion, edema,  Gastrointestinal: negative for - abdominal pain, nausea/vomiting  Genito-Urinary: negative for - dysuria, incontinence, irregular/heavy menses or urinary frequency/urgency  Musculoskeletal: negative for - gait disturbance, joint pain, joint stiffness, joint swelling, muscle pain, muscular weakness  Dermatological:  As in HPI  Neurological: negative for confusion, dizziness, headaches, impaired coordination/balance, memory loss, numbness/tingling, seizures, speech problems, tremors or weakness       Objective: There were no vitals taken for this visit  Physical Exam:    General Appearance:    Alert, cooperative, no distress   Head:    Normocephalic, without obvious abnormality, atraumatic           Skin:   A full skin exam was performed including scalp, head scalp, eyes, ears, nose, lips, neck, chest, axilla, abdomen, back, buttocks, bilateral upper extremities, bilateral lower extremities, hands, feet, fingers, toes, fingernails, and toenails palpable purpura noted widespread areas legs occasional lesions on the arms nothing else remarkable essentially no change from previous     Assessment:     1  Henoch-Schonlein purpura ([address] )           Plan:   My feeling at this point that he still has his active Henoch-Schoenlein purpura my feeling really is that the nonsteroidal agents acted on his nose bleeds which she had had problems previously and probably is not related to his vasculitis  At present patient to continue follow-up with head and  neck surgery to get this under control  Patient advised probably to rest more keep his legs elevated hopefully this process will continue to slowly improve  Would hold off on prednisone this point as long as there is no sign of any systemic involvement  or worsening of the skin is noted    Since anca  was negative  With point again granulomatosis with polyangitis, but if further lesions noted elsewhere biopsy would be indicated  Tess Lynch MD  8/8/2019,9:55 AM    Portions of the record may have been created with voice recognition software   Occasional wrong word or "sound a like" substitutions may have occurred due to the inherent limitations of voice recognition software   Read the chart carefully and recognize, using context, where substitutions have occurred

## 2019-08-08 NOTE — LETTER
2019     Florian Edmonds MD  [address] [place] [address]    Patient: Hugo Estes   YOB: 1950   Date of Visit: 2019       Dear Dr Mj Ann:    Thank you for referring Matt Amador to me for evaluation  Below are my notes for this consultation  If you have questions, please do not hesitate to call me  I look forward to following your patient along with you  Sincerely,        Meghan Javier MD        CC: MD Vladimir Gonzalez DO Maggie Earing, MD  2019 10:00 AM  Incomplete  [facility] DERMATOLOGY  [address] [place] [id number] [phone]     MRN: [id number] : 1950  Encounter: [id number]  Patient Information: Hugo Estes  Chief complaint:  Follow-up for Henoch-Schoenlein purpura    History of present illness:  29-year-old male presents for follow-up for his vasculitis patient subsequently for me seen and has continued to have skin lesions however he has had a massive nose bleed and had have surgery on a arterial bleed  Patient has been stop from his nonsteroidals  Also seen by Dr Mj Ann for further evaluation      Past Medical History:   Diagnosis Date    Actinic keratosis     Adhesive capsulitis of unspecified shoulder     Anxiety     Asthma     Atopic dermatitis     Cardiac disease     Cellulitis of right upper extremity     Cervical radiculopathy     Chest pain     Chronic maxillary sinusitis     last assessed 14    Diabetes mellitus ([address] )     Erectile dysfunction of non-organic origin     Esophageal reflux     Gout     last assessed 08/26/15    Hearing loss, conductive     Heart murmur     Mitral Valve disorder    Herpes zoster     History of paroxysmal supraventricular tachycardia     Hypertension     Mitral valve disorder     Neoplasm of skin, malignant     Non-melanoma skin cancer     last assessed 10/26/17    Obesity     Palpitations     last assessed 03/05/15    Paroxysmal supraventricular tachycardia ([facility] ) 2014    Plantar fasciitis     last assessed 05/28/15    PSVT ([facility]ox[facility]mal supra[facility]ricular tachycardia) ([facility])     Sciatica     Sleep apnea     Squamous cell carcinoma of skin of scalp     Tinnitus, unspecified ear     Type 2 diabetes mellitus with hyperglycemia ([facility] [facility] )     last assessed     Umbilical hernia     Worms in stool     last assessed 08/26/15     Past Surgical History:   Procedure Laterality Date    APPENDECTOMY      COLECTOMY      Partial, Sigmoid    COLONOSCOPY      HEAD & NECK SKIN LESION EXCISIONAL BIOPSY      10/11/10 SCC --  10/17/11 SCC  --  Location Scalp    HERNIA REPAIR      NASAL/SINUS ENDOSCOPY N/A 2019    Procedure: FUNCTIONAL ENDOSCOPIC SINUS SURGERY (FESS) IMAGED GUIDED for control of epistaxis; Surgeon: Rosanne Winchester MD;  [facility];  Service: ENT    [place] ESOPHAGOGASTRODUODENOSCOPY TRANSORAL DIAGNOSTIC N/A 2017    Procedure: ESOPHAGOGASTRODUODENOSCOPY (EGD); Surgeon: Francisco Allison MD;  Location: [facility];   Service: Gastroenterology    SKIN BIOPSY      TONSILLECTOMY       Social History   Social History     Substance and Sexual Activity   Alcohol Use Yes    Frequency: Monthly or less    Drinks per session: 1 or 2    Binge frequency: Never     Social History     Substance and Sexual Activity   Drug Use No     Social History     Tobacco Use   Smoking Status Former Smoker    Packs/day:     Years: 10 00    Pack years: 10 00    Types: Cigarettes    Last attempt to quit:     Years since quittin 6   Smokeless Tobacco Never Used     Family History   Problem Relation Age of Onset    Cancer Mother     Diabetes Mother     Heart disease Mother     Heart attack Mother     Breast cancer Mother         Adenocarcinoma    Skin cancer Mother         Adenocarcinoma    Heart failure Mother     Diabetes type II Mother     Varicose Veins Mother         Lower Extremities    Cancer Father     Prostate cancer Father         Adenocarcinoma    Skin cancer Father     Lymphoma Father         Non-Hodgkin's    Arthritis Family      Meds/Allergies   Allergies   Allergen Reactions    Iodinated Diagnostic Agents Anaphylaxis    Shellfish Allergy Anaphylaxis    Shellfish-Derived Products     Shrimp Extract Allergy Skin Test Anaphylaxis    Empagliflozin Hives       Meds:  Prior to Admission medications    Medication Sig Start Date End Date Taking?  Authorizing Provider   acetaminophen (TYLENOL) 500 mg tablet Take 1 tablet (500 mg total) by mouth every 6 (six) hours as needed for mild pain for up to 5 days 8/6/19 8/11/19 Yes Rosanne Winchester MD   bacitracin topical ointment 500 units/g topical ointment Apply 1 large application topically 2 (two) times a day 8/4/19  Yes Donna Epperson MD   clotrimazole-betamethasone (LOTRISONE) 1-0 05 % cream Apply topically as needed  11/28/16  Yes Historical Provider, MD   cyclobenzaprine (FLEXERIL) 10 mg tablet Take 1 tablet (10 mg total) by mouth 3 (three) times a day as needed for muscle spasms 12/24/18  Yes Xavier Painting MD   EPINEPHrine (EPIPEN 2-DAVID) 0 3 mg/0 3 mL SOAJ Inject as directed 7/13/14  Yes Historical Provider, MD   fluticasone (FLONASE) 50 mcg/act nasal spray 1 spray into each nostril as needed    Yes Historical Provider, MD   glucose blood (FREESTYLE LITE) test strip CHECK BLOOD SUGAR THREE TIMES DAILY (DX: E11 65, FLUCTUATING BLOOD SUGARS) 7/15/19  Yes David Holli, DO   JARDIANCE 25 MG TABS Take 25 mg by mouth daily  6/20/19  Yes Historical Provider, MD   ketoconazole (NIZORAL) 2 % cream APPLY TOPICALLY TWICE DAILY TO RASH ON FACE AND SCALP UNTIL RESOLVED  Patient taking differently: APPLY TOPICALLY TWICE DAILY TO RASH ON FACE AND SCALP UNTIL RESOLVED PRN 4/9/19  Yes Josiah Tejeda MD   Lancets (FREESTYLE) lancets TEST 2 TIMES A DAY 2/21/19  Yes Manisha Lindsey PA-C   LORazepam (ATIVAN) 1 mg tablet TAKE 1 TABLET BY MOUTH EVERY DAY AT BEDTIME  Patient taking differently: TAKE 1 TABLET BY MOUTH EVERY DAY AT BEDTIME PRN 6/10/19  Yes David De La Garza DO   metFORMIN (GLUCOPHAGE-XR) 500 mg 24 hr tablet Take 1 tablet by mouth 2 (two) times a day   Yes Historical Provider, MD   montelukast (SINGULAIR) 10 mg tablet Take 1 tablet (10 mg total) by mouth as needed (sob) 7/3/19  Yes Antione Townsend PA-C   Multiple Vitamin (MULTIVITAMINS PO) Take by mouth daily 1/21/14  Yes Historical Provider, MD   Multiple Vitamins-Minerals (PRESERVISION AREDS PO) Take by mouth   Yes Historical Provider, MD   omeprazole (PriLOSEC) 20 mg delayed release capsule TAKE ONE CAPSULE BY MOUTH EVERY DAY 7/2/19  Yes Antione Townsend PA-C   repaglinide (PRANDIN) 1 mg tablet TAKE 1 TABLET (1 MG TOTAL) BY MOUTH 3 (THREE) TIMES A DAY BEFORE MEALS   6/27/19  Yes Historical Provider, MD   sildenafil (VIAGRA) 50 MG tablet TAKE 1 TABLET BY MOUTH AS NEEDED FOR ERECTILE DYSFUNCTION 4/8/19  Yes Antione Townsend PA-C   sodium chloride (OCEAN) 0 65 % nasal spray 1 spray into each nostril as needed for congestion   Yes Historical Provider, MD   sodium chloride (OCEAN) 0 65 % nasal spray 1 spray into each nostril 2 (two) times a day 8/4/19  Yes Tamica Tse MD   triamcinolone (KENALOG) 0 025 % cream Apply topically 2 (two) times a day   Yes Historical Provider, MD   verapamil (VERELAN PM) 180 MG 24 hr capsule Take 1 capsule (180 mg total) by mouth 2 (two) times a day 6/24/19  Yes Claudell Sabal Nothstein, DO       Subjective:     Review of Systems:    General: negative for - chills, fatigue, fever,  weight gain or weight loss  Psychological: negative for - anxiety, behavioral disorder, concentration difficulties, decreased libido, depression, irritability, memory difficulties, mood swings, sleep disturbances or suicidal ideation  ENT: negative for - hearing difficulties , nasal congestion, nasal discharge, oral lesions, sinus pain, sneezing, sore throat  Allergy and Immunology: negative for - hives, insect bite sensitivity,  Hematological and Lymphatic: negative for - bleeding problems, blood clots,bruising, swollen lymph nodes  Endocrine: negative for - hair pattern changes, hot flashes, malaise/lethargy, mood swings, palpitations, polydipsia/polyuria, skin changes, temperature intolerance or unexpected weight change  Respiratory: negative for - cough, hemoptysis, orthopnea, shortness of breath, or wheezing  Cardiovascular: negative for - chest pain, dyspnea on exertion, edema,  Gastrointestinal: negative for - abdominal pain, nausea/vomiting  Genito-Urinary: negative for - dysuria, incontinence, irregular/heavy menses or urinary frequency/urgency  Musculoskeletal: negative for - gait disturbance, joint pain, joint stiffness, joint swelling, muscle pain, muscular weakness  Dermatological:  As in HPI  Neurological: negative for confusion, dizziness, headaches, impaired coordination/balance, memory loss, numbness/tingling, seizures, speech problems, tremors or weakness       Objective: There were no vitals taken for this visit  Physical Exam:    General Appearance:    Alert, cooperative, no distress   Head:    Normocephalic, without obvious abnormality, atraumatic           Skin:   A full skin exam was performed including scalp, head scalp, eyes, ears, nose, lips, neck, chest, axilla, abdomen, back, buttocks, bilateral upper extremities, bilateral lower extremities, hands, feet, fingers, toes, fingernails, and toenails palpable purpura noted widespread areas legs occasional lesions on the arms nothing else remarkable essentially no change from previous     Assessment:     1  [facility]-Schonlein purpura ([facility] )           Plan:   My feeling at this point that he still has his active Henoch-Schoenlein purpura my feeling really is that the nonsteroidal agents acted on his nose bleeds which she had had problems previously and probably is not related to his vasculitis    At present patient to continue follow-up with head and  neck surgery to get this under control  Patient advised probably to rest more keep his legs elevated hopefully this process will continue to slowly improve  Would hold off on prednisone this point as long as there is no sign of any systemic involvement  or worsening of the skin is noted  Since anca  was negative  With point again granulomatosis with polyangitis, but if further lesions noted elsewhere biopsy would be indicated  Cristi Law MD  8/8/2019,9:55 AM    Portions of the record may have been created with voice recognition software   Occasional wrong word or "sound a like" substitutions may have occurred due to the inherent limitations of voice recognition software   Read the chart carefully and recognize, using context, where substitutions have occurred

## 2019-08-08 NOTE — PATIENT INSTRUCTIONS
My feeling at this point that he still has his active Henoch-Schoenlein purpura my feeling really is that the nonsteroidal agents acted on his nose bleeds which she had had problems previously and probably is not related to his vasculitis  At present patient to continue follow-up with head and  neck surgery to get this under control  Patient advised probably to rest more keep his legs elevated hopefully this process will continue to slowly improve  Would hold off on prednisone this point as long as there is no sign of any systemic involvement  Or worsening of the skin is noted   Since anca  was negative  With point again granulomatosis with polyangitis, but if further lesions noted elsewhere biopsy would be indicated

## 2019-08-13 LAB — MISCELLANEOUS LAB TEST RESULT: NORMAL

## 2019-08-21 ENCOUNTER — APPOINTMENT (OUTPATIENT)
Dept: LAB | Facility: HOSPITAL | Age: 69
End: 2019-08-21
Attending: INTERNAL MEDICINE
Payer: MEDICARE

## 2019-08-21 DIAGNOSIS — R63.4 WEIGHT LOSS: ICD-10-CM

## 2019-08-21 DIAGNOSIS — D69.0 IGA MEDIATED LEUKOCYTOCLASTIC VASCULITIS (HCC): ICD-10-CM

## 2019-08-21 DIAGNOSIS — D50.0 CHRONIC BLOOD LOSS ANEMIA: ICD-10-CM

## 2019-08-21 DIAGNOSIS — R04.0 EPISTAXIS: ICD-10-CM

## 2019-08-21 DIAGNOSIS — D69.0 HENOCH-SCHONLEIN PURPURA (HCC): ICD-10-CM

## 2019-08-21 LAB
CRP SERPL QL: 13.5 MG/L
FERRITIN SERPL-MCNC: 60 NG/ML (ref 8–388)
IGA SERPL-MCNC: 249 MG/DL (ref 70–400)
IGG SERPL-MCNC: 722 MG/DL (ref 700–1600)
IGM SERPL-MCNC: 42 MG/DL (ref 40–230)
T4 FREE SERPL-MCNC: 0.92 NG/DL (ref 0.76–1.46)
TSH SERPL DL<=0.05 MIU/L-ACNC: 1.21 UIU/ML (ref 0.36–3.74)

## 2019-08-21 PROCEDURE — 84443 ASSAY THYROID STIM HORMONE: CPT

## 2019-08-21 PROCEDURE — 82232 ASSAY OF BETA-2 PROTEIN: CPT

## 2019-08-21 PROCEDURE — 84439 ASSAY OF FREE THYROXINE: CPT

## 2019-08-21 PROCEDURE — 84165 PROTEIN E-PHORESIS SERUM: CPT

## 2019-08-21 PROCEDURE — 82728 ASSAY OF FERRITIN: CPT

## 2019-08-21 PROCEDURE — 84165 PROTEIN E-PHORESIS SERUM: CPT | Performed by: PATHOLOGY

## 2019-08-21 PROCEDURE — 82784 ASSAY IGA/IGD/IGG/IGM EACH: CPT

## 2019-08-21 PROCEDURE — 86140 C-REACTIVE PROTEIN: CPT

## 2019-08-21 PROCEDURE — 36415 COLL VENOUS BLD VENIPUNCTURE: CPT

## 2019-08-22 LAB
ALBUMIN SERPL ELPH-MCNC: 3.86 G/DL (ref 3.5–5)
ALBUMIN SERPL ELPH-MCNC: 56.8 % (ref 52–65)
ALPHA1 GLOB SERPL ELPH-MCNC: 0.33 G/DL (ref 0.1–0.4)
ALPHA1 GLOB SERPL ELPH-MCNC: 4.9 % (ref 2.5–5)
ALPHA2 GLOB SERPL ELPH-MCNC: 1.03 G/DL (ref 0.4–1.2)
ALPHA2 GLOB SERPL ELPH-MCNC: 15.1 % (ref 7–13)
BETA GLOB ABNORMAL SERPL ELPH-MCNC: 0.46 G/DL (ref 0.4–0.8)
BETA1 GLOB SERPL ELPH-MCNC: 6.8 % (ref 5–13)
BETA2 GLOB SERPL ELPH-MCNC: 6.3 % (ref 2–8)
BETA2+GAMMA GLOB SERPL ELPH-MCNC: 0.43 G/DL (ref 0.2–0.5)
GAMMA GLOB ABNORMAL SERPL ELPH-MCNC: 0.69 G/DL (ref 0.5–1.6)
GAMMA GLOB SERPL ELPH-MCNC: 10.1 % (ref 12–22)
IGG/ALB SER: 1.31 {RATIO} (ref 1.1–1.8)
PROT PATTERN SERPL ELPH-IMP: ABNORMAL
PROT SERPL-MCNC: 6.8 G/DL (ref 6.4–8.2)

## 2019-08-24 LAB — B2 MICROGLOB SERPL-MCNC: 1.7 MG/L (ref 0.6–2.4)

## 2019-08-27 ENCOUNTER — HOSPITAL ENCOUNTER (OUTPATIENT)
Dept: CT IMAGING | Facility: HOSPITAL | Age: 69
Discharge: HOME | End: 2019-08-27
Attending: INTERNAL MEDICINE
Payer: MEDICARE

## 2019-08-27 DIAGNOSIS — D69.0 HENOCH-SCHONLEIN PURPURA (HCC): ICD-10-CM

## 2019-08-27 DIAGNOSIS — R63.4 WEIGHT LOSS: ICD-10-CM

## 2019-08-27 DIAGNOSIS — D69.0 IGA MEDIATED LEUKOCYTOCLASTIC VASCULITIS (HCC): ICD-10-CM

## 2019-08-27 PROCEDURE — 74176 CT ABD & PELVIS W/O CONTRAST: CPT

## 2019-08-27 PROCEDURE — 71250 CT THORAX DX C-: CPT

## 2019-09-05 ENCOUNTER — OFFICE VISIT (OUTPATIENT)
Dept: DERMATOLOGY | Facility: CLINIC | Age: 69
End: 2019-09-05
Payer: MEDICARE

## 2019-09-05 DIAGNOSIS — D69.0 HENOCH-SCHONLEIN PURPURA (HCC): Primary | ICD-10-CM

## 2019-09-05 PROCEDURE — 1124F ACP DISCUSS-NO DSCNMKR DOCD: CPT | Performed by: DERMATOLOGY

## 2019-09-05 PROCEDURE — 99213 OFFICE O/P EST LOW 20 MIN: CPT | Performed by: DERMATOLOGY

## 2019-09-05 NOTE — PROGRESS NOTES
[address] [facility] DERMATOLOGY  [facility] [address]  [phone]  [phone]     MRN: [id number] : 1950  Encounter: [id number]  Patient Information: Jameson Rogers  Chief complaint:  Follow-up for vasculitis    History of present illness:  66-year-old male presents secondary to his adult onset Henoch-Schoenlein purpura  Patient has also been followed with oncology had a CT scan that did not really show anything of concern patient notably could with continued problems with nosebleeds off of any treatment for his process    No other concerns noted  Past Medical History:   Diagnosis Date    Actinic keratosis     Adhesive capsulitis of unspecified shoulder     Anxiety     Asthma     Atopic dermatitis     Cardiac disease     Cellulitis of right upper extremity     Cervical radiculopathy     Chest pain     Chronic maxillary sinusitis     last assessed 14    Diabetes mellitus ([address] )     Erectile dysfunction of non-organic origin     Esophageal reflux     Gout     last assessed 08/26/15    Hearing loss, conductive     Heart murmur     Mitral Valve disorder    Herpes zoster     History of paroxysmal supraventricular tachycardia     Hypertension     Mitral valve disorder     Neoplasm of skin, malignant     Non-melanoma skin cancer     last assessed 10/26/17    Obesity     Palpitations     last assessed 03/05/15    Paroxysmal supraventricular tachycardia ([address] ) 2014    Plantar fasciitis     last assessed 05/28/15    [facility] ([facility])     Sciatica     Sleep apnea     Squamous cell carcinoma of skin of scalp     Tinnitus, unspecified ear     Type 2 diabetes mellitus with hyperglycemia ([address] )     last assessed     Umbilical hernia     Worms in stool     last assessed 08/26/15     Past Surgical History:   Procedure Laterality Date    APPENDECTOMY      COLECTOMY      Partial, Sigmoid    COLONOSCOPY      HEAD & NECK SKIN LESION EXCISIONAL BIOPSY      10/11/10 SCC --  10/17/11 SCC  --  Location Scalp    HERNIA REPAIR      NASAL/SINUS ENDOSCOPY N/A 2019    Procedure: FUNCTIONAL ENDOSCOPIC SINUS SURGERY (FESS) IMAGED GUIDED for control of epistaxis; Surgeon: Collins Burnett MD;  [facility]: [facility];  Service: ENT    [place] ESOPHAGOGASTRODUODENOSCOPY TRANSORAL DIAGNOSTIC N/A 2017    Procedure: ESOPHAGOGASTRODUODENOSCOPY (EGD); Surgeon: Zulma Loaiza MD;  [facility]: [facility]; Service: Gastroenterology    SKIN BIOPSY      TONSILLECTOMY       Social History   Social History     Substance and Sexual Activity   Alcohol Use Yes    Frequency: Monthly or less    Drinks per session: 1 or 2    Binge frequency: Never     Social History     Substance and Sexual Activity   Drug Use No     Social History     Tobacco Use   Smoking Status Former Smoker    Packs/day: 1 00    Years: 10 00    Pack years: 10 00    Types: Cigarettes    Last attempt to quit:     Years since quittin 7   Smokeless Tobacco Never Used     Family History   Problem Relation Age of Onset    Cancer Mother     Diabetes Mother     Heart disease Mother     Heart attack Mother     Breast cancer Mother         Adenocarcinoma    Skin cancer Mother         Adenocarcinoma    Heart failure Mother     Diabetes type II Mother     Varicose Veins Mother         Lower Extremities    Cancer Father     Prostate cancer Father         Adenocarcinoma    Skin cancer Father     Lymphoma Father         Non-Hodgkin's    Arthritis Family      Meds/Allergies   Allergies   Allergen Reactions    Iodinated Diagnostic Agents Anaphylaxis    Shellfish Allergy Anaphylaxis    Shellfish-Derived Products     Shrimp Extract Allergy Skin Test Anaphylaxis    Empagliflozin Hives       Meds:  Prior to Admission medications    Medication Sig Start Date End Date Taking?  Authorizing Provider   bacitracin topical ointment 500 units/g topical ointment Apply 1 large application topically 2 (two) times a day 8/4/19  Yes Donna Ramos MD   clotrimazole-betamethasone (LOTRISONE) 1-0 05 % cream Apply topically as needed  11/28/16  Yes Historical Provider, MD   cyclobenzaprine (FLEXERIL) 10 mg tablet Take 1 tablet (10 mg total) by mouth 3 (three) times a day as needed for muscle spasms 12/24/18  Yes Nacho Alvarez MD   EPINEPHrine (EPIPEN 2-DAVID) 0 3 mg/0 3 mL SOAJ Inject as directed 7/13/14  Yes Historical Provider, MD   fluticasone (FLONASE) 50 mcg/act nasal spray 1 spray into each nostril as needed    Yes Historical Provider, MD   glucose blood (FREESTYLE LITE) test strip CHECK BLOOD SUGAR THREE TIMES DAILY (DX: E11 65, FLUCTUATING BLOOD SUGARS) 7/15/19  Yes David De La Garza,    ketoconazole (NIZORAL) 2 % cream APPLY TOPICALLY TWICE DAILY TO RASH ON FACE AND SCALP UNTIL RESOLVED  Patient taking differently: APPLY TOPICALLY TWICE DAILY TO RASH ON FACE AND SCALP UNTIL RESOLVED PRN 4/9/19  Yes Jamie Sewell MD   Lancets (FREESTYLE) lancets TEST 2 TIMES A DAY 2/21/19  Yes Karen Perdomo PA-C   LORazepam (ATIVAN) 1 mg tablet TAKE 1 TABLET BY MOUTH EVERY DAY AT BEDTIME  Patient taking differently: TAKE 1 TABLET BY MOUTH EVERY DAY AT BEDTIME PRN 6/10/19  Yes David De La Garza DO   metFORMIN (GLUCOPHAGE-XR) 500 mg 24 hr tablet Take 1 tablet by mouth 2 (two) times a day   Yes Historical Provider, MD   Multiple Vitamin (MULTIVITAMINS PO) Take by mouth daily 1/21/14  Yes Historical Provider, MD   Multiple Vitamins-Minerals (PRESERVISION AREDS PO) Take by mouth   Yes Historical Provider, MD   omeprazole (PriLOSEC) 20 mg delayed release capsule TAKE ONE CAPSULE BY MOUTH EVERY DAY 7/2/19  Yes Karen Perdomo PA-C   repaglinide (PRANDIN) 1 mg tablet TAKE 1 TABLET (1 MG TOTAL) BY MOUTH 3 (THREE) TIMES A DAY BEFORE MEALS   6/27/19  Yes Historical Provider, MD   sildenafil (VIAGRA) 50 MG tablet TAKE 1 TABLET BY MOUTH AS NEEDED FOR ERECTILE DYSFUNCTION 4/8/19  Yes Jenny Garcia PA-C   sodium chloride (OCEAN) 0 65 % nasal spray 1 spray into each nostril 2 (two) times a day 8/4/19  Yes Tommy Beth MD   triamcinolone (KENALOG) 0 025 % cream Apply topically 2 (two) times a day   Yes Historical Provider, MD   verapamil (VERELAN PM) 180 MG 24 hr capsule Take 1 capsule (180 mg total) by mouth 2 (two) times a day 6/24/19  Yes David Restrepotein, DO   JARDIANCE 25 MG TABS Take 25 mg by mouth daily  6/20/19   Historical Provider, MD   montelukast (SINGULAIR) 10 mg tablet Take 1 tablet (10 mg total) by mouth as needed (sob) 7/3/19   Jenny Garcia PA-C   sodium chloride (OCEAN) 0 65 % nasal spray 1 spray into each nostril as needed for congestion    Historical Provider, MD       Subjective:     Review of Systems:    General: negative for - chills, fatigue, fever,  weight gain or weight loss  Psychological: negative for - anxiety, behavioral disorder, concentration difficulties, decreased libido, depression, irritability, memory difficulties, mood swings, sleep disturbances or suicidal ideation  ENT: negative for - hearing difficulties , nasal congestion, nasal discharge, oral lesions, sinus pain, sneezing, sore throat  Allergy and Immunology: negative for - hives, insect bite sensitivity,  Hematological and Lymphatic: negative for - bleeding problems, blood clots,bruising, swollen lymph nodes  Endocrine: negative for - hair pattern changes, hot flashes, malaise/lethargy, mood swings, palpitations, polydipsia/polyuria, skin changes, temperature intolerance or unexpected weight change  Respiratory: negative for - cough, hemoptysis, orthopnea, shortness of breath, or wheezing  Cardiovascular: negative for - chest pain, dyspnea on exertion, edema,  Gastrointestinal: negative for - abdominal pain, nausea/vomiting  Genito-Urinary: negative for - dysuria, incontinence, irregular/heavy menses or urinary frequency/urgency  Musculoskeletal: negative for - gait disturbance, joint pain, joint stiffness, joint swelling, muscle pain, muscular weakness  Dermatological:  As in HPI  Neurological: negative for confusion, dizziness, headaches, impaired coordination/balance, memory loss, numbness/tingling, seizures, speech problems, tremors or weakness       Objective: There were no vitals taken for this visit  Physical Exam:    General Appearance:    Alert, cooperative, no distress   Head:    Normocephalic, without obvious abnormality, atraumatic           Skin:   A full skin exam was performed including scalp, head scalp, eyes, ears, nose, lips, neck, chest, axilla, abdomen, back, buttocks, bilateral upper extremities, bilateral lower extremities, hands, feet, fingers, toes, fingernails, and toenails occasional papules will purpura noted on legs much less than previous     Assessment:     1  Henoch-Schonlein purpura ([facility] )           Plan:   Process appears to be improving hopefully will resolve shortly no further intervention from my point of view patient to continue follow up for regular checkup    Walter Roberson MD  9/5/2019,11:05 AM    Portions of the record may have been created with voice recognition software   Occasional wrong word or "sound a like" substitutions may have occurred due to the inherent limitations of voice recognition software   Read the chart carefully and recognize, using context, where substitutions have occurred

## 2019-09-05 NOTE — PATIENT INSTRUCTIONS
Process appears to be improving hopefully will resolve shortly no further intervention from my point of view patient to continue follow up for regular checkup

## 2019-09-09 ENCOUNTER — OFFICE VISIT (OUTPATIENT)
Dept: HEMATOLOGY ONCOLOGY | Facility: CLINIC | Age: 69
End: 2019-09-09
Payer: MEDICARE

## 2019-09-09 VITALS
SYSTOLIC BLOOD PRESSURE: 122 MMHG | BODY MASS INDEX: 29.07 KG/M2 | HEART RATE: 73 BPM | DIASTOLIC BLOOD PRESSURE: 72 MMHG | RESPIRATION RATE: 18 BRPM | TEMPERATURE: 97.8 F | WEIGHT: 174.5 LBS | OXYGEN SATURATION: 98 % | HEIGHT: 65 IN

## 2019-09-09 DIAGNOSIS — D69.0 IGA MEDIATED LEUKOCYTOCLASTIC VASCULITIS (HCC): ICD-10-CM

## 2019-09-09 DIAGNOSIS — D72.829 LEUKOCYTOSIS, UNSPECIFIED TYPE: ICD-10-CM

## 2019-09-09 DIAGNOSIS — N18.2 TYPE 2 DIABETES MELLITUS WITH STAGE 2 CHRONIC KIDNEY DISEASE, WITHOUT LONG-TERM CURRENT USE OF INSULIN (HCC): Chronic | ICD-10-CM

## 2019-09-09 DIAGNOSIS — E11.22 TYPE 2 DIABETES MELLITUS WITH STAGE 2 CHRONIC KIDNEY DISEASE, WITHOUT LONG-TERM CURRENT USE OF INSULIN (HCC): Chronic | ICD-10-CM

## 2019-09-09 DIAGNOSIS — D72.10 EOSINOPHILIA: ICD-10-CM

## 2019-09-09 DIAGNOSIS — R04.0 EPISTAXIS: ICD-10-CM

## 2019-09-09 DIAGNOSIS — D69.0 HENOCH-SCHONLEIN PURPURA (HCC): Primary | ICD-10-CM

## 2019-09-09 PROCEDURE — 99214 OFFICE O/P EST MOD 30 MIN: CPT | Performed by: INTERNAL MEDICINE

## 2019-09-09 NOTE — PROGRESS NOTES
HPI:  Patient is here with his wife  This is a follow-up visit for IgA mediated vasculitis ( Henoch-Schonlein purpura ) with vascular purpura on his lower legs and lower anterior abdomen and couple spots on his arms  These spots are lessening  Also he is having much less problem with epistaxis and has required cauterization of the bleeding spot in left nasal cavity only once since his last visit in the office  He is trying to keep his nasal cavities moist   He is feeling better overall  He has stopped losing weight  No history of internal bleeding  Negative urinalysis for blood  No hemoptysis  No blood in stool  Kidney functions are preserved  No abdominal pain  He has minor arthritic symptoms  History of GERD and he was taking omeprazole that he  stopped  Has tiredness  Minimal nonproductive cough         Current Outpatient Medications:     bacitracin topical ointment 500 units/g topical ointment, Apply 1 large application topically 2 (two) times a day, Disp: 28 g, Rfl: 0    clotrimazole-betamethasone (LOTRISONE) 1-0 05 % cream, Apply topically as needed , Disp: , Rfl:     cyclobenzaprine (FLEXERIL) 10 mg tablet, Take 1 tablet (10 mg total) by mouth 3 (three) times a day as needed for muscle spasms, Disp: 90 tablet, Rfl: 0    EPINEPHrine (EPIPEN 2-DAVID) 0 3 mg/0 3 mL SOAJ, Inject as directed, Disp: , Rfl:     fluticasone (FLONASE) 50 mcg/act nasal spray, 1 spray into each nostril as needed , Disp: , Rfl:     glucose blood (FREESTYLE LITE) test strip, CHECK BLOOD SUGAR THREE TIMES DAILY (DX: E11 65, FLUCTUATING BLOOD SUGARS), Disp: 200 each, Rfl: 3    JARDIANCE 25 MG TABS, Take 25 mg by mouth daily , Disp: , Rfl:     ketoconazole (NIZORAL) 2 % cream, APPLY TOPICALLY TWICE DAILY TO RASH ON FACE AND SCALP UNTIL RESOLVED (Patient taking differently: APPLY TOPICALLY TWICE DAILY TO RASH ON FACE AND SCALP UNTIL RESOLVED PRN), Disp: 30 g, Rfl: 3    Lancets (FREESTYLE) lancets, TEST 2 TIMES A DAY, Disp: 200 each, Rfl: 0    LORazepam (ATIVAN) 1 mg tablet, TAKE 1 TABLET BY MOUTH EVERY DAY AT BEDTIME (Patient taking differently: TAKE 1 TABLET BY MOUTH EVERY DAY AT BEDTIME PRN), Disp: 90 tablet, Rfl: 1    metFORMIN (GLUCOPHAGE-XR) 500 mg 24 hr tablet, Take 1 tablet by mouth 2 (two) times a day, Disp: , Rfl:     montelukast (SINGULAIR) 10 mg tablet, Take 1 tablet (10 mg total) by mouth as needed (sob), Disp: 90 tablet, Rfl: 3    Multiple Vitamin (MULTIVITAMINS PO), Take by mouth daily, Disp: , Rfl:     Multiple Vitamins-Minerals (PRESERVISION AREDS PO), Take by mouth, Disp: , Rfl:     omeprazole (PriLOSEC) 20 mg delayed release capsule, TAKE ONE CAPSULE BY MOUTH EVERY DAY, Disp: 90 capsule, Rfl: 0    repaglinide (PRANDIN) 1 mg tablet, TAKE 1 TABLET (1 MG TOTAL) BY MOUTH 3 (THREE) TIMES A DAY BEFORE MEALS , Disp: , Rfl: 3    sildenafil (VIAGRA) 50 MG tablet, TAKE 1 TABLET BY MOUTH AS NEEDED FOR ERECTILE DYSFUNCTION, Disp: 60 tablet, Rfl: 0    sodium chloride (OCEAN) 0 65 % nasal spray, 1 spray into each nostril as needed for congestion, Disp: , Rfl:     sodium chloride (OCEAN) 0 65 % nasal spray, 1 spray into each nostril 2 (two) times a day, Disp: 15 mL, Rfl: 0    triamcinolone (KENALOG) 0 025 % cream, Apply topically 2 (two) times a day, Disp: , Rfl:     verapamil (VERELAN PM) 180 MG 24 hr capsule, Take 1 capsule (180 mg total) by mouth 2 (two) times a day, Disp: 180 capsule, Rfl: 3    Allergies   Allergen Reactions    Iodinated Diagnostic Agents Anaphylaxis    Shellfish Allergy Anaphylaxis    Shellfish-Derived Products     Shrimp Extract Allergy Skin Test Anaphylaxis    Empagliflozin Hives        No history exists  ROS:  09/09/19 Reviewed 13 systems:  Presently no other neurological, cardiac, pulmonary, GI and  symptoms  No other symptoms like fever, chills,  bone pains, skin rash, weight loss,  weakness, numbness,  claudication and gait problem  No frequent infections    Not unusually sensitive to heat or cold  No swelling of the ankles  No swollen glands  Patient is anxious  Other symptoms are in HPI        /72 (BP Location: Right arm, Patient Position: Sitting, Cuff Size: Adult)   Pulse 73   Temp 97 8 °F (36 6 °C)   Resp 18   Ht 5' 5 25" (1 657 m)   Wt 79 2 kg (174 lb 8 oz)   SpO2 98%   BMI 28 82 kg/m²     Physical Exam:    Patient is alert and oriented  He is not in distress  Vital signs are as above  There is no scleral icterus,  no oral thrush,  no palpable neck mass, clear lung fields, regular heart rate, abdomen  soft and non tender, no palpable abdominal mass, no ascites, no edema of ankles, no calf tenderness, no focal neurological deficit, has palpable vascular purpura involving less extensively his lower legs and also at lower abdomen, no palpable lymphadenopathy, good arterial pulses, no clubbing  Patient is anxious  Performance status 1  IMAGING:  IMPRESSION:     Stable benign right middle and left lower lobe nodules  Additional 3 mm right lower lobe nodule, not included on the prior study  Based on current Fleischner Society 2017 Guidelines on incidental pulmonary nodule, no routine follow-up is needed if the   patient is considered low risk for lung cancer    If the patient is considered high risk for lung cancer, 12 month follow-up non-contrast chest CT is recommended      Renal cysts            Workstation performed: [id number]      Imaging     CT chest abdomen pelvis wo contrast (Order: [id number]) - 8/27/2019       LABS:  Results for orders placed or performed in visit on 08/21/19   IgG, IgA, IgM   Result Value Ref Range     0 70 0 - 400 0 mg/dL     0 700 0-1,600 0 mg/dL    IGM 42 0 40 0 - 230 0 mg/dL   C-reactive protein   Result Value Ref Range    CRP 13 5 (H) <3 0 mg/L   Beta 2 microglobulin, serum   Result Value Ref Range    Beta-2 Microglobulin 1 7 0 6 - 2 4 mg/L   TSH, 3rd generation   Result Value Ref Range    TSH 3RD GENERATON 1 206 0 358 - 3 740 uIU/mL   T4, free   Result Value Ref Range    Free T4 0 92 0 76 - 1 46 ng/dL   Ferritin   Result Value Ref Range    Ferritin 60 8 - 388 ng/mL   Protein electrophoresis, serum   Result Value Ref Range    A/G Ratio 1 31 1 10 - 1 80    Albumin Electrophoresis 56 8 52 0 - 65 0 %    Albumin CONC 3 86 3 50 - 5 00 g/dl    Alpha 1 4 9 2 5 - 5 0 %    ALPHA 1 CONC 0 33 0 10 - 0 40 g/dL    Alpha 2 15 1 (H) 7 0 - 13 0 %    ALPHA 2 CONC 1 03 0 40 - 1 20 g/dL    Beta-1 6 8 5 0 - 13 0 %    BETA 1 CONC 0 46 0 40 - 0 80 g/dL    Beta-2 6 3 2 0 - 8 0 %    BETA 2 CONC 0 43 0 20 - 0 50 g/dL    Gamma Globulin 10 1 (L) 12 0 - 22 0 %    GAMMA CONC 0 69 0 50 - 1 60 g/dL    Total Protein 6 8 6 4 - 8 2 g/dL    SPEP Interpretation       No monoclonal bands noted  Reviewed by: Keon Love MD, PhD ([id number]) **Electronic Signature**     Labs, Imaging, & Other studies:   All pertinent labs and imaging studies were personally reviewed    Lab Results   Component Value Date     08/19/2015    K 3 7 06/24/2019     06/24/2019    CO2 24 06/24/2019    ANIONGAP 14 9 08/19/2015    BUN 13 06/24/2019    CREATININE 1 10 06/24/2019    GLUCOSE 139 (H) 08/19/2015    GLUF 135 (H) 06/24/2019    CALCIUM 9 5 06/24/2019    AST 19 06/24/2019    ALT 28 06/24/2019    ALKPHOS 60 06/24/2019    PROT 7 2 08/19/2015    BILITOT 0 8 08/19/2015    EGFR 68 06/24/2019     Lab Results   Component Value Date    WBC 12 15 (H) 08/06/2019    HGB 14 7 08/06/2019    HCT 44 2 08/06/2019    MCV 91 08/06/2019     08/06/2019    Normal differential count except for slight increase in eosinophil 880  Normal PT, PTT and ferritin    Reviewed and discussed with patient  Assessment and plan:   Patient is here with his wife  This is a follow-up visit for IgA mediated vasculitis ( Henoch-Schonlein purpura ) with vascular purpura on his lower legs and lower anterior abdomen and couple spots on his arms  These spots are lessening    Also he is having much less problem with epistaxis and has required cauterization of the bleeding spot in left nasal cavity only once since his last visit in the office  He is trying to keep his nasal cavities moist   He is feeling better overall  He has stopped losing weight  No history of internal bleeding  Negative urinalysis for blood  No hemoptysis  No blood in stool  Kidney functions are preserved  No abdominal pain  He has minor arthritic symptoms  History of GERD and he was taking omeprazole that he  stopped  Has tiredness  Minimal nonproductive cough    Physical examination and test results are as recorded and discussed in detail  Discussed this particular condition, aggravating factors like acute and chronic infections, medications, malignancy etc   Symptomatic treatments and if needed steroids and not prophylactic steroids  Steroid therapy is not needed at this time  All discussed in detail  Questions answered  Goal will be to prevent bleeding  Patient will not be taking aspirin, NSAIDs and other blood thinners  Discussed the importance of eating healthy foods and health screening tests  Patient appears to be capable of self-care at this time     To monitor blood counts  1  Henoch-Schonlein purpura (HCC)    - CBC and differential; Future    2  IgA mediated leukocytoclastic vasculitis ([facility] [address] )      3  Type 2 diabetes mellitus with stage 2 chronic kidney disease, without long-term current use of insulin ([facility])      4  Epistaxis      5  Eosinophilia      6  Leukocytosis, unspecified type          Patient voiced understanding and agreement in the discussion  Counseling / Coordination of Care   Greater than 50% of total time was spent with the patient and / or family counseling and / or coordination of care

## 2019-09-18 LAB
LEFT EYE DIABETIC RETINOPATHY: NORMAL
RIGHT EYE DIABETIC RETINOPATHY: NORMAL

## 2019-09-29 DIAGNOSIS — K21.9 GASTROESOPHAGEAL REFLUX DISEASE, ESOPHAGITIS PRESENCE NOT SPECIFIED: ICD-10-CM

## 2019-09-30 ENCOUNTER — APPOINTMENT (EMERGENCY)
Dept: RADIOLOGY | Facility: HOSPITAL | Age: 69
End: 2019-09-30
Payer: MEDICARE

## 2019-09-30 ENCOUNTER — HOSPITAL ENCOUNTER (EMERGENCY)
Facility: HOSPITAL | Age: 69
Discharge: HOME | End: 2019-09-30
Attending: EMERGENCY MEDICINE | Admitting: EMERGENCY MEDICINE
Payer: MEDICARE

## 2019-09-30 VITALS
SYSTOLIC BLOOD PRESSURE: 141 MMHG | DIASTOLIC BLOOD PRESSURE: 69 MMHG | TEMPERATURE: 99.1 F | OXYGEN SATURATION: 96 % | RESPIRATION RATE: 18 BRPM | HEART RATE: 83 BPM

## 2019-09-30 DIAGNOSIS — K21.9 GASTROESOPHAGEAL REFLUX DISEASE, ESOPHAGITIS PRESENCE NOT SPECIFIED: ICD-10-CM

## 2019-09-30 DIAGNOSIS — M25.472 LEFT ANKLE SWELLING: Primary | ICD-10-CM

## 2019-09-30 PROCEDURE — 99284 EMERGENCY DEPT VISIT MOD MDM: CPT | Performed by: PHYSICIAN ASSISTANT

## 2019-09-30 PROCEDURE — 99284 EMERGENCY DEPT VISIT MOD MDM: CPT

## 2019-09-30 PROCEDURE — 73610 X-RAY EXAM OF ANKLE: CPT

## 2019-09-30 RX ORDER — OMEPRAZOLE 20 MG/1
20 CAPSULE, DELAYED RELEASE ORAL DAILY
Qty: 90 CAPSULE | Refills: 0 | Status: SHIPPED | OUTPATIENT
Start: 2019-09-30 | End: 2019-10-15 | Stop reason: CLARIF

## 2019-09-30 RX ORDER — OMEPRAZOLE 20 MG/1
CAPSULE, DELAYED RELEASE ORAL
Qty: 90 CAPSULE | Refills: 0 | Status: SHIPPED | OUTPATIENT
Start: 2019-09-30 | End: 2019-09-30 | Stop reason: SDUPTHER

## 2019-10-01 ENCOUNTER — HOSPITAL ENCOUNTER (OUTPATIENT)
Dept: NON INVASIVE DIAGNOSTICS | Facility: CLINIC | Age: 69
Discharge: HOME | End: 2019-10-01
Payer: MEDICARE

## 2019-10-01 ENCOUNTER — TRANSCRIBE ORDERS (OUTPATIENT)
Dept: LAB | Facility: CLINIC | Age: 69
End: 2019-10-01

## 2019-10-01 ENCOUNTER — APPOINTMENT (OUTPATIENT)
Dept: LAB | Facility: CLINIC | Age: 69
End: 2019-10-01
Payer: MEDICARE

## 2019-10-01 DIAGNOSIS — E11.649 UNCONTROLLED TYPE 2 DIABETES MELLITUS WITH HYPOGLYCEMIA WITHOUT COMA (HCC): ICD-10-CM

## 2019-10-01 DIAGNOSIS — M25.472 LEFT ANKLE SWELLING: ICD-10-CM

## 2019-10-01 DIAGNOSIS — E11.649 UNCONTROLLED TYPE 2 DIABETES MELLITUS WITH HYPOGLYCEMIA WITHOUT COMA (HCC): Primary | ICD-10-CM

## 2019-10-01 LAB
ALBUMIN SERPL BCP-MCNC: 3.4 G/DL (ref 3.5–5)
ALP SERPL-CCNC: 59 U/L (ref 46–116)
ALT SERPL W P-5'-P-CCNC: 19 U/L (ref 12–78)
ANION GAP SERPL CALCULATED.3IONS-SCNC: 7 MMOL/L (ref 4–13)
AST SERPL W P-5'-P-CCNC: 11 U/L (ref 5–45)
BILIRUB SERPL-MCNC: 0.78 MG/DL (ref 0.2–1)
BUN SERPL-MCNC: 17 MG/DL (ref 5–25)
CALCIUM SERPL-MCNC: 9.5 MG/DL (ref 8.3–10.1)
CHLORIDE SERPL-SCNC: 106 MMOL/L (ref 100–108)
CHOLEST SERPL-MCNC: 143 MG/DL (ref 50–200)
CO2 SERPL-SCNC: 27 MMOL/L (ref 21–32)
CREAT SERPL-MCNC: 0.95 MG/DL (ref 0.6–1.3)
EST. AVERAGE GLUCOSE BLD GHB EST-MCNC: 174 MG/DL
GFR SERPL CREATININE-BSD FRML MDRD: 81 ML/MIN/1.73SQ M
GLUCOSE P FAST SERPL-MCNC: 189 MG/DL (ref 65–99)
HBA1C MFR BLD: 7.7 % (ref 4.2–6.3)
HDLC SERPL-MCNC: 39 MG/DL (ref 40–60)
LDLC SERPL CALC-MCNC: 79 MG/DL (ref 0–100)
NONHDLC SERPL-MCNC: 104 MG/DL
POTASSIUM SERPL-SCNC: 3.8 MMOL/L (ref 3.5–5.3)
PROT SERPL-MCNC: 7.5 G/DL (ref 6.4–8.2)
SODIUM SERPL-SCNC: 140 MMOL/L (ref 136–145)
TRIGL SERPL-MCNC: 125 MG/DL

## 2019-10-01 PROCEDURE — 83036 HEMOGLOBIN GLYCOSYLATED A1C: CPT

## 2019-10-01 PROCEDURE — 93971 EXTREMITY STUDY: CPT | Performed by: SURGERY

## 2019-10-01 PROCEDURE — 80061 LIPID PANEL: CPT

## 2019-10-01 PROCEDURE — 36415 COLL VENOUS BLD VENIPUNCTURE: CPT

## 2019-10-01 PROCEDURE — 93971 EXTREMITY STUDY: CPT

## 2019-10-01 PROCEDURE — 80053 COMPREHEN METABOLIC PANEL: CPT

## 2019-10-01 NOTE — ED PROVIDER NOTES
History  Chief Complaint   Patient presents with    Ankle Swelling     left ankle swelling, pain that radiates up his leg  denies injury  history of gout     Patient is a 19-year-old male presents to the emergency department with complaints of left ankle pain and swelling  He is also complaining of left calf pain that radiates up behind his knee  He states that he was having pain for last 2 days  He states that he did have fall at home patient feel as though he injured himself during the fall  Patient also states he has been doing a lot of physical labor lately and may have just over did it  Prior to Admission Medications   Prescriptions Last Dose Informant Patient Reported? Taking?    EPINEPHrine (EPIPEN 2-DAVID) 0 3 mg/0 3 mL SOAJ  Self Yes No   Sig: Inject as directed   JARDIANCE 25 MG TABS  Self Yes No   Sig: Take 25 mg by mouth daily    LORazepam (ATIVAN) 1 mg tablet  Self No No   Sig: TAKE 1 TABLET BY MOUTH EVERY DAY AT BEDTIME   Patient taking differently: TAKE 1 TABLET BY MOUTH EVERY DAY AT BEDTIME PRN   Lancets (FREESTYLE) lancets  Self No No   Sig: TEST 2 TIMES A DAY   Multiple Vitamin (MULTIVITAMINS PO)  Self Yes No   Sig: Take by mouth daily   Multiple Vitamins-Minerals (PRESERVISION AREDS PO)  Self Yes No   Sig: Take by mouth   bacitracin topical ointment 500 units/g topical ointment  Self No No   Sig: Apply 1 large application topically 2 (two) times a day   clotrimazole-betamethasone (LOTRISONE) 1-0 05 % cream  Self Yes No   Sig: Apply topically as needed    cyclobenzaprine (FLEXERIL) 10 mg tablet  Self No No   Sig: Take 1 tablet (10 mg total) by mouth 3 (three) times a day as needed for muscle spasms   fluticasone (FLONASE) 50 mcg/act nasal spray  Self Yes No   Si spray into each nostril as needed    glucose blood (FREESTYLE LITE) test strip  Self No No   Sig: CHECK BLOOD SUGAR THREE TIMES DAILY (DX: E11 65, FLUCTUATING BLOOD SUGARS)   ketoconazole (NIZORAL) 2 % cream  Self No No Sig:  APPLY TOPICALLY TWICE DAILY TO RASH ON FACE AND SCALP UNTIL RESOLVED   Patient taking differently: APPLY TOPICALLY TWICE DAILY TO RASH ON FACE AND SCALP UNTIL RESOLVED PRN   metFORMIN (GLUCOPHAGE-XR) 500 mg 24 hr tablet  Self Yes No   Sig: Take 1 tablet by mouth 2 (two) times a day   montelukast (SINGULAIR) 10 mg tablet  Self No No   Sig: Take 1 tablet (10 mg total) by mouth as needed (sob)   omeprazole (PriLOSEC) 20 mg delayed release capsule   No No   Sig: Take 1 capsule (20 mg total) by mouth daily   repaglinide (PRANDIN) 1 mg tablet  Self Yes No   Sig: TAKE 1 TABLET (1 MG TOTAL) BY MOUTH 3 (THREE) TIMES A DAY BEFORE MEALS    sildenafil (VIAGRA) 50 MG tablet  Self No No   Sig: TAKE 1 TABLET BY MOUTH AS NEEDED FOR ERECTILE DYSFUNCTION   sodium chloride (OCEAN) 0 65 % nasal spray  Self Yes No   Si spray into each nostril as needed for congestion   sodium chloride (OCEAN) 0 65 % nasal spray  Self No No   Si spray into each nostril 2 (two) times a day   triamcinolone (KENALOG) 0 025 % cream  Self Yes No   Sig: Apply topically 2 (two) times a day   verapamil (VERELAN PM) 180 MG 24 hr capsule  Self No No   Sig: Take 1 capsule (180 mg total) by mouth 2 (two) times a day      Facility-Administered Medications: None       Past Medical History:   Diagnosis Date    Actinic keratosis     Adhesive capsulitis of unspecified shoulder     Anxiety     Asthma     Atopic dermatitis     Cardiac disease     Cellulitis of right upper extremity     Cervical radiculopathy     Chest pain     Chronic maxillary sinusitis     last assessed 14    Diabetes mellitus ([facility] [address] )     Erectile dysfunction of non-organic origin     Esophageal reflux     Gout     last assessed 08/26/15    Hearing loss, conductive     Heart murmur     Mitral Valve disorder    Herpes zoster     History of paroxysmal supraventricular tachycardia     Hypertension     Mitral valve disorder     Neoplasm of skin, malignant     Non-melanoma skin cancer     last assessed 10/26/17    Obesity     Palpitations     last assessed 03/05/15    Paroxysmal supraventricular tachycardia ([facility] [address] ) 1/21/2014    Plantar fasciitis     last assessed 05/28/15    PSVT (paroxysmal supraventricular tachycardia) ([facility])     Sciatica     Sleep apnea     Squamous cell carcinoma of skin of scalp     Tinnitus, unspecified ear     Type 2 diabetes mellitus with hyperglycemia ([facility] [address] )     last assessed 74/10/34    Umbilical hernia     Worms in stool     last assessed 08/26/15       Past Surgical History:   Procedure Laterality Date    APPENDECTOMY      COLECTOMY      Partial, Sigmoid    COLONOSCOPY      HEAD & NECK SKIN LESION EXCISIONAL BIOPSY      10/11/10 SCC --  10/17/11 SCC  --  Location Scalp    HERNIA REPAIR      NASAL/SINUS ENDOSCOPY N/A 8/6/2019    Procedure: FUNCTIONAL ENDOSCOPIC SINUS SURGERY (FESS) IMAGED GUIDED for control of epistaxis; Surgeon: Alcides Jackman MD;  Location: [facility];  Service: ENT    MI ESOPHAGOGASTRODUODENOSCOPY TRANSORAL DIAGNOSTIC N/A 8/9/2017    Procedure: ESOPHAGOGASTRODUODENOSCOPY (EGD); Surgeon: Ana Sanz MD;  Location: [facility]; Service: Gastroenterology    SKIN BIOPSY      TONSILLECTOMY         Family History   Problem Relation Age of Onset    Cancer Mother     Diabetes Mother     Heart disease Mother     Heart attack Mother     Breast cancer Mother         Adenocarcinoma    Skin cancer Mother         Adenocarcinoma    Heart failure Mother     Diabetes type II Mother     Varicose Veins Mother         Lower Extremities    Cancer Father     Prostate cancer Father         Adenocarcinoma    Skin cancer Father     Lymphoma Father         Non-Hodgkin's    Arthritis Family      I have reviewed and agree with the history as documented      Social History     Tobacco Use    Smoking status: Former Smoker     Packs/day: 1 00     Years: 10 00     Pack years: 10 00     Types: Cigarettes     Last attempt to quit: 1977     Years since quittin 7    Smokeless tobacco: Never Used   Substance Use Topics    Alcohol use: Yes     Frequency: Monthly or less     Drinks per session: 1 or 2     Binge frequency: Never    Drug use: No        Review of Systems   Constitutional: Negative for fever  Respiratory: Negative for shortness of breath  Cardiovascular: Negative for chest pain  Musculoskeletal: Positive for arthralgias  All other systems reviewed and are negative  Physical Exam  Physical Exam   Constitutional: He is oriented to person, place, and time  He appears well-developed and well-nourished  HENT:   Head: Normocephalic and atraumatic  Eyes: Pupils are equal, round, and reactive to light  Conjunctivae and EOM are normal    Neck: Normal range of motion  Cardiovascular: Normal rate, regular rhythm and normal heart sounds  Pulmonary/Chest: Effort normal and breath sounds normal    Musculoskeletal:        Legs:       Feet:    Neurological: He is alert and oriented to person, place, and time  Skin: Skin is warm  Psychiatric: He has a normal mood and affect  His behavior is normal  Judgment and thought content normal    Vitals reviewed        Vital Signs  ED Triage Vitals   Temperature Pulse Respirations Blood Pressure SpO2   19   99 1 °F (37 3 °C) 83 18 141/69 96 %      Temp Source Heart Rate Source Patient Position - Orthostatic VS BP Location FiO2 (%)   19 --   Oral Monitor Sitting Left arm       Pain Score       --                  Vitals:    19   BP:  141/69   Pulse: 83    Patient Position - Orthostatic VS: Sitting          Visual Acuity      ED Medications  Medications - No data to display    Diagnostic Studies  Results Reviewed     None                 XR ankle 3+ views LEFT   ED Interpretation by Traci Gregorio PA-C ([id number])   No acute fracture      VAS lower limb venous duplex study, unilateral/limited    (Results Pending)              Procedures  Procedures       ED Course           Identification of Seniors at Risk      Most Recent Value   (ISAR) Identification of Seniors at Risk   Before the illness or injury that brought you to the Emergency, did you need someone to help you on a regular basis? 0 Filed at: 09/30/2019 2108   In the last 24 hours, have you needed more help than usual?  0 Filed at: 09/30/2019 2108   Have you been hospitalized for one or more nights during the past 6 months? 0 Filed at: 09/30/2019 2108   In general, do you see well?  0 Filed at: 09/30/2019 2108   In general, do you have serious problems with your memory? 0 Filed at: 09/30/2019 2108   Do you take more than three different medications every day?  0 Filed at: 09/30/2019 2108   ISAR Score  0 Filed at: 09/30/2019 2108                          MDM  Number of Diagnoses or Management Options  Left ankle swelling:   Diagnosis management comments: Patient is a 40-year-old male presents emergency department with complaints of left calf and ankle pain and swelling  X-ray images left ankle reviewed and does not show any acute abnormality  Etiology pain may be related to sprain of left ankle versus possible DVT  Recommended ultrasound of the left lower extremity  I also recommended prophylaxis with blood thinner  Patient states that he has bloody noses would like to avoid blood thinners at all possible  He states he would rather wait for the results of the ultrasound before he begins a blood thinner  Patient will present to hospital tomorrow for ultrasound of his left lower extremity  He does understand the risk of potentiation of clot without blood thinner  Patient stable for discharge  Return parameters were discussed  He will follow up with his family physician with the ultrasound of his left lower extremity         Amount and/or Complexity of Data Reviewed  Clinical lab tests: ordered and reviewed  Tests in the radiology section of CPT®: ordered and reviewed    Risk of Complications, Morbidity, and/or Mortality  Presenting problems: moderate  Diagnostic procedures: moderate  Management options: moderate    Patient Progress  Patient progress: stable      Disposition  Final diagnoses:   Left ankle swelling     Time reflects when diagnosis was documented in both MDM as applicable and the Disposition within this note     Time User Action Codes Description Comment    9/30/2019 11:20 PM Anna Durán Eloisa [[id number]] Left ankle swelling       ED Disposition     ED Disposition Condition Date/Time Comment    Discharge Good Mon Sep 30, 2019 [facility] discharge to home/self care              Follow-up Information     Follow up With Specialties Details Why [address], DO Internal Medicine Schedule an appointment as soon as possible for a visit   [address] [address]  [phone]            Discharge Medication List as of 9/30/2019 11:21 PM      CONTINUE these medications which have NOT CHANGED    Details   bacitracin topical ointment 500 units/g topical ointment Apply 1 large application topically 2 (two) times a day, Starting Sun 8/4/2019, Print      clotrimazole-betamethasone (LOTRISONE) 1-0 05 % cream Apply topically as needed , Starting Mon 11/28/2016, Historical Med      cyclobenzaprine (FLEXERIL) 10 mg tablet Take 1 tablet (10 mg total) by mouth 3 (three) times a day as needed for muscle spasms, Starting Mon 12/24/2018, Normal      EPINEPHrine (EPIPEN 2-DAVID) 0 3 mg/0 3 mL SOAJ Inject as directed, Starting Sun 7/13/2014, Historical Med      fluticasone (FLONASE) 50 mcg/act nasal spray 1 spray into each nostril as needed , Historical Med      glucose blood (FREESTYLE LITE) test strip CHECK BLOOD SUGAR THREE TIMES DAILY (DX: E11 65, FLUCTUATING BLOOD SUGARS), Normal      JARDIANCE 25 MG TABS Take 25 mg by mouth daily , Starting Thu 6/20/2019, Historical Med      ketoconazole (NIZORAL) 2 % cream APPLY TOPICALLY TWICE DAILY TO RASH ON FACE AND SCALP UNTIL RESOLVED, Normal      Lancets (FREESTYLE) lancets TEST 2 TIMES A DAY, Normal      LORazepam (ATIVAN) 1 mg tablet TAKE 1 TABLET BY MOUTH EVERY DAY AT BEDTIME, Normal      metFORMIN (GLUCOPHAGE-XR) 500 mg 24 hr tablet Take 1 tablet by mouth 2 (two) times a day, Historical Med      montelukast (SINGULAIR) 10 mg tablet Take 1 tablet (10 mg total) by mouth as needed (sob), Starting Wed 7/3/2019, Normal      Multiple Vitamin (MULTIVITAMINS PO) Take by mouth daily, Starting Tue 1/21/2014, Historical Med      Multiple Vitamins-Minerals (PRESERVISION AREDS PO) Take by mouth, Historical Med      omeprazole (PriLOSEC) 20 mg delayed release capsule Take 1 capsule (20 mg total) by mouth daily, Starting Mon 9/30/2019, Normal      repaglinide (PRANDIN) 1 mg tablet TAKE 1 TABLET (1 MG TOTAL) BY MOUTH 3 (THREE) TIMES A DAY BEFORE MEALS , Historical Med      sildenafil (VIAGRA) 50 MG tablet TAKE 1 TABLET BY MOUTH AS NEEDED FOR ERECTILE DYSFUNCTION, Normal      !! sodium chloride (OCEAN) 0 65 % nasal spray 1 spray into each nostril as needed for congestion, Historical Med      !! sodium chloride (OCEAN) 0 65 % nasal spray 1 spray into each nostril 2 (two) times a day, Starting Sun 8/4/2019, Print      triamcinolone (KENALOG) 0 025 % cream Apply topically 2 (two) times a day, Historical Med      verapamil (VERELAN PM) 180 MG 24 hr capsule Take 1 capsule (180 mg total) by mouth 2 (two) times a day, Starting Mon 6/24/2019, Normal       !! - Potential duplicate medications found  Please discuss with provider  Outpatient Discharge Orders   VAS lower limb venous duplex study, unilateral/limited   Standing Status: Future Standing Exp   Date: 09/30/23       ED Provider  Electronically Signed by           Jennifer Glaser PA-C  10/01/19 0763

## 2019-10-03 ENCOUNTER — PATIENT OUTREACH (OUTPATIENT)
Dept: INTERNAL MEDICINE CLINIC | Facility: CLINIC | Age: 69
End: 2019-10-03

## 2019-10-03 DIAGNOSIS — Z71.89 COMPLEX CARE COORDINATION: Primary | ICD-10-CM

## 2019-10-09 ENCOUNTER — CLINICAL SUPPORT (OUTPATIENT)
Dept: INTERNAL MEDICINE CLINIC | Facility: CLINIC | Age: 69
End: 2019-10-09
Payer: MEDICARE

## 2019-10-09 DIAGNOSIS — Z23 ENCOUNTER FOR IMMUNIZATION: Primary | ICD-10-CM

## 2019-10-09 PROCEDURE — 90662 IIV NO PRSV INCREASED AG IM: CPT

## 2019-10-09 PROCEDURE — G0008 ADMIN INFLUENZA VIRUS VAC: HCPCS

## 2019-10-11 ENCOUNTER — PATIENT OUTREACH (OUTPATIENT)
Dept: INTERNAL MEDICINE CLINIC | Facility: CLINIC | Age: 69
End: 2019-10-11

## 2019-10-15 ENCOUNTER — OFFICE VISIT (OUTPATIENT)
Dept: INTERNAL MEDICINE CLINIC | Facility: CLINIC | Age: 69
End: 2019-10-15
Payer: MEDICARE

## 2019-10-15 VITALS
SYSTOLIC BLOOD PRESSURE: 126 MMHG | OXYGEN SATURATION: 95 % | HEIGHT: 65 IN | HEART RATE: 87 BPM | BODY MASS INDEX: 27.96 KG/M2 | WEIGHT: 167.8 LBS | DIASTOLIC BLOOD PRESSURE: 82 MMHG

## 2019-10-15 DIAGNOSIS — D69.0 HENOCH-SCHONLEIN PURPURA (HCC): Chronic | ICD-10-CM

## 2019-10-15 DIAGNOSIS — N18.2 TYPE 2 DIABETES MELLITUS WITH STAGE 2 CHRONIC KIDNEY DISEASE, WITHOUT LONG-TERM CURRENT USE OF INSULIN (HCC): Chronic | ICD-10-CM

## 2019-10-15 DIAGNOSIS — E78.2 MIXED HYPERLIPIDEMIA: Chronic | ICD-10-CM

## 2019-10-15 DIAGNOSIS — Z00.00 MEDICARE ANNUAL WELLNESS VISIT, SUBSEQUENT: Primary | ICD-10-CM

## 2019-10-15 DIAGNOSIS — E11.22 TYPE 2 DIABETES MELLITUS WITH STAGE 2 CHRONIC KIDNEY DISEASE, WITHOUT LONG-TERM CURRENT USE OF INSULIN (HCC): Chronic | ICD-10-CM

## 2019-10-15 DIAGNOSIS — I10 BENIGN ESSENTIAL HYPERTENSION: Chronic | ICD-10-CM

## 2019-10-15 PROBLEM — D72.829 LEUKOCYTOSIS: Status: RESOLVED | Noted: 2019-09-09 | Resolved: 2019-10-15

## 2019-10-15 PROBLEM — E11.3299 MILD NONPROLIFERATIVE DIABETIC RETINOPATHY ASSOCIATED WITH TYPE 2 DIABETES MELLITUS (HCC): Status: ACTIVE | Noted: 2019-10-09

## 2019-10-15 PROBLEM — E11.3299 MILD NONPROLIFERATIVE DIABETIC RETINOPATHY ASSOCIATED WITH TYPE 2 DIABETES MELLITUS (HCC): Chronic | Status: ACTIVE | Noted: 2019-10-09

## 2019-10-15 PROBLEM — R63.4 WEIGHT LOSS: Status: RESOLVED | Noted: 2019-08-06 | Resolved: 2019-10-15

## 2019-10-15 PROBLEM — R04.0 EPISTAXIS: Status: RESOLVED | Noted: 2019-06-10 | Resolved: 2019-10-15

## 2019-10-15 PROBLEM — M25.472 LEFT ANKLE SWELLING: Status: RESOLVED | Noted: 2019-09-30 | Resolved: 2019-10-15

## 2019-10-15 PROBLEM — D72.10 EOSINOPHILIA: Status: RESOLVED | Noted: 2019-09-09 | Resolved: 2019-10-15

## 2019-10-15 PROCEDURE — G0439 PPPS, SUBSEQ VISIT: HCPCS | Performed by: INTERNAL MEDICINE

## 2019-10-15 PROCEDURE — 99214 OFFICE O/P EST MOD 30 MIN: CPT | Performed by: INTERNAL MEDICINE

## 2019-10-15 NOTE — ASSESSMENT & PLAN NOTE
Most recent A1c was 7 7 % on 10/1/2019  Recently increased to metformin ER 1000mg twice daily and prandin 2mg daily  Follows with Dr Romayne Glass UTD with eye and foot exam  Has changed to vegan diet and continues to lose weight with that

## 2019-10-15 NOTE — PROGRESS NOTES
INTERNAL MEDICINE FOLLOW-UP OFFICE VISIT  [facility] - [facility]    NAME: Roberto Bowens  AGE: 71 y o  SEX: male  : 1950     DATE: 10/15/2019     Assessment and Plan:     1  Type 2 diabetes mellitus with stage 2 chronic kidney disease, without long-term current use of insulin ([facility] [address] )  Assessment & Plan:  Most recent A1c was 7 7 % on 10/1/2019  Recently increased to metformin ER 1000mg twice daily and prandin 2mg daily  Follows with Dr Lindsay CONDE with eye and foot exam  Has changed to vegan diet and continues to lose weight with that  Orders:  -     Hemoglobin A1C; Future; Expected date: 01/15/2020  -     Microalbumin / creatinine urine ratio; Future; Expected date: 01/15/2020  -     Lipid panel; Future; Expected date: 01/15/2020  -     Basic metabolic panel; Future; Expected date: 01/15/2020    2  Benign essential hypertension  Assessment & Plan:  Continue anti-hypertensives as prescribed  BP is well controlled  3  Henoch-Schonlein purpura ([facility] [address] )  Assessment & Plan: This is stable and improving  Follow-up with dermatology and Dr Sherrie Martinez  4  Mixed hyperlipidemia  Assessment & Plan:  Cholesterol has been well controlled  Can continue current diet  Return in about 4 months (around 2/15/2020) for Follow-up  Chief Complaint:     Chief Complaint   Patient presents with    Medicare Wellness Visit    Follow-up     3 month      History of Present Illness:     Patient presents for follow-up  Continues to stay on vegan diet and is very physically active  His endocrinologist changed his medication to go up on metformin ER to 2000mg BID and prandin 2mg every morning  His home blood sugars have been improving  A1c went from 8 0 to 7 7%  He was diagnosed with HSP  Currently lesions have been improving  Still bothers him that he has it  Follows with dermatology and allergist      No cardiac symptoms  No other new concerns       Review of Systems: Review of Systems   Constitutional: Negative for activity change, appetite change and fatigue  Respiratory: Negative for apnea, cough, chest tightness, shortness of breath and wheezing  Cardiovascular: Negative for chest pain, palpitations and leg swelling  Gastrointestinal: Negative for abdominal distention, abdominal pain, blood in stool, constipation, diarrhea, nausea and vomiting  Musculoskeletal: Negative for arthralgias, back pain, gait problem, joint swelling and myalgias  Skin: Positive for rash (vasculitic lesions arms/legs)  Negative for wound  Neurological: Negative for dizziness, weakness, light-headedness, numbness and headaches  Psychiatric/Behavioral: Negative for behavioral problems, confusion, hallucinations, sleep disturbance and suicidal ideas  The patient is not nervous/anxious  Problem List:     Patient Active Problem List   Diagnosis    Actinic keratosis    Seborrheic keratosis    History of skin cancer    Anxiety disorder    Benign essential hypertension    Erectile dysfunction of non-organic origin    Esophageal reflux    Mixed hyperlipidemia    Mitral valve disorder    Rosacea    Sleep apnea    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    Thoracic radiculopathy    Type 2 diabetes mellitus with stage 2 chronic kidney disease, without long-term current use of insulin (HCC)    IgA mediated leukocytoclastic vasculitis (HCC)    Henoch-Schonlein purpura (HCC)    Mild 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 (HCC)      Objective:     /82 (BP Location: Left arm, Patient Position: Sitting, Cuff Size: Standard)   Pulse 87   Ht 5' 4 5" (1 638 m)   Wt 76 1 kg (167 lb 12 8 oz)   SpO2 95%   BMI 28 36 kg/m²     Physical Exam   Constitutional: He is oriented to person, place, and time  He appears well-developed and well-nourished  No distress  Eyes: Conjunctivae are normal  Right eye exhibits no discharge   Left eye exhibits no discharge  No scleral icterus  Neck: Neck supple  No JVD present  No thyromegaly present  Cardiovascular: Normal rate, regular rhythm and normal heart sounds  No murmur heard  Pulmonary/Chest: Effort normal and breath sounds normal  No respiratory distress  He has no wheezes  He has no rales  He exhibits no tenderness  Abdominal: Soft  Bowel sounds are normal  He exhibits no distension and no mass  There is no tenderness  There is no rebound and no guarding  No hernia  Musculoskeletal: He exhibits no edema  Lymphadenopathy:     He has no cervical adenopathy  Neurological: He is alert and oriented to person, place, and time  Skin: He is not diaphoretic  Psychiatric: He has a normal mood and affect  His behavior is normal    Vitals reviewed      Edis Correa DO  [facility]

## 2019-10-15 NOTE — PATIENT INSTRUCTIONS

## 2019-10-15 NOTE — PROGRESS NOTES
Assessment and Plan:     1  Medicare annual wellness visit, subsequent    BMI Counseling: Body mass index is 28 36 kg/m²  The BMI is above normal  Nutrition recommendations include decreasing portion sizes, encouraging healthy choices of fruits and vegetables, limiting drinks that contain sugar, moderation in carbohydrate intake and increasing intake of lean protein  Preventive health issues were discussed with patient, and age appropriate screening tests were ordered as noted in patient's After Visit Summary  Personalized health advice and appropriate referrals for health education or preventive services given if needed, as noted in patient's After Visit Summary       History of Present Illness:     Patient presents for Medicare Annual Wellness visit    Patient Care Team:  Maik Cooney DO as PCP - General (Internal Medicine)  MD Tea Gomes MD     Problem List:     Patient Active Problem List   Diagnosis    Actinic keratosis    Seborrheic keratosis    History of skin cancer    Anxiety disorder    Benign essential hypertension    Erectile dysfunction of non-organic origin    Esophageal reflux    Mixed hyperlipidemia    Mitral valve disorder    Rosacea    Sleep apnea    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    Thoracic radiculopathy    Epistaxis    Type 2 diabetes mellitus with stage 2 chronic kidney disease, without long-term current use of insulin (HCC)    Leukocytoclastic vasculitis ([address] )    Henoch-Schonlein purpura (HCC)    IgA mediated leukocytoclastic vasculitis (HCC)    Weight loss    Leukocytosis    Eosinophilia    Left ankle swelling      Past Medical and Surgical History:     Past Medical History:   Diagnosis Date    Actinic keratosis     Adhesive capsulitis of unspecified shoulder     Anxiety     Asthma     Atopic dermatitis     Cardiac disease     Cellulitis of right upper extremity     Cervical radiculopathy     Chest pain     Chronic maxillary sinusitis     last assessed 01/21/14    Diabetes mellitus ([facility] [address] )     Erectile dysfunction of non-organic origin     Esophageal reflux     Gout     last assessed 08/26/15    Hearing loss, conductive     Heart murmur     Mitral Valve disorder    Herpes zoster     History of paroxysmal supraventricular tachycardia     Hypertension     Mitral valve disorder     Neoplasm of skin, malignant     Non-melanoma skin cancer     last assessed 10/26/17    Obesity     Palpitations     last assessed 03/05/15    Paroxysmal supraventricular tachycardia ([facility] [address] ) 1/21/2014    Plantar fasciitis     last assessed 05/28/15    PSVT (paroxysmal supraventricular tachycardia) ([facility] [address] )     Sciatica     Sleep apnea     Squamous cell carcinoma of skin of scalp     Tinnitus, unspecified ear     [facility]     last assessed 01/14/98    Umbilical hernia     Worms in stool     last assessed 08/26/15     Past Surgical History:   Procedure Laterality Date    APPENDECTOMY      COLECTOMY      Partial, Sigmoid    COLONOSCOPY      HEAD & NECK SKIN LESION EXCISIONAL BIOPSY      10/11/10 SCC --  10/17/11 SCC  --  Location Scalp    HERNIA REPAIR      NASAL/SINUS ENDOSCOPY N/A 8/6/2019    Procedure: FUNCTIONAL ENDOSCOPIC SINUS SURGERY (FESS) IMAGED GUIDED for control of epistaxis; Surgeon: Chelsey Doyle MD;  Location: [facility];  Service: ENT    SD ESOPHAGOGASTRODUODENOSCOPY TRANSORAL DIAGNOSTIC N/A 8/9/2017    Procedure: ESOPHAGOGASTRODUODENOSCOPY (EGD); Surgeon: Carleen Polanco MD;  Location: [facility];   Service: Gastroenterology    SKIN BIOPSY      TONSILLECTOMY        Family History:     Family History   Problem Relation Age of Onset    Cancer Mother     Diabetes Mother     Heart disease Mother     Heart attack Mother     Breast cancer Mother         Adenocarcinoma    Skin cancer Mother         Adenocarcinoma    Heart failure Mother     Diabetes type II Mother    Yvan Montero Varicose Veins Mother         Lower Extremities    Cancer Father     Prostate cancer Father         Adenocarcinoma    Skin cancer Father     Lymphoma Father         Non-Hodgkin's    Arthritis Family       Social History:     Social History     Socioeconomic History    Marital status: /Civil Union     Spouse name: None    Number of children: None    Years of education: None    Highest education level: None   Occupational History    Occupation: Working Part Time    Social Needs    Financial resource strain: None    Food insecurity:     Worry: None     Inability: None    Transportation needs:     Medical: None     Non-medical: None   Tobacco Use    Smoking status: Former Smoker     Packs/day:  00     Years: 10 00     Pack years: 10 00     Types: Cigarettes     Last attempt to quit:      Years since quittin 8    Smokeless tobacco: Never Used   Substance and Sexual Activity    Alcohol use: Yes     Frequency: Monthly or less     Drinks per session: 1 or 2     Binge frequency: Never    Drug use: No    Sexual activity: Yes     Partners: Female   Lifestyle    Physical activity:     Days per week: 2 days     Minutes per session: 10 min    Stress: Very much   Relationships    Social connections:     Talks on phone: None     Gets together: None     Attends [employer] service: None     Active member of club or organization: None     Attends meetings of clubs or organizations: None     Relationship status: None    Intimate partner violence:     Fear of current or ex partner: None     Emotionally abused: None     Physically abused: None     Forced sexual activity: None   Other Topics Concern    None   Social History Narrative    Active Advance Directive       Medications and Allergies:     Current Outpatient Medications   Medication Sig Dispense Refill    bacitracin topical ointment 500 units/g topical ointment Apply 1 large application topically 2 (two) times a day 28 g 0    clotrimazole-betamethasone (LOTRISONE) 1-0 05 % cream Apply topically as needed       cyclobenzaprine (FLEXERIL) 10 mg tablet Take 1 tablet (10 mg total) by mouth 3 (three) times a day as needed for muscle spasms 90 tablet 0    EPINEPHrine (EPIPEN 2-DAVID) 0 3 mg/0 3 mL SOAJ Inject as directed      fluticasone (FLONASE) 50 mcg/act nasal spray 1 spray into each nostril as needed       glucose blood (FREESTYLE LITE) test strip CHECK BLOOD SUGAR THREE TIMES DAILY (DX: E11 65, FLUCTUATING BLOOD SUGARS) 200 each 3    ketoconazole (NIZORAL) 2 % cream APPLY TOPICALLY TWICE DAILY TO RASH ON FACE AND SCALP UNTIL RESOLVED (Patient taking differently: as needed ) 30 g 3    Lancets (FREESTYLE) lancets TEST 2 TIMES A  each 0    LORazepam (ATIVAN) 1 mg tablet TAKE 1 TABLET BY MOUTH EVERY DAY AT BEDTIME (Patient taking differently: TAKE 1 TABLET BY MOUTH EVERY DAY AT BEDTIME PRN) 90 tablet 1    metFORMIN (GLUCOPHAGE-XR) 500 mg 24 hr tablet Take 1,000 mg by mouth 2 (two) times a day       repaglinide (PRANDIN) 1 mg tablet Take 2 mg by mouth daily   3    sildenafil (VIAGRA) 50 MG tablet TAKE 1 TABLET BY MOUTH AS NEEDED FOR ERECTILE DYSFUNCTION 60 tablet 0    sodium chloride (OCEAN) 0 65 % nasal spray 1 spray into each nostril as needed for congestion      triamcinolone (KENALOG) 0 025 % cream Apply topically as needed       verapamil (VERELAN PM) 180 MG 24 hr capsule Take 1 capsule (180 mg total) by mouth 2 (two) times a day 180 capsule 3    JARDIANCE 25 MG TABS Take 25 mg by mouth daily       montelukast (SINGULAIR) 10 mg tablet Take 1 tablet (10 mg total) by mouth as needed (sob) (Patient not taking: Reported on 10/15/2019) 90 tablet 3    Multiple Vitamin (MULTIVITAMINS PO) Take by mouth daily      Multiple Vitamins-Minerals (PRESERVISION AREDS PO) Take by mouth      omeprazole (PriLOSEC) 20 mg delayed release capsule Take 1 capsule (20 mg total) by mouth daily (Patient not taking: Reported on 10/15/2019) 90 capsule 0     No current facility-administered medications for this visit  Allergies   Allergen Reactions    Iodinated Diagnostic Agents Anaphylaxis    Shellfish Allergy Anaphylaxis    Shellfish-Derived Products     Shrimp Extract Allergy Skin Test Anaphylaxis    Empagliflozin Hives      Immunizations:     Immunization History   Administered Date(s) Administered    INFLUENZA 10/28/2013, 11/25/2013, 10/27/2015, 10/21/2016, 10/18/2017    Influenza Quadrivalent, 6-35 Months IM 10/22/2014, 10/27/2015    Influenza Split High Dose Preservative Free IM 10/21/2016, 10/18/2017    Influenza, high dose seasonal 0 5 mL 10/02/2018, 10/09/2019    Pneumococcal Conjugate 13-Valent 11/16/2016    Pneumococcal Polysaccharide PPV23 11/30/2017    Tdap 12/15/2009, 01/06/2018    Zoster 06/15/2012      Health Maintenance:         Topic Date Due    CRC Screening: Colonoscopy  01/04/2026    Hepatitis C Screening  Completed         Topic Date Due    HEPATITIS B VACCINES (1 of 3 - Risk 3-dose series) [birth year]04/1969      Medicare Health Risk Assessment:     /82 (BP Location: Left arm, Patient Position: Sitting, Cuff Size: Standard)   Pulse 87   Ht 5' 4 5" (1 638 m)   Wt 76 1 kg (167 lb 12 8 oz)   SpO2 95%   BMI 28 36 kg/m²      Star Helton is here for his Subsequent Wellness visit  Health Risk Assessment:   Patient rates overall health as very good  Patient feels that their physical health rating is same  Eyesight was rated as same  Hearing was rated as same  Patient feels that their emotional and mental health rating is same  Pain experienced in the last 7 days has been none  Patient states that he has experienced weight loss or gain in last 6 months  Depression Screening:   PHQ-2 Score: 0      Fall Risk Screening: In the past year, patient has experienced: no history of falling in past year      Home Safety:  Patient does not have trouble with stairs inside or outside of their home   Patient has working smoke alarms and has working carbon monoxide detector  Home safety hazards include: none  Nutrition:   Current diet is Low Cholesterol and Regular  Medications:   Patient is currently taking over-the-counter supplements  OTC medications include: see medication list  Patient is able to manage medications  Activities of Daily Living (ADLs)/Instrumental Activities of Daily Living (IADLs):   Walk and transfer into and out of bed and chair?: Yes  Dress and groom yourself?: Yes    Bathe or shower yourself?: Yes    Feed yourself? Yes  Do your laundry/housekeeping?: Yes  Manage your money, pay your bills and track your expenses?: Yes  Make your own meals?: Yes    Do your own shopping?: Yes    Durable Medical Equipment Suppliers  none    Previous Hospitalizations:   Any hospitalizations or ED visits within the last 12 months?: Yes    How many hospitalizations have you had in the last year?: 3-4    Advance Care Planning:   Living will: Yes    Durable POA for healthcare: No    Advanced directive: Yes    Five wishes given: Yes      Cognitive Screening:   Provider or family/friend/caregiver concerned regarding cognition?: No    PREVENTIVE SCREENINGS      Cardiovascular Screening:    General: Screening Not Indicated and History Lipid Disorder      Diabetes Screening:     General: Screening Not Indicated and History Diabetes      Colorectal Cancer Screening:     General: Screening Current      Prostate Cancer Screening:    General: Screening Current      Osteoporosis Screening:    General: Screening Not Indicated      Abdominal Aortic Aneurysm (AAA) Screening:    Risk factors include: age between 73-67 yo and tobacco use        General: Screening Current      Lung Cancer Screening:     General: Screening Current      Hepatitis C Screening:    General: Screening Current    Other Counseling Topics:   Car/seat belt/driving safety, skin self-exam, sunscreen and regular weightbearing exercise         Paris Son DO

## 2019-10-30 ENCOUNTER — APPOINTMENT (OUTPATIENT)
Dept: LAB | Facility: HOSPITAL | Age: 69
End: 2019-10-30
Attending: INTERNAL MEDICINE
Payer: MEDICARE

## 2019-10-30 LAB — HEMOCCULT STL QL IA: NEGATIVE

## 2019-10-30 PROCEDURE — G0328 FECAL BLOOD SCRN IMMUNOASSAY: HCPCS | Performed by: INTERNAL MEDICINE

## 2019-11-05 ENCOUNTER — OFFICE VISIT (OUTPATIENT)
Dept: CARDIOLOGY CLINIC | Facility: CLINIC | Age: 69
End: 2019-11-05
Payer: MEDICARE

## 2019-11-05 VITALS
BODY MASS INDEX: 27.66 KG/M2 | HEART RATE: 94 BPM | HEIGHT: 65 IN | DIASTOLIC BLOOD PRESSURE: 64 MMHG | SYSTOLIC BLOOD PRESSURE: 128 MMHG | OXYGEN SATURATION: 98 % | WEIGHT: 166 LBS

## 2019-11-05 DIAGNOSIS — I10 BENIGN ESSENTIAL HYPERTENSION: Primary | ICD-10-CM

## 2019-11-05 DIAGNOSIS — E78.00 HYPERCHOLESTEROLEMIA: ICD-10-CM

## 2019-11-05 DIAGNOSIS — I47.1 PAROXYSMAL SUPRAVENTRICULAR TACHYCARDIA (HCC): ICD-10-CM

## 2019-11-05 DIAGNOSIS — I05.9 MITRAL VALVE DISORDER: ICD-10-CM

## 2019-11-05 PROCEDURE — 99213 OFFICE O/P EST LOW 20 MIN: CPT | Performed by: INTERNAL MEDICINE

## 2019-11-05 NOTE — PROGRESS NOTES
[facility] [address] [address]  Cardiology Follow Up    Alben Kawasaki  1950  [id number]      1  Benign essential hypertension     2  Mitral valve disorder     3  Paroxysmal supraventricular tachycardia ([address] )     4  Hypercholesterolemia         Chief Complaint   Patient presents with    Follow-up    Hypertension       Interval History:  Patient presents for follow-up visit  Patient denies any chest pain  No shortness of breath out of the ordinary  He has lost approximately 35 points  He has been diagnosed with HS purpura recently  He has been followed by ENT for recurrent nosebleeds  He denies any history of palpitations  No history of orthopnea PND  He does have history of SVT on longstanding medications in the form of verapamil      Patient Active Problem List   Diagnosis    Actinic keratosis    Seborrheic keratosis    History of skin cancer    Anxiety disorder    Benign essential hypertension    Erectile dysfunction of non-organic origin    Esophageal reflux    Mixed hyperlipidemia    Mitral valve disorder    Rosacea    Sleep apnea    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    Thoracic radiculopathy    Type 2 diabetes mellitus with stage 2 chronic kidney disease, without long-term current use of insulin (HCC)    IgA mediated leukocytoclastic vasculitis (HCC)    Henoch-Schonlein purpura (HCC)    Mild 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 ([address] )     Past Medical History:   Diagnosis Date    Actinic keratosis     Adhesive capsulitis of unspecified shoulder     Anxiety     Asthma     Atopic dermatitis     Cardiac disease     Cellulitis of right upper extremity     Cervical radiculopathy     Chest pain     Chronic maxillary sinusitis     last assessed 01/21/14    Diabetes mellitus ([address] )     Erectile dysfunction of non-organic origin     Esophageal reflux     Gout     last assessed 08/26/15    Hearing loss, conductive     Heart murmur     Mitral Valve disorder    Herpes zoster     History of paroxysmal supraventricular tachycardia     Hypertension     Mitral valve disorder     Neoplasm of skin, malignant     Non-melanoma skin cancer     last assessed 10/26/17    Obesity     Palpitations     last assessed 03/05/15    Paroxysmal supraventricular tachycardia ([facility] ) 2014    Plantar fasciitis     last assessed 05/28/15    PSVT (paroxysmal supraventricular tachycardia) ([facility]     Sciatica     Sleep apnea     Squamous cell carcinoma of skin of scalp     Tinnitus, unspecified ear     Type 2 diabetes mellitus with hyperglycemia ([facility] )     last assessed     Umbilical hernia     Worms in stool     last assessed 08/26/15     Social History     Socioeconomic History    Marital status: /Civil Union     Spouse name: Not on file    Number of children: Not on file    Years of education: Not on file    Highest education level: Not on file   Occupational History    Occupation: Working Part Time    Social Needs    Financial resource strain: Not on file    Food insecurity:     Worry: Not on file     Inability: Not on file   [employer] needs:     Medical: Not on file     Non-medical: Not on file   Tobacco Use    Smoking status: Former Smoker     Packs/day: 1 00     Years: 10 00     Pack years: 10 00     Types: Cigarettes     Last attempt to quit:      Years since quittin 8    Smokeless tobacco: Never Used   Substance and Sexual Activity    Alcohol use: Yes     Frequency: Monthly or less     Drinks per session: 1 or 2     Binge frequency: Never    Drug use: No    Sexual activity: Yes     Partners: Female   Lifestyle    Physical activity:     Days per week: 2 days     Minutes per session: 10 min    Stress: Very much   Relationships    Social connections:     Talks on phone: Not on file     Gets together: Not on file     Attends Yarsanism service: Not on file     Active member of club or organization: Not on file     Attends meetings of clubs or organizations: Not on file     Relationship status: Not on file    Intimate partner violence:     Fear of current or ex partner: Not on file     Emotionally abused: Not on file     Physically abused: Not on file     Forced sexual activity: Not on file   Other Topics Concern    Not on file   Social History Narrative    Active Advance Directive      Family History   Problem Relation Age of Onset    Cancer Mother     Diabetes Mother     Heart disease Mother     Heart attack Mother     Breast cancer Mother         Adenocarcinoma    Skin cancer Mother         Adenocarcinoma    Heart failure Mother     Diabetes type II Mother     Varicose Veins Mother         Lower Extremities    Cancer Father     Prostate cancer Father         Adenocarcinoma    Skin cancer Father     Lymphoma Father         Non-Hodgkin's    Arthritis Family      Past Surgical History:   Procedure Laterality Date    APPENDECTOMY      COLECTOMY      Partial, Sigmoid    COLONOSCOPY      HEAD & NECK SKIN LESION EXCISIONAL BIOPSY      10/11/10 SCC --  10/17/11 SCC  --  Location Scalp    HERNIA REPAIR      NASAL/SINUS ENDOSCOPY N/A 8/6/2019    Procedure: FUNCTIONAL ENDOSCOPIC SINUS SURGERY (FESS) IMAGED GUIDED for control of epistaxis; Surgeon: Javier Castañeda MD;  [facility];  Service: ENT    [place] ESOPHAGOGASTRODUODENOSCOPY TRANSORAL DIAGNOSTIC N/A 8/9/2017    Procedure: ESOPHAGOGASTRODUODENOSCOPY (EGD); Surgeon: Christophe Hope MD;  [facility];   Service: Gastroenterology    SKIN BIOPSY      TONSILLECTOMY         Current Outpatient Medications:     bacitracin topical ointment 500 units/g topical ointment, Apply 1 large application topically 2 (two) times a day, Disp: 28 g, Rfl: 0    clotrimazole-betamethasone (LOTRISONE) 1-0 05 % cream, Apply topically as needed , Disp: , Rfl:     cyclobenzaprine (FLEXERIL) 10 mg tablet, Take 1 tablet (10 mg total) by mouth 3 (three) times a day as needed for muscle spasms, Disp: 90 tablet, Rfl: 0    EPINEPHrine (EPIPEN 2-DAVID) 0 3 mg/0 3 mL SOAJ, Inject as directed, Disp: , Rfl:     fluticasone (FLONASE) 50 mcg/act nasal spray, 1 spray into each nostril as needed , Disp: , Rfl:     glucose blood (FREESTYLE LITE) test strip, CHECK BLOOD SUGAR THREE TIMES DAILY (DX: E11 65, FLUCTUATING BLOOD SUGARS), Disp: 200 each, Rfl: 3    ketoconazole (NIZORAL) 2 % cream, APPLY TOPICALLY TWICE DAILY TO RASH ON FACE AND SCALP UNTIL RESOLVED (Patient taking differently: as needed ), Disp: 30 g, Rfl: 3    Lancets (FREESTYLE) lancets, TEST 2 TIMES A DAY, Disp: 200 each, Rfl: 0    LORazepam (ATIVAN) 1 mg tablet, TAKE 1 TABLET BY MOUTH EVERY DAY AT BEDTIME (Patient taking differently: TAKE 1 TABLET BY MOUTH EVERY DAY AT BEDTIME PRN), Disp: 90 tablet, Rfl: 1    metFORMIN (GLUCOPHAGE-XR) 500 mg 24 hr tablet, Take 1,000 mg by mouth 2 (two) times a day , Disp: , Rfl:     repaglinide (PRANDIN) 1 mg tablet, Take 2 mg by mouth daily , Disp: , Rfl: 3    sildenafil (VIAGRA) 50 MG tablet, TAKE 1 TABLET BY MOUTH AS NEEDED FOR ERECTILE DYSFUNCTION, Disp: 60 tablet, Rfl: 0    sodium chloride (OCEAN) 0 65 % nasal spray, 1 spray into each nostril as needed for congestion, Disp: , Rfl:     triamcinolone (KENALOG) 0 025 % cream, Apply topically as needed , Disp: , Rfl:     verapamil (VERELAN PM) 180 MG 24 hr capsule, Take 1 capsule (180 mg total) by mouth 2 (two) times a day, Disp: 180 capsule, Rfl: 3  Allergies   Allergen Reactions    Iodinated Diagnostic Agents Anaphylaxis    Shellfish Allergy Anaphylaxis    Shellfish-Derived Products     Shrimp Extract Allergy Skin Test Anaphylaxis    Empagliflozin Hives       Labs:  Appointment on 10/01/2019   Component Date Value    Sodium 10/01/2019 140     Potassium 10/01/2019 3 8     Chloride 10/01/2019 106     CO2 10/01/2019 27     ANION GAP 10/01/2019 7     BUN 10/01/2019 17     Creatinine 10/01/2019 0 95     Glucose, Fasting 10/01/2019 189*    Calcium 10/01/2019 9 5     AST 10/01/2019 11     ALT 10/01/2019 19     Alkaline Phosphatase 10/01/2019 59     Total Protein 10/01/2019 7 5     Albumin 10/01/2019 3 4*    Total Bilirubin 10/01/2019 0 78     eGFR 10/01/2019 81     Hemoglobin A1C 10/01/2019 7 7*    EAG 10/01/2019 174     Cholesterol 10/01/2019 143     Triglycerides 10/01/2019 125     HDL, Direct 10/01/2019 39*    LDL Calculated 10/01/2019 79     Non-HDL-Chol (CHOL-HDL) 10/01/2019 104    Orders Only on 09/18/2019   Component Date Value    Right Eye Diabetic Retin* 09/18/2019 Mild     Left Eye Diabetic Retino* 09/18/2019 Mild      Imaging: No results found  Review of Systems:  Review of Systems   REVIEW OF SYSTEMS:  Constitutional:  Denies fever or chills   Eyes:  Denies change in visual acuity   HENT:  Denies nasal congestion or sore throat   Respiratory:  Denies cough or shortness of breath   Cardiovascular:  Denies chest pain or edema   GI:  Denies abdominal pain, nausea, vomiting, bloody stools or diarrhea   :  Denies dysuria, frequency, difficulty in micturition and nocturia  Musculoskeletal:  Denies back pain or joint pain   Neurologic:  Denies headache, focal weakness or sensory changes   Endocrine:  Denies polyuria or polydipsia   Lymphatic:  Denies swollen glands   Psychiatric:  Denies depression or anxiety     Physical Exam:    /64   Pulse 94   Ht 5' 4 5" (1 638 m)   Wt 75 3 kg (166 lb)   SpO2 98%   BMI 28 05 kg/m²     Physical Exam   PHYSICAL EXAM:  General:  Patient is not in acute distress   Head: Normocephalic, Atraumatic  HEENT:  Both pupils normal-size atraumatic, normocephalic, nonicteric  Neck:  JVP not raised  Trachea central  No carotid bruit  Respiratory:  normal breath sounds no crackles  no rhonchi  Cardiovascular:  Regular rate and rhythm no S3 no murmurs  GI:  Abdomen soft nontender  No organomegaly  Lymphatic:  No cervical or inguinal lymphadenopathy  Neurologic:  Patient is awake alert, oriented   Grossly nonfocal  Patient has purpuric  spots in the left lower extremity  Discussion/Summary:  Patient overall doing well from a cardiovascular standpoint  Symptoms to watch out from cardiac standpoint which would indicate the need for further cardiac evaluation discussed with patient  Patient has lost weight with watching his diet  Patient encouraged to continue to maintain his weight  Continue present medications  Follow-up with primary care physician as well as Hematology  All his medications were reviewed  Patient had a few questions which were answered  Follow-up in 6 months

## 2019-11-07 ENCOUNTER — TELEPHONE (OUTPATIENT)
Dept: INTERNAL MEDICINE CLINIC | Facility: CLINIC | Age: 69
End: 2019-11-07
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
## 2019-11-07 DIAGNOSIS — S30.861A TICK BITE OF GROIN, INITIAL ENCOUNTER: Primary | ICD-10-CM

## 2019-11-07 NOTE — TELEPHONE ENCOUNTER
PATIENT CALLED AND WAS BITTEN BY A TICK A DAY OR SO AGO   GOT PART OF IT OUT AND WANTS TO KNOW WHAT HE SHOULD DO  HIS #    [phone]

## 2019-11-08 RX ORDER — DOXYCYCLINE HYCLATE 100 MG/1
200 CAPSULE ORAL ONCE
Qty: 2 CAPSULE | Refills: 0 | Status: SHIPPED | OUTPATIENT
Start: 2019-11-08 | End: 2019-11-08

## 2019-11-13 ENCOUNTER — OFFICE VISIT (OUTPATIENT)
Dept: DERMATOLOGY | Facility: CLINIC | Age: 69
End: 2019-11-13
Payer: MEDICARE

## 2019-11-13 DIAGNOSIS — Z13.89 SCREENING FOR SKIN CONDITION: ICD-10-CM

## 2019-11-13 DIAGNOSIS — Z85.828 HISTORY OF SKIN CANCER: ICD-10-CM

## 2019-11-13 DIAGNOSIS — D69.0 HENOCH-SCHONLEIN PURPURA (HCC): Primary | ICD-10-CM

## 2019-11-13 DIAGNOSIS — L82.1 SEBORRHEIC KERATOSIS: ICD-10-CM

## 2019-11-13 DIAGNOSIS — L21.9 SEBORRHEIC DERMATITIS: ICD-10-CM

## 2019-11-13 PROCEDURE — 99214 OFFICE O/P EST MOD 30 MIN: CPT | Performed by: DERMATOLOGY

## 2019-11-13 RX ORDER — KETOCONAZOLE 20 MG/G
CREAM TOPICAL 2 TIMES DAILY
Qty: 30 G | Refills: 2 | Status: SHIPPED | OUTPATIENT
Start: 2019-11-13 | End: 2020-09-21

## 2019-11-13 NOTE — PATIENT INSTRUCTIONS
Henoch-Schoenlein purpura appears to be improving slowly at present will hold off on any intervention continue watchful management and consider a re-evaluation if this continues also will discuss possibility of Methotrexate in the future if this continues especially when he returns from [place] in the spring  Seborrheic dermatitis continue ketoconazole cream as needed  Seborrheic keratosis patient reassured these are normal growths we acquire with age no treatment needed  History of skin cancer in no recurrence nothing else atypical sunblock recommended follow-up in 6 months  Screening for dermatologic disorders nothing else of concern noted on complete exam follow-up in 6 months

## 2019-11-13 NOTE — PROGRESS NOTES
[facility] DERMATOLOG[facility]  [address] [place] [id number]  [phone]  [phone]     MRN: [id number] : 1950  Encounter: [id number]  Patient Information: Davee Hand  Chief complaint:  Follow-up for chronic vasculitis     History of present illness:  60-year-old male presents for overall checkup with his history of leukocytoclastic vasculitis and presumed Henoch-Schoenlein purpura  Or IgA vasculitis  No specific concerns except occasional flare ups at times no other specific triggers  Patient also with nose bleeds  Patient followed with Dr Benoit Leonard  No other concerns patient with blood work in October which did not reveal anything significant for elevated glucose    Past Medical History:   Diagnosis Date    Actinic keratosis     Adhesive capsulitis of unspecified shoulder     Anxiety     Asthma     Atopic dermatitis     Cardiac disease     Cellulitis of right upper extremity     Cervical radiculopathy     Chest pain     Chronic maxillary sinusitis     last assessed 14    Diabetes mellitus ([address] )     Erectile dysfunction of non-organic origin     Esophageal reflux     Gout     last assessed 08/26/15    Hearing loss, conductive     Heart murmur     Mitral Valve disorder    Herpes zoster     History of paroxysmal supraventricular tachycardia     Hypertension     Mitral valve disorder     Neoplasm of skin, malignant     Non-melanoma skin cancer     last assessed 10/26/17    Obesity     Palpitations     last assessed 03/05/15    Paroxysmal supraventricular tachycardia ([address] ) 2014    Plantar fasciitis     last assessed 05/28/15    PSVT (paroxysmal supraventricular tachycardia) (HCC)     Sciatica     Sleep apnea     Squamous cell carcinoma of skin of scalp     Tinnitus, unspecified ear     Type 2 diabetes mellitus with hyperglycemia ([address] )     last assessed 61/49/10    Umbilical hernia     Worms in stool     last assessed 08/26/15     Past Surgical History:   Procedure Laterality Date    APPENDECTOMY      COLECTOMY      Partial, Sigmoid    COLONOSCOPY      HEAD & NECK SKIN LESION EXCISIONAL BIOPSY      10/11/10 SCC --  10/17/11 SCC  --  Location Scalp    HERNIA REPAIR      NASAL/SINUS ENDOSCOPY N/A 2019    Procedure: FUNCTIONAL ENDOSCOPIC SINUS SURGERY (FESS) IMAGED GUIDED for control of epistaxis; Surgeon: Will Dominguez MD;  [facility];  Service: ENT    [place] ESOPHAGOGASTRODUODENOSCOPY TRANSORAL DIAGNOSTIC N/A 2017    Procedure: ESOPHAGOGASTRODUODENOSCOPY (EGD); Surgeon: Feliciano Cordova MD;  [facility];   Service: Gastroenterology    SKIN BIOPSY      TONSILLECTOMY       Social History   Social History     Substance and Sexual Activity   Alcohol Use Yes    Frequency: Monthly or less    Drinks per session: 1 or 2    Binge frequency: Never     Social History     Substance and Sexual Activity   Drug Use No     Social History     Tobacco Use   Smoking Status Former Smoker    Packs/day: 1 00    Years: 10 00    Pack years: 10 00    Types: Cigarettes    Last attempt to quit:     Years since quittin 8   Smokeless Tobacco Never Used     Family History   Problem Relation Age of Onset    Cancer Mother     Diabetes Mother     Heart disease Mother     Heart attack Mother     Breast cancer Mother         Adenocarcinoma    Skin cancer Mother         Adenocarcinoma    Heart failure Mother     Diabetes type II Mother     Varicose Veins Mother         Lower Extremities    Cancer Father     Prostate cancer Father         Adenocarcinoma    Skin cancer Father     Lymphoma Father         Non-Hodgkin's    Arthritis Family      Meds/Allergies   Allergies   Allergen Reactions    Iodinated Diagnostic Agents Anaphylaxis    Shellfish Allergy Anaphylaxis    Shellfish-Derived Products     Shrimp Extract Allergy Skin Test Anaphylaxis    Empagliflozin Hives       Meds:  Prior to Admission medications    Medication Sig Start Date End Date Taking?  Authorizing Provider   bacitracin topical ointment 500 units/g topical ointment Apply 1 large application topically 2 (two) times a day 8/4/19  Yes Donna Wiggins MD   clotrimazole-betamethasone (LOTRISONE) 1-0 05 % cream Apply topically as needed  11/28/16  Yes Historical Provider, MD   cyclobenzaprine (FLEXERIL) 10 mg tablet Take 1 tablet (10 mg total) by mouth 3 (three) times a day as needed for muscle spasms 12/24/18  Yes Sunitha Allen MD   EPINEPHrine (EPIPEN 2-DAVID) 0 3 mg/0 3 mL SOAJ Inject as directed 7/13/14  Yes Historical Provider, MD   fluticasone (FLONASE) 50 mcg/act nasal spray 1 spray into each nostril as needed    Yes Historical Provider, MD   glucose blood (FREESTYLE LITE) test strip CHECK BLOOD SUGAR THREE TIMES DAILY (DX: E11 65, FLUCTUATING BLOOD SUGARS) 7/15/19  Yes David De La Garza,    ketoconazole (NIZORAL) 2 % cream APPLY TOPICALLY TWICE DAILY TO RASH ON [address] RESOLVED  Patient taking differently: as needed  4/9/19  Yes Ni Bailon MD   Lancets (FREESTYLE) lancets TEST 2 TIMES A DAY 2/21/19  Yes Lillie Melo PA-C   metFORMIN (GLUCOPHAGE-XR) 500 mg 24 hr tablet Take 1,000 mg by mouth 2 (two) times a day    Yes Historical Provider, MD   repaglinide (PRANDIN) 1 mg tablet Take 2 mg by mouth daily  6/27/19  Yes Historical Provider, MD   sildenafil (VIAGRA) 50 MG tablet TAKE 1 TABLET BY MOUTH AS NEEDED FOR ERECTILE DYSFUNCTION 4/8/19  Yes Lillie Melo PA-C   sodium chloride (OCEAN) 0 65 % nasal spray 1 spray into each nostril as needed for congestion   Yes Historical Provider, MD   triamcinolone (KENALOG) 0 025 % cream Apply topically as needed    Yes Historical Provider, MD   verapamil (VERELAN PM) 180 MG 24 hr capsule Take 1 capsule (180 mg total) by mouth 2 (two) times a day 6/24/19  Yes David De La Garza DO   LORazepam (ATIVAN) 1 mg tablet TAKE 1 TABLET BY MOUTH EVERY DAY AT BEDTIME  Patient taking differently: TAKE 1 TABLET BY MOUTH EVERY DAY AT BEDTIME PRN 6/10/19   Paris Son DO       Subjective:     Review of Systems:    General: negative for - chills, fatigue, fever,  weight gain or weight loss  Psychological: negative for - anxiety, behavioral disorder, concentration difficulties, decreased libido, depression, irritability, memory difficulties, mood swings, sleep disturbances or suicidal ideation  ENT: negative for - hearing difficulties , nasal congestion, nasal discharge, oral lesions, sinus pain, sneezing, sore throat  Allergy and Immunology: negative for - hives, insect bite sensitivity,  Hematological and Lymphatic: negative for - bleeding problems, blood clots,bruising, swollen lymph nodes  Endocrine: negative for - hair pattern changes, hot flashes, malaise/lethargy, mood swings, palpitations, polydipsia/polyuria, skin changes, temperature intolerance or unexpected weight change  Respiratory: negative for - cough, hemoptysis, orthopnea, shortness of breath, or wheezing  Cardiovascular: negative for - chest pain, dyspnea on exertion, edema,  Gastrointestinal: negative for - abdominal pain, nausea/vomiting  Genito-Urinary: negative for - dysuria, incontinence, irregular/heavy menses or urinary frequency/urgency  Musculoskeletal: negative for - gait disturbance, joint pain, joint stiffness, joint swelling, muscle pain, muscular weakness  Dermatological:  As in HPI  Neurological: negative for confusion, dizziness, headaches, impaired coordination/balance, memory loss, numbness/tingling, seizures, speech problems, tremors or weakness       Objective: There were no vitals taken for this visit      Physical Exam:    General Appearance:    Alert, cooperative, no distress   Head:    Normocephalic, without obvious abnormality, atraumatic           Skin:   A full skin exam was performed including scalp, head scalp, eyes, ears, nose, lips, neck, chest, axilla, abdomen, back, buttocks, bilateral upper extremities, bilateral lower extremities, hands, feet, fingers, toes, fingernails, and toenails previous sites skin cancer well healed without recurrence greasy stuck previous normal keratotic  palpable purpura  papules with lesions noted on the lower legs not as many as initially     Assessment:     1  Henoch-Schonlein purpura (HCC)     2  Seborrheic keratosis     3  Seborrheic dermatitis     4  Screening for skin condition     5  History of skin cancer           Plan:   Henoch-Schoenlein purpura appears to be improving slowly at present will hold off on any intervention continue watchful management and consider a re-evaluation if this continues also will discuss possibility of Methotrexate in the future if this continues especially when he returns from [place] in the spring  Seborrheic dermatitis continue ketoconazole cream as needed  Seborrheic keratosis patient reassured these are normal growths we acquire with age no treatment needed  History of skin cancer in no recurrence nothing else atypical sunblock recommended follow-up in 6 months  Screening for dermatologic disorders nothing else of concern noted on complete exam follow-up in 6 months    Alejandro Mccauley MD  11/13/2019,9:44 AM    Portions of the record may have been created with voice recognition software   Occasional wrong word or "sound a like" substitutions may have occurred due to the inherent limitations of voice recognition software   Read the chart carefully and recognize, using context, where substitutions have occurred

## 2019-12-05 ENCOUNTER — OFFICE VISIT (OUTPATIENT)
Dept: URGENT CARE | Facility: CLINIC | Age: 69
End: 2019-12-05
Payer: MEDICARE

## 2019-12-05 VITALS
DIASTOLIC BLOOD PRESSURE: 80 MMHG | SYSTOLIC BLOOD PRESSURE: 108 MMHG | RESPIRATION RATE: 18 BRPM | OXYGEN SATURATION: 97 % | TEMPERATURE: 98.1 F | BODY MASS INDEX: 28.32 KG/M2 | HEART RATE: 80 BPM | WEIGHT: 170 LBS | HEIGHT: 65 IN

## 2019-12-05 DIAGNOSIS — R04.0 EPISTAXIS: Primary | ICD-10-CM

## 2019-12-05 PROCEDURE — 99213 OFFICE O/P EST LOW 20 MIN: CPT | Performed by: PHYSICIAN ASSISTANT

## 2019-12-05 PROCEDURE — G0463 HOSPITAL OUTPT CLINIC VISIT: HCPCS | Performed by: PHYSICIAN ASSISTANT

## 2019-12-05 NOTE — PROGRESS NOTES
[address]        NAME: Jose A Roberts is a 71 y o  male  : 1950    MRN: [id number]  DATE: 2019  TIME: 9:41 AM    Assessment and Plan   Epistaxis [R04 0]  1  Epistaxis       Patient was informed by his ENT that if he ever had bleeding again he was to go straight to the office  Currently bleeding has slowed and he is hemodynamically stable  There was going to insert a rhino rocket but patient refused  He would like to go to his ENT  His wife will drive him  Patient Instructions   Patient Instructions   Go directly to your ENT  Proceed to  ER if symptoms worsen  Chief Complaint     Chief Complaint   Patient presents with    Nose Bleed     active nose bleed from left nare since 8:13am-8:41am  Reports seeing an ENT for nose bleeds that occur every 2-3 weeks, for about 6 months          History of Present Illness       Patient is 80-year-old male with history of posterior epistaxis who presents to urgent care with sudden onset of nose bleed which occurred in the parking lot of this building  He was here with his wife who has an appointment in another office  He is not on any anticoagulants  He is being followed by ENT for this condition with his last appointment on 19 where he had a cauterizing procedure  Currently bleeding is slowing  Appears to be coming from left nare  Review of Systems   Review of Systems   Constitutional: Negative for chills and fever  HENT: Positive for nosebleeds  Respiratory: Negative  Cardiovascular: Negative  Neurological: Negative            Current Medications       Current Outpatient Medications:     bacitracin topical ointment 500 units/g topical ointment, Apply 1 large application topically 2 (two) times a day, Disp: 28 g, Rfl: 0    clotrimazole-betamethasone (LOTRISONE) 1-0 05 % cream, Apply topically as needed , Disp: , Rfl:     cyclobenzaprine (FLEXERIL) 10 mg tablet, Take 1 tablet (10 mg total) by mouth 3 (three) times a day as needed for muscle spasms, Disp: 90 tablet, Rfl: 0    EPINEPHrine (EPIPEN 2-DAVID) 0 3 mg/0 3 mL SOAJ, Inject as directed, Disp: , Rfl:     fluticasone (FLONASE) 50 mcg/act nasal spray, 1 spray into each nostril as needed , Disp: , Rfl:     glucose blood (FREESTYLE LITE) test strip, CHECK BLOOD SUGAR THREE TIMES DAILY (DX: E11 65, FLUCTUATING BLOOD SUGARS), Disp: 200 each, Rfl: 3    ketoconazole (NIZORAL) 2 % cream, Apply topically 2 (two) times a day, Disp: 30 g, Rfl: 2    Lancets (FREESTYLE) lancets, TEST 2 TIMES A DAY, Disp: 200 each, Rfl: 0    LORazepam (ATIVAN) 1 mg tablet, TAKE 1 TABLET BY MOUTH EVERY DAY AT BEDTIME (Patient taking differently: TAKE 1 TABLET BY MOUTH EVERY DAY AT BEDTIME PRN), Disp: 90 tablet, Rfl: 1    metFORMIN (GLUCOPHAGE-XR) 500 mg 24 hr tablet, Take 1,000 mg by mouth 2 (two) times a day , Disp: , Rfl:     repaglinide (PRANDIN) 1 mg tablet, Take 2 mg by mouth daily , Disp: , Rfl: 3    sildenafil (VIAGRA) 50 MG tablet, TAKE 1 TABLET BY MOUTH AS NEEDED FOR ERECTILE DYSFUNCTION, Disp: 60 tablet, Rfl: 0    sodium chloride (OCEAN) 0 65 % nasal spray, 1 spray into each nostril as needed for congestion, Disp: , Rfl:     triamcinolone (KENALOG) 0 025 % cream, Apply topically as needed , Disp: , Rfl:     verapamil (VERELAN PM) 180 MG 24 hr capsule, Take 1 capsule (180 mg total) by mouth 2 (two) times a day, Disp: 180 capsule, Rfl: 3    Current Allergies     Allergies as of 12/05/2019 - Reviewed 12/05/2019   Allergen Reaction Noted    Iodinated diagnostic agents Anaphylaxis 01/16/2014    Shellfish allergy Anaphylaxis 01/16/2014    Shellfish-derived products      Shrimp extract allergy skin test Anaphylaxis     Empagliflozin Hives 06/24/2019            The following portions of the patient's history were reviewed and updated as appropriate: allergies, current medications, past family history, past medical history, past social history, past surgical history and problem list      Past Medical History:   Diagnosis Date    Actinic keratosis     Adhesive capsulitis of unspecified shoulder     Anxiety     Asthma     Atopic dermatitis     Cardiac disease     Cellulitis of right upper extremity     Cervical radiculopathy     Chest pain     Chronic maxillary sinusitis     last assessed 01/21/14    Diabetes mellitus ([facility] [address] )     Erectile dysfunction of non-organic origin     Esophageal reflux     Gout     last assessed 08/26/15    Hearing loss, conductive     Heart murmur     Mitral Valve disorder    Herpes zoster     History of paroxysmal supraventricular tachycardia     Hypertension     Mitral valve disorder     Neoplasm of skin, malignant     Non-melanoma skin cancer     last assessed 10/26/17    Obesity     Palpitations     last assessed 03/05/15    Paroxysmal supraventricular tachycardia ([facility] [address] ) 1/21/2014    Plantar fasciitis     last assessed 05/28/15    PSVT ([facility]ox[facility]mal [facility]ricular tachycardia) [facility])     Sciatica     Sleep apnea     Squamous cell carcinoma of skin of scalp     Tinnitus, unspecified ear     Type 2 diabetes mellitus with hyperglycemia ([facility] [address] )     last assessed 48/53/67    Umbilical hernia     Worms in stool     last assessed 08/26/15       Past Surgical History:   Procedure Laterality Date    APPENDECTOMY      COLECTOMY      Partial, Sigmoid    COLONOSCOPY      HEAD & NECK SKIN LESION EXCISIONAL BIOPSY      10/11/10 SCC --  10/17/11 SCC  --  Location Scalp    HERNIA REPAIR      NASAL/SINUS ENDOSCOPY N/A 8/6/2019    Procedure: FUNCTIONAL ENDOSCOPIC SINUS SURGERY (FESS) IMAGED GUIDED for control of epistaxis; Surgeon: Es Callahan MD;  [facility];  Service: ENT    AL ESOPHAGOGASTRODUODENOSCOPY TRANSORAL DIAGNOSTIC N/A 8/9/2017    Procedure: ESOPHAGOGASTRODUODENOSCOPY (EGD); Surgeon: Elle Jackman MD;  [facility];   Service: Gastroenterology    SKIN BIOPSY      TONSILLECTOMY         Family History Problem Relation Age of Onset    Cancer Mother     Diabetes Mother     Heart disease Mother     Heart attack Mother     Breast cancer Mother         Adenocarcinoma    Skin cancer Mother         Adenocarcinoma    Heart failure Mother     Diabetes type II Mother     Varicose Veins Mother         Lower Extremities    Cancer Father     Prostate cancer Father         Adenocarcinoma    Skin cancer Father     Lymphoma Father         Non-Hodgkin's    Arthritis Family          Medications have been verified  Objective   /80 (BP Location: Left arm, Patient Position: Sitting, Cuff Size: Large)   Pulse 80   Temp 98 1 °F (36 7 °C)   Resp 18   Ht 5' 4 5" (1 638 m)   Wt 77 1 kg (170 lb)   SpO2 97%   BMI 28 73 kg/m²        Physical Exam     Physical Exam   HENT:   Nose: Epistaxis is observed  Epistaxis of BL nare  No anterior bleeding visualized  Bleeding is slowed  Oropharynx without clots or active bleeding  Eyes: Conjunctivae are normal    Cardiovascular: Normal rate and regular rhythm  Pulmonary/Chest: Effort normal and breath sounds normal    Skin: Skin is warm  Vitals reviewed

## 2020-01-05 DIAGNOSIS — K21.9 GASTROESOPHAGEAL REFLUX DISEASE, ESOPHAGITIS PRESENCE NOT SPECIFIED: ICD-10-CM

## 2020-01-06 RX ORDER — OMEPRAZOLE 20 MG/1
CAPSULE, DELAYED RELEASE ORAL
Qty: 90 CAPSULE | Refills: 0 | Status: SHIPPED | OUTPATIENT
Start: 2020-01-06 | End: 2020-08-06 | Stop reason: SDUPTHER

## 2020-03-04 ENCOUNTER — OFFICE VISIT (OUTPATIENT)
Dept: INTERNAL MEDICINE CLINIC | Facility: CLINIC | Age: 70
End: 2020-03-04
Payer: MEDICARE

## 2020-03-04 VITALS
DIASTOLIC BLOOD PRESSURE: 74 MMHG | TEMPERATURE: 96.9 F | HEART RATE: 87 BPM | OXYGEN SATURATION: 96 % | WEIGHT: 182.8 LBS | SYSTOLIC BLOOD PRESSURE: 112 MMHG | BODY MASS INDEX: 31.21 KG/M2 | HEIGHT: 64 IN

## 2020-03-04 DIAGNOSIS — E11.22 TYPE 2 DIABETES MELLITUS WITH STAGE 2 CHRONIC KIDNEY DISEASE, WITHOUT LONG-TERM CURRENT USE OF INSULIN (HCC): Primary | ICD-10-CM

## 2020-03-04 DIAGNOSIS — J01.90 ACUTE NON-RECURRENT SINUSITIS, UNSPECIFIED LOCATION: Primary | ICD-10-CM

## 2020-03-04 DIAGNOSIS — N18.2 TYPE 2 DIABETES MELLITUS WITH STAGE 2 CHRONIC KIDNEY DISEASE, WITHOUT LONG-TERM CURRENT USE OF INSULIN (HCC): Primary | ICD-10-CM

## 2020-03-04 PROCEDURE — 1160F RVW MEDS BY RX/DR IN RCRD: CPT | Performed by: INTERNAL MEDICINE

## 2020-03-04 PROCEDURE — 3078F DIAST BP <80 MM HG: CPT | Performed by: INTERNAL MEDICINE

## 2020-03-04 PROCEDURE — 3008F BODY MASS INDEX DOCD: CPT | Performed by: INTERNAL MEDICINE

## 2020-03-04 PROCEDURE — 1036F TOBACCO NON-USER: CPT | Performed by: INTERNAL MEDICINE

## 2020-03-04 PROCEDURE — 3074F SYST BP LT 130 MM HG: CPT | Performed by: INTERNAL MEDICINE

## 2020-03-04 PROCEDURE — 99214 OFFICE O/P EST MOD 30 MIN: CPT | Performed by: INTERNAL MEDICINE

## 2020-03-04 PROCEDURE — 3066F NEPHROPATHY DOC TX: CPT | Performed by: INTERNAL MEDICINE

## 2020-03-04 PROCEDURE — 4040F PNEUMOC VAC/ADMIN/RCVD: CPT | Performed by: INTERNAL MEDICINE

## 2020-03-04 RX ORDER — AMOXICILLIN AND CLAVULANATE POTASSIUM 875; 125 MG/1; MG/1
1 TABLET, FILM COATED ORAL EVERY 12 HOURS SCHEDULED
Qty: 14 TABLET | Refills: 0 | Status: SHIPPED | OUTPATIENT
Start: 2020-03-04 | End: 2020-03-11

## 2020-03-04 RX ORDER — PIOGLITAZONEHYDROCHLORIDE 45 MG/1
TABLET ORAL
Qty: 90 TABLET | Refills: 3 | Status: SHIPPED | OUTPATIENT
Start: 2020-03-04 | End: 2020-08-06

## 2020-03-04 NOTE — PATIENT INSTRUCTIONS

## 2020-03-04 NOTE — PROGRESS NOTES
INTERNAL MEDICINE FOLLOW-UP OFFICE VISIT  [facility] - [facility]    NAME: Beverley Khan  AGE: 79 y o  SEX: male  : 1950     DATE: 3/4/2020     Assessment and Plan:     1  Acute non-recurrent sinusitis, unspecified location    Recommend antibiotics as prescribed  He has follow-up with ENT next week  MRI brain on 3/3/2020 had shown some evidence of maxillary sinus mucosal thickening     - amoxicillin-clavulanate (AUGMENTIN) 875-125 mg per tablet; Take 1 tablet by mouth every 12 (twelve) hours for 7 days  Dispense: 14 tablet; Refill: 0     Chief Complaint:     Chief Complaint   Patient presents with    URI     since; last 5 days    Nasal Congestion     stuffy nose, when blows nose, green mucus and little blood    Cough     little, dry/tickle    Earache     little    Sinusitis        History of Present Illness:     Increased sinus congestion for past 7-10 days  Notes stuffy nose, runny nose, post-nasal drip, mild cough, ear fullness  He is concerned about infection  Getting thicker drainage coming out of his nose  HSP lesions since I last saw him have cleared up  He saw a specialist to r/o HHT and no evidence of HHT  Hasn't had a nose bleed since December  He is staying away from nasal spray as he does not want to cause bleeding  Review of Systems:     6 point ROS completed  Please see above HPI for pertinent positives/negatives  Remaining ROS completely negative       Problem List:     Patient Active Problem List   Diagnosis    Actinic keratosis    Seborrheic keratosis    History of skin cancer    Anxiety disorder    Benign essential hypertension    Erectile dysfunction of non-organic origin    Esophageal reflux    Mixed hyperlipidemia    Mitral valve disorder    Rosacea    Sleep apnea    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    Thoracic radiculopathy    Type 2 diabetes mellitus with stage 2 chronic kidney disease, without long-term current use of insulin (HCC)    IgA mediated leukocytoclastic vasculitis (HCC)    Henoch-Schonlein purpura (HCC)    Mild 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 (HCC)      Objective:     /74 (BP Location: Left arm, Patient Position: Sitting, Cuff Size: Standard)   Pulse 87   Temp (!) 96 9 °F (36 1 °C) (Tympanic) Comment: NO NSAIDS  Ht 5' 4 25" (1 632 m)   Wt 82 9 kg (182 lb 12 8 oz)   SpO2 96%   BMI 31 13 kg/m²     Physical Exam   Constitutional: He appears well-developed and well-nourished  No distress  HENT:   Right Ear: External ear normal    Left Ear: External ear normal    Mouth/Throat: Oropharynx is clear and moist  No oropharyngeal exudate  Nasal mucosa edema and mucoid rhinorrhea   Eyes: Conjunctivae are normal  Right eye exhibits no discharge  Left eye exhibits no discharge  No scleral icterus  Neck: Neck supple  No JVD present  No thyromegaly present  Cardiovascular: Normal rate, regular rhythm and normal heart sounds  No murmur heard  Pulmonary/Chest: Effort normal and breath sounds normal  No respiratory distress  He has no wheezes  He has no rales  He exhibits no tenderness  Abdominal: Soft  Bowel sounds are normal  He exhibits no distension  There is no tenderness  Musculoskeletal: He exhibits no edema  Lymphadenopathy:     He has no cervical adenopathy  Skin: He is not diaphoretic  Psychiatric: He has a normal mood and affect  His behavior is normal    Vitals reviewed      Bernadine Cisneros DO  [facility]

## 2020-03-05 ENCOUNTER — TELEPHONE (OUTPATIENT)
Dept: ADMINISTRATIVE | Facility: OTHER | Age: 70
End: 2020-03-05

## 2020-03-05 NOTE — TELEPHONE ENCOUNTER
----- Message from Taniya Al sent at 3/4/2020  3:49 PM EST -----  Regarding: EYE AND FOOT EXAM [facility] INTERNAL MED  Contact: [phone]  03/04/20 3:49 PM    Hello, our patient Francisco Frost has had Diabetic Eye Exam and Diabetic Foot Exam completed/performed  Please assist in updating the patient chart by making an External outreach to FOOT EXAM-Dr Mable Emmanuel per pt this was a couple months ago P# [phone]   EYE EXAM-Hayden Greene  Per pt this was recent, a couple months ago P#; [phone]      Thank you,  CHITRA Al 64

## 2020-03-05 NOTE — TELEPHONE ENCOUNTER
Upon review of your request/inquiry, we have found as a result of outreach that patient did not have the requested item(s) completed  *Last exam was 9/18/19 - PT scheduled for appt in April per [facility]  *      Any additional questions or concerns should be emailed to the Practice Liaisons via [email or web address] email, please do not reply via In Basket       Thank you  Max Shukla

## 2020-03-05 NOTE — TELEPHONE ENCOUNTER
Upon review of your request/inquiry, we have found as a result of outreach that patient did not have the requested item(s) completed  *Per CE, upon review of PT's last office visits with Dr Curtis Houser - diabetic foot exam was not performed  Any additional questions or concerns should be emailed to the Practice Liaisons via [email or web address] email, please do not reply via In Basket       Thank you  Maryanne Bartholomew

## 2020-03-10 PROCEDURE — 87147 CULTURE TYPE IMMUNOLOGIC: CPT | Performed by: OTOLARYNGOLOGY

## 2020-03-10 PROCEDURE — 87205 SMEAR GRAM STAIN: CPT | Performed by: OTOLARYNGOLOGY

## 2020-03-10 PROCEDURE — 87070 CULTURE OTHR SPECIMN AEROBIC: CPT | Performed by: OTOLARYNGOLOGY

## 2020-03-10 PROCEDURE — 87186 SC STD MICRODIL/AGAR DIL: CPT | Performed by: OTOLARYNGOLOGY

## 2020-03-16 ENCOUNTER — TELEPHONE (OUTPATIENT)
Dept: INTERNAL MEDICINE CLINIC | Facility: CLINIC | Age: 70
End: 2020-03-16

## 2020-03-16 NOTE — TELEPHONE ENCOUNTER
MRI/CT scans from care everywhere were previously discussed with patient    Should discuss results of wound culture with ordering provider, but the wound culture came back showing staphylococcus aureus   Says that he has already viewed the result through [address]

## 2020-03-16 NOTE — TELEPHONE ENCOUNTER
CALLED PT   AND WAS NOTIFIED AND HAS HEARD FROM ENT AND WILL PUT HIM ON KEFLEX FOR 1 WEEK AND WILL BE HAVING CT SCAN OF SINUS ON TUES

## 2020-03-17 ENCOUNTER — HOSPITAL ENCOUNTER (OUTPATIENT)
Dept: CT IMAGING | Facility: HOSPITAL | Age: 70
Discharge: HOME | End: 2020-03-17
Attending: OTOLARYNGOLOGY
Payer: MEDICARE

## 2020-03-17 DIAGNOSIS — J31.0 CHRONIC RHINITIS: ICD-10-CM

## 2020-03-17 DIAGNOSIS — J01.00 SUBACUTE MAXILLARY SINUSITIS: ICD-10-CM

## 2020-03-17 DIAGNOSIS — D69.0 HENOCH-SCHONLEIN PURPURA (HCC): ICD-10-CM

## 2020-03-17 DIAGNOSIS — J34.2 DEVIATED NASAL SEPTUM: ICD-10-CM

## 2020-03-17 PROCEDURE — 70486 CT MAXILLOFACIAL W/O DYE: CPT

## 2020-03-24 ENCOUNTER — APPOINTMENT (OUTPATIENT)
Dept: LAB | Facility: CLINIC | Age: 70
End: 2020-03-24
Payer: MEDICARE

## 2020-03-24 DIAGNOSIS — D69.0 HENOCH-SCHONLEIN PURPURA (HCC): ICD-10-CM

## 2020-03-24 DIAGNOSIS — N18.2 TYPE 2 DIABETES MELLITUS WITH STAGE 2 CHRONIC KIDNEY DISEASE, WITHOUT LONG-TERM CURRENT USE OF INSULIN (HCC): Chronic | ICD-10-CM

## 2020-03-24 DIAGNOSIS — E11.22 TYPE 2 DIABETES MELLITUS WITH STAGE 2 CHRONIC KIDNEY DISEASE, WITHOUT LONG-TERM CURRENT USE OF INSULIN (HCC): Chronic | ICD-10-CM

## 2020-03-24 LAB
ANION GAP SERPL CALCULATED.3IONS-SCNC: 4 MMOL/L (ref 4–13)
BASOPHILS # BLD AUTO: 0.05 THOUSANDS/ΜL (ref 0–0.1)
BASOPHILS NFR BLD AUTO: 1 % (ref 0–1)
BUN SERPL-MCNC: 14 MG/DL (ref 5–25)
CALCIUM SERPL-MCNC: 9.3 MG/DL (ref 8.3–10.1)
CHLORIDE SERPL-SCNC: 106 MMOL/L (ref 100–108)
CHOLEST SERPL-MCNC: 174 MG/DL (ref 50–200)
CO2 SERPL-SCNC: 29 MMOL/L (ref 21–32)
CREAT SERPL-MCNC: 1.05 MG/DL (ref 0.6–1.3)
CREAT UR-MCNC: 103 MG/DL
EOSINOPHIL # BLD AUTO: 0.65 THOUSAND/ΜL (ref 0–0.61)
EOSINOPHIL NFR BLD AUTO: 7 % (ref 0–6)
ERYTHROCYTE [DISTWIDTH] IN BLOOD BY AUTOMATED COUNT: 12.4 % (ref 11.6–15.1)
EST. AVERAGE GLUCOSE BLD GHB EST-MCNC: 212 MG/DL
GFR SERPL CREATININE-BSD FRML MDRD: 72 ML/MIN/1.73SQ M
GLUCOSE P FAST SERPL-MCNC: 170 MG/DL (ref 65–99)
HBA1C MFR BLD: 9 %
HCT VFR BLD AUTO: 45.9 % (ref 36.5–49.3)
HDLC SERPL-MCNC: 46 MG/DL
HGB BLD-MCNC: 15.1 G/DL (ref 12–17)
IMM GRANULOCYTES # BLD AUTO: 0.03 THOUSAND/UL (ref 0–0.2)
IMM GRANULOCYTES NFR BLD AUTO: 0 % (ref 0–2)
LDLC SERPL CALC-MCNC: 97 MG/DL (ref 0–100)
LYMPHOCYTES # BLD AUTO: 2.85 THOUSANDS/ΜL (ref 0.6–4.47)
LYMPHOCYTES NFR BLD AUTO: 30 % (ref 14–44)
MCH RBC QN AUTO: 29.8 PG (ref 26.8–34.3)
MCHC RBC AUTO-ENTMCNC: 32.9 G/DL (ref 31.4–37.4)
MCV RBC AUTO: 91 FL (ref 82–98)
MICROALBUMIN UR-MCNC: 159 MG/L (ref 0–20)
MICROALBUMIN/CREAT 24H UR: 154 MG/G CREATININE (ref 0–30)
MONOCYTES # BLD AUTO: 0.69 THOUSAND/ΜL (ref 0.17–1.22)
MONOCYTES NFR BLD AUTO: 7 % (ref 4–12)
NEUTROPHILS # BLD AUTO: 5.13 THOUSANDS/ΜL (ref 1.85–7.62)
NEUTS SEG NFR BLD AUTO: 55 % (ref 43–75)
NONHDLC SERPL-MCNC: 128 MG/DL
NRBC BLD AUTO-RTO: 0 /100 WBCS
PLATELET # BLD AUTO: 214 THOUSANDS/UL (ref 149–390)
PMV BLD AUTO: 10.8 FL (ref 8.9–12.7)
POTASSIUM SERPL-SCNC: 3.9 MMOL/L (ref 3.5–5.3)
RBC # BLD AUTO: 5.07 MILLION/UL (ref 3.88–5.62)
SODIUM SERPL-SCNC: 139 MMOL/L (ref 136–145)
TRIGL SERPL-MCNC: 154 MG/DL
WBC # BLD AUTO: 9.4 THOUSAND/UL (ref 4.31–10.16)

## 2020-03-24 PROCEDURE — 85025 COMPLETE CBC W/AUTO DIFF WBC: CPT

## 2020-03-24 PROCEDURE — 82043 UR ALBUMIN QUANTITATIVE: CPT

## 2020-03-24 PROCEDURE — 82570 ASSAY OF URINE CREATININE: CPT

## 2020-03-24 PROCEDURE — 36415 COLL VENOUS BLD VENIPUNCTURE: CPT

## 2020-03-24 PROCEDURE — 80061 LIPID PANEL: CPT

## 2020-03-24 PROCEDURE — 83036 HEMOGLOBIN GLYCOSYLATED A1C: CPT

## 2020-03-24 PROCEDURE — 80048 BASIC METABOLIC PNL TOTAL CA: CPT

## 2020-03-25 ENCOUNTER — TELEPHONE (OUTPATIENT)
Dept: INTERNAL MEDICINE CLINIC | Facility: CLINIC | Age: 70
End: 2020-03-25

## 2020-03-27 ENCOUNTER — TELEPHONE (OUTPATIENT)
Dept: INTERNAL MEDICINE CLINIC | Facility: CLINIC | Age: 70
End: 2020-03-27

## 2020-03-27 ENCOUNTER — TELEPHONE (OUTPATIENT)
Dept: HEMATOLOGY ONCOLOGY | Facility: MEDICAL CENTER | Age: 70
End: 2020-03-27

## 2020-03-27 NOTE — TELEPHONE ENCOUNTER
Inbound Calls to Reschedule/Cancel Appointments   Patient Details:  Roberta Soares  1950  [id number]     Who is calling? Patient    Date of original appointment 04/06/2020   Visit Type Follow up    Who is the Provider and Location? Dr Steven Rose   Is the patient on active treatment? Chemo? Radiation? no   The patient would like to cancel or reschedule? Patient requested a tele-visit  [phone]   Reason for rescheduling or cancelation? Coronavirus crisis    Next Steps:    HopeLine: After completing the above information, please route to the appropriate Care Team for review  Care Team: Please review and reach out to the patient if you have any changes

## 2020-03-30 ENCOUNTER — TELEMEDICINE (OUTPATIENT)
Dept: INTERNAL MEDICINE CLINIC | Facility: CLINIC | Age: 70
End: 2020-03-30
Payer: MEDICARE

## 2020-03-30 DIAGNOSIS — N18.2 TYPE 2 DIABETES MELLITUS WITH STAGE 2 CHRONIC KIDNEY DISEASE, WITHOUT LONG-TERM CURRENT USE OF INSULIN (HCC): Primary | Chronic | ICD-10-CM

## 2020-03-30 DIAGNOSIS — E78.2 MIXED HYPERLIPIDEMIA: Chronic | ICD-10-CM

## 2020-03-30 DIAGNOSIS — I10 BENIGN ESSENTIAL HYPERTENSION: Chronic | ICD-10-CM

## 2020-03-30 DIAGNOSIS — E11.22 TYPE 2 DIABETES MELLITUS WITH STAGE 2 CHRONIC KIDNEY DISEASE, WITHOUT LONG-TERM CURRENT USE OF INSULIN (HCC): Primary | Chronic | ICD-10-CM

## 2020-03-30 PROCEDURE — G2012 BRIEF CHECK IN BY MD/QHP: HCPCS | Performed by: INTERNAL MEDICINE

## 2020-03-30 RX ORDER — GLIMEPIRIDE 4 MG/1
4 TABLET ORAL 2 TIMES DAILY
Qty: 180 TABLET | Refills: 3 | Status: SHIPPED | OUTPATIENT
Start: 2020-03-30 | End: 2021-03-03

## 2020-03-30 NOTE — PROGRESS NOTES
Virtual Brief Visit    Assessment/Plan    1  Type 2 diabetes mellitus with stage 2 chronic kidney disease, without long-term current use of insulin [facility])    He had been in [place] with daughter and eating poorly for 2 month time period  Now back to eating healthy and has 3 acres [place] his property  Most recent A1c was 9 0 % on 3/24/2020  Will restart back on amaryl  He will continue to watch diet closely and work on losing some weight  He has been eating a lot of vegetables which I think is great  2  Benign essential hypertension    BP has been controlled  Continue anti-hypertensives as prescribed  3  Mixed hyperlipidemia    Lab Results   Component Value Date    LDLCALC 97 03/24/2020      Will continue to monitor cholesterol  Patient declines statin at this time  Reason for visit is chronic condition follow-up    Encounter provider Erwin Ferrell DO    Provider located at [address] [address]    Recent Visits  Date Type Provider Dept   03/27/20 Telephone Emily Fabian Colin [facility]   03/25/20 Telephone [facility] recent visits within past 7 days and meeting all other requirements     Today's Visits  Date Type Provider Dept   03/30/20 Telemedicine Erwin Ferrell  [facility] today's visits and meeting all other requirements     Future Appointments  No visits were found meeting these conditions  Showing future appointments within next 150 days and meeting all other requirements        After connecting through telephone, the patient was identified by name and date of birth  Margie Kay was informed that this is a telemedicine visit and that the visit is being conducted through telephone which may not be secure and therefore, might not be HIPAA-compliant  My office door was closed  No one else was in the room    He acknowledged consent and understanding of privacy and security of the video platform  The patient has agreed to participate and understands they can discontinue the visit at any time  Mathew Champion is a 79 y o  male with diabetes, htn, hld  Had recent labs which were mostly stable except for A1C and microalbumin increase  His A1C went up to 9 0%, which he doesn't understand  Has been treated recently for sinus infections  No hypoglycemic episodes  Restarted omeprazole  Review of Systems   Constitutional: Negative for chills, fatigue and fever  HENT: Positive for congestion  Respiratory: Negative for cough, chest tightness and shortness of breath  Cardiovascular: Negative for chest pain, palpitations and leg swelling  Gastrointestinal: Negative for abdominal pain, blood in stool, constipation, diarrhea, rectal pain and vomiting        Past Medical History:   Diagnosis Date    Actinic keratosis     Adhesive capsulitis of unspecified shoulder     Anxiety     Asthma     Atopic dermatitis     Cardiac disease     Cellulitis of right upper extremity     Cervical radiculopathy     Chest pain     Chronic maxillary sinusitis     last assessed 01/21/14    Diabetes mellitus ([address] )     Erectile dysfunction of non-organic origin     Esophageal reflux     Gout     last assessed 08/26/15    Hearing loss, conductive     Heart murmur     Mitral Valve disorder    Herpes zoster     History of paroxysmal supraventricular tachycardia     Hypertension     Mitral valve disorder     Nasal congestion 4/18/19    Neoplasm of skin, malignant     Non-melanoma skin cancer     last assessed 10/26/17    Nosebleed 4/18/19    Obesity     Palpitations     last assessed 03/05/15    Paroxysmal supraventricular tachycardia ([address] ) 1/21/2014    Plantar fasciitis     last assessed 05/28/15    PSVT ([facility]ox[facility]ricular [facility]cardia) [facility])     Sciatica     Sleep apnea     Squamous cell carcinoma of skin of scalp     Tinnitus, unspecified ear     Type 2 diabetes mellitus with hyperglycemia ([facility] [address] )     last assessed     Umbilical hernia     Worms in stool     last assessed 08/26/15     Past Surgical History:   Procedure Laterality Date    APPENDECTOMY      COLECTOMY      Partial, Sigmoid    COLONOSCOPY      HEAD & NECK SKIN LESION EXCISIONAL BIOPSY      10/11/10 SCC --  10/17/11 SCC  --  Location Scalp    HERNIA REPAIR      NASAL/SINUS ENDOSCOPY N/A 2019    Procedure: FUNCTIONAL ENDOSCOPIC SINUS SURGERY (FESS) IMAGED GUIDED for control of epistaxis; Surgeon: iRmma Hsieh MD;  [facility];  Service: ENT    VA ESOPHAGOGASTRODUODENOSCOPY TRANSORAL DIAGNOSTIC N/A 2017    Procedure: ESOPHAGOGASTRODUODENOSCOPY (EGD); Surgeon: Nguyen Bradshaw MD;  [facility];   Service: Gastroenterology    SKIN BIOPSY      TONSILLECTOMY       Social History     Occupational History    Occupation: Working Part Time    Tobacco Use    Smoking status: Former Smoker     Packs/day: 1 00     Years: 10 00     Pack years: 10      Types: Cigarettes     Last attempt to quit:      Years since quittin 2    Smokeless tobacco: Never Used   Substance and Sexual Activity    Alcohol use: Yes     Frequency: Monthly or less     Drinks per session: 1 or 2     Binge frequency: Never     Comment: only occasionally    Drug use: No    Sexual activity: Yes     Partners: Female      Current Outpatient Medications   Medication Sig Dispense Refill    bacitracin topical ointment 500 units/g topical ointment Apply 1 large application topically 2 (two) times a day (Patient taking differently: Apply 1 large application topically as needed ) 28 g 0    clotrimazole-betamethasone (LOTRISONE) 1-0 05 % cream Apply topically as needed       cyclobenzaprine (FLEXERIL) 10 mg tablet Take 1 tablet (10 mg total) by mouth 3 (three) times a day as needed for muscle spasms 90 tablet 0    EPINEPHrine (EPIPEN 2-DAVID) 0 3 mg/0 3 mL SOAJ Inject as directed      fexofenadine (ALLEGRA) 180 MG tablet Take 1 tablet (180 mg total) by mouth daily 30 tablet 1    fluticasone (FLONASE) 50 mcg/act nasal spray 1 spray into each nostril as needed       glucose blood (FREESTYLE LITE) test strip CHECK BLOOD SUGAR THREE TIMES DAILY (DX: E11 65, FLUCTUATING BLOOD SUGARS) 200 each 3    ketoconazole (NIZORAL) 2 % cream Apply topically 2 (two) times a day (Patient taking differently: Apply topically as needed ) 30 g 2    Lancets (FREESTYLE) lancets TEST 2 TIMES A  each 0    LORazepam (ATIVAN) 1 mg tablet TAKE 1 TABLET BY MOUTH EVERY DAY AT BEDTIME (Patient taking differently: Take 1 mg by mouth as needed ) 90 tablet 1    metFORMIN (GLUCOPHAGE-XR) 500 mg 24 hr tablet Take 1,000 mg by mouth 2 (two) times a day       omeprazole (PriLOSEC) 20 mg delayed release capsule TAKE 1 CAPSULE BY MOUTH EVERY DAY 90 capsule 0    pioglitazone (ACTOS) 45 mg tablet TAKE 1 TABLET (45 MG TOTAL) BY MOUTH DAILY 90 tablet 3    repaglinide (PRANDIN) 1 mg tablet Take 2 mg by mouth daily   3    sildenafil (VIAGRA) 50 MG tablet TAKE 1 TABLET BY MOUTH AS NEEDED FOR ERECTILE DYSFUNCTION 60 tablet 0    sodium chloride (OCEAN) 0 65 % nasal spray 1 spray into each nostril daily       triamcinolone (KENALOG) 0 025 % cream Apply topically as needed       verapamil (VERELAN PM) 180 MG 24 hr capsule Take 1 capsule (180 mg total) by mouth 2 (two) times a day 180 capsule 3     No current facility-administered medications for this visit  Allergies   Allergen Reactions    Iodinated Diagnostic Agents Anaphylaxis    Shellfish Allergy Anaphylaxis    Shellfish-Derived Products     Shrimp Extract Allergy Skin Test Anaphylaxis    Empagliflozin Hives     I spent 10 minutes with the patient during this visit      Carmen Sender,

## 2020-04-03 ENCOUNTER — TELEPHONE (OUTPATIENT)
Dept: HEMATOLOGY ONCOLOGY | Facility: CLINIC | Age: 70
End: 2020-04-03

## 2020-04-06 ENCOUNTER — TELEMEDICINE (OUTPATIENT)
Dept: HEMATOLOGY ONCOLOGY | Facility: CLINIC | Age: 70
End: 2020-04-06
Payer: MEDICARE

## 2020-04-06 DIAGNOSIS — D69.0 HENOCH-SCHONLEIN PURPURA (HCC): Primary | Chronic | ICD-10-CM

## 2020-04-06 PROCEDURE — 99213 OFFICE O/P EST LOW 20 MIN: CPT | Performed by: INTERNAL MEDICINE

## 2020-04-22 DIAGNOSIS — J01.00 SUBACUTE MAXILLARY SINUSITIS: ICD-10-CM

## 2020-04-22 DIAGNOSIS — D69.0 HENOCH-SCHONLEIN PURPURA (HCC): ICD-10-CM

## 2020-04-22 DIAGNOSIS — J31.0 CHRONIC RHINITIS: ICD-10-CM

## 2020-04-22 DIAGNOSIS — J34.2 DEVIATED NASAL SEPTUM: ICD-10-CM

## 2020-04-22 RX ORDER — FEXOFENADINE HCL 180 MG/1
180 TABLET ORAL DAILY
Qty: 90 TABLET | Refills: 1 | Status: SHIPPED | OUTPATIENT
Start: 2020-04-22 | End: 2021-04-26 | Stop reason: CLARIF

## 2020-05-01 ENCOUNTER — TELEMEDICINE (OUTPATIENT)
Dept: CARDIOLOGY CLINIC | Facility: CLINIC | Age: 70
End: 2020-05-01
Payer: MEDICARE

## 2020-05-01 VITALS
HEIGHT: 64 IN | BODY MASS INDEX: 29.53 KG/M2 | DIASTOLIC BLOOD PRESSURE: 81 MMHG | HEART RATE: 71 BPM | WEIGHT: 173 LBS | SYSTOLIC BLOOD PRESSURE: 134 MMHG

## 2020-05-01 DIAGNOSIS — I47.1 PAROXYSMAL SUPRAVENTRICULAR TACHYCARDIA (HCC): ICD-10-CM

## 2020-05-01 DIAGNOSIS — I05.9 MITRAL VALVE DISORDER: ICD-10-CM

## 2020-05-01 DIAGNOSIS — I10 BENIGN ESSENTIAL HYPERTENSION: Primary | ICD-10-CM

## 2020-05-01 DIAGNOSIS — E78.00 HYPERCHOLESTEROLEMIA: ICD-10-CM

## 2020-05-01 DIAGNOSIS — F41.9 ANXIETY DISORDER, UNSPECIFIED TYPE: ICD-10-CM

## 2020-05-01 PROCEDURE — 99442 PR PHYS/QHP TELEPHONE EVALUATION 11-20 MIN: CPT | Performed by: INTERNAL MEDICINE

## 2020-05-01 RX ORDER — LORAZEPAM 1 MG/1
1 TABLET ORAL AS NEEDED
Qty: 90 TABLET | Refills: 0 | Status: SHIPPED | OUTPATIENT
Start: 2020-05-01

## 2020-05-13 ENCOUNTER — TELEMEDICINE (OUTPATIENT)
Dept: DERMATOLOGY | Facility: CLINIC | Age: 70
End: 2020-05-13
Payer: MEDICARE

## 2020-05-13 DIAGNOSIS — Z85.828 HISTORY OF SKIN CANCER: ICD-10-CM

## 2020-05-13 DIAGNOSIS — Z13.89 SCREENING FOR SKIN CONDITION: ICD-10-CM

## 2020-05-13 DIAGNOSIS — L72.9 FOLLICULAR CYST OF SKIN: ICD-10-CM

## 2020-05-13 DIAGNOSIS — L82.1 SEBORRHEIC KERATOSIS: Primary | ICD-10-CM

## 2020-05-13 PROCEDURE — 99213 OFFICE O/P EST LOW 20 MIN: CPT | Performed by: DERMATOLOGY

## 2020-05-14 DIAGNOSIS — N18.2 TYPE 2 DIABETES MELLITUS WITH STAGE 2 CHRONIC KIDNEY DISEASE, WITHOUT LONG-TERM CURRENT USE OF INSULIN (HCC): Primary | Chronic | ICD-10-CM

## 2020-05-14 DIAGNOSIS — E11.22 TYPE 2 DIABETES MELLITUS WITH STAGE 2 CHRONIC KIDNEY DISEASE, WITHOUT LONG-TERM CURRENT USE OF INSULIN (HCC): Primary | Chronic | ICD-10-CM

## 2020-05-14 RX ORDER — METFORMIN HYDROCHLORIDE 500 MG/1
TABLET, EXTENDED RELEASE ORAL
Qty: 360 TABLET | Refills: 1 | Status: SHIPPED | OUTPATIENT
Start: 2020-05-14 | End: 2020-11-01

## 2020-05-19 ENCOUNTER — TELEPHONE (OUTPATIENT)
Dept: INTERNAL MEDICINE CLINIC | Facility: CLINIC | Age: 70
End: 2020-05-19

## 2020-05-27 ENCOUNTER — TELEPHONE (OUTPATIENT)
Dept: INTERNAL MEDICINE CLINIC | Facility: CLINIC | Age: 70
End: 2020-05-27
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
## 2020-05-27 RX ORDER — DOXYCYCLINE HYCLATE 100 MG/1
200 CAPSULE ORAL ONCE
Qty: 2 CAPSULE | Refills: 0 | Status: SHIPPED | OUTPATIENT
Start: 2020-05-27 | End: 2020-05-27

## 2020-06-10 DIAGNOSIS — I10 BENIGN ESSENTIAL HYPERTENSION: Chronic | ICD-10-CM

## 2020-06-10 RX ORDER — VERAPAMIL HYDROCHLORIDE 180 MG/1
180 CAPSULE, EXTENDED RELEASE ORAL 2 TIMES DAILY
Qty: 180 CAPSULE | Refills: 3 | Status: SHIPPED | OUTPATIENT
Start: 2020-06-10 | End: 2021-03-20

## 2020-07-10 LAB
LEFT EYE DIABETIC RETINOPATHY: NORMAL
RIGHT EYE DIABETIC RETINOPATHY: NORMAL

## 2020-07-27 ENCOUNTER — APPOINTMENT (OUTPATIENT)
Dept: LAB | Facility: CLINIC | Age: 70
End: 2020-07-27
Payer: MEDICARE

## 2020-07-27 DIAGNOSIS — E11.22 TYPE 2 DIABETES MELLITUS WITH STAGE 2 CHRONIC KIDNEY DISEASE, WITHOUT LONG-TERM CURRENT USE OF INSULIN (HCC): Chronic | ICD-10-CM

## 2020-07-27 DIAGNOSIS — N18.2 TYPE 2 DIABETES MELLITUS WITH STAGE 2 CHRONIC KIDNEY DISEASE, WITHOUT LONG-TERM CURRENT USE OF INSULIN (HCC): Chronic | ICD-10-CM

## 2020-07-27 LAB
ANION GAP SERPL CALCULATED.3IONS-SCNC: 5 MMOL/L (ref 4–13)
BUN SERPL-MCNC: 17 MG/DL (ref 5–25)
CALCIUM SERPL-MCNC: 10 MG/DL (ref 8.3–10.1)
CHLORIDE SERPL-SCNC: 105 MMOL/L (ref 100–108)
CO2 SERPL-SCNC: 30 MMOL/L (ref 21–32)
CREAT SERPL-MCNC: 1.09 MG/DL (ref 0.6–1.3)
EST. AVERAGE GLUCOSE BLD GHB EST-MCNC: 177 MG/DL
GFR SERPL CREATININE-BSD FRML MDRD: 68 ML/MIN/1.73SQ M
GLUCOSE P FAST SERPL-MCNC: 112 MG/DL (ref 65–99)
HBA1C MFR BLD: 7.8 %
POTASSIUM SERPL-SCNC: 3.8 MMOL/L (ref 3.5–5.3)
SODIUM SERPL-SCNC: 140 MMOL/L (ref 136–145)

## 2020-07-27 PROCEDURE — 80048 BASIC METABOLIC PNL TOTAL CA: CPT

## 2020-07-27 PROCEDURE — 36415 COLL VENOUS BLD VENIPUNCTURE: CPT

## 2020-07-27 PROCEDURE — 83036 HEMOGLOBIN GLYCOSYLATED A1C: CPT

## 2020-08-05 ENCOUNTER — TELEPHONE (OUTPATIENT)
Dept: ADMINISTRATIVE | Facility: OTHER | Age: 70
End: 2020-08-05

## 2020-08-05 DIAGNOSIS — E11.22 TYPE 2 DIABETES MELLITUS WITH STAGE 2 CHRONIC KIDNEY DISEASE, WITHOUT LONG-TERM CURRENT USE OF INSULIN (HCC): Primary | Chronic | ICD-10-CM

## 2020-08-05 DIAGNOSIS — N18.2 TYPE 2 DIABETES MELLITUS WITH STAGE 2 CHRONIC KIDNEY DISEASE, WITHOUT LONG-TERM CURRENT USE OF INSULIN (HCC): Primary | Chronic | ICD-10-CM

## 2020-08-05 RX ORDER — REPAGLINIDE 2 MG/1
2 TABLET ORAL DAILY
Qty: 90 TABLET | Refills: 3 | Status: SHIPPED | OUTPATIENT
Start: 2020-08-05 | End: 2020-08-06 | Stop reason: SDUPTHER

## 2020-08-05 RX ORDER — MONTELUKAST SODIUM 10 MG/1
TABLET ORAL
COMMUNITY
Start: 2020-05-19 | End: 2020-08-17

## 2020-08-05 RX ORDER — OLOPATADINE HYDROCHLORIDE 1 MG/ML
1 SOLUTION/ DROPS OPHTHALMIC DAILY
COMMUNITY

## 2020-08-05 NOTE — TELEPHONE ENCOUNTER
Upon review of the In Basket request and the patient's chart, initial outreach has been made via fax, please see Contacts section for details  A second outreach attempt will be made within 5 business days      Thank you  Robert Masters

## 2020-08-05 NOTE — TELEPHONE ENCOUNTER
----- Message from John Wooten MA sent at 8/5/2020 10:45 AM EDT -----  Regarding: eye exam  08/05/20 10:45 AM    Hello, our patient Harinder Fermin has had Diabetic Eye Exam completed/performed   Please assist in updating the patient chart by making an External outreach to dr Elier Gutierrez  facility located in [facility] The date of service is 7/2020    Thank you,  CHITRA Campbell

## 2020-08-05 NOTE — LETTER
Diabetic Eye Exam Form    Date Requested: 20  Patient: Herb London  Patient : 1950   Referring Provider: Jesus Weiss, DO    Dilated Retinal Exam, Optomap-Iris Exam, or Fundus Photography Done         Yes ([place] one above)         No     Date of Diabetic Eye Exam _______2020 or last performed_________  Left Eye      Exam did show retinopathy    Exam did not show retinopathy         Mild       Moderate       None       Proliferative       Severe     Right Eye     Exam did show retinopathy    Exam did not show retinopathy         Mild       Moderate       None       Proliferative       Severe     Comments __________________________________________________________    Practice Providing Exam ______________________________________________    Exam Performed By (print name) _______________________________________      Provider Signature ___________________________________________________      These reports are needed for  compliance    Please fax this completed form and a copy of the Diabetic Eye Exam report to our office located at [place] [address] as soon as possible to [phone] attention Fabian: Phone [phone]    We thank you for your assistance in treating our mutual patient

## 2020-08-05 NOTE — LETTER
Diabetic Eye Exam Form    Date Requested: 20  Patient: Kyrie Du  Patient : 1950   Referring Provider: Bhaskar Araiza,     Dilated Retinal Exam, Optomap-Iris Exam, or Fundus Photography Done         Yes ([place] one above)         No     Date of Diabetic Eye Exam ________2020 or last performed________  Left Eye      Exam did show retinopathy    Exam did not show retinopathy         Mild       Moderate       None       Proliferative       Severe     Right Eye     Exam did show retinopathy    Exam did not show retinopathy         Mild       Moderate       None       Proliferative       Severe     Comments __________________________________________________________    Practice Providing Exam ______________________________________________    Exam Performed By (print name) _______________________________________      Provider Signature ___________________________________________________      These reports are needed for  compliance    Please fax this completed form and a copy of the Diabetic Eye Exam report to our office located at [place] [address] as soon as possible to [phone] attention Fabian: Phone [phone]    We thank you for your assistance in treating our mutual patient

## 2020-08-06 ENCOUNTER — OFFICE VISIT (OUTPATIENT)
Dept: INTERNAL MEDICINE CLINIC | Facility: CLINIC | Age: 70
End: 2020-08-06
Payer: MEDICARE

## 2020-08-06 VITALS
WEIGHT: 180.8 LBS | OXYGEN SATURATION: 100 % | DIASTOLIC BLOOD PRESSURE: 78 MMHG | HEART RATE: 80 BPM | HEIGHT: 64 IN | SYSTOLIC BLOOD PRESSURE: 122 MMHG | BODY MASS INDEX: 30.87 KG/M2 | TEMPERATURE: 97.4 F

## 2020-08-06 DIAGNOSIS — K21.9 GASTROESOPHAGEAL REFLUX DISEASE, ESOPHAGITIS PRESENCE NOT SPECIFIED: ICD-10-CM

## 2020-08-06 DIAGNOSIS — N18.2 TYPE 2 DIABETES MELLITUS WITH STAGE 2 CHRONIC KIDNEY DISEASE, WITHOUT LONG-TERM CURRENT USE OF INSULIN (HCC): Primary | Chronic | ICD-10-CM

## 2020-08-06 DIAGNOSIS — E11.22 TYPE 2 DIABETES MELLITUS WITH STAGE 2 CHRONIC KIDNEY DISEASE, WITHOUT LONG-TERM CURRENT USE OF INSULIN (HCC): Primary | Chronic | ICD-10-CM

## 2020-08-06 DIAGNOSIS — E78.2 MIXED HYPERLIPIDEMIA: Chronic | ICD-10-CM

## 2020-08-06 DIAGNOSIS — I10 BENIGN ESSENTIAL HYPERTENSION: Chronic | ICD-10-CM

## 2020-08-06 PROCEDURE — 1036F TOBACCO NON-USER: CPT | Performed by: INTERNAL MEDICINE

## 2020-08-06 PROCEDURE — 3066F NEPHROPATHY DOC TX: CPT | Performed by: INTERNAL MEDICINE

## 2020-08-06 PROCEDURE — 3074F SYST BP LT 130 MM HG: CPT | Performed by: INTERNAL MEDICINE

## 2020-08-06 PROCEDURE — 3008F BODY MASS INDEX DOCD: CPT | Performed by: INTERNAL MEDICINE

## 2020-08-06 PROCEDURE — 1160F RVW MEDS BY RX/DR IN RCRD: CPT | Performed by: INTERNAL MEDICINE

## 2020-08-06 PROCEDURE — 4040F PNEUMOC VAC/ADMIN/RCVD: CPT | Performed by: INTERNAL MEDICINE

## 2020-08-06 PROCEDURE — 3060F POS MICROALBUMINURIA REV: CPT | Performed by: INTERNAL MEDICINE

## 2020-08-06 PROCEDURE — 3078F DIAST BP <80 MM HG: CPT | Performed by: INTERNAL MEDICINE

## 2020-08-06 PROCEDURE — 3051F HG A1C>EQUAL 7.0%<8.0%: CPT | Performed by: INTERNAL MEDICINE

## 2020-08-06 PROCEDURE — 99214 OFFICE O/P EST MOD 30 MIN: CPT | Performed by: INTERNAL MEDICINE

## 2020-08-06 RX ORDER — REPAGLINIDE 2 MG/1
2 TABLET ORAL DAILY
Qty: 90 TABLET | Refills: 3 | Status: SHIPPED | OUTPATIENT
Start: 2020-08-06 | End: 2020-12-14 | Stop reason: SDUPTHER

## 2020-08-06 RX ORDER — OMEPRAZOLE 20 MG/1
20 CAPSULE, DELAYED RELEASE ORAL DAILY
Qty: 90 CAPSULE | Refills: 3 | Status: SHIPPED | OUTPATIENT
Start: 2020-08-06 | End: 2021-08-16

## 2020-08-06 NOTE — PROGRESS NOTES
[facility] [address] [place] [address]    NAME: Opal Faulkner  AGE: 79 y o  SEX: male  : 1950     DATE: 2020     Assessment and Plan:     1  Type 2 diabetes mellitus with stage 2 chronic kidney disease, without long-term current use of insulin (HCC)    Continue current oral hypoglycemics  A1c has improved from 9 0-7 8%  Will repeat labs in 3 months  Continue monitoring sugars 3 times daily  - repaglinide (PRANDIN) 2 mg tablet; Take 1 tablet (2 mg total) by mouth daily  Dispense: 90 tablet; Refill: 3  - Hemoglobin A1C; Future  - Basic metabolic panel; Future    2  Benign essential hypertension    Blood pressure is well controlled  Continue current antihypertensives  3  Mixed hyperlipidemia    Will check cholesterol before next visit  Continue current diet  - Lipid panel; Future    4  Gastroesophageal reflux disease, esophagitis presence not specified    Acid reflux is well controlled on PPI therapy  Will continue omeprazole  - omeprazole (PriLOSEC) 20 mg delayed release capsule; Take 1 capsule (20 mg total) by mouth daily  Dispense: 90 capsule; Refill: 3          Return in about 4 months (around 2020) for Subsequent AWV, Follow-up  Chief Complaint:     Chief Complaint   Patient presents with    Follow-up     4 month        History of Present Illness:     Patient presents for follow-up  Diabetes has been better controlled and he has been making a ever to check his blood sugars on a regular basis  He is taking repaglinide, glimepiride, and metformin  Pioglitazone was stopped due to increased risk of nose bleeds  A1c has improved from 9 0% to 7 8%  Recent fasting glucose was 112  He does realize what foods tend to spike his blood sugar  He denies any hypoglycemic episodes  Blood pressure remains well controlled on antihypertensives  He denies any recent cardiac symptoms  Cholesterol is been controlled on diet alone      No problems with acid reflux symptoms as of late  He recently saw ENT and had cauterization again due to nose bleeds  His nose bleeds have been much better controlled since they 1st started  He tries to use saline rinse on a regular basis to help keep his nose moisturized  Review of Systems:     Review of Systems   Constitutional: Negative for activity change, appetite change and fatigue  Respiratory: Negative for apnea, cough, chest tightness, shortness of breath and wheezing  Cardiovascular: Negative for chest pain, palpitations and leg swelling  Gastrointestinal: Negative for abdominal distention, abdominal pain, blood in stool, constipation, diarrhea, nausea and vomiting  Neurological: Negative for dizziness, weakness, light-headedness, numbness and headaches  Psychiatric/Behavioral: Negative for behavioral problems, confusion, hallucinations, sleep disturbance and suicidal ideas  The patient is not nervous/anxious  Objective:     /78 (BP Location: Left arm, Patient Position: Sitting, Cuff Size: Standard)   Pulse 80   Temp (!) 97 4 °F (36 3 °C) (Temporal) Comment: NO NSAIDS  Ht 5' 4 25" (1 632 m)   Wt 82 kg (180 lb 12 8 oz)   SpO2 100%   BMI 30 79 kg/m²     Physical Exam  Vitals signs reviewed  Constitutional:       General: He is not in acute distress  Appearance: He is well-developed  He is not diaphoretic  Eyes:      General: No scleral icterus  Right eye: No discharge  Left eye: No discharge  Conjunctiva/sclera: Conjunctivae normal    Neck:      Musculoskeletal: Neck supple  Thyroid: No thyromegaly  Vascular: No JVD  Cardiovascular:      Rate and Rhythm: Normal rate and regular rhythm  Pulses: Pulses are weak  Dorsalis pedis pulses are 1+ on the right side and 1+ on the left side  Posterior tibial pulses are 1+ on the right side and 1+ on the left side  Heart sounds: Normal heart sounds  No murmur     Pulmonary:      Effort: Pulmonary effort is normal  No respiratory distress  Breath sounds: Normal breath sounds  No wheezing or rales  Abdominal:      General: Bowel sounds are normal  There is no distension  Palpations: Abdomen is soft  Tenderness: There is no abdominal tenderness  Feet:      Right foot:      Skin integrity: No ulcer, skin breakdown, erythema, warmth, callus or dry skin  Left foot:      Skin integrity: No ulcer, skin breakdown, erythema, warmth, callus or dry skin  Lymphadenopathy:      Cervical: No cervical adenopathy  Skin:     General: Skin is warm and dry  Neurological:      Mental Status: He is alert and oriented to person, place, and time  Psychiatric:         Behavior: Behavior normal      Diabetic Foot Exam  Patient's shoes and socks removed  Right Foot/Ankle   Right Foot Inspection  Skin Exam: skin normal and skin intact no dry skin, no warmth, no callus, no erythema, no maceration, no abnormal color, no pre-ulcer, no ulcer and no callus                          Toe Exam: ROM and strength within normal limits and right toe deformity (calluses)  Sensory     Proprioception: intact   Monofilament testing: intact  Vascular  Capillary refills: < 3 seconds  The right DP pulse is 1+  The right PT pulse is 1+  Left Foot/Ankle  Left Foot Inspection  Skin Exam: skin normal and skin intactno dry skin, no warmth, no erythema, no maceration, normal color, no pre-ulcer, no ulcer and no callus                         Toe Exam: ROM and strength within normal limits and left toe deformity (calluses)                   Sensory     Proprioception: intact  Monofilament: intact  Vascular  Capillary refills: < 3 seconds  The left DP pulse is 1+  The left PT pulse is 1+  Assign Risk Category:  Deformity present;  No loss of protective sensation; Weak pulses       Risk: 1         David De La Garza DO  MEDICAL [id number]

## 2020-08-12 NOTE — TELEPHONE ENCOUNTER
Upon review of the In Basket request we were able to locate, review, and update the patient chart as requested for Diabetic Eye Exam     Any additional questions or concerns should be emailed to the Practice Liaisons via [email or web address] email, please do not reply via In Basket      Thank you  Frantz Townsend

## 2020-08-12 NOTE — TELEPHONE ENCOUNTER
As a follow-up, a second attempt has been made for outreach via fax, please see Contacts section for details  A third and final attempt will be made within 5 business days      Thank you  Esperanza Chamberlain

## 2020-08-16 DIAGNOSIS — J31.0 CHRONIC RHINITIS: Primary | ICD-10-CM

## 2020-08-17 RX ORDER — MONTELUKAST SODIUM 10 MG/1
TABLET ORAL
Qty: 90 TABLET | Refills: 3 | Status: SHIPPED | OUTPATIENT
Start: 2020-08-17 | End: 2020-11-24

## 2020-09-18 ENCOUNTER — TELEPHONE (OUTPATIENT)
Dept: GASTROENTEROLOGY | Facility: CLINIC | Age: 70
End: 2020-09-18

## 2020-09-19 DIAGNOSIS — N18.2 TYPE 2 DIABETES MELLITUS WITH STAGE 2 CHRONIC KIDNEY DISEASE, WITHOUT LONG-TERM CURRENT USE OF INSULIN (HCC): Chronic | ICD-10-CM

## 2020-09-19 DIAGNOSIS — M31.0 LEUKOCYTOCLASTIC VASCULITIS (HCC): Chronic | ICD-10-CM

## 2020-09-19 DIAGNOSIS — L21.9 SEBORRHEIC DERMATITIS: ICD-10-CM

## 2020-09-19 DIAGNOSIS — I10 BENIGN ESSENTIAL HYPERTENSION: Chronic | ICD-10-CM

## 2020-09-19 DIAGNOSIS — E11.22 TYPE 2 DIABETES MELLITUS WITH STAGE 2 CHRONIC KIDNEY DISEASE, WITHOUT LONG-TERM CURRENT USE OF INSULIN (HCC): Chronic | ICD-10-CM

## 2020-09-20 RX ORDER — BLOOD-GLUCOSE METER
KIT MISCELLANEOUS
Qty: 100 EACH | Refills: 5 | Status: SHIPPED | OUTPATIENT
Start: 2020-09-20 | End: 2020-11-24

## 2020-09-21 RX ORDER — KETOCONAZOLE 20 MG/G
CREAM TOPICAL
Qty: 30 G | Refills: 2 | Status: SHIPPED | OUTPATIENT
Start: 2020-09-21 | End: 2020-11-24

## 2020-09-23 ENCOUNTER — CLINICAL SUPPORT (OUTPATIENT)
Dept: INTERNAL MEDICINE CLINIC | Facility: CLINIC | Age: 70
End: 2020-09-23
Payer: MEDICARE

## 2020-09-23 VITALS — TEMPERATURE: 98.1 F

## 2020-09-23 DIAGNOSIS — Z23 NEED FOR VACCINATION: Primary | ICD-10-CM

## 2020-09-23 PROCEDURE — G0008 ADMIN INFLUENZA VIRUS VAC: HCPCS

## 2020-09-23 PROCEDURE — 90662 IIV NO PRSV INCREASED AG IM: CPT

## 2020-10-05 ENCOUNTER — TELEMEDICINE (OUTPATIENT)
Dept: INTERNAL MEDICINE CLINIC | Facility: CLINIC | Age: 70
End: 2020-10-05
Payer: MEDICARE

## 2020-10-05 ENCOUNTER — TELEPHONE (OUTPATIENT)
Dept: INTERNAL MEDICINE CLINIC | Facility: CLINIC | Age: 70
End: 2020-10-05

## 2020-10-05 VITALS — TEMPERATURE: 98.2 F

## 2020-10-05 DIAGNOSIS — R50.9 FEVER AND CHILLS: Primary | ICD-10-CM

## 2020-10-05 DIAGNOSIS — R10.9 ABDOMINAL PAIN, UNSPECIFIED ABDOMINAL LOCATION: ICD-10-CM

## 2020-10-05 DIAGNOSIS — R11.0 NAUSEA: ICD-10-CM

## 2020-10-05 DIAGNOSIS — R19.7 DIARRHEA, UNSPECIFIED TYPE: ICD-10-CM

## 2020-10-05 PROCEDURE — 99214 OFFICE O/P EST MOD 30 MIN: CPT | Performed by: INTERNAL MEDICINE

## 2020-10-07 DIAGNOSIS — R50.9 FEVER AND CHILLS: ICD-10-CM

## 2020-10-07 DIAGNOSIS — R19.7 DIARRHEA, UNSPECIFIED TYPE: ICD-10-CM

## 2020-10-07 DIAGNOSIS — R10.9 ABDOMINAL PAIN, UNSPECIFIED ABDOMINAL LOCATION: ICD-10-CM

## 2020-10-07 DIAGNOSIS — R11.0 NAUSEA: ICD-10-CM

## 2020-10-07 PROCEDURE — U0003 INFECTIOUS AGENT DETECTION BY NUCLEIC ACID (DNA OR RNA); SEVERE ACUTE RESPIRATORY SYNDROME CORONAVIRUS 2 (SARS-COV-2) (CORONAVIRUS DISEASE [COVID-19]), AMPLIFIED PROBE TECHNIQUE, MAKING USE OF HIGH THROUGHPUT TECHNOLOGIES AS DESCRIBED BY CMS-2020-01-R: HCPCS | Performed by: INTERNAL MEDICINE

## 2020-10-09 LAB — SARS-COV-2 RNA SPEC QL NAA+PROBE: NOT DETECTED

## 2020-10-10 ENCOUNTER — TELEPHONE (OUTPATIENT)
Dept: OTHER | Facility: HOSPITAL | Age: 70
End: 2020-10-10

## 2020-11-01 DIAGNOSIS — N18.2 TYPE 2 DIABETES MELLITUS WITH STAGE 2 CHRONIC KIDNEY DISEASE, WITHOUT LONG-TERM CURRENT USE OF INSULIN (HCC): Chronic | ICD-10-CM

## 2020-11-01 DIAGNOSIS — E11.22 TYPE 2 DIABETES MELLITUS WITH STAGE 2 CHRONIC KIDNEY DISEASE, WITHOUT LONG-TERM CURRENT USE OF INSULIN (HCC): Chronic | ICD-10-CM

## 2020-11-01 RX ORDER — METFORMIN HYDROCHLORIDE 500 MG/1
TABLET, EXTENDED RELEASE ORAL
Qty: 360 TABLET | Refills: 1 | Status: SHIPPED | OUTPATIENT
Start: 2020-11-01 | End: 2020-11-24

## 2020-11-20 ENCOUNTER — LAB (OUTPATIENT)
Dept: LAB | Facility: CLINIC | Age: 70
End: 2020-11-20
Payer: MEDICARE

## 2020-11-20 DIAGNOSIS — N18.2 TYPE 2 DIABETES MELLITUS WITH STAGE 2 CHRONIC KIDNEY DISEASE, WITHOUT LONG-TERM CURRENT USE OF INSULIN (HCC): Chronic | ICD-10-CM

## 2020-11-20 DIAGNOSIS — E78.2 MIXED HYPERLIPIDEMIA: Chronic | ICD-10-CM

## 2020-11-20 DIAGNOSIS — E11.22 TYPE 2 DIABETES MELLITUS WITH STAGE 2 CHRONIC KIDNEY DISEASE, WITHOUT LONG-TERM CURRENT USE OF INSULIN (HCC): Chronic | ICD-10-CM

## 2020-11-20 LAB
ANION GAP SERPL CALCULATED.3IONS-SCNC: 3 MMOL/L (ref 4–13)
BUN SERPL-MCNC: 15 MG/DL (ref 5–25)
CALCIUM SERPL-MCNC: 9.6 MG/DL (ref 8.3–10.1)
CHLORIDE SERPL-SCNC: 109 MMOL/L (ref 100–108)
CHOLEST SERPL-MCNC: 158 MG/DL (ref 50–200)
CO2 SERPL-SCNC: 29 MMOL/L (ref 21–32)
CREAT SERPL-MCNC: 1.14 MG/DL (ref 0.6–1.3)
EST. AVERAGE GLUCOSE BLD GHB EST-MCNC: 183 MG/DL
GFR SERPL CREATININE-BSD FRML MDRD: 65 ML/MIN/1.73SQ M
GLUCOSE P FAST SERPL-MCNC: 153 MG/DL (ref 65–99)
HBA1C MFR BLD: 8 %
HDLC SERPL-MCNC: 40 MG/DL
LDLC SERPL CALC-MCNC: 83 MG/DL (ref 0–100)
NONHDLC SERPL-MCNC: 118 MG/DL
POTASSIUM SERPL-SCNC: 4.1 MMOL/L (ref 3.5–5.3)
SODIUM SERPL-SCNC: 141 MMOL/L (ref 136–145)
TRIGL SERPL-MCNC: 174 MG/DL

## 2020-11-20 PROCEDURE — 36415 COLL VENOUS BLD VENIPUNCTURE: CPT

## 2020-11-20 PROCEDURE — 80048 BASIC METABOLIC PNL TOTAL CA: CPT

## 2020-11-20 PROCEDURE — 83036 HEMOGLOBIN GLYCOSYLATED A1C: CPT

## 2020-11-20 PROCEDURE — 80061 LIPID PANEL: CPT

## 2020-11-24 ENCOUNTER — OFFICE VISIT (OUTPATIENT)
Dept: GASTROENTEROLOGY | Facility: CLINIC | Age: 70
End: 2020-11-24
Payer: MEDICARE

## 2020-11-24 VITALS
DIASTOLIC BLOOD PRESSURE: 72 MMHG | BODY MASS INDEX: 30.38 KG/M2 | SYSTOLIC BLOOD PRESSURE: 134 MMHG | WEIGHT: 178.38 LBS | HEART RATE: 78 BPM

## 2020-11-24 DIAGNOSIS — K21.9 GASTROESOPHAGEAL REFLUX DISEASE WITHOUT ESOPHAGITIS: Primary | Chronic | ICD-10-CM

## 2020-11-24 PROCEDURE — 99204 OFFICE O/P NEW MOD 45 MIN: CPT | Performed by: INTERNAL MEDICINE

## 2020-11-27 ENCOUNTER — ANESTHESIA EVENT (OUTPATIENT)
Dept: GASTROENTEROLOGY | Facility: HOSPITAL | Age: 70
End: 2020-11-27

## 2020-11-27 ENCOUNTER — HOSPITAL ENCOUNTER (OUTPATIENT)
Dept: GASTROENTEROLOGY | Facility: HOSPITAL | Age: 70
Setting detail: OUTPATIENT SURGERY
Discharge: HOME | End: 2020-11-27
Attending: INTERNAL MEDICINE | Admitting: INTERNAL MEDICINE
Payer: MEDICARE

## 2020-11-27 ENCOUNTER — ANESTHESIA (OUTPATIENT)
Dept: GASTROENTEROLOGY | Facility: HOSPITAL | Age: 70
End: 2020-11-27

## 2020-11-27 VITALS
SYSTOLIC BLOOD PRESSURE: 103 MMHG | BODY MASS INDEX: 29.4 KG/M2 | HEIGHT: 64 IN | WEIGHT: 172.18 LBS | HEART RATE: 77 BPM | DIASTOLIC BLOOD PRESSURE: 70 MMHG | RESPIRATION RATE: 19 BRPM | OXYGEN SATURATION: 94 % | TEMPERATURE: 98.1 F

## 2020-11-27 VITALS — HEART RATE: 79 BPM

## 2020-11-27 DIAGNOSIS — K21.9 GASTROESOPHAGEAL REFLUX DISEASE WITHOUT ESOPHAGITIS: ICD-10-CM

## 2020-11-27 LAB — GLUCOSE SERPL-MCNC: 178 MG/DL (ref 65–140)

## 2020-11-27 PROCEDURE — 82948 REAGENT STRIP/BLOOD GLUCOSE: CPT

## 2020-11-27 PROCEDURE — 43239 EGD BIOPSY SINGLE/MULTIPLE: CPT | Performed by: INTERNAL MEDICINE

## 2020-11-27 PROCEDURE — 88305 TISSUE EXAM BY PATHOLOGIST: CPT | Performed by: PATHOLOGY

## 2020-11-27 RX ORDER — SODIUM CHLORIDE, SODIUM LACTATE, POTASSIUM CHLORIDE, CALCIUM CHLORIDE 600; 310; 30; 20 MG/100ML; MG/100ML; MG/100ML; MG/100ML
125 INJECTION, SOLUTION INTRAVENOUS CONTINUOUS
Status: DISCONTINUED | OUTPATIENT
Start: 2020-11-27 | End: 2020-12-01 | Stop reason: HOSPADM

## 2020-11-27 RX ORDER — PROPOFOL 10 MG/ML
INJECTION, EMULSION INTRAVENOUS AS NEEDED
Status: DISCONTINUED | OUTPATIENT
Start: 2020-11-27 | End: 2020-11-27

## 2020-11-27 RX ORDER — SODIUM CHLORIDE, SODIUM LACTATE, POTASSIUM CHLORIDE, CALCIUM CHLORIDE 600; 310; 30; 20 MG/100ML; MG/100ML; MG/100ML; MG/100ML
INJECTION, SOLUTION INTRAVENOUS CONTINUOUS PRN
Status: DISCONTINUED | OUTPATIENT
Start: 2020-11-27 | End: 2020-11-27

## 2020-11-27 RX ADMIN — SODIUM CHLORIDE, SODIUM LACTATE, POTASSIUM CHLORIDE, AND CALCIUM CHLORIDE: .6; .31; .03; .02 INJECTION, SOLUTION INTRAVENOUS at 10:47

## 2020-11-27 RX ADMIN — PROPOFOL 150 MG: 10 INJECTION, EMULSION INTRAVENOUS at 10:51

## 2020-12-08 DIAGNOSIS — N52.1 ERECTILE DYSFUNCTION DUE TO DISEASES CLASSIFIED ELSEWHERE: ICD-10-CM

## 2020-12-08 RX ORDER — SILDENAFIL 50 MG/1
TABLET, FILM COATED ORAL
Qty: 6 TABLET | Refills: 9 | Status: SHIPPED | OUTPATIENT
Start: 2020-12-08

## 2020-12-14 ENCOUNTER — OFFICE VISIT (OUTPATIENT)
Dept: INTERNAL MEDICINE CLINIC | Facility: CLINIC | Age: 70
End: 2020-12-14
Payer: MEDICARE

## 2020-12-14 DIAGNOSIS — E11.22 TYPE 2 DIABETES MELLITUS WITH STAGE 2 CHRONIC KIDNEY DISEASE, WITHOUT LONG-TERM CURRENT USE OF INSULIN (HCC): Chronic | ICD-10-CM

## 2020-12-14 DIAGNOSIS — Z87.892 HISTORY OF ANAPHYLAXIS: ICD-10-CM

## 2020-12-14 DIAGNOSIS — N18.2 TYPE 2 DIABETES MELLITUS WITH STAGE 2 CHRONIC KIDNEY DISEASE, WITHOUT LONG-TERM CURRENT USE OF INSULIN (HCC): Chronic | ICD-10-CM

## 2020-12-14 DIAGNOSIS — N18.2 TYPE 2 DIABETES MELLITUS WITH STAGE 2 CHRONIC KIDNEY DISEASE, WITHOUT LONG-TERM CURRENT USE OF INSULIN (HCC): Primary | Chronic | ICD-10-CM

## 2020-12-14 DIAGNOSIS — I10 BENIGN ESSENTIAL HYPERTENSION: Chronic | ICD-10-CM

## 2020-12-14 DIAGNOSIS — Z00.00 MEDICARE ANNUAL WELLNESS VISIT, SUBSEQUENT: ICD-10-CM

## 2020-12-14 DIAGNOSIS — E78.2 MIXED HYPERLIPIDEMIA: Chronic | ICD-10-CM

## 2020-12-14 DIAGNOSIS — E11.22 TYPE 2 DIABETES MELLITUS WITH STAGE 2 CHRONIC KIDNEY DISEASE, WITHOUT LONG-TERM CURRENT USE OF INSULIN (HCC): Primary | Chronic | ICD-10-CM

## 2020-12-14 PROCEDURE — 99214 OFFICE O/P EST MOD 30 MIN: CPT | Performed by: INTERNAL MEDICINE

## 2020-12-14 PROCEDURE — G0439 PPPS, SUBSEQ VISIT: HCPCS | Performed by: INTERNAL MEDICINE

## 2020-12-14 RX ORDER — EPINEPHRINE 0.3 MG/.3ML
0.3 INJECTION SUBCUTANEOUS ONCE
Qty: 0.6 ML | Refills: 0 | Status: SHIPPED | OUTPATIENT
Start: 2020-12-14 | End: 2020-12-23

## 2020-12-14 RX ORDER — METFORMIN HYDROCHLORIDE 500 MG/1
1000 TABLET, EXTENDED RELEASE ORAL 2 TIMES DAILY WITH MEALS
Qty: 360 TABLET | Refills: 1 | COMMUNITY
Start: 2020-12-14 | End: 2020-12-15

## 2020-12-14 RX ORDER — BLOOD SUGAR DIAGNOSTIC
STRIP MISCELLANEOUS
Qty: 200 EACH | Refills: 3 | Status: SHIPPED | OUTPATIENT
Start: 2020-12-14 | End: 2020-12-28 | Stop reason: SDUPTHER

## 2020-12-14 RX ORDER — REPAGLINIDE 2 MG/1
1 TABLET ORAL
Qty: 180 TABLET | Refills: 3 | Status: SHIPPED | OUTPATIENT
Start: 2020-12-14 | End: 2021-08-30 | Stop reason: SDUPTHER

## 2020-12-15 ENCOUNTER — TELEPHONE (OUTPATIENT)
Dept: INTERNAL MEDICINE CLINIC | Facility: CLINIC | Age: 70
End: 2020-12-15

## 2020-12-15 VITALS
HEART RATE: 89 BPM | HEIGHT: 64 IN | OXYGEN SATURATION: 97 % | SYSTOLIC BLOOD PRESSURE: 130 MMHG | BODY MASS INDEX: 30.22 KG/M2 | TEMPERATURE: 98.3 F | DIASTOLIC BLOOD PRESSURE: 70 MMHG | WEIGHT: 177 LBS

## 2020-12-15 DIAGNOSIS — N18.2 TYPE 2 DIABETES MELLITUS WITH STAGE 2 CHRONIC KIDNEY DISEASE, WITHOUT LONG-TERM CURRENT USE OF INSULIN (HCC): Chronic | ICD-10-CM

## 2020-12-15 DIAGNOSIS — E11.22 TYPE 2 DIABETES MELLITUS WITH STAGE 2 CHRONIC KIDNEY DISEASE, WITHOUT LONG-TERM CURRENT USE OF INSULIN (HCC): Chronic | ICD-10-CM

## 2020-12-15 RX ORDER — METFORMIN HYDROCHLORIDE 500 MG/1
TABLET, EXTENDED RELEASE ORAL
Qty: 360 TABLET | Refills: 3 | Status: SHIPPED | OUTPATIENT
Start: 2020-12-15 | End: 2021-05-18 | Stop reason: SDUPTHER

## 2020-12-16 ENCOUNTER — TELEPHONE (OUTPATIENT)
Dept: INTERNAL MEDICINE CLINIC | Facility: CLINIC | Age: 70
End: 2020-12-16

## 2020-12-20 ENCOUNTER — TELEPHONE (OUTPATIENT)
Dept: OTHER | Facility: OTHER | Age: 70
End: 2020-12-20

## 2020-12-22 ENCOUNTER — TELEPHONE (OUTPATIENT)
Dept: INTERNAL MEDICINE CLINIC | Facility: CLINIC | Age: 70
End: 2020-12-22

## 2020-12-23 ENCOUNTER — TELEPHONE (OUTPATIENT)
Dept: INTERNAL MEDICINE CLINIC | Facility: CLINIC | Age: 70
End: 2020-12-23

## 2020-12-23 DIAGNOSIS — Z87.892 HISTORY OF ANAPHYLAXIS: ICD-10-CM

## 2020-12-23 DIAGNOSIS — Z87.892 HISTORY OF ANAPHYLAXIS: Primary | ICD-10-CM

## 2020-12-23 RX ORDER — EPINEPHRINE 0.3 MG/.3ML
0.3 INJECTION SUBCUTANEOUS ONCE
Qty: 0.6 ML | Refills: 0 | Status: SHIPPED | OUTPATIENT
Start: 2020-12-23 | End: 2021-11-22 | Stop reason: SDUPTHER

## 2020-12-23 RX ORDER — EPINEPHRINE 0.15 MG/.3ML
0.15 INJECTION INTRAMUSCULAR ONCE
Qty: 0.3 ML | Refills: 0 | Status: SHIPPED | OUTPATIENT
Start: 2020-12-23 | End: 2020-12-23

## 2020-12-23 NOTE — TELEPHONE ENCOUNTER
Epinephrine injection was denied by insurance com  Please advise    Test strips are still in process

## 2020-12-24 RX ORDER — BLOOD SUGAR DIAGNOSTIC
STRIP MISCELLANEOUS
Refills: 3 | OUTPATIENT
Start: 2020-12-24

## 2020-12-28 ENCOUNTER — TELEPHONE (OUTPATIENT)
Dept: INTERNAL MEDICINE CLINIC | Facility: CLINIC | Age: 70
End: 2020-12-28

## 2020-12-28 DIAGNOSIS — N18.2 TYPE 2 DIABETES MELLITUS WITH STAGE 2 CHRONIC KIDNEY DISEASE, WITHOUT LONG-TERM CURRENT USE OF INSULIN (HCC): Chronic | ICD-10-CM

## 2020-12-28 DIAGNOSIS — E11.22 TYPE 2 DIABETES MELLITUS WITH STAGE 2 CHRONIC KIDNEY DISEASE, WITHOUT LONG-TERM CURRENT USE OF INSULIN (HCC): Chronic | ICD-10-CM

## 2020-12-28 RX ORDER — BLOOD-GLUCOSE METER
KIT MISCELLANEOUS
Qty: 100 EACH | Refills: 3 | Status: SHIPPED | OUTPATIENT
Start: 2020-12-28 | End: 2021-03-29 | Stop reason: SDUPTHER

## 2020-12-28 RX ORDER — BLOOD SUGAR DIAGNOSTIC
STRIP MISCELLANEOUS
Qty: 100 EACH | Refills: 3 | Status: SHIPPED | OUTPATIENT
Start: 2020-12-28 | End: 2021-04-19 | Stop reason: CLARIF

## 2021-01-05 ENCOUNTER — OFFICE VISIT (OUTPATIENT)
Dept: GASTROENTEROLOGY | Facility: CLINIC | Age: 71
End: 2021-01-05
Payer: MEDICARE

## 2021-01-05 VITALS
DIASTOLIC BLOOD PRESSURE: 74 MMHG | WEIGHT: 178.38 LBS | SYSTOLIC BLOOD PRESSURE: 136 MMHG | BODY MASS INDEX: 30.62 KG/M2

## 2021-01-05 DIAGNOSIS — K21.9 GASTROESOPHAGEAL REFLUX DISEASE WITHOUT ESOPHAGITIS: Primary | Chronic | ICD-10-CM

## 2021-01-05 DIAGNOSIS — R09.89 THROAT CLEARING: ICD-10-CM

## 2021-01-05 PROCEDURE — 99214 OFFICE O/P EST MOD 30 MIN: CPT | Performed by: INTERNAL MEDICINE

## 2021-01-05 NOTE — PROGRESS NOTES
Follow-up Note -  [facility]  Chavo Mahan 1950 79 y o  male     ASSESSMENT @ PLAN:   He is a 77-year-old male with gastroesophageal reflux disease longstanding with much improved cough and throat clearing likely secondary to elevating the head of the bed and not eating late at night  His endoscopy on November 27 showed visualized Starr' with  Negative pathology  1 he will continue on the omeprazole 20 mg now  I question whether not he will be successful off of it  He thinks he will stay on it for the winter and may be try to come off of it in the summer    2 reflux precautions were reviewed with the patient    Reason:  GERD    HPI:  He is a 77-year-old male with longstanding gastroesophageal disease  When I saw him in consultation he was really struggling with throat clearing and cough  He is much improved  Over the last 2 months he has not been eating after 8:00 p m  and has elevated the head of the bed in these 2 measures alone have helped him greatly  He still on the omeprazole 20 mg daily and he is taking this when he wakes up an hour before eating  He reports less heartburn regurgitation no dysphagia no nausea vomiting or abdominal pain  He had endoscopy with me on November 27th which showed visualized Starr's and the biopsies were negative  This is a 2nd procedure with negative pathology so he really just has a greatly irregular GE junction or Z-line  REVIEW OF SYSTEMS:     CONSTITUTIONAL: Denies any fever, chills, or rigors  Good appetite, and no recent weight loss  HEENT: No earache or tinnitus  Denies hearing loss or visual disturbances  CARDIOVASCULAR: No chest pain or palpitations  RESPIRATORY: Denies any cough, hemoptysis, shortness of breath or dyspnea on exertion  GASTROINTESTINAL: As noted in the History of Present Illness  GENITOURINARY: No problems with urination  Denies any hematuria or dysuria    NEUROLOGIC: No dizziness or vertigo, denies headaches  MUSCULOSKELETAL: Denies any muscle or joint pain  SKIN: Denies skin rashes or itching  ENDOCRINE: Denies excessive thirst  Denies intolerance to heat or cold  PSYCHOSOCIAL: Denies depression or anxiety  Denies any recent memory loss  Past Medical History:   Diagnosis Date    Actinic keratosis     Adhesive capsulitis of unspecified shoulder     Anxiety     Asthma     Atopic dermatitis     Cardiac disease     Cellulitis of right upper extremity     Cervical radiculopathy     Chest pain     Chronic maxillary sinusitis     last assessed 01/21/14    Diabetes mellitus ([facility] [address] )     Erectile dysfunction of non-organic origin     Esophageal reflux     Gout     last assessed 08/26/15    Hearing loss, conductive     Heart murmur     Mitral Valve disorder    Herpes zoster     History of paroxysmal supraventricular tachycardia     Hypertension     Mitral valve disorder     Nasal congestion 4/18/19    Neoplasm of skin, malignant     Non-melanoma skin cancer     last assessed 10/26/17    Nosebleed 4/18/19    Obesity     Palpitations     last assessed 03/05/15    Paroxysmal supraventricular tachycardia ([facility] [address] ) 1/21/2014    Plantar fasciitis     last assessed 05/28/15    [facility]ox[facility]mal [facility]ricular tachycardia) ([facility])     Sciatica     Sleep apnea     Squamous cell carcinoma of skin of scalp     Tinnitus, unspecified ear     Type 2 diabetes mellitus with hyperglycemia ([facility] [address] )     last assessed 69/93/85    Umbilical hernia     Worms in stool     last assessed 08/26/15      Past Surgical History:   Procedure Laterality Date    APPENDECTOMY      COLECTOMY      Partial, Sigmoid    COLONOSCOPY      HEAD & NECK SKIN LESION EXCISIONAL BIOPSY      10/11/10 SCC --  10/17/11 SCC  --  Location Scalp    HERNIA REPAIR      NASAL/SINUS ENDOSCOPY N/A 8/6/2019    Procedure: FUNCTIONAL ENDOSCOPIC SINUS SURGERY (FESS) IMAGED GUIDED for control of epistaxis;   Surgeon: Bonita Cheek MD; Location:  MAIN OR;  Service: ENT    CT ESOPHAGOGASTRODUODENOSCOPY TRANSORAL DIAGNOSTIC N/A 2017    Procedure: ESOPHAGOGASTRODUODENOSCOPY (EGD); Surgeon: Dasha Oliva MD;  Location: [facility]; Service: Gastroenterology    SKIN BIOPSY      TONSILLECTOMY       Social History     Socioeconomic History    Marital status: /Civil Union     Spouse name: Not on file    Number of children: Not on file    Years of education: Not on file    Highest education level: Not on file   Occupational History    Occupation: Working Part Time    Social Needs    Financial resource strain: Not on file    Food insecurity     Worry: Not on file     Inability: Not on file   [employer] needs     Medical: Not on file     Non-medical: Not on file   Tobacco Use    Smoking status: Former Smoker     Packs/day: 1 00     Years: 10      Pack years: 10 00     Types: Cigarettes     Quit date:      Years since quittin 0    Smokeless tobacco: Never Used   Substance and Sexual Activity    Alcohol use: Yes     Frequency: 2-3 times a week     Drinks per session: 1 or 2     Binge frequency: Never     Comment: only occasionally    Drug use: No    Sexual activity: Yes     Partners: Female   Lifestyle    Physical activity     Days per week: 0 days     Minutes per session: 0 min    Stress:  Only a little   Relationships    Social connections     Talks on phone: Not on file     Gets together: Not on file     Attends [employer] service: Not on file     Active member of club or organization: Not on file     Attends meetings of clubs or organizations: Not on file     Relationship status: Not on file    Intimate partner violence     Fear of current or ex partner: Not on file     Emotionally abused: Not on file     Physically abused: Not on file     Forced sexual activity: Not on file   Other Topics Concern    Not on file   Social History Narrative    Active Advance Directive     Family History   Problem Relation Age of Onset    Cancer Mother         Breast + Skin    Diabetes Mother     Heart disease Mother     Heart attack Mother     Breast cancer Mother         Adenocarcinoma    Skin cancer Mother         Adenocarcinoma    Heart failure Mother     Diabetes type II Mother     Varicose Veins Mother         Lower Extremities    Cancer Father         Prostate + Skin    Prostate cancer Father         Adenocarcinoma    Skin cancer Father     Lymphoma Father         Non-Hodgkin's    Arthritis Family      Iodinated diagnostic agents, Shellfish allergy, Shellfish-derived products, Shrimp extract allergy skin test, and Empagliflozin  Current Outpatient Medications   Medication Sig Dispense Refill    bacitracin topical ointment 500 units/g topical ointment Apply 1 large application topically 2 (two) times a day 28 g 0    clotrimazole-betamethasone (LOTRISONE) 1-0 05 % cream Apply topically as needed       fexofenadine (ALLEGRA) 180 MG tablet Take 1 tablet (180 mg total) by mouth daily 90 tablet 1    glimepiride (AMARYL) 4 mg tablet Take 1 tablet (4 mg total) by mouth 2 (two) times a day 180 tablet 3    glucose blood (Contour Next Test) test strip Check blood sugars once daily 100 each 3    glucose blood (FREESTYLE LITE) test strip PT TO TEST BLOOD SUGAR ONCE DAILY DX:E11 22 100 each 3    LORazepam (ATIVAN) 1 mg tablet Take 1 tablet (1 mg total) by mouth as needed for anxiety 90 tablet 0    metFORMIN (GLUCOPHAGE-XR) 500 mg 24 hr tablet TAKE 2 TABLETS BY MOUTH TWICE A DAY WITH FOOD 360 tablet 3    olopatadine (PATANOL) 0 1 % ophthalmic solution Administer 1 drop to both eyes daily       omeprazole (PriLOSEC) 20 mg delayed release capsule Take 1 capsule (20 mg total) by mouth daily 90 capsule 3    repaglinide (PRANDIN) 2 mg tablet Take 0 5 tablets (1 mg total) by mouth 2 (two) times a day before meals 180 tablet 3    sildenafil (VIAGRA) 50 MG tablet TAKE 1 TABLET BY MOUTH AS NEEDED FOR ERECTILE DYSFUNCTION 6 tablet 9    sodium chloride (OCEAN) 0 65 % nasal spray 1 spray into each nostril daily       triamcinolone (KENALOG) 0 025 % cream Apply topically as needed       verapamil (VERELAN PM) 180 MG 24 hr capsule TAKE 1 CAPSULE (180 MG TOTAL) BY MOUTH 2 (TWO) TIMES A  capsule 3    EPINEPHrine (EPIPEN) 0 3 mg/0 3 mL SOAJ Inject 0 3 mL (0 3 mg total) into a muscle once for 1 dose 0 6 mL 0     No current facility-administered medications for this visit  Blood pressure 136/74, weight 80 9 kg (178 lb 6 oz)  PHYSICAL EXAM:     General Appearance:   Alert, cooperative, no distress, appears stated age    HEENT:   Normocephalic, atraumatic, anicteric      Neck:  Supple, symmetrical, trachea midline, no adenopathy;    thyroid: no enlargement/tenderness/nodules; no carotid  bruit or JVD    Lungs:   Clear to auscultation bilaterally; no rales, rhonchi or wheezing; respirations unlabored    Heart[de-identified]   S1 and S2 normal; regular rate and rhythm; no murmur, rub, or gallop     Abdomen:   Soft, non-tender, non-distended; normal bowel sounds; no masses, no organomegaly    Genitalia:   Deferred    Rectal:   Deferred    Extremities:  No cyanosis, clubbing or edema    Pulses:  2+ and symmetric all extremities    Skin:  Skin color, texture, turgor normal, no rashes or lesions    Lymph nodes:  No palpable cervical, axillary or inguinal lymphadenopathy        Lab Results   Component Value Date    WBC 9 40 03/24/2020    HGB 15 1 03/24/2020    HCT 45 9 03/24/2020    MCV 91 03/24/2020     03/24/2020     Lab Results   Component Value Date    GLUCOSE 139 (H) 08/19/2015    CALCIUM 9 6 11/20/2020     08/19/2015    K 4 1 11/20/2020    CO2 29 11/20/2020     (H) 11/20/2020    BUN 15 11/20/2020    CREATININE 1 14 11/20/2020     Lab Results   Component Value Date    ALT 19 10/01/2019    AST 11 10/01/2019    ALKPHOS 59 10/01/2019    BILITOT 0 8 08/19/2015     Lab Results   Component Value Date    INR 0 99 08/06/2019 PROTIME 12 7 08/06/2019

## 2021-01-20 ENCOUNTER — VBI (OUTPATIENT)
Dept: ADMINISTRATIVE | Facility: OTHER | Age: 71
End: 2021-01-20

## 2021-01-28 ENCOUNTER — TELEPHONE (OUTPATIENT)
Dept: INTERNAL MEDICINE CLINIC | Facility: CLINIC | Age: 71
End: 2021-01-28

## 2021-01-28 DIAGNOSIS — J30.2 SEASONAL ALLERGIES: Primary | ICD-10-CM

## 2021-01-28 RX ORDER — MONTELUKAST SODIUM 10 MG/1
10 TABLET ORAL
Qty: 90 TABLET | Refills: 3 | Status: SHIPPED | OUTPATIENT
Start: 2021-01-28 | End: 2022-02-07 | Stop reason: SDUPTHER

## 2021-02-23 ENCOUNTER — OFFICE VISIT (OUTPATIENT)
Dept: CARDIOLOGY CLINIC | Facility: CLINIC | Age: 71
End: 2021-02-23
Payer: MEDICARE

## 2021-02-23 VITALS
DIASTOLIC BLOOD PRESSURE: 80 MMHG | OXYGEN SATURATION: 97 % | HEART RATE: 80 BPM | SYSTOLIC BLOOD PRESSURE: 138 MMHG | WEIGHT: 181 LBS | BODY MASS INDEX: 30.9 KG/M2 | HEIGHT: 64 IN

## 2021-02-23 DIAGNOSIS — F41.9 ANXIETY DISORDER, UNSPECIFIED TYPE: ICD-10-CM

## 2021-02-23 DIAGNOSIS — E78.00 HYPERCHOLESTEROLEMIA: ICD-10-CM

## 2021-02-23 DIAGNOSIS — I10 BENIGN ESSENTIAL HYPERTENSION: Primary | ICD-10-CM

## 2021-02-23 DIAGNOSIS — I47.1 PAROXYSMAL SUPRAVENTRICULAR TACHYCARDIA (HCC): ICD-10-CM

## 2021-02-23 DIAGNOSIS — I05.9 MITRAL VALVE DISORDER: ICD-10-CM

## 2021-02-23 PROCEDURE — 99213 OFFICE O/P EST LOW 20 MIN: CPT | Performed by: INTERNAL MEDICINE

## 2021-02-23 NOTE — PROGRESS NOTES
[facility] [address] [address]  Cardiology Follow Up    Alice Lewis  1950  [id number]      1  Benign essential hypertension     2  Paroxysmal supraventricular tachycardia ([address] )     3  Hypercholesterolemia     4  Mitral valve disorder     5  Anxiety disorder, unspecified type         Chief Complaint   Patient presents with    Follow-up       Interval History:   Patient presents for follow-up visit  Patient denies any history of chest pain shortness of breath  Patient denies any history of leg edema or orthopnea PND  No history of presyncope syncope  Patient states compliance with the present list of medications  No history of palpitations    Patient is hoping  to get his COVID vaccine soon    Patient Active Problem List   Diagnosis    Actinic keratosis    Seborrheic keratosis    History of skin cancer    Anxiety disorder    Benign essential hypertension    Erectile dysfunction of non-organic origin    Esophageal reflux    Mixed hyperlipidemia    Mitral valve disorder    Rosacea    Sleep apnea    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    Thoracic radiculopathy    Type 2 diabetes mellitus with stage 2 chronic kidney disease, without long-term current use of insulin (HCC)    IgA mediated leukocytoclastic vasculitis (HCC)    Henoch-Schonlein purpura (HCC)    Mild 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 (HCC)    Throat clearing     Past Medical History:   Diagnosis Date    Actinic keratosis     Adhesive capsulitis of unspecified shoulder     Anxiety     Asthma     Atopic dermatitis     Cardiac disease     Cellulitis of right upper extremity     Cervical radiculopathy     Chest pain     Chronic maxillary sinusitis     last assessed 01/21/14    Diabetes mellitus ([address] )     Erectile dysfunction of non-organic origin     Esophageal reflux     Gout     last assessed 08/26/15    Hearing loss, conductive     Heart murmur     Mitral Valve disorder    Herpes zoster     History of paroxysmal supraventricular tachycardia     Hypertension     Mitral valve disorder     Nasal congestion 19    Neoplasm of skin, malignant     Non-melanoma skin cancer     last assessed 10/26/17    Nosebleed 19    Obesity     Palpitations     last assessed 03/05/15    Paroxysmal supraventricular tachycardia ([facility] ) 2014    Plantar fasciitis     last assessed 05/28/15    [facility]     Sciatica     Sleep apnea     Squamous cell carcinoma of skin of scalp     Tinnitus, unspecified ear     Type 2 diabetes mellitus with hyperglycemia ([facility] )     last assessed     Umbilical hernia     Worms in stool     last assessed 08/26/15     Social History     Socioeconomic History    Marital status: /Civil Union     Spouse name: Not on file    Number of children: Not on file    Years of education: Not on file    Highest education level: Not on file   Occupational History    Occupation: Working Part Time    Social Needs    Financial resource strain: Not on file    Food insecurity     Worry: Not on file     Inability: Not on file   [employer] needs     Medical: Not on file     Non-medical: Not on file   Tobacco Use    Smoking status: Former Smoker     Packs/day: 1 00     Years: 10 00     Pack years: 10 00     Types: Cigarettes     Quit date:      Years since quittin 1    Smokeless tobacco: Never Used   Substance and Sexual Activity    Alcohol use: Yes     Frequency: 2-3 times a week     Drinks per session: 1 or 2     Binge frequency: Never     Comment: only occasionally    Drug use: No    Sexual activity: Yes     Partners: Female   Lifestyle    Physical activity     Days per week: 0 days     Minutes per session: 0 min    Stress:  Only a little   Relationships    Social connections     Talks on phone: Not on file     Gets together: Not on file     Attends [employer] service: Not on file     Active member of club or organization: Not on file     Attends meetings of clubs or organizations: Not on file     Relationship status: Not on file    Intimate partner violence     Fear of current or ex partner: Not on file     Emotionally abused: Not on file     Physically abused: Not on file     Forced sexual activity: Not on file   Other Topics Concern    Not on file   Social History Narrative    Active Advance Directive      Family History   Problem Relation Age of Onset    Cancer Mother         Breast + Skin    Diabetes Mother     Heart disease Mother     Heart attack Mother     Breast cancer Mother         Adenocarcinoma    Skin cancer Mother         Adenocarcinoma    Heart failure Mother     Diabetes type II Mother     Varicose Veins Mother         Lower Extremities    Cancer Father         Prostate + Skin    Prostate cancer Father         Adenocarcinoma    Skin cancer Father     Lymphoma Father         Non-Hodgkin's    Arthritis Family      Past Surgical History:   Procedure Laterality Date    APPENDECTOMY      COLECTOMY      Partial, Sigmoid    COLONOSCOPY      HEAD & NECK SKIN LESION EXCISIONAL BIOPSY      10/11/10 SCC --  10/17/11 SCC  --  Location Scalp    HERNIA REPAIR      NASAL/SINUS ENDOSCOPY N/A 8/6/2019    Procedure: FUNCTIONAL ENDOSCOPIC SINUS SURGERY (FESS) IMAGED GUIDED for control of epistaxis; Surgeon: Rosamaria Bolaños MD;  [facility]: [facility];  Service: ENT    MI ESOPHAGOGASTRODUODENOSCOPY TRANSORAL DIAGNOSTIC N/A 8/9/2017    Procedure: ESOPHAGOGASTRODUODENOSCOPY (EGD); Surgeon: Viky Barnett MD;  Location: [facility];   Service: Gastroenterology    SKIN BIOPSY      TONSILLECTOMY         Current Outpatient Medications:     bacitracin topical ointment 500 units/g topical ointment, Apply 1 large application topically 2 (two) times a day, Disp: 28 g, Rfl: 0    clotrimazole-betamethasone (LOTRISONE) 1-0 05 % cream, Apply topically as needed , Disp: , Rfl:     EPINEPHrine (EPIPEN) 0 3 mg/0 3 mL SOAJ, Inject 0 3 mL (0 3 mg total) into a muscle once for 1 dose, Disp: 0 6 mL, Rfl: 0    fexofenadine (ALLEGRA) 180 MG tablet, Take 1 tablet (180 mg total) by mouth daily, Disp: 90 tablet, Rfl: 1    glimepiride (AMARYL) 4 mg tablet, Take 1 tablet (4 mg total) by mouth 2 (two) times a day, Disp: 180 tablet, Rfl: 3    glucose blood (Contour Next Test) test strip, Check blood sugars once daily, Disp: 100 each, Rfl: 3    glucose blood (FREESTYLE LITE) test strip, PT TO TEST BLOOD SUGAR ONCE DAILY DX:E11 22, Disp: 100 each, Rfl: 3    LORazepam (ATIVAN) 1 mg tablet, Take 1 tablet (1 mg total) by mouth as needed for anxiety, Disp: 90 tablet, Rfl: 0    metFORMIN (GLUCOPHAGE-XR) 500 mg 24 hr tablet, TAKE 2 TABLETS BY MOUTH TWICE A DAY WITH FOOD, Disp: 360 tablet, Rfl: 3    montelukast (SINGULAIR) 10 mg tablet, Take 1 tablet (10 mg total) by mouth daily at bedtime, Disp: 90 tablet, Rfl: 3    olopatadine (PATANOL) 0 1 % ophthalmic solution, Administer 1 drop to both eyes daily , Disp: , Rfl:     omeprazole (PriLOSEC) 20 mg delayed release capsule, Take 1 capsule (20 mg total) by mouth daily, Disp: 90 capsule, Rfl: 3    repaglinide (PRANDIN) 2 mg tablet, Take 0 5 tablets (1 mg total) by mouth 2 (two) times a day before meals, Disp: 180 tablet, Rfl: 3    sildenafil (VIAGRA) 50 MG tablet, TAKE 1 TABLET BY MOUTH AS NEEDED FOR ERECTILE DYSFUNCTION, Disp: 6 tablet, Rfl: 9    sodium chloride (OCEAN) 0 65 % nasal spray, 1 spray into each nostril daily , Disp: , Rfl:     triamcinolone (KENALOG) 0 025 % cream, Apply topically as needed , Disp: , Rfl:     verapamil (VERELAN PM) 180 MG 24 hr capsule, TAKE 1 CAPSULE (180 MG TOTAL) BY MOUTH 2 (TWO) TIMES A DAY, Disp: 180 capsule, Rfl: 3  Allergies   Allergen Reactions    Iodinated Diagnostic Agents Anaphylaxis    Shellfish Allergy Anaphylaxis    Shellfish-Derived Products     Shrimp Extract Allergy Skin Test Anaphylaxis    Empagliflozin Hives       Labs:  No visits with results within 2 Month(s) from this visit  Latest known visit with results is:   Hospital Outpatient Visit on 11/27/2020   Component Date Value    Case Report 11/27/2020                      Value:Surgical Pathology Report                         Case: [id number]                                   Authorizing Provider:  Mckay Garcia MD   Collected:           11/27/2020 1055              Ordering Location[facility]      [facility]       Received:            11/27/2020 [address] [facility]                                                             Pathologist:           Maria Victoria Nolasco MD                                                                Specimen:    Esophagus, distal esophagus                                                                Final Diagnosis 11/27/2020                      Value: This result contains rich text formatting which cannot be displayed here   Additional Information 11/27/2020                      Value: This result contains rich text formatting which cannot be displayed here  Cole Gross Description 11/27/2020                      Value: This result contains rich text formatting which cannot be displayed here   POC Glucose 11/27/2020 178*     Imaging: No results found      Review of Systems:  Review of Systems    REVIEW OF SYSTEMS:  Constitutional:  Denies fever or chills   Eyes:  Denies change in visual acuity   HENT:  Denies nasal congestion or sore throat   Respiratory:  Denies cough or shortness of breath   Cardiovascular:  Denies chest pain or edema   GI:  Denies abdominal pain, nausea, vomiting, bloody stools or diarrhea   :  Denies dysuria, frequency, difficulty in micturition and nocturia  Musculoskeletal:  Denies back pain or joint pain   Neurologic:  Denies headache, focal weakness or sensory changes   Endocrine:  Denies polyuria or polydipsia   Lymphatic:  Denies swollen glands   Psychiatric:   anxiety     Physical Exam:    /80   Pulse 80   Ht 5' 4" (1 626 m)   Wt 82 1 kg (181 lb)   SpO2 97%   BMI 31 07 kg/m²     Physical Exam   PHYSICAL EXAM:  General:  Patient is not in acute distress   Head: Normocephalic, Atraumatic  HEENT:  Both pupils normal-size atraumatic, normocephalic, nonicteric  Neck:  JVP not raised  Trachea central  No carotid bruit  Respiratory:  normal breath sounds no crackles  no rhonchi  Cardiovascular:  Regular rate and rhythm no S3 no murmurs  GI:  Abdomen soft nontender  No organomegaly  Lymphatic:  No cervical or inguinal lymphadenopathy  Neurologic:  Patient is awake alert, oriented   Grossly nonfocal   extremities no edema    Discussion/Summary:   Patient with multiple medical problems who seems to be doing reasonably well from cardiac standpoint  Previous studies reviewed with patient  Medications reviewed and possible side effects discussed  concepts of cardiovascular disease , signs and symptoms of heart disease  Dietary and risk factor modification reinforced  All questions answered  Safety measures reviewed  Patient advised to report any problems prompting medical attention  symptoms to watch out from cardiac standpoint discussed with patient  Medications reviewed  Follow-up in 6 months or earlier as needed  Follow-up with primary care physician  Patient is agreeable with the plan of care

## 2021-03-03 DIAGNOSIS — Z23 ENCOUNTER FOR IMMUNIZATION: ICD-10-CM

## 2021-03-03 DIAGNOSIS — N18.2 TYPE 2 DIABETES MELLITUS WITH STAGE 2 CHRONIC KIDNEY DISEASE, WITHOUT LONG-TERM CURRENT USE OF INSULIN (HCC): Chronic | ICD-10-CM

## 2021-03-03 DIAGNOSIS — E11.22 TYPE 2 DIABETES MELLITUS WITH STAGE 2 CHRONIC KIDNEY DISEASE, WITHOUT LONG-TERM CURRENT USE OF INSULIN (HCC): Chronic | ICD-10-CM

## 2021-03-03 RX ORDER — GLIMEPIRIDE 4 MG/1
4 TABLET ORAL 2 TIMES DAILY
Qty: 180 TABLET | Refills: 3 | Status: SHIPPED | OUTPATIENT
Start: 2021-03-03 | End: 2021-12-15

## 2021-03-06 ENCOUNTER — IMMUNIZATIONS (OUTPATIENT)
Dept: FAMILY MEDICINE CLINIC | Facility: HOSPITAL | Age: 71
End: 2021-03-06
Attending: INTERNAL MEDICINE

## 2021-03-06 DIAGNOSIS — Z23 ENCOUNTER FOR IMMUNIZATION: Primary | ICD-10-CM

## 2021-03-06 PROCEDURE — 91300 SARS-COV-2 / COVID-19 MRNA VACCINE (PFIZER-BIONTECH) 30 MCG: CPT

## 2021-03-06 PROCEDURE — 0001A SARS-COV-2 / COVID-19 MRNA VACCINE (PFIZER-BIONTECH) 30 MCG: CPT

## 2021-03-20 DIAGNOSIS — I10 BENIGN ESSENTIAL HYPERTENSION: Chronic | ICD-10-CM

## 2021-03-20 RX ORDER — VERAPAMIL HYDROCHLORIDE 180 MG/1
180 CAPSULE, EXTENDED RELEASE ORAL 2 TIMES DAILY
Qty: 180 CAPSULE | Refills: 3 | Status: SHIPPED | OUTPATIENT
Start: 2021-03-20 | End: 2022-04-19

## 2021-03-27 ENCOUNTER — IMMUNIZATIONS (OUTPATIENT)
Dept: FAMILY MEDICINE CLINIC | Facility: HOSPITAL | Age: 71
End: 2021-03-27

## 2021-03-27 DIAGNOSIS — Z23 ENCOUNTER FOR IMMUNIZATION: Primary | ICD-10-CM

## 2021-03-27 PROCEDURE — 91300 SARS-COV-2 / COVID-19 MRNA VACCINE (PFIZER-BIONTECH) 30 MCG: CPT

## 2021-03-27 PROCEDURE — 0002A SARS-COV-2 / COVID-19 MRNA VACCINE (PFIZER-BIONTECH) 30 MCG: CPT

## 2021-03-29 DIAGNOSIS — N18.2 TYPE 2 DIABETES MELLITUS WITH STAGE 2 CHRONIC KIDNEY DISEASE, WITHOUT LONG-TERM CURRENT USE OF INSULIN (HCC): Chronic | ICD-10-CM

## 2021-03-29 DIAGNOSIS — E11.22 TYPE 2 DIABETES MELLITUS WITH STAGE 2 CHRONIC KIDNEY DISEASE, WITHOUT LONG-TERM CURRENT USE OF INSULIN (HCC): Chronic | ICD-10-CM

## 2021-03-29 RX ORDER — BLOOD-GLUCOSE METER
KIT MISCELLANEOUS
Qty: 100 EACH | Refills: 3 | Status: SHIPPED | OUTPATIENT
Start: 2021-03-29 | End: 2021-12-08

## 2021-03-29 RX ORDER — BLOOD-GLUCOSE METER
KIT MISCELLANEOUS
Qty: 100 EACH | Refills: 3 | Status: SHIPPED | OUTPATIENT
Start: 2021-03-29

## 2021-03-29 NOTE — TELEPHONE ENCOUNTER
Refill on:  glucose blood (FREESTYLE LITE) test strip    [facility]

## 2021-04-19 ENCOUNTER — HOSPITAL ENCOUNTER (OUTPATIENT)
Dept: ULTRASOUND IMAGING | Facility: CLINIC | Age: 71
Discharge: HOME | End: 2021-04-19
Payer: MEDICARE

## 2021-04-19 ENCOUNTER — TELEPHONE (OUTPATIENT)
Dept: CARDIOLOGY CLINIC | Facility: CLINIC | Age: 71
End: 2021-04-19

## 2021-04-19 ENCOUNTER — OFFICE VISIT (OUTPATIENT)
Dept: INTERNAL MEDICINE CLINIC | Facility: CLINIC | Age: 71
End: 2021-04-19
Payer: MEDICARE

## 2021-04-19 VITALS
HEIGHT: 64 IN | BODY MASS INDEX: 31.55 KG/M2 | HEART RATE: 72 BPM | DIASTOLIC BLOOD PRESSURE: 82 MMHG | OXYGEN SATURATION: 96 % | SYSTOLIC BLOOD PRESSURE: 136 MMHG | WEIGHT: 184.8 LBS | TEMPERATURE: 98.1 F

## 2021-04-19 DIAGNOSIS — N50.812 PAIN IN LEFT TESTICLE: Primary | ICD-10-CM

## 2021-04-19 DIAGNOSIS — N50.89 TESTICULAR SWELLING, LEFT: ICD-10-CM

## 2021-04-19 DIAGNOSIS — N50.812 PAIN IN LEFT TESTICLE: ICD-10-CM

## 2021-04-19 PROCEDURE — 99214 OFFICE O/P EST MOD 30 MIN: CPT | Performed by: INTERNAL MEDICINE

## 2021-04-19 PROCEDURE — 76870 US EXAM SCROTUM: CPT

## 2021-04-19 RX ORDER — CIPROFLOXACIN 500 MG/1
500 TABLET, FILM COATED ORAL EVERY 12 HOURS SCHEDULED
Qty: 20 TABLET | Refills: 0 | Status: SHIPPED | OUTPATIENT
Start: 2021-04-19 | End: 2021-04-29

## 2021-04-19 NOTE — PROGRESS NOTES
Aishwaryamonajma    NAME: Herb London  AGE: 70 y o  SEX: male  : 1950     DATE: 2021     Assessment and Plan:     1  Pain in left testicle  2  Testicular swelling, left    +pain and swelling with tenderness noted on exam  Will get STAT ultrasound  Discussed epididymitis vs orchitis vs torsion  Start antibiotics  Will call with results  - US scrotum and testicles; Future  - ciprofloxacin (CIPRO) 500 mg tablet; Take 1 tablet (500 mg total) by mouth every 12 (twelve) hours for 10 days  Dispense: 20 tablet; Refill: 0           Chief Complaint:     Chief Complaint   Patient presents with    Testicle Pain     swollen left testicle        History of Present Illness: Worsening pain and swelling of left testicle over the weekend  Denies any trauma  No fevers  Had one episode where he felt a little chill  No nausea or vomiting  No dysuria  No painful ejaculation  No recent sexual activity  Has been putting ice on testicle  Review of Systems:     Review of Systems   Constitutional: Positive for chills (x 1)  Negative for fatigue and fever  Genitourinary: Positive for scrotal swelling (left side) and testicular pain  Negative for difficulty urinating and dysuria  Objective:     /82 (BP Location: Left arm, Patient Position: Sitting, Cuff Size: Standard)   Pulse 72   Temp 98 1 °F (36 7 °C) (Temporal) Comment: no nsaids  Ht 5' 4" (1 626 m)   Wt 83 8 kg (184 lb 12 8 oz)   SpO2 96%   BMI 31 72 kg/m²     Physical Exam  Abdominal:      General: Bowel sounds are normal  There is no distension  Tenderness: There is no abdominal tenderness  There is no guarding or rebound  Genitourinary:     Scrotum/Testes:         Right: Tenderness or swelling not present  Left: Tenderness and swelling present           Jesus Weiss DO  [facility] [address] [place] [address]

## 2021-04-23 ENCOUNTER — APPOINTMENT (OUTPATIENT)
Dept: LAB | Facility: CLINIC | Age: 71
End: 2021-04-23
Payer: MEDICARE

## 2021-04-23 DIAGNOSIS — E11.22 TYPE 2 DIABETES MELLITUS WITH STAGE 2 CHRONIC KIDNEY DISEASE, WITHOUT LONG-TERM CURRENT USE OF INSULIN (HCC): Chronic | ICD-10-CM

## 2021-04-23 DIAGNOSIS — E78.2 MIXED HYPERLIPIDEMIA: Chronic | ICD-10-CM

## 2021-04-23 DIAGNOSIS — N18.2 TYPE 2 DIABETES MELLITUS WITH STAGE 2 CHRONIC KIDNEY DISEASE, WITHOUT LONG-TERM CURRENT USE OF INSULIN (HCC): Chronic | ICD-10-CM

## 2021-04-23 LAB
ANION GAP SERPL CALCULATED.3IONS-SCNC: 9 MMOL/L (ref 4–13)
BUN SERPL-MCNC: 25 MG/DL (ref 5–25)
CALCIUM SERPL-MCNC: 9.8 MG/DL (ref 8.3–10.1)
CHLORIDE SERPL-SCNC: 102 MMOL/L (ref 100–108)
CHOLEST SERPL-MCNC: 167 MG/DL (ref 50–200)
CO2 SERPL-SCNC: 28 MMOL/L (ref 21–32)
CREAT SERPL-MCNC: 1.3 MG/DL (ref 0.6–1.3)
CREAT UR-MCNC: 55.6 MG/DL
EST. AVERAGE GLUCOSE BLD GHB EST-MCNC: 192 MG/DL
GFR SERPL CREATININE-BSD FRML MDRD: 55 ML/MIN/1.73SQ M
GLUCOSE P FAST SERPL-MCNC: 94 MG/DL (ref 65–99)
HBA1C MFR BLD: 8.3 %
HDLC SERPL-MCNC: 41 MG/DL
LDLC SERPL CALC-MCNC: 95 MG/DL (ref 0–100)
MICROALBUMIN UR-MCNC: 16.7 MG/L (ref 0–20)
MICROALBUMIN/CREAT 24H UR: 30 MG/G CREATININE (ref 0–30)
NONHDLC SERPL-MCNC: 126 MG/DL
POTASSIUM SERPL-SCNC: 3.5 MMOL/L (ref 3.5–5.3)
SODIUM SERPL-SCNC: 139 MMOL/L (ref 136–145)
TRIGL SERPL-MCNC: 154 MG/DL

## 2021-04-23 PROCEDURE — 82043 UR ALBUMIN QUANTITATIVE: CPT

## 2021-04-23 PROCEDURE — 80061 LIPID PANEL: CPT

## 2021-04-23 PROCEDURE — 83036 HEMOGLOBIN GLYCOSYLATED A1C: CPT

## 2021-04-23 PROCEDURE — 36415 COLL VENOUS BLD VENIPUNCTURE: CPT

## 2021-04-23 PROCEDURE — 82570 ASSAY OF URINE CREATININE: CPT

## 2021-04-23 PROCEDURE — 80048 BASIC METABOLIC PNL TOTAL CA: CPT

## 2021-04-26 ENCOUNTER — OFFICE VISIT (OUTPATIENT)
Dept: INTERNAL MEDICINE CLINIC | Facility: CLINIC | Age: 71
End: 2021-04-26
Payer: MEDICARE

## 2021-04-26 VITALS
WEIGHT: 182.4 LBS | SYSTOLIC BLOOD PRESSURE: 130 MMHG | TEMPERATURE: 98.2 F | BODY MASS INDEX: 31.14 KG/M2 | HEART RATE: 87 BPM | OXYGEN SATURATION: 97 % | DIASTOLIC BLOOD PRESSURE: 84 MMHG | HEIGHT: 64 IN

## 2021-04-26 DIAGNOSIS — N45.1 EPIDIDYMITIS: ICD-10-CM

## 2021-04-26 DIAGNOSIS — I10 BENIGN ESSENTIAL HYPERTENSION: Chronic | ICD-10-CM

## 2021-04-26 DIAGNOSIS — N18.2 TYPE 2 DIABETES MELLITUS WITH STAGE 2 CHRONIC KIDNEY DISEASE, WITHOUT LONG-TERM CURRENT USE OF INSULIN (HCC): Primary | Chronic | ICD-10-CM

## 2021-04-26 DIAGNOSIS — E11.22 TYPE 2 DIABETES MELLITUS WITH STAGE 2 CHRONIC KIDNEY DISEASE, WITHOUT LONG-TERM CURRENT USE OF INSULIN (HCC): Primary | Chronic | ICD-10-CM

## 2021-04-26 PROBLEM — D69.0 HENOCH-SCHONLEIN PURPURA (HCC): Chronic | Status: RESOLVED | Noted: 2019-08-06 | Resolved: 2021-04-26

## 2021-04-26 PROBLEM — D69.0 IGA MEDIATED LEUKOCYTOCLASTIC VASCULITIS (HCC): Chronic | Status: RESOLVED | Noted: 2019-07-03 | Resolved: 2021-04-26

## 2021-04-26 PROCEDURE — 99214 OFFICE O/P EST MOD 30 MIN: CPT | Performed by: INTERNAL MEDICINE

## 2021-04-26 NOTE — PATIENT INSTRUCTIONS
Type 2 Diabetes Management for Adults   WHAT YOU NEED TO KNOW:   What is type 2 diabetes? Type 2 diabetes is a disease that affects how your body uses glucose (sugar)  Normally, when the blood sugar level increases, the pancreas makes more insulin  Insulin helps move sugar out of the blood so it can be used for energy  Type 2 diabetes develops because either the body cannot make enough insulin, or it cannot use the insulin correctly  Type 2 diabetes can be controlled to prevent damage to your heart, blood vessels, and other organs  What can I do to manage my blood sugar levels? · Make healthy food choices  Healthy foods can give you energy to learn and be active  Healthy foods can also help you keep your blood sugar in balance, and manage or lose weight safely  Work with a dietitian to develop a meal plan that works for you and your schedule  A dietitian can help you learn how to eat the right amount of carbohydrates during your meals and snacks  Carbohydrates can raise your blood sugar if you eat too many at one time  Some foods that contain carbohydrates include breads, cereals, rice, pasta, and sweets  · Make healthy beverage choices  Drink water  Decrease the amount of drinks with sugar substitutes you have, such as diet sodas  Avoid sugar-sweetened drinks, such as regular sodas and fruit juice  · Get regular physical activity  Physical activity helps to lower your blood sugar levels  It can also help you manage your weight  Get at least 150 minutes of moderate to vigorous aerobic physical activity each week  Do not miss more than 2 days in a row  Do not sit longer than 30 minutes at a time  Your healthcare provider can help you create an activity plan  The plan can include the best activities for you and can help you build your strength and endurance  · Maintain a healthy weight  Ask your healthcare provider how much you should weigh   Ask him or her to help you create a safe weight loss plan if you are overweight  Weight loss can improve your blood sugar levels  · Check your blood sugar level as directed and as needed  Ask your healthcare provider what your blood sugar levels should be  ? Look at your schedule and make a plan for when you will check your blood sugar levels throughout the day  ? Check more often if you think your blood sugar is too high or too low  This will allow you to take care of any low or high blood sugar levels so they do not interfere with your activities  ? Rotate the sites where you do fingersticks  This will help make the checks less painful, and make fingerstick sites less noticeable  ? Write down your blood sugar levels so you can show them to your healthcare provider during your visits  Talk to your healthcare provider if you are having trouble keeping your blood sugar at the recommended levels  · Take your diabetes medicine or insulin as directed  You may need diabetes medicine, insulin, or both to help control your blood sugar levels  Your healthcare provider will teach you how and when to take your diabetes medicine or insulin  · Go to all follow-up appointments  Your healthcare provider may need to check your A1c every 3 months  An A1c test shows the average amount of sugar in your blood over the past 2 to 3 months  Your healthcare provider will tell you what your A1c level should be  What do I need to know about high blood sugar? High blood sugar may not cause any symptoms  It may cause you to feel more thirsty than usual or urinate more often than usual  Over time, high blood sugar levels can damage your nerves, blood vessels, tissues, and organs  · Large meals or large amounts of carbohydrates at one time can raise your blood sugar  · Decreased physical activity can raise your blood sugar  For example, your blood sugar can increase if you stop playing a sport or getting regular physical activity   Do not sit for longer than 90 minutes at a time  · Stress can raise your blood sugar  Ask your healthcare provider for help if you are having trouble managing stress  · Illness can raise your blood sugar  This can happen even if you eat less than usual while you are sick  Work with your healthcare provider to develop a sick day plan  This is a plan that helps you manage your blood sugar levels while you are sick  · A lower dose of medicine or insulin, or a late dose, can raise your blood sugar  There is not enough time for your medicine or insulin to work as it should if you take it late  When you take a lower dose, there is not enough medicine or insulin needed to lower your blood sugar  What do I need to know about low blood sugar? You can prevent symptoms such as shakiness, dizziness, irritability, or confusion by preventing your blood sugar from going too low  · Treat low blood sugar right away  Eat 15 grams of carbohydrate, such as 4 ounces of juice or 1 tube of glucose gel  Check your blood sugar again 10 to 15 minutes later  When your blood sugar goes back to normal, eat a meal or snack to prevent another decrease in blood sugar  ·          · Your blood sugar can get too low if you take diabetes medicine or insulin and do not eat enough food  It can also happen if you skip a meal or snack  · Increased physical activity can cause low blood sugar  If you use insulin, check your blood sugar before you exercise  If your blood sugar is below 100 mg/dL, eat 15 grams of carbohydrate  If you will exercise for more than 1 hour, check your blood sugar every 30 minutes  You may need to adjust your insulin before exercise  You may need a carbohydrate snack during or after exercise  What else can I do to manage my diabetes? · Wear medical alert jewelry or carry a card that says you have diabetes  Ask where to get these items  · Be safe when you drive    Check your blood sugar before you drive if you use insulin, and you think your blood sugar is low  If your blood sugar is low, eat 15 grams of carbohydrate and wait for your blood sugar to go back to normal  Keep snacks that contain carbohydrate in the car  If you feel like your blood sugar is low while you are driving, pull over and check your blood sugar level  Treat low blood sugar before you start driving again, if needed  · Know the risks if you choose to drink alcohol  Alcohol can cause your blood sugar levels to be low if you use insulin  Alcohol can cause high blood sugar levels and weight gain if you drink too much  Women should limit alcohol to 1 drink a day  Men should limit alcohol to 2 drinks a day  A drink of alcohol is 12 ounces of beer, 5 ounces of wine, or 1½ ounces of liquor  · Do not smoke  Nicotine can damage blood vessels and make it more difficult to manage your diabetes  Do not use e-cigarettes or smokeless tobacco in place of cigarettes or to help you quit  They still contain nicotine  Ask your healthcare provider for information if you currently smoke and need help quitting  · Have screenings for complications of diabetes and other conditions that happen with diabetes  You will need to be screened for kidney problems, high cholesterol, high blood pressure, blood vessel problems, eye problems, and eating disorders  Some screenings may begin right away and some may happen within the first 5 years of diagnosis  You will need to continue screenings through your lifetime  Keep your follow-up appointments with all providers  · Ask about vaccines  You have a higher risk for serious illness if you get the flu, pneumonia, or hepatitis  Ask your healthcare provider if you should get a flu, pneumonia, or hepatitis B vaccine, and when to get the vaccine  CARE AGREEMENT:   You have the right to help plan your care  Learn about your health condition and how it may be treated   Discuss treatment options with your healthcare providers to decide what care you want to receive  You always have the right to refuse treatment  The above information is an  only  It is not intended as medical advice for individual conditions or treatments  Talk to your doctor, nurse or pharmacist before following any medical regimen to see if it is safe and effective for you  © Copyright [address] Information is for End User's use only and may not be sold, redistributed or otherwise used for commercial purposes   All illustrations and images included in CareNotes® are the copyrighted property of A D [employer] M [employer]  or [address] [place] [address]

## 2021-04-26 NOTE — PROGRESS NOTES
[place]    NAME: Isis Pringle  AGE: 70 y o  SEX: male  : 1950     DATE: 2021     Assessment and Plan:     1  Type 2 diabetes mellitus with stage 2 chronic kidney disease, without long-term current use of insulin ([facility] [address] )    Most recent A1c was 8 3 % on 2021  Needs to make diet and lifestyle changes  Will hold off insulin for now, but if A1c is not down by next visit, discussed with him about adding insulin  - Hemoglobin A1C; Future  - Basic metabolic panel; Future  - CBC; Future    2  Benign essential hypertension    BP is stable in the office  Continue anti-hypertensives  3  Epididymitis    Has 3 more days of antibiotics left  Symptoms are improving as expected  Chief Complaint:     Chief Complaint   Patient presents with    Follow-up     4 month and labs- 2021        History of Present Illness:     Seen last week for left scrotal swelling and tenderness  US confirmed clinical suspicion of epididymitis  He has 3 more days of antibiotics and symptoms of swelling and pain have been improving  Had lab work for chronic conditions which show A1c is up to 8 3%  He admits to not being very active during winter months  Review of Systems:     Review of Systems   Constitutional: Negative for activity change, appetite change and fatigue  Respiratory: Negative for apnea, cough, chest tightness, shortness of breath and wheezing  Cardiovascular: Negative for chest pain, palpitations and leg swelling  Gastrointestinal: Negative for abdominal distention, abdominal pain, blood in stool, constipation, diarrhea, nausea and vomiting  Genitourinary: Positive for scrotal swelling (improving) and testicular pain (improving)  Musculoskeletal: Positive for back pain  Negative for arthralgias, gait problem, joint swelling and myalgias  Neurological: Negative for dizziness, weakness, light-headedness, numbness and headaches  Psychiatric/Behavioral: Negative for behavioral problems, confusion, hallucinations, sleep disturbance and suicidal ideas  The patient is not nervous/anxious  Objective:     /84 (BP Location: Left arm, Patient Position: Sitting, Cuff Size: Standard)   Pulse 87   Temp 98 2 °F (36 8 °C) (Temporal) Comment: NO NSAIDS  Ht 5' 4" (1 626 m)   Wt 82 7 kg (182 lb 6 4 oz)   SpO2 97%   BMI 31 31 kg/m²     Physical Exam  Vitals signs reviewed  Constitutional:       General: He is not in acute distress  Appearance: He is well-developed  He is not diaphoretic  Neck:      Musculoskeletal: Neck supple  Thyroid: No thyromegaly  Vascular: No JVD  Cardiovascular:      Rate and Rhythm: Normal rate and regular rhythm  Heart sounds: Normal heart sounds  No murmur  Pulmonary:      Effort: Pulmonary effort is normal  No respiratory distress  Breath sounds: Normal breath sounds  No wheezing or rales  Abdominal:      General: Bowel sounds are normal  There is no distension  Palpations: Abdomen is soft  Tenderness: There is no abdominal tenderness  Musculoskeletal:         General: Deformity (lumbar paraspinal hypertonicity) present  Right lower leg: No edema  Left lower leg: No edema  Lymphadenopathy:      Cervical: No cervical adenopathy  Neurological:      Mental Status: He is alert     Psychiatric:         Mood and Affect: Mood normal          Behavior: Behavior normal        [place],   MEDICAL [id number]

## 2021-05-17 ENCOUNTER — TELEPHONE (OUTPATIENT)
Dept: OTHER | Facility: OTHER | Age: 71
End: 2021-05-17

## 2021-05-17 DIAGNOSIS — N18.2 TYPE 2 DIABETES MELLITUS WITH STAGE 2 CHRONIC KIDNEY DISEASE, WITHOUT LONG-TERM CURRENT USE OF INSULIN (HCC): Primary | Chronic | ICD-10-CM

## 2021-05-17 DIAGNOSIS — E11.22 TYPE 2 DIABETES MELLITUS WITH STAGE 2 CHRONIC KIDNEY DISEASE, WITHOUT LONG-TERM CURRENT USE OF INSULIN (HCC): Primary | Chronic | ICD-10-CM

## 2021-05-18 DIAGNOSIS — E11.22 TYPE 2 DIABETES MELLITUS WITH STAGE 2 CHRONIC KIDNEY DISEASE, WITHOUT LONG-TERM CURRENT USE OF INSULIN (HCC): Chronic | ICD-10-CM

## 2021-05-18 DIAGNOSIS — N18.2 TYPE 2 DIABETES MELLITUS WITH STAGE 2 CHRONIC KIDNEY DISEASE, WITHOUT LONG-TERM CURRENT USE OF INSULIN (HCC): Chronic | ICD-10-CM

## 2021-05-18 RX ORDER — METFORMIN HYDROCHLORIDE 500 MG/1
1000 TABLET, EXTENDED RELEASE ORAL 2 TIMES DAILY WITH MEALS
Qty: 360 TABLET | Refills: 3 | Status: SHIPPED | OUTPATIENT
Start: 2021-05-18 | End: 2022-04-19

## 2021-05-18 RX ORDER — ATORVASTATIN CALCIUM 10 MG/1
10 TABLET, FILM COATED ORAL DAILY
Qty: 90 TABLET | Refills: 3 | Status: SHIPPED | OUTPATIENT
Start: 2021-05-18 | End: 2022-04-19

## 2021-05-18 NOTE — TELEPHONE ENCOUNTER
Your patient reached out to [facility] to see if he should start a statin therapy  Please send a new prescription to [facility] if it is appropriate

## 2021-05-19 ENCOUNTER — OFFICE VISIT (OUTPATIENT)
Dept: DERMATOLOGY | Facility: CLINIC | Age: 71
End: 2021-05-19
Payer: MEDICARE

## 2021-05-19 DIAGNOSIS — L82.1 SEBORRHEIC KERATOSIS: ICD-10-CM

## 2021-05-19 DIAGNOSIS — Z85.828 HISTORY OF SKIN CANCER: ICD-10-CM

## 2021-05-19 DIAGNOSIS — L57.0 ACTINIC KERATOSIS: Primary | ICD-10-CM

## 2021-05-19 DIAGNOSIS — Z13.89 SCREENING FOR SKIN CONDITION: ICD-10-CM

## 2021-05-19 PROCEDURE — 99213 OFFICE O/P EST LOW 20 MIN: CPT | Performed by: DERMATOLOGY

## 2021-05-19 PROCEDURE — 17000 DESTRUCT PREMALG LESION: CPT | Performed by: DERMATOLOGY

## 2021-05-19 NOTE — PATIENT INSTRUCTIONS
Actinic Keratosis:  Patient advised lesions are precancers  These should resolve with cryosurgery if not to let us know sun block recommended  Seborrheic keratosis patient reassured these are normal growths we acquire with age no treatment needed  History of skin cancer in no recurrence nothing else atypical sunblock recommended follow-up in 1 year  Screening for dermatologic disorders nothing else of concern noted on complete exam follow-up in 1 year  Treatment with Cryotherapy    The doctor has treated your skin with nitrogen, which is 320 degrees Fahrenheit below zero  He has given the treated area "frostbite "    Stinging should subside within a few hours  You can take Tylenol for pain, if needed  Over the next few days, it is normal if the area becomes reddened, a blood blister, or swollen with fluid  If the lesion treated was near the eye - you could get a swollen eye over the next few days  Do not panic! This is all temporary, and will resolve with time  There is no special treatment - just keep the area clean  Makeup and BandAids can be used, if preferred  When the area starts to dry up and peel off, using Vaseline can help healing  It usually takes up to a month for it to heal   Some lesions are recurrent and may require repeat treatments  If a lesion has not healed in one month, please don't hesitate to contact us  If you have any further questions that are not answered here, please call us  [phone]    Thank you for allowing us to care for you

## 2021-05-19 NOTE — PROGRESS NOTES
[facility] DERMATOLOGY  [address] [place] [id number]  [phone]  [phone]     MRN: [id number] : 1950  Encounter: [id number]  Patient Information: Harinder Fermin  Chief complaint:  Yearly checkup    History of present illness:  66-year-old male with previous history of both skin cancer as well as adult on set HSP presents for overall checkup    No specific concerns noted  Past Medical History:   Diagnosis Date    Actinic keratosis     Adhesive capsulitis of unspecified shoulder     Anxiety     Asthma     Atopic dermatitis     Cardiac disease     Cellulitis of right upper extremity     Cervical radiculopathy     Chest pain     Chronic maxillary sinusitis     last assessed 14    Diabetes mellitus ([address] )     Erectile dysfunction of non-organic origin     Esophageal reflux     Gout     last assessed 08/26/15    Hearing loss, conductive     Heart murmur     Mitral Valve disorder    Henoch-Schonlein purpura ([address] ) 2019    Herpes zoster     History of paroxysmal supraventricular tachycardia     Hypertension     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    Mitral valve disorder     Nasal congestion 19    Neoplasm of skin, malignant     Non-melanoma skin cancer     last assessed 10/26/17    Nosebleed 19    Obesity     Palpitations     last assessed 03/05/15    Paroxysmal supraventricular tachycardia ([address] ) 2014    Plantar fasciitis     last assessed 05/28/15    PSVT (paroxysmal supraventricular tachycardia) ([address] )     Sciatica     Sleep apnea     Squamous cell carcinoma of skin of scalp     Tinnitus, unspecified ear     Type 2 diabetes mellitus with hyperglycemia ([address] )     last assessed     Umbilical hernia     Worms in stool     last assessed 08/26/15     Past Surgical History:   Procedure Laterality Date    APPENDECTOMY      COLECTOMY      Partial, Sigmoid    COLONOSCOPY  HEAD & NECK SKIN LESION EXCISIONAL BIOPSY      10/11/10 SCC --  10/17/11 SCC  --  Location Scalp    HERNIA REPAIR      NASAL/SINUS ENDOSCOPY N/A 2019    Procedure: FUNCTIONAL ENDOSCOPIC SINUS SURGERY (FESS) IMAGED GUIDED for control of epistaxis; Surgeon: Bev Walls MD;  Location: [facility];  Service: ENT    NM ESOPHAGOGASTRODUODENOSCOPY TRANSORAL DIAGNOSTIC N/A 2017    Procedure: ESOPHAGOGASTRODUODENOSCOPY (EGD); Surgeon: Patito Brambila MD;  Location: [facility];   Service: Gastroenterology    SKIN BIOPSY      TONSILLECTOMY       Social History   Social History     Substance and Sexual Activity   Alcohol Use Yes    Frequency: 2-3 times a week    Drinks per session: 1 or 2    Binge frequency: Never    Comment: only occasionally     Social History     Substance and Sexual Activity   Drug Use No     Social History     Tobacco Use   Smoking Status Former Smoker    Packs/day: 1 00    Years: 10 00    Pack years: 10 00    Types: Cigarettes    Quit date: 0    Years since quittin 4   Smokeless Tobacco Never Used     Family History   Problem Relation Age of Onset    Cancer Mother         Breast + Skin    Diabetes Mother     Heart disease Mother     Heart attack Mother     Breast cancer Mother         Adenocarcinoma    Skin cancer Mother         Adenocarcinoma    Heart failure Mother     Diabetes type II Mother     Varicose Veins Mother         Lower Extremities    Cancer Father         Prostate + Skin    Prostate cancer Father         Adenocarcinoma    Skin cancer Father     Lymphoma Father         Non-Hodgkin's    Arthritis Family      Meds/Allergies   Allergies   Allergen Reactions    Iodinated Diagnostic Agents Anaphylaxis    Shellfish Allergy - Food Allergy Anaphylaxis    Shellfish-Derived Products - Food Allergy     Shrimp Extract Allergy Skin Test - Food Allergy Anaphylaxis    Empagliflozin Hives       Meds:  Prior to Admission medications    Medication Sig Start Date End Date Taking?  Authorizing Provider   atorvastatin (LIPITOR) 10 mg tablet Take 1 tablet (10 mg total) by mouth daily 5/18/21  Yes Tammie Bamberger Nothstein, DO   bacitracin topical ointment 500 units/g topical ointment Apply 1 large application topically 2 (two) times a day  Patient taking differently: Apply 1 large application topically as needed  8/4/19  Yes Donna Horan MD   clotrimazole-betamethasone (LOTRISONE) 1-0 05 % cream Apply topically as needed  11/28/16  Yes Historical Provider, MD   FREESTYLE LITE test strip PT TO TEST BLOOD SUGAR ONCE DAILY DX:E11 22 3/29/21  Yes David De La Garza DO   glimepiride (AMARYL) 4 mg tablet TAKE 1 TABLET (4 MG TOTAL) BY MOUTH 2 (TWO) TIMES A DAY 3/3/21  Yes David De La Garza DO   glucose blood (FREESTYLE LITE) test strip PT TO TEST BLOOD SUGAR ONCE DAILY DX:E11 22 3/29/21  Yes David De La Garza DO   LORazepam (ATIVAN) 1 mg tablet Take 1 tablet (1 mg total) by mouth as needed for anxiety 5/1/20  Yes Sina Barroso MD   metFORMIN (GLUCOPHAGE-XR) 500 mg 24 hr tablet Take 2 tablets (1,000 mg total) by mouth 2 (two) times a day with meals 5/18/21  Yes David De La Garza DO   montelukast (SINGULAIR) 10 mg tablet Take 1 tablet (10 mg total) by mouth daily at bedtime 1/28/21  Yes David De La Garza DO   olopatadine (PATANOL) 0 1 % ophthalmic solution Administer 1 drop to both eyes daily    Yes Historical Provider, MD   omeprazole (PriLOSEC) 20 mg delayed release capsule Take 1 capsule (20 mg total) by mouth daily 8/6/20  Yes David De La Garza DO   repaglinide (PRANDIN) 2 mg tablet Take 0 5 tablets (1 mg total) by mouth 2 (two) times a day before meals 12/14/20  Yes David De La Garza DO   sildenafil (VIAGRA) 50 MG tablet TAKE 1 TABLET BY MOUTH AS NEEDED FOR ERECTILE DYSFUNCTION  Patient taking differently: as needed  12/8/20  Yes Baird Number, PA-C   sodium chloride (OCEAN) 0 65 % nasal spray 1 spray into each nostril as needed for congestion    Yes Historical Provider, MD triamcinolone (KENALOG) 0 025 % cream Apply topically as needed    Yes Historical Provider, MD   verapamil (VERELAN PM) 180 MG 24 hr capsule TAKE 1 CAPSULE (180 MG TOTAL) BY MOUTH 2 (TWO) TIMES A DAY 3/20/21  Yes David De La Garza DO   EPINEPHrine (EPIPEN) 0 3 mg/0 3 mL SOAJ Inject 0 3 mL (0 3 mg total) into a muscle once for 1 dose 12/23/20 4/26/21  David De La Garza DO       Subjective:     Review of Systems:    General: negative for - chills, fatigue, fever,  weight gain or weight loss  Psychological: negative for - anxiety, behavioral disorder, concentration difficulties, decreased libido, depression, irritability, memory difficulties, mood swings, sleep disturbances or suicidal ideation  ENT: negative for - hearing difficulties , nasal congestion, nasal discharge, oral lesions, sinus pain, sneezing, sore throat  Allergy and Immunology: negative for - hives, insect bite sensitivity,  Hematological and Lymphatic: negative for - bleeding problems, blood clots,bruising, swollen lymph nodes  Endocrine: negative for - hair pattern changes, hot flashes, malaise/lethargy, mood swings, palpitations, polydipsia/polyuria, skin changes, temperature intolerance or unexpected weight change  Respiratory: negative for - cough, hemoptysis, orthopnea, shortness of breath, or wheezing  Cardiovascular: negative for - chest pain, dyspnea on exertion, edema,  Gastrointestinal: negative for - abdominal pain, nausea/vomiting  Genito-Urinary: negative for - dysuria, incontinence, irregular/heavy menses or urinary frequency/urgency  Musculoskeletal: negative for - gait disturbance, joint pain, joint stiffness, joint swelling, muscle pain, muscular weakness  Dermatological:  As in HPI  Neurological: negative for confusion, dizziness, headaches, impaired coordination/balance, memory loss, numbness/tingling, seizures, speech problems, tremors or weakness       Objective: There were no vitals taken for this visit      Physical Exam:    General Appearance:    Alert, cooperative, no distress   Head:    Normocephalic, without obvious abnormality, atraumatic           Skin:   A full skin exam was performed including scalp, head scalp, eyes, ears, nose, lips, neck, chest, axilla, abdomen, back, buttocks, bilateral upper extremities, bilateral lower extremities, hands, feet, fingers, toes, fingernails, and toenails scaling erythematous area noted on the frontal scalp normal keratotic papules greasy stuck on appearance nothing else of concern noted on exam previous site of skin cancer well healed without recurrence  Physical Exam  HENT:      Head:          Cryotherapy Procedure Note    Pre-operative Diagnosis: actinic keratosis    Plan:  1  Instructed to keep the area dry and clean thereafter  Apply petrolatum if area gets crusty  2  Recommended that the patient use acetaminophen  as needed for pain  Locations:  Frontal scalp    Indications: Destruction of actinic keratosis x1    Patient informed of risks (permanent scarring, infection, light or dark discoloration, bleeding, infection, weakness, numbness and recurrence of the lesion) and benefits of the procedure and verbal informed consent obtained  The areas are treated with liquid nitrogen therapy, frozen until ice ball extended 2 mm beyond lesion, allowed to thaw, and treated again  The patient tolerated procedure well  The patient was instructed on post-op care, warned that there may be blister formation, redness and pain  Recommend OTC analgesia as needed for pain  Condition:  Stable    Complications:  none  Assessment:     1  Actinic keratosis     2  Seborrheic keratosis     3  Screening for skin condition     4  History of skin cancer           Plan:   Actinic Keratosis:  Patient advised lesions are precancers  These should resolve with cryosurgery if not to let us know sun block recommended    Seborrheic keratosis patient reassured these are normal growths we acquire with age no treatment needed  History of skin cancer in no recurrence nothing else atypical sunblock recommended follow-up in 1 year  Screening for dermatologic disorders nothing else of concern noted on complete exam follow-up in 1 year      Kapil Burciaga MD  5/19/2021,9:22 AM    Portions of the record may have been created with voice recognition software   Occasional wrong word or "sound a like" substitutions may have occurred due to the inherent limitations of voice recognition software   Read the chart carefully and recognize, using context, where substitutions have occurred

## 2021-05-28 PROCEDURE — 88305 TISSUE EXAM BY PATHOLOGIST: CPT | Performed by: PATHOLOGY

## 2021-06-02 ENCOUNTER — LAB REQUISITION (OUTPATIENT)
Dept: LAB | Facility: HOSPITAL | Age: 71
End: 2021-06-02
Payer: MEDICARE

## 2021-06-02 ENCOUNTER — TELEPHONE (OUTPATIENT)
Dept: INTERNAL MEDICINE CLINIC | Facility: CLINIC | Age: 71
End: 2021-06-02

## 2021-06-02 DIAGNOSIS — S20.219A CONTUSION OF CHEST WALL, UNSPECIFIED LATERALITY, INITIAL ENCOUNTER: Primary | ICD-10-CM

## 2021-06-02 DIAGNOSIS — D12.2 BENIGN NEOPLASM OF ASCENDING COLON: ICD-10-CM

## 2021-06-02 DIAGNOSIS — Z86.010 PERSONAL HISTORY OF COLONIC POLYPS: ICD-10-CM

## 2021-06-02 DIAGNOSIS — D12.3 BENIGN NEOPLASM OF TRANSVERSE COLON: ICD-10-CM

## 2021-06-02 NOTE — TELEPHONE ENCOUNTER
Pt fell and tumbled down hill  Left elbow pushed against his chest    Can someone order a chest x-ray for him? Don't know if it can be ordered today    He is seeing Dr Cele Morris tomorrow at 11:15AM    Please call him when the x-ray order is in

## 2021-06-03 ENCOUNTER — OFFICE VISIT (OUTPATIENT)
Dept: INTERNAL MEDICINE CLINIC | Facility: CLINIC | Age: 71
End: 2021-06-03
Payer: MEDICARE

## 2021-06-03 ENCOUNTER — APPOINTMENT (OUTPATIENT)
Dept: RADIOLOGY | Facility: CLINIC | Age: 71
End: 2021-06-03
Payer: MEDICARE

## 2021-06-03 VITALS
SYSTOLIC BLOOD PRESSURE: 134 MMHG | HEART RATE: 74 BPM | TEMPERATURE: 97.3 F | WEIGHT: 183.8 LBS | OXYGEN SATURATION: 98 % | DIASTOLIC BLOOD PRESSURE: 76 MMHG | BODY MASS INDEX: 31.55 KG/M2

## 2021-06-03 DIAGNOSIS — R07.81 RIB PAIN: ICD-10-CM

## 2021-06-03 DIAGNOSIS — S20.219A CONTUSION OF CHEST WALL, UNSPECIFIED LATERALITY, INITIAL ENCOUNTER: ICD-10-CM
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
## 2021-06-03 DIAGNOSIS — Z12.5 SCREENING FOR PROSTATE CANCER: ICD-10-CM

## 2021-06-03 PROCEDURE — 71046 X-RAY EXAM CHEST 2 VIEWS: CPT

## 2021-06-03 PROCEDURE — 99213 OFFICE O/P EST LOW 20 MIN: CPT | Performed by: INTERNAL MEDICINE

## 2021-06-03 NOTE — PATIENT INSTRUCTIONS
Fall Prevention   WHAT YOU NEED TO KNOW:   Fall prevention includes ways to make your home and other areas safer  It also includes ways you can move more carefully to prevent a fall  Health conditions that cause changes in your blood pressure, vision, or muscle strength and coordination may increase your risk for falls  Medicines may also increase your risk for falls if they make you dizzy, weak, or sleepy  DISCHARGE INSTRUCTIONS:   Call 911 or have someone else call if:   · You have fallen and are unconscious  · You have fallen and cannot move part of your body  Contact your healthcare provider if:   · You have fallen and have pain or a headache  · You have questions or concerns about your condition or care  Fall prevention tips:   · Stand or sit up slowly  This may help you keep your balance and prevent falls  · Use assistive devices as directed  Your healthcare provider may suggest that you use a cane or walker to help you keep your balance  You may need to have grab bars put in your bathroom near the toilet or in the shower  · Wear shoes that fit well and have soles that   Wear shoes both inside and outside  Use slippers with good   Do not wear shoes with high heels  · Wear a personal alarm  This is a device that allows you to call 911 if you fall and need help  Ask your healthcare provider for more information  · Stay active  Exercise can help strengthen your muscles and improve your balance  Your healthcare provider may recommend water aerobics or walking  He or she may also recommend physical therapy to improve your coordination  Never start an exercise program without talking to your healthcare provider first          · Manage your medical conditions  Keep all appointments with your healthcare providers  Visit your eye doctor as directed  Home safety tips:       · Add items to prevent falls in the bathroom    Put nonslip strips on your bath or shower floor to prevent you from slipping  Use a bath mat if you do not have carpet in the bathroom  This will prevent you from falling when you step out of the bath or shower  Use a shower seat so you do not need to stand while you shower  Sit on the toilet or a chair in your bathroom to dry yourself and put on clothing  This will prevent you from losing your balance from drying or dressing yourself while you are standing  · Keep paths clear  Remove books, shoes, and other objects from walkways and stairs  Place cords for telephones and lamps out of the way so that you do not need to walk over them  Tape them down if you cannot move them  Remove small rugs  If you cannot remove a rug, secure it with double-sided tape  This will prevent you from tripping  · Install bright lights in your home  Use night lights to help light paths to the bathroom or kitchen  Always turn on the light before you start walking  · Keep items you use often on shelves within reach  Do not use a step stool to help you reach an item  · Paint or place reflective tape on the edges of your stairs  This will help you see the stairs better  Follow up with your healthcare provider as directed:  Write down your questions so you remember to ask them during your visits  © Copyright [address] Information is for End User's use only and may not be sold, redistributed or otherwise used for commercial purposes  All illustrations and images included in CareNotes® are the copyrighted property of A D A M , Inc  or Candis Raygoza   The above information is an  only  It is not intended as medical advice for individual conditions or treatments  Talk to your doctor, nurse or pharmacist before following any medical regimen to see if it is safe and effective for you

## 2021-06-03 NOTE — PROGRESS NOTES
Kymberly    NAME: Myesha Bingham  AGE: 70 y o  SEX: male  : 1950     DATE: 6/3/2021     Assessment and Plan:     1  Fall, initial encounter  2  Rib pain    Likely rib contusion from falling and having impact into rib cage from his elbow  Had an x-ray this morning  Lungs expanding normally  Take tylenol for pain  Use ice/heat prn  Chief Complaint:     Chief Complaint   Patient presents with    Fall     "fell outside and left elbow hit ribs, to take a deep breath it hurts"      History of Present Illness:     Shannen Amado outside at his home using the steps he had built by his garden  When he fell, his left elbow hit his rib area  Rib area is painful  Review of Systems:     Review of Systems   Constitutional: Negative  Cardiovascular: Positive for chest pain  Skin: Positive for wound  Objective:     /76 (BP Location: Right arm, Patient Position: Sitting) Comment: patient stated "left arm in pain"  Pulse 74   Temp (!) 97 3 °F (36 3 °C) (Temporal)   Wt 83 4 kg (183 lb 12 8 oz)   SpO2 98%   BMI 31 55 kg/m²     Physical Exam  Constitutional:       General: He is not in acute distress  Appearance: He is not ill-appearing  Cardiovascular:      Rate and Rhythm: Normal rate and regular rhythm  Heart sounds: No murmur  Pulmonary:      Effort: Pulmonary effort is normal  No respiratory distress  Breath sounds: No wheezing  Chest:      Chest wall: Tenderness (left rib cage) present  Skin:     Findings: Lesion (skin tear around left elbow) present  Neurological:      Mental Status: He is alert         Willie Leija DO  [facility]

## 2021-08-16 DIAGNOSIS — K21.9 GASTROESOPHAGEAL REFLUX DISEASE: ICD-10-CM

## 2021-08-16 RX ORDER — OMEPRAZOLE 20 MG/1
CAPSULE, DELAYED RELEASE ORAL
Qty: 90 CAPSULE | Refills: 3 | Status: SHIPPED | OUTPATIENT
Start: 2021-08-16 | End: 2022-07-07

## 2021-08-24 ENCOUNTER — APPOINTMENT (OUTPATIENT)
Dept: LAB | Facility: CLINIC | Age: 71
End: 2021-08-24
Payer: MEDICARE

## 2021-08-24 DIAGNOSIS — Z12.5 SCREENING FOR PROSTATE CANCER: ICD-10-CM

## 2021-08-24 DIAGNOSIS — E11.22 TYPE 2 DIABETES MELLITUS WITH STAGE 2 CHRONIC KIDNEY DISEASE, WITHOUT LONG-TERM CURRENT USE OF INSULIN (HCC): Chronic | ICD-10-CM

## 2021-08-24 DIAGNOSIS — N18.2 TYPE 2 DIABETES MELLITUS WITH STAGE 2 CHRONIC KIDNEY DISEASE, WITHOUT LONG-TERM CURRENT USE OF INSULIN (HCC): Chronic | ICD-10-CM

## 2021-08-24 LAB
ANION GAP SERPL CALCULATED.3IONS-SCNC: 5 MMOL/L (ref 4–13)
BUN SERPL-MCNC: 20 MG/DL (ref 5–25)
CALCIUM SERPL-MCNC: 9.2 MG/DL (ref 8.3–10.1)
CHLORIDE SERPL-SCNC: 106 MMOL/L (ref 100–108)
CO2 SERPL-SCNC: 27 MMOL/L (ref 21–32)
CREAT SERPL-MCNC: 1.41 MG/DL (ref 0.6–1.3)
ERYTHROCYTE [DISTWIDTH] IN BLOOD BY AUTOMATED COUNT: 12.6 % (ref 11.6–15.1)
EST. AVERAGE GLUCOSE BLD GHB EST-MCNC: 192 MG/DL
GFR SERPL CREATININE-BSD FRML MDRD: 50 ML/MIN/1.73SQ M
GLUCOSE P FAST SERPL-MCNC: 124 MG/DL (ref 65–99)
HBA1C MFR BLD: 8.3 %
HCT VFR BLD AUTO: 42.1 % (ref 36.5–49.3)
HGB BLD-MCNC: 13.8 G/DL (ref 12–17)
MCH RBC QN AUTO: 30.1 PG (ref 26.8–34.3)
MCHC RBC AUTO-ENTMCNC: 32.8 G/DL (ref 31.4–37.4)
MCV RBC AUTO: 92 FL (ref 82–98)
PLATELET # BLD AUTO: 215 THOUSANDS/UL (ref 149–390)
PMV BLD AUTO: 10.9 FL (ref 8.9–12.7)
POTASSIUM SERPL-SCNC: 4.1 MMOL/L (ref 3.5–5.3)
PSA SERPL-MCNC: 0.7 NG/ML (ref 0–4)
RBC # BLD AUTO: 4.59 MILLION/UL (ref 3.88–5.62)
SODIUM SERPL-SCNC: 138 MMOL/L (ref 136–145)
WBC # BLD AUTO: 8.31 THOUSAND/UL (ref 4.31–10.16)

## 2021-08-24 PROCEDURE — 85027 COMPLETE CBC AUTOMATED: CPT

## 2021-08-24 PROCEDURE — 83036 HEMOGLOBIN GLYCOSYLATED A1C: CPT

## 2021-08-24 PROCEDURE — G0103 PSA SCREENING: HCPCS

## 2021-08-24 PROCEDURE — 36415 COLL VENOUS BLD VENIPUNCTURE: CPT

## 2021-08-24 PROCEDURE — 80048 BASIC METABOLIC PNL TOTAL CA: CPT

## 2021-08-30 ENCOUNTER — OFFICE VISIT (OUTPATIENT)
Dept: INTERNAL MEDICINE CLINIC | Facility: CLINIC | Age: 71
End: 2021-08-30
Payer: MEDICARE

## 2021-08-30 VITALS
RESPIRATION RATE: 14 BRPM | DIASTOLIC BLOOD PRESSURE: 70 MMHG | HEART RATE: 87 BPM | HEIGHT: 64 IN | BODY MASS INDEX: 33.16 KG/M2 | OXYGEN SATURATION: 97 % | TEMPERATURE: 96.7 F | SYSTOLIC BLOOD PRESSURE: 138 MMHG | WEIGHT: 194.2 LBS

## 2021-08-30 DIAGNOSIS — Z79.4 TYPE 2 DIABETES MELLITUS WITH HYPERGLYCEMIA, WITH LONG-TERM CURRENT USE OF INSULIN (HCC): Primary | ICD-10-CM

## 2021-08-30 DIAGNOSIS — E11.65 TYPE 2 DIABETES MELLITUS WITH HYPERGLYCEMIA, WITH LONG-TERM CURRENT USE OF INSULIN (HCC): Primary | ICD-10-CM

## 2021-08-30 DIAGNOSIS — R21 RASH: ICD-10-CM

## 2021-08-30 DIAGNOSIS — M62.838 MUSCLE SPASM: ICD-10-CM

## 2021-08-30 DIAGNOSIS — E78.2 MIXED HYPERLIPIDEMIA: Chronic | ICD-10-CM

## 2021-08-30 DIAGNOSIS — I10 BENIGN ESSENTIAL HYPERTENSION: Chronic | ICD-10-CM

## 2021-08-30 PROCEDURE — 99214 OFFICE O/P EST MOD 30 MIN: CPT | Performed by: INTERNAL MEDICINE

## 2021-08-30 RX ORDER — CYCLOBENZAPRINE HCL 5 MG
5 TABLET ORAL 3 TIMES DAILY PRN
Qty: 60 TABLET | Refills: 0 | Status: SHIPPED | OUTPATIENT
Start: 2021-08-30 | End: 2021-10-29

## 2021-08-30 RX ORDER — DAPAGLIFLOZIN 10 MG/1
10 TABLET, FILM COATED ORAL DAILY
Qty: 90 TABLET | Refills: 3 | Status: CANCELLED | OUTPATIENT
Start: 2021-08-30

## 2021-08-30 RX ORDER — REPAGLINIDE 1 MG/1
1 TABLET ORAL
Qty: 180 TABLET | Refills: 3 | Status: SHIPPED | OUTPATIENT
Start: 2021-08-30 | End: 2021-10-25

## 2021-08-30 RX ORDER — DAPAGLIFLOZIN 5 MG/1
5 TABLET, FILM COATED ORAL DAILY
Qty: 90 TABLET | Refills: 3 | Status: SHIPPED | OUTPATIENT
Start: 2021-08-30 | End: 2021-10-19 | Stop reason: SINTOL

## 2021-08-30 RX ORDER — CLOTRIMAZOLE AND BETAMETHASONE DIPROPIONATE 10; .64 MG/G; MG/G
CREAM TOPICAL 2 TIMES DAILY PRN
Qty: 30 G | Refills: 2 | Status: SHIPPED | OUTPATIENT
Start: 2021-08-30 | End: 2021-12-15

## 2021-08-30 NOTE — PATIENT INSTRUCTIONS
Type 2 Diabetes Management for Adults   WHAT YOU NEED TO KNOW:   What is type 2 diabetes? Type 2 diabetes is a disease that affects how your body uses glucose (sugar)  Either your body cannot make enough insulin, or it cannot use the insulin correctly  It is important to keep diabetes controlled to prevent damage to your heart, blood vessels, and other organs  What can I do to manage my blood sugar levels? · Talk to your care team if you become stressed about diabetes care  Sometimes being able to fit diabetes care into your life can cause increased stress  The stress can cause you not to take care of yourself properly  Your care team can help by offering tips about self-care  Your care team may suggest you talk to a mental health provider  The provider can listen and offer help with self-care issues  · Make healthy food choices  Work with a dietitian to develop a meal plan that works for you and your schedule  A dietitian can help you learn how to eat the right amount of carbohydrates during your meals and snacks  Carbohydrates can raise your blood sugar if you eat too many at one time  Some foods that contain carbohydrates include breads, cereals, rice, pasta, and sweets  · Drink water  Water can help your kidneys function properly  Decrease the amount of drinks with sugar substitutes you have, such as diet sodas  Avoid sugary drinks, such as regular sodas and fruit juice  · Get regular physical activity  Physical activity can help you get to your target blood sugar level goal and manage your weight  Get at least 150 minutes of moderate to vigorous aerobic physical activity each week  Do not miss more than 2 days in a row  Do not sit longer than 30 minutes at a time  Your healthcare provider can help you create an activity plan  The plan can include the best activities for you and can help you build your strength and endurance  · Maintain a healthy weight    Ask your healthcare provider how much you should weigh  Ask him or her to help you create a safe weight loss plan if you are overweight  · Check your blood sugar level as directed and as needed  Ask your healthcare provider what your blood sugar levels should be  Ask him or her to help you create a plan for times to check your level  · Take your diabetes medicine or insulin as directed  You may need diabetes medicine, insulin, or both to help control your blood sugar levels  Your healthcare provider will teach you how and when to take your diabetes medicine or insulin  · Go to all follow-up appointments  Your healthcare provider may need to check your A1c every 3 months  An A1c test shows the average amount of sugar in your blood over the past 2 to 3 months  Your healthcare provider will tell you what your A1c level should be  What do I need to know about high blood sugar? High blood sugar may not cause any symptoms  It may cause you to feel more thirsty than usual or urinate more often than usual  Over time, high blood sugar levels can damage your nerves, blood vessels, tissues, and organs  The following can increase your blood sugar levels:  · Large meals or large amounts of carbohydrates at one time    · Decreased physical activity    · Stress    · Illness     · A lower dose of medicine or insulin, or a late dose    What do I need to know about low blood sugar? You can prevent symptoms such as shakiness, dizziness, irritability, or confusion by preventing your blood sugar from going too low  · Treat low blood sugar right away  ? Drink 4 ounces of juice or have 1 tube of glucose gel  ? Check your blood sugar again 10 to 15 minutes later  ? When your blood sugar goes back to normal, eat a meal or snack to prevent another decrease  · Keep glucose gel, raisins, or even hard candy with you at all times to treat low blood sugar       · Your blood sugar can get too low if you take diabetes medicine or insulin and do not eat enough food  · If you use insulin, check your blood sugar before you exercise  ? If your blood sugar is below 100 mg/dL, eat 4 crackers, 2 ounces of raisins, or drink 4 ounces of juice  ? Check your blood sugar every 30 minutes if you exercise more than 1 hour  ? You may need a snack during or after exercise  What else can I do to manage my diabetes? · Wear medical alert jewelry or carry a card that says you have diabetes  Your provider can tell you where to get the items  · Be safe when you drive  If you feel like your blood sugar is low while you are driving, pull over and check your blood sugar level  Treat low blood sugar before you start driving again, if needed  Keep snacks such as raisins and crackers in your car  You can also keep glucose gel in your car  · Know the risks if you choose to drink alcohol  Alcohol can cause your blood sugar levels to be low if you use insulin  Alcohol can cause high blood sugar levels and weight gain if you drink too much  Women should limit alcohol to 1 drink a day  Men should limit alcohol to 2 drinks a day  A drink of alcohol is 12 ounces of beer, 5 ounces of wine, or 1½ ounces of liquor  · Do not smoke  Nicotine can damage blood vessels and make it more difficult to manage your diabetes  Do not use e-cigarettes or smokeless tobacco in place of cigarettes or to help you quit  They still contain nicotine  Ask your healthcare provider for information if you currently smoke and need help quitting  · Have screenings for complications of diabetes and other conditions that happen with diabetes  You will need to be screened for kidney problems, high cholesterol, high blood pressure, blood vessel problems, eye problems, and eating disorders  Some screenings may begin right away and some may happen within the first 5 years of diagnosis  You will need to continue screenings through your lifetime   Keep your follow-up appointments with all providers  · Ask about vaccines  You have a higher risk for serious illness if you get the flu, pneumonia, COVID-19, or hepatitis  Ask your healthcare provider if you should get a flu, pneumonia, or hepatitis B vaccine, and when to get the COVID-19 vaccine  CARE AGREEMENT:   You have the right to help plan your care  Learn about your health condition and how it may be treated  Discuss treatment options with your healthcare providers to decide what care you want to receive  You always have the right to refuse treatment  The above information is an  only  It is not intended as medical advice for individual conditions or treatments  Talk to your doctor, nurse or pharmacist before following any medical regimen to see if it is safe and effective for you  © Copyright [employer] 2021 Information is for End User's use only and may not be sold, redistributed or otherwise used for commercial purposes   All illustrations and images included in CareNotes® are the copyrighted property of [employer]  or [address] [place] [address]

## 2021-08-30 NOTE — PROGRESS NOTES
[place]    NAME: Angel Arias  AGE: 70 y o  SEX: male  : 1950     DATE: 2021     Assessment and Plan:     1  Type 2 diabetes mellitus with hyperglycemia, with long-term current use of insulin ([facility] [address] )    Add farxiga to medication regimen  Also needs to focus on diet/exericse/losing weight  Most recent A1c was 8 3 % on 2021  Has future endo appt  Can get fructosamine level before that visit  - Hemoglobin A1C; Future  - Basic metabolic panel; Future  - Microalbumin / creatinine urine ratio; Future  - Dapagliflozin Propanediol (Farxiga) 5 MG TABS; Take 1 tablet (5 mg total) by mouth daily  Dispense: 90 tablet; Refill: 3  - repaglinide (PRANDIN) 1 mg tablet; Take 1 tablet (1 mg total) by mouth 2 (two) times a day before meals  Dispense: 180 tablet; Refill: 3  - Fructosamine; Future    2  Benign essential hypertension    BP stable, but could be better  Weight loss can help with his BP and he knows what he needs to do  3  Mixed hyperlipidemia    Continue atorvastatin 10mg  - Lipid panel; Future    4  Rash  - clotrimazole-betamethasone (LOTRISONE) 1-0 05 % cream; Apply topically 2 (two) times a day as needed (rash)  Dispense: 30 g; Refill: 2    5  Muscle spasm  - cyclobenzaprine (FLEXERIL) 5 mg tablet; Take 1 tablet (5 mg total) by mouth 3 (three) times a day as needed for muscle spasms  Dispense: 60 tablet; Refill: 0        Return in about 3 months (around 2021) for Follow-up  Chief Complaint:     Chief Complaint   Patient presents with    Follow-up     four month       History of Present Illness:     Sherryle Ali presents for f/u  Admits to not doing great with diet and A1c has still been high  Asking about adding farxiga to his med regimen  Had issues with pharmacy and how they were refilling his medications and didn't have metformin for a week  Has put on some weight and admits to not having great will power to say no sometimes  Review of Systems:     Review of Systems   Constitutional: Negative for activity change, appetite change and fatigue  Respiratory: Negative for apnea, cough, chest tightness, shortness of breath and wheezing  Cardiovascular: Negative for chest pain, palpitations and leg swelling  Gastrointestinal: Negative for abdominal distention, abdominal pain, blood in stool, constipation, diarrhea, nausea and vomiting  Musculoskeletal: Positive for back pain (intermittent)  Neurological: Negative for dizziness, weakness, light-headedness, numbness and headaches  Psychiatric/Behavioral: Negative for behavioral problems, confusion, hallucinations, sleep disturbance and suicidal ideas  The patient is not nervous/anxious  Objective:     /82 (BP Location: Left arm, Patient Position: Sitting, Cuff Size: Standard)   Pulse 87   Temp (!) 96 7 °F (35 9 °C) (Tympanic)   Resp 14   Ht 5' 4" (1 626 m)   Wt 88 1 kg (194 lb 3 2 oz)   SpO2 97%   BMI 33 33 kg/m²     Physical Exam  Constitutional:       General: He is not in acute distress  Appearance: He is not ill-appearing  Cardiovascular:      Rate and Rhythm: Normal rate and regular rhythm  Pulses: no weak pulses          Dorsalis pedis pulses are 2+ on the right side and 2+ on the left side  Posterior tibial pulses are 2+ on the right side and 2+ on the left side  Heart sounds: No murmur heard  Pulmonary:      Effort: Pulmonary effort is normal  No respiratory distress  Breath sounds: No wheezing  Abdominal:      General: Bowel sounds are normal  There is no distension  Tenderness: There is no abdominal tenderness  Musculoskeletal:      Right lower leg: No edema  Left lower leg: No edema  Feet:      Right foot:      Skin integrity: No ulcer, skin breakdown, erythema, warmth, callus or dry skin  Left foot:      Skin integrity: No ulcer, skin breakdown, erythema, warmth, callus or dry skin     Neurological: Mental Status: He is alert  Diabetic Foot Exam  Patient's shoes and socks removed  Right Foot/Ankle   Right Foot Inspection  Skin Exam: skin normal and skin intact no dry skin, no warmth, no callus, no erythema, no maceration, no abnormal color, no pre-ulcer, no ulcer and no callus                          Toe Exam: ROM and strength within normal limits  Sensory     Proprioception: intact   Monofilament testing: intact  Vascular  Capillary refills: < 3 seconds  The right DP pulse is 2+  The right PT pulse is 2+  Left Foot/Ankle  Left Foot Inspection  Skin Exam: skin normal and skin intactno dry skin, no warmth, no erythema, no maceration, normal color, no pre-ulcer, no ulcer and no callus                         Toe Exam: ROM and strength within normal limits                   Sensory     Proprioception: intact  Monofilament: intact  Vascular  Capillary refills: < 3 seconds  The left DP pulse is 2+  The left PT pulse is 2+  Assign Risk Category:  No deformity present; No loss of protective sensation;  No weak pulses       Risk: 0    Ryley Saleem,   MEDICAL [id number]

## 2021-10-05 ENCOUNTER — IMMUNIZATIONS (OUTPATIENT)
Dept: FAMILY MEDICINE CLINIC | Facility: HOSPITAL | Age: 71
End: 2021-10-05

## 2021-10-05 DIAGNOSIS — Z23 ENCOUNTER FOR IMMUNIZATION: Primary | ICD-10-CM

## 2021-10-05 PROCEDURE — 91300 SARS-COV-2 / COVID-19 MRNA VACCINE (PFIZER-BIONTECH) 30 MCG: CPT

## 2021-10-05 PROCEDURE — 0001A SARS-COV-2 / COVID-19 MRNA VACCINE (PFIZER-BIONTECH) 30 MCG: CPT

## 2021-10-20 ENCOUNTER — APPOINTMENT (OUTPATIENT)
Dept: LAB | Facility: CLINIC | Age: 71
End: 2021-10-20
Payer: MEDICARE

## 2021-10-20 DIAGNOSIS — Z79.4 TYPE 2 DIABETES MELLITUS WITH HYPERGLYCEMIA, WITH LONG-TERM CURRENT USE OF INSULIN (HCC): ICD-10-CM

## 2021-10-20 DIAGNOSIS — E11.65 TYPE 2 DIABETES MELLITUS WITH HYPERGLYCEMIA, WITH LONG-TERM CURRENT USE OF INSULIN (HCC): ICD-10-CM

## 2021-10-20 PROCEDURE — 36415 COLL VENOUS BLD VENIPUNCTURE: CPT

## 2021-10-20 PROCEDURE — 82985 ASSAY OF GLYCATED PROTEIN: CPT

## 2021-10-21 LAB — FRUCTOSAMINE SERPL-SCNC: 307 UMOL/L (ref 0–285)

## 2021-10-25 ENCOUNTER — OFFICE VISIT (OUTPATIENT)
Dept: ENDOCRINOLOGY | Facility: CLINIC | Age: 71
End: 2021-10-25

## 2021-10-25 VITALS
DIASTOLIC BLOOD PRESSURE: 70 MMHG | SYSTOLIC BLOOD PRESSURE: 116 MMHG | BODY MASS INDEX: 31.95 KG/M2 | WEIGHT: 187.13 LBS | HEART RATE: 72 BPM | HEIGHT: 64 IN

## 2021-10-25 DIAGNOSIS — E78.2 MIXED HYPERLIPIDEMIA: Chronic | ICD-10-CM

## 2021-10-25 DIAGNOSIS — I10 BENIGN ESSENTIAL HYPERTENSION: Chronic | ICD-10-CM

## 2021-10-25 DIAGNOSIS — N18.2 TYPE 2 DIABETES MELLITUS WITH STAGE 2 CHRONIC KIDNEY DISEASE, WITHOUT LONG-TERM CURRENT USE OF INSULIN (HCC): Primary | Chronic | ICD-10-CM

## 2021-10-25 DIAGNOSIS — E11.22 TYPE 2 DIABETES MELLITUS WITH STAGE 2 CHRONIC KIDNEY DISEASE, WITHOUT LONG-TERM CURRENT USE OF INSULIN (HCC): Primary | Chronic | ICD-10-CM

## 2021-10-25 PROCEDURE — 99204 OFFICE O/P NEW MOD 45 MIN: CPT | Performed by: INTERNAL MEDICINE

## 2021-10-25 RX ORDER — SEMAGLUTIDE 1.34 MG/ML
INJECTION, SOLUTION SUBCUTANEOUS
Qty: 1.5 ML | Refills: 3 | Status: SHIPPED | OUTPATIENT
Start: 2021-10-25 | End: 2021-11-30

## 2021-10-28 DIAGNOSIS — M62.838 MUSCLE SPASM: ICD-10-CM

## 2021-10-29 RX ORDER — CYCLOBENZAPRINE HCL 5 MG
TABLET ORAL
Qty: 60 TABLET | Refills: 0 | Status: SHIPPED | OUTPATIENT
Start: 2021-10-29 | End: 2021-12-15

## 2021-11-03 ENCOUNTER — CLINICAL SUPPORT (OUTPATIENT)
Dept: INTERNAL MEDICINE CLINIC | Facility: CLINIC | Age: 71
End: 2021-11-03
Payer: MEDICARE

## 2021-11-03 DIAGNOSIS — Z23 ENCOUNTER FOR IMMUNIZATION: Primary | ICD-10-CM

## 2021-11-03 PROCEDURE — G0008 ADMIN INFLUENZA VIRUS VAC: HCPCS

## 2021-11-03 PROCEDURE — 90662 IIV NO PRSV INCREASED AG IM: CPT

## 2021-11-05 ENCOUNTER — APPOINTMENT (OUTPATIENT)
Dept: LAB | Facility: CLINIC | Age: 71
End: 2021-11-05
Payer: MEDICARE

## 2021-11-05 DIAGNOSIS — E11.65 TYPE 2 DIABETES MELLITUS WITH HYPERGLYCEMIA, WITH LONG-TERM CURRENT USE OF INSULIN (HCC): ICD-10-CM

## 2021-11-05 DIAGNOSIS — Z79.4 TYPE 2 DIABETES MELLITUS WITH HYPERGLYCEMIA, WITH LONG-TERM CURRENT USE OF INSULIN (HCC): ICD-10-CM

## 2021-11-05 DIAGNOSIS — R04.0 EPISTAXIS: ICD-10-CM

## 2021-11-05 LAB
ALBUMIN SERPL BCP-MCNC: 4.1 G/DL (ref 3.5–5)
ALP SERPL-CCNC: 73 U/L (ref 46–116)
ALT SERPL W P-5'-P-CCNC: 26 U/L (ref 12–78)
ANION GAP SERPL CALCULATED.3IONS-SCNC: 3 MMOL/L (ref 4–13)
APTT PPP: 28 SECONDS (ref 23–37)
AST SERPL W P-5'-P-CCNC: 11 U/L (ref 5–45)
BASOPHILS # BLD AUTO: 0.05 THOUSANDS/ΜL (ref 0–0.1)
BASOPHILS NFR BLD AUTO: 1 % (ref 0–1)
BILIRUB SERPL-MCNC: 0.59 MG/DL (ref 0.2–1)
BUN SERPL-MCNC: 19 MG/DL (ref 5–25)
CALCIUM SERPL-MCNC: 9.9 MG/DL (ref 8.3–10.1)
CHLORIDE SERPL-SCNC: 105 MMOL/L (ref 100–108)
CO2 SERPL-SCNC: 28 MMOL/L (ref 21–32)
CREAT SERPL-MCNC: 1.2 MG/DL (ref 0.6–1.3)
EOSINOPHIL # BLD AUTO: 0.33 THOUSAND/ΜL (ref 0–0.61)
EOSINOPHIL NFR BLD AUTO: 4 % (ref 0–6)
ERYTHROCYTE [DISTWIDTH] IN BLOOD BY AUTOMATED COUNT: 12.4 % (ref 11.6–15.1)
EST. AVERAGE GLUCOSE BLD GHB EST-MCNC: 157 MG/DL
GFR SERPL CREATININE-BSD FRML MDRD: 60 ML/MIN/1.73SQ M
GLUCOSE SERPL-MCNC: 146 MG/DL (ref 65–140)
HBA1C MFR BLD: 7.1 %
HCT VFR BLD AUTO: 43.5 % (ref 36.5–49.3)
HGB BLD-MCNC: 15 G/DL (ref 12–17)
IMM GRANULOCYTES # BLD AUTO: 0.03 THOUSAND/UL (ref 0–0.2)
IMM GRANULOCYTES NFR BLD AUTO: 0 % (ref 0–2)
INR PPP: 1 (ref 0.84–1.19)
LYMPHOCYTES # BLD AUTO: 2.23 THOUSANDS/ΜL (ref 0.6–4.47)
LYMPHOCYTES NFR BLD AUTO: 24 % (ref 14–44)
MCH RBC QN AUTO: 30.5 PG (ref 26.8–34.3)
MCHC RBC AUTO-ENTMCNC: 34.5 G/DL (ref 31.4–37.4)
MCV RBC AUTO: 89 FL (ref 82–98)
MONOCYTES # BLD AUTO: 0.7 THOUSAND/ΜL (ref 0.17–1.22)
MONOCYTES NFR BLD AUTO: 7 % (ref 4–12)
NEUTROPHILS # BLD AUTO: 6.11 THOUSANDS/ΜL (ref 1.85–7.62)
NEUTS SEG NFR BLD AUTO: 64 % (ref 43–75)
NRBC BLD AUTO-RTO: 0 /100 WBCS
PLATELET # BLD AUTO: 237 THOUSANDS/UL (ref 149–390)
PMV BLD AUTO: 10.6 FL (ref 8.9–12.7)
POTASSIUM SERPL-SCNC: 3.8 MMOL/L (ref 3.5–5.3)
PROT SERPL-MCNC: 7.9 G/DL (ref 6.4–8.2)
PROTHROMBIN TIME: 12.8 SECONDS (ref 11.6–14.5)
RBC # BLD AUTO: 4.91 MILLION/UL (ref 3.88–5.62)
SODIUM SERPL-SCNC: 136 MMOL/L (ref 136–145)
WBC # BLD AUTO: 9.45 THOUSAND/UL (ref 4.31–10.16)

## 2021-11-05 PROCEDURE — 85025 COMPLETE CBC W/AUTO DIFF WBC: CPT

## 2021-11-05 PROCEDURE — 85730 THROMBOPLASTIN TIME PARTIAL: CPT

## 2021-11-05 PROCEDURE — 80053 COMPREHEN METABOLIC PANEL: CPT

## 2021-11-05 PROCEDURE — 85610 PROTHROMBIN TIME: CPT

## 2021-11-05 PROCEDURE — 83036 HEMOGLOBIN GLYCOSYLATED A1C: CPT

## 2021-11-05 PROCEDURE — 36415 COLL VENOUS BLD VENIPUNCTURE: CPT

## 2021-11-16 ENCOUNTER — TELEPHONE (OUTPATIENT)
Dept: ENDOCRINOLOGY | Facility: CLINIC | Age: 71
End: 2021-11-16

## 2021-11-17 ENCOUNTER — OFFICE VISIT (OUTPATIENT)
Dept: DERMATOLOGY | Facility: CLINIC | Age: 71
End: 2021-11-17
Payer: MEDICARE

## 2021-11-17 DIAGNOSIS — L57.0 ACTINIC KERATOSIS: Primary | ICD-10-CM

## 2021-11-17 DIAGNOSIS — Z13.89 SCREENING FOR SKIN CONDITION: ICD-10-CM

## 2021-11-17 DIAGNOSIS — L21.9 SEBORRHEIC DERMATITIS: ICD-10-CM

## 2021-11-17 DIAGNOSIS — L82.1 SEBORRHEIC KERATOSIS: ICD-10-CM

## 2021-11-17 DIAGNOSIS — L30.4 INTERTRIGO: ICD-10-CM

## 2021-11-17 DIAGNOSIS — Z85.828 HISTORY OF SKIN CANCER: ICD-10-CM

## 2021-11-17 PROCEDURE — 17003 DESTRUCT PREMALG LES 2-14: CPT | Performed by: DERMATOLOGY

## 2021-11-17 PROCEDURE — 17000 DESTRUCT PREMALG LESION: CPT | Performed by: DERMATOLOGY

## 2021-11-17 PROCEDURE — 99213 OFFICE O/P EST LOW 20 MIN: CPT | Performed by: DERMATOLOGY

## 2021-11-17 RX ORDER — KETOCONAZOLE 20 MG/G
CREAM TOPICAL 2 TIMES DAILY
Qty: 30 G | Refills: 3 | Status: SHIPPED | OUTPATIENT
Start: 2021-11-17 | End: 2022-03-30

## 2021-11-22 ENCOUNTER — TELEPHONE (OUTPATIENT)
Dept: ENDOCRINOLOGY | Facility: CLINIC | Age: 71
End: 2021-11-22

## 2021-11-22 ENCOUNTER — OFFICE VISIT (OUTPATIENT)
Dept: INTERNAL MEDICINE CLINIC | Facility: CLINIC | Age: 71
End: 2021-11-22
Payer: MEDICARE

## 2021-11-22 VITALS
WEIGHT: 184.6 LBS | BODY MASS INDEX: 31.51 KG/M2 | SYSTOLIC BLOOD PRESSURE: 120 MMHG | TEMPERATURE: 97.8 F | HEIGHT: 64 IN | RESPIRATION RATE: 16 BRPM | DIASTOLIC BLOOD PRESSURE: 88 MMHG | OXYGEN SATURATION: 96 % | HEART RATE: 80 BPM

## 2021-11-22 DIAGNOSIS — Z87.892 HISTORY OF ANAPHYLAXIS: ICD-10-CM

## 2021-11-22 DIAGNOSIS — R21 RASH IN ADULT: Primary | ICD-10-CM

## 2021-11-22 DIAGNOSIS — E11.22 TYPE 2 DIABETES MELLITUS WITH STAGE 2 CHRONIC KIDNEY DISEASE, WITHOUT LONG-TERM CURRENT USE OF INSULIN (HCC): Chronic | ICD-10-CM

## 2021-11-22 DIAGNOSIS — N18.2 TYPE 2 DIABETES MELLITUS WITH STAGE 2 CHRONIC KIDNEY DISEASE, WITHOUT LONG-TERM CURRENT USE OF INSULIN (HCC): Chronic | ICD-10-CM

## 2021-11-22 PROCEDURE — 99212 OFFICE O/P EST SF 10 MIN: CPT

## 2021-11-22 RX ORDER — EPINEPHRINE 0.3 MG/.3ML
0.3 INJECTION SUBCUTANEOUS ONCE
Qty: 0.6 ML | Refills: 0 | Status: SHIPPED | OUTPATIENT
Start: 2021-11-22 | End: 2022-07-11

## 2021-11-30 ENCOUNTER — OFFICE VISIT (OUTPATIENT)
Dept: INTERNAL MEDICINE CLINIC | Facility: CLINIC | Age: 71
End: 2021-11-30
Payer: MEDICARE

## 2021-11-30 VITALS
WEIGHT: 184.2 LBS | HEART RATE: 76 BPM | RESPIRATION RATE: 12 BRPM | TEMPERATURE: 96.6 F | SYSTOLIC BLOOD PRESSURE: 118 MMHG | DIASTOLIC BLOOD PRESSURE: 78 MMHG | OXYGEN SATURATION: 97 % | BODY MASS INDEX: 31.45 KG/M2 | HEIGHT: 64 IN

## 2021-11-30 DIAGNOSIS — E11.65 TYPE 2 DIABETES MELLITUS WITH HYPERGLYCEMIA, WITH LONG-TERM CURRENT USE OF INSULIN (HCC): Primary | ICD-10-CM

## 2021-11-30 DIAGNOSIS — Z79.4 TYPE 2 DIABETES MELLITUS WITH HYPERGLYCEMIA, WITH LONG-TERM CURRENT USE OF INSULIN (HCC): Primary | ICD-10-CM

## 2021-11-30 DIAGNOSIS — E78.2 MIXED HYPERLIPIDEMIA: Chronic | ICD-10-CM

## 2021-11-30 DIAGNOSIS — I10 BENIGN ESSENTIAL HYPERTENSION: Chronic | ICD-10-CM

## 2021-11-30 PROBLEM — R09.89 THROAT CLEARING: Status: RESOLVED | Noted: 2021-01-05 | Resolved: 2021-11-30

## 2021-11-30 PROCEDURE — 99214 OFFICE O/P EST MOD 30 MIN: CPT | Performed by: INTERNAL MEDICINE

## 2021-12-02 ENCOUNTER — TELEPHONE (OUTPATIENT)
Dept: ENDOCRINOLOGY | Facility: CLINIC | Age: 71
End: 2021-12-02

## 2021-12-08 DIAGNOSIS — N18.2 TYPE 2 DIABETES MELLITUS WITH STAGE 2 CHRONIC KIDNEY DISEASE, WITHOUT LONG-TERM CURRENT USE OF INSULIN (HCC): Chronic | ICD-10-CM

## 2021-12-08 DIAGNOSIS — E11.22 TYPE 2 DIABETES MELLITUS WITH STAGE 2 CHRONIC KIDNEY DISEASE, WITHOUT LONG-TERM CURRENT USE OF INSULIN (HCC): Chronic | ICD-10-CM

## 2021-12-08 RX ORDER — BLOOD-GLUCOSE METER
KIT MISCELLANEOUS
Qty: 100 STRIP | Refills: 3 | Status: SHIPPED | OUTPATIENT
Start: 2021-12-08 | End: 2021-12-08

## 2021-12-14 DIAGNOSIS — E11.22 TYPE 2 DIABETES MELLITUS WITH STAGE 2 CHRONIC KIDNEY DISEASE, WITHOUT LONG-TERM CURRENT USE OF INSULIN (HCC): Chronic | ICD-10-CM

## 2021-12-14 DIAGNOSIS — N18.2 TYPE 2 DIABETES MELLITUS WITH STAGE 2 CHRONIC KIDNEY DISEASE, WITHOUT LONG-TERM CURRENT USE OF INSULIN (HCC): Chronic | ICD-10-CM

## 2021-12-14 DIAGNOSIS — M62.838 MUSCLE SPASM: ICD-10-CM

## 2021-12-14 DIAGNOSIS — R21 RASH: ICD-10-CM

## 2021-12-15 RX ORDER — CLOTRIMAZOLE AND BETAMETHASONE DIPROPIONATE 10; .64 MG/G; MG/G
CREAM TOPICAL 2 TIMES DAILY PRN
Qty: 30 G | Refills: 2 | Status: SHIPPED | OUTPATIENT
Start: 2021-12-15

## 2021-12-15 RX ORDER — GLIMEPIRIDE 4 MG/1
4 TABLET ORAL 2 TIMES DAILY
Qty: 180 TABLET | Refills: 3 | Status: SHIPPED | OUTPATIENT
Start: 2021-12-15

## 2021-12-15 RX ORDER — CYCLOBENZAPRINE HCL 5 MG
TABLET ORAL
Qty: 60 TABLET | Refills: 0 | Status: SHIPPED | OUTPATIENT
Start: 2021-12-15 | End: 2022-07-07

## 2022-02-07 DIAGNOSIS — J30.2 SEASONAL ALLERGIES: ICD-10-CM

## 2022-02-07 RX ORDER — MONTELUKAST SODIUM 10 MG/1
10 TABLET ORAL
Qty: 90 TABLET | Refills: 0 | Status: SHIPPED | OUTPATIENT
Start: 2022-02-07 | End: 2022-03-30

## 2022-03-16 DIAGNOSIS — N18.2 TYPE 2 DIABETES MELLITUS WITH STAGE 2 CHRONIC KIDNEY DISEASE, WITHOUT LONG-TERM CURRENT USE OF INSULIN (HCC): Chronic | ICD-10-CM

## 2022-03-16 DIAGNOSIS — E11.22 TYPE 2 DIABETES MELLITUS WITH STAGE 2 CHRONIC KIDNEY DISEASE, WITHOUT LONG-TERM CURRENT USE OF INSULIN (HCC): Chronic | ICD-10-CM

## 2022-03-16 RX ORDER — BLOOD-GLUCOSE METER
KIT MISCELLANEOUS
Qty: 300 STRIP | Refills: 3 | Status: SHIPPED | OUTPATIENT
Start: 2022-03-16 | End: 2022-03-17

## 2022-03-24 ENCOUNTER — APPOINTMENT (OUTPATIENT)
Dept: LAB | Facility: CLINIC | Age: 72
End: 2022-03-24
Payer: MEDICARE

## 2022-03-24 DIAGNOSIS — E78.2 MIXED HYPERLIPIDEMIA: Chronic | ICD-10-CM

## 2022-03-24 DIAGNOSIS — I10 BENIGN ESSENTIAL HYPERTENSION: Chronic | ICD-10-CM

## 2022-03-24 DIAGNOSIS — Z79.4 TYPE 2 DIABETES MELLITUS WITH HYPERGLYCEMIA, WITH LONG-TERM CURRENT USE OF INSULIN (HCC): ICD-10-CM

## 2022-03-24 DIAGNOSIS — E11.65 TYPE 2 DIABETES MELLITUS WITH HYPERGLYCEMIA, WITH LONG-TERM CURRENT USE OF INSULIN (HCC): ICD-10-CM

## 2022-03-24 LAB
ANION GAP SERPL CALCULATED.3IONS-SCNC: 5 MMOL/L (ref 4–13)
BUN SERPL-MCNC: 22 MG/DL (ref 5–25)
CALCIUM SERPL-MCNC: 9.5 MG/DL (ref 8.3–10.1)
CHLORIDE SERPL-SCNC: 104 MMOL/L (ref 100–108)
CHOLEST SERPL-MCNC: 171 MG/DL
CO2 SERPL-SCNC: 29 MMOL/L (ref 21–32)
CREAT SERPL-MCNC: 1.47 MG/DL (ref 0.6–1.3)
EST. AVERAGE GLUCOSE BLD GHB EST-MCNC: 189 MG/DL
GFR SERPL CREATININE-BSD FRML MDRD: 46 ML/MIN/1.73SQ M
GLUCOSE P FAST SERPL-MCNC: 152 MG/DL (ref 65–99)
HBA1C MFR BLD: 8.2 %
HDLC SERPL-MCNC: 38 MG/DL
LDLC SERPL CALC-MCNC: 102 MG/DL (ref 0–100)
NONHDLC SERPL-MCNC: 133 MG/DL
POTASSIUM SERPL-SCNC: 4.1 MMOL/L (ref 3.5–5.3)
SODIUM SERPL-SCNC: 138 MMOL/L (ref 136–145)
TRIGL SERPL-MCNC: 153 MG/DL

## 2022-03-24 PROCEDURE — 36415 COLL VENOUS BLD VENIPUNCTURE: CPT

## 2022-03-24 PROCEDURE — 80048 BASIC METABOLIC PNL TOTAL CA: CPT

## 2022-03-24 PROCEDURE — 83036 HEMOGLOBIN GLYCOSYLATED A1C: CPT

## 2022-03-24 PROCEDURE — 80061 LIPID PANEL: CPT

## 2022-03-28 ENCOUNTER — OFFICE VISIT (OUTPATIENT)
Dept: ENDOCRINOLOGY | Facility: CLINIC | Age: 72
End: 2022-03-28
Payer: MEDICARE

## 2022-03-28 VITALS
WEIGHT: 189 LBS | SYSTOLIC BLOOD PRESSURE: 158 MMHG | HEART RATE: 106 BPM | HEIGHT: 64 IN | DIASTOLIC BLOOD PRESSURE: 84 MMHG | BODY MASS INDEX: 32.27 KG/M2

## 2022-03-28 DIAGNOSIS — N18.2 TYPE 2 DIABETES MELLITUS WITH STAGE 2 CHRONIC KIDNEY DISEASE, WITHOUT LONG-TERM CURRENT USE OF INSULIN (HCC): Chronic | ICD-10-CM

## 2022-03-28 DIAGNOSIS — E11.22 TYPE 2 DIABETES MELLITUS WITH STAGE 2 CHRONIC KIDNEY DISEASE, WITHOUT LONG-TERM CURRENT USE OF INSULIN (HCC): Chronic | ICD-10-CM

## 2022-03-28 DIAGNOSIS — I10 BENIGN ESSENTIAL HYPERTENSION: Primary | Chronic | ICD-10-CM

## 2022-03-28 DIAGNOSIS — E78.2 MIXED HYPERLIPIDEMIA: Chronic | ICD-10-CM

## 2022-03-28 PROCEDURE — 99214 OFFICE O/P EST MOD 30 MIN: CPT | Performed by: PHYSICIAN ASSISTANT

## 2022-03-28 NOTE — ASSESSMENT & PLAN NOTE
Control has worsened  He feels this is due to poor dietary choices and sedentary lifestyle while staying with family in [place] over the winter  Discussed importance of improving control of Diabetes to reduce risk of complications and slow progression of retinopathy  Discussed option of trying another GLP-1 agonist such as Victoza or Trulicity or starting GLP but he declines but will consider at next visit if A1C remains above target       Lab Results   Component Value Date    HGBA1C 8 2 (H) 03/24/2022

## 2022-03-28 NOTE — ASSESSMENT & PLAN NOTE
Not at goal today, was 118/78  Continue current regimen and he plans to focus on lifestyle modification and weight loss  He has appt with Dr Mirtha Gillis in 1 week

## 2022-03-28 NOTE — PROGRESS NOTES
Established Patient Progress Note      Chief Complaint   Patient presents with    Diabetes Type 2        History of Present Illness:   Jf Matos is a 67 y o  male with a history of type 2 diabetes without long term use of insulin since 1996  Reports complications of CKD and retinopathy  Denies recent illness or hospitalizations  Denies recent severe hypoglycemic or severe hyperglycemic episodes  Denies any issues with his current regimen  home glucose monitoring: are performed regularly 2x per day  Just returned from [place]  In [place], stays with daughter and sister  When he is there, they eat a lot of pasta  In [place], eats a much healthier and regimented diet  Gained about 10 pounds while away  Did well with Ozempic-- but had to stop due to rash which started right after taking medication  Does walk a few times per week for about 20 mins  Has met with dietician in past many times  Also did well with [place] and [place] Angelon but had to stop due to bad yeast infection    Home blood glucose readings:   Before breakfast: 120-180  Before dinner: 130-200s     Current regimen:   Glimepiride 4mg twice per day   Metformin ER 1000mg twice per day  Januvia 100mg daily     Last Eye Exam: has upcoming appt  Last Foot Exam: UTD    Has hypertension: Taking verapamil     Has hyperlipidemia: Taking atorvastatin        Patient Active Problem List   Diagnosis    Actinic keratosis    Seborrheic keratosis    History of skin cancer    Anxiety disorder    Benign essential hypertension    Erectile dysfunction of non-organic origin    Esophageal reflux    Mixed hyperlipidemia    Mitral valve disorder    Rosacea    Sleep apnea    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    Thoracic radiculopathy    Type 2 diabetes mellitus with stage 2 chronic kidney disease, without long-term current use of insulin (HCC)    Mild 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 (HCC)      Past Medical History:   Diagnosis Date    Actinic keratosis     Adhesive capsulitis of unspecified shoulder     Anxiety     Asthma     Atopic dermatitis     Cardiac disease     Cellulitis of right upper extremity     Cervical radiculopathy     Chest pain     Chronic maxillary sinusitis     last assessed 01/21/14    Diabetes mellitus ([facility] [address] [facility]     Erectile dysfunction of non-organic origin     Esophageal reflux     Gout     last assessed 08/26/15    Hearing loss, conductive     Heart murmur     Mitral Valve disorder    Henoch-Schonlein purpura ([facility] [address] ) 8/6/2019    Herpes zoster     History of paroxysmal supraventricular tachycardia     Hypertension     IgA mediated leukocytoclastic vasculitis ([facility] [address] ) 7/3/2019    Mitral valve disorder     Nasal congestion 4/18/19    Neoplasm of skin, malignant     Non-melanoma skin cancer     last assessed 10/26/17    Nosebleed 4/18/19    Obesity     Palpitations     last assessed 03/05/15    Paroxysmal supraventricular tachycardia ([facility] [address] ) 1/21/2014    Plantar fasciitis     last assessed 05/28/15    PSVT (paroxysmal supraventricular tachycardia) ([facility] [address] [facility]     Sciatica     Sleep apnea     Squamous cell carcinoma of skin of scalp     Tinnitus, unspecified ear     Type 2 diabetes mellitus with hyperglycemia ([facility] [address] )     last assessed 72/14/14    Umbilical hernia     Worms in stool     last assessed 08/26/15      Past Surgical History:   Procedure Laterality Date    APPENDECTOMY      COLECTOMY      Partial, Sigmoid    COLONOSCOPY      HEAD & NECK SKIN LESION EXCISIONAL BIOPSY      10/11/10 SCC --  10/17/11 SCC  --  Location Scalp    HERNIA REPAIR      NASAL/SINUS ENDOSCOPY N/A 8/6/2019    Procedure: FUNCTIONAL ENDOSCOPIC SINUS SURGERY (FESS) IMAGED GUIDED for control of epistaxis; Surgeon: Janice Faust MD;  [facility];  Service: ENT    MA ESOPHAGOGASTRODUODENOSCOPY TRANSORAL DIAGNOSTIC N/A 8/9/2017    Procedure: ESOPHAGOGASTRODUODENOSCOPY (EGD);   Surgeon: Stacey Iglesias MD;  Location: [facility];   Service: Gastroenterology    SKIN BIOPSY      TONSILLECTOMY        Family History   Problem Relation Age of Onset    Cancer Mother         Breast + Skin    Diabetes Mother     Heart disease Mother     Heart attack Mother     Breast cancer Mother         Adenocarcinoma    Skin cancer Mother         Adenocarcinoma    Heart failure Mother     Diabetes type II Mother     Varicose Veins Mother         Lower Extremities    Cancer Father         Prostate + Skin    Prostate cancer Father         Adenocarcinoma    Skin cancer Father     Lymphoma Father         Non-Hodgkin's    Arthritis Family      Social History     Tobacco Use    Smoking status: Former Smoker     Packs/day: 1 00     Years: 10 00     Pack years: 10 00     Types: Cigarettes     Quit date:      Years since quittin 2    Smokeless tobacco: Never Used   Substance Use Topics    Alcohol use: Yes     Comment: only occasionally     Allergies   Allergen Reactions    Iodinated Diagnostic Agents Anaphylaxis    Shellfish Allergy - Food Allergy Anaphylaxis    Shellfish-Derived Products - Food Allergy     Shrimp Extract Allergy Skin Test - Food Allergy Anaphylaxis    Empagliflozin Hives    Farxiga [Dapagliflozin] Rash     Yeast infection    Ozempic (0 25 Or 0 5 Mg-Dose) [Semaglutide(0 25 Or 0 5mg-Dos)] Rash         Current Outpatient Medications:     atorvastatin (LIPITOR) 10 mg tablet, Take 1 tablet (10 mg total) by mouth daily, Disp: 90 tablet, Rfl: 3    bacitracin topical ointment 500 units/g topical ointment, Apply 1 large application topically 2 (two) times a day, Disp: 28 g, Rfl: 0    clotrimazole-betamethasone (LOTRISONE) 1-0 05 % cream, APPLY TOPICALLY 2 (TWO) TIMES A DAY AS NEEDED (RASH), Disp: 30 g, Rfl: 2    cyclobenzaprine (FLEXERIL) 5 mg tablet, TAKE 1 TABLET BY MOUTH THREE TIMES A DAY AS NEEDED FOR MUSCLE SPASMS, Disp: 60 tablet, Rfl: 0    EPINEPHrine (EPIPEN) 0 3 mg/0 3 mL SOAJ, Inject 0 3 mL (0 3 mg total) into a muscle once for 1 dose, Disp: 0 6 mL, Rfl: 0    glimepiride (AMARYL) 4 mg tablet, TAKE 1 TABLET (4 MG TOTAL) BY MOUTH 2 (TWO) TIMES A DAY, Disp: 180 tablet, Rfl: 3    glucose blood (FREESTYLE LITE) test strip, PT TO TEST BLOOD SUGAR ONCE DAILY DX:E11 22, Disp: 100 each, Rfl: 3    glucose blood (FREESTYLE LITE) test strip, USE TO ONCE DAILY (DX: E11 65), Disp: 100 strip, Rfl: 3    ketoconazole (NIZORAL) 2 % cream, Apply topically 2 (two) times a day, Disp: 30 g, Rfl: 3    LORazepam (ATIVAN) 1 mg tablet, Take 1 tablet (1 mg total) by mouth as needed for anxiety, Disp: 90 tablet, Rfl: 0    metFORMIN (GLUCOPHAGE-XR) 500 mg 24 hr tablet, Take 2 tablets (1,000 mg total) by mouth 2 (two) times a day with meals, Disp: 360 tablet, Rfl: 3    montelukast (SINGULAIR) 10 mg tablet, Take 1 tablet (10 mg total) by mouth daily at bedtime, Disp: 90 tablet, Rfl: 0    olopatadine (PATANOL) 0 1 % ophthalmic solution, Administer 1 drop to both eyes daily , Disp: , Rfl:     omeprazole (PriLOSEC) 20 mg delayed release capsule, TAKE 1 CAPSULE BY MOUTH EVERY DAY, Disp: 90 capsule, Rfl: 3    sildenafil (VIAGRA) 50 MG tablet, TAKE 1 TABLET BY MOUTH AS NEEDED FOR ERECTILE DYSFUNCTION, Disp: 6 tablet, Rfl: 9    sitaGLIPtin (JANUVIA) 100 mg tablet, Take 1 tablet (100 mg total) by mouth daily, Disp: 90 tablet, Rfl: 3    sodium chloride (OCEAN) 0 65 % nasal spray, 1 spray into each nostril as needed for congestion , Disp: , Rfl:     triamcinolone (KENALOG) 0 025 % cream, Apply topically as needed , Disp: , Rfl:     verapamil (VERELAN PM) 180 MG 24 hr capsule, TAKE 1 CAPSULE (180 MG TOTAL) BY MOUTH 2 (TWO) TIMES A DAY, Disp: 180 capsule, Rfl: 3    Review of Systems   Constitutional: Negative for activity change, appetite change and fatigue  HENT: Negative for sore throat, trouble swallowing and voice change  Eyes: Negative for visual disturbance     Respiratory: Negative for choking, chest tightness and shortness of breath  Cardiovascular: Negative for chest pain, palpitations and leg swelling  Gastrointestinal: Negative for abdominal pain, constipation and diarrhea  Endocrine: Negative for cold intolerance, heat intolerance, polydipsia, polyphagia and polyuria  Genitourinary: Negative for frequency  Musculoskeletal: Positive for arthralgias  Negative for myalgias  Skin: Negative for rash  Neurological: Negative for dizziness and syncope  Hematological: Negative for adenopathy  Psychiatric/Behavioral: Negative for sleep disturbance  All other systems reviewed and are negative  Physical Exam:  Body mass index is 32 44 kg/m²  /84   Pulse (!) 106   Ht 5' 4" (1 626 m)   Wt 85 7 kg (189 lb)   BMI 32 44 kg/m²    Wt Readings from Last 3 Encounters:   03/28/22 85 7 kg (189 lb)   03/18/22 81 6 kg (180 lb)   11/30/21 83 6 kg (184 lb 3 2 oz)       Physical Exam  Vitals reviewed  Constitutional:       General: He is not in acute distress  Appearance: He is well-developed  HENT:      Head: Normocephalic and atraumatic  Eyes:      Conjunctiva/sclera: Conjunctivae normal       Pupils: Pupils are equal, round, and reactive to light  Neck:      Thyroid: No thyromegaly  Cardiovascular:      Rate and Rhythm: Normal rate and regular rhythm  Heart sounds: Normal heart sounds  No murmur heard  Pulmonary:      Effort: Pulmonary effort is normal  No respiratory distress  Breath sounds: Normal breath sounds  No wheezing or rales  Abdominal:      General: Bowel sounds are normal  There is no distension  Palpations: Abdomen is soft  Tenderness: There is no abdominal tenderness  Musculoskeletal:         General: Normal range of motion  Cervical back: Normal range of motion and neck supple  Lymphadenopathy:      Cervical: No cervical adenopathy  Skin:     General: Skin is warm and dry     Neurological:      Mental Status: He is alert and oriented to person, place, and time  Labs:   Lab Results   Component Value Date    HGBA1C 8 2 (H) 03/24/2022    HGBA1C 7 1 (H) 11/05/2021    HGBA1C 8 3 (H) 08/24/2021     Lab Results   Component Value Date    CREATININE 1 47 (H) 03/24/2022    CREATININE 1 20 11/05/2021    CREATININE 1 41 (H) 08/24/2021    BUN 22 03/24/2022     08/19/2015    K 4 1 03/24/2022     03/24/2022    CO2 29 03/24/2022     eGFR   Date Value Ref Range Status   03/24/2022 46 ml/min/1 73sq m Final     Lab Results   Component Value Date    CHOL 183 08/19/2015    HDL 38 (L) 03/24/2022    TRIG 153 (H) 03/24/2022     Lab Results   Component Value Date    ALT 26 11/05/2021    AST 11 11/05/2021    ALKPHOS 73 11/05/2021    BILITOT 0 8 08/19/2015     Lab Results   Component Value Date    WBY3KKRNRSVO 1 206 08/21/2019    RIV8JJNTUECT 1 020 11/09/2017    YNS8SBNHHNOP 0 915 01/15/2016     Lab Results   Component Value Date    FREET4 0 92 08/21/2019       Impression & Plan:    Problem List Items Addressed This Visit        Endocrine    Type 2 diabetes mellitus with stage 2 chronic kidney disease, without long-term current use of insulin (HCC) (Chronic)     Control has worsened  He feels this is due to poor dietary choices and sedentary lifestyle while staying with family in [place] over the winter  Discussed importance of improving control of Diabetes to reduce risk of complications and slow progression of retinopathy  Discussed option of trying another GLP-1 agonist such as Victoza or Trulicity or starting GLP but he declines but will consider at next visit if A1C remains above target  Lab Results   Component Value Date    HGBA1C 8 2 (H) 03/24/2022            Relevant Orders    Hemoglobin A1C    Microalbumin / creatinine urine ratio    Ambulatory referral to Diabetic Education       Cardiovascular and Mediastinum    Benign essential hypertension - Primary (Chronic)     Not at goal today, was 118/78    Continue current regimen and he plans to focus on lifestyle modification and weight loss  He has appt with Dr Ni Kearney in 1 week  Other    Mixed hyperlipidemia (Chronic)     LDL slightly higher  Continue atorvastatin, focus on dietary improvement  Relevant Orders    Comprehensive metabolic panel    Lipid Panel with Direct LDL reflex          Orders Placed This Encounter   Procedures    Hemoglobin A1C     Standing Status:   Future     Standing Expiration Date:   3/28/2023    Comprehensive metabolic panel     This is a patient instruction: Patient fasting for 8 hours or longer recommended  Standing Status:   Future     Standing Expiration Date:   3/28/2023    Lipid Panel with Direct LDL reflex     This is a patient instruction: This test requires patient fasting for 10-12 hours or longer  Drinking of black coffee or black tea is acceptable  Standing Status:   Future     Standing Expiration Date:   3/28/2023    Microalbumin / creatinine urine ratio     Standing Status:   Future     Standing Expiration Date:   3/28/2023    Ambulatory referral to Diabetic Education     Standing Status:   Future     Standing Expiration Date:   3/28/2023     Referral Priority:   Routine     Referral Type:   Consult - AMB     Referral Reason:   Specialty Services Required     Requested Specialty:   Diabetes Services     Number of Visits Requested:   1     Expiration Date:   3/28/2023       There are no Patient Instructions on file for this visit  Discussed with the patient and all questioned fully answered  He will call me if any problems arise  Follow-up appointment in 3 months       Counseled patient on diagnostic results, prognosis, risk and benefit of treatment options, instruction for management, importance of treatment compliance, Risk  factor reduction and impressions    Franco Moore PA-C

## 2022-03-30 ENCOUNTER — OFFICE VISIT (OUTPATIENT)
Dept: DIABETES SERVICES | Facility: CLINIC | Age: 72
End: 2022-03-30
Payer: MEDICARE

## 2022-03-30 VITALS — WEIGHT: 198.4 LBS | HEIGHT: 64 IN | BODY MASS INDEX: 33.87 KG/M2

## 2022-03-30 DIAGNOSIS — J30.2 SEASONAL ALLERGIES: ICD-10-CM

## 2022-03-30 DIAGNOSIS — E11.22 TYPE 2 DIABETES MELLITUS WITH STAGE 2 CHRONIC KIDNEY DISEASE, WITHOUT LONG-TERM CURRENT USE OF INSULIN (HCC): Chronic | ICD-10-CM

## 2022-03-30 DIAGNOSIS — L21.9 SEBORRHEIC DERMATITIS: ICD-10-CM

## 2022-03-30 DIAGNOSIS — N18.2 TYPE 2 DIABETES MELLITUS WITH STAGE 2 CHRONIC KIDNEY DISEASE, WITHOUT LONG-TERM CURRENT USE OF INSULIN (HCC): Chronic | ICD-10-CM

## 2022-03-30 PROCEDURE — 97802 MEDICAL NUTRITION INDIV IN: CPT | Performed by: DIETITIAN, REGISTERED

## 2022-03-30 RX ORDER — MONTELUKAST SODIUM 10 MG/1
TABLET ORAL
Qty: 90 TABLET | Refills: 3 | Status: SHIPPED | OUTPATIENT
Start: 2022-03-30

## 2022-03-30 RX ORDER — KETOCONAZOLE 20 MG/G
CREAM TOPICAL
Qty: 30 G | Refills: 3 | Status: SHIPPED | OUTPATIENT
Start: 2022-03-30 | End: 2022-05-25 | Stop reason: SDUPTHER

## 2022-03-30 NOTE — PATIENT INSTRUCTIONS
45-60 grams of carbohydrate per meal  15 grams of carbohydrate per snack    3 meals per day, 4-5 hours apart   1 snack per day, at least 2 hours after dinner    Use the Portion Book and label reading to determine carbohydrate amounts in food and beverages  Keep protein choices to 8 oz per day from pages 9-12, focus more on the lean proteins from pages 9-10  Limit saturated fat intake  Please complete 3 day food record prior to follow-up appointment in 6 weeks

## 2022-03-30 NOTE — PROGRESS NOTES
Medical Nutrition Therapy        Assessment    Visit Type: Initial visit    Chief complaint T2DM    HPI:  19-year-old male with history of type 2 diabetes since 1996  Most recent HbA1c 8 2%  Diabetes complications include CKD and retinopathy  Other pertinent health history includes benign essential hypertension mixed hyperlipidemia  Dorita Barthel reports that 4 days ago he returned from spending the winter in [place] during which time he states he gained 10-15 lb due to staying with his daughter and her  and that they ate pasta the majority of the time in order to out frequently  He states that that is not normally how he eats when he is living in [place]  He states having gotten most of his nutrition guidance from a book titled How not to Die" by Dena Duong which he states he has read multiple times  [facility] diet history reveals inconsistent carbohydrate intake and excess saturated fat intake  Currently meals range from 0 to 70 grams of carbohydrate  Explained basic pathophysiology of diabetes and impact of diet on blood glucose levels  Together we discussed what foods contain CHO, reading a food label, timing of meals and snacks, serving sizes, the role of fiber, and the importance of consistent carbohydrate intake in the appropriate amounts  Used the portion booklet to teach Dorita Barthel more about food groups and basic carbohydrate counting  Created an individualized meal plan for Dorita Barthel with 3 meals and 1 snack providing 45-60 g carb per meal and 15 g carb per snack  Discussed choosing more lean and very lean proteins and limiting medium fat and high fat proteins  Also discussed different types of fat with focus on consuming monounsaturated fats, polyunsaturated fats, and avoiding saturated fats  Put together sample meals for Armand's reference  Dorita Barthel demonstrated good understanding will call with any questions prior to follow-up appointment 6 weeks      Ht Readings from Last 1 Encounters:   03/28/22 5' 4" (1 626 m)     Wt Readings from Last 2 Encounters:   03/28/22 85 7 kg (189 lb)   03/18/22 81 6 kg (180 lb)     Weight Change: Yes gained 10-15 lbs while in [place] over the winter    Medical Diagnosis/ICD10: E11 22, N18 2 (ICD-10-CM) - Type 2 diabetes mellitus with stage 2 chronic kidney disease, without long-term current use of insulin ([facility] [address] )    Barriers to Learning: hearing    Do you follow any special diet presently?: Trying to follow Brett Lawrence diet reports this is a lifestyle change and not a diet  Who shops: patient and spouse  Who cooks: patient and spouse    Food Log: Completed via the method of food recall    Breakfast:wakes 9 am  Eats right away  Cereal (almond flakes mixed with strawberry flakes and raisin bran - 1 5 cups total) with almond milk OR oatmeal (1 cup water and 1/2 cup oatmeal) with blueberries 1/2 cup  Coffee with splenda  Morning Snack:usually not  Lunch:12:30- 1 pm  Skipped yesterday  3 boiled eggs that he used for a egg salad sandwich (sourdough bread) short  Diet coke  Afternoon Snack: 3 pm maybe a banana   Dinner:6 pm   Hot dogs (2) with potato rolls (2) with mustard, tumeric, banana peppers  Sauerkraut  Diet coke  OR a big salad (Spring mix, pickled beets, mushrooms, olive oil and vinegar dressing)    Evening Snack:tries not to  Sometimes around 7 pm  grapes (handful)   Beverages: coffee, diet coke, water  Eating out/Take out:rare since coming back from [place] 4 days ago  Had [facility] once    Exercise tries to walk a few times per week for 20 min but will not walk if cold out    Calorie needs 1,838 kcals/day Carbs: 45-60 g/meal, 15 g/snack         Nutrition Diagnosis:  Inconsistent carbohydrate intake  intake related to Food and nutrition related knowledge deficit concerning appropriate amount and timing of carbohydrate intake as evidenced by  Estimated carbohydrate intake that is different from recommended types or ingested on an irregular basis    Intervention: plate method, increased fiber intake, label reading, increased protein intake, increased plant based foods, meal timing, meal planning, individualized meal plan, monitoring portion control and food diary     Treatment Goals: Patient understands education and recommendations, Patient will monitor fat intake, Patient will consume 3 meals a day, Patient will monitor portion control, Patient will consume 25-35 grams of fiber a day, Patient will increase their intake of plant based foods and Patient will count carbohydrates    Monitoring and evaluation:    Term code indicator  FH 1 6 3 Carbohydrate Intake Criteria: 45-60 g carbohydrate per meal, 15 g carbohydrate per snack  Term code indicator  FH 4 4 Mealtime Behavior Criteria: Three meals per day, 4-5 hours apart  One snack per day, at least 2 hours apart from meals  Patients Response to Instruction:  Fide Oleary  Expected Compliancegood    Thank you for coming to the [facility] for education today  Please feel free to call with any questions or concerns  Start: 10:27 am  Stop:  11:40 a m    Referred by: JUSTEN Simon Erzsébet [facility]  [address] [facility] [phone]

## 2022-04-01 ENCOUNTER — TELEPHONE (OUTPATIENT)
Dept: DIABETES SERVICES | Facility: CLINIC | Age: 72
End: 2022-04-01

## 2022-04-01 NOTE — TELEPHONE ENCOUNTER
Patient called with questions regarding how to count carbs in fats such as pistachios as well as the difference between carbs and calories  Provided answers that we do not count carbs in fats generally speaking unless having large amounts of fats such as peanut butter or some salad dressings  Advised to stick to serving size or less and then he definitely does not need to count carbohydrates  Explained difference between carbs and calories  Patient verbalized good understanding and will call with any further questions

## 2022-04-04 LAB
LEFT EYE DIABETIC RETINOPATHY: NORMAL
RIGHT EYE DIABETIC RETINOPATHY: NORMAL

## 2022-04-12 ENCOUNTER — OFFICE VISIT (OUTPATIENT)
Dept: INTERNAL MEDICINE CLINIC | Facility: CLINIC | Age: 72
End: 2022-04-12
Payer: MEDICARE

## 2022-04-12 VITALS
HEIGHT: 64 IN | BODY MASS INDEX: 32.1 KG/M2 | TEMPERATURE: 97.1 F | DIASTOLIC BLOOD PRESSURE: 80 MMHG | OXYGEN SATURATION: 97 % | SYSTOLIC BLOOD PRESSURE: 128 MMHG | WEIGHT: 188 LBS | HEART RATE: 90 BPM | RESPIRATION RATE: 20 BRPM

## 2022-04-12 DIAGNOSIS — N18.31 TYPE 2 DIABETES MELLITUS WITH STAGE 3A CHRONIC KIDNEY DISEASE, WITHOUT LONG-TERM CURRENT USE OF INSULIN (HCC): Primary | Chronic | ICD-10-CM

## 2022-04-12 DIAGNOSIS — I12.9 HYPERTENSIVE KIDNEY DISEASE WITH STAGE 3A CHRONIC KIDNEY DISEASE (HCC): ICD-10-CM

## 2022-04-12 DIAGNOSIS — E78.2 MIXED HYPERLIPIDEMIA DUE TO TYPE 2 DIABETES MELLITUS (HCC): Chronic | ICD-10-CM

## 2022-04-12 DIAGNOSIS — Z12.5 SCREENING FOR PROSTATE CANCER: ICD-10-CM

## 2022-04-12 DIAGNOSIS — E11.22 TYPE 2 DIABETES MELLITUS WITH STAGE 3A CHRONIC KIDNEY DISEASE, WITHOUT LONG-TERM CURRENT USE OF INSULIN (HCC): Primary | Chronic | ICD-10-CM

## 2022-04-12 DIAGNOSIS — E11.69 MIXED HYPERLIPIDEMIA DUE TO TYPE 2 DIABETES MELLITUS (HCC): Chronic | ICD-10-CM

## 2022-04-12 DIAGNOSIS — N18.31 HYPERTENSIVE KIDNEY DISEASE WITH STAGE 3A CHRONIC KIDNEY DISEASE (HCC): ICD-10-CM

## 2022-04-12 DIAGNOSIS — Z00.00 MEDICARE ANNUAL WELLNESS VISIT, SUBSEQUENT: ICD-10-CM

## 2022-04-12 PROBLEM — E11.3299 MILD NONPROLIFERATIVE DIABETIC RETINOPATHY ASSOCIATED WITH TYPE 2 DIABETES MELLITUS (HCC): Chronic | Status: RESOLVED | Noted: 2019-10-09 | Resolved: 2022-04-12

## 2022-04-12 PROCEDURE — G0439 PPPS, SUBSEQ VISIT: HCPCS | Performed by: INTERNAL MEDICINE

## 2022-04-12 PROCEDURE — G0444 DEPRESSION SCREEN ANNUAL: HCPCS | Performed by: INTERNAL MEDICINE

## 2022-04-12 PROCEDURE — 99214 OFFICE O/P EST MOD 30 MIN: CPT | Performed by: INTERNAL MEDICINE

## 2022-04-12 PROCEDURE — 1123F ACP DISCUSS/DSCN MKR DOCD: CPT | Performed by: INTERNAL MEDICINE

## 2022-04-12 NOTE — PATIENT INSTRUCTIONS

## 2022-04-12 NOTE — PROGRESS NOTES
[place]    NAME: Booker Freire  AGE: 67 y o  SEX: male  : 1950     DATE: 2022     Assessment and Plan:     1  Type 2 diabetes mellitus with stage 3a chronic kidney disease, without long-term current use of insulin (HCC)        Lab Units 22  0658 21  1509   HEMOGLOBIN A1C % 8 2* 7 1*   CREATININE mg/dL 1 47* 1 20   EGFR ml/min/1 73sq m 46 60     A1c has risen due to poor diet  Now it appears after going to diabetic education that he is doing much better  He is getting his fasting sugars below 130 which is great  CKD teetering between stage 2/3  Keep BP and DM2 controlled  Watch closely  Stay hydrated  2  Hypertensive kidney disease with stage 3a chronic kidney disease (HCC)    BP well controlled  Continue current anti-HTN medications  Will watch renal function closely  3  Mixed hyperlipidemia due to type 2 diabetes mellitus (HCC)    Continue statin  Monitor lipids  Return in about 5 months (around 2022) for Follow-up  Chief Complaint:     Chief Complaint   Patient presents with   [facility] Wellness Visit    Follow-up     no concerns / had eye exam / right eye issue       History of Present Illness:       Nicol Mcleod presents for f/u  [facility] and did poorly with diet  Saw endocrinology and no additional medications added now  Started going to diabetic education and is tracking everything now and has noticed the difference  Review of Systems:     Review of Systems   Constitutional: Negative  Eyes: Positive for visual disturbance  Respiratory: Negative  Cardiovascular: Negative  Gastrointestinal: Negative  Musculoskeletal: Negative             Objective:     /80 (BP Location: Left arm, Patient Position: Sitting, Cuff Size: Standard)   Pulse 90   Temp (!) 97 1 °F (36 2 °C) (Tympanic)   Resp 20   Ht 5' 4" (1 626 m)   Wt 85 3 kg (188 lb)   SpO2 97%   BMI 32 27 kg/m²     Physical Exam  Constitutional:       General: He is not in acute distress  Appearance: He is not ill-appearing  Cardiovascular:      Rate and Rhythm: Normal rate and regular rhythm  Heart sounds: No murmur heard  Pulmonary:      Effort: Pulmonary effort is normal  No respiratory distress  Breath sounds: No wheezing  Abdominal:      General: Bowel sounds are normal  There is no distension  Tenderness: There is no abdominal tenderness  Musculoskeletal:      Right lower leg: No edema  Left lower leg: No edema  Neurological:      Mental Status: He is alert         Lencho Martin DO  [facility]

## 2022-04-12 NOTE — PROGRESS NOTES
Assessment and Plan:     1  Medicare annual wellness visit, subsequent    BMI Counseling: Body mass index is 32 27 kg/m²  The BMI is above normal  Nutrition recommendations include moderation in carbohydrate intake and increasing intake of lean protein  Exercise recommendations include exercising 3-5 times per week  Rationale for BMI follow-up plan is due to patient being overweight or obese  Depression Screening and Follow-up Plan: Patient was screened for depression during today's encounter  They screened negative with a PHQ-2 score of 0  Preventive health issues were discussed with patient, and age appropriate screening tests were ordered as noted in patient's After Visit Summary  Personalized health advice and appropriate referrals for health education or preventive services given if needed, as noted in patient's After Visit Summary       History of Present Illness:     Patient presents for Medicare Annual Wellness visit    Patient Care Team:  Kira Tavares DO as PCP - General (Internal Medicine)  MD Aida Sexton MD Nanette Groom, MD (Gastroenterology)  [address] (Ophthalmology)  Tara Elizabeth MD (Endocrinology)     Problem List:     Patient Active Problem List   Diagnosis    Actinic keratosis    Seborrheic keratosis    History of skin cancer    Anxiety disorder    Benign essential hypertension    Erectile dysfunction of non-organic origin    Esophageal reflux    Mixed hyperlipidemia due to type 2 diabetes mellitus ([address] )    Mitral valve disorder    Rosacea    Sleep apnea    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    Thoracic radiculopathy    Type 2 diabetes mellitus with stage 3a chronic kidney disease, without long-term current use of insulin ([address] )    Stage 3a chronic kidney disease ([address] )      Past Medical and Surgical History:     Past Medical History:   Diagnosis Date    Actinic keratosis     Adhesive capsulitis of unspecified shoulder     Anxiety     Asthma     Atopic dermatitis     Cardiac disease     Cellulitis of right upper extremity     Cervical radiculopathy     Chest pain     Chronic maxillary sinusitis     last assessed 01/21/14    Diabetes mellitus ([facility]     Erectile dysfunction of non-organic origin     Esophageal reflux     Gout     last assessed 08/26/15    Hearing loss, conductive     Heart murmur     Mitral Valve disorder    Henoch-Schonlein purpura ([facility] ) 8/6/2019    Herpes zoster     History of paroxysmal supraventricular tachycardia     Hypertension     IgA mediated leukocytoclastic vasculitis ([facility] ) 7/3/2019    Mitral valve disorder     Nasal congestion 4/18/19    Neoplasm of skin, malignant     Non-melanoma skin cancer     last assessed 10/26/17    Nosebleed 4/18/19    Obesity     Palpitations     last assessed 03/05/15    Paroxysmal supraventricular tachycardia ([facility] ) 1/21/2014    Plantar fasciitis     last assessed 05/28/15    PSVT (paroxysmal supraventricular tachycardia) ([facility] )     Sciatica     Sleep apnea     Squamous cell carcinoma of skin of scalp     Tinnitus, unspecified ear     Type 2 diabetes mellitus with hyperglycemia ([facility] )     last assessed 59/88/01    Umbilical hernia     Worms in stool     last assessed 08/26/15     Past Surgical History:   Procedure Laterality Date    APPENDECTOMY      COLECTOMY      Partial, Sigmoid    COLONOSCOPY      HEAD & NECK SKIN LESION EXCISIONAL BIOPSY      10/11/10 SCC --  10/17/11 SCC  --  Location Scalp    HERNIA REPAIR      NASAL/SINUS ENDOSCOPY N/A 8/6/2019    Procedure: FUNCTIONAL ENDOSCOPIC SINUS SURGERY (FESS) IMAGED GUIDED for control of epistaxis; Surgeon: Eddie Corbett MD;  [facility]: [facility];  Service: ENT    NC ESOPHAGOGASTRODUODENOSCOPY TRANSORAL DIAGNOSTIC N/A 8/9/2017    Procedure: ESOPHAGOGASTRODUODENOSCOPY (EGD); Surgeon: Elia Levi MD;  Location: [facility];   Service: Gastroenterology    SKIN BIOPSY      TONSILLECTOMY Family History:     Family History   Problem Relation Age of Onset    Cancer Mother         Breast + Skin    Diabetes Mother     Heart disease Mother     Heart attack Mother    [facility] Breast cancer Mother         Adenocarcinoma    Skin cancer Mother         Adenocarcinoma    Heart failure Mother     Diabetes type II Mother     Varicose Veins Mother         Lower Extremities    Cancer Father         Prostate + Skin    Prostate cancer Father         Adenocarcinoma    Skin cancer Father     Lymphoma Father         Non-Hodgkin's    Arthritis Family       Social History:     Social History     Socioeconomic History    Marital status: /Civil Union     Spouse name: None    Number of children: None    Years of education: None    Highest education level: None   Occupational History    Occupation: Working Part Time    Tobacco Use    Smoking status: Former Smoker     Packs/day: 1 00     Years: 10 00     Pack years: 10 00     Types: Cigarettes     Quit date:      Years since quittin 3    Smokeless tobacco: Never Used   Vaping Use    Vaping Use: Never used   Substance and Sexual Activity    Alcohol use: Yes     Comment: only occasionally    Drug use: No    Sexual activity: Yes     Partners: Female   Other Topics Concern    None   Social History Narrative    Active Advance Directive     Social Determinants of Health     Financial Resource Strain: Not on file   Food Insecurity: Not on file   Transportation Needs: Not on file   Physical Activity: Inactive    Days of Exercise per Week: 0 days    Minutes of Exercise per Session: 0 min   Stress: No Stress Concern Present    Feeling of Stress : Not at all   Social Connections: Not on file   Intimate Partner Violence: Not on file   Housing Stability: Not on file      Medications and Allergies:     Current Outpatient Medications   Medication Sig Dispense Refill    atorvastatin (LIPITOR) 10 mg tablet Take 1 tablet (10 mg total) by mouth daily 90 tablet 3    bacitracin topical ointment 500 units/g topical ointment Apply 1 large application topically 2 (two) times a day 28 g 0    clotrimazole-betamethasone (LOTRISONE) 1-0 05 % cream APPLY TOPICALLY 2 (TWO) TIMES A DAY AS NEEDED (RASH) 30 g 2    cyclobenzaprine (FLEXERIL) 5 mg tablet TAKE 1 TABLET BY MOUTH THREE TIMES A DAY AS NEEDED FOR MUSCLE SPASMS 60 tablet 0    glimepiride (AMARYL) 4 mg tablet TAKE 1 TABLET (4 MG TOTAL) BY MOUTH 2 (TWO) TIMES A  tablet 3    glucose blood (FREESTYLE LITE) test strip PT TO TEST BLOOD SUGAR ONCE DAILY DX:E11 22 100 each 3    glucose blood (FREESTYLE LITE) test strip USE TO ONCE DAILY (DX: E11 65) 100 strip 3    ketoconazole (NIZORAL) 2 % cream APPLY TO AFFECTED AREA TWICE A DAY 30 g 3    LORazepam (ATIVAN) 1 mg tablet Take 1 tablet (1 mg total) by mouth as needed for anxiety 90 tablet 0    metFORMIN (GLUCOPHAGE-XR) 500 mg 24 hr tablet Take 2 tablets (1,000 mg total) by mouth 2 (two) times a day with meals 360 tablet 3    montelukast (SINGULAIR) 10 mg tablet TAKE 1 TABLET BY MOUTH DAILY AT BEDTIME 90 tablet 3    olopatadine (PATANOL) 0 1 % ophthalmic solution Administer 1 drop to both eyes daily       omeprazole (PriLOSEC) 20 mg delayed release capsule TAKE 1 CAPSULE BY MOUTH EVERY DAY 90 capsule 3    sildenafil (VIAGRA) 50 MG tablet TAKE 1 TABLET BY MOUTH AS NEEDED FOR ERECTILE DYSFUNCTION 6 tablet 9    sitaGLIPtin (JANUVIA) 100 mg tablet Take 1 tablet (100 mg total) by mouth daily 90 tablet 3    sodium chloride (OCEAN) 0 65 % nasal spray 1 spray into each nostril as needed for congestion       triamcinolone (KENALOG) 0 025 % cream Apply topically as needed       verapamil (VERELAN PM) 180 MG 24 hr capsule TAKE 1 CAPSULE (180 MG TOTAL) BY MOUTH 2 (TWO) TIMES A  capsule 3    EPINEPHrine (EPIPEN) 0 3 mg/0 3 mL SOAJ Inject 0 3 mL (0 3 mg total) into a muscle once for 1 dose 0 6 mL 0     No current facility-administered medications for this visit  Allergies   Allergen Reactions    Iodinated Diagnostic Agents Anaphylaxis    Shellfish Allergy - Food Allergy Anaphylaxis    Shellfish-Derived Products - Food Allergy     Shrimp Extract Allergy Skin Test - Food Allergy Anaphylaxis    Empagliflozin Hives    Farxiga [Dapagliflozin] Rash     Yeast infection    Ozempic (0 25 Or 0 5 Mg-Dose) [Semaglutide(0 25 Or 0 5mg-Dos)] Rash      Immunizations:     Immunization History   Administered Date(s) Administered    COVID-19 PFIZER VACCINE 0 3 ML IM 03/06/2021, 03/27/2021, 10/05/2021    INFLUENZA 10/28/2013, 11/25/2013, 10/27/2015, 10/21/2016, 10/18/2017    Influenza Quadrivalent, 6-35 Months IM 10/22/2014, 10/27/2015    Influenza Split High Dose Preservative Free IM 10/21/2016, 10/18/2017    Influenza, high dose seasonal 0 7 mL 10/02/2018, 10/09/2019, 09/23/2020, 11/03/2021    Pneumococcal Conjugate 13-Valent 11/16/2016    Pneumococcal Polysaccharide PPV23 11/30/2017    Tdap 12/15/2009, 01/06/2018    Zoster 06/15/2012      Health Maintenance:         Topic Date Due    Colorectal Cancer Screening  05/28/2031    Hepatitis C Screening  Completed     There are no preventive care reminders to display for this patient  Medicare Health Risk Assessment:     /80 (BP Location: Left arm, Patient Position: Sitting, Cuff Size: Standard)   Pulse 90   Temp (!) 97 1 °F (36 2 °C) (Tympanic)   Resp 20   Ht 5' 4" (1 626 m)   Wt 85 3 kg (188 lb)   SpO2 97%   BMI 32 27 kg/m²      Votaw Held is here for his Subsequent Wellness visit  Last Medicare Wellness visit information reviewed, patient interviewed and updates made to the record today  Health Risk Assessment:   Patient rates overall health as good  Patient feels that their physical health rating is same  Patient is satisfied with their life  Eyesight was rated as slightly worse  Hearing was rated as same  Patient feels that their emotional and mental health rating is same   Patients states they are never, rarely angry  Patient states they are sometimes unusually tired/fatigued  Pain experienced in the last 7 days has been none  Patient states that he has experienced no weight loss or gain in last 6 months  Depression Screening:   PHQ-2 Score: 0      Fall Risk Screening: In the past year, patient has experienced: no history of falling in past year      Home Safety:  Patient does not have trouble with stairs inside or outside of their home  Patient has working smoke alarms and has working carbon monoxide detector  Home safety hazards include: none  Nutrition:   Current diet is Diabetic, Low Cholesterol, Low Carb and No Added Salt  Medications:   Patient is not currently taking any over-the-counter supplements  Patient is able to manage medications  Activities of Daily Living (ADLs)/Instrumental Activities of Daily Living (IADLs):   Walk and transfer into and out of bed and chair?: Yes  Dress and groom yourself?: Yes    Bathe or shower yourself?: Yes    Feed yourself? Yes  Do your laundry/housekeeping?: Yes  Manage your money, pay your bills and track your expenses?: Yes  Make your own meals?: Yes    Do your own shopping?: Yes    Durable Medical Equipment Suppliers  none    Previous Hospitalizations:   Any hospitalizations or ED visits within the last 12 months?: No      Advance Care Planning:   Living will: Yes    Durable POA for healthcare:  Yes    Advanced directive: Yes    Five wishes given: No      Cognitive Screening:   Provider or family/friend/caregiver concerned regarding cognition?: No    PREVENTIVE SCREENINGS      Cardiovascular Screening:    General: Screening Not Indicated and History Lipid Disorder      Diabetes Screening:     General: Screening Not Indicated and History Diabetes      Colorectal Cancer Screening:     General: Screening Current      Prostate Cancer Screening:    General: Screening Current      Osteoporosis Screening:    General: Screening Not Indicated      Abdominal Aortic Aneurysm (AAA) Screening:    Risk factors include: age between 73-67 yo and tobacco use        General: Screening Current      Lung Cancer Screening:     General: Screening Not Indicated      Hepatitis C Screening:    General: Screening Current    Screening, Brief Intervention, and Referral to Treatment (SBIRT)    Screening  Typical number of drinks in a day: 0  Typical number of drinks in a week: 0  Interpretation: Low risk drinking behavior  AUDIT-C Screenin) How often did you have a drink containing alcohol in the past year? monthly or less  2) How many drinks did you have on a typical day when you were drinking in the past year? 1 to 2  3) How often did you have 6 or more drinks on one occasion in the past year? never    AUDIT-C Score: 1  Interpretation: Score 0-3 (male): Negative screen for alcohol misuse    Single Item Drug Screening:  How often have you used an illegal drug (including marijuana) or a prescription medication for non-medical reasons in the past year? never    Single Item Drug Screen Score: 0  Interpretation: Negative screen for possible drug use disorder    Brief Intervention  Alcohol & drug use screenings were reviewed  No concerns regarding substance use disorder identified  Other Counseling Topics:   Car/seat belt/driving safety, skin self-exam, sunscreen and regular weightbearing exercise       Ladean Sender, DO

## 2022-04-13 ENCOUNTER — TELEPHONE (OUTPATIENT)
Dept: ADMINISTRATIVE | Facility: OTHER | Age: 72
End: 2022-04-13

## 2022-04-13 NOTE — TELEPHONE ENCOUNTER
----- Message from [facility] sent at 4/12/2022 10:30 AM EDT -----  Regarding: Care Gap Request [facility]  04/12/22 10:31 AM    Hello, our patient Kyle Portillo has had Diabetic Eye Exam completed/performed  Please assist in updating the patient chart by pulling a previous Electronic Medical Record (EMR) document  The previous EMR is last exam from [facility]      Thank you,  [facility]  [address]

## 2022-04-13 NOTE — TELEPHONE ENCOUNTER
Upon review of the In Basket request and the patient's chart, initial outreach has been made via fax, please see Contacts section for details       Thank you  Adra Spikes

## 2022-04-14 NOTE — TELEPHONE ENCOUNTER
Upon review of the In Basket request we were able to locate, review, and update the patient chart as requested for Diabetic Eye Exam     Any additional questions or concerns should be emailed to the Practice Liaisons via [email or web address] email, please do not reply via In Basket      Thank you  Vanessa Cai

## 2022-04-19 DIAGNOSIS — N18.2 TYPE 2 DIABETES MELLITUS WITH STAGE 2 CHRONIC KIDNEY DISEASE, WITHOUT LONG-TERM CURRENT USE OF INSULIN (HCC): Chronic | ICD-10-CM

## 2022-04-19 DIAGNOSIS — I10 BENIGN ESSENTIAL HYPERTENSION: Chronic | ICD-10-CM

## 2022-04-19 DIAGNOSIS — E11.22 TYPE 2 DIABETES MELLITUS WITH STAGE 2 CHRONIC KIDNEY DISEASE, WITHOUT LONG-TERM CURRENT USE OF INSULIN (HCC): Chronic | ICD-10-CM

## 2022-04-19 RX ORDER — VERAPAMIL HYDROCHLORIDE 180 MG/1
180 CAPSULE, EXTENDED RELEASE ORAL 2 TIMES DAILY
Qty: 180 CAPSULE | Refills: 3 | Status: SHIPPED | OUTPATIENT
Start: 2022-04-19

## 2022-04-19 RX ORDER — METFORMIN HYDROCHLORIDE 500 MG/1
TABLET, EXTENDED RELEASE ORAL
Qty: 360 TABLET | Refills: 3 | Status: SHIPPED | OUTPATIENT
Start: 2022-04-19 | End: 2022-07-07

## 2022-04-19 RX ORDER — ATORVASTATIN CALCIUM 10 MG/1
TABLET, FILM COATED ORAL
Qty: 90 TABLET | Refills: 3 | Status: SHIPPED | OUTPATIENT
Start: 2022-04-19

## 2022-05-25 ENCOUNTER — OFFICE VISIT (OUTPATIENT)
Dept: DERMATOLOGY | Facility: CLINIC | Age: 72
End: 2022-05-25
Payer: MEDICARE

## 2022-05-25 VITALS — HEIGHT: 64 IN | WEIGHT: 188 LBS | BODY MASS INDEX: 32.1 KG/M2

## 2022-05-25 DIAGNOSIS — L82.1 SEBORRHEIC KERATOSIS: ICD-10-CM

## 2022-05-25 DIAGNOSIS — Z85.828 HISTORY OF SKIN CANCER: ICD-10-CM

## 2022-05-25 DIAGNOSIS — L57.0 ACTINIC KERATOSIS: Primary | ICD-10-CM

## 2022-05-25 DIAGNOSIS — Z13.89 SCREENING FOR SKIN CONDITION: ICD-10-CM

## 2022-05-25 DIAGNOSIS — L21.9 SEBORRHEIC DERMATITIS: ICD-10-CM

## 2022-05-25 PROCEDURE — 99213 OFFICE O/P EST LOW 20 MIN: CPT | Performed by: DERMATOLOGY

## 2022-05-25 PROCEDURE — 17000 DESTRUCT PREMALG LESION: CPT | Performed by: DERMATOLOGY

## 2022-05-25 PROCEDURE — 17003 DESTRUCT PREMALG LES 2-14: CPT | Performed by: DERMATOLOGY

## 2022-05-25 RX ORDER — KETOCONAZOLE 20 MG/G
1 CREAM TOPICAL 2 TIMES DAILY
Qty: 30 G | Refills: 3 | Status: SHIPPED | OUTPATIENT
Start: 2022-05-25

## 2022-05-25 NOTE — PATIENT INSTRUCTIONS
Actinic Keratosis:  Patient advised lesions are precancers  These should resolve with cryosurgery if not to let us know sun block recommended  Seborrheic dermatitis advised patient to use this twice a day for 3 weeks and repeat as needed  Seborrheic keratosis patient reassured these are normal growths we acquire with age no treatment needed  History of skin cancer in no recurrence nothing else atypical sunblock recommended follow-up in 1 year  Screening for dermatologic disorders nothing else of concern noted on complete exam follow-up in 1 year  Treatment with Cryotherapy    The doctor has treated your skin with nitrogen, which is 320 degrees Fahrenheit below zero  He has given the treated area "frostbite "    Stinging should subside within a few hours  You can take Tylenol for pain, if needed  Over the next few days, it is normal if the area becomes reddened, a blood blister, or swollen with fluid  If the lesion treated was near the eye - you could get a swollen eye over the next few days  Do not panic! This is all temporary, and will resolve with time  There is no special treatment - just keep the area clean  Makeup and BandAids can be used, if preferred  When the area starts to dry up and peel off, using Vaseline can help healing  It usually takes up to a month for it to heal   Some lesions are recurrent and may require repeat treatments  If a lesion has not healed in one month, please don't hesitate to contact us  If you have any further questions that are not answered here, please call us  [phone]    Thank you for allowing us to care for you

## 2022-05-25 NOTE — PROGRESS NOTES
[facility] DERMATOLOGY  [address] [place] [id number]  [phone]  [phone]     MRN: [id number] : 1950  Encounter: [id number]  Patient Information: Chavo Brighter  Chief complaint: yearly check up    History of present illness:  66-year-old male presents for overall skin check previous history of actinic keratosis and nonmelanoma skin cancer notes some scaling areas also noted on history of seborrheic dermatitis  Nothing else of concern noted      Past Medical History:   Diagnosis Date    Actinic keratosis     Adhesive capsulitis of unspecified shoulder     Anxiety     Asthma     Atopic dermatitis     Cardiac disease     Cellulitis of right upper extremity     Cervical radiculopathy     Chest pain     Chronic maxillary sinusitis     last assessed 14    Diabetes mellitus ([address] )     Erectile dysfunction of non-organic origin     Esophageal reflux     Gout     last assessed 08/26/15    Hearing loss, conductive     Heart murmur     Mitral Valve disorder    Henoch-Schonlein purpura ([address] ) 2019    Herpes zoster     History of paroxysmal supraventricular tachycardia     Hypertension     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    Mitral valve disorder     Nasal congestion 19    Neoplasm of skin, malignant     Non-melanoma skin cancer     last assessed 10/26/17    Nosebleed 19    Obesity     Palpitations     last assessed 03/05/15    Paroxysmal supraventricular tachycardia ([address] ) 2014    Plantar fasciitis     last assessed 05/28/15    PSVT (paroxysmal supraventricular tachycardia) ([address] )     Sciatica     Sleep apnea     Squamous cell carcinoma of skin of scalp     Tinnitus, unspecified ear     Type 2 diabetes mellitus with hyperglycemia ([address] )     last assessed     Umbilical hernia     Worms in stool     last assessed 08/26/15     Past Surgical History:   Procedure Laterality Date    APPENDECTOMY      COLECTOMY      Partial, Sigmoid    COLONOSCOPY      HEAD & NECK SKIN LESION EXCISIONAL BIOPSY      10/11/10 SCC --  10/17/11 SCC  --  Location Scalp    HERNIA REPAIR      NASAL/SINUS ENDOSCOPY N/A 2019    Procedure: FUNCTIONAL ENDOSCOPIC SINUS SURGERY (FESS) IMAGED GUIDED for control of epistaxis; Surgeon: Will Dominguez MD;  Location: [facility];  Service: ENT    ID ESOPHAGOGASTRODUODENOSCOPY TRANSORAL DIAGNOSTIC N/A 2017    Procedure: ESOPHAGOGASTRODUODENOSCOPY (EGD); Surgeon: Feliciano Cordova MD;  Location: [facility];   Service: Gastroenterology    SKIN BIOPSY      TONSILLECTOMY       Social History   Social History     Substance and Sexual Activity   Alcohol Use Yes    Comment: only occasionally     Social History     Substance and Sexual Activity   Drug Use No     Social History     Tobacco Use   Smoking Status Former Smoker    Packs/day: 1 00    Years: 10     Pack years: 10 00    Types: Cigarettes    Quit date: 0    Years since quittin 4   Smokeless Tobacco Never Used     Family History   Problem Relation Age of Onset    Cancer Mother         Breast + Skin    Diabetes Mother     Heart disease Mother     Heart attack Mother     Breast cancer Mother         Adenocarcinoma    Skin cancer Mother         Adenocarcinoma    Heart failure Mother     Diabetes type II Mother     Varicose Veins Mother         Lower Extremities    Cancer Father         Prostate + Skin    Prostate cancer Father         Adenocarcinoma    Skin cancer Father     Lymphoma Father         Non-Hodgkin's    Arthritis Family      Meds/Allergies   Allergies   Allergen Reactions    Iodinated Diagnostic Agents Anaphylaxis    Shellfish Allergy - Food Allergy Anaphylaxis    Shellfish-Derived Products - Food Allergy     Shrimp Extract Allergy Skin Test - Food Allergy Anaphylaxis    Empagliflozin Hives    Farxiga [Dapagliflozin] Rash     Yeast infection    Ozempic (0 25 Or 0 5 Mg-Dose) [Semaglutide(0 25 Or 0 5mg-Dos)] Rash       Meds:  Prior to Admission medications    Medication Sig Start Date End Date Taking?  Authorizing Provider   atorvastatin (LIPITOR) 10 mg tablet TAKE 1 TABLET BY MOUTH EVERY DAY 4/19/22  Yes David De La Garza,    bacitracin topical ointment 500 units/g topical ointment Apply 1 large application topically 2 (two) times a day 8/4/19  Yes Donna Hill MD   clotrimazole-betamethasone (LOTRISONE) 1-0 05 % cream APPLY TOPICALLY 2 (TWO) TIMES A DAY AS NEEDED (RASH) 12/15/21  Yes David De La Garza DO   cyclobenzaprine (FLEXERIL) 5 mg tablet TAKE 1 TABLET BY MOUTH THREE TIMES A DAY AS NEEDED FOR MUSCLE SPASMS 12/15/21  Yes David De La Garza DO   EPINEPHrine (EPIPEN) 0 3 mg/0 3 mL SOAJ Inject 0 3 mL (0 3 mg total) into a muscle once for 1 dose 11/22/21 5/25/22 Yes LELE Salmeron   glimepiride (AMARYL) 4 mg tablet TAKE 1 TABLET (4 MG TOTAL) BY MOUTH 2 (TWO) TIMES A DAY 12/15/21  Yes David De La Garza, DO   glucose blood (FREESTYLE LITE) test strip PT TO TEST BLOOD SUGAR ONCE DAILY DX:E11 22 3/29/21  Yes David De La Garza DO   glucose blood (FREESTYLE LITE) test strip USE TO ONCE DAILY (DX: E11 65) 3/17/22  Yes David De La Garza DO   ketoconazole (NIZORAL) 2 % cream APPLY TO AFFECTED AREA TWICE A DAY 3/30/22  Yes Elli Mathews MD   LORazepam (ATIVAN) 1 mg tablet Take 1 tablet (1 mg total) by mouth as needed for anxiety 5/1/20  Yes Alfreda Ceballos MD   metFORMIN (GLUCOPHAGE-XR) 500 mg 24 hr tablet TAKE 2 TABLETS BY MOUTH TWICE A DAY WITH FOOD 4/19/22  Yes David De La Garza DO   montelukast (SINGULAIR) 10 mg tablet TAKE 1 TABLET BY MOUTH DAILY AT BEDTIME 3/30/22  Yes David De La Garza DO   olopatadine (PATANOL) 0 1 % ophthalmic solution Administer 1 drop to both eyes daily    Yes Historical Provider, MD   omeprazole (PriLOSEC) 20 mg delayed release capsule TAKE 1 CAPSULE BY MOUTH EVERY DAY 8/16/21  Yes David De La Garza DO   sildenafil (VIAGRA) 50 MG tablet TAKE 1 TABLET BY MOUTH AS NEEDED FOR ERECTILE DYSFUNCTION 12/8/20  Yes Nick Hoang PA-C   sitaGLIPtin (JANUVIA) 100 mg tablet Take 1 tablet (100 mg total) by mouth daily 11/30/21  Yes [facility]   sodium chloride (OCEAN) 0 65 % nasal spray 1 spray into each nostril as needed for congestion    Yes Historical Provider, MD   triamcinolone (KENALOG) 0 025 % cream Apply topically as needed    Yes Historical Provider, MD   verapamil (VERELAN PM) 180 MG 24 hr capsule TAKE 1 CAPSULE (180 MG TOTAL) BY MOUTH 2 (TWO) TIMES A DAY 4/19/22  Yes [facility],        Subjective:     Review of Systems:    General: negative for - chills, fatigue, fever,  weight gain or weight loss  Psychological: negative for - anxiety, behavioral disorder, concentration difficulties, decreased libido, depression, irritability, memory difficulties, mood swings, sleep disturbances or suicidal ideation  ENT: negative for - hearing difficulties , nasal congestion, nasal discharge, oral lesions, sinus pain, sneezing, sore throat  Allergy and Immunology: negative for - hives, insect bite sensitivity,  Hematological and Lymphatic: negative for - bleeding problems, blood clots,bruising, swollen lymph nodes  Endocrine: negative for - hair pattern changes, hot flashes, malaise/lethargy, mood swings, palpitations, polydipsia/polyuria, skin changes, temperature intolerance or unexpected weight change  Respiratory: negative for - cough, hemoptysis, orthopnea, shortness of breath, or wheezing  Cardiovascular: negative for - chest pain, dyspnea on exertion, edema,  Gastrointestinal: negative for - abdominal pain, nausea/vomiting  Genito-Urinary: negative for - dysuria, incontinence, irregular/heavy menses or urinary frequency/urgency  Musculoskeletal: negative for - gait disturbance, joint pain, joint stiffness, joint swelling, muscle pain, muscular weakness  Dermatological:  As in HPI  Neurological: negative for confusion, dizziness, headaches, impaired coordination/balance, memory loss, numbness/tingling, seizures, speech problems, tremors or weakness       Objective:   Ht 5' 4" (1 626 m)   Wt 85 3 kg (188 lb)   BMI 32 27 kg/m²     Physical Exam:    General Appearance:    Alert, cooperative, no distress   Head:    Normocephalic, without obvious abnormality, atraumatic           Skin:   A full skin exam was performed including scalp, head scalp, eyes, ears, nose, lips, neck, chest, axilla, abdomen, back, buttocks, bilateral upper extremities, bilateral lower extremities, hands, feet, fingers, toes, fingernails, and toenails scaling erythematous areas noted below on greasy scaling erythema noted in the glabella area normal keratotic papules greasy stuck appearance nothing else remarkable noted on complete exam previous sites skin cancer well healed without recurrence  Physical Exam  HENT:      Head:          Cryotherapy Procedure Note    Pre-operative Diagnosis: actinic keratosis    Plan:  1  Instructed to keep the area dry and clean thereafter  Apply petrolatum if area gets crusty  2  Recommended that the patient use acetaminophen  as needed for pain  Locations:  Face    Indications: Destruction of actinic keratosis x 5  Patient informed of risks (permanent scarring, infection, light or dark discoloration, bleeding, infection, weakness, numbness and recurrence of the lesion) and benefits of the procedure and verbal informed consent obtained  The areas are treated with liquid nitrogen therapy, frozen until ice ball extended 2 mm beyond lesion, allowed to thaw, and treated again  The patient tolerated procedure well  The patient was instructed on post-op care, warned that there may be blister formation, redness and pain  Recommend OTC analgesia as needed for pain  Condition:  Stable    Complications:  none  Assessment:     1  Actinic keratosis     2  Seborrheic dermatitis  ketoconazole (NIZORAL) 2 % cream   3  Seborrheic keratosis     4  Screening for skin condition     5  History of skin cancer           Plan:   Actinic Keratosis:  Patient advised lesions are precancers  These should resolve with cryosurgery if not to let us know sun block recommended  Seborrheic dermatitis advised patient to use this twice a day for 3 weeks and repeat as needed  Seborrheic keratosis patient reassured these are normal growths we acquire with age no treatment needed  History of skin cancer in no recurrence nothing else atypical sunblock recommended follow-up in 1 year  Screening for dermatologic disorders nothing else of concern noted on complete exam follow-up in 1 year      Fidelina Hannah MD  5/25/2022,9:36 AM    Portions of the record may have been created with voice recognition software   Occasional wrong word or "sound a like" substitutions may have occurred due to the inherent limitations of voice recognition software   Read the chart carefully and recognize, using context, where substitutions have occurred

## 2022-06-28 ENCOUNTER — APPOINTMENT (OUTPATIENT)
Dept: LAB | Facility: CLINIC | Age: 72
End: 2022-06-28
Payer: MEDICARE

## 2022-06-28 DIAGNOSIS — N18.2 TYPE 2 DIABETES MELLITUS WITH STAGE 2 CHRONIC KIDNEY DISEASE, WITHOUT LONG-TERM CURRENT USE OF INSULIN (HCC): Chronic | ICD-10-CM

## 2022-06-28 DIAGNOSIS — Z79.4 TYPE 2 DIABETES MELLITUS WITH HYPERGLYCEMIA, WITH LONG-TERM CURRENT USE OF INSULIN (HCC): ICD-10-CM

## 2022-06-28 DIAGNOSIS — E78.2 MIXED HYPERLIPIDEMIA: Chronic | ICD-10-CM

## 2022-06-28 DIAGNOSIS — E11.65 TYPE 2 DIABETES MELLITUS WITH HYPERGLYCEMIA, WITH LONG-TERM CURRENT USE OF INSULIN (HCC): ICD-10-CM

## 2022-06-28 DIAGNOSIS — E11.22 TYPE 2 DIABETES MELLITUS WITH STAGE 2 CHRONIC KIDNEY DISEASE, WITHOUT LONG-TERM CURRENT USE OF INSULIN (HCC): Chronic | ICD-10-CM

## 2022-06-28 LAB
ALBUMIN SERPL BCP-MCNC: 3.5 G/DL (ref 3.5–5)
ALP SERPL-CCNC: 57 U/L (ref 46–116)
ALT SERPL W P-5'-P-CCNC: 24 U/L (ref 12–78)
ANION GAP SERPL CALCULATED.3IONS-SCNC: 7 MMOL/L (ref 4–13)
AST SERPL W P-5'-P-CCNC: 8 U/L (ref 5–45)
BILIRUB SERPL-MCNC: 0.62 MG/DL (ref 0.2–1)
BUN SERPL-MCNC: 20 MG/DL (ref 5–25)
CALCIUM SERPL-MCNC: 9.1 MG/DL (ref 8.3–10.1)
CHLORIDE SERPL-SCNC: 106 MMOL/L (ref 100–108)
CHOLEST SERPL-MCNC: 104 MG/DL
CO2 SERPL-SCNC: 26 MMOL/L (ref 21–32)
CREAT SERPL-MCNC: 1.24 MG/DL (ref 0.6–1.3)
CREAT UR-MCNC: 79.1 MG/DL
EST. AVERAGE GLUCOSE BLD GHB EST-MCNC: 194 MG/DL
GFR SERPL CREATININE-BSD FRML MDRD: 57 ML/MIN/1.73SQ M
GLUCOSE P FAST SERPL-MCNC: 142 MG/DL (ref 65–99)
HBA1C MFR BLD: 8.4 %
HDLC SERPL-MCNC: 41 MG/DL
LDLC SERPL CALC-MCNC: 43 MG/DL (ref 0–100)
MICROALBUMIN UR-MCNC: 22.4 MG/L (ref 0–20)
MICROALBUMIN/CREAT 24H UR: 28 MG/G CREATININE (ref 0–30)
POTASSIUM SERPL-SCNC: 3.8 MMOL/L (ref 3.5–5.3)
PROT SERPL-MCNC: 7.5 G/DL (ref 6.4–8.2)
SODIUM SERPL-SCNC: 139 MMOL/L (ref 136–145)
TRIGL SERPL-MCNC: 101 MG/DL

## 2022-06-28 PROCEDURE — 80061 LIPID PANEL: CPT

## 2022-06-28 PROCEDURE — 82043 UR ALBUMIN QUANTITATIVE: CPT

## 2022-06-28 PROCEDURE — 83036 HEMOGLOBIN GLYCOSYLATED A1C: CPT

## 2022-06-28 PROCEDURE — 80053 COMPREHEN METABOLIC PANEL: CPT

## 2022-06-28 PROCEDURE — 82570 ASSAY OF URINE CREATININE: CPT

## 2022-06-28 PROCEDURE — 36415 COLL VENOUS BLD VENIPUNCTURE: CPT

## 2022-07-07 DIAGNOSIS — K21.9 GASTROESOPHAGEAL REFLUX DISEASE: ICD-10-CM

## 2022-07-07 DIAGNOSIS — E11.22 TYPE 2 DIABETES MELLITUS WITH STAGE 2 CHRONIC KIDNEY DISEASE, WITHOUT LONG-TERM CURRENT USE OF INSULIN (HCC): Chronic | ICD-10-CM

## 2022-07-07 DIAGNOSIS — M62.838 MUSCLE SPASM: ICD-10-CM

## 2022-07-07 DIAGNOSIS — N18.2 TYPE 2 DIABETES MELLITUS WITH STAGE 2 CHRONIC KIDNEY DISEASE, WITHOUT LONG-TERM CURRENT USE OF INSULIN (HCC): Chronic | ICD-10-CM

## 2022-07-07 RX ORDER — METFORMIN HYDROCHLORIDE 500 MG/1
TABLET, EXTENDED RELEASE ORAL
Qty: 360 TABLET | Refills: 3 | Status: SHIPPED | OUTPATIENT
Start: 2022-07-07 | End: 2022-08-11

## 2022-07-07 RX ORDER — OMEPRAZOLE 20 MG/1
CAPSULE, DELAYED RELEASE ORAL
Qty: 90 CAPSULE | Refills: 1 | Status: SHIPPED | OUTPATIENT
Start: 2022-07-07

## 2022-07-07 RX ORDER — CYCLOBENZAPRINE HCL 5 MG
TABLET ORAL
Qty: 60 TABLET | Refills: 0 | Status: SHIPPED | OUTPATIENT
Start: 2022-07-07 | End: 2022-08-04

## 2022-07-08 NOTE — PROGRESS NOTES
Established Patient Progress Note    CC: Follow up for type 2 diabetes mellitus    History of Present Illness:   Pavel Chowdary is a 67 y o  male with a history of type 2 diabetes with complications of retinopathy, hypertension, hyperlipidemia and CKD  Last seen in the office 3/28/22  Last A1C was 8 4 from 6/28/22  He is not suitable for SGLT-2's as he has had allergies in the past to [place]  He also has an allergy to ozempic  He has met with the dietician in the past and was taught to carb count  He did keep a food diary which he states helped  He would like to see a different dietician       Most recent GFR: 57    Home blood glucose readings:   Before breakfast: 120-low 250  Before lunch: does not check  Before dinner: 120's-220's  Bedtime: does not check     Diet: does carb counting, has sweets occasionally, used to keep a food diary which helped, feels his diet has worsened lately from salad to pancakes and sausage  Exercise: hurt his arm and back, hasn't been able to exercise lately      Current regimen:   Glimepiride 4 mg BID   Metformin 1,000 mg BID   Januvia 100 mg daily     Last Eye Exam: UTD, 4/4/22, mild left eye DR  Last Foot Exam: done in office today    Has hypertension: Taking verapamil   Has hyperlipidemia: Taking Lipitor     Patient Active Problem List   Diagnosis    Actinic keratosis    Seborrheic keratosis    History of skin cancer    Anxiety disorder    Benign essential hypertension    Erectile dysfunction of non-organic origin    Esophageal reflux    Mixed hyperlipidemia due to type 2 diabetes mellitus ([address] )    Mitral valve disorder    Rosacea    Sleep apnea    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    Thoracic radiculopathy    Type 2 diabetes mellitus with stage 3a chronic kidney disease, without long-term current use of insulin (HCC)    Stage 3a chronic kidney disease ([address] )      Past Medical History:   Diagnosis Date    Actinic keratosis     Adhesive capsulitis of unspecified shoulder     Anxiety     Asthma     Atopic dermatitis     Cardiac disease     Cellulitis of right upper extremity     Cervical radiculopathy     Chest pain     Chronic maxillary sinusitis     last assessed 01/21/14    Diabetes mellitus ([facility] )     Erectile dysfunction of non-organic origin     Esophageal reflux     Gout     last assessed 08/26/15    Hearing loss, conductive     Heart murmur     Mitral Valve disorder    Henoch-Schonlein purpura ([facility] ) 8/6/2019    Herpes zoster     History of paroxysmal supraventricular tachycardia     Hypertension     IgA mediated leukocytoclastic vasculitis ([facility] ) 7/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    Mitral valve disorder     Nasal congestion 4/18/19    Neoplasm of skin, malignant     Non-melanoma skin cancer     last assessed 10/26/17    Nosebleed 4/18/19    Obesity     Palpitations     last assessed 03/05/15    Paroxysmal supraventricular tachycardia ([facility] ) 1/21/2014    Plantar fasciitis     last assessed 05/28/15    PSVT (paroxysmal supraventricular tachycardia) ([facility] )     Sciatica     Sleep apnea     Squamous cell carcinoma of skin of scalp     Tinnitus, unspecified ear     Type 2 diabetes mellitus with hyperglycemia ([facility] )     last assessed 30/93/21    Umbilical hernia     Worms in stool     last assessed 08/26/15      Past Surgical History:   Procedure Laterality Date    APPENDECTOMY      COLECTOMY      Partial, Sigmoid    COLONOSCOPY      HEAD & NECK SKIN LESION EXCISIONAL BIOPSY      10/11/10 SCC --  10/17/11 SCC  --  Location Scalp    HERNIA REPAIR      NASAL/SINUS ENDOSCOPY N/A 8/6/2019    Procedure: FUNCTIONAL ENDOSCOPIC SINUS SURGERY (FESS) IMAGED GUIDED for control of epistaxis; Surgeon: Sylvie Moreno MD;  [facility]: [facility];  Service: ENT    SC ESOPHAGOGASTRODUODENOSCOPY TRANSORAL DIAGNOSTIC N/A 8/9/2017    Procedure: ESOPHAGOGASTRODUODENOSCOPY (EGD); Surgeon: Víctor Beck MD;  Location: [facility];   Service: Gastroenterology  SKIN BIOPSY      TONSILLECTOMY        Family History   Problem Relation Age of Onset    Cancer Mother         Breast + Skin    Diabetes Mother     Heart disease Mother     Heart attack Mother     Breast cancer Mother         Adenocarcinoma    Skin cancer Mother         Adenocarcinoma    Heart failure Mother     Diabetes type II Mother     Varicose Veins Mother         Lower Extremities    Cancer Father         Prostate + Skin    Prostate cancer Father         Adenocarcinoma    Skin cancer Father     Lymphoma Father         Non-Hodgkin's    Arthritis Family      Social History     Tobacco Use    Smoking status: Former Smoker     Packs/day: 1 00     Years: 10 00     Pack years: 10 00     Types: Cigarettes     Quit date:      Years since quittin 5    Smokeless tobacco: Never Used   Substance Use Topics    Alcohol use: Yes     Comment: only occasionally     Allergies   Allergen Reactions    Iodinated Diagnostic Agents Anaphylaxis    Shellfish Allergy - Food Allergy Anaphylaxis    Shellfish-Derived Products - Food Allergy     Shrimp Extract Allergy Skin Test - Food Allergy Anaphylaxis    Empagliflozin Hives    Farxiga [Dapagliflozin] Rash     Yeast infection    Ozempic (0 25 Or 0 5 Mg-Dose) [Semaglutide(0 25 Or 0 5mg-Dos)] Rash         Current Outpatient Medications:     atorvastatin (LIPITOR) 10 mg tablet, TAKE 1 TABLET BY MOUTH EVERY DAY, Disp: 90 tablet, Rfl: 3    bacitracin topical ointment 500 units/g topical ointment, Apply 1 large application topically 2 (two) times a day, Disp: 28 g, Rfl: 0    clotrimazole-betamethasone (LOTRISONE) 1-0 05 % cream, APPLY TOPICALLY 2 (TWO) TIMES A DAY AS NEEDED (RASH), Disp: 30 g, Rfl: 2    cyclobenzaprine (FLEXERIL) 5 mg tablet, TAKE 1 TABLET BY MOUTH THREE TIMES A DAY AS NEEDED FOR MUSCLE SPASMS, Disp: 60 tablet, Rfl: 0    EPINEPHrine (EPIPEN) 0 3 mg/0 3 mL SOAJ, Inject 0 3 mL (0 3 mg total) into a muscle once for 1 dose, Disp: 0 6 mL, Rfl: 0    glimepiride (AMARYL) 4 mg tablet, TAKE 1 TABLET (4 MG TOTAL) BY MOUTH 2 (TWO) TIMES A DAY, Disp: 180 tablet, Rfl: 3    glucose blood (FREESTYLE LITE) test strip, PT TO TEST BLOOD SUGAR ONCE DAILY DX:E11 22, Disp: 100 each, Rfl: 3    glucose blood (FREESTYLE LITE) test strip, USE TO ONCE DAILY (DX: E11 65), Disp: 100 strip, Rfl: 3    insulin glargine (Lantus SoloStar) 100 units/mL injection pen, Inject 12 Units under the skin daily, Disp: 15 mL, Rfl: 0    Insulin Pen Needle (BD Pen Needle Audelia U/F) 32G X 4 MM MISC, Once daily at bedtime under the skin, Disp: 250 each, Rfl: 0    ketoconazole (NIZORAL) 2 % cream, Apply 1 application topically in the morning and 1 application in the evening  Applied to face and scalp twice a day for 3 weeks repeat as needed  , Disp: 30 g, Rfl: 3    LORazepam (ATIVAN) 1 mg tablet, Take 1 tablet (1 mg total) by mouth as needed for anxiety, Disp: 90 tablet, Rfl: 0    metFORMIN (GLUCOPHAGE-XR) 500 mg 24 hr tablet, TAKE 2 TABLETS BY MOUTH TWICE A DAY WITH FOOD, Disp: 360 tablet, Rfl: 3    montelukast (SINGULAIR) 10 mg tablet, TAKE 1 TABLET BY MOUTH DAILY AT BEDTIME, Disp: 90 tablet, Rfl: 3    olopatadine (PATANOL) 0 1 % ophthalmic solution, Administer 1 drop to both eyes daily , Disp: , Rfl:     omeprazole (PriLOSEC) 20 mg delayed release capsule, TAKE 1 CAPSULE BY MOUTH EVERY DAY, Disp: 90 capsule, Rfl: 1    sildenafil (VIAGRA) 50 MG tablet, TAKE 1 TABLET BY MOUTH AS NEEDED FOR ERECTILE DYSFUNCTION, Disp: 6 tablet, Rfl: 9    sitaGLIPtin (JANUVIA) 100 mg tablet, Take 1 tablet (100 mg total) by mouth daily, Disp: 90 tablet, Rfl: 3    sodium chloride (OCEAN) 0 65 % nasal spray, 1 spray into each nostril as needed for congestion , Disp: , Rfl:     triamcinolone (KENALOG) 0 025 % cream, Apply topically as needed , Disp: , Rfl:     verapamil (VERELAN PM) 180 MG 24 hr capsule, TAKE 1 CAPSULE (180 MG TOTAL) BY MOUTH 2 (TWO) TIMES A DAY, Disp: 180 capsule, Rfl: 3    Review of Systems   Constitutional: Negative for chills and fever  HENT: Negative for ear pain and sore throat  Eyes: Positive for visual disturbance  Negative for photophobia and pain  Cataract in right eye  Needs surgery  Respiratory: Negative for cough and shortness of breath  Cardiovascular: Negative for chest pain and palpitations  Gastrointestinal: Negative for abdominal pain and vomiting  Endocrine: Positive for polyuria  Negative for polydipsia and polyphagia  Believes this is due to drinking more water   Genitourinary: Negative for dysuria and hematuria  Musculoskeletal: Negative for arthralgias and back pain  Skin: Negative for color change, rash and wound  Neurological: Negative for seizures, syncope and numbness  All other systems reviewed and are negative  Physical Exam:  Body mass index is 32 37 kg/m²  /80   Pulse 81   Ht 5' 4" (1 626 m)   Wt 85 5 kg (188 lb 9 6 oz)   BMI 32 37 kg/m²    Wt Readings from Last 3 Encounters:   07/11/22 85 5 kg (188 lb 9 6 oz)   05/25/22 85 3 kg (188 lb)   04/12/22 85 3 kg (188 lb)       Physical Exam  Vitals reviewed  Constitutional:       Appearance: Normal appearance  HENT:      Head: Normocephalic and atraumatic  Cardiovascular:      Rate and Rhythm: Normal rate and regular rhythm  Pulses: no weak pulses          Dorsalis pedis pulses are 2+ on the right side and 2+ on the left side  Heart sounds: Normal heart sounds  Pulmonary:      Effort: Pulmonary effort is normal       Breath sounds: Normal breath sounds  Feet:      Right foot:      Skin integrity: Callus and dry skin present  Left foot:      Skin integrity: Dry skin present  Neurological:      Mental Status: He is alert and oriented to person, place, and time  Psychiatric:         Mood and Affect: Mood normal          Behavior: Behavior normal        Patient's shoes and socks removed      Right Foot/Ankle   Right Foot Inspection  Skin Exam: skin intact, dry skin, callus and callus  Sensory   Vibration: intact  Monofilament testing: intact    Vascular  The right DP pulse is 2+  Left Foot/Ankle  Left Foot Inspection  Skin Exam: skin intact and dry skin  Vascular  The left DP pulse is 2+  Assign Risk Category  No deformity present  No loss of protective sensation  No weak pulses  Risk: 0      Labs:   Lab Results   Component Value Date    HGBA1C 8 4 (H) 06/28/2022    HGBA1C 8 2 (H) 03/24/2022    HGBA1C 7 1 (H) 11/05/2021     Lab Results   Component Value Date    CREATININE 1 24 06/28/2022    CREATININE 1 47 (H) 03/24/2022    CREATININE 1 20 11/05/2021    BUN 20 06/28/2022     08/19/2015    K 3 8 06/28/2022     06/28/2022    CO2 26 06/28/2022     eGFR   Date Value Ref Range Status   06/28/2022 57 ml/min/1 73sq m Final     Lab Results   Component Value Date    CHOL 183 08/19/2015    HDL 41 06/28/2022    TRIG 101 06/28/2022     Lab Results   Component Value Date    ALT 24 06/28/2022    AST 8 06/28/2022    ALKPHOS 57 06/28/2022    BILITOT 0 8 08/19/2015     Lab Results   Component Value Date    GII6RNQAYIAV 1 206 08/21/2019    TFZ3OQFWCRFA 1 020 11/09/2017    [id number] 0 915 01/15/2016     Lab Results   Component Value Date    FREET4 0 92 08/21/2019       Impression & Plan:    Problem List Items Addressed This Visit        Endocrine    Mixed hyperlipidemia due to type 2 diabetes mellitus ([facility] [address] ) (Chronic)     Controlled on most recent lab work  Continue with current regimen  Lab Results   Component Value Date    HGBA1C 8 4 (H) 06/28/2022              Relevant Medications    insulin glargine (Lantus SoloStar) 100 units/mL injection pen    Type 2 diabetes mellitus with stage 3a chronic kidney disease, without long-term current use of insulin (HCC) (Chronic)     Discussed importance of diet and exercise  He states he will try to increase exercise in his routine   He also plans to resume his food diary which he states helped with his carb counting  I have also placed another referral for him to see Giovanna Powell  He will make an appointment today  He is UTD on his labs and his foot and eye exam      We also discussed starting basal insulin for improved glycemic and A1C control  He is agreeable  He will set up education with Giovanna Powell to learn how to inject the insulin  We will start with 12 units of Lantus at bedtime  Lab Results   Component Value Date    HGBA1C 8 4 (H) 06/28/2022              Relevant Medications    Insulin Pen Needle (BD Pen Needle Audelia U/F) 32G X 4 MM MISC    insulin glargine (Lantus SoloStar) 100 units/mL injection pen    Other Relevant Orders    Ambulatory referral to Diabetic Education       Cardiovascular and Mediastinum    Benign essential hypertension (Chronic)     Controlled today in office  Continue with current regimen               Other Visit Diagnoses     Type 2 diabetes mellitus with stage 2 chronic kidney disease, without long-term current use of insulin (HCC)    -  Primary    Relevant Medications    insulin glargine (Lantus SoloStar) 100 units/mL injection pen    Other Relevant Orders    Ambulatory referral to Diabetic Education          Orders Placed This Encounter   Procedures    Ambulatory referral to Diabetic Education     Standing Status:   Future     Standing Expiration Date:   7/11/2023     Referral Priority:   Routine     Referral Type:   Consult - AMB     Referral Reason:   Specialty Services Required     Requested Specialty:   Diabetes Services     Number of Visits Requested:   1     Expiration Date:   7/11/2023    Ambulatory referral to Diabetic Education     Standing Status:   Future     Standing Expiration Date:   7/11/2023     Referral Priority:   Routine     Referral Type:   Consult - AMB     Referral Reason:   Specialty Services Required     Requested Specialty:   Diabetes Services     Number of Visits Requested:   1     Expiration Date:   7/11/2023       Patient Instructions   Follow up with Dietician  Continue with food diary        Discussed with the patient and all questioned fully answered  He will call me if any problems arise

## 2022-07-11 ENCOUNTER — OFFICE VISIT (OUTPATIENT)
Dept: ENDOCRINOLOGY | Facility: CLINIC | Age: 72
End: 2022-07-11
Payer: MEDICARE

## 2022-07-11 VITALS
HEIGHT: 64 IN | SYSTOLIC BLOOD PRESSURE: 130 MMHG | WEIGHT: 188.6 LBS | HEART RATE: 81 BPM | DIASTOLIC BLOOD PRESSURE: 80 MMHG | BODY MASS INDEX: 32.2 KG/M2

## 2022-07-11 DIAGNOSIS — E11.22 TYPE 2 DIABETES MELLITUS WITH STAGE 3A CHRONIC KIDNEY DISEASE, WITHOUT LONG-TERM CURRENT USE OF INSULIN (HCC): Chronic | ICD-10-CM

## 2022-07-11 DIAGNOSIS — E11.69 MIXED HYPERLIPIDEMIA DUE TO TYPE 2 DIABETES MELLITUS (HCC): Chronic | ICD-10-CM

## 2022-07-11 DIAGNOSIS — N18.2 TYPE 2 DIABETES MELLITUS WITH STAGE 2 CHRONIC KIDNEY DISEASE, WITHOUT LONG-TERM CURRENT USE OF INSULIN (HCC): Primary | ICD-10-CM

## 2022-07-11 DIAGNOSIS — E11.22 TYPE 2 DIABETES MELLITUS WITH STAGE 2 CHRONIC KIDNEY DISEASE, WITHOUT LONG-TERM CURRENT USE OF INSULIN (HCC): Primary | ICD-10-CM

## 2022-07-11 DIAGNOSIS — N18.31 TYPE 2 DIABETES MELLITUS WITH STAGE 3A CHRONIC KIDNEY DISEASE, WITHOUT LONG-TERM CURRENT USE OF INSULIN (HCC): Chronic | ICD-10-CM

## 2022-07-11 DIAGNOSIS — I10 BENIGN ESSENTIAL HYPERTENSION: Chronic | ICD-10-CM

## 2022-07-11 DIAGNOSIS — E78.2 MIXED HYPERLIPIDEMIA DUE TO TYPE 2 DIABETES MELLITUS (HCC): Chronic | ICD-10-CM

## 2022-07-11 PROCEDURE — 99214 OFFICE O/P EST MOD 30 MIN: CPT

## 2022-07-11 RX ORDER — INSULIN GLARGINE 100 [IU]/ML
15 INJECTION, SOLUTION SUBCUTANEOUS DAILY
Qty: 15 ML | Refills: 0 | Status: SHIPPED | OUTPATIENT
Start: 2022-07-11 | End: 2022-07-11

## 2022-07-11 RX ORDER — PEN NEEDLE, DIABETIC 32GX 5/32"
NEEDLE, DISPOSABLE MISCELLANEOUS
Qty: 250 EACH | Refills: 0 | Status: SHIPPED | OUTPATIENT
Start: 2022-07-11

## 2022-07-11 RX ORDER — INSULIN GLARGINE 100 [IU]/ML
12 INJECTION, SOLUTION SUBCUTANEOUS DAILY
Qty: 15 ML | Refills: 0 | Status: SHIPPED | OUTPATIENT
Start: 2022-07-11 | End: 2022-08-08

## 2022-07-11 NOTE — ASSESSMENT & PLAN NOTE
Discussed importance of diet and exercise  He states he will try to increase exercise in his routine  He also plans to resume his food diary which he states helped with his carb counting  I have also placed another referral for him to see Sheila Bell  He will make an appointment today  He is UTD on his labs and his foot and eye exam      We also discussed starting basal insulin for improved glycemic and A1C control  He is agreeable  He will set up education with Sheila Bell to learn how to inject the insulin  We will start with 12 units of Lantus at bedtime       Lab Results   Component Value Date    HGBA1C 8 4 (H) 06/28/2022

## 2022-07-11 NOTE — PROGRESS NOTES
Pt instructed on insulin use and injection:   Instructions given for storage, pen use (including priming) handwashing,site prep,site selection and rotation, needle disposal  Pt to call the physician if experiencing redness, swelling, itching or  lumps at injection site, if injections are painful or if having hypoglycemia    Pt notes he has not been following his meal plan as prev educated on with Gladystine Elizabeth  Thought when he didn't keep track of his CHO on a daily food log that he didn't need to count anymore  Notes he has gained weight and is now "paying the price"    Suggested he begin counting his CHO again at 45 Grams each meal and keep his f/u appt with the dietician

## 2022-07-11 NOTE — PATIENT INSTRUCTIONS
Take injection each evening before bed approximately the same time    Rotate injection sites as educated  Call the physician if experiencing redness, swelling, itching or  lumps at injection site, if injections are painful or if having hypoglycemia

## 2022-07-11 NOTE — ASSESSMENT & PLAN NOTE
Controlled on most recent lab work  Continue with current regimen      Lab Results   Component Value Date    HGBA1C 8 4 (H) 06/28/2022

## 2022-07-11 NOTE — Clinical Note
Vasu Farrar, This patient made an appointment with you for carb counting on Wednesday  I was also hoping you would be able to teach him how to use his basal insulin which will be a new medication for him   Let me know if you have any questions/ Thank you

## 2022-07-12 ENCOUNTER — OFFICE VISIT (OUTPATIENT)
Dept: DIABETES SERVICES | Facility: CLINIC | Age: 72
End: 2022-07-12
Payer: MEDICARE

## 2022-07-12 DIAGNOSIS — E11.22 TYPE 2 DIABETES MELLITUS WITH STAGE 3A CHRONIC KIDNEY DISEASE, WITHOUT LONG-TERM CURRENT USE OF INSULIN (HCC): Chronic | ICD-10-CM

## 2022-07-12 DIAGNOSIS — N18.2 TYPE 2 DIABETES MELLITUS WITH STAGE 2 CHRONIC KIDNEY DISEASE, WITHOUT LONG-TERM CURRENT USE OF INSULIN (HCC): Primary | ICD-10-CM

## 2022-07-12 DIAGNOSIS — E11.22 TYPE 2 DIABETES MELLITUS WITH STAGE 2 CHRONIC KIDNEY DISEASE, WITHOUT LONG-TERM CURRENT USE OF INSULIN (HCC): Primary | ICD-10-CM

## 2022-07-12 DIAGNOSIS — N18.31 TYPE 2 DIABETES MELLITUS WITH STAGE 3A CHRONIC KIDNEY DISEASE, WITHOUT LONG-TERM CURRENT USE OF INSULIN (HCC): Chronic | ICD-10-CM

## 2022-07-12 PROCEDURE — G0108 DIAB MANAGE TRN  PER INDIV: HCPCS

## 2022-07-20 ENCOUNTER — OFFICE VISIT (OUTPATIENT)
Dept: DIABETES SERVICES | Facility: CLINIC | Age: 72
End: 2022-07-20
Payer: MEDICARE

## 2022-07-20 DIAGNOSIS — E11.22 TYPE 2 DIABETES MELLITUS WITH STAGE 2 CHRONIC KIDNEY DISEASE, WITHOUT LONG-TERM CURRENT USE OF INSULIN (HCC): Primary | ICD-10-CM

## 2022-07-20 DIAGNOSIS — N18.2 TYPE 2 DIABETES MELLITUS WITH STAGE 2 CHRONIC KIDNEY DISEASE, WITHOUT LONG-TERM CURRENT USE OF INSULIN (HCC): Primary | ICD-10-CM

## 2022-07-20 PROCEDURE — 98960 EDU&TRN PT SELF-MGMT NQHP 1: CPT | Performed by: DIETITIAN, REGISTERED

## 2022-07-20 NOTE — PROGRESS NOTES
Carbohydrate Counting Instruction    Met with Antoine Dietrich for carbohydrate counting  Dorita Barthel is currently on the following medication: Glimepiride, Lantus, Metformin and Januvia  Patient instructed on:PATIENT DECLINED CARBOHYDRATE COUNTING INSTRUCTION TODAY  Dorita Barthel states that he understands how to carbohydrate count  He states that he is here to learn how to control his cravings  Explained that hyperglycemia can make him feel hungry and glycemic control may be help control cravings  Patient states that, since starting Lantus, his blood sugars have come down and he is not having as many cravings  He admits to consuming a lot of Coke zero  Suggested that he limit his intake of diet beverage to with meals only  Water was encouraged between meals  Reviewed food records  Unable to assess patient understanding of carbohydrate counting as the food logs were incomplete  Patient is not using the portion booklet to calculate carbs  He states that he uses "his best guess"   Reviewed how to use the portion booklet, read food labels and use the 's web-site to obtain the information to accurately count carbs  Patient agreed to complete another 3-day food record and return it for assessment  Dorita Barthel understands that if he is not able to demonstrate that he understands how to carb count that he will need to attend the carb counting class that was supposed to take place today  Diabetes Education Record: Dorita Barthel was given the following education material:  food record  Patient response to instruction  Comprehension: fair  Motivation: good  Expected Compliance: good  Readiness: action  Barriers to Learning: none known     Start- Stop: 8:45-9:20  Total Minutes: 35 Minutes  Group or Individual Instruction: DSMT-I  Other: Roly Velez    Thank you for referring your patient to [facility], it was a pleasure working with them today   Please feel free to call with any questions or concerns      aHnnah Brian  [id number] [facility] [address] [id number]

## 2022-08-04 ENCOUNTER — OFFICE VISIT (OUTPATIENT)
Dept: CARDIOLOGY CLINIC | Facility: CLINIC | Age: 72
End: 2022-08-04
Payer: MEDICARE

## 2022-08-04 VITALS
HEART RATE: 88 BPM | SYSTOLIC BLOOD PRESSURE: 138 MMHG | DIASTOLIC BLOOD PRESSURE: 78 MMHG | BODY MASS INDEX: 32.47 KG/M2 | OXYGEN SATURATION: 97 % | WEIGHT: 190.2 LBS | HEIGHT: 64 IN

## 2022-08-04 DIAGNOSIS — F41.9 ANXIETY DISORDER, UNSPECIFIED TYPE: ICD-10-CM

## 2022-08-04 DIAGNOSIS — I10 BENIGN ESSENTIAL HYPERTENSION: Primary | ICD-10-CM

## 2022-08-04 DIAGNOSIS — E78.00 HYPERCHOLESTEROLEMIA: ICD-10-CM

## 2022-08-04 DIAGNOSIS — M62.838 MUSCLE SPASM: ICD-10-CM

## 2022-08-04 DIAGNOSIS — I47.1 PAROXYSMAL SUPRAVENTRICULAR TACHYCARDIA (HCC): ICD-10-CM

## 2022-08-04 PROCEDURE — 99213 OFFICE O/P EST LOW 20 MIN: CPT | Performed by: INTERNAL MEDICINE

## 2022-08-04 RX ORDER — CYCLOBENZAPRINE HCL 5 MG
TABLET ORAL
Qty: 60 TABLET | Refills: 5 | Status: SHIPPED | OUTPATIENT
Start: 2022-08-04

## 2022-08-04 NOTE — PROGRESS NOTES
[facility] [address]  [address] [facility] [address]  Cardiology Follow Up    Reford Otilia  1950  [id number]      1  Benign essential hypertension     2  Hypercholesterolemia     3  Paroxysmal supraventricular tachycardia ([facility] [address] )     4  Anxiety disorder, unspecified type         Chief Complaint   Patient presents with    Follow-up     Routine follow up       Interval History:  Patient presents for follow-up visit  Patient denies any history of chest pain shortness of breath  Patient denies any history of leg edema or orthopnea PND  No history of presyncope syncope  Patient states compliance with the present list of medications      Patient Active Problem List   Diagnosis    Actinic keratosis    Seborrheic keratosis    History of skin cancer    Anxiety disorder    Benign essential hypertension    Erectile dysfunction of non-organic origin    Esophageal reflux    Mixed hyperlipidemia due to type 2 diabetes mellitus ([address] )    Mitral valve disorder    Rosacea    Sleep apnea    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    Thoracic radiculopathy    Type 2 diabetes mellitus with stage 3a chronic kidney disease, without long-term current use of insulin ([facility])    Stage 3a chronic kidney disease ([facility] [address] )     Past Medical History:   Diagnosis Date    Actinic keratosis     Adhesive capsulitis of unspecified shoulder     Anxiety     Asthma     Atopic dermatitis     Cardiac disease     Cellulitis of right upper extremity     Cervical radiculopathy     Chest pain     Chronic maxillary sinusitis     last assessed 01/21/14    Diabetes mellitus ([facility] [address] )     Erectile dysfunction of non-organic origin     Esophageal reflux     Gout     last assessed 08/26/15    Hearing loss, conductive     Heart murmur     Mitral Valve disorder    Henoch-Schonlein purpura ([facility] [address] ) 8/6/2019    Herpes zoster     History of paroxysmal supraventricular tachycardia     Hypertension     IgA mediated leukocytoclastic vasculitis ([facility] ) 7/3/2019    Mitral valve disorder     Nasal congestion 19    Neoplasm of skin, malignant     Non-melanoma skin cancer     last assessed 10/26/17    Nosebleed 19    Obesity     Palpitations     last assessed 03/05/15    Paroxysmal supraventricular tachycardia ([facility] ) 2014    Plantar fasciitis     last assessed 05/28/15    PSVT (paroxysmal supraventricular tachycardia) ([facility])     Sciatica     Sleep apnea     Squamous cell carcinoma of skin of scalp     Tinnitus, unspecified ear     Type 2 diabetes mellitus with hyperglycemia ([facility] )     last assessed     Umbilical hernia     Worms in stool     last assessed 08/26/15     Social History     Socioeconomic History    Marital status: /Civil Union     Spouse name: Not on file    Number of children: Not on file    Years of education: Not on file    Highest education level: Not on file   Occupational History    Occupation: Working Part Time    Tobacco Use    Smoking status: Former Smoker     Packs/day: 1 00     Years: 10 00     Pack years: 10 00     Types: Cigarettes     Quit date:      Years since quittin 6    Smokeless tobacco: Never Used   Vaping Use    Vaping Use: Never used   Substance and Sexual Activity    Alcohol use: Yes     Comment: only occasionally    Drug use: No    Sexual activity: Yes     Partners: Female   Other Topics Concern    Not on file   Social History Narrative    Active Advance Directive     Social Determinants of Health     Financial Resource Strain: Not on file   Food Insecurity: Not on file   Transportation Needs: Not on file   Physical Activity: Not on file   Stress: Not on file   Social Connections: Not on file   Intimate Partner Violence: Not on file   Housing Stability: Not on file      Family History   Problem Relation Age of Onset    Cancer Mother         Breast + Skin    Diabetes Mother     Heart disease Mother     Heart attack Mother    Misa Phelan Breast cancer Mother         Adenocarcinoma    Skin cancer Mother         Adenocarcinoma    Heart failure Mother     Diabetes type II Mother     Varicose Veins Mother         Lower Extremities    Cancer Father         Prostate + Skin    Prostate cancer Father         Adenocarcinoma    Skin cancer Father     Lymphoma Father         Non-Hodgkin's    Arthritis Family      Past Surgical History:   Procedure Laterality Date    APPENDECTOMY      COLECTOMY      Partial, Sigmoid    COLONOSCOPY      HEAD & NECK SKIN LESION EXCISIONAL BIOPSY      10/11/10 SCC --  10/17/11 SCC  --  Location Scalp    HERNIA REPAIR      NASAL/SINUS ENDOSCOPY N/A 8/6/2019    Procedure: FUNCTIONAL ENDOSCOPIC SINUS SURGERY (FESS) IMAGED GUIDED for control of epistaxis; Surgeon: Pallavi Faust MD;  [facility]: [facility];  Service: ENT    MI ESOPHAGOGASTRODUODENOSCOPY TRANSORAL DIAGNOSTIC N/A 8/9/2017    Procedure: ESOPHAGOGASTRODUODENOSCOPY (EGD); Surgeon: Brandy Mujica MD;  Location: [facility];   Service: Gastroenterology    SKIN BIOPSY      TONSILLECTOMY         Current Outpatient Medications:     atorvastatin (LIPITOR) 10 mg tablet, TAKE 1 TABLET BY MOUTH EVERY DAY, Disp: 90 tablet, Rfl: 3    bacitracin topical ointment 500 units/g topical ointment, Apply 1 large application topically 2 (two) times a day, Disp: 28 g, Rfl: 0    clotrimazole-betamethasone (LOTRISONE) 1-0 05 % cream, APPLY TOPICALLY 2 (TWO) TIMES A DAY AS NEEDED (RASH), Disp: 30 g, Rfl: 2    cyclobenzaprine (FLEXERIL) 5 mg tablet, TAKE 1 TABLET BY MOUTH THREE TIMES A DAY AS NEEDED FOR MUSCLE SPASMS, Disp: 60 tablet, Rfl: 0    glimepiride (AMARYL) 4 mg tablet, TAKE 1 TABLET (4 MG TOTAL) BY MOUTH 2 (TWO) TIMES A DAY, Disp: 180 tablet, Rfl: 3    glucose blood (FREESTYLE LITE) test strip, PT TO TEST BLOOD SUGAR ONCE DAILY DX:E11 22, Disp: 100 each, Rfl: 3    glucose blood (FREESTYLE LITE) test strip, USE TO ONCE DAILY (DX: E11 65), Disp: 100 strip, Rfl: 3   insulin glargine (Lantus SoloStar) 100 units/mL injection pen, Inject 12 Units under the skin daily, Disp: 15 mL, Rfl: 0    Insulin Pen Needle (BD Pen Needle Audelia U/F) 32G X 4 MM MISC, Once daily at bedtime under the skin, Disp: 250 each, Rfl: 0    ketoconazole (NIZORAL) 2 % cream, Apply 1 application topically in the morning and 1 application in the evening  Applied to face and scalp twice a day for 3 weeks repeat as needed  , Disp: 30 g, Rfl: 3    LORazepam (ATIVAN) 1 mg tablet, Take 1 tablet (1 mg total) by mouth as needed for anxiety, Disp: 90 tablet, Rfl: 0    metFORMIN (GLUCOPHAGE-XR) 500 mg 24 hr tablet, TAKE 2 TABLETS BY MOUTH TWICE A DAY WITH FOOD, Disp: 360 tablet, Rfl: 3    montelukast (SINGULAIR) 10 mg tablet, TAKE 1 TABLET BY MOUTH DAILY AT BEDTIME, Disp: 90 tablet, Rfl: 3    olopatadine (PATANOL) 0 1 % ophthalmic solution, Administer 1 drop to both eyes daily , Disp: , Rfl:     omeprazole (PriLOSEC) 20 mg delayed release capsule, TAKE 1 CAPSULE BY MOUTH EVERY DAY, Disp: 90 capsule, Rfl: 1    sildenafil (VIAGRA) 50 MG tablet, TAKE 1 TABLET BY MOUTH AS NEEDED FOR ERECTILE DYSFUNCTION, Disp: 6 tablet, Rfl: 9    sitaGLIPtin (JANUVIA) 100 mg tablet, Take 1 tablet (100 mg total) by mouth daily, Disp: 90 tablet, Rfl: 3    sodium chloride (OCEAN) 0 65 % nasal spray, 1 spray into each nostril as needed for congestion , Disp: , Rfl:     triamcinolone (KENALOG) 0 025 % cream, Apply topically as needed , Disp: , Rfl:     verapamil (VERELAN PM) 180 MG 24 hr capsule, TAKE 1 CAPSULE (180 MG TOTAL) BY MOUTH 2 (TWO) TIMES A DAY, Disp: 180 capsule, Rfl: 3    EPINEPHrine (EPIPEN) 0 3 mg/0 3 mL SOAJ, Inject 0 3 mL (0 3 mg total) into a muscle once for 1 dose (Patient not taking: Reported on 8/4/2022), Disp: 0 6 mL, Rfl: 0  Allergies   Allergen Reactions    Iodinated Diagnostic Agents Anaphylaxis    Shellfish Allergy - Food Allergy Anaphylaxis    Shellfish-Derived Products - Food Allergy     Shrimp Extract Allergy Skin Test - Food Allergy Anaphylaxis    Empagliflozin Hives    Farxiga [Dapagliflozin] Rash     Yeast infection    Ozempic (0 25 Or 0 5 Mg-Dose) [Semaglutide(0 25 Or 0 5mg-Dos)] Rash       Labs:  Appointment on 06/28/2022   Component Date Value    Hemoglobin A1C 06/28/2022 8 4 (A)    EAG 06/28/2022 194     Sodium 06/28/2022 139     Potassium 06/28/2022 3 8     Chloride 06/28/2022 106     CO2 06/28/2022 26     ANION GAP 06/28/2022 7     BUN 06/28/2022 20     Creatinine 06/28/2022 1 24     Glucose, Fasting 06/28/2022 142 (A)    Calcium 06/28/2022 9 1     AST 06/28/2022 8     ALT 06/28/2022 24     Alkaline Phosphatase 06/28/2022 57     Total Protein 06/28/2022 7 5     Albumin 06/28/2022 3 5     Total Bilirubin 06/28/2022 0 62     eGFR 06/28/2022 57     Cholesterol 06/28/2022 104     Triglycerides 06/28/2022 101     HDL, Direct 06/28/2022 41     LDL Calculated 06/28/2022 43     Creatinine, Ur 06/28/2022 79 1     Microalbum  ,U,Random 06/28/2022 22 4 (A)    Microalb Creat Ratio 06/28/2022 28      Imaging: No results found      Review of Systems:  Review of Systems   REVIEW OF SYSTEMS:  Constitutional:  Denies fever or chills   Eyes:  Denies change in visual acuity   HENT:  Denies nasal congestion or sore throat   Respiratory:  Denies cough or shortness of breath   Cardiovascular:  Denies chest pain or edema   GI:  Denies abdominal pain, nausea, vomiting, bloody stools or diarrhea   :  Denies dysuria, frequency, difficulty in micturition and nocturia  Musculoskeletal:  Denies back pain or joint pain   Neurologic:  Denies headache, focal weakness or sensory changes   Endocrine:  Denies polyuria or polydipsia   Lymphatic:  Denies swollen glands   Psychiatric:  Denies depression or anxiety       Physical Exam:    /78 (BP Location: Left arm, Patient Position: Sitting, Cuff Size: Standard)   Pulse 88   Ht 5' 4" (1 626 m)   Wt 86 3 kg (190 lb 3 2 oz)   SpO2 97%   BMI 32 65 kg/m² Physical Exam   PHYSICAL EXAM:  General:  Patient is not in acute distress   Head: Normocephalic, Atraumatic  HEENT:  Both pupils normal-size atraumatic, normocephalic, nonicteric  Neck:  JVP not raised  Trachea central  No carotid bruit  Respiratory:  normal breath sounds no crackles  no rhonchi  Cardiovascular:  Regular rate and rhythm no S3 no murmurs  GI:  Abdomen soft nontender  No organomegaly  Lymphatic:  No cervical or inguinal lymphadenopathy  Neurologic:  Patient is awake alert, oriented   Grossly nonfocal  Extremities no edema    Discussion/Summary:  Patient with multiple medical problems who seems to be doing reasonably well from cardiac standpoint  Previous studies reviewed with patient  Medications reviewed and possible side effects discussed  concepts of cardiovascular disease , signs and symptoms of heart disease  Dietary and risk factor modification reinforced  All questions answered  Safety measures reviewed  Patient advised to report any problems prompting medical attention  Symptoms to watch out from cardiac standpoint which would indicate the need for further cardiac evaluation discussed  Medications reviewed  Follow-up in 6 months or earlier as needed  Patient is agreeable with the plan of care

## 2022-08-06 DIAGNOSIS — E11.22 TYPE 2 DIABETES MELLITUS WITH STAGE 3A CHRONIC KIDNEY DISEASE, WITHOUT LONG-TERM CURRENT USE OF INSULIN (HCC): Chronic | ICD-10-CM

## 2022-08-06 DIAGNOSIS — N18.31 TYPE 2 DIABETES MELLITUS WITH STAGE 3A CHRONIC KIDNEY DISEASE, WITHOUT LONG-TERM CURRENT USE OF INSULIN (HCC): Chronic | ICD-10-CM

## 2022-08-08 RX ORDER — INSULIN GLARGINE 100 [IU]/ML
12 INJECTION, SOLUTION SUBCUTANEOUS DAILY
Qty: 15 ML | Refills: 1 | Status: SHIPPED | OUTPATIENT
Start: 2022-08-08

## 2022-08-11 DIAGNOSIS — N18.2 TYPE 2 DIABETES MELLITUS WITH STAGE 2 CHRONIC KIDNEY DISEASE, WITHOUT LONG-TERM CURRENT USE OF INSULIN (HCC): Chronic | ICD-10-CM

## 2022-08-11 DIAGNOSIS — E11.22 TYPE 2 DIABETES MELLITUS WITH STAGE 2 CHRONIC KIDNEY DISEASE, WITHOUT LONG-TERM CURRENT USE OF INSULIN (HCC): Chronic | ICD-10-CM

## 2022-08-11 RX ORDER — METFORMIN HYDROCHLORIDE 500 MG/1
TABLET, EXTENDED RELEASE ORAL
Qty: 360 TABLET | Refills: 2 | Status: SHIPPED | OUTPATIENT
Start: 2022-08-11

## 2022-08-24 ENCOUNTER — OFFICE VISIT (OUTPATIENT)
Dept: DERMATOLOGY | Facility: CLINIC | Age: 72
End: 2022-08-24
Payer: MEDICARE

## 2022-08-24 VITALS — BODY MASS INDEX: 32.1 KG/M2 | HEIGHT: 64 IN | WEIGHT: 188 LBS

## 2022-08-24 DIAGNOSIS — L98.9 UNKNOWN SKIN LESION: Primary | ICD-10-CM

## 2022-08-24 PROCEDURE — 88305 TISSUE EXAM BY PATHOLOGIST: CPT | Performed by: PATHOLOGY

## 2022-08-24 PROCEDURE — 11102 TANGNTL BX SKIN SINGLE LES: CPT | Performed by: DERMATOLOGY

## 2022-08-24 NOTE — PATIENT INSTRUCTIONS
Probable verrucous keratosis will await results of biopsy further treatment needed  Wound care instructions given to patient

## 2022-08-24 NOTE — PROGRESS NOTES
[address]  [facility] [address]  [facility] [address]  [phone]  [phone]     MRN: [id number] : 1950  Encounter: [id number]  Patient Information: Harinder Zander    Subjective:     79-year-old male presents for concerns regarding growth on his scalp patient reports that previously treated this with cryosurgery but has recurred he is concerned     Objective:   Ht 5' 4" (1 626 m)   Wt 85 3 kg (188 lb)   BMI 32 27 kg/m²     Physical Exam:    General Appearance:    Alert, cooperative, no distress   Skin:   In keratotic papule greasy stuck on appearance noted on the frontal scalp      Shave Biopsy Procedure Note    Pre-operative Diagnosis:  Inflamed keratosis rule out atypia    Plan:  1  Instructed to keep the wound dry and covered for 24 and clean thereafter  2  Warning signs of infection were reviewed  3  Recommended that the patient use OTC acetaminophen as needed for pain  4  Return  Pending results of biopsy(ies)    Locations:  Frontal scalp    Indications:  Patient concerned    Anesthesia: Lidocaine 1% with epinephrine without added sodium bicarbonate    Procedure Details     Patient informed of the risks (including bleeding and infection) and benefits of the   procedure and Verbal informed consent obtained  The lesion and surrounding area were given a sterile prep using alcohol and draped in the usual sterile fashion  A Blue blade razor was used to obtain a specimen  Hemostasis achieved with aluminum chloride  Petrolatum and a sterile dressing applied  The specimen was sent for pathologic examination  The patient tolerated the procedure(s) well  Complications:  none  Assessment:     1  Unknown skin lesion           Plan:   Probable verrucous keratosis will await results of biopsy further treatment needed      Prior to Admission medications    Medication Sig Start Date End Date Taking?  Authorizing Provider   atorvastatin (LIPITOR) 10 mg tablet TAKE 1 TABLET BY MOUTH EVERY DAY 4/19/22  Yes David De La Garza DO   bacitracin topical ointment 500 units/g topical ointment Apply 1 large application topically 2 (two) times a day 8/4/19  Yes Donna De La Paz MD   clotrimazole-betamethasone (LOTRISONE) 1-0 05 % cream APPLY TOPICALLY 2 (TWO) TIMES A DAY AS NEEDED (RASH) 12/15/21  Yes David De La Garza DO   cyclobenzaprine (FLEXERIL) 5 mg tablet TAKE 1 TABLET BY MOUTH THREE TIMES A DAY AS NEEDED FOR MUSCLE SPASMS 8/4/22  Yes David De La Garza DO   glimepiride (AMARYL) 4 mg tablet TAKE 1 TABLET (4 MG TOTAL) BY MOUTH 2 (TWO) TIMES A DAY 12/15/21  Yes David De La Garza DO   glucose blood (FREESTYLE LITE) test strip PT TO TEST BLOOD SUGAR ONCE DAILY DX:E11 22 3/29/21  Yes David De La Garza DO   glucose blood (FREESTYLE LITE) test strip USE TO ONCE DAILY (DX: E11 65) 3/17/22  Yes David De La Garza DO   Insulin Pen Needle (BD Pen Needle Audelia U/F) 32G X 4 MM MISC Once daily at bedtime under the skin 7/11/22  Yes LELE Ye   ketoconazole (NIZORAL) 2 % cream Apply 1 application topically in the morning and 1 application in the evening  Applied to face and scalp twice a day for 3 weeks repeat as needed   5/25/22  Yes Veena Curry MD   Lantus SoloStar 100 units/mL SOPN INJECT 12 UNITS UNDER THE SKIN DAILY 8/8/22  Yes LELE Ye   LORazepam (ATIVAN) 1 mg tablet Take 1 tablet (1 mg total) by mouth as needed for anxiety 5/1/20  Yes Grayson Frazier MD   metFORMIN (GLUCOPHAGE-XR) 500 mg 24 hr tablet TAKE 2 TABLETS BY MOUTH TWICE A DAY WITH FOOD 8/11/22  Yes David De La Garza DO   montelukast (SINGULAIR) 10 mg tablet TAKE 1 TABLET BY MOUTH DAILY AT BEDTIME 3/30/22  Yes David De La Garza DO   olopatadine (PATANOL) 0 1 % ophthalmic solution Administer 1 drop to both eyes daily    Yes Historical Provider, MD   omeprazole (PriLOSEC) 20 mg delayed release capsule TAKE 1 CAPSULE BY MOUTH EVERY DAY 7/7/22  Yes David De La Garza, DO   sildenafil (VIAGRA) 50 MG tablet TAKE 1 TABLET BY MOUTH AS NEEDED FOR ERECTILE DYSFUNCTION 12/8/20  Yes Ketan Acuña PA-C   sitaGLIPtin (JANUVIA) 100 mg tablet Take 1 tablet (100 mg total) by mouth daily 11/30/21  Yes David De La Garza,    sodium chloride (OCEAN) 0 65 % nasal spray 1 spray into each nostril as needed for congestion    Yes Historical Provider, MD   triamcinolone (KENALOG) 0 025 % cream Apply topically as needed    Yes Historical Provider, MD   verapamil (VERELAN PM) 180 MG 24 hr capsule TAKE 1 CAPSULE (180 MG TOTAL) BY MOUTH 2 (TWO) TIMES A DAY 4/19/22  Yes David De La Garza,    EPINEPHrine (EPIPEN) 0 3 mg/0 3 mL SOAJ Inject 0 3 mL (0 3 mg total) into a muscle once for 1 dose  Patient not taking: Reported on 8/4/2022 11/22/21 7/11/22  LELE Moulton     Allergies   Allergen Reactions    Iodinated Diagnostic Agents Anaphylaxis    Shellfish Allergy - Food Allergy Anaphylaxis    Shellfish-Derived Products - Food Allergy     Shrimp Extract Allergy Skin Test - Food Allergy Anaphylaxis    Empagliflozin Hives    Farxiga [Dapagliflozin] Rash     Yeast infection    Ozempic (0 25 Or 0 5 Mg-Dose) [Semaglutide(0 25 Or 0 5mg-Dos)] Rash       Oscar Riggs MD  8/24/2022,2:15 PM    Portions of the record may have been created with voice recognition software   Occasional wrong word or "sound a like" substitutions may have occurred due to the inherent limitations of voice recognition software   Read the chart carefully and recognize, using context, where substitutions have occurred

## 2022-09-07 ENCOUNTER — TELEPHONE (OUTPATIENT)
Dept: INTERNAL MEDICINE CLINIC | Facility: CLINIC | Age: 72
End: 2022-09-07

## 2022-09-07 ENCOUNTER — RA CDI HCC (OUTPATIENT)
Dept: OTHER | Facility: HOSPITAL | Age: 72
End: 2022-09-07

## 2022-09-07 NOTE — PROGRESS NOTES
[id number]  [id number]  [facility]  coding opportunities          Chart Reviewed number of suggestions sent to Provider: 2     Patients Insurance     Medicare Insurance: [employer]

## 2022-09-12 ENCOUNTER — APPOINTMENT (OUTPATIENT)
Dept: LAB | Facility: CLINIC | Age: 72
End: 2022-09-12
Payer: MEDICARE

## 2022-09-12 ENCOUNTER — TELEPHONE (OUTPATIENT)
Dept: INTERNAL MEDICINE CLINIC | Facility: CLINIC | Age: 72
End: 2022-09-12

## 2022-09-12 DIAGNOSIS — E11.22 TYPE 2 DIABETES MELLITUS WITH STAGE 2 CHRONIC KIDNEY DISEASE, WITHOUT LONG-TERM CURRENT USE OF INSULIN (HCC): ICD-10-CM

## 2022-09-12 DIAGNOSIS — N18.2 TYPE 2 DIABETES MELLITUS WITH STAGE 2 CHRONIC KIDNEY DISEASE, WITHOUT LONG-TERM CURRENT USE OF INSULIN (HCC): ICD-10-CM

## 2022-09-12 DIAGNOSIS — Z12.5 SCREENING FOR PROSTATE CANCER: ICD-10-CM

## 2022-09-12 DIAGNOSIS — N18.2 TYPE 2 DIABETES MELLITUS WITH STAGE 2 CHRONIC KIDNEY DISEASE, WITHOUT LONG-TERM CURRENT USE OF INSULIN (HCC): Primary | ICD-10-CM

## 2022-09-12 DIAGNOSIS — E11.22 TYPE 2 DIABETES MELLITUS WITH STAGE 2 CHRONIC KIDNEY DISEASE, WITHOUT LONG-TERM CURRENT USE OF INSULIN (HCC): Primary | ICD-10-CM

## 2022-09-12 LAB
EST. AVERAGE GLUCOSE BLD GHB EST-MCNC: 186 MG/DL
HBA1C MFR BLD: 8.1 %
PSA SERPL-MCNC: 0.7 NG/ML (ref 0–4)

## 2022-09-12 PROCEDURE — 83036 HEMOGLOBIN GLYCOSYLATED A1C: CPT

## 2022-09-12 PROCEDURE — G0103 PSA SCREENING: HCPCS

## 2022-09-12 PROCEDURE — 36415 COLL VENOUS BLD VENIPUNCTURE: CPT

## 2022-09-12 NOTE — TELEPHONE ENCOUNTER
Dr Charo Bearden needs to put in an order for an A1c for patient  He was at the lab today and had it done   thanks

## 2022-09-13 ENCOUNTER — TELEPHONE (OUTPATIENT)
Dept: ENDOCRINOLOGY | Facility: CLINIC | Age: 72
End: 2022-09-13

## 2022-09-13 NOTE — TELEPHONE ENCOUNTER
----- Message from Richa Reyes PA-C sent at 9/13/2022  1:07 PM EDT -----  A1C high at 8 1, please send blood sugar readings after insulin started at last visit with EMMANUEL TRAN  [facility]

## 2022-09-15 ENCOUNTER — TELEPHONE (OUTPATIENT)
Dept: ENDOCRINOLOGY | Facility: CLINIC | Age: 72
End: 2022-09-15

## 2022-09-16 ENCOUNTER — OFFICE VISIT (OUTPATIENT)
Dept: INTERNAL MEDICINE CLINIC | Facility: CLINIC | Age: 72
End: 2022-09-16
Payer: MEDICARE

## 2022-09-16 ENCOUNTER — DOCUMENTATION (OUTPATIENT)
Dept: ENDOCRINOLOGY | Facility: CLINIC | Age: 72
End: 2022-09-16

## 2022-09-16 VITALS
SYSTOLIC BLOOD PRESSURE: 126 MMHG | BODY MASS INDEX: 32.92 KG/M2 | HEART RATE: 70 BPM | DIASTOLIC BLOOD PRESSURE: 68 MMHG | WEIGHT: 191.8 LBS

## 2022-09-16 DIAGNOSIS — E78.2 MIXED HYPERLIPIDEMIA DUE TO TYPE 2 DIABETES MELLITUS (HCC): Chronic | ICD-10-CM

## 2022-09-16 DIAGNOSIS — N18.31 HYPERTENSIVE KIDNEY DISEASE WITH STAGE 3A CHRONIC KIDNEY DISEASE (HCC): ICD-10-CM

## 2022-09-16 DIAGNOSIS — E11.69 MIXED HYPERLIPIDEMIA DUE TO TYPE 2 DIABETES MELLITUS (HCC): Chronic | ICD-10-CM

## 2022-09-16 DIAGNOSIS — E11.22 TYPE 2 DIABETES MELLITUS WITH STAGE 3A CHRONIC KIDNEY DISEASE, WITHOUT LONG-TERM CURRENT USE OF INSULIN (HCC): Primary | Chronic | ICD-10-CM

## 2022-09-16 DIAGNOSIS — N18.31 TYPE 2 DIABETES MELLITUS WITH STAGE 3A CHRONIC KIDNEY DISEASE, WITHOUT LONG-TERM CURRENT USE OF INSULIN (HCC): Primary | Chronic | ICD-10-CM

## 2022-09-16 DIAGNOSIS — I12.9 HYPERTENSIVE KIDNEY DISEASE WITH STAGE 3A CHRONIC KIDNEY DISEASE (HCC): ICD-10-CM

## 2022-09-16 PROCEDURE — 99214 OFFICE O/P EST MOD 30 MIN: CPT | Performed by: INTERNAL MEDICINE

## 2022-09-16 NOTE — TELEPHONE ENCOUNTER
Please let him know that I reviewed his blood sugars  He did have an overnight Bgs of 66  Please ask him to reduce the Lantus from 12units to 10units and keep the other medications the same   Thank you

## 2022-09-16 NOTE — PROGRESS NOTES
Aishwaryamonajma    NAME: Dottie Munoz  AGE: 67 y o  SEX: male  : 1950     DATE: 2022     Assessment and Plan:     1  Type 2 diabetes mellitus with stage 3a chronic kidney disease, without long-term current use of insulin ([facility] [address] )    Most recent A1c was 8 1 % on 2022  A1c is coming down  Hasn't been on insulin a full 3 months yet  Has f/u with endo in October  Continue monitoring blood sugar logs  Can still make some further diet and lifestyle changes  Lab Results   Component Value Date    CREATININE 1 24 2022    CREATININE 1 47 (H) 2022    CREATININE 1 0 2015    CREATININE 0 9 2015    EGFR 57 2022    EGFR 46 2022     Cr around 1 2-1 4  Discussed weight control, BP control, DM2 control  Will monitor closely  - Hemoglobin A1C; Future  - Microalbumin / creatinine urine ratio; Future  - Lipid Panel with Direct LDL reflex; Future    2  Mixed hyperlipidemia due to type 2 diabetes mellitus [facility])    Lab Results   Component Value Date    LDLCALC 43 2022      Excellent  Continue statin  3  Hypertensive kidney disease with stage 3a chronic kidney disease (HCC)    BP well controlled in office today  Goal <130/80  Continue anti-HTN therapy as prescribed  Will monitor renal function     - CBC and Platelet; Future  - Comprehensive metabolic panel; Future        Return in about 6 months (around 3/16/2023) for Follow-up  History of Present Illness:     Kelly Calderonmaryann presents for f/u  Was started on lantus by endocrinology  Starting to see sugars go in right direction  Started on 2022  States he does have difficulty staying away from the sweets  His wife loves sweets  Review of Systems:     Review of Systems   Constitutional: Negative  Respiratory: Negative  Cardiovascular: Negative  Gastrointestinal: Negative  Musculoskeletal: Positive for arthralgias and joint swelling        Objective:     /68 Pulse 70   Wt 87 kg (191 lb 12 8 oz)   BMI 32 92 kg/m²     Physical Exam  Constitutional:       General: He is not in acute distress  Appearance: He is not ill-appearing  Cardiovascular:      Rate and Rhythm: Normal rate and regular rhythm  Heart sounds: No murmur heard  Pulmonary:      Effort: Pulmonary effort is normal  No respiratory distress  Breath sounds: No wheezing  Abdominal:      General: Bowel sounds are normal  There is no distension  Tenderness: There is no abdominal tenderness  Musculoskeletal:      Right lower leg: No edema  Left lower leg: No edema  Skin:     Findings: Erythema:    Neurological:      Mental Status: He is alert         Eden Zavala DO  [facility]

## 2022-09-16 NOTE — PATIENT INSTRUCTIONS
Type 2 Diabetes Management for Adults   WHAT YOU NEED TO KNOW:   What is type 2 diabetes? Type 2 diabetes is a disease that affects how your body uses glucose (sugar)  Either your body cannot make enough insulin, or it cannot use the insulin correctly  It is important to keep diabetes controlled to prevent damage to your heart, blood vessels, and other organs  What do I need to know about high blood sugar levels? High blood sugar levels may not cause any symptoms  You may feel more thirsty or urinate more often than usual  Over time, high blood sugar levels can damage your nerves, blood vessels, tissues, and organs  The following can increase your blood sugar levels:  · Large meals or large amounts of carbohydrates at one time    · Less physical activity    · Stress    · Illness    · A lower dose of medicine or insulin, or a late dose    What do I need to know about low blood sugar levels? You can prevent symptoms such as shakiness, dizziness, irritability, or confusion by preventing your blood sugar levels from going too low  1  Treat a low blood sugar level right away  ? Drink 4 ounces of juice or have 1 tube of glucose gel  ? Check your blood sugar level again 10 to 15 minutes later  ? When the level goes back to normal, eat a meal or snack to prevent another decrease  2  Keep glucose gel, raisins, or hard candy with you at all times to treat a low blood sugar level  3  Your blood sugar level can get too low if you take diabetes medicine or insulin and do not eat enough food  4  If you use insulin, check your blood sugar level before you exercise  ? If your blood sugar level is below 100 mg/dL, eat 4 crackers or 2 ounces of raisins, or drink 4 ounces of juice  ? Check your level every 30 minutes if you exercise more than 1 hour  ? You may need a snack during or after exercise  What can I do to manage my blood sugar levels? 1   Check your blood sugar levels as directed and as needed  Several items are available to use to check your levels  You may need to check by testing a drop of blood in a glucose monitor  You may instead be given a continuous glucose monitoring (CGM) device  The device is worn at all times  The CGM checks your blood sugar level every 5 minutes  It sends results to an electronic device such as a smart phone  A CGM can be used with or without an insulin pump  Talk with your provider to find out which method is best for you  The goal for blood sugar levels before meals  is between 80 and 130 mg/dL and 2 hours after eating  is lower than 180 mg/dL  2  Make healthy food choices  Work with a dietitian to develop a meal plan that works for you and your schedule  A dietitian can help you learn how to eat the right amount of carbohydrates during your meals and snacks  Carbohydrates can raise your blood sugar level if you eat too many at one time  Examples of foods that contain carbohydrates are breads, cereals, rice, pasta, and sweets  3  Get regular physical activity  Physical activity can help you get to your target blood sugar level goal and manage your weight  Get at least 150 minutes of moderate to vigorous aerobic physical activity each week  Do not miss more than 2 days in a row  Do not sit longer than 30 minutes at a time  Your healthcare provider can help you create an activity plan  The plan can include the best activities for you and can help you build your strength and endurance  4  Maintain a healthy weight  Ask your healthcare provider what a healthy weight is for you  Ask him or her to help you create a safe weight loss plan if you are overweight  5  Take your diabetes medicine or insulin as directed  You may need diabetes medicine, insulin, or both to help control your blood sugar levels  Your healthcare provider will teach you how and when to take your diabetes medicine or insulin   You will also be taught about side effects oral diabetes medicine can cause  Insulin may be injected, or given through a pump or pen  You and your care team will discuss which method is best for you  ? An insulin pump  is an implanted device that gives your insulin 24 hours a day  An insulin pump prevents the need for multiple insulin injections in a day  ? An insulin pen  is a device prefilled with the right amount of insulin  ? You and your family members will be taught how to draw up and give insulin  if this is the best method for you  Your education team will also teach you how to dispose of needles and syringes  ? You will learn how much insulin you need  and when to give it  You will be taught when not to give insulin  You will also be taught what to do if your blood sugar level drops too low  This may happen if you take insulin and do not eat the right amount of carbohydrates  What else can I do to manage type 2 diabetes? · Wear medical alert identification  Wear medical alert jewelry or carry a card that says you have diabetes  Ask your provider where to get these items  · Do not smoke  Nicotine and other chemicals in cigarettes and cigars can cause lung and blood vessel damage  It also makes it more difficult to manage your diabetes  Ask your provider for information if you currently smoke and need help to quit  Do not use e-cigarettes or smokeless tobacco in place of cigarettes or to help you quit  They still contain nicotine  · Check your feet each day for cuts, scratches, calluses, or other wounds  Look for redness and swelling, and feel for warmth  Wear shoes that fit well  Check your shoes for rocks or other objects that can hurt your feet  Do not walk barefoot or wear shoes without socks  Wear cotton socks to help keep your feet dry  · Ask about vaccines you may need  You have a higher risk for serious illness if you get the flu, pneumonia, COVID-19, or hepatitis   Ask your provider if you should get vaccines to prevent these or other diseases, and when to get the vaccines  · Talk to your care team if you become stressed about diabetes care  Sometimes being able to fit diabetes care into your life can cause increased stress  The stress can cause you not to take care of yourself properly  Your care team can help by offering tips about self-care  Your care team may suggest you talk to a mental health provider  The provider can listen and offer help with self-care issues  Have someone call your local emergency number (911 in the [address] [facility]) if:   1  You cannot be woken  2  You have signs of diabetic ketoacidosis:     ? confusion, fatigue    ? vomiting    ? rapid heartbeat    ? fruity smelling breath    ? extreme thirst    ? dry mouth and skin    3  You have any of the following signs of a heart attack:      ? Squeezing, pressure, or pain in your chest    ? You may  also have any of the following:     ? Discomfort or pain in your back, neck, jaw, stomach, or arm    ? Shortness of breath    ? Nausea or vomiting    ? Lightheadedness or a sudden cold sweat    4  You have any of the following signs of a stroke:      ? Numbness or drooping on one side of your face     ? Weakness in an arm or leg    ? Confusion or difficulty speaking    ? Dizziness, a severe headache, or vision loss    When should I call my doctor or diabetes care team?   · You have a sore or wound that will not heal     · You have a change in the amount you urinate  · Your blood sugar levels are higher than your target goals  · You often have lower blood sugar levels than your target goals  · Your skin is red, dry, warm, or swollen  · You have trouble coping with diabetes, or you feel anxious or depressed  · You have questions or concerns about your condition or care  CARE AGREEMENT:   You have the right to help plan your care  Learn about your health condition and how it may be treated   Discuss treatment options with your healthcare providers to decide what care you want to receive  You always have the right to refuse treatment  The above information is an  only  It is not intended as medical advice for individual conditions or treatments  Talk to your doctor, nurse or pharmacist before following any medical regimen to see if it is safe and effective for you  © Copyright [employer] 2022 Information is for End User's use only and may not be sold, redistributed or otherwise used for commercial purposes  All illustrations and images included in CareNotes® are the copyrighted property of [employer]  or [facility] Jesse Raygoza     Type 2 Diabetes Management for Adults   AMBULATORY CARE:   Type 2 diabetes  is a disease that affects how your body uses glucose (sugar)  Either your body cannot make enough insulin, or it cannot use the insulin correctly  It is important to keep diabetes controlled to prevent damage to your heart, blood vessels, and other organs  Have someone call your local emergency number (911 in the [address] [facility]) if:   · You cannot be woken  · You have signs of diabetic ketoacidosis:     ? confusion, fatigue    ? vomiting    ? rapid heartbeat    ? fruity smelling breath    ? extreme thirst    ? dry mouth and skin    · You have any of the following signs of a heart attack:      ? Squeezing, pressure, or pain in your chest    ? You may  also have any of the following:     § Discomfort or pain in your back, neck, jaw, stomach, or arm    § Shortness of breath    § Nausea or vomiting    § Lightheadedness or a sudden cold sweat    · You have any of the following signs of a stroke:      ? Numbness or drooping on one side of your face     ? Weakness in an arm or leg    ? Confusion or difficulty speaking    ?  Dizziness, a severe headache, or vision loss    Call your doctor or diabetes care team if:   · You have a sore or wound that will not heal     · You have a change in the amount you urinate  · Your blood sugar levels are higher than your target goals  · You often have lower blood sugar levels than your target goals  · Your skin is red, dry, warm, or swollen  · You have trouble coping with diabetes, or you feel anxious or depressed  · You have questions or concerns about your condition or care  What you need to know about high blood sugar levels:  High blood sugar levels may not cause any symptoms  You may feel more thirsty or urinate more often than usual  Over time, high blood sugar levels can damage your nerves, blood vessels, tissues, and organs  The following can increase your blood sugar levels:  · Large meals or large amounts of carbohydrates at one time    · Less physical activity    · Stress    · Illness    · A lower dose of medicine or insulin, or a late dose    What you need to know about low blood sugar levels: You can prevent symptoms such as shakiness, dizziness, irritability, or confusion by preventing your blood sugar levels from going too low  · Treat a low blood sugar level right away  ? Drink 4 ounces of juice or have 1 tube of glucose gel  ? Check your blood sugar level again 10 to 15 minutes later  ? When the level goes back to normal, eat a meal or snack to prevent another decrease  · Keep glucose gel, raisins, or hard candy with you at all times to treat a low blood sugar level  · Your blood sugar level can get too low if you take diabetes medicine or insulin and do not eat enough food  · If you use insulin, check your blood sugar level before you exercise  ? If your blood sugar level is below 100 mg/dL, eat 4 crackers or 2 ounces of raisins, or drink 4 ounces of juice  ? Check your level every 30 minutes if you exercise more than 1 hour  ? You may need a snack during or after exercise  What you can do to manage your blood sugar levels:   · Check your blood sugar levels as directed and as needed    Several items are available to use to check your levels  You may need to check by testing a drop of blood in a glucose monitor  You may instead be given a continuous glucose monitoring (CGM) device  The device is worn at all times  The CGM checks your blood sugar level every 5 minutes  It sends results to an electronic device such as a smart phone  A CGM can be used with or without an insulin pump  Talk with your provider to find out which method is best for you  The goal for blood sugar levels before meals  is between 80 and 130 mg/dL and 2 hours after eating  is lower than 180 mg/dL  · Make healthy food choices  Work with a dietitian to develop a meal plan that works for you and your schedule  A dietitian can help you learn how to eat the right amount of carbohydrates during your meals and snacks  Carbohydrates can raise your blood sugar level if you eat too many at one time  Examples of foods that contain carbohydrates are breads, cereals, rice, pasta, and sweets  · Get regular physical activity  Physical activity can help you get to your target blood sugar level goal and manage your weight  Get at least 150 minutes of moderate to vigorous aerobic physical activity each week  Do not miss more than 2 days in a row  Do not sit longer than 30 minutes at a time  Your healthcare provider can help you create an activity plan  The plan can include the best activities for you and can help you build your strength and endurance  · Maintain a healthy weight  Ask your healthcare provider what a healthy weight is for you  Ask him or her to help you create a safe weight loss plan if you are overweight  · Take your diabetes medicine or insulin as directed  You may need diabetes medicine, insulin, or both to help control your blood sugar levels  Your healthcare provider will teach you how and when to take your diabetes medicine or insulin   You will also be taught about side effects oral diabetes medicine can cause  Insulin may be injected, or given through a pump or pen  You and your care team will discuss which method is best for you  ? An insulin pump  is an implanted device that gives your insulin 24 hours a day  An insulin pump prevents the need for multiple insulin injections in a day  ? An insulin pen  is a device prefilled with the right amount of insulin  ? You and your family members will be taught how to draw up and give insulin  if this is the best method for you  Your education team will also teach you how to dispose of needles and syringes  ? You will learn how much insulin you need  and when to give it  You will be taught when not to give insulin  You will also be taught what to do if your blood sugar level drops too low  This may happen if you take insulin and do not eat the right amount of carbohydrates  Other things you can do to manage type 2 diabetes:   · Wear medical alert identification  Wear medical alert jewelry or carry a card that says you have diabetes  Ask your provider where to get these items  · Do not smoke  Nicotine and other chemicals in cigarettes and cigars can cause lung and blood vessel damage  It also makes it more difficult to manage your diabetes  Ask your provider for information if you currently smoke and need help to quit  Do not use e-cigarettes or smokeless tobacco in place of cigarettes or to help you quit  They still contain nicotine  · Check your feet each day for cuts, scratches, calluses, or other wounds  Look for redness and swelling, and feel for warmth  Wear shoes that fit well  Check your shoes for rocks or other objects that can hurt your feet  Do not walk barefoot or wear shoes without socks  Wear cotton socks to help keep your feet dry  · Ask about vaccines you may need  You have a higher risk for serious illness if you get the flu, pneumonia, COVID-19, or hepatitis   Ask your provider if you should get vaccines to prevent these or other diseases, and when to get the vaccines  · Talk to your care team if you become stressed about diabetes care  Sometimes being able to fit diabetes care into your life can cause increased stress  The stress can cause you not to take care of yourself properly  Your care team can help by offering tips about self-care  Your care team may suggest you talk to a mental health provider  The provider can listen and offer help with self-care issues  Follow up with your doctor or diabetes care team as directed: You may need to have blood tests done before your follow-up visit  The test results will show if changes need to be made in your treatment or self-care  Write down your questions so you remember to ask them during your visits  Talk to your provider if you cannot afford your medicine  © Copyright [employer] 2022 Information is for End User's use only and may not be sold, redistributed or otherwise used for commercial purposes  All illustrations and images included in CareNotes® are the copyrighted property of [employer]  or [facility] Jesse Pitt  The above information is an  only  It is not intended as medical advice for individual conditions or treatments  Talk to your doctor, nurse or pharmacist before following any medical regimen to see if it is safe and effective for you

## 2022-09-24 DIAGNOSIS — L21.9 SEBORRHEIC DERMATITIS: ICD-10-CM

## 2022-09-24 DIAGNOSIS — Z79.4 TYPE 2 DIABETES MELLITUS WITH HYPERGLYCEMIA, WITH LONG-TERM CURRENT USE OF INSULIN (HCC): ICD-10-CM

## 2022-09-24 DIAGNOSIS — E11.65 TYPE 2 DIABETES MELLITUS WITH HYPERGLYCEMIA, WITH LONG-TERM CURRENT USE OF INSULIN (HCC): ICD-10-CM

## 2022-09-25 RX ORDER — SITAGLIPTIN 100 MG/1
TABLET, FILM COATED ORAL
Qty: 90 TABLET | Refills: 1 | Status: SHIPPED | OUTPATIENT
Start: 2022-09-25

## 2022-09-26 RX ORDER — KETOCONAZOLE 20 MG/G
1 CREAM TOPICAL 2 TIMES DAILY
Qty: 30 G | Refills: 3 | Status: SHIPPED | OUTPATIENT
Start: 2022-09-26

## 2022-10-05 ENCOUNTER — CLINICAL SUPPORT (OUTPATIENT)
Dept: INTERNAL MEDICINE CLINIC | Facility: CLINIC | Age: 72
End: 2022-10-05
Payer: MEDICARE

## 2022-10-05 DIAGNOSIS — Z23 ENCOUNTER FOR IMMUNIZATION: Primary | ICD-10-CM

## 2022-10-05 PROCEDURE — G0008 ADMIN INFLUENZA VIRUS VAC: HCPCS

## 2022-10-05 PROCEDURE — 90662 IIV NO PRSV INCREASED AG IM: CPT

## 2022-10-17 ENCOUNTER — OFFICE VISIT (OUTPATIENT)
Dept: ENDOCRINOLOGY | Facility: CLINIC | Age: 72
End: 2022-10-17
Payer: MEDICARE

## 2022-10-17 VITALS
OXYGEN SATURATION: 96 % | HEIGHT: 64 IN | SYSTOLIC BLOOD PRESSURE: 130 MMHG | BODY MASS INDEX: 33.39 KG/M2 | DIASTOLIC BLOOD PRESSURE: 80 MMHG | WEIGHT: 195.6 LBS | HEART RATE: 81 BPM

## 2022-10-17 DIAGNOSIS — E11.22 TYPE 2 DIABETES MELLITUS WITH STAGE 3A CHRONIC KIDNEY DISEASE, WITHOUT LONG-TERM CURRENT USE OF INSULIN (HCC): Primary | Chronic | ICD-10-CM

## 2022-10-17 DIAGNOSIS — E11.69 MIXED HYPERLIPIDEMIA DUE TO TYPE 2 DIABETES MELLITUS (HCC): Chronic | ICD-10-CM

## 2022-10-17 DIAGNOSIS — E78.2 MIXED HYPERLIPIDEMIA DUE TO TYPE 2 DIABETES MELLITUS (HCC): Chronic | ICD-10-CM

## 2022-10-17 DIAGNOSIS — N18.31 TYPE 2 DIABETES MELLITUS WITH STAGE 3A CHRONIC KIDNEY DISEASE, WITHOUT LONG-TERM CURRENT USE OF INSULIN (HCC): Primary | Chronic | ICD-10-CM

## 2022-10-17 DIAGNOSIS — I10 BENIGN ESSENTIAL HYPERTENSION: Chronic | ICD-10-CM

## 2022-10-17 PROCEDURE — 99214 OFFICE O/P EST MOD 30 MIN: CPT | Performed by: PHYSICIAN ASSISTANT

## 2022-10-17 NOTE — ASSESSMENT & PLAN NOTE
Fasting blood sugars have improved since starting basal insulin but he is still having hyperglycemia after dinner  Discussed switching Januvia to an  alternative GLP-1 agonist such as Victoza or trulicity, as he did well with ozempic except for rash  At this time, he does not want to start any new medication and would like to focus on dietary improvements  He will be travelling to [place] and will follow up in April when he returns  He has order for lab testing to be done from Dr Samara Roque which he plans to do in April     Lab Results   Component Value Date    HGBA1C 8 1 (H) 09/12/2022

## 2022-10-17 NOTE — PROGRESS NOTES
Established Patient Progress Note      Chief Complaint   Patient presents with   • Diabetes Type 2        History of Present Illness:   Mary Hinds is a 67 y o  male with a history of type 2 diabetes with long term use of insulin since 1996, started insulin at the last visit  Reports complications of CKD and retinopathy  Denies recent illness or hospitalizations  Denies recent severe hypoglycemic or severe hyperglycemic episodes  Denies any issues with his current regimen  home glucose monitoring: are performed regularly 2x per day  No exercise, but has been active with gardening and building a wall  Diet has room for improvement  He did have 1 low blood sugar and called the office 9/16 and lantus dose was reduced from 12 to 10 units   He does feel like blood sugars improved with lantus  Hx ozempic intolerance (Rash)  HX SGLT2 inhibitor intolerance ( side effects)     Home blood glucose readings:   Before breakfast: 120-130s  Before lunch: not checking  Before dinner: 120s-140s, some over 200  Bedtime: higher, at times up to 200s     Current regimen:   Lantus 10 units mik bedtime  Glimepiride 4mg with breakfast and dinner  Januvia 100mg daily   Metformin ER 500mg- 2 tabs twice per day  januvia 100mg daily      Last Eye Exam: UTD  Last Foot Exam: UTD    Has hypertension: Taking verapamil  Has hyperlipidemia: Taking atorvastatin 10mg        Patient Active Problem List   Diagnosis   • Actinic keratosis   • Seborrheic keratosis   • History of skin cancer   • Anxiety disorder   • Benign essential hypertension   • Erectile dysfunction of non-organic origin   • Esophageal reflux   • Mixed hyperlipidemia due to type 2 diabetes mellitus (HCC)   • Mitral valve disorder   • Rosacea   • Sleep apnea   • 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   • Thoracic radiculopathy   • Type 2 diabetes mellitus with stage 3a chronic kidney disease, without long-term current use of insulin (HCC)   • Stage 3a chronic kidney disease [facility])      Past Medical History:   Diagnosis Date   • Actinic keratosis    • Adhesive capsulitis of unspecified shoulder    • Anxiety    • Asthma    • Atopic dermatitis    • Cardiac disease    • Cellulitis of right upper extremity    • Cervical radiculopathy    • Chest pain    • Chronic maxillary sinusitis     last assessed 01/21/14   • Diabetes mellitus ([facility] [address] )    • Erectile dysfunction of non-organic origin    • Esophageal reflux    • Gout     last assessed 08/26/15   • Hearing loss, conductive    • Heart murmur     Mitral Valve disorder   • Henoch-Schonlein purpura ([address] ) 8/6/2019   • Herpes zoster    • History of paroxysmal supraventricular tachycardia    • Hypertension    • 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   • Mitral valve disorder    • Nasal congestion 4/18/19   • Neoplasm of skin, malignant    • Non-melanoma skin cancer     last assessed 10/26/17   • Nosebleed 4/18/19   • Obesity    • Palpitations     last assessed 03/05/15   • Paroxysmal supraventricular tachycardia ([facility] [address] ) 1/21/2014   • Plantar fasciitis     last assessed 05/28/15   • PSVT (paroxysmal supraventricular tachycardia) ([facility])    • Sciatica    • Sleep apnea    • Squamous cell carcinoma of skin of scalp    • Tinnitus, unspecified ear    • Type 2 diabetes mellitus with hyperglycemia ([facility] [address] )     last assessed 03/29/82   • Umbilical hernia    • Worms in stool     last assessed 08/26/15      Past Surgical History:   Procedure Laterality Date   • APPENDECTOMY     • COLECTOMY      Partial, Sigmoid   • COLONOSCOPY     • HEAD & NECK SKIN LESION EXCISIONAL BIOPSY      10/11/10 SCC --  10/17/11 SCC  --  Location Scalp   • HERNIA REPAIR     • NASAL/SINUS ENDOSCOPY N/A 8/6/2019    Procedure: FUNCTIONAL ENDOSCOPIC SINUS SURGERY (FESS) IMAGED GUIDED for control of epistaxis;   Surgeon: Komal Ruiz MD;  Location: [facility];  Service: ENT   • GA ESOPHAGOGASTRODUODENOSCOPY TRANSORAL DIAGNOSTIC N/A 8/9/2017    Procedure: ESOPHAGOGASTRODUODENOSCOPY (EGD); Surgeon: Talya Topete MD;  Location: [facility];   Service: Gastroenterology   • SKIN BIOPSY     • TONSILLECTOMY        Family History   Problem Relation Age of Onset   • Cancer Mother         Breast + Skin   • Diabetes Mother    • Heart disease Mother    • Heart attack Mother    • Breast cancer Mother         Adenocarcinoma   • Skin cancer Mother         Adenocarcinoma   • Heart failure Mother    • Diabetes type II Mother    • Varicose Veins Mother         Lower Extremities   • Cancer Father         Prostate + Skin   • Prostate cancer Father         Adenocarcinoma   • Skin cancer Father    • Lymphoma Father         Non-Hodgkin's   • Arthritis Family      Social History     Tobacco Use   • Smoking status: Former Smoker     Packs/day:  00     Years: 10 00     Pack years: 10 00     Types: Cigarettes     Quit date:      Years since quittin 8   • Smokeless tobacco: Never Used   Substance Use Topics   • Alcohol use: Yes     Comment: only occasionally     Allergies   Allergen Reactions   • Iodinated Diagnostic Agents Anaphylaxis   • Shellfish Allergy - Food Allergy Anaphylaxis   • Shellfish-Derived Products - Food Allergy    • Shrimp Extract Allergy Skin Test - Food Allergy Anaphylaxis   • Empagliflozin Hives   • Farxiga [Dapagliflozin] Rash     Yeast infection   • Ozempic (0 25 Or 0 5 Mg-Dose) [Semaglutide(0 25 Or 0 5mg-Dos)] Rash         Current Outpatient Medications:   •  atorvastatin (LIPITOR) 10 mg tablet, TAKE 1 TABLET BY MOUTH EVERY DAY, Disp: 90 tablet, Rfl: 3  •  bacitracin topical ointment 500 units/g topical ointment, Apply 1 large application topically 2 (two) times a day (Patient taking differently: Apply 1 large application topically daily as needed), Disp: 28 g, Rfl: 0  •  clotrimazole-betamethasone (LOTRISONE) 1-0 05 % cream, APPLY TOPICALLY 2 (TWO) TIMES A DAY AS NEEDED (RASH), Disp: 30 g, Rfl: 2  •  cyclobenzaprine (FLEXERIL) 5 mg tablet, TAKE 1 TABLET BY MOUTH THREE TIMES A DAY AS NEEDED FOR MUSCLE SPASMS, Disp: 60 tablet, Rfl: 5  •  glimepiride (AMARYL) 4 mg tablet, TAKE 1 TABLET (4 MG TOTAL) BY MOUTH 2 (TWO) TIMES A DAY, Disp: 180 tablet, Rfl: 3  •  glucose blood (FREESTYLE LITE) test strip, PT TO TEST BLOOD SUGAR ONCE DAILY DX:E11 22 (Patient taking differently: Pt states he tests at least twice a day), Disp: 100 each, Rfl: 3  •  Insulin Pen Needle (BD Pen Needle Audelia U/F) 32G X 4 MM MISC, Once daily at bedtime under the skin, Disp: 250 each, Rfl: 0  •  Januvia 100 MG tablet, TAKE 1 TABLET BY MOUTH EVERY DAY, Disp: 90 tablet, Rfl: 1  •  ketoconazole (NIZORAL) 2 % cream, APPLY 1 APPLICATION TOPICALLY IN THE MORNING AND 1 APPLICATION IN THE EVENING   APPLIED TO FACE AND SCALP TWICE A DAY FOR 3 WEEKS REPEAT AS NEEDED , Disp: 30 g, Rfl: 3  •  Lantus SoloStar 100 units/mL SOPN, INJECT 12 UNITS UNDER THE SKIN DAILY (Patient taking differently: Inject 10 Units under the skin daily), Disp: 15 mL, Rfl: 1  •  LORazepam (ATIVAN) 1 mg tablet, Take 1 tablet (1 mg total) by mouth as needed for anxiety, Disp: 90 tablet, Rfl: 0  •  metFORMIN (GLUCOPHAGE-XR) 500 mg 24 hr tablet, TAKE 2 TABLETS BY MOUTH TWICE A DAY WITH FOOD, Disp: 360 tablet, Rfl: 2  •  montelukast (SINGULAIR) 10 mg tablet, TAKE 1 TABLET BY MOUTH DAILY AT BEDTIME, Disp: 90 tablet, Rfl: 3  •  olopatadine (PATANOL) 0 1 % ophthalmic solution, Administer 1 drop to both eyes daily , Disp: , Rfl:   •  omeprazole (PriLOSEC) 20 mg delayed release capsule, TAKE 1 CAPSULE BY MOUTH EVERY DAY, Disp: 90 capsule, Rfl: 1  •  sildenafil (VIAGRA) 50 MG tablet, TAKE 1 TABLET BY MOUTH AS NEEDED FOR ERECTILE DYSFUNCTION, Disp: 6 tablet, Rfl: 9  •  sodium chloride (OCEAN) 0 65 % nasal spray, 1 spray into each nostril as needed for congestion , Disp: , Rfl:   •  triamcinolone (KENALOG) 0 025 % cream, Apply topically as needed , Disp: , Rfl:   •  verapamil (VERELAN PM) 180 MG 24 hr capsule, TAKE 1 CAPSULE (180 MG TOTAL) BY MOUTH 2 (TWO) TIMES A DAY, Disp: 180 capsule, Rfl: 3  •  EPINEPHrine (EPIPEN) 0 3 mg/0 3 mL SOAJ, Inject 0 3 mL (0 3 mg total) into a muscle once for 1 dose (Patient not taking: Reported on 10/17/2022), Disp: 0 6 mL, Rfl: 0  •  glucose blood (FREESTYLE LITE) test strip, USE TO ONCE DAILY (DX: E11 65) (Patient not taking: Reported on 10/17/2022), Disp: 100 strip, Rfl: 3    Review of Systems   Constitutional: Negative for activity change, appetite change and fatigue  HENT: Negative for sore throat, trouble swallowing and voice change  Eyes: Negative for visual disturbance  Respiratory: Negative for choking, chest tightness and shortness of breath  Cardiovascular: Negative for chest pain, palpitations and leg swelling  Gastrointestinal: Negative for abdominal pain, constipation and diarrhea  Endocrine: Negative for cold intolerance, heat intolerance, polydipsia, polyphagia and polyuria  Genitourinary: Negative for frequency  Musculoskeletal: Negative for arthralgias and myalgias  Skin: Negative for rash  Neurological: Negative for dizziness and syncope  Hematological: Negative for adenopathy  Psychiatric/Behavioral: Negative for sleep disturbance  All other systems reviewed and are negative  Physical Exam:  Body mass index is 33 57 kg/m²  /80 (BP Location: Left arm, Patient Position: Sitting, Cuff Size: Standard)   Pulse 81   Ht 5' 4" (1 626 m)   Wt 88 7 kg (195 lb 9 6 oz)   SpO2 96%   BMI 33 57 kg/m²    Wt Readings from Last 3 Encounters:   10/17/22 88 7 kg (195 lb 9 6 oz)   09/21/22 86 6 kg (191 lb)   09/16/22 87 kg (191 lb 12 8 oz)       Physical Exam  Vitals reviewed  Constitutional:       General: He is not in acute distress  Appearance: He is well-developed  HENT:      Head: Normocephalic and atraumatic  Eyes:      Conjunctiva/sclera: Conjunctivae normal       Pupils: Pupils are equal, round, and reactive to light  Neck:      Thyroid: No thyromegaly     Cardiovascular: Rate and Rhythm: Normal rate and regular rhythm  Heart sounds: Normal heart sounds  No murmur heard  Pulmonary:      Effort: Pulmonary effort is normal  No respiratory distress  Breath sounds: Normal breath sounds  No wheezing or rales  Abdominal:      General: Bowel sounds are normal  There is no distension  Palpations: Abdomen is soft  Tenderness: There is no abdominal tenderness  Musculoskeletal:         General: Normal range of motion  Cervical back: Normal range of motion and neck supple  Lymphadenopathy:      Cervical: No cervical adenopathy  Skin:     General: Skin is warm and dry  Neurological:      Mental Status: He is alert and oriented to person, place, and time  Labs:   Lab Results   Component Value Date    HGBA1C 8 1 (H) 09/12/2022    HGBA1C 8 4 (H) 06/28/2022    HGBA1C 8 2 (H) 03/24/2022     Lab Results   Component Value Date    CREATININE 1 24 06/28/2022    CREATININE 1 47 (H) 03/24/2022    CREATININE 1 20 11/05/2021    BUN 20 06/28/2022     08/19/2015    K 3 8 06/28/2022     06/28/2022    CO2 26 06/28/2022     eGFR   Date Value Ref Range Status   06/28/2022 57 ml/min/1 73sq m Final     Lab Results   Component Value Date    CHOL 183 08/19/2015    HDL 41 06/28/2022    TRIG 101 06/28/2022     Lab Results   Component Value Date    ALT 24 06/28/2022    AST 8 06/28/2022    ALKPHOS 57 06/28/2022    BILITOT 0 8 08/19/2015     Lab Results   Component Value Date    DPQ1WRHAPITP 1 206 08/21/2019    QNK3HLEYMAHP 1 020 11/09/2017    LDU6XTEYKMBP 0 915 01/15/2016     Lab Results   Component Value Date    FREET4 0 92 08/21/2019       Impression & Plan:    Problem List Items Addressed This Visit        Endocrine    Mixed hyperlipidemia due to type 2 diabetes mellitus ([address] ) (Chronic)     Continue atorvastatin     Lab Results   Component Value Date    HGBA1C 8 1 (H) 09/12/2022            Type 2 diabetes mellitus with stage 3a chronic kidney disease, without long-term current use of insulin (HCC) - Primary (Chronic)     Fasting blood sugars have improved since starting basal insulin but he is still having hyperglycemia after dinner  Discussed switching Januvia to an  alternative GLP-1 agonist such as Victoza or trulicity, as he did well with ozempic except for rash  At this time, he does not want to start any new medication and would like to focus on dietary improvements  He will be travelling to [place] and will follow up in April when he returns  He has order for lab testing to be done from Dr Nella Gaucher which he plans to do in April  Lab Results   Component Value Date    HGBA1C 8 1 (H) 09/12/2022               Cardiovascular and Mediastinum    Benign essential hypertension (Chronic)          No orders of the defined types were placed in this encounter  There are no Patient Instructions on file for this visit  Discussed with the patient and all questioned fully answered  He will call me if any problems arise      Follow-up appointment in 6 months, when returning from [facility] patient on diagnostic results, prognosis, risk and benefit of treatment options, instruction for management, importance of treatment compliance, Risk  factor reduction and impressions    Skylar Shi PA-C (2) good, crying

## 2022-10-20 ENCOUNTER — PROCEDURE VISIT (OUTPATIENT)
Dept: DERMATOLOGY | Facility: CLINIC | Age: 72
End: 2022-10-20
Payer: MEDICARE

## 2022-10-20 VITALS — BODY MASS INDEX: 33.29 KG/M2 | HEIGHT: 64 IN | WEIGHT: 195 LBS

## 2022-10-20 DIAGNOSIS — L57.0 ACTINIC KERATOSIS: ICD-10-CM

## 2022-10-20 DIAGNOSIS — D04.4 SQUAMOUS CELL CARCINOMA IN SITU (SCCIS) OF SCALP: Primary | ICD-10-CM

## 2022-10-20 PROCEDURE — 12032 INTMD RPR S/A/T/EXT 2.6-7.5: CPT | Performed by: DERMATOLOGY

## 2022-10-20 PROCEDURE — 11622 EXC S/N/H/F/G MAL+MRG 1.1-2: CPT | Performed by: DERMATOLOGY

## 2022-10-20 PROCEDURE — 17000 DESTRUCT PREMALG LESION: CPT | Performed by: DERMATOLOGY

## 2022-10-20 NOTE — PROGRESS NOTES
[address]  [address] [facility] [address]  [phone]  [phone]     MRN: [id number] : 1950  Encounter: [id number]  Patient Information: Alannah Shock    Subjective:   79-year-old male presents for planned excision of previously biopsied squamous cell carcinoma in Situ mid frontal scalp addition patient complaining of scaling area on his right cheek no other concerns noted     Objective:   Ht 5' 4" (1 626 m)   Wt 88 5 kg (195 lb)   BMI 33 47 kg/m²     Physical Exam:    General Appearance:    Alert, cooperative, no distress   Skin:   Previous site of biopsy noted also scaling area noted on the right cheek  Physical Exam  HENT:      Head:         Procedure name: Excision with intermediate layered closure        Location:  Mid frontal scalp    Diagnosis: Squamous cell carcinoma in-situ    Size of lesion:6  mm    Margins: 4 mm    Size + Margins: 14 mm    I explained the diagnosis to the patient and we recommend an excision of the lesion for diagnosis and/or treatment  Potential complications include, but are not limited to: scarring, bleeding, infection, incomplete removal, recurrence, and nerve damage  The risks, benefits, and alternatives were discussed with the patient in detail  The location of the tumor was identified, circled, and confirmed by the patient  The correct patient, site, and procedure were confirmed  Anesthesia: 1% xylocaine with 1:100,000 epinephrine 3 cc  The patient was brought into the room, prepped and draped sterilely in the usual manner and anesthesia was administered by local infiltration  A fusiform shape was drawn around the lesion, and the margins were incised to the level of the subcutaneous fat with a number 15cc Bard-Elías blade  The tissue was removed with sharp and blunt dissection  The lateral margins of the resulting defect were undermined with sharp and blunt dissection    Hemostasis was achieved with electrocautery  The deeper layers of the defect, including subcutaneous fat and fascia, had to be approximated to reduce tension on the suture line  Layered wound closure was performed  The wound was cleaned with saline, dried off, petroleum applied, and the wound was covered with a pressure dressing  The patient was given detailed verbal and written instructions on postoperative care  Suture for adipose/fascial/dermal layer closure: 4-0  vicryl 2 sutures interrupted    Suture used to approximate epidermis: 4-0  nylon 5 sutures interrupted    Final wound length: 3 3 cm    Follow-up in: 1 weeks  Patient tolerated procedure well without complications  Cryotherapy Procedure Note    Pre-operative Diagnosis: actinic keratosis    Plan:  1  Instructed to keep the area dry and clean thereafter  Apply petrolatum if area gets crusty  2  Recommended that the patient use acetaminophen  as needed for pain  Locations:  Right cheek    Indications: Destruction of actinic keratosis x1    Patient informed of risks (permanent scarring, infection, light or dark discoloration, bleeding, infection, weakness, numbness and recurrence of the lesion) and benefits of the procedure and verbal informed consent obtained  The areas are treated with liquid nitrogen therapy, frozen until ice ball extended 2 mm beyond lesion, allowed to thaw, and treated again  The patient tolerated procedure well  The patient was instructed on post-op care, warned that there may be blister formation, redness and pain  Recommend OTC analgesia as needed for pain  Condition:  Stable    Complications:  none  Assessment:     1  Squamous cell carcinoma in situ (SCCIS) of scalp     2  Actinic keratosis           Plan:   Previous site excised await results of biopsy for margin control  Actinic Keratosis:  Patient advised lesions are precancers   These should resolve with cryosurgery if not to let us know sun block recommended  Prior to Admission medications    Medication Sig Start Date End Date Taking? Authorizing Provider   atorvastatin (LIPITOR) 10 mg tablet TAKE 1 TABLET BY MOUTH EVERY DAY 4/19/22   David De La Garza, DO   bacitracin topical ointment 500 units/g topical ointment Apply 1 large application topically 2 (two) times a day  Patient taking differently: Apply 1 large application topically daily as needed 8/4/19   Donna Maria MD   clotrimazole-betamethasone (LOTRISONE) 1-0 05 % cream APPLY TOPICALLY 2 (TWO) TIMES A DAY AS NEEDED (RASH) 12/15/21   David De La Garza DO   cyclobenzaprine (FLEXERIL) 5 mg tablet TAKE 1 TABLET BY MOUTH THREE TIMES A DAY AS NEEDED FOR MUSCLE SPASMS 8/4/22   David De La Garza, DO   EPINEPHrine (EPIPEN) 0 3 mg/0 3 mL SOAJ Inject 0 3 mL (0 3 mg total) into a muscle once for 1 dose  Patient not taking: Reported on 10/17/2022 11/22/21 9/16/22  LELE Germain   glimepiride (AMARYL) 4 mg tablet TAKE 1 TABLET (4 MG TOTAL) BY MOUTH 2 (TWO) TIMES A DAY 12/15/21   David De La Garza, DO   glucose blood (FREESTYLE LITE) test strip PT TO TEST BLOOD SUGAR ONCE DAILY DX:E11 22  Patient taking differently: Pt states he tests at least twice a day 3/29/21   David De La Garza, DO   glucose blood (FREESTYLE LITE) test strip USE TO ONCE DAILY (DX: E11 65)  Patient not taking: Reported on 10/17/2022 3/17/22   David De La Garza, DO   Insulin Pen Needle (BD Pen Needle Audelia U/F) 32G X 4 MM MISC Once daily at bedtime under the skin 7/11/22   LELE Greenberg   Januvia 100 MG tablet TAKE 1 TABLET BY MOUTH EVERY DAY 9/25/22   David De La Garza,    ketoconazole (NIZORAL) 2 % cream APPLY 1 APPLICATION TOPICALLY IN THE MORNING AND 1 APPLICATION IN THE EVENING   APPLIED TO FACE AND SCALP TWICE A DAY FOR 3 WEEKS REPEAT AS NEEDED  9/26/22   MD Harsha Gómez SolVelmaar 100 units/mL SOPN INJECT 12 UNITS UNDER THE SKIN DAILY  Patient taking differently: Inject 10 Units under the skin daily 8/8/22 Rati Lauryn Every, CRNP   LORazepam (ATIVAN) 1 mg tablet Take 1 tablet (1 mg total) by mouth as needed for anxiety 5/1/20   Alfreda Ceballos MD   metFORMIN (GLUCOPHAGE-XR) 500 mg 24 hr tablet TAKE 2 TABLETS BY MOUTH TWICE A DAY WITH FOOD 8/11/22   David De La Garza DO   montelukast (SINGULAIR) 10 mg tablet TAKE 1 TABLET BY MOUTH DAILY AT BEDTIME 3/30/22   David De La Garza DO   olopatadine (PATANOL) 0 1 % ophthalmic solution Administer 1 drop to both eyes daily     Historical Provider, MD   omeprazole (PriLOSEC) 20 mg delayed release capsule TAKE 1 CAPSULE BY MOUTH EVERY DAY 7/7/22   David De La Garza DO   sildenafil (VIAGRA) 50 MG tablet TAKE 1 TABLET BY MOUTH AS NEEDED FOR ERECTILE DYSFUNCTION 12/8/20   Tyrell Boyle PA-C   sodium chloride (OCEAN) 0 65 % nasal spray 1 spray into each nostril as needed for congestion     Historical Provider, MD   triamcinolone (KENALOG) 0 025 % cream Apply topically as needed     Historical Provider, MD   verapamil (VERELAN PM) 180 MG 24 hr capsule TAKE 1 CAPSULE (180 MG TOTAL) BY MOUTH 2 (TWO) TIMES A DAY 4/19/22   David De La Garza, DO     Allergies   Allergen Reactions   • Iodinated Diagnostic Agents Anaphylaxis   • Shellfish Allergy - Food Allergy Anaphylaxis   • Shellfish-Derived Products - Food Allergy    • Shrimp Extract Allergy Skin Test - Food Allergy Anaphylaxis   • Empagliflozin Hives   • Farxiga [Dapagliflozin] Rash     Yeast infection   • Ozempic (0 25 Or 0 5 Mg-Dose) [Semaglutide(0 25 Or 0 5mg-Dos)] Rash       Devoria Shingles  10/20/2022,10:55 AM    Portions of the record may have been created with voice recognition software   Occasional wrong word or "sound a like" substitutions may have occurred due to the inherent limitations of voice recognition software   Read the chart carefully and recognize, using context, where substitutions have occurred

## 2022-10-20 NOTE — PATIENT INSTRUCTIONS
Previous site excised await results of biopsy for margin control  Actinic Keratosis:  Patient advised lesions are precancers  These should resolve with cryosurgery if not to let us know sun block recommended  Dr Mohinder Morris - Surgery with Sutures After Care Instructions    WOUND CARE AFTER SURGERY:    Do NOT to remove the pressure bandage for 48 hours  Keep the area clean and dry while this bandage is on  After removing the bandage for the first time, gently clean the area with soap and water  If the bandage is difficult to remove, getting the bandage wet in the shower will sometimes help soften the adhesive and allow it to be removed more easily  You will now need to cleanse this area daily in the shower with gentle soap  There is no need to scrub the area  Apply plain Vaseline ointment (this is over the counter and not a prescription) to the site followed by a clean appropriately sized bandage to area  Non stick dressing and paper tape (or Hypafix) are recommended for sensitive skin but a bandaid is fine if it covers the area well  DO NOT USE NEOSPORIN, BACITRACIN OR ANY TOPICAL ANTIBIOTIC UNLESS INSTRUCTED BY THE DOCTOR  You will need to continue the above daily wound care until you return for suture removal in 7 days (generally 7 days for the face, 10-14 days off the face)      RESTRICTIONS:     For two DAYS:   - You will need to take it very easy as this time is highest risk for bleeding  Being a "couch potato" during these two days is generally recommended  - For surgeries on the face/neck/scalp: Avoid leaning down to pick things up off the floor as this brings blood up to your head  Instead, squat down to pick things up  For two WEEKS:   - No heavy lifting (anything greater than 10 pounds)   - You can start to do slow, gentle activities such as slow walking but nothing to increase your heart rate and blood pressure too much (such as cardiovascular exercise)   It is important to take it easy as there is still a risk for bleeding and a high risk popping of stitches open during this time  If we did surgery near the eyes (including the nose, forehead, front part of your scalp, cheeks): It is VERY common to get a large amount of swelling around your eyes (puffy eyes)  Although less frequent, this can be enough to swell your eyes shut and can also come along with bruising  This should not hurt and is very expected and normal  It is typically worst at ~ 3 days out from your surgery and dramatically better 1 week post-operatively  If we did surgery around your nose: No blowing your nose as this puts you at higher risk of popping stitches durign this time  Instead dab under your nose with a tissue or use a Q-tip inside your nose  If we did surgery on the skin above or bellow your lip or your lip itself: No sipping from straws as this uses a lot of the muscles around your mouth and increases the risk of popping stitches during this time  MANAGING YOUR PAIN AFTER SURGERY     You can expect to have some pain after surgery  This is normal  The pain is typically worse the first two days after surgery, and quickly begins to get better  The best strategy for controlling your pain after surgery is around the clock pain control  You can take over the counter Acetaminophen (Tylenol) for discomfort, if no contraindications  If you are taking this at the maximum dose, you can alternate this with Motrin (ibuprofen or Advil) as well  Alternating these medications with each other allows you to maximize your pain control  In addition to Tylenol and Motrin, you can use heating pads or ice packs on your incisions to help reduce your pain  How will I alternate your regular strength over-the-counter pain medication? You will take a dose of pain medication every three hours     Start by taking 650 mg of Tylenol (2 pills of 325 mg)   3 hours later take 600 mg of Motrin (3 pills of 200 mg)   3 hours after taking the Motrin take 650 mg of Tylenol   3 hours after that take 600 mg of Motrin  See example - if your first dose of Tylenol is at 12:00 PM     12:00 PM  Tylenol 650 mg (2 pills of 325 mg)    3:00 PM  Motrin 600 mg (3 pills of 200 mg)    6:00 PM  Tylenol 650 mg (2 pills of 325 mg)    9:00 PM  Motrin 600 mg (3 pills of 200 mg)    Continue alternating every 3 hours      Important:   Do not take more than 4000mg of Tylenol or 3200mg of Motrin in a 24-hour period  CALL OUR OFFICE IMMEDIATELY FOR ANY SIGNS OF INFECTION:    This includes fever, chills, increased redness, warmth, tenderness, severe discomfort/pain, or pus or foul smell coming from the wound  [facility] directly at [phone]  IF BLEEDING IS NOTICED:    Place a clean cloth over the area and apply firm pressure for thirty minutes  Check the wound ONLY after 30 minutes of direct pressure; do not cheat and sneak a peak, as that does not count  If bleeding persists after 30 minutes of legitimate direct pressure, then try one more round of direct pressure to the area  Should the bleeding become heavier or not stop after the second attempt, call [facility] directly at [phone]  Your call will get routed to the dermatology surgeon on call even after hours  Treatment with Cryotherapy    The doctor has treated your skin with nitrogen, which is 320 degrees Fahrenheit below zero  He has given the treated area "frostbite "    Stinging should subside within a few hours  You can take Tylenol for pain, if needed  Over the next few days, it is normal if the area becomes reddened, a blood blister, or swollen with fluid  If the lesion treated was near the eye - you could get a swollen eye over the next few days  Do not panic! This is all temporary, and will resolve with time  There is no special treatment - just keep the area clean  Makeup and BandAids can be used, if preferred      When the area starts to dry up and peel off, using Vaseline can help healing  It usually takes up to a month for it to heal   Some lesions are recurrent and may require repeat treatments  If a lesion has not healed in one month, please don't hesitate to contact us  If you have any further questions that are not answered here, please call us  [phone]    Thank you for allowing us to care for you

## 2022-10-27 ENCOUNTER — CLINICAL SUPPORT (OUTPATIENT)
Dept: DERMATOLOGY | Facility: CLINIC | Age: 72
End: 2022-10-27

## 2022-10-27 DIAGNOSIS — D04.4 SQUAMOUS CELL CARCINOMA IN SITU (SCCIS) OF SCALP: Primary | ICD-10-CM

## 2022-10-27 NOTE — PROGRESS NOTES
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 no problems noted     Objective: There were no vitals taken for this visit  Physical Exam:    General Appearance:    Alert, cooperative, no distress   Skin:   Previous site of excision healing  Procedure:  Suture removal  Site of excision:  Mid for  Wound was cleansed with saline  Wound is intact  Sutures removed  Steri-Strips applied  Biopsy pending     Assessment:     1  Squamous cell carcinoma in situ (SCCIS) of scalp           Plan:   Wound care instructions given to patient  Patient will be called when biopsies available      Prior to Admission medications    Medication Sig Start Date End Date Taking?  Authorizing Provider   atorvastatin (LIPITOR) 10 mg tablet TAKE 1 TABLET BY MOUTH EVERY DAY 22   David De La Garza,    bacitracin topical ointment 500 units/g topical ointment Apply 1 large application topically 2 (two) times a day  Patient taking differently: Apply 1 large application topically daily as needed 19   Donna Cast MD   clotrimazole-betamethasone (LOTRISONE) 1-0 05 % cream APPLY TOPICALLY 2 (TWO) TIMES A DAY AS NEEDED (RASH) 12/15/21   David De La Garza DO   cyclobenzaprine (FLEXERIL) 5 mg tablet TAKE 1 TABLET BY MOUTH THREE TIMES A DAY AS NEEDED FOR MUSCLE SPASMS 22   David De La Garza DO   EPINEPHrine (EPIPEN) 0 3 mg/0 3 mL SOAJ Inject 0 3 mL (0 3 mg total) into a muscle once for 1 dose  Patient not taking: Reported on 10/17/2022 11/22/21 9/16/22  LELE Aguilar   glimepiride (AMARYL) 4 mg tablet TAKE 1 TABLET (4 MG TOTAL) BY MOUTH 2 (TWO) TIMES A DAY 12/15/21   David De La Garza DO   glucose blood (FREESTYLE LITE) test strip PT TO TEST BLOOD SUGAR ONCE DAILY DX:E11 22  Patient taking differently: Pt states he tests at least twice a day 3/29/21   David Biggsbrittney, DO   glucose blood (FREESTYLE LITE) test strip USE TO ONCE DAILY (DX: E11 65)  Patient not taking: Reported on 10/17/2022 3/17/22   David De La Garza DO   Insulin Pen Needle (BD Pen Needle Audelia U/F) 32G X 4 MM MISC Once daily at bedtime under the skin 7/11/22   LELE Greenberg   Januvia 100 MG tablet TAKE 1 TABLET BY MOUTH EVERY DAY 9/25/22   [facility], DO   ketoconazole (NIZORAL) 2 % cream APPLY 1 APPLICATION TOPICALLY IN THE MORNING AND 1 APPLICATION IN THE EVENING   APPLIED TO FACE AND SCALP TWICE A DAY FOR 3 WEEKS REPEAT AS NEEDED  9/26/22   Maisie Kussmaul, MD   Lantus SoloStar 100 units/mL SOPN INJECT 12 UNITS UNDER THE SKIN DAILY  Patient taking differently: Inject 10 Units under the skin daily 8/8/22   LELE Gibbs   LORazepam (ATIVAN) 1 mg tablet Take 1 tablet (1 mg total) by mouth as needed for anxiety 5/1/20   Alfreda Ceballos MD   metFORMIN (GLUCOPHAGE-XR) 500 mg 24 hr tablet TAKE 2 TABLETS BY MOUTH TWICE A DAY WITH FOOD 8/11/22   [facility], DO   montelukast (SINGULAIR) 10 mg tablet TAKE 1 TABLET BY MOUTH DAILY AT BEDTIME 3/30/22   [facility]   olopatadine (PATANOL) 0 1 % ophthalmic solution Administer 1 drop to both eyes daily     Historical Provider, MD   omeprazole (PriLOSEC) 20 mg delayed release capsule TAKE 1 CAPSULE BY MOUTH EVERY DAY 7/7/22   [facility], DO   sildenafil (VIAGRA) 50 MG tablet TAKE 1 TABLET BY MOUTH AS NEEDED FOR ERECTILE DYSFUNCTION 12/8/20   Bridgett Carranza PA-C   sodium chloride (OCEAN) 0 65 % nasal spray 1 spray into each nostril as needed for congestion     Historical Provider, MD   triamcinolone (KENALOG) 0 025 % cream Apply topically as needed     Historical Provider, MD   verapamil (VERELAN PM) 180 MG 24 hr capsule TAKE 1 CAPSULE (180 MG TOTAL) BY MOUTH 2 (TWO) TIMES A DAY 4/19/22   [facility], DO     Allergies   Allergen Reactions   • Iodinated Diagnostic Agents Anaphylaxis   • Shellfish Allergy - Food Allergy Anaphylaxis   • Shellfish-Derived Products - Food Allergy    • Shrimp Extract Allergy Skin Test - Food Allergy Anaphylaxis   • Empagliflozin Hives   • Farxiga [Dapagliflozin] Rash     Yeast infection   • Ozempic (0 25 Or 0 5 Mg-Dose) [Semaglutide(0 25 Or 0 5mg-Dos)] Rash       Enrigue Finder  10/27/2022,8:55 AM    Portions of the record may have been created with voice recognition software   Occasional wrong word or "sound a like" substitutions may have occurred due to the inherent limitations of voice recognition software   Read the chart carefully and recognize, using context, where substitutions have occurred

## 2022-10-27 NOTE — PATIENT INSTRUCTIONS
Wound care instructions given to patient  Patient will be called when biopsies available  STERI-STRIPS (BUTTERFLY) POST SURGERY        Steri-Strips have been placed over your incision site to give added support  Please continue to bathe the area as usual     Eventually the Steri-Strips will begin to peel off on the ends  Snip the raised ends off with scissors and let the rest fall off on their own  If you have any questions, please call our office   [phone]

## 2022-11-07 ENCOUNTER — TELEPHONE (OUTPATIENT)
Dept: DERMATOLOGY | Facility: CLINIC | Age: 72
End: 2022-11-07

## 2022-11-07 DIAGNOSIS — Z79.4 TYPE 2 DIABETES MELLITUS WITH HYPERGLYCEMIA, WITH LONG-TERM CURRENT USE OF INSULIN (HCC): ICD-10-CM

## 2022-11-07 DIAGNOSIS — E11.65 TYPE 2 DIABETES MELLITUS WITH HYPERGLYCEMIA, WITH LONG-TERM CURRENT USE OF INSULIN (HCC): ICD-10-CM

## 2022-11-07 NOTE — TELEPHONE ENCOUNTER
----- Message from Sarai Charlton MD sent at 11/7/2022  7:58 AM EST -----  Call patient if not coming in shortly for suture removal to let him know that the margins were clear

## 2022-11-15 DIAGNOSIS — T75.3XXD MOTION SICKNESS, SUBSEQUENT ENCOUNTER: Primary | ICD-10-CM

## 2022-11-15 RX ORDER — SCOLOPAMINE TRANSDERMAL SYSTEM 1 MG/1
1 PATCH, EXTENDED RELEASE TRANSDERMAL
Qty: 10 PATCH | Refills: 0 | Status: SHIPPED | OUTPATIENT
Start: 2022-11-15

## 2022-12-16 ENCOUNTER — OFFICE VISIT (OUTPATIENT)
Dept: INTERNAL MEDICINE CLINIC | Facility: CLINIC | Age: 72
End: 2022-12-16

## 2022-12-16 VITALS
OXYGEN SATURATION: 96 % | DIASTOLIC BLOOD PRESSURE: 80 MMHG | SYSTOLIC BLOOD PRESSURE: 140 MMHG | WEIGHT: 196 LBS | HEART RATE: 104 BPM | BODY MASS INDEX: 33.46 KG/M2 | TEMPERATURE: 97.8 F | HEIGHT: 64 IN

## 2022-12-16 DIAGNOSIS — R05.1 ACUTE COUGH: Primary | ICD-10-CM

## 2022-12-16 NOTE — PROGRESS NOTES
Name: Roddy Styles      : 1950      MRN: [id number]  Encounter Provider: Chandan Vora DO  Encounter Date: 2022   [facility]   [address]     1  Acute cough    Appears to be viral in origin  Follow good hygiene/infection prevention principles  Discussed symptom directed treatment  Can do another home covid test tomorrow  If positive, would recommend isolation  Subjective      Cough x 3 days  Some congestion in his chest  Has to clear secretions from his throat sometimes  Getting PND  No fever or chills  No body aches  No exposure to covid that he is aware of  Testing negative with covid on home test at 2 AM  Going to his daughters tomorrow so wasn't sure what to do  Review of Systems   Constitutional: Positive for appetite change (decrease) and fatigue  Negative for chills and fever  HENT: Positive for postnasal drip  Respiratory: Positive for cough  Negative for chest tightness, shortness of breath and wheezing  Cardiovascular: Negative  Gastrointestinal: Negative  Musculoskeletal: Negative for myalgias  Neurological: Negative for weakness  Objective     /80   Pulse 104   Temp 97 8 °F (36 6 °C)   Ht 5' 4" (1 626 m)   Wt 88 9 kg (196 lb)   SpO2 96%   BMI 33 64 kg/m²     Physical Exam  Constitutional:       General: He is not in acute distress  Appearance: He is not ill-appearing  HENT:      Right Ear: Tympanic membrane, ear canal and external ear normal  There is no impacted cerumen  Left Ear: Tympanic membrane, ear canal and external ear normal  There is no impacted cerumen  Nose: Rhinorrhea (clear) present  Right Turbinates: Swollen  Left Turbinates: Swollen  Cardiovascular:      Rate and Rhythm: Normal rate and regular rhythm  Heart sounds: No murmur heard  Pulmonary:      Effort: Pulmonary effort is normal  No respiratory distress  Breath sounds:  No wheezing or rales  Neurological:      Mental Status: He is alert         Pramod Guo,

## 2022-12-20 DIAGNOSIS — E11.22 TYPE 2 DIABETES MELLITUS WITH STAGE 3A CHRONIC KIDNEY DISEASE, WITHOUT LONG-TERM CURRENT USE OF INSULIN (HCC): Chronic | ICD-10-CM

## 2022-12-20 DIAGNOSIS — N18.31 TYPE 2 DIABETES MELLITUS WITH STAGE 3A CHRONIC KIDNEY DISEASE, WITHOUT LONG-TERM CURRENT USE OF INSULIN (HCC): Chronic | ICD-10-CM

## 2022-12-20 RX ORDER — PEN NEEDLE, DIABETIC 32GX 5/32"
NEEDLE, DISPOSABLE MISCELLANEOUS
Qty: 100 EACH | Refills: 2 | Status: SHIPPED | OUTPATIENT
Start: 2022-12-20

## 2023-01-06 ENCOUNTER — OFFICE VISIT (OUTPATIENT)
Dept: INTERNAL MEDICINE CLINIC | Facility: CLINIC | Age: 73
End: 2023-01-06

## 2023-01-06 VITALS
SYSTOLIC BLOOD PRESSURE: 138 MMHG | HEART RATE: 85 BPM | TEMPERATURE: 99.1 F | RESPIRATION RATE: 16 BRPM | BODY MASS INDEX: 32.37 KG/M2 | DIASTOLIC BLOOD PRESSURE: 92 MMHG | WEIGHT: 189.6 LBS | HEIGHT: 64 IN | OXYGEN SATURATION: 98 %

## 2023-01-06 DIAGNOSIS — J06.9 ACUTE UPPER RESPIRATORY INFECTION, UNSPECIFIED: Primary | ICD-10-CM

## 2023-01-06 RX ORDER — FLUTICASONE PROPIONATE 50 MCG
1 SPRAY, SUSPENSION (ML) NASAL DAILY
Qty: 18.2 ML | Refills: 0 | Status: SHIPPED | OUTPATIENT
Start: 2023-01-06

## 2023-01-06 RX ORDER — AZITHROMYCIN 250 MG/1
TABLET, FILM COATED ORAL
Qty: 6 TABLET | Refills: 0 | Status: SHIPPED | OUTPATIENT
Start: 2023-01-06 | End: 2023-01-11

## 2023-01-06 NOTE — PATIENT INSTRUCTIONS
Claritin, Allegra, or zyrtec (generics are fine) 1 tablet daily  Zithromax as directed   Flonase 1 spray each nostril at bedtime  Stay well hydrated    Rhinosinusitis   AMBULATORY CARE:   Rhinosinusitis (RS)  is inflammation or infection of your nasal passages and sinuses  RS is most often caused by a virus but may be caused by bacteria  Acute RS lasts up to 12 weeks  Chronic RS lasts more than 12 weeks  Recurrent RS means you have 4 or more infections in 1 year  Common signs and symptoms:   Fever    Pain, pressure, redness, or swelling around the forehead, cheeks, or eyes    Thick yellow or green discharge from your nose    Loss of smell or taste    Dry cough that happens mostly at night or when you lie down    Headache and face pain that is worse when you lean forward    Tooth pain, or pain when you chew    Seek care immediately if:   You have trouble breathing, or wheezing that is getting worse  You have a stiff neck, a fever, or a bad headache  You cannot open your eye  Your eyeball bulges out, or you cannot move your eye  You are more sleepy than usual, or you notice changes in your ability to think, move, or talk  You have swelling of your forehead or scalp  Call your doctor if:   Your symptoms are worse or do not improve after 3 to 5 days of treatment  You have questions or concerns about your condition or care  Treatment  may not be needed  Your symptoms may go away on their own  Your healthcare provider may recommend watchful waiting for up to 10 days before starting antibiotics  Antibiotics will not help if the infection is caused by a virus  Check with your provider before you take any over-the-counter medicines  You may need any of the following:  Acetaminophen  decreases pain and fever  It is available without a doctor's order  Ask how much to take and how often to take it  Follow directions   Read the labels of all other medicines you are using to see if they also contain acetaminophen, or ask your doctor or pharmacist  Acetaminophen can cause liver damage if not taken correctly  Do not use more than 4 grams (4,000 milligrams) total of acetaminophen in one day  NSAIDs , such as ibuprofen, help decrease swelling, pain, and fever  This medicine is available with or without a doctor's order  NSAIDs can cause stomach bleeding or kidney problems in certain people  If you take blood thinner medicine, always ask your healthcare provider if NSAIDs are safe for you  Always read the medicine label and follow directions  Nasal steroid sprays  help decrease inflammation in your nose and sinuses  Decongestants  help reduce swelling and drain mucus in the nose and sinuses  They may help you breathe easier  Do not use decongestants for more than 3 days  Antihistamines  help dry mucus in the nose and relieve sneezing  Antibiotics  help treat or prevent a bacterial infection  Self-care:   Rinse your sinuses as directed  Use a sinus rinse device to rinse your nasal passages with a saline (salt water) solution or distilled water  Do not  use tap water  A sinus rinse will help thin the mucus in your nose and rinse away pollen and dirt  It will also help lower swelling so you can breathe normally  Use a humidifier  to increase air moisture in your home  Moist air may make it easier for you to breathe and help decrease your cough  Sleep with your head raised  Place an extra pillow under your head before you go to sleep to help your sinuses drain  Drink liquids as directed  Ask your healthcare provider how much liquid to drink each day and which liquids are best for you  Liquids will thin the mucus in your nose and help it drain  Do not have drinks that contain alcohol or caffeine  Do not smoke, and avoid secondhand smoke  Nicotine and other chemicals in cigarettes and cigars can make your symptoms worse   Ask your healthcare provider for information if you currently smoke and need help to quit  E-cigarettes or smokeless tobacco still contain nicotine  Talk to your healthcare provider before you use these products  Prevent the spread of germs:   Wash your hands often with soap and water  Wash your hands after you use the bathroom, change a child's diaper, or sneeze  Wash your hands before you prepare or eat food  Stay away from people who are sick  Some germs spread easily and quickly through contact  Follow up with your doctor as directed: You may be referred to an ear, nose, and throat specialist  Write down your questions so you remember to ask them during your visits  © Copyright [address] 2022 Information is for End User's use only and may not be sold, redistributed or otherwise used for commercial purposes  All illustrations and images included in CareNotes® are the copyrighted property of [employer]  or Candis [employer]  The above information is an  only  It is not intended as medical advice for individual conditions or treatments  Talk to your doctor, nurse or pharmacist before following any medical regimen to see if it is safe and effective for you

## 2023-01-06 NOTE — PROGRESS NOTES
[place]    NAME: Antoine Dietrich  AGE: 67 y o  SEX: male  : 1950     DATE: 2023     Assessment and Plan:     1  Acute upper respiratory infection, unspecified  Afebrile  Positive for congestion mild sinus tenderness over ethmoid sinuses, posterior pharynx mildly erythematous with evidence of postnasal drip  Lungs CTA bilaterally  Bilateral TMs are injected  Patient reports congested cough productive of green mucus  Denies wheezing or shortness of breath  Was on a cruise recently  Wife tested positive for COVID coming off a cruise  Patient states his COVID test have been negative  We will recheck COVID/flu swab today  Counseled on over-the-counter antihistamines, Flonase nasal spray 1 spray each nostril at bedtime  We will start Zithromax  If symptoms do not improve or worsen patient was instructed to return for further evaluation  - azithromycin (Zithromax) 250 mg tablet; Take 2 tablets (500 mg total) by mouth daily for 1 day, THEN 1 tablet (250 mg total) daily for 4 days  Dispense: 6 tablet; Refill: 0  - fluticasone (FLONASE) 50 mcg/act nasal spray; 1 spray into each nostril daily  Dispense: 18 2 mL; Refill: 0  - Covid/Flu- Office Collect        No follow-ups on file  Chief Complaint:     Chief Complaint   Patient presents with   • Cough     Wet cough with green phlegm  Post nasal  Does not recall any fevers  Nausea, no vomitting   • Sinus Problem     Sinus pressure        History of Present Illness:     Dorita Barthel presents to the office for reevaluation of URI symptoms that started approximately 3 weeks ago  Reports cough, congestion, sinus pressure  States have not improved  Recently on a cruise with symptoms states wife tested positive for COVID coming off cruise  States his a COVID test have been negative  Review of Systems:     Review of Systems   Constitutional: Negative for chills and fever     HENT: Positive for congestion, postnasal drip, rhinorrhea and sinus pressure  Negative for sore throat  Respiratory: Positive for cough  Negative for shortness of breath  Cardiovascular: Negative  Gastrointestinal: Positive for nausea  Negative for vomiting  Genitourinary: Negative  Musculoskeletal: Negative  Neurological: Negative  Problem List:     Patient Active Problem List   Diagnosis   • Actinic keratosis   • Seborrheic keratosis   • History of skin cancer   • Anxiety disorder   • Benign essential hypertension   • Erectile dysfunction of non-organic origin   • Esophageal reflux   • Mixed hyperlipidemia due to type 2 diabetes mellitus ([address] )   • Mitral valve disorder   • Rosacea   • Sleep apnea   • 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   • Thoracic radiculopathy   • Type 2 diabetes mellitus with stage 3a chronic kidney disease, without long-term current use of insulin (HCC)   • Stage 3a chronic kidney disease (HCC)        Objective:     /92 (BP Location: Left arm, Patient Position: Sitting, Cuff Size: Standard)   Pulse 85   Temp 99 1 °F (37 3 °C) (Oral)   Resp 16   Ht 5' 4" (1 626 m)   Wt 86 kg (189 lb 9 6 oz)   SpO2 98%   BMI 32 54 kg/m²     Physical Exam  Constitutional:       General: He is not in acute distress  Appearance: He is obese  HENT:      Head: Normocephalic and atraumatic  Right Ear: Tympanic membrane is injected  Left Ear: Tympanic membrane is injected  Nose: Congestion present  Right Turbinates: Enlarged  Left Turbinates: Enlarged  Comments: Bilateral ethmoid sinus tenderness     Mouth/Throat:      Pharynx: Oropharynx is clear  Posterior oropharyngeal erythema (post nasal drip) present  Eyes:      Conjunctiva/sclera: Conjunctivae normal    Cardiovascular:      Rate and Rhythm: Normal rate and regular rhythm  Pulses: Normal pulses  Heart sounds: Normal heart sounds     Pulmonary:      Effort: Pulmonary effort is normal  No respiratory distress  Breath sounds: Normal breath sounds  No wheezing or rhonchi  Abdominal:      Palpations: Abdomen is soft  Musculoskeletal:         General: Normal range of motion  Cervical back: Normal range of motion and neck supple  Lymphadenopathy:      Cervical: No cervical adenopathy  Skin:     General: Skin is warm and dry  Capillary Refill: Capillary refill takes less than 2 seconds  Neurological:      Mental Status: He is alert and oriented to person, place, and time  Psychiatric:         Mood and Affect: Mood normal          Behavior: Behavior normal          Thought Content: Thought content normal          Judgment: Judgment normal          I spent 15 minutes with this patient      [address] [place] [address]  [facility]

## 2023-01-07 LAB
FLUAV RNA RESP QL NAA+PROBE: NEGATIVE
FLUBV RNA RESP QL NAA+PROBE: NEGATIVE
SARS-COV-2 RNA RESP QL NAA+PROBE: NEGATIVE

## 2023-02-04 ENCOUNTER — NURSE TRIAGE (OUTPATIENT)
Dept: OTHER | Facility: OTHER | Age: 73
End: 2023-02-04

## 2023-02-04 DIAGNOSIS — E11.22 TYPE 2 DIABETES MELLITUS WITH STAGE 2 CHRONIC KIDNEY DISEASE, WITHOUT LONG-TERM CURRENT USE OF INSULIN (HCC): Chronic | ICD-10-CM

## 2023-02-04 DIAGNOSIS — N18.2 TYPE 2 DIABETES MELLITUS WITH STAGE 2 CHRONIC KIDNEY DISEASE, WITHOUT LONG-TERM CURRENT USE OF INSULIN (HCC): Chronic | ICD-10-CM

## 2023-02-04 RX ORDER — GLIMEPIRIDE 4 MG/1
4 TABLET ORAL 2 TIMES DAILY
Qty: 180 TABLET | Refills: 0 | Status: SHIPPED | OUTPATIENT
Start: 2023-02-04

## 2023-02-04 NOTE — TELEPHONE ENCOUNTER
Regarding: Urgent Medication Refill, Glimepiride  ----- Message from [facility] sent at 2/4/2023  2:50 PM EST -----  Medication Refill Request     Name glimepiride (AMARYL) 4 mg tablet [[id number]]  Dose/Frequency TAKE 1 TABLET (4 MG TOTAL) BY MOUTH 2 (TWO) TIMES A DAY  Quantity 180 tablet  Verified pharmacy   [x ] [facility] [address], [place], [address]  Phone # [phone]  Verified ordering Provider   [ x]  Does patient have enough for the next 3 days?  Yes [ ] No [ x]     Patient is in [place]

## 2023-02-04 NOTE — TELEPHONE ENCOUNTER
Reason for Disposition  • [1] Caller requesting a prescription renewal (no refills left), no triage required, AND [2] triager able to renew prescription per department policy    Answer Assessment - Initial Assessment Questions  1  DRUG NAME: "What medicine do you need to have refilled?"      Amaryl 4mg   2  REFILLS REMAINING: "How many refills are remaining?" (Note: The label on the medicine or pill bottle will show how many refills are remaining  If there are no refills remaining, then a renewal may be needed )      0    Rx refilled per proctocol      Protocols used: MEDICATION REFILL AND RENEWAL [facility]

## 2023-02-27 DIAGNOSIS — J06.9 ACUTE UPPER RESPIRATORY INFECTION, UNSPECIFIED: ICD-10-CM

## 2023-02-27 RX ORDER — FLUTICASONE PROPIONATE 50 MCG
SPRAY, SUSPENSION (ML) NASAL
Qty: 24 ML | Refills: 1 | Status: SHIPPED | OUTPATIENT
Start: 2023-02-27

## 2023-03-04 DIAGNOSIS — K21.9 GASTROESOPHAGEAL REFLUX DISEASE: ICD-10-CM

## 2023-03-04 DIAGNOSIS — I10 BENIGN ESSENTIAL HYPERTENSION: Chronic | ICD-10-CM

## 2023-03-04 RX ORDER — OMEPRAZOLE 20 MG/1
CAPSULE, DELAYED RELEASE ORAL
Qty: 90 CAPSULE | Refills: 1 | Status: SHIPPED | OUTPATIENT
Start: 2023-03-04

## 2023-03-05 RX ORDER — VERAPAMIL HYDROCHLORIDE 180 MG/1
CAPSULE, EXTENDED RELEASE ORAL
Qty: 60 CAPSULE | Refills: 5 | Status: SHIPPED | OUTPATIENT
Start: 2023-03-05

## 2023-03-09 DIAGNOSIS — E11.22 TYPE 2 DIABETES MELLITUS WITH STAGE 2 CHRONIC KIDNEY DISEASE, WITHOUT LONG-TERM CURRENT USE OF INSULIN (HCC): Chronic | ICD-10-CM

## 2023-03-09 DIAGNOSIS — N18.2 TYPE 2 DIABETES MELLITUS WITH STAGE 2 CHRONIC KIDNEY DISEASE, WITHOUT LONG-TERM CURRENT USE OF INSULIN (HCC): Chronic | ICD-10-CM

## 2023-03-09 RX ORDER — ATORVASTATIN CALCIUM 10 MG/1
TABLET, FILM COATED ORAL
Qty: 90 TABLET | Refills: 3 | Status: SHIPPED | OUTPATIENT
Start: 2023-03-09

## 2023-04-01 DIAGNOSIS — J30.2 SEASONAL ALLERGIES: ICD-10-CM

## 2023-04-01 RX ORDER — MONTELUKAST SODIUM 10 MG/1
TABLET ORAL
Qty: 90 TABLET | Refills: 3 | Status: SHIPPED | OUTPATIENT
Start: 2023-04-01

## 2023-04-02 DIAGNOSIS — N18.2 TYPE 2 DIABETES MELLITUS WITH STAGE 2 CHRONIC KIDNEY DISEASE, WITHOUT LONG-TERM CURRENT USE OF INSULIN (HCC): Chronic | ICD-10-CM

## 2023-04-02 DIAGNOSIS — E11.22 TYPE 2 DIABETES MELLITUS WITH STAGE 2 CHRONIC KIDNEY DISEASE, WITHOUT LONG-TERM CURRENT USE OF INSULIN (HCC): Chronic | ICD-10-CM

## 2023-04-02 RX ORDER — BLOOD-GLUCOSE METER
KIT MISCELLANEOUS
Qty: 100 STRIP | Refills: 3 | Status: SHIPPED | OUTPATIENT
Start: 2023-04-02

## 2023-04-06 ENCOUNTER — APPOINTMENT (OUTPATIENT)
Age: 73
End: 2023-04-06

## 2023-04-06 DIAGNOSIS — N18.31 TYPE 2 DIABETES MELLITUS WITH STAGE 3A CHRONIC KIDNEY DISEASE, WITHOUT LONG-TERM CURRENT USE OF INSULIN (HCC): Chronic | ICD-10-CM

## 2023-04-06 DIAGNOSIS — N18.31 HYPERTENSIVE KIDNEY DISEASE WITH STAGE 3A CHRONIC KIDNEY DISEASE (HCC): ICD-10-CM

## 2023-04-06 DIAGNOSIS — E11.22 TYPE 2 DIABETES MELLITUS WITH STAGE 3A CHRONIC KIDNEY DISEASE, WITHOUT LONG-TERM CURRENT USE OF INSULIN (HCC): Chronic | ICD-10-CM

## 2023-04-06 DIAGNOSIS — I12.9 HYPERTENSIVE KIDNEY DISEASE WITH STAGE 3A CHRONIC KIDNEY DISEASE (HCC): ICD-10-CM

## 2023-04-06 LAB
ALBUMIN SERPL BCP-MCNC: 3.8 G/DL (ref 3.5–5)
ALP SERPL-CCNC: 60 U/L (ref 46–116)
ALT SERPL W P-5'-P-CCNC: 25 U/L (ref 12–78)
ANION GAP SERPL CALCULATED.3IONS-SCNC: 5 MMOL/L (ref 4–13)
AST SERPL W P-5'-P-CCNC: 14 U/L (ref 5–45)
BILIRUB SERPL-MCNC: 0.51 MG/DL (ref 0.2–1)
BUN SERPL-MCNC: 20 MG/DL (ref 5–25)
CALCIUM SERPL-MCNC: 9.4 MG/DL (ref 8.3–10.1)
CHLORIDE SERPL-SCNC: 106 MMOL/L (ref 96–108)
CHOLEST SERPL-MCNC: 126 MG/DL
CO2 SERPL-SCNC: 27 MMOL/L (ref 21–32)
CREAT SERPL-MCNC: 1.17 MG/DL (ref 0.6–1.3)
CREAT UR-MCNC: 121 MG/DL
ERYTHROCYTE [DISTWIDTH] IN BLOOD BY AUTOMATED COUNT: 12.8 % (ref 11.6–15.1)
GFR SERPL CREATININE-BSD FRML MDRD: 61 ML/MIN/1.73SQ M
GLUCOSE P FAST SERPL-MCNC: 152 MG/DL (ref 65–99)
HCT VFR BLD AUTO: 44.2 % (ref 36.5–49.3)
HDLC SERPL-MCNC: 41 MG/DL
HGB BLD-MCNC: 14.9 G/DL (ref 12–17)
LDLC SERPL CALC-MCNC: 61 MG/DL (ref 0–100)
MCH RBC QN AUTO: 29.8 PG (ref 26.8–34.3)
MCHC RBC AUTO-ENTMCNC: 33.7 G/DL (ref 31.4–37.4)
MCV RBC AUTO: 88 FL (ref 82–98)
MICROALBUMIN UR-MCNC: 72.4 MG/L (ref 0–20)
MICROALBUMIN/CREAT 24H UR: 60 MG/G CREATININE (ref 0–30)
PLATELET # BLD AUTO: 260 THOUSANDS/UL (ref 149–390)
PMV BLD AUTO: 11.2 FL (ref 8.9–12.7)
POTASSIUM SERPL-SCNC: 3.7 MMOL/L (ref 3.5–5.3)
PROT SERPL-MCNC: 7.6 G/DL (ref 6.4–8.4)
RBC # BLD AUTO: 5 MILLION/UL (ref 3.88–5.62)
SODIUM SERPL-SCNC: 138 MMOL/L (ref 135–147)
TRIGL SERPL-MCNC: 119 MG/DL
WBC # BLD AUTO: 10.9 THOUSAND/UL (ref 4.31–10.16)

## 2023-04-07 LAB
EST. AVERAGE GLUCOSE BLD GHB EST-MCNC: 209 MG/DL
HBA1C MFR BLD: 8.9 %

## 2023-04-17 PROBLEM — Z79.4 CURRENT USE OF INSULIN (HCC): Status: ACTIVE | Noted: 2023-04-17

## 2023-04-18 ENCOUNTER — TELEPHONE (OUTPATIENT)
Dept: ADMINISTRATIVE | Facility: OTHER | Age: 73
End: 2023-04-18

## 2023-04-18 NOTE — LETTER
Diabetic Eye Exam Form    Date Requested: 23  Patient: Alona Crockett  Patient : 1950   Referring Provider: Jody Wilkinson DO      DIABETIC Eye Exam Date _______________________________      Type of Exam MUST be documented for Diabetic Eye Exams  Please CHECK ONE  Retinal Exam       Dilated Retinal Exam       OCT       Optomap-Iris Exam      Fundus Photography       Left Eye - Please check Retinopathy or No Retinopathy        Exam did show retinopathy    Exam did not show retinopathy       Right Eye - Please check Retinopathy or No Retinopathy       Exam did show retinopathy    Exam did not show retinopathy       Comments __________________________________________________________    Practice Providing Exam ______________________________________________    Exam Performed By (print name) _______________________________________      Provider Signature ___________________________________________________      These reports are needed for  compliance  Please fax this completed form and a copy of the Diabetic Eye Exam report to our office located at [place] [address] as soon as possible via Fax [phone] yvonne Weber: Phone [phone]  We thank you for your assistance in treating our mutual patient

## 2023-04-18 NOTE — TELEPHONE ENCOUNTER
----- Message from Ancelmo Houston sent at 4/17/2023  9:08 AM EDT -----  Regarding: HM DM EYE EXAM  04/17/23 9:09 AM    Hello, our patient Selene Cruz has had Diabetic Eye Exam completed/performed  Please assist in updating the patient chart by [address] (Ophthalmology) Phone: [phone] Fax: [phone] [place] [address]        Thank you,  Angela Smith  [facility]

## 2023-04-18 NOTE — LETTER
Diabetic Eye Exam Form    Date Requested: 23  Patient: Selene Cruz  Patient : 1950   Referring Provider: Kelvin Boeck, DO      DIABETIC Eye Exam Date _______________________________      Type of Exam MUST be documented for Diabetic Eye Exams  Please CHECK ONE  Retinal Exam       Dilated Retinal Exam       OCT       Optomap-Iris Exam      Fundus Photography       Left Eye - Please check Retinopathy or No Retinopathy        Exam did show retinopathy    Exam did not show retinopathy       Right Eye - Please check Retinopathy or No Retinopathy       Exam did show retinopathy    Exam did not show retinopathy       Comments __________________________________________________________    Practice Providing Exam ______________________________________________    Exam Performed By (print name) _______________________________________      Provider Signature ___________________________________________________      These reports are needed for  compliance  Please fax this completed form and a copy of the Diabetic Eye Exam report to our office located at [place] [address] as soon as possible via Fax [phone] yvonne Holden Men: Phone [phone]  We thank you for your assistance in treating our mutual patient

## 2023-04-19 NOTE — TELEPHONE ENCOUNTER
Upon review of the In Basket request and the patient's chart, initial outreach has been made via fax to facility  Please see Contacts section for details       Thank you  Radha Blackwell MA

## 2023-04-25 NOTE — TELEPHONE ENCOUNTER
As a follow-up, a second attempt has been made for outreach via fax to facility  Please see Contacts section for details      Thank you  Rey Solomon MA

## 2023-04-27 DIAGNOSIS — Z79.4 TYPE 2 DIABETES MELLITUS WITH HYPERGLYCEMIA, WITH LONG-TERM CURRENT USE OF INSULIN (HCC): ICD-10-CM

## 2023-04-27 DIAGNOSIS — E11.65 TYPE 2 DIABETES MELLITUS WITH HYPERGLYCEMIA, WITH LONG-TERM CURRENT USE OF INSULIN (HCC): ICD-10-CM

## 2023-04-27 RX ORDER — SITAGLIPTIN 100 MG/1
TABLET, FILM COATED ORAL
Qty: 90 TABLET | Refills: 1 | Status: SHIPPED | OUTPATIENT
Start: 2023-04-27

## 2023-04-27 NOTE — TELEPHONE ENCOUNTER
Upon review of the In Basket request we received same 4-4-22 report in     Any additional questions or concerns should be emailed to the Practice Liaisons via the appropriate education email address, please do not reply via In Basket      Thank you  Patricia Davenport MA

## 2023-04-28 DIAGNOSIS — L21.9 SEBORRHEIC DERMATITIS: ICD-10-CM

## 2023-04-28 DIAGNOSIS — I10 BENIGN ESSENTIAL HYPERTENSION: Chronic | ICD-10-CM

## 2023-04-28 RX ORDER — VERAPAMIL HYDROCHLORIDE 180 MG/1
CAPSULE, EXTENDED RELEASE ORAL
Qty: 180 CAPSULE | Refills: 3 | Status: SHIPPED | OUTPATIENT
Start: 2023-04-28

## 2023-04-29 RX ORDER — KETOCONAZOLE 20 MG/G
1 CREAM TOPICAL 2 TIMES DAILY
Qty: 30 G | Refills: 3 | Status: SHIPPED | OUTPATIENT
Start: 2023-04-29

## 2023-05-02 DIAGNOSIS — E11.22 TYPE 2 DIABETES MELLITUS WITH STAGE 2 CHRONIC KIDNEY DISEASE, WITHOUT LONG-TERM CURRENT USE OF INSULIN (HCC): Chronic | ICD-10-CM

## 2023-05-02 DIAGNOSIS — N18.2 TYPE 2 DIABETES MELLITUS WITH STAGE 2 CHRONIC KIDNEY DISEASE, WITHOUT LONG-TERM CURRENT USE OF INSULIN (HCC): Chronic | ICD-10-CM

## 2023-05-02 RX ORDER — GLIMEPIRIDE 4 MG/1
TABLET ORAL
Qty: 180 TABLET | Refills: 0 | Status: SHIPPED | OUTPATIENT
Start: 2023-05-02

## 2023-05-09 ENCOUNTER — OFFICE VISIT (OUTPATIENT)
Age: 73
End: 2023-05-09

## 2023-05-09 VITALS
HEIGHT: 64 IN | SYSTOLIC BLOOD PRESSURE: 120 MMHG | DIASTOLIC BLOOD PRESSURE: 76 MMHG | HEART RATE: 73 BPM | BODY MASS INDEX: 31.86 KG/M2 | OXYGEN SATURATION: 97 % | TEMPERATURE: 97.1 F | RESPIRATION RATE: 17 BRPM | WEIGHT: 186.6 LBS

## 2023-05-09 DIAGNOSIS — E66.9 CLASS 1 OBESITY WITH BODY MASS INDEX (BMI) OF 32.0 TO 32.9 IN ADULT, UNSPECIFIED OBESITY TYPE, UNSPECIFIED WHETHER SERIOUS COMORBIDITY PRESENT: ICD-10-CM

## 2023-05-09 DIAGNOSIS — J20.9 ACUTE BRONCHITIS, UNSPECIFIED ORGANISM: Primary | ICD-10-CM

## 2023-05-09 RX ORDER — AMOXICILLIN AND CLAVULANATE POTASSIUM 875; 125 MG/1; MG/1
1 TABLET, FILM COATED ORAL EVERY 12 HOURS SCHEDULED
Qty: 14 TABLET | Refills: 0 | Status: SHIPPED | OUTPATIENT
Start: 2023-05-09 | End: 2023-05-16

## 2023-05-09 RX ORDER — PREDNISOLONE ACETATE 10 MG/ML
SUSPENSION/ DROPS OPHTHALMIC
COMMUNITY
Start: 2023-04-26

## 2023-05-09 RX ORDER — NEOMYCIN SULFATE, POLYMYXIN B SULFATE, AND DEXAMETHASONE 3.5; 10000; 1 MG/G; [USP'U]/G; MG/G
OINTMENT OPHTHALMIC
COMMUNITY
Start: 2023-04-26

## 2023-05-09 RX ORDER — FLUTICASONE PROPIONATE AND SALMETEROL 100; 50 UG/1; UG/1
1 POWDER RESPIRATORY (INHALATION) 2 TIMES DAILY
Qty: 60 BLISTER | Refills: 0 | Status: SHIPPED | OUTPATIENT
Start: 2023-05-09 | End: 2023-05-18

## 2023-05-09 RX ORDER — BENZONATATE 200 MG/1
200 CAPSULE ORAL 3 TIMES DAILY PRN
Qty: 20 CAPSULE | Refills: 0 | Status: SHIPPED | OUTPATIENT
Start: 2023-05-09

## 2023-05-09 NOTE — PATIENT INSTRUCTIONS
Augmentin 1 tablet twice a day for 7 days  Advair 1 puff twice a day -rinse mouth after using  Tessalon perles 1 tablet three times a day as needed  Over the counter antihistamine such as claritin, allegra, and zyrtec (generics are ok)      Acute Bronchitis   AMBULATORY CARE:   Acute bronchitis  is swelling and irritation in your lungs  It is usually caused by a virus and most often happens in the winter  Bronchitis may also be caused by bacteria or by a chemical irritant, such as smoke  Common symptoms include the following:   Cough that lasts up to 3 weeks, stuffy nose    Hoarseness, sore throat    A fever and chills    Feeling more tired than usual, and body aches    Wheezing or pain when you breathe or cough    Seek care immediately if:   You cough up blood  Your lips or fingernails turn blue  You feel like you are not getting enough air when you breathe  Call your doctor if:   Your symptoms do not go away or get worse, even after treatment  Your cough does not get better within 4 weeks  You have questions or concerns about your condition or care  Medicines: You may  need any of the following:  Cough suppressants  decrease your urge to cough  Decongestants  help loosen mucus in your lungs and make it easier to cough up  This can help you breathe easier  Inhalers  may be given  Your healthcare provider may give you one or more inhalers to help you breathe easier and cough less  An inhaler gives your medicine to open your airways  Ask your healthcare provider to show you how to use your inhaler correctly  Antibiotics  may be given for up to 5 days if your bronchitis is caused by bacteria  Acetaminophen  decreases pain and fever  It is available without a doctor's order  Ask how much to take and how often to take it  Follow directions   Read the labels of all other medicines you are using to see if they also contain acetaminophen, or ask your doctor or pharmacist  Acetaminophen can cause liver damage if not taken correctly  NSAIDs  help decrease swelling and pain or fever  This medicine is available with or without a doctor's order  NSAIDs can cause stomach bleeding or kidney problems in certain people  If you take blood thinner medicine, always ask your healthcare provider if NSAIDs are safe for you  Always read the medicine label and follow directions  Take your medicine as directed  Contact your healthcare provider if you think your medicine is not helping or if you have side effects  Tell your provider if you are allergic to any medicine  Keep a list of the medicines, vitamins, and herbs you take  Include the amounts, and when and why you take them  Bring the list or the pill bottles to follow-up visits  Carry your medicine list with you in case of an emergency  Self-care:   Drink liquids as directed  You may need to drink more liquids than usual to stay hydrated  Ask how much liquid to drink each day and which liquids are best for you  Use a cool mist humidifier  to increase air moisture in your home  This may make it easier for you to breathe and help decrease your cough  Get more rest   Rest helps your body to heal  Slowly start to do more each day  Rest when you feel it is needed  Avoid irritants in the air  Avoid chemicals, fumes, and dust  Wear a face mask if you must work around dust or fumes  Stay inside on days when air pollution levels are high  If you have allergies, stay inside when pollen counts are high  Do not use aerosol products, such as spray-on deodorant, bug spray, and hair spray  Do not smoke or be around others who are smoking  Nicotine and other chemicals in cigarettes and cigars can cause lung damage  Ask your healthcare provider for information if you currently smoke and need help to quit  E-cigarettes or smokeless tobacco still contain nicotine  Talk to your healthcare provider before you use these products      Prevent acute bronchitis:       Ask about vaccines you may need  Get a flu vaccine each year as soon as recommended, usually in September or October  Ask your healthcare provider if you should also get a pneumonia or COVID-19 vaccine  Your healthcare provider can tell you if you should also get other vaccines, and when to get them  Prevent the spread of germs  You can decrease your risk for acute bronchitis and other illnesses by doing the following:    Wash your hands often with soap and water  Carry germ-killing hand lotion or gel with you  You can use the lotion or gel to clean your hands when soap and water are not available  Do not touch your eyes, nose, or mouth unless you have washed your hands first     Always cover your mouth when you cough to prevent the spread of germs  It is best to cough into a tissue or your shirt sleeve instead of into your hand  Ask those around you to cover their mouths when they cough  Try to avoid people who have a cold or the flu  If you are sick, stay away from others as much as possible  Follow up with your doctor as directed:  Write down questions you have so you will remember to ask them during your follow-up visits  © Copyright [facility] 2022 Information is for End User's use only and may not be sold, redistributed or otherwise used for commercial purposes  The above information is an  only  It is not intended as medical advice for individual conditions or treatments  Talk to your doctor, nurse or pharmacist before following any medical regimen to see if it is safe and effective for you

## 2023-05-09 NOTE — PROGRESS NOTES
[facility] - [facility] [place]    NAME: Lyudmila Land  AGE: 68 y o  SEX: male  : 1950     DATE: 2023     Assessment and Plan:     1  Acute bronchitis, unspecified organism  Afebrile  Positive congestion and rhinorrhea  Positive evidence of postnasal drip  Some coarse expiratory wheezes noted on auscultation  Congested cough productive for green sputum  Will start on Augmentin and Tessalon Perles for cough  Patient has undergone recent cataract repair which has been slow to heal   We will hold off on oral prednisone at this time and start Advair  Follow-up if symptoms do not improve or worsen  - amoxicillin-clavulanate (AUGMENTIN) 875-125 mg per tablet; Take 1 tablet by mouth every 12 (twelve) hours for 7 days  Dispense: 14 tablet; Refill: 0  - Fluticasone-Salmeterol (Advair Diskus) 100-50 mcg/dose inhaler; Inhale 1 puff 2 (two) times a day Rinse mouth after use  Dispense: 60 blister; Refill: 0  - benzonatate (TESSALON) 200 MG capsule; Take 1 capsule (200 mg total) by mouth 3 (three) times a day as needed for cough  Dispense: 20 capsule; Refill: 0    2  Class 1 obesity with body mass index (BMI) of 32 0 to 32 9 in adult, unspecified obesity type, unspecified whether serious comorbidity present  Patient has been making dietary changes and increasing exercise  He has had an approximate 10 pound weight loss over the last month  He is working on getting better management of his blood sugars  Patient encouraged to continue lifestyle modifications      No follow-ups on file  Chief Complaint:     Chief Complaint   Patient presents with   • Follow-up     Cough since 9 days         History of Present Illness:     Traci Sullivan presents to the office today for evaluation of URI symptoms that started approximately 9 days ago  Reports congestion and cough that has been productive of green sputum  Denies fever or chills  Denies other sick contacts       Review of Systems: "    Review of Systems   Constitutional: Negative  Negative for chills and fever  HENT: Positive for congestion, postnasal drip and rhinorrhea  Respiratory: Positive for cough  Negative for shortness of breath  Cardiovascular: Negative  Gastrointestinal: Negative  Neurological: Negative  Problem List:     Patient Active Problem List   Diagnosis   • Actinic keratosis   • Seborrheic keratosis   • History of skin cancer   • Anxiety disorder   • Benign essential hypertension   • Erectile dysfunction of non-organic origin   • Esophageal reflux   • Mixed hyperlipidemia due to type 2 diabetes mellitus ([address] )   • Mitral valve disorder   • Rosacea   • Sleep apnea   • 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   • Thoracic radiculopathy   • Type 2 diabetes mellitus with stage 3a chronic kidney disease, without long-term current use of insulin (HCC)   • Stage 3a chronic kidney disease (HCC)   • Current use of insulin (HCC)        Objective:     /76 (BP Location: Left arm, Patient Position: Sitting, Cuff Size: Standard)   Pulse 73   Temp (!) 97 1 °F (36 2 °C) (Temporal)   Resp 17   Ht 5' 4\" (1 626 m)   Wt 84 6 kg (186 lb 9 6 oz)   SpO2 97%   BMI 32 03 kg/m²     Physical Exam  Vitals and nursing note reviewed  Constitutional:       Appearance: Normal appearance  He is obese  HENT:      Head: Normocephalic and atraumatic  Right Ear: Tympanic membrane normal       Nose: Congestion and rhinorrhea present  Mouth/Throat:      Mouth: Mucous membranes are moist       Pharynx: Oropharynx is clear  Comments: Postnasal drip  Eyes:      Conjunctiva/sclera: Conjunctivae normal    Cardiovascular:      Rate and Rhythm: Normal rate and regular rhythm  Pulses: Normal pulses  Heart sounds: Normal heart sounds  Pulmonary:      Effort: Pulmonary effort is normal  No respiratory distress  Breath sounds: Wheezing present        Comments: Congested cough  Abdominal:      Palpations: " Abdomen is soft  Musculoskeletal:         General: Normal range of motion  Cervical back: Neck supple  Lymphadenopathy:      Cervical: No cervical adenopathy  Skin:     General: Skin is warm and dry  Capillary Refill: Capillary refill takes less than 2 seconds  Neurological:      Mental Status: He is alert  Psychiatric:         Mood and Affect: Mood normal          Behavior: Behavior normal          Thought Content: Thought content normal          Judgment: Judgment normal          I spent 20 minutes with this patient      [address] [place] [address], [address] [place] [address],[address] PRIMARY CARE [place]

## 2023-05-13 DIAGNOSIS — J06.9 ACUTE UPPER RESPIRATORY INFECTION, UNSPECIFIED: ICD-10-CM

## 2023-05-13 RX ORDER — FLUTICASONE PROPIONATE 50 MCG
SPRAY, SUSPENSION (ML) NASAL
Qty: 16 ML | Refills: 0 | Status: SHIPPED | OUTPATIENT
Start: 2023-05-13

## 2023-05-14 DIAGNOSIS — E11.22 TYPE 2 DIABETES MELLITUS WITH STAGE 3A CHRONIC KIDNEY DISEASE, WITHOUT LONG-TERM CURRENT USE OF INSULIN (HCC): Chronic | ICD-10-CM

## 2023-05-14 DIAGNOSIS — N18.31 TYPE 2 DIABETES MELLITUS WITH STAGE 3A CHRONIC KIDNEY DISEASE, WITHOUT LONG-TERM CURRENT USE OF INSULIN (HCC): Chronic | ICD-10-CM

## 2023-05-15 RX ORDER — INSULIN GLARGINE 100 [IU]/ML
12 INJECTION, SOLUTION SUBCUTANEOUS DAILY
Qty: 12 ML | Refills: 1 | Status: SHIPPED | OUTPATIENT
Start: 2023-05-15

## 2023-05-18 DIAGNOSIS — J20.9 ACUTE BRONCHITIS, UNSPECIFIED ORGANISM: Primary | ICD-10-CM

## 2023-05-18 RX ORDER — FLUTICASONE PROPIONATE AND SALMETEROL 100; 50 UG/1; UG/1
1 POWDER RESPIRATORY (INHALATION) 2 TIMES DAILY
Qty: 60 BLISTER | Refills: 3 | Status: SHIPPED | OUTPATIENT
Start: 2023-05-18

## 2023-05-30 DIAGNOSIS — N18.2 TYPE 2 DIABETES MELLITUS WITH STAGE 2 CHRONIC KIDNEY DISEASE, WITHOUT LONG-TERM CURRENT USE OF INSULIN (HCC): Chronic | ICD-10-CM

## 2023-05-30 DIAGNOSIS — E11.22 TYPE 2 DIABETES MELLITUS WITH STAGE 2 CHRONIC KIDNEY DISEASE, WITHOUT LONG-TERM CURRENT USE OF INSULIN (HCC): Chronic | ICD-10-CM

## 2023-05-30 RX ORDER — METFORMIN HYDROCHLORIDE 500 MG/1
1000 TABLET, EXTENDED RELEASE ORAL 2 TIMES DAILY WITH MEALS
Qty: 360 TABLET | Refills: 2 | Status: SHIPPED | OUTPATIENT
Start: 2023-05-30

## 2023-05-31 ENCOUNTER — OFFICE VISIT (OUTPATIENT)
Age: 73
End: 2023-05-31

## 2023-05-31 VITALS — HEIGHT: 64 IN | BODY MASS INDEX: 31.76 KG/M2 | WEIGHT: 186 LBS

## 2023-05-31 DIAGNOSIS — Z13.89 SCREENING FOR SKIN CONDITION: ICD-10-CM

## 2023-05-31 DIAGNOSIS — Z85.828 HISTORY OF SKIN CANCER: ICD-10-CM

## 2023-05-31 DIAGNOSIS — L57.0 ACTINIC KERATOSIS: Primary | ICD-10-CM

## 2023-05-31 DIAGNOSIS — L82.1 SEBORRHEIC KERATOSIS: ICD-10-CM

## 2023-05-31 NOTE — PATIENT INSTRUCTIONS
"Actinic Keratosis:  Patient advised lesions are precancers  These should resolve with cryosurgery if not to let us know sun block recommended  Seborrheic keratosis patient reassured these are normal growths we acquire with age no treatment needed  History of skin cancer in no recurrence nothing else atypical sunblock recommended follow-up in 6 months  Screening for dermatologic disorders nothing else of concern noted on complete exam follow-up in 6 months  Treatment with Cryotherapy    The doctor has treated your skin with nitrogen, which is 320 degrees Fahrenheit below zero  He has given the treated area \"frostbite  \"    Stinging should subside within a few hours  You can take Tylenol for pain, if needed  Over the next few days, it is normal if the area becomes reddened, a blood blister, or swollen with fluid  If the lesion treated was near the eye - you could get a swollen eye over the next few days  Do not panic! This is all temporary, and will resolve with time  There is no special treatment - just keep the area clean  Makeup and BandAids can be used, if preferred  When the area starts to dry up and peel off, using Vaseline can help healing  It usually takes up to a month for it to heal   Some lesions are recurrent and may require repeat treatments  If a lesion has not healed in one month, please don't hesitate to contact us  If you have any further questions that are not answered here, please call us  [phone]    Thank you for allowing us to care for you      "

## 2023-05-31 NOTE — PROGRESS NOTES
"[facility] Note     Patient Name: Sal Coronado  Encounter Date: May 31, 2023     Have you been cared for by a [facility] Dermatologist in the last 3 years and, if so, which description applies to you? Yes  I have been here within the last 3 years, and my medical history has NOT changed since that time  I am MALE/not capable of bearing children  REVIEW OF SYSTEMS:  Have you recently had or currently have any of the following? · No changes in my recent health  PAST MEDICAL HISTORY:  Have you personally ever had or currently have any of the following? If \"YES,\" then please provide more detail  · No changes in my medical history  FAMILY HISTORY:  Any \"first degree relatives\" (parent, brother, sister, or child) with the following? • No changes in my family's known health  PATIENT EXPERIENCE:    • Do you want the Dermatologist to perform a COMPLETE skin exam today including a clinical examination under the \"bra and underwear\" areas? Yes  • If necessary, do we have your permission to call and leave a detailed message on your Preferred Phone number that includes your specific medical information?   Yes      Allergies   Allergen Reactions   • Iodinated Contrast Media Anaphylaxis   • Shellfish Allergy - Food Allergy Anaphylaxis   • Shellfish-Derived Products - Food Allergy    • Shrimp Extract Allergy Skin Test - Food Allergy Anaphylaxis   • Empagliflozin Hives   • Farxiga [Dapagliflozin] Rash     Yeast infection   • Ozempic (0 25 Or 0 5 Mg-Dose) [Semaglutide(0 25 Or 0 5mg-Dos)] Rash      Current Outpatient Medications:   •  atorvastatin (LIPITOR) 10 mg tablet, TAKE 1 TABLET BY MOUTH EVERY DAY, Disp: 90 tablet, Rfl: 3  •  BD Pen Needle [place] U/F 32G X 4 MM MISC, ONCE DAILY AT BEDTIME UNDER THE SKIN, Disp: 100 each, Rfl: 2  •  benzonatate (TESSALON) 200 MG capsule, Take 1 capsule (200 mg total) by mouth 3 (three) times a day as needed for cough, Disp: 20 capsule, Rfl: 0  •  " clotrimazole-betamethasone (LOTRISONE) 1-0 05 % cream, APPLY TOPICALLY 2 (TWO) TIMES A DAY AS NEEDED (RASH), Disp: 30 g, Rfl: 2  •  cyclobenzaprine (FLEXERIL) 5 mg tablet, TAKE 1 TABLET BY MOUTH THREE TIMES A DAY AS NEEDED FOR MUSCLE SPASMS, Disp: 60 tablet, Rfl: 5  •  fluticasone (FLONASE) 50 mcg/act nasal spray, SPRAY 1 SPRAY INTO EACH NOSTRIL EVERY DAY, Disp: 16 mL, Rfl: 0  •  Fluticasone-Salmeterol (Advair Diskus) 100-50 mcg/dose inhaler, Inhale 1 puff 2 (two) times a day Rinse mouth after use , Disp: 60 blister, Rfl: 3  •  glimepiride (AMARYL) 4 mg tablet, TAKE 1 TABLET BY MOUTH 2 TIMES A DAY , Disp: 180 tablet, Rfl: 0  •  glucose blood (FREESTYLE LITE) test strip, PT TO TEST BLOOD SUGAR ONCE DAILY DX:E11 22 (Patient taking differently: Pt states he tests at least twice a day), Disp: 100 each, Rfl: 3  •  glucose blood (FREESTYLE LITE) test strip, USE TO TEST ONCE DAILY (DX: E11 65), Disp: 100 strip, Rfl: 3  •  Januvia 100 MG tablet, TAKE 1 TABLET BY MOUTH EVERY DAY, Disp: 90 tablet, Rfl: 1  •  ketoconazole (NIZORAL) 2 % cream, APPLY 1 APPLICATION TOPICALLY IN THE MORNING AND 1 APPLICATION IN THE EVENING   APPLIED TO FACE AND SCALP TWICE A DAY FOR 3 WEEKS REPEAT AS NEEDED , Disp: 30 g, Rfl: 3  •  Lantus SoloStar 100 units/mL SOPN, INJECT 12 UNITS UNDER THE SKIN DAILY, Disp: 12 mL, Rfl: 1  •  LORazepam (ATIVAN) 1 mg tablet, Take 1 tablet (1 mg total) by mouth as needed for anxiety, Disp: 90 tablet, Rfl: 0  •  metFORMIN (GLUCOPHAGE-XR) 500 mg 24 hr tablet, Take 2 tablets (1,000 mg total) by mouth 2 (two) times a day with meals, Disp: 360 tablet, Rfl: 2  •  montelukast (SINGULAIR) 10 mg tablet, TAKE 1 TABLET BY MOUTH EVERYDAY AT BEDTIME, Disp: 90 tablet, Rfl: 3  •  olopatadine (PATANOL) 0 1 % ophthalmic solution, Administer 1 drop to both eyes daily , Disp: , Rfl:   •  omeprazole (PriLOSEC) 20 mg delayed release capsule, TAKE 1 CAPSULE BY MOUTH EVERY DAY, Disp: 90 capsule, Rfl: 1  •  prednisoLONE acetate (PRED FORTE) 1 % ophthalmic suspension, INSTILL 1 DROP INTO RIGHT EYE FOUR TIMES A DAY AS DIRECTED USE AFTER SURGERY, Disp: , Rfl:   •  sildenafil (VIAGRA) 50 MG tablet, Take 1 tablet (50 mg total) by mouth daily as needed for erectile dysfunction, Disp: 6 tablet, Rfl: 9  •  sodium chloride (OCEAN) 0 65 % nasal spray, 1 spray into each nostril as needed for congestion , Disp: , Rfl:   •  triamcinolone (KENALOG) 0 025 % cream, Apply topically as needed , Disp: , Rfl:   •  verapamil (VERELAN PM) 180 MG 24 hr capsule, TAKE 1 CAPSULE BY MOUTH 2 TIMES A DAY , Disp: 180 capsule, Rfl: 3  •  bacitracin topical ointment 500 units/g topical ointment, Apply 1 large application topically 2 (two) times a day (Patient not taking: Reported on 4/18/2023), Disp: 28 g, Rfl: 0  •  EPINEPHrine (EPIPEN) 0 3 mg/0 3 mL SOAJ, Inject 0 3 mL (0 3 mg total) into a muscle once for 1 dose, Disp: 0 6 mL, Rfl: 0  •  neomycin-polymyxin-dexamethasone (MAXITROL) 0 35%-10,000 units/g-0 1%, APPLY 1 A SMALL AMOUNT ON EYELID EVERY NIGHT 1 WEEK, Disp: , Rfl:   •  scopolamine (TRANSDERM-SCOP) 1 mg/3 days TD 72 hr patch, Place 1 patch on the skin every third day (Patient not taking: Reported on 5/9/2023), Disp: 10 patch, Rfl: 0          • Whom besides the patient is providing clinical information about today's encounter?   o NO ADDITIONAL HISTORIAN (patient alone provided history)    Physical Exam and Assessment/Plan by Diagnosis:

## 2023-05-31 NOTE — PROGRESS NOTES
[address] [facility] [id number]  [phone]  [phone]     MRN: [id number] : 1950  Encounter: [id number]  Patient Information: Aurora Olvera  Chief complaint: 6 month check up    History of present illness: 51-year-old male with previous history of nonmelanoma skin cancer presents for overall checkup concerns regarding some sore spots on his face  Past Medical History:   Diagnosis Date   • Actinic keratosis    • Adhesive capsulitis of unspecified shoulder    • Anxiety    • Asthma    • Atopic dermatitis    • Cardiac disease    • Cellulitis of right upper extremity    • Cervical radiculopathy    • Chest pain    • Chronic maxillary sinusitis     last assessed 14   • Diabetes mellitus ([facility] [address] )    • Erectile dysfunction of non-organic origin    • Esophageal reflux    • Gout     last assessed 08/26/15   • Hearing loss, conductive    • Heart murmur     Mitral Valve disorder   • Henoch-Schonlein purpura ([facility] [address] ) 2019   • Herpes zoster    • History of paroxysmal supraventricular tachycardia    • Hypertension    • IgA mediated leukocytoclastic vasculitis ([facility] [address] ) 7/3/2019   • Mitral valve disorder    • Nasal congestion 19   • Neoplasm of skin, malignant    • Non-melanoma skin cancer     last assessed 10/26/17   • Nosebleed 19   • Obesity    • Palpitations     last assessed 03/05/15   • Paroxysmal supraventricular tachycardia ([facility] [address] ) 2014   • Plantar fasciitis     last assessed 05/28/15   • PSVT (paroxysmal supraventricular tachycardia) ([facility])    • Sciatica    • Sleep apnea    • Squamous cell carcinoma of skin of scalp    • Tinnitus, unspecified ear    • Type 2 diabetes mellitus with hyperglycemia ([facility] [address] )     last assessed    • Umbilical hernia    • Worms in stool     last assessed 08/26/15     Past Surgical History:   Procedure Laterality Date   • APPENDECTOMY     • COLECTOMY      Partial, Sigmoid   • COLONOSCOPY     • HEAD & NECK SKIN LESION EXCISIONAL BIOPSY      10/11/10 SCC --  10/17/11 SCC  --  Location Scalp   • HERNIA REPAIR     • NASAL/SINUS ENDOSCOPY N/A 2019    Procedure: FUNCTIONAL ENDOSCOPIC SINUS SURGERY (FESS) IMAGED GUIDED for control of epistaxis; Surgeon: Kelsie Mcdonald MD;  Location: [facility];  Service: ENT   • CO ESOPHAGOGASTRODUODENOSCOPY TRANSORAL DIAGNOSTIC N/A 2017    Procedure: ESOPHAGOGASTRODUODENOSCOPY (EGD); Surgeon: Ammy Drake MD;  Location: [facility];   Service: Gastroenterology   • SKIN BIOPSY     • TONSILLECTOMY       Social History   Social History     Substance and Sexual Activity   Alcohol Use Yes   • Alcohol/week: 1 0 standard drink of alcohol   • Types: 1 Standard drinks or equivalent per week    Comment: only occasionally     Social History     Substance and Sexual Activity   Drug Use No     Social History     Tobacco Use   Smoking Status Former   • Packs/day: 1 00   • Years:    • Total pack years:    • Types: Cigarettes   • Quit date: 1977   • Years since quittin 4   Smokeless Tobacco Never     Family History   Problem Relation Age of Onset   • Cancer Mother         Breast + Skin   • Diabetes Mother    • Heart disease Mother    • Heart attack Mother    • Breast cancer Mother         Adenocarcinoma   • Skin cancer Mother         Adenocarcinoma   • Heart failure Mother    • Diabetes type II Mother    • Varicose Veins Mother         Lower Extremities   • Cancer Father         Prostate + Skin   • Prostate cancer Father         Adenocarcinoma   • Skin cancer Father    • Lymphoma Father         Non-Hodgkin's   • Arthritis Family      Meds/Allergies   Allergies   Allergen Reactions   • Iodinated Contrast Media Anaphylaxis   • Shellfish Allergy - Food Allergy Anaphylaxis   • Shellfish-Derived Products - Food Allergy    • Shrimp Extract Allergy Skin Test - Food Allergy Anaphylaxis   • Empagliflozin Hives   • Farxiga [Dapagliflozin] Rash     Yeast infection   • Ozempic (0 25 Or 0 5 Mg-Dose) [Semaglutide(0 25 Or 0 5mg-Dos)] Rash       Meds:  Prior to Admission medications    Medication Sig Start Date End Date Taking? Authorizing Provider   atorvastatin (LIPITOR) 10 mg tablet TAKE 1 TABLET BY MOUTH EVERY DAY 3/9/23  Yes Rafa De La Garza, DO   BD Pen Needle Audelia U/F 32G X 4 MM MISC ONCE DAILY AT BEDTIME UNDER THE SKIN 12/20/22  Yes LELE Mcneil   benzonatate (TESSALON) 200 MG capsule Take 1 capsule (200 mg total) by mouth 3 (three) times a day as needed for cough 5/9/23  Yes Ethel Farida Antis, CRNP   clotrimazole-betamethasone (LOTRISONE) 1-0 05 % cream APPLY TOPICALLY 2 (TWO) TIMES A DAY AS NEEDED (RASH) 12/15/21  Yes David De La Garza DO   cyclobenzaprine (FLEXERIL) 5 mg tablet TAKE 1 TABLET BY MOUTH THREE TIMES A DAY AS NEEDED FOR MUSCLE SPASMS 8/4/22  Yes David De La Garza, DO   fluticasone (FLONASE) 50 mcg/act nasal spray SPRAY 1 SPRAY INTO EACH NOSTRIL EVERY DAY 5/13/23  Yes Ethel Farida Antis CRNP   Fluticasone-Salmeterol (Advair Diskus) 100-50 mcg/dose inhaler Inhale 1 puff 2 (two) times a day Rinse mouth after use  5/18/23  Yes Ethel Farida Antis, CRNP   glimepiride (AMARYL) 4 mg tablet TAKE 1 TABLET BY MOUTH 2 TIMES A DAY  5/2/23  Yes David De La Garza, DO   glucose blood (FREESTYLE LITE) test strip PT TO TEST BLOOD SUGAR ONCE DAILY DX:E11 22  Patient taking differently: Pt states he tests at least twice a day 3/29/21  Yes David De La Garza DO   glucose blood (FREESTYLE LITE) test strip USE TO TEST ONCE DAILY (DX: E11 65) 4/2/23  Yes David De La Garza DO   Januvia 100 MG tablet TAKE 1 TABLET BY MOUTH EVERY DAY 4/27/23  Yes David De La Garza DO   ketoconazole (NIZORAL) 2 % cream APPLY 1 APPLICATION TOPICALLY IN THE MORNING AND 1 APPLICATION IN THE EVENING   APPLIED TO FACE AND SCALP TWICE A DAY FOR 3 WEEKS REPEAT AS NEEDED  4/29/23  Yes Adrian Christine MD   Lantus SoloStar 100 units/mL SOPN INJECT 12 UNITS UNDER THE SKIN DAILY 5/15/23  Yes LELE Mcneil   LORazepam (ATIVAN) 1 mg tablet Take 1 tablet (1 mg total) by mouth as needed for anxiety 5/1/20  Yes Regine Triana MD   metFORMIN (GLUCOPHAGE-XR) 500 mg 24 hr tablet Take 2 tablets (1,000 mg total) by mouth 2 (two) times a day with meals 5/30/23  Yes David De La Garza DO   montelukast (SINGULAIR) 10 mg tablet TAKE 1 TABLET BY MOUTH EVERYDAY AT BEDTIME 4/1/23  Yes David De La Garza DO   olopatadine (PATANOL) 0 1 % ophthalmic solution Administer 1 drop to both eyes daily    Yes Historical Provider, MD   omeprazole (PriLOSEC) 20 mg delayed release capsule TAKE 1 CAPSULE BY MOUTH EVERY DAY 3/4/23  Yes David De La Garza DO   prednisoLONE acetate (PRED FORTE) 1 % ophthalmic suspension INSTILL 1 DROP INTO RIGHT EYE FOUR TIMES A DAY AS DIRECTED USE AFTER SURGERY 4/26/23  Yes Historical Provider, MD   sildenafil (VIAGRA) 50 MG tablet Take 1 tablet (50 mg total) by mouth daily as needed for erectile dysfunction 4/10/23  Yes LELE Corcoran   sodium chloride (OCEAN) 0 65 % nasal spray 1 spray into each nostril as needed for congestion    Yes Historical Provider, MD   triamcinolone (KENALOG) 0 025 % cream Apply topically as needed    Yes Historical Provider, MD   verapamil (VERELAN PM) 180 MG 24 hr capsule TAKE 1 CAPSULE BY MOUTH 2 TIMES A DAY  4/28/23  Yes David De La Garza DO   bacitracin topical ointment 500 units/g topical ointment Apply 1 large application topically 2 (two) times a day  Patient not taking: Reported on 4/18/2023 8/4/19   Donna Lopez MD   EPINEPHrine (EPIPEN) 0 3 mg/0 3 mL SOAJ Inject 0 3 mL (0 3 mg total) into a muscle once for 1 dose 11/22/21 5/9/23  LELE Pratt   neomycin-polymyxin-dexamethasone (MAXITROL) 0 35%-10,000 units/g-0 1% APPLY 1 A SMALL AMOUNT ON EYELID EVERY NIGHT 1 WEEK 4/26/23   Historical Provider, MD   scopolamine (TRANSDERM-SCOP) 1 mg/3 days TD 72 hr patch Place 1 patch on the skin every third day  Patient not taking: Reported on 5/9/2023 11/15/22   Rachael Reis DO "      Subjective:     Review of Systems:    General: negative for - chills, fatigue, fever,  weight gain or weight loss  Psychological: negative for - anxiety, behavioral disorder, concentration difficulties, decreased libido, depression, irritability, memory difficulties, mood swings, sleep disturbances or suicidal ideation  ENT: negative for - hearing difficulties , nasal congestion, nasal discharge, oral lesions, sinus pain, sneezing, sore throat  Allergy and Immunology: negative for - hives, insect bite sensitivity,  Hematological and Lymphatic: negative for - bleeding problems, blood clots,bruising, swollen lymph nodes  Endocrine: negative for - hair pattern changes, hot flashes, malaise/lethargy, mood swings, palpitations, polydipsia/polyuria, skin changes, temperature intolerance or unexpected weight change  Respiratory: negative for - cough, hemoptysis, orthopnea, shortness of breath, or wheezing  Cardiovascular: negative for - chest pain, dyspnea on exertion, edema,  Gastrointestinal: negative for - abdominal pain, nausea/vomiting  Genito-Urinary: negative for - dysuria, incontinence, irregular/heavy menses or urinary frequency/urgency  Musculoskeletal: negative for - gait disturbance, joint pain, joint stiffness, joint swelling, muscle pain, muscular weakness  Dermatological:  As in HPI  Neurological: negative for confusion, dizziness, headaches, impaired coordination/balance, memory loss, numbness/tingling, seizures, speech problems, tremors or weakness       Objective:   Ht 5' 4\" (1 626 m)   Wt 84 4 kg (186 lb)   BMI 31 93 kg/m²     Physical Exam:    General Appearance:    Alert, cooperative, no distress   Head:    Normocephalic, without obvious abnormality, atraumatic           Skin:   A full skin exam was performed including scalp, head scalp, eyes, ears, nose, lips, neck, chest, axilla, abdomen, back, buttocks, bilateral upper extremities, bilateral lower extremities, hands, feet, fingers, toes, " fingernails, and toenails scaling erythematous areas noted below normal keratotic papules with greasy stuck on appearance previous site of skin cancer well-healed without recurrence nothing else atypical noted include exam  Physical Exam  HENT:      Head:          Cryotherapy Procedure Note    Pre-operative Diagnosis: actinic keratosis    Plan:  1  Instructed to keep the area dry and clean thereafter  Apply petrolatum if area gets crusty  2  Recommended that the patient use acetaminophen  as needed for pain  Locations: Face scalp    Indications: Destruction of actinic keratosis x6    Patient informed of risks (permanent scarring, infection, light or dark discoloration, bleeding, infection, weakness, numbness and recurrence of the lesion) and benefits of the procedure and verbal informed consent obtained  The areas are treated with liquid nitrogen therapy, frozen until ice ball extended 2 mm beyond lesion, allowed to thaw, and treated again  The patient tolerated procedure well  The patient was instructed on post-op care, warned that there may be blister formation, redness and pain  Recommend OTC analgesia as needed for pain  Condition:  Stable    Complications:  none  Assessment:     1  Actinic keratosis        2  Seborrheic keratosis        3  History of skin cancer        4  Screening for skin condition              Plan:   Actinic Keratosis:  Patient advised lesions are precancers  These should resolve with cryosurgery if not to let us know sun block recommended    Seborrheic keratosis patient reassured these are normal growths we acquire with age no treatment needed  History of skin cancer in no recurrence nothing else atypical sunblock recommended follow-up in 6 months  Screening for dermatologic disorders nothing else of concern noted on complete exam follow-up in 6 months    Josh Silva MD  5/31/2023,9:26 AM    Portions of the record may have been created with voice recognition "software   Occasional wrong word or \"sound a like\" substitutions may have occurred due to the inherent limitations of voice recognition software   Read the chart carefully and recognize, using context, where substitutions have occurred    "

## 2023-06-17 DIAGNOSIS — K21.9 GASTROESOPHAGEAL REFLUX DISEASE: ICD-10-CM

## 2023-06-18 RX ORDER — OMEPRAZOLE 20 MG/1
CAPSULE, DELAYED RELEASE ORAL
Qty: 90 CAPSULE | Refills: 1 | Status: SHIPPED | OUTPATIENT
Start: 2023-06-18

## 2023-06-21 ENCOUNTER — OFFICE VISIT (OUTPATIENT)
Dept: CARDIOLOGY CLINIC | Facility: CLINIC | Age: 73
End: 2023-06-21
Payer: MEDICARE

## 2023-06-21 VITALS
DIASTOLIC BLOOD PRESSURE: 66 MMHG | SYSTOLIC BLOOD PRESSURE: 114 MMHG | OXYGEN SATURATION: 98 % | HEART RATE: 75 BPM | WEIGHT: 186 LBS | RESPIRATION RATE: 16 BRPM | BODY MASS INDEX: 31.76 KG/M2 | HEIGHT: 64 IN

## 2023-06-21 DIAGNOSIS — F41.9 ANXIETY DISORDER, UNSPECIFIED TYPE: ICD-10-CM

## 2023-06-21 DIAGNOSIS — I10 BENIGN ESSENTIAL HYPERTENSION: Primary | ICD-10-CM

## 2023-06-21 DIAGNOSIS — I47.1 PAROXYSMAL SUPRAVENTRICULAR TACHYCARDIA (HCC): ICD-10-CM

## 2023-06-21 DIAGNOSIS — E78.00 HYPERCHOLESTEROLEMIA: ICD-10-CM

## 2023-06-21 PROCEDURE — 99213 OFFICE O/P EST LOW 20 MIN: CPT | Performed by: INTERNAL MEDICINE

## 2023-06-21 NOTE — PROGRESS NOTES
[facility] [address] [address]  Cardiology Follow Up    Tu Barnes  1950  [id number]      1  Benign essential hypertension        2  Paroxysmal supraventricular tachycardia ([address] )        3  Hypercholesterolemia        4  Anxiety disorder, unspecified type            Chief Complaint   Patient presents with   • Follow-up       Interval History: Patient presents for follow-up visit  Patient denies any history of chest pain shortness of breath  Patient denies any history of leg edema or orthopnea PND  No history of presyncope syncope  Patient states compliance with the present list of medications  No history of palpitations  Patient does have history of SVT presently on verapamil twice a day  Patient has tried to decrease the dosage in the past but had recurrence of palpitations  Patient was seen by endocrinology recently      Patient Active Problem List   Diagnosis   • Actinic keratosis   • Seborrheic keratosis   • History of skin cancer   • Anxiety disorder   • Benign essential hypertension   • Erectile dysfunction of non-organic origin   • Esophageal reflux   • Mixed hyperlipidemia due to type 2 diabetes mellitus ([address] )   • Mitral valve disorder   • Rosacea   • Sleep apnea   • 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   • Thoracic radiculopathy   • Type 2 diabetes mellitus with stage 3a chronic kidney disease, without long-term current use of insulin (HCC)   • Stage 3a chronic kidney disease (HCC)   • Current use of insulin ([address] )     Past Medical History:   Diagnosis Date   • Actinic keratosis    • Adhesive capsulitis of unspecified shoulder    • Anxiety    • Asthma    • Atopic dermatitis    • Cardiac disease    • Cellulitis of right upper extremity    • Cervical radiculopathy    • Chest pain    • Chronic maxillary sinusitis     last assessed 01/21/14   • Diabetes mellitus ([address] )    • Erectile dysfunction of non-organic origin    • Esophageal reflux    • Gout     last assessed 08/26/15   • Hearing loss, conductive    • Heart murmur     Mitral Valve disorder   • Henoch-Schonlein purpura ([email or web address] ) 2019   • Herpes zoster    • History of paroxysmal supraventricular tachycardia    • Hypertension    • 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   • Mitral valve disorder    • Nasal congestion 19   • Neoplasm of skin, malignant    • Non-melanoma skin cancer     last assessed 10/26/17   • Nosebleed 19   • Obesity    • Palpitations     last assessed 03/05/15   • Paroxysmal supraventricular tachycardia ([place] [address] ) 2014   • Plantar fasciitis     last assessed 05/28/15   • PSVT (paroxysmal supraventricular tachycardia) ([facility])    • Sciatica    • Sleep apnea    • Squamous cell carcinoma of skin of scalp    • Tinnitus, unspecified ear    • Type 2 diabetes mellitus with hyperglycemia ([place] [address] )     last assessed    • Umbilical hernia    • Worms in stool     last assessed 08/26/15     Social History     Socioeconomic History   • Marital status: /Civil Union     Spouse name: Not on file   • Number of children: Not on file   • Years of education: Not on file   • Highest education level: Not on file   Occupational History   • Occupation: Working Part Time    Tobacco Use   • Smoking status: Former     Packs/day: 1 00     Years: 20 00     Total pack years: 20 00     Types: Cigarettes     Quit date: 1977     Years since quittin 4   • Smokeless tobacco: Never   Vaping Use   • Vaping Use: Never used   Substance and Sexual Activity   • Alcohol use:  Yes     Alcohol/week: 1 0 standard drink of alcohol     Types: 1 Standard drinks or equivalent per week     Comment: only occasionally   • Drug use: No   • Sexual activity: Yes     Partners: Female   Other Topics Concern   • Not on file   Social History Narrative    Active Advance Directive     Social Determinants of Health     Financial Resource Strain: Medium Risk (2023)    Overall Financial Resource Strain (CARDIA)    • Difficulty of Paying Living Expenses: Somewhat hard   Food Insecurity: Not on file   Transportation Needs: No Transportation Needs (4/18/2023)    PRAPARE - Transportation    • Lack of Transportation (Medical): No    • Lack of Transportation (Non-Medical): No   Physical Activity: Inactive (4/26/2021)    Exercise Vital Sign    • Days of Exercise per Week: 0 days    • Minutes of Exercise per Session: 0 min   Stress: No Stress Concern Present (5/9/2023)    Shahzad7 Alin Leal    • Feeling of Stress : Not at all   Social Connections: Not on file   Intimate Partner Violence: Not on file   Housing Stability: Not on file      Family History   Problem Relation Age of Onset   • Cancer Mother         Breast + Skin   • Diabetes Mother    • Heart disease Mother    • Heart attack Mother    • Breast cancer Mother         Adenocarcinoma   • Skin cancer Mother         Adenocarcinoma   • Heart failure Mother    • Diabetes type II Mother    • Varicose Veins Mother         Lower Extremities   • Cancer Father         Prostate + Skin   • Prostate cancer Father         Adenocarcinoma   • Skin cancer Father    • Lymphoma Father         Non-Hodgkin's   • Arthritis Family      Past Surgical History:   Procedure Laterality Date   • APPENDECTOMY     • COLECTOMY      Partial, Sigmoid   • COLONOSCOPY     • HEAD & NECK SKIN LESION EXCISIONAL BIOPSY      10/11/10 SCC --  10/17/11 SCC  --  Location Scalp   • HERNIA REPAIR     • NASAL/SINUS ENDOSCOPY N/A 8/6/2019    Procedure: FUNCTIONAL ENDOSCOPIC SINUS SURGERY (FESS) IMAGED GUIDED for control of epistaxis; Surgeon: Junior Stephens MD;  Location:  MAIN OR;  Service: ENT   • CT ESOPHAGOGASTRODUODENOSCOPY TRANSORAL DIAGNOSTIC N/A 8/9/2017    Procedure: ESOPHAGOGASTRODUODENOSCOPY (EGD); Surgeon: Maria L Angel MD;  Location: [facility];   Service: Gastroenterology   • SKIN BIOPSY     • TONSILLECTOMY Current Outpatient Medications:   •  atorvastatin (LIPITOR) 10 mg tablet, TAKE 1 TABLET BY MOUTH EVERY DAY, Disp: 90 tablet, Rfl: 3  •  BD Pen Needle Audelia U/F 32G X 4 MM MISC, ONCE DAILY AT BEDTIME UNDER THE SKIN, Disp: 100 each, Rfl: 2  •  benzonatate (TESSALON) 200 MG capsule, Take 1 capsule (200 mg total) by mouth 3 (three) times a day as needed for cough, Disp: 20 capsule, Rfl: 0  •  clotrimazole-betamethasone (LOTRISONE) 1-0 05 % cream, APPLY TOPICALLY 2 (TWO) TIMES A DAY AS NEEDED (RASH), Disp: 30 g, Rfl: 2  •  cyclobenzaprine (FLEXERIL) 5 mg tablet, TAKE 1 TABLET BY MOUTH THREE TIMES A DAY AS NEEDED FOR MUSCLE SPASMS, Disp: 60 tablet, Rfl: 5  •  EPINEPHrine (EPIPEN) 0 3 mg/0 3 mL SOAJ, Inject 0 3 mL (0 3 mg total) into a muscle once for 1 dose, Disp: 0 6 mL, Rfl: 0  •  fluticasone (FLONASE) 50 mcg/act nasal spray, SPRAY 1 SPRAY INTO EACH NOSTRIL EVERY DAY, Disp: 16 mL, Rfl: 0  •  Fluticasone-Salmeterol (Advair Diskus) 100-50 mcg/dose inhaler, Inhale 1 puff 2 (two) times a day Rinse mouth after use , Disp: 60 blister, Rfl: 3  •  glimepiride (AMARYL) 4 mg tablet, TAKE 1 TABLET BY MOUTH 2 TIMES A DAY , Disp: 180 tablet, Rfl: 0  •  glucose blood (FREESTYLE LITE) test strip, PT TO TEST BLOOD SUGAR ONCE DAILY DX:E11 22 (Patient taking differently: Pt states he tests at least twice a day), Disp: 100 each, Rfl: 3  •  glucose blood (FREESTYLE LITE) test strip, USE TO TEST ONCE DAILY (DX: E11 65), Disp: 100 strip, Rfl: 3  •  Januvia 100 MG tablet, TAKE 1 TABLET BY MOUTH EVERY DAY, Disp: 90 tablet, Rfl: 1  •  ketoconazole (NIZORAL) 2 % cream, APPLY 1 APPLICATION TOPICALLY IN THE MORNING AND 1 APPLICATION IN THE EVENING   APPLIED TO FACE AND SCALP TWICE A DAY FOR 3 WEEKS REPEAT AS NEEDED , Disp: 30 g, Rfl: 3  •  Lantus SoloStar 100 units/mL SOPN, INJECT 12 UNITS UNDER THE SKIN DAILY, Disp: 12 mL, Rfl: 1  •  LORazepam (ATIVAN) 1 mg tablet, Take 1 tablet (1 mg total) by mouth as needed for anxiety, Disp: 90 tablet, Rfl: 0  •  metFORMIN (GLUCOPHAGE-XR) 500 mg 24 hr tablet, Take 2 tablets (1,000 mg total) by mouth 2 (two) times a day with meals, Disp: 360 tablet, Rfl: 2  •  montelukast (SINGULAIR) 10 mg tablet, TAKE 1 TABLET BY MOUTH EVERYDAY AT BEDTIME, Disp: 90 tablet, Rfl: 3  •  neomycin-polymyxin-dexamethasone (MAXITROL) 0 35%-10,000 units/g-0 1%, APPLY 1 A SMALL AMOUNT ON EYELID EVERY NIGHT 1 WEEK, Disp: , Rfl:   •  olopatadine (PATANOL) 0 1 % ophthalmic solution, Administer 1 drop to both eyes daily , Disp: , Rfl:   •  omeprazole (PriLOSEC) 20 mg delayed release capsule, TAKE 1 CAPSULE BY MOUTH EVERY DAY, Disp: 90 capsule, Rfl: 1  •  prednisoLONE acetate (PRED FORTE) 1 % ophthalmic suspension, INSTILL 1 DROP INTO RIGHT EYE FOUR TIMES A DAY AS DIRECTED USE AFTER SURGERY, Disp: , Rfl:   •  sildenafil (VIAGRA) 50 MG tablet, Take 1 tablet (50 mg total) by mouth daily as needed for erectile dysfunction, Disp: 6 tablet, Rfl: 9  •  sodium chloride (OCEAN) 0 65 % nasal spray, 1 spray into each nostril as needed for congestion , Disp: , Rfl:   •  triamcinolone (KENALOG) 0 025 % cream, Apply topically as needed , Disp: , Rfl:   •  verapamil (VERELAN PM) 180 MG 24 hr capsule, TAKE 1 CAPSULE BY MOUTH 2 TIMES A DAY , Disp: 180 capsule, Rfl: 3  Allergies   Allergen Reactions   • Iodinated Contrast Media Anaphylaxis   • Shellfish Allergy - Food Allergy Anaphylaxis   • Shellfish-Derived Products - Food Allergy    • Shrimp Extract Allergy Skin Test - Food Allergy Anaphylaxis   • Empagliflozin Hives   • Farxiga [Dapagliflozin] Rash     Yeast infection   • Ozempic (0 25 Or 0 5 Mg-Dose) [Semaglutide(0 25 Or 0 5mg-Dos)] Rash       Labs:  No visits with results within 2 Month(s) from this visit     Latest known visit with results is:   Appointment on 04/06/2023   Component Date Value   • Hemoglobin A1C 04/06/2023 8 9 (H)    • EAG 04/06/2023 209    • WBC 04/06/2023 10 90 (H)    • RBC 04/06/2023 5 00    • Hemoglobin 04/06/2023 14 9    • Hematocrit "04/06/2023 44 2    • MCV 04/06/2023 88    • MCH 04/06/2023 29 8    • MCHC 04/06/2023 33 7    • RDW 04/06/2023 12 8    • Platelets 75/19/7403 260    • MPV 04/06/2023 11 2    • Sodium 04/06/2023 138    • Potassium 04/06/2023 3 7    • Chloride 04/06/2023 106    • CO2 04/06/2023 27    • ANION GAP 04/06/2023 5    • BUN 04/06/2023 20    • Creatinine 04/06/2023 1 17    • Glucose, Fasting 04/06/2023 152 (H)    • Calcium 04/06/2023 9 4    • AST 04/06/2023 14    • ALT 04/06/2023 25    • Alkaline Phosphatase 04/06/2023 60    • Total Protein 04/06/2023 7 6    • Albumin 04/06/2023 3 8    • Total Bilirubin 04/06/2023 0 51    • eGFR 04/06/2023 61    • Creatinine, Ur 04/06/2023 121 0    • Albumin,U,Random 04/06/2023 72 4 (H)    • Albumin Creat Ratio 04/06/2023 60 (H)    • Cholesterol 04/06/2023 126    • Triglycerides 04/06/2023 119    • HDL, Direct 04/06/2023 41    • LDL Calculated 04/06/2023 61      Imaging: No results found  Review of Systems:  Review of Systems  REVIEW OF SYSTEMS:  Constitutional:  Denies fever or chills   Eyes:  Denies change in visual acuity   HENT:  Denies nasal congestion or sore throat   Respiratory:  Denies cough or shortness of breath   Cardiovascular:  Denies chest pain or edema   GI:  Denies abdominal pain, nausea, vomiting, bloody stools or diarrhea   :  Denies dysuria, frequency, difficulty in micturition and nocturia  Musculoskeletal:  Denies back pain or joint pain   Neurologic:  Denies headache, focal weakness or sensory changes   Endocrine:  Denies polyuria or polydipsia   Lymphatic:  Denies swollen glands   Psychiatric:   anxiety    Physical Exam:    /66 (BP Location: Left arm, Patient Position: Sitting, Cuff Size: Standard)   Pulse 75   Resp 16   Ht 5' 4\" (1 626 m)   Wt 84 4 kg (186 lb)   SpO2 98%   BMI 31 93 kg/m²     Physical Exam   PHYSICAL EXAM:  General:  Patient is not in acute distress   Head: Normocephalic, Atraumatic    HEENT:  Both pupils normal-size atraumatic, " normocephalic, nonicteric  Neck:  JVP not raised  Trachea central  No carotid bruit  Respiratory:  normal breath sounds no crackles  no rhonchi  Cardiovascular:  Regular rate and rhythm no S3 no murmurs  GI:  Abdomen soft nontender  No organomegaly  Lymphatic:  No cervical or inguinal lymphadenopathy  Neurologic:  Patient is awake alert, oriented   Grossly nonfocal  Extremities no edema    Discussion/Summary:  Patient with multiple medical problems who seems to be doing reasonably well from cardiac standpoint  Previous studies reviewed with patient  Medications reviewed and possible side effects discussed  concepts of cardiovascular disease , signs and symptoms of heart disease  Dietary and risk factor modification reinforced  All questions answered  Safety measures reviewed  Patient advised to report any problems prompting medical attention  Symptoms to watch her from cardiac standpoint which would indicate the need for further cardiac evaluation discussed with patient  Discussed with patient regarding the dosage of verapamil which is twice a day  Patient has tried once a day in the past but had recurrence of palpitations  No evidence of bradycardia at this time  Follow-up with primary care physician and endocrinology  Follow-up in 1 year or earlier as needed  Patient is agreeable with the plan of care

## 2023-06-22 ENCOUNTER — CONSULT (OUTPATIENT)
Age: 73
End: 2023-06-22
Payer: MEDICARE

## 2023-06-22 VITALS
DIASTOLIC BLOOD PRESSURE: 76 MMHG | WEIGHT: 187.6 LBS | HEART RATE: 99 BPM | SYSTOLIC BLOOD PRESSURE: 134 MMHG | TEMPERATURE: 95.2 F | OXYGEN SATURATION: 94 % | HEIGHT: 64 IN | RESPIRATION RATE: 16 BRPM | BODY MASS INDEX: 32.03 KG/M2

## 2023-06-22 DIAGNOSIS — J45.991 COUGH VARIANT ASTHMA: ICD-10-CM

## 2023-06-22 DIAGNOSIS — Z01.818 PRE-OP EXAMINATION: Primary | ICD-10-CM

## 2023-06-22 DIAGNOSIS — J30.9 ALLERGIC RHINITIS, UNSPECIFIED SEASONALITY, UNSPECIFIED TRIGGER: ICD-10-CM

## 2023-06-22 PROCEDURE — 99214 OFFICE O/P EST MOD 30 MIN: CPT

## 2023-06-22 NOTE — PROGRESS NOTES
INTERNAL MEDICINE PRE-OPERATIVE EVALUATION  [facility]YS[facility] - [facility]    NAME[facility]  AGE: 68 y o  SEX: male  : 1950     DATE: 2023     Internal Medicine Pre-Operative Evaluation:     Chief Complaint: Pre-operative Evaluation     Surgery: OS Cataract Extraction, IOL PHACO  Anticipated Date of Surgery: 23  Referring Provider: Eliane Glover DO        History of Present Illness:     Briana Easton is a 68 y o  male who presents to the office today for a preoperative consultation at the request of surgeon, Dr Beatrice Zurita, who plans on performing OS Cataract Extraction, IOL PHACO  on 23  Planned anesthesia is MAC  Patient has a bleeding risk of: no recent abnormal bleeding, no remote history of abnormal bleeding and use of Ca-channel blockers (see med list)  Patient does not have objections to receiving blood products if needed  Current anti-platelet/anti-coagulation medications that the patient is prescribed includes: none        Assessment of Chronic Conditions:   - Diabetes Mellitus: HgbA1c 8 9   - Coronary Artery Disease: stable  - Hypertension: controlled     Assessment of Cardiac Risk:  · Denies unstable or severe angina or MI in the last 6 weeks or history of stent placement in the last year   · Denies decompensated heart failure (e g  New onset heart failure, NYHA functional class IV heart failure, or worsening existing heart failure)  · Reports significant arrhythmias such as high grade AV block, symptomatic ventricular arrhythmia, newly recognized ventricular tachycardia, supraventricular tachycardia with resting heart rate >100, or symptomatic bradycardia  · Denies severe heart valve disease including aortic stenosis or symptomatic mitral stenosis     Exercise Capacity:  · Able to walk 4 blocks without symptoms?: Yes  · Able to walk 2 flights without symptoms?: Yes    Prior Anesthesia Reactions: No     Personal history of venous thromboembolic disease? No    History of steroid use for >2 weeks within last year? No    STOP-BANG Sleep Apnea Screening Questionnaire:      Do you SNORE loudly (louder than talking or loud enough to be heard through closed doors)? No = 0 point   Do you often feel TIRED, fatigued, or sleepy during daytime? Yes = 1 point   Has anyone OBSERVED you stop breathing during your sleep? No = 0 point   Do you have or are you being treated for high blood pressure? Yes = 1 point   BMI more than 35 kg/m2? No = 0 point   AGE over 48years old? Yes = 1 point   NECK circumference > 16 inches (40 cm)? No = 0 point   Male GENDER? Yes = 1 point   TOTAL SCORE 4= INTERMEDIATE risk of BERTA       Review of Systems:     Review of Systems   Constitutional: Negative  HENT: Positive for congestion  Respiratory: Positive for cough (morning congestion)  Cardiovascular: Negative  Gastrointestinal: Negative  Genitourinary: Negative  Musculoskeletal: Negative  Skin: Negative  Neurological: Negative  Psychiatric/Behavioral: Negative  Problem List:     Patient Active Problem List   Diagnosis   • Actinic keratosis   • Seborrheic keratosis   • History of skin cancer   • Anxiety disorder   • Benign essential hypertension   • Erectile dysfunction of non-organic origin   • Esophageal reflux   • Mixed hyperlipidemia due to type 2 diabetes mellitus ([facility] )   • Mitral valve disorder   • Rosacea   • Sleep apnea   • 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   • Thoracic radiculopathy   • Type 2 diabetes mellitus with stage 3a chronic kidney disease, without long-term current use of insulin ([facility])   • Stage 3a chronic kidney disease ([facility] [facility] )   • Current use of insulin ([facility])        Allergies:      Allergies   Allergen Reactions   • Iodinated Contrast Media Anaphylaxis   • Shellfish Allergy - Food Allergy Anaphylaxis   • Shellfish-Derived Products - Food Allergy    • Shrimp Extract Allergy Skin Test - Food Allergy Anaphylaxis   • Empagliflozin Hives   • Farxiga [Dapagliflozin] Rash     Yeast infection   • Ozempic (0 25 Or 0 5 Mg-Dose) [Semaglutide(0 25 Or 0 5mg-Dos)] Rash        Current Medications:       Current Outpatient Medications:   •  atorvastatin (LIPITOR) 10 mg tablet, TAKE 1 TABLET BY MOUTH EVERY DAY, Disp: 90 tablet, Rfl: 3  •  BD Pen Needle Audelia U/F 32G X 4 MM MISC, ONCE DAILY AT BEDTIME UNDER THE SKIN, Disp: 100 each, Rfl: 2  •  benzonatate (TESSALON) 200 MG capsule, Take 1 capsule (200 mg total) by mouth 3 (three) times a day as needed for cough, Disp: 20 capsule, Rfl: 0  •  clotrimazole-betamethasone (LOTRISONE) 1-0 05 % cream, APPLY TOPICALLY 2 (TWO) TIMES A DAY AS NEEDED (RASH), Disp: 30 g, Rfl: 2  •  cyclobenzaprine (FLEXERIL) 5 mg tablet, TAKE 1 TABLET BY MOUTH THREE TIMES A DAY AS NEEDED FOR MUSCLE SPASMS, Disp: 60 tablet, Rfl: 5  •  fluticasone (FLONASE) 50 mcg/act nasal spray, SPRAY 1 SPRAY INTO EACH NOSTRIL EVERY DAY, Disp: 16 mL, Rfl: 0  •  Fluticasone-Salmeterol (Advair Diskus) 100-50 mcg/dose inhaler, Inhale 1 puff 2 (two) times a day Rinse mouth after use , Disp: 60 blister, Rfl: 3  •  glimepiride (AMARYL) 4 mg tablet, TAKE 1 TABLET BY MOUTH 2 TIMES A DAY , Disp: 180 tablet, Rfl: 0  •  glucose blood (FREESTYLE LITE) test strip, USE TO TEST ONCE DAILY (DX: E11 65), Disp: 100 strip, Rfl: 3  •  Januvia 100 MG tablet, TAKE 1 TABLET BY MOUTH EVERY DAY, Disp: 90 tablet, Rfl: 1  •  ketoconazole (NIZORAL) 2 % cream, APPLY 1 APPLICATION TOPICALLY IN THE MORNING AND 1 APPLICATION IN THE EVENING   APPLIED TO FACE AND SCALP TWICE A DAY FOR 3 WEEKS REPEAT AS NEEDED , Disp: 30 g, Rfl: 3  •  Lantus SoloStar 100 units/mL SOPN, INJECT 12 UNITS UNDER THE SKIN DAILY, Disp: 12 mL, Rfl: 1  •  LORazepam (ATIVAN) 1 mg tablet, Take 1 tablet (1 mg total) by mouth as needed for anxiety, Disp: 90 tablet, Rfl: 0  •  metFORMIN (GLUCOPHAGE-XR) 500 mg 24 hr tablet, Take 2 tablets (1,000 mg total) by mouth 2 (two) times a day with meals, Disp: 360 tablet, Rfl: 2  •  montelukast (SINGULAIR) 10 mg tablet, TAKE 1 TABLET BY MOUTH EVERYDAY AT BEDTIME, Disp: 90 tablet, Rfl: 3  •  neomycin-polymyxin-dexamethasone (MAXITROL) 0 35%-10,000 units/g-0 1%, APPLY 1 A SMALL AMOUNT ON EYELID EVERY NIGHT 1 WEEK, Disp: , Rfl:   •  olopatadine (PATANOL) 0 1 % ophthalmic solution, Administer 1 drop to both eyes daily , Disp: , Rfl:   •  omeprazole (PriLOSEC) 20 mg delayed release capsule, TAKE 1 CAPSULE BY MOUTH EVERY DAY, Disp: 90 capsule, Rfl: 1  •  sildenafil (VIAGRA) 50 MG tablet, Take 1 tablet (50 mg total) by mouth daily as needed for erectile dysfunction, Disp: 6 tablet, Rfl: 9  •  sodium chloride (OCEAN) 0 65 % nasal spray, 1 spray into each nostril as needed for congestion , Disp: , Rfl:   •  triamcinolone (KENALOG) 0 025 % cream, Apply topically as needed , Disp: , Rfl:   •  verapamil (VERELAN PM) 180 MG 24 hr capsule, TAKE 1 CAPSULE BY MOUTH 2 TIMES A DAY , Disp: 180 capsule, Rfl: 3  •  EPINEPHrine (EPIPEN) 0 3 mg/0 3 mL SOAJ, Inject 0 3 mL (0 3 mg total) into a muscle once for 1 dose, Disp: 0 6 mL, Rfl: 0  •  glucose blood (FREESTYLE LITE) test strip, PT TO TEST BLOOD SUGAR ONCE DAILY DX:E11 22 (Patient taking differently: Pt states he tests at least twice a day), Disp: 100 each, Rfl: 3  •  prednisoLONE acetate (PRED FORTE) 1 % ophthalmic suspension, INSTILL 1 DROP INTO RIGHT EYE FOUR TIMES A DAY AS DIRECTED USE AFTER SURGERY (Patient not taking: Reported on 6/22/2023), Disp: , Rfl:      Past History:     Past Medical History:   Diagnosis Date   • Actinic keratosis    • Adhesive capsulitis of unspecified shoulder    • Anxiety    • Asthma    • Atopic dermatitis    • Cardiac disease    • Cellulitis of right upper extremity    • Cervical radiculopathy    • Chest pain    • Chronic maxillary sinusitis     last assessed 01/21/14   • Diabetes mellitus ([facility] [address] )    • Erectile dysfunction of non-organic origin    • Esophageal reflux    • Gout     last assessed 08/26/15   • Hearing loss, conductive    • Heart murmur     Mitral Valve disorder   • Henoch-Schonlein purpura ([facility] [address] ) 8/6/2019   • Herpes zoster    • History of paroxysmal supraventricular tachycardia    • Hypertension    • IgA mediated leukocytoclastic vasculitis [facility] 7/3/2019   • Mitral valve disorder    • Nasal congestion 4/18/19   • Neoplasm of skin, malignant    • Non-melanoma skin cancer     last assessed 10/26/17   • Nosebleed 4/18/19   • Obesity    • Palpitations     last assessed 03/05/15   • Paroxysmal supraventricular tachycardia ([facility] [address] [address] ) 1/21/2014   • Plantar fasciitis     last assessed 05/28/15   • PSVT (paroxysmal supraventricular tachycardia) ([facility])    • Sciatica    • Sleep apnea    • Squamous cell carcinoma of skin of scalp    • Tinnitus, unspecified ear    • Type 2 diabetes mellitus with hyperglycemia ([facility] )     last assessed 13/14/48   • Umbilical hernia    • Worms in stool     last assessed 08/26/15        Past Surgical History:   Procedure Laterality Date   • APPENDECTOMY     • COLECTOMY      Partial, Sigmoid   • COLONOSCOPY     • HEAD & NECK SKIN LESION EXCISIONAL BIOPSY      10/11/10 SCC --  10/17/11 SCC  --  Location Scalp   • HERNIA REPAIR     • NASAL/SINUS ENDOSCOPY N/A 8/6/2019    Procedure: FUNCTIONAL ENDOSCOPIC SINUS SURGERY (FESS) IMAGED GUIDED for control of epistaxis; Surgeon: Oneyda Guzman MD;  Location:  MAIN OR;  Service: ENT   • DC ESOPHAGOGASTRODUODENOSCOPY TRANSORAL DIAGNOSTIC N/A 8/9/2017    Procedure: ESOPHAGOGASTRODUODENOSCOPY (EGD); Surgeon: Brando Honeycutt MD;  Location: [facility];   Service: Gastroenterology   • SKIN BIOPSY     • TONSILLECTOMY          Family History   Problem Relation Age of Onset   • Cancer Mother         Breast + Skin   • Diabetes Mother    • Heart disease Mother    • Heart attack Mother    • Breast cancer Mother         Adenocarcinoma   • Skin cancer Mother         Adenocarcinoma   • Heart failure Mother    • Diabetes type II Mother    • Varicose Veins Mother Lower Extremities   • Cancer Father         Prostate + Skin   • Prostate cancer Father         Adenocarcinoma   • Skin cancer Father    • Lymphoma Father         Non-Hodgkin's   • Arthritis Family         Social History     Socioeconomic History   • Marital status: /Civil Union     Spouse name: Not on file   • Number of children: Not on file   • Years of education: Not on file   • Highest education level: Not on file   Occupational History   • Occupation: Working Part Time    Tobacco Use   • Smoking status: Former     Packs/day: 1 00     Years: 20 00     Total pack years: 20 00     Types: Cigarettes     Quit date: 1977     Years since quittin 5   • Smokeless tobacco: Never   Vaping Use   • Vaping Use: Never used   Substance and Sexual Activity   • Alcohol use: Yes     Alcohol/week: 1 0 standard drink of alcohol     Types: 1 Standard drinks or equivalent per week     Comment: only occasionally   • Drug use: No   • Sexual activity: Yes     Partners: Female   Other Topics Concern   • Not on file   Social History Narrative    Active Advance Directive     Social Determinants of Health     Financial Resource Strain: Medium Risk (2023)    Overall Financial Resource Strain (CARDIA)    • Difficulty of Paying Living Expenses: Somewhat hard   Food Insecurity: Not on file   Transportation Needs: No Transportation Needs (2023)    PRAPARE - Transportation    • Lack of Transportation (Medical): No    • Lack of Transportation (Non-Medical):  No   Physical Activity: Inactive (2021)    Exercise Vital Sign    • Days of Exercise per Week: 0 days    • Minutes of Exercise per Session: 0 min   Stress: No Stress Concern Present (2023)    [address]    • Feeling of Stress : Not at all   Social Connections: Not on file   Intimate Partner Violence: Not on file   Housing Stability: Not on file        Physical Exam:      /76 (BP Location: "Left arm, Patient Position: Sitting, Cuff Size: Standard)   Pulse 99   Temp (!) 95 2 °F (35 1 °C) (Tympanic)   Resp 16   Ht 5' 4\" (1 626 m)   Wt 85 1 kg (187 lb 9 6 oz)   SpO2 94%   BMI 32 20 kg/m²     Physical Exam  Vitals and nursing note reviewed  Constitutional:       General: He is not in acute distress  Appearance: He is obese  HENT:      Head: Normocephalic and atraumatic  Right Ear: Tympanic membrane normal       Left Ear: Tympanic membrane normal       Nose: Congestion present  Mouth/Throat:      Mouth: Mucous membranes are moist       Pharynx: Oropharynx is clear  No oropharyngeal exudate  Eyes:      Conjunctiva/sclera: Conjunctivae normal       Pupils: Pupils are equal, round, and reactive to light  Cardiovascular:      Rate and Rhythm: Normal rate and regular rhythm  Pulses: Normal pulses  Heart sounds: Normal heart sounds  Pulmonary:      Effort: Pulmonary effort is normal  No respiratory distress  Breath sounds: Normal breath sounds  Abdominal:      General: Bowel sounds are normal       Palpations: Abdomen is soft  Tenderness: There is no abdominal tenderness  Musculoskeletal:         General: Normal range of motion  Cervical back: Neck supple  Lymphadenopathy:      Cervical: No cervical adenopathy  Skin:     General: Skin is warm and dry  Capillary Refill: Capillary refill takes less than 2 seconds  Neurological:      General: No focal deficit present  Mental Status: He is alert and oriented to person, place, and time  Psychiatric:         Mood and Affect: Mood normal          Behavior: Behavior normal          Thought Content: Thought content normal          Judgment: Judgment normal            Data:     Pre-operative work-up    Laboratory Results: I have personally reviewed the pertinent laboratory results/reports     EKG: n/a    Chest x-ray: n/a           Assessment:     No diagnosis found       Plan:     68 y o  male " with planned surgery: OS Cataract Extraction, IOL PHACO     Cardiac Risk Estimation: per the Revised Cardiac Risk Index (Circ  [id number], 1999), the patient's risk factors for cardiac complications include on insulin, putting him in: RCI RISK CLASS II (1 risk factor, risk of major cardiac compl  appr  1 3%)  1  Further preoperative workup as follows:   - None; no further preoperative work-up is required    2  Medication Management/Recommendations:   - Patient has been instructed to avoid herbs or non-directed vitamins the week prior to surgery to ensure no drug interactions with perioperative surgical and anesthetic medications  - Patient should continue antihypertensive medications up through and including the day of surgery  - Insulin Management: hold morning oral antidiabetic medications    3  Prophylaxis for cardiac events with perioperative beta-blockers: not indicated  4  Patient requires further consultation with: None    Clearance  Patient is CLEARED for surgery without any additional cardiac testing  I spent 30 minutes with this patient      Jason Triplett, [address] PRIMARY CARE [address] [address]  Phone#  [phone]  Fax#  [phone]

## 2023-06-22 NOTE — PATIENT INSTRUCTIONS
- None, continue medication regimen including morning of surgery, with sip of water  - Patient has been instructed to avoid herbs or non-directed vitamins the week prior to surgery to ensure no drug interactions with perioperative surgical and anesthetic medications  - Patient should continue antihypertensive medications up through and including the day of surgery  - Insulin Management: Lantus as per endocrinologist- hold other diabetic medications morining of surgery  - Patient has been instructed to avoid aspirin containing medications or non-steroidal anti-inflammatory drugs for the week preceding surgery

## 2023-07-05 LAB
LEFT EYE DIABETIC RETINOPATHY: POSITIVE
RIGHT EYE DIABETIC RETINOPATHY: NORMAL

## 2023-07-19 ENCOUNTER — APPOINTMENT (OUTPATIENT)
Age: 73
End: 2023-07-19
Payer: MEDICARE

## 2023-07-19 DIAGNOSIS — Z79.4 CURRENT USE OF INSULIN (HCC): ICD-10-CM

## 2023-07-19 DIAGNOSIS — N18.31 TYPE 2 DIABETES MELLITUS WITH STAGE 3A CHRONIC KIDNEY DISEASE, WITHOUT LONG-TERM CURRENT USE OF INSULIN (HCC): Chronic | ICD-10-CM

## 2023-07-19 DIAGNOSIS — J45.991 COUGH VARIANT ASTHMA: ICD-10-CM

## 2023-07-19 DIAGNOSIS — J30.9 ALLERGIC RHINITIS, UNSPECIFIED SEASONALITY, UNSPECIFIED TRIGGER: ICD-10-CM

## 2023-07-19 DIAGNOSIS — E11.22 TYPE 2 DIABETES MELLITUS WITH STAGE 3A CHRONIC KIDNEY DISEASE, WITHOUT LONG-TERM CURRENT USE OF INSULIN (HCC): Chronic | ICD-10-CM

## 2023-07-19 LAB
ANION GAP SERPL CALCULATED.3IONS-SCNC: 4 MMOL/L
BUN SERPL-MCNC: 22 MG/DL (ref 5–25)
CALCIUM SERPL-MCNC: 9.5 MG/DL (ref 8.3–10.1)
CHLORIDE SERPL-SCNC: 108 MMOL/L (ref 96–108)
CO2 SERPL-SCNC: 29 MMOL/L (ref 21–32)
CREAT SERPL-MCNC: 1.17 MG/DL (ref 0.6–1.3)
EST. AVERAGE GLUCOSE BLD GHB EST-MCNC: 151 MG/DL
GFR SERPL CREATININE-BSD FRML MDRD: 61 ML/MIN/1.73SQ M
GLUCOSE P FAST SERPL-MCNC: 85 MG/DL (ref 65–99)
HBA1C MFR BLD: 6.9 %
POTASSIUM SERPL-SCNC: 4.4 MMOL/L (ref 3.5–5.3)
SODIUM SERPL-SCNC: 141 MMOL/L (ref 135–147)

## 2023-07-19 PROCEDURE — 80048 BASIC METABOLIC PNL TOTAL CA: CPT

## 2023-07-19 PROCEDURE — 86008 ALLG SPEC IGE RECOMB EA: CPT

## 2023-07-19 PROCEDURE — 36415 COLL VENOUS BLD VENIPUNCTURE: CPT

## 2023-07-19 PROCEDURE — 82785 ASSAY OF IGE: CPT

## 2023-07-19 PROCEDURE — 83036 HEMOGLOBIN GLYCOSYLATED A1C: CPT

## 2023-07-19 PROCEDURE — 86003 ALLG SPEC IGE CRUDE XTRC EA: CPT

## 2023-07-20 LAB
A ALTERNATA IGE QN: <0.1 KUA/I
A FUMIGATUS IGE QN: <0.1 KUA/I
ALMOND IGE QN: <0.1 KUA/I
BERMUDA GRASS IGE QN: <0.1 KUA/I
BOXELDER IGE QN: <0.1 KUA/I
C HERBARUM IGE QN: <0.1 KUA/I
CASHEW NUT IGE QN: <0.1 KUA/I
CAT DANDER IGE QN: <0.1 KUA/I
CMN PIGWEED IGE QN: <0.1 KUA/I
CODFISH IGE QN: <0.1 KUA/I
COMMON RAGWEED IGE QN: <0.1 KUA/I
COTTONWOOD IGE QN: <0.1 KUA/I
D FARINAE IGE QN: <0.1 KUA/I
D PTERONYSS IGE QN: <0.1 KUA/I
DOG DANDER IGE QN: <0.1 KUA/I
EGG WHITE IGE QN: 0.13 KUA/I
GLUTEN IGE QN: <0.1 KUA/I
HAZELNUT IGE QN: <0.1 KUA/L
LONDON PLANE IGE QN: <0.1 KUA/I
MILK IGE QN: <0.1 KUA/I
MOUSE URINE PROT IGE QN: <0.1 KUA/I
MT JUNIPER IGE QN: <0.1 KUA/I
MUGWORT IGE QN: <0.1 KUA/I
OVALB IGE QN: <0.1 KAU/I
OVOMUCOID IGE QN: <0.1 KAU/I
P NOTATUM IGE QN: <0.1 KUA/I
PEANUT IGE QN: <0.1 KUA/I
ROACH IGE QN: 0.14 KUA/I
SALMON IGE QN: <0.1 KUA/I
SCALLOP IGE QN: <0.1 KUA/L
SESAME SEED IGE QN: <0.1 KUA/I
SHEEP SORREL IGE QN: <0.1 KUA/I
SHRIMP IGE QN: <0.1 KUA/L
SILVER BIRCH IGE QN: <0.1 KUA/I
SOYBEAN IGE QN: <0.1 KUA/I
TIMOTHY IGE QN: <0.1 KUA/I
TOTAL IGE SMQN RAST: 73.3 KU/L (ref 0–113)
TOTAL IGE SMQN RAST: 74.3 KU/L (ref 0–113)
TUNA IGE QN: <0.1 KUA/I
WALNUT IGE QN: <0.1 KUA/I
WALNUT IGE QN: <0.1 KUA/I
WHEAT IGE QN: <0.1 KUA/I
WHITE ASH IGE QN: <0.1 KUA/I
WHITE ELM IGE QN: <0.1 KUA/I
WHITE MULBERRY IGE QN: <0.1 KUA/I
WHITE OAK IGE QN: <0.1 KUA/I

## 2023-07-30 DIAGNOSIS — E11.22 TYPE 2 DIABETES MELLITUS WITH STAGE 2 CHRONIC KIDNEY DISEASE, WITHOUT LONG-TERM CURRENT USE OF INSULIN (HCC): Chronic | ICD-10-CM

## 2023-07-30 DIAGNOSIS — N18.2 TYPE 2 DIABETES MELLITUS WITH STAGE 2 CHRONIC KIDNEY DISEASE, WITHOUT LONG-TERM CURRENT USE OF INSULIN (HCC): Chronic | ICD-10-CM

## 2023-07-30 DIAGNOSIS — L21.9 SEBORRHEIC DERMATITIS: ICD-10-CM

## 2023-07-30 RX ORDER — METFORMIN HYDROCHLORIDE 500 MG/1
1000 TABLET, EXTENDED RELEASE ORAL 2 TIMES DAILY WITH MEALS
Qty: 360 TABLET | Refills: 0 | Status: SHIPPED | OUTPATIENT
Start: 2023-07-30

## 2023-07-31 ENCOUNTER — TELEPHONE (OUTPATIENT)
Dept: ADMINISTRATIVE | Facility: OTHER | Age: 73
End: 2023-07-31

## 2023-07-31 RX ORDER — KETOCONAZOLE 20 MG/G
1 CREAM TOPICAL 2 TIMES DAILY
Qty: 30 G | Refills: 3 | Status: SHIPPED | OUTPATIENT
Start: 2023-07-31

## 2023-07-31 NOTE — TELEPHONE ENCOUNTER
----- Message from Sina Mistry DO sent at 7/31/2023  9:00 AM EDT -----  Regarding: DM eye exam  Contact: [phone]   07/31/23 9:00 AM    Hello, our patient Citlali Barksdale has had Diabetic Eye Exam completed/performed. Please assist in updating the patient chart by making an External outreach to [facility] eye [facility] facility located in [facility]. The date of service is 2023.     Thank you,  Sina Mistry DO   PRIMARY CARE [place]

## 2023-07-31 NOTE — LETTER
Diabetic Eye Exam Form    Date Requested: 23  Patient: Malu Gaitan  Patient : 1950   Referring Provider: Beata Saba DO      DIABETIC Eye Exam Date _______________________________      Type of Exam MUST be documented for Diabetic Eye Exams. Please CHECK ONE. Retinal Exam       Dilated Retinal Exam       OCT       Optomap-Iris Exam      Fundus Photography       Left Eye - Please check Retinopathy or No Retinopathy        Exam did show retinopathy    Exam did not show retinopathy       Right Eye - Please check Retinopathy or No Retinopathy       Exam did show retinopathy    Exam did not show retinopathy       Comments __________________________________________________________    Practice Providing Exam ______________________________________________    Exam Performed By (print name) _______________________________________      Provider Signature ___________________________________________________      These reports are needed for  compliance. Please fax this completed form and a copy of the Diabetic Eye Exam report to our office located at [address] [place] [address] as soon as possible via Fax [phone] attention Noble Pichardo: Phone [phone]  We thank you for your assistance in treating our mutual patient.

## 2023-08-01 NOTE — TELEPHONE ENCOUNTER
Upon review of the In Basket request we were able to locate, review, and update the patient chart as requested for Diabetic Eye Exam.    Any additional questions or concerns should be emailed to the Practice Liaisons via the appropriate education email address, please do not reply via In Basket.     Thank you  Sadiq Hogan MA

## 2023-08-01 NOTE — TELEPHONE ENCOUNTER
Upon review of the In Basket request and the patient's chart, initial outreach has been made via fax to facility. Please see Contacts section for details.      Thank you  Jack Ricks MA

## 2023-08-02 DIAGNOSIS — E11.22 TYPE 2 DIABETES MELLITUS WITH STAGE 2 CHRONIC KIDNEY DISEASE, WITHOUT LONG-TERM CURRENT USE OF INSULIN (HCC): Chronic | ICD-10-CM

## 2023-08-02 DIAGNOSIS — N18.2 TYPE 2 DIABETES MELLITUS WITH STAGE 2 CHRONIC KIDNEY DISEASE, WITHOUT LONG-TERM CURRENT USE OF INSULIN (HCC): Chronic | ICD-10-CM

## 2023-08-02 DIAGNOSIS — J06.9 ACUTE UPPER RESPIRATORY INFECTION, UNSPECIFIED: ICD-10-CM

## 2023-08-02 RX ORDER — FLUTICASONE PROPIONATE 50 MCG
SPRAY, SUSPENSION (ML) NASAL
Qty: 16 ML | Refills: 0 | Status: SHIPPED | OUTPATIENT
Start: 2023-08-02

## 2023-08-03 DIAGNOSIS — E11.22 TYPE 2 DIABETES MELLITUS WITH STAGE 2 CHRONIC KIDNEY DISEASE, WITHOUT LONG-TERM CURRENT USE OF INSULIN: Chronic | ICD-10-CM

## 2023-08-03 DIAGNOSIS — N18.2 TYPE 2 DIABETES MELLITUS WITH STAGE 2 CHRONIC KIDNEY DISEASE, WITHOUT LONG-TERM CURRENT USE OF INSULIN: Chronic | ICD-10-CM

## 2023-08-03 RX ORDER — GLIMEPIRIDE 4 MG/1
4 TABLET ORAL 2 TIMES DAILY
Qty: 180 TABLET | Refills: 0 | OUTPATIENT
Start: 2023-08-03

## 2023-08-03 RX ORDER — GLIMEPIRIDE 4 MG/1
4 TABLET ORAL 2 TIMES DAILY
Qty: 180 TABLET | Refills: 0 | Status: SHIPPED | OUTPATIENT
Start: 2023-08-03

## 2023-08-15 ENCOUNTER — TELEPHONE (OUTPATIENT)
Age: 73
End: 2023-08-15

## 2023-08-15 ENCOUNTER — OFFICE VISIT (OUTPATIENT)
Age: 73
End: 2023-08-15
Payer: MEDICARE

## 2023-08-15 ENCOUNTER — APPOINTMENT (EMERGENCY)
Dept: VASCULAR ULTRASOUND | Facility: HOSPITAL | Age: 73
End: 2023-08-15
Payer: MEDICARE

## 2023-08-15 ENCOUNTER — HOSPITAL ENCOUNTER (EMERGENCY)
Facility: HOSPITAL | Age: 73
Discharge: HOME | End: 2023-08-15
Attending: EMERGENCY MEDICINE | Admitting: EMERGENCY MEDICINE
Payer: MEDICARE

## 2023-08-15 VITALS
WEIGHT: 196.4 LBS | DIASTOLIC BLOOD PRESSURE: 78 MMHG | HEART RATE: 74 BPM | SYSTOLIC BLOOD PRESSURE: 130 MMHG | RESPIRATION RATE: 16 BRPM | BODY MASS INDEX: 33.53 KG/M2 | TEMPERATURE: 97 F | OXYGEN SATURATION: 96 % | HEIGHT: 64 IN

## 2023-08-15 VITALS
OXYGEN SATURATION: 96 % | DIASTOLIC BLOOD PRESSURE: 89 MMHG | TEMPERATURE: 97.3 F | SYSTOLIC BLOOD PRESSURE: 143 MMHG | HEART RATE: 83 BPM | RESPIRATION RATE: 18 BRPM

## 2023-08-15 DIAGNOSIS — I80.8 SUPERFICIAL PHLEBITIS OF ARM: ICD-10-CM

## 2023-08-15 DIAGNOSIS — L03.114 LEFT ARM CELLULITIS: Primary | ICD-10-CM

## 2023-08-15 DIAGNOSIS — L03.114 CELLULITIS OF LEFT UPPER EXTREMITY: Primary | ICD-10-CM

## 2023-08-15 PROCEDURE — 93971 EXTREMITY STUDY: CPT | Performed by: SURGERY

## 2023-08-15 PROCEDURE — G0463 HOSPITAL OUTPT CLINIC VISIT: HCPCS | Performed by: EMERGENCY MEDICINE

## 2023-08-15 PROCEDURE — 99284 EMERGENCY DEPT VISIT MOD MDM: CPT | Performed by: EMERGENCY MEDICINE

## 2023-08-15 PROCEDURE — 99213 OFFICE O/P EST LOW 20 MIN: CPT | Performed by: EMERGENCY MEDICINE

## 2023-08-15 PROCEDURE — 99284 EMERGENCY DEPT VISIT MOD MDM: CPT

## 2023-08-15 PROCEDURE — 93971 EXTREMITY STUDY: CPT

## 2023-08-15 RX ORDER — CEPHALEXIN 500 MG/1
500 CAPSULE ORAL 4 TIMES DAILY
Qty: 40 CAPSULE | Refills: 0 | Status: SHIPPED | OUTPATIENT
Start: 2023-08-15 | End: 2023-08-25

## 2023-08-15 NOTE — PATIENT INSTRUCTIONS
Go directly to the ER for an Ultrasound of your left upper extremity or go upstairs to see if your family Dr. Ofelia Jaquez put a STAT order in for an outpatient US to r/o DVT in left upper extremity. 2.  Apply warm compresses 3 times/day 20 min at a time   3. F/u with PCP in 2-3 days  4.   Elevate arm as much as possible

## 2023-08-15 NOTE — ED PROVIDER NOTES
History  Chief Complaint   Patient presents with   • Arm Pain     Pt seen at urgent care earlier today for redness, pain, and swelling of LUE. Sent here to r/o cellulitis and possible DVT. 26-year-old male history of diabetes, CAD, hypertension presenting with left arm redness and irritation. Patient reports had a blunt trauma about 1 month ago with bruising that progressed to an area of redness and warmth and irritation near where blood was drawn. Reports redness and pain improving over the past couple weeks given persistence, patient was seen by urgent care who sent him to the ED for evaluation for superficial thrombophlebitis or possible DVT. Patient denies any chest pain shortness of breath. Denies any abdominal pain nausea vomit diarrhea. Denies any other complaints. Chart reviewed. Past Medical History:  No date: Actinic keratosis  No date: Adhesive capsulitis of unspecified shoulder  No date: Anxiety  No date: Asthma  No date:  Atopic dermatitis  No date: Cardiac disease  No date: Cellulitis of right upper extremity  No date: Cervical radiculopathy  No date: Chest pain  No date: Chronic maxillary sinusitis      Comment:  last assessed 01/21/14  No date: Diabetes mellitus (HCC)  No date: Erectile dysfunction of non-organic origin  No date: Esophageal reflux  No date: Gout      Comment:  last assessed 08/26/15  No date: Hearing loss, conductive  No date: Heart murmur      Comment:  Mitral Valve disorder  8/6/2019: Henoch-Schonlein purpura (HCC)  No date: Herpes zoster  No date: History of paroxysmal supraventricular tachycardia  No date: Hypertension  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)  No date: Mitral valve disorder  4/18/19: Nasal congestion  No date: Neoplasm of skin, malignant  No date: Non-melanoma skin cancer      Comment:  last assessed 10/26/17  4/18/19: Nosebleed  No date: Obesity  No date: Palpitations      Comment:  last assessed 03/05/15  1/21/2014: Paroxysmal supraventricular tachycardia (HCC)  No date: Plantar fasciitis      Comment:  last assessed 05/28/15  No date: PSVT (paroxysmal supraventricular tachycardia) (HCC)  No date: Sciatica  No date: Sleep apnea  No date: Squamous cell carcinoma of skin of scalp  No date: Tinnitus, unspecified ear  No date: Type 2 diabetes mellitus with hyperglycemia ([address])      Comment:  last assessed 84/54/10  No date: Umbilical hernia  No date: Worms in stool      Comment:  last assessed 08/26/15  Family History: non-contributory  Social History            Prior to Admission Medications   Prescriptions Last Dose Informant Patient Reported? Taking? BD Pen Needle Audelia U/F 32G X 4 MM MISC  Self No No   Sig: ONCE DAILY AT BEDTIME UNDER THE SKIN   EPINEPHrine (EPIPEN) 0.3 mg/0.3 mL SOAJ  Self No No   Sig: Inject 0.3 mL (0.3 mg total) into a muscle once for 1 dose   Fluticasone-Salmeterol (Advair Diskus) 100-50 mcg/dose inhaler  Self No No   Sig: Inhale 1 puff 2 (two) times a day Rinse mouth after use.    Januvia 100 MG tablet  Self No No   Sig: TAKE 1 TABLET BY MOUTH EVERY DAY   LORazepam (ATIVAN) 1 mg tablet  Self No No   Sig: Take 1 tablet (1 mg total) by mouth as needed for anxiety   Lantus SoloStar 100 units/mL SOPN  Self No No   Sig: INJECT 12 UNITS UNDER THE SKIN DAILY   atorvastatin (LIPITOR) 10 mg tablet  Self No No   Sig: TAKE 1 TABLET BY MOUTH EVERY DAY   benzonatate (TESSALON) 200 MG capsule  Self No No   Sig: Take 1 capsule (200 mg total) by mouth 3 (three) times a day as needed for cough   cephalexin (KEFLEX) 500 mg capsule   No No   Sig: Take 1 capsule (500 mg total) by mouth 4 (four) times a day for 10 days   clotrimazole-betamethasone (LOTRISONE) 1-0.05 % cream  Self No No   Sig: APPLY TOPICALLY 2 (TWO) TIMES A DAY AS NEEDED (RASH)   cyclobenzaprine (FLEXERIL) 5 mg tablet  Self No No   Sig: TAKE 1 TABLET BY MOUTH THREE TIMES A DAY AS NEEDED FOR MUSCLE SPASMS   fluticasone (FLONASE) 50 mcg/act nasal spray   No No   Sig: SPRAY 1 SPRAY INTO EACH NOSTRIL EVERY DAY   glimepiride (AMARYL) 4 mg tablet   No No   Sig: TAKE 1 TABLET BY MOUTH TWICE A DAY   glucose blood (FREESTYLE LITE) test strip  Self No No   Sig: PT TO TEST BLOOD SUGAR ONCE DAILY DX:E11.22   Patient taking differently: Pt states he tests at least twice a day   glucose blood (FREESTYLE LITE) test strip  Self No No   Sig: USE TO TEST ONCE DAILY (DX: E11.65)   ketoconazole (NIZORAL) 2 % cream   No No   Sig: APPLY 1 APPLICATION TOPICALLY IN THE MORNING AND 1 APPLICATION IN THE EVENING. APPLIED TO FACE AND SCALP TWICE A DAY FOR 3 WEEKS REPEAT AS NEEDED. metFORMIN (GLUCOPHAGE-XR) 500 mg 24 hr tablet   No No   Sig: TAKE 2 TABLETS BY MOUTH TWICE A DAY WITH FOOD   montelukast (SINGULAIR) 10 mg tablet  Self No No   Sig: TAKE 1 TABLET BY MOUTH EVERYDAY AT BEDTIME   neomycin-polymyxin-dexamethasone (MAXITROL) 0.35%-10,000 units/g-0.1%  Self Yes No   Sig: APPLY 1 A SMALL AMOUNT ON EYELID EVERY NIGHT 1 WEEK   olopatadine (PATANOL) 0.1 % ophthalmic solution  Self Yes No   Sig: Administer 1 drop to both eyes daily    omeprazole (PriLOSEC) 20 mg delayed release capsule   No No   Sig: TAKE 1 CAPSULE BY MOUTH EVERY DAY   prednisoLONE acetate (PRED FORTE) 1 % ophthalmic suspension  Self Yes No   Sig: INSTILL 1 DROP INTO RIGHT EYE FOUR TIMES A DAY AS DIRECTED USE AFTER SURGERY   Patient not taking: Reported on 2023   sildenafil (VIAGRA) 50 MG tablet  Self No No   Sig: Take 1 tablet (50 mg total) by mouth daily as needed for erectile dysfunction   sodium chloride (OCEAN) 0.65 % nasal spray  Self Yes No   Si spray into each nostril as needed for congestion    triamcinolone (KENALOG) 0.025 % cream  Self Yes No   Sig: Apply topically as needed    verapamil (VERELAN PM) 180 MG 24 hr capsule  Self No No   Sig: TAKE 1 CAPSULE BY MOUTH 2 TIMES A DAY.       Facility-Administered Medications: None       Past Medical History:   Diagnosis Date   • Actinic keratosis    • Adhesive capsulitis of unspecified shoulder    • Anxiety    • Asthma    • Atopic dermatitis    • Cardiac disease    • Cellulitis of right upper extremity    • Cervical radiculopathy    • Chest pain    • Chronic maxillary sinusitis     last assessed 01/21/14   • Diabetes mellitus ([address])    • Erectile dysfunction of non-organic origin    • Esophageal reflux    • Gout     last assessed 08/26/15   • Hearing loss, conductive    • Heart murmur     Mitral Valve disorder   • Henoch-Schonlein purpura ([address]) 8/6/2019   • Herpes zoster    • History of paroxysmal supraventricular tachycardia    • Hypertension    • 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   • Mitral valve disorder    • Nasal congestion 4/18/19   • Neoplasm of skin, malignant    • Non-melanoma skin cancer     last assessed 10/26/17   • Nosebleed 4/18/19   • Obesity    • Palpitations     last assessed 03/05/15   • Paroxysmal supraventricular tachycardia ([address]) 1/21/2014   • Plantar fasciitis     last assessed 05/28/15   • PSVT (paroxysmal supraventricular tachycardia) ([facility])    • Sciatica    • Sleep apnea    • Squamous cell carcinoma of skin of scalp    • Tinnitus, unspecified ear    • Type 2 diabetes mellitus with hyperglycemia ([address])     last assessed 56/07/88   • Umbilical hernia    • Worms in stool     last assessed 08/26/15       Past Surgical History:   Procedure Laterality Date   • APPENDECTOMY     • COLECTOMY      Partial, Sigmoid   • COLONOSCOPY     • HEAD & NECK SKIN LESION EXCISIONAL BIOPSY      10/11/10 SCC --  10/17/11 SCC  --  Location Scalp   • HERNIA REPAIR     • NASAL/SINUS ENDOSCOPY N/A 8/6/2019    Procedure: FUNCTIONAL ENDOSCOPIC SINUS SURGERY (FESS) IMAGED GUIDED for control of epistaxis; Surgeon: Ottoniel Gutierrez MD;  Location: [facility];  Service: ENT   • [place] ESOPHAGOGASTRODUODENOSCOPY TRANSORAL DIAGNOSTIC N/A 8/9/2017    Procedure: ESOPHAGOGASTRODUODENOSCOPY (EGD); Surgeon: Mervin Barros MD;  Location: [facility];   Service: Gastroenterology   • SKIN BIOPSY     • TONSILLECTOMY         Family History   Problem Relation Age of Onset   • Cancer Mother         Breast + Skin   • Diabetes Mother    • Heart disease Mother    • Heart attack Mother    • Breast cancer Mother         Adenocarcinoma   • Skin cancer Mother         Adenocarcinoma   • Heart failure Mother    • Diabetes type II Mother    • Varicose Veins Mother         Lower Extremities   • Cancer Father         Prostate + Skin   • Prostate cancer Father         Adenocarcinoma   • Skin cancer Father    • Lymphoma Father         Non-Hodgkin's   • Arthritis Family      I have reviewed and agree with the history as documented. E-Cigarette/Vaping   • E-Cigarette Use Never User      E-Cigarette/Vaping Substances   • Nicotine No    • THC No    • CBD No    • Flavoring No    • Other No    • Unknown No      Social History     Tobacco Use   • Smoking status: Former     Packs/day: 1.00     Years: 20.00     Total pack years: 20.00     Types: Cigarettes     Quit date: 1977     Years since quittin.6   • Smokeless tobacco: Never   Vaping Use   • Vaping Use: Never used   Substance Use Topics   • Alcohol use: Yes     Alcohol/week: 1.0 standard drink of alcohol     Types: 1 Standard drinks or equivalent per week     Comment: only occasionally   • Drug use: No       Review of Systems   Constitutional: Negative for appetite change, chills, diaphoresis, fever and unexpected weight change. HENT: Negative for congestion and rhinorrhea. Eyes: Negative for photophobia and visual disturbance. Respiratory: Negative for cough, chest tightness and shortness of breath. Cardiovascular: Negative for chest pain, palpitations and leg swelling. Gastrointestinal: Negative for abdominal distention, abdominal pain, blood in stool, constipation, diarrhea, nausea and vomiting. Genitourinary: Negative for dysuria and hematuria. Musculoskeletal: Positive for myalgias.  Negative for back pain, joint swelling, neck pain and neck stiffness. Skin: Positive for rash. Negative for color change, pallor and wound. Neurological: Negative for dizziness, syncope, weakness, light-headedness and headaches. Psychiatric/Behavioral: Negative for agitation. All other systems reviewed and are negative. Physical Exam  Physical Exam  Vitals and nursing note reviewed. Constitutional:       General: He is not in acute distress. Appearance: Normal appearance. He is well-developed. He is not ill-appearing, toxic-appearing or diaphoretic. HENT:      Head: Normocephalic and atraumatic. Nose: Nose normal. No congestion or rhinorrhea. Mouth/Throat:      Mouth: Mucous membranes are moist.      Pharynx: Oropharynx is clear. No oropharyngeal exudate or posterior oropharyngeal erythema. Eyes:      General: No scleral icterus. Right eye: No discharge. Left eye: No discharge. Extraocular Movements: Extraocular movements intact. Conjunctiva/sclera: Conjunctivae normal.      Pupils: Pupils are equal, round, and reactive to light. Neck:      Vascular: No JVD. Trachea: No tracheal deviation. Comments: Supple. Normal range of motion. Cardiovascular:      Rate and Rhythm: Normal rate and regular rhythm. Heart sounds: Normal heart sounds. No murmur heard. No friction rub. No gallop. Comments: Normal rate and regular rhythm  Pulmonary:      Effort: Pulmonary effort is normal. No respiratory distress. Breath sounds: Normal breath sounds. No stridor. No wheezing or rales. Comments: Clear to auscultation bilaterally  Chest:      Chest wall: No tenderness. Abdominal:      General: Bowel sounds are normal. There is no distension. Palpations: Abdomen is soft. Tenderness: There is no abdominal tenderness. There is no right CVA tenderness, left CVA tenderness, guarding or rebound. Comments: Soft, nontender, nondistended.   Normal bowel sounds throughout   Musculoskeletal: General: No swelling, tenderness, deformity or signs of injury. Normal range of motion. Cervical back: Normal range of motion and neck supple. No rigidity. No muscular tenderness. Right lower leg: No edema. Left lower leg: No edema. Lymphadenopathy:      Cervical: No cervical adenopathy. Skin:     General: Skin is warm and dry. Coloration: Skin is not pale. Findings: Erythema present. No rash. Comments: Left antecubital fossa with mild erythema and warmth with mild overlying tenderness. 2+ radial pulses. Sensation intact entire left upper extremity. Motor 5 out of 5 left upper extremity   Neurological:      General: No focal deficit present. Mental Status: He is alert. Mental status is at baseline. Sensory: No sensory deficit. Motor: No weakness or abnormal muscle tone. Coordination: Coordination normal.      Gait: Gait normal.      Comments: Alert. Strength and sensation grossly intact. Ambulatory without difficulty at baseline. Psychiatric:         Behavior: Behavior normal.         Thought Content:  Thought content normal.         Vital Signs  ED Triage Vitals [08/15/23 1411]   Temperature Pulse Respirations Blood Pressure SpO2   (!) 97.3 °F (36.3 °C) 83 18 143/89 96 %      Temp Source Heart Rate Source Patient Position - Orthostatic VS BP Location FiO2 (%)   Tympanic Monitor -- Right arm --      Pain Score       --           Vitals:    08/15/23 1411   BP: 143/89   Pulse: 83         Visual Acuity      ED Medications  Medications - No data to display    Diagnostic Studies  Results Reviewed     None                 VAS upper limb venous duplex scan, unilateral/limited   Final Result by Kvng Miller MD (08/15 1750)                 Procedures  Procedures         ED Course  ED Course as of 08/15/23 2022   Tue Aug 15, 2023   1642 No DVT per vascular tech                               SBIRT 22yo+    Flowsheet Row Most Recent Value   Initial Alcohol Screen: US AUDIT-C     1. How often do you have a drink containing alcohol? 0 Filed at: 08/15/2023 1510   2. How many drinks containing alcohol do you have on a typical day you are drinking? 0 Filed at: 08/15/2023 1510   3a. Male UNDER 65: How often do you have five or more drinks on one occasion? 0 Filed at: 08/15/2023 1510   3b. FEMALE Any Age, or MALE 65+: How often do you have 4 or more drinks on one occassion? 0 Filed at: 08/15/2023 1510   Audit-C Score 0 Filed at: 08/15/2023 1510   BELÉN: How many times in the past year have you. .. Used an illegal drug or used a prescription medication for non-medical reasons? Never Filed at: 08/15/2023 1510                    Medical Decision Making  68-year-old male history of diabetes, CAD, hypertension presenting with left arm redness and irritation. Given recent blood draw in the area with surrounding redness and warmth and tenderness suspect likely superficial thrombophlebitis. Plan for duplex. Reassess. Duplex without acute process per vascular tech. Patient already prescribed antibiotics to cover for possible cellulitis. Advised patient to take course of antibiotics. Discussed results and recommendations. Advised follow up PCP. Medication recommendations. Given instructions and return precautions. Patient/family at bedside acknowledged understanding of all written and verbal instructions and return precautions. Discharged. Disposition  Final diagnoses:   Left arm cellulitis     Time reflects when diagnosis was documented in both MDM as applicable and the Disposition within this note     Time User Action Codes Description Comment    8/15/2023  4:42 PM Bryant Martin Add [V40.324] Left arm cellulitis       ED Disposition     ED Disposition   Discharge    Condition   Stable    Date/Time   Tue Aug 15, 2023  4:42 PM    Comment   Jen Larson discharge to home/self care.                Follow-up Information     Follow up With Specialties Details Why Contact Dirk Roman,  Internal Medicine Schedule an appointment as soon as possible for a visit in 1 week  [address] [place] [address] [address] [facility]  [phone]            Discharge Medication List as of 8/15/2023  5:06 PM      CONTINUE these medications which have NOT CHANGED    Details   atorvastatin (LIPITOR) 10 mg tablet TAKE 1 TABLET BY MOUTH EVERY DAY, Normal      BD Pen Needle Audelia U/F 32G X 4 MM MISC ONCE DAILY AT BEDTIME UNDER THE SKIN, Normal      benzonatate (TESSALON) 200 MG capsule Take 1 capsule (200 mg total) by mouth 3 (three) times a day as needed for cough, Starting Tue 5/9/2023, Normal      cephalexin (KEFLEX) 500 mg capsule Take 1 capsule (500 mg total) by mouth 4 (four) times a day for 10 days, Starting Tue 8/15/2023, Until Fri 8/25/2023, Normal      clotrimazole-betamethasone (LOTRISONE) 1-0.05 % cream APPLY TOPICALLY 2 (TWO) TIMES A DAY AS NEEDED (RASH), Starting Wed 12/15/2021, Normal      cyclobenzaprine (FLEXERIL) 5 mg tablet TAKE 1 TABLET BY MOUTH THREE TIMES A DAY AS NEEDED FOR MUSCLE SPASMS, Normal      EPINEPHrine (EPIPEN) 0.3 mg/0.3 mL SOAJ Inject 0.3 mL (0.3 mg total) into a muscle once for 1 dose, Starting Mon 11/22/2021, Normal      fluticasone (FLONASE) 50 mcg/act nasal spray SPRAY 1 SPRAY INTO EACH NOSTRIL EVERY DAY, Normal      Fluticasone-Salmeterol (Advair Diskus) 100-50 mcg/dose inhaler Inhale 1 puff 2 (two) times a day Rinse mouth after use., Starting Thu 5/18/2023, Normal      glimepiride (AMARYL) 4 mg tablet TAKE 1 TABLET BY MOUTH TWICE A DAY, Starting Thu 8/3/2023, Normal      !! glucose blood (FREESTYLE LITE) test strip PT TO TEST BLOOD SUGAR ONCE DAILY DX:E11.22, Normal      !! glucose blood (FREESTYLE LITE) test strip USE TO TEST ONCE DAILY (DX: E11.65), Normal      Januvia 100 MG tablet TAKE 1 TABLET BY MOUTH EVERY DAY, Normal      ketoconazole (NIZORAL) 2 % cream APPLY 1 APPLICATION TOPICALLY IN THE MORNING AND 1 APPLICATION IN THE EVENING. APPLIED TO FACE AND SCALP TWICE A DAY FOR 3 WEEKS REPEAT AS NEEDED., Starting Mon 7/31/2023, Normal      Lantus SoloStar 100 units/mL SOPN INJECT 12 UNITS UNDER THE SKIN DAILY, Starting Mon 5/15/2023, Normal      LORazepam (ATIVAN) 1 mg tablet Take 1 tablet (1 mg total) by mouth as needed for anxiety, Starting Fri 5/1/2020, Normal      metFORMIN (GLUCOPHAGE-XR) 500 mg 24 hr tablet TAKE 2 TABLETS BY MOUTH TWICE A DAY WITH FOOD, Starting Sun 7/30/2023, Normal      montelukast (SINGULAIR) 10 mg tablet TAKE 1 TABLET BY MOUTH EVERYDAY AT BEDTIME, Normal      neomycin-polymyxin-dexamethasone (MAXITROL) 0.35%-10,000 units/g-0.1% APPLY 1 A SMALL AMOUNT ON EYELID EVERY NIGHT 1 WEEK, Historical Med      olopatadine (PATANOL) 0.1 % ophthalmic solution Administer 1 drop to both eyes daily , Historical Med      omeprazole (PriLOSEC) 20 mg delayed release capsule TAKE 1 CAPSULE BY MOUTH EVERY DAY, Normal      prednisoLONE acetate (PRED FORTE) 1 % ophthalmic suspension INSTILL 1 DROP INTO RIGHT EYE FOUR TIMES A DAY AS DIRECTED USE AFTER SURGERY, Historical Med      sildenafil (VIAGRA) 50 MG tablet Take 1 tablet (50 mg total) by mouth daily as needed for erectile dysfunction, Starting Mon 4/10/2023, Normal      sodium chloride (OCEAN) 0.65 % nasal spray 1 spray into each nostril as needed for congestion , Historical Med      triamcinolone (KENALOG) 0.025 % cream Apply topically as needed , Historical Med      verapamil (VERELAN PM) 180 MG 24 hr capsule TAKE 1 CAPSULE BY MOUTH 2 TIMES A DAY., Normal       !! - Potential duplicate medications found. Please discuss with provider. No discharge procedures on file.     PDMP Review     None          ED Provider  Electronically Signed by           Sebastian De La Rosa MD  08/15/23 2022

## 2023-08-15 NOTE — TELEPHONE ENCOUNTER
PT HERE NOW - WAS JUST SEEN AT URGENT CARE - CELLULITIS    WAS TOLD TO ASK PCP FOR STAT ORDER FOR ULTRASOUND OF LEFT UPPER EXTREMITY    PT HERE NOW

## 2023-08-15 NOTE — PROGRESS NOTES
[facility]        NAME: Mili Zacarias is a 68 y.o. male  : 1950    MRN: [id number]  DATE: August 15, 2023  TIME: 7:19 PM    Assessment and Plan   Cellulitis of left upper extremity [L03.114]  1. Cellulitis of left upper extremity  cephalexin (KEFLEX) 500 mg capsule    Transfer to other facility      2. Superficial phlebitis of arm  Transfer to other facility    r/o DVT            Patient Instructions   Patient Instructions   1. Go directly to the ER for an Ultrasound of your left upper extremity or go upstairs to see if your family Dr. Marily Agosto put a STAT order in for an outpatient US to r/o DVT in left upper extremity. 2.  Apply warm compresses 3 times/day 20 min at a time   3. F/u with PCP in 2-3 days  4. Elevate arm as much as possible        Follow up with PCP in 3-5 days. Proceed to  ER if symptoms worsen. Chief Complaint     Chief Complaint   Patient presents with   • Red Line on arm      Patient stated red line appeared on his left arm after blood work that was done in July. Stated that arm hurts, itch and c/o fatigue. History of Present Illness       24-year-old white male with a chief complaint of a redness on his left arm. Patient states on  he had blood drawn from this region of his left arm and since that time it has been red but the soreness is getting worse. Patient denies any fever or chills. Patient denies any IV use recently. Review of Systems   Review of Systems   Constitutional: Negative for chills and fever. HENT: Negative for congestion and rhinorrhea. Eyes: Negative for discharge and visual disturbance. Respiratory: Negative for shortness of breath and wheezing. Cardiovascular: Negative for chest pain and palpitations. Gastrointestinal: Negative for abdominal pain and vomiting. Endocrine: Negative for polydipsia and polyuria. Genitourinary: Negative for dysuria and hematuria.    Musculoskeletal: Positive for arthralgias and myalgias. Negative for gait problem and neck stiffness. Skin: Positive for color change. Negative for rash and wound. Neurological: Negative for dizziness and headaches. Psychiatric/Behavioral: Negative for confusion and suicidal ideas. Current Medications       Current Outpatient Medications:   •  atorvastatin (LIPITOR) 10 mg tablet, TAKE 1 TABLET BY MOUTH EVERY DAY, Disp: 90 tablet, Rfl: 3  •  BD Pen Needle [place] U/F 32G X 4 MM MISC, ONCE DAILY AT BEDTIME UNDER THE SKIN, Disp: 100 each, Rfl: 2  •  benzonatate (TESSALON) 200 MG capsule, Take 1 capsule (200 mg total) by mouth 3 (three) times a day as needed for cough, Disp: 20 capsule, Rfl: 0  •  cephalexin (KEFLEX) 500 mg capsule, Take 1 capsule (500 mg total) by mouth 4 (four) times a day for 10 days, Disp: 40 capsule, Rfl: 0  •  clotrimazole-betamethasone (LOTRISONE) 1-0.05 % cream, APPLY TOPICALLY 2 (TWO) TIMES A DAY AS NEEDED (RASH), Disp: 30 g, Rfl: 2  •  cyclobenzaprine (FLEXERIL) 5 mg tablet, TAKE 1 TABLET BY MOUTH THREE TIMES A DAY AS NEEDED FOR MUSCLE SPASMS, Disp: 60 tablet, Rfl: 5  •  fluticasone (FLONASE) 50 mcg/act nasal spray, SPRAY 1 SPRAY INTO EACH NOSTRIL EVERY DAY, Disp: 16 mL, Rfl: 0  •  Fluticasone-Salmeterol (Advair Diskus) 100-50 mcg/dose inhaler, Inhale 1 puff 2 (two) times a day Rinse mouth after use., Disp: 60 blister, Rfl: 3  •  glimepiride (AMARYL) 4 mg tablet, TAKE 1 TABLET BY MOUTH TWICE A DAY, Disp: 180 tablet, Rfl: 0  •  glucose blood (FREESTYLE LITE) test strip, PT TO TEST BLOOD SUGAR ONCE DAILY DX:E11.22 (Patient taking differently: Pt states he tests at least twice a day), Disp: 100 each, Rfl: 3  •  glucose blood (FREESTYLE LITE) test strip, USE TO TEST ONCE DAILY (DX: E11.65), Disp: 100 strip, Rfl: 3  •  Januvia 100 MG tablet, TAKE 1 TABLET BY MOUTH EVERY DAY, Disp: 90 tablet, Rfl: 1  •  ketoconazole (NIZORAL) 2 % cream, APPLY 1 APPLICATION TOPICALLY IN THE MORNING AND 1 APPLICATION IN THE EVENING.  APPLIED TO FACE AND SCALP TWICE A DAY FOR 3 WEEKS REPEAT AS NEEDED., Disp: 30 g, Rfl: 3  •  Lantus SoloStar 100 units/mL SOPN, INJECT 12 UNITS UNDER THE SKIN DAILY, Disp: 12 mL, Rfl: 1  •  LORazepam (ATIVAN) 1 mg tablet, Take 1 tablet (1 mg total) by mouth as needed for anxiety, Disp: 90 tablet, Rfl: 0  •  metFORMIN (GLUCOPHAGE-XR) 500 mg 24 hr tablet, TAKE 2 TABLETS BY MOUTH TWICE A DAY WITH FOOD, Disp: 360 tablet, Rfl: 0  •  montelukast (SINGULAIR) 10 mg tablet, TAKE 1 TABLET BY MOUTH EVERYDAY AT BEDTIME, Disp: 90 tablet, Rfl: 3  •  neomycin-polymyxin-dexamethasone (MAXITROL) 0.35%-10,000 units/g-0.1%, APPLY 1 A SMALL AMOUNT ON EYELID EVERY NIGHT 1 WEEK, Disp: , Rfl:   •  olopatadine (PATANOL) 0.1 % ophthalmic solution, Administer 1 drop to both eyes daily , Disp: , Rfl:   •  omeprazole (PriLOSEC) 20 mg delayed release capsule, TAKE 1 CAPSULE BY MOUTH EVERY DAY, Disp: 90 capsule, Rfl: 1  •  sildenafil (VIAGRA) 50 MG tablet, Take 1 tablet (50 mg total) by mouth daily as needed for erectile dysfunction, Disp: 6 tablet, Rfl: 9  •  sodium chloride (OCEAN) 0.65 % nasal spray, 1 spray into each nostril as needed for congestion , Disp: , Rfl:   •  triamcinolone (KENALOG) 0.025 % cream, Apply topically as needed , Disp: , Rfl:   •  verapamil (VERELAN PM) 180 MG 24 hr capsule, TAKE 1 CAPSULE BY MOUTH 2 TIMES A DAY., Disp: 180 capsule, Rfl: 3  •  EPINEPHrine (EPIPEN) 0.3 mg/0.3 mL SOAJ, Inject 0.3 mL (0.3 mg total) into a muscle once for 1 dose, Disp: 0.6 mL, Rfl: 0  •  prednisoLONE acetate (PRED FORTE) 1 % ophthalmic suspension, INSTILL 1 DROP INTO RIGHT EYE FOUR TIMES A DAY AS DIRECTED USE AFTER SURGERY (Patient not taking: Reported on 6/22/2023), Disp: , Rfl:     Current Allergies     Allergies as of 08/15/2023 - Reviewed 08/15/2023   Allergen Reaction Noted   • Iodinated contrast media Anaphylaxis 01/16/2014   • Shellfish allergy - food allergy Anaphylaxis 01/16/2014   • Shellfish-derived products - food allergy     • Shrimp extract allergy skin test - food allergy Anaphylaxis    • Empagliflozin Hives 06/24/2019   • Farxiga [dapagliflozin] Rash 10/19/2021   • Ozempic (0.25 or 0.5 mg-dose) [semaglutide(0.25 or 0.5mg-dos)] Rash 11/30/2021            The following portions of the patient's history were reviewed and updated as appropriate: allergies, current medications, past family history, past medical history, past social history, past surgical history and problem list.     Past Medical History:   Diagnosis Date   • Actinic keratosis    • Adhesive capsulitis of unspecified shoulder    • Anxiety    • Asthma    • Atopic dermatitis    • Cardiac disease    • Cellulitis of right upper extremity    • Cervical radiculopathy    • Chest pain    • Chronic maxillary sinusitis     last assessed 01/21/14   • Diabetes mellitus ([address])    • Erectile dysfunction of non-organic origin    • Esophageal reflux    • Gout     last assessed 08/26/15   • Hearing loss, conductive    • Heart murmur     Mitral Valve disorder   • Henoch-Schonlein purpura ([address]) 8/6/2019   • Herpes zoster    • History of paroxysmal supraventricular tachycardia    • Hypertension    • 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   • Mitral valve disorder    • Nasal congestion 4/18/19   • Neoplasm of skin, malignant    • Non-melanoma skin cancer     last assessed 10/26/17   • Nosebleed 4/18/19   • Obesity    • Palpitations     last assessed 03/05/15   • Paroxysmal supraventricular tachycardia ([address]) 1/21/2014   • Plantar fasciitis     last assessed 05/28/15   • PSVT (paroxysmal supra[facility]ricular tachycardia) ([facility])    • Sciatica    • Sleep apnea    • Squamous cell carcinoma of skin of scalp    • Tinnitus, unspecified ear    • Type 2 diabetes mellitus with hyperglycemia ([address])     last assessed 61/43/94   • Umbilical hernia    • Worms in stool     last assessed 08/26/15       Past Surgical History:   Procedure Laterality Date   • APPENDECTOMY     • COLECTOMY      Partial, Sigmoid   • COLONOSCOPY     • HEAD & NECK SKIN LESION EXCISIONAL BIOPSY      10/11/10 SCC --  10/17/11 SCC  --  Location Scalp   • HERNIA REPAIR     • NASAL/SINUS ENDOSCOPY N/A 8/6/2019    Procedure: FUNCTIONAL ENDOSCOPIC SINUS SURGERY (FESS) IMAGED GUIDED for control of epistaxis; Surgeon: Marco A Oconnell MD;  Location: [facility];  Service: ENT   • VA ESOPHAGOGASTRODUODENOSCOPY TRANSORAL DIAGNOSTIC N/A 8/9/2017    Procedure: ESOPHAGOGASTRODUODENOSCOPY (EGD); Surgeon: Alan Díaz MD;  Location: [facility]; Service: Gastroenterology   • SKIN BIOPSY     • TONSILLECTOMY         Family History   Problem Relation Age of Onset   • Cancer Mother         Breast + Skin   • Diabetes Mother    • Heart disease Mother    • Heart attack Mother    • Breast cancer Mother         Adenocarcinoma   • Skin cancer Mother         Adenocarcinoma   • Heart failure Mother    • Diabetes type II Mother    • Varicose Veins Mother         Lower Extremities   • Cancer Father         Prostate + Skin   • Prostate cancer Father         Adenocarcinoma   • Skin cancer Father    • Lymphoma Father         Non-Hodgkin's   • Arthritis Family          Medications have been verified. Objective   /78   Pulse 74   Temp (!) 97 °F (36.1 °C)   Resp 16   Ht 5' 4" (1.626 m)   Wt 89.1 kg (196 lb 6.4 oz)   SpO2 96%   BMI 33.71 kg/m²        Physical Exam     Physical Exam  Vitals and nursing note reviewed. Constitutional:       Appearance: Normal appearance. Comments: 66-year-old male sitting on the exam table with left arm pain   HENT:      Head: Normocephalic and atraumatic. Nose: Nose normal.   Eyes:      Extraocular Movements: Extraocular movements intact. Conjunctiva/sclera: Conjunctivae normal.   Cardiovascular:      Rate and Rhythm: Normal rate. Pulmonary:      Effort: Pulmonary effort is normal.   Musculoskeletal:         General: Tenderness present. Normal range of motion. Cervical back: Neck supple.    Skin: General: Skin is warm and dry. Findings: Erythema present. Comments: Left forearm:  + erythema from mid humeral region to mid-forearm on the volar aspect of the left arm consistent with a superficial phlebitis vs cellulitis. There is a palpable "lump" to the antecubital region that is tender to touch, which could be consistent with a superficial phlebitis. Neurological:      General: No focal deficit present. Mental Status: He is alert and oriented to person, place, and time.    Psychiatric:         Mood and Affect: Mood normal.

## 2023-09-11 ENCOUNTER — TELEPHONE (OUTPATIENT)
Age: 73
End: 2023-09-11

## 2023-09-11 ENCOUNTER — OFFICE VISIT (OUTPATIENT)
Age: 73
End: 2023-09-11
Payer: MEDICARE

## 2023-09-11 VITALS
RESPIRATION RATE: 16 BRPM | BODY MASS INDEX: 32.81 KG/M2 | HEIGHT: 64 IN | OXYGEN SATURATION: 93 % | TEMPERATURE: 98.6 F | SYSTOLIC BLOOD PRESSURE: 120 MMHG | HEART RATE: 82 BPM | WEIGHT: 192.2 LBS | DIASTOLIC BLOOD PRESSURE: 80 MMHG

## 2023-09-11 DIAGNOSIS — N18.31 TYPE 2 DIABETES MELLITUS WITH STAGE 3A CHRONIC KIDNEY DISEASE, WITHOUT LONG-TERM CURRENT USE OF INSULIN (HCC): Primary | Chronic | ICD-10-CM

## 2023-09-11 DIAGNOSIS — E11.22 TYPE 2 DIABETES MELLITUS WITH STAGE 3A CHRONIC KIDNEY DISEASE, WITHOUT LONG-TERM CURRENT USE OF INSULIN (HCC): Primary | Chronic | ICD-10-CM

## 2023-09-11 DIAGNOSIS — B34.9 VIRAL INFECTION: Primary | ICD-10-CM

## 2023-09-11 DIAGNOSIS — F41.9 ANXIETY DISORDER, UNSPECIFIED TYPE: ICD-10-CM

## 2023-09-11 PROCEDURE — 99213 OFFICE O/P EST LOW 20 MIN: CPT | Performed by: FAMILY MEDICINE

## 2023-09-11 RX ORDER — LORAZEPAM 1 MG/1
1 TABLET ORAL AS NEEDED
Qty: 90 TABLET | Refills: 0 | Status: CANCELLED | OUTPATIENT
Start: 2023-09-11

## 2023-09-11 NOTE — PROGRESS NOTES
Name: Cesar Gonzalez      : 1950      MRN: [id number]  Encounter Provider: Kenneth Pinto MD  Encounter Date: 2023   Encounter department: [address]     1. Viral infection  Assessment & Plan:  Feeling not like himself for the past 3 days, exam significant for swollen glands likely secondary to viral infection. Discussed taking it easy, hydrating well with water, resting, return parameters, ED parameters. May take Benadryl at night to help sleep for the next couple nights, avoid daytime use due to risk of falls. May return in 1 week if patient interested in close monitoring. Patient reassured he does not need antibiotics, Ativan at this time. 2. Anxiety disorder, unspecified type  Assessment & Plan:  History of anxiety previously taken Ativan 1 mg as needed. Requesting for refill. Discussed risks versus benefits. Discussed breathing techniques and provided reassurance. Return in 1 week if symptoms worsening. Discussed ED parameters. Return in about 1 week (around 2023) for anxiety if persists. Subjective      Crystal Leeper with PMH of HTN, GERD, type 2 diabetes on insulin, hyperlipidemia, BERTA, stage III CKD, mitral valve disorder, anxiety disorder, history of skin cancer. [id number] cardiology, endocrinology, dermatology, ENT. Today patient reports concerns of swollen glands in the neck, noted several mosquito bites that improved with benadryl. Pt was tested for COVID, it was negative. Requesting for ativan due to feeling of doom, but denies SOB, chest pain, ct. Review of Systems   Constitutional: Negative for chills, fever and unexpected weight change. HENT: Negative for congestion, rhinorrhea, sore throat and trouble swallowing. Eyes: Negative for visual disturbance. Respiratory: Negative for chest tightness, shortness of breath and wheezing. Cardiovascular: Negative for chest pain. Gastrointestinal: Negative for abdominal pain, constipation, diarrhea, nausea and vomiting. Endocrine: Negative for polyuria. Genitourinary: Negative for frequency. Skin: Negative for color change and rash. Neurological: Negative for dizziness, weakness, light-headedness and headaches. Psychiatric/Behavioral: Negative for confusion. Current Outpatient Medications on File Prior to Visit   Medication Sig   • atorvastatin (LIPITOR) 10 mg tablet TAKE 1 TABLET BY MOUTH EVERY DAY   • BD Pen Needle [place] U/F 32G X 4 MM MISC ONCE DAILY AT BEDTIME UNDER THE SKIN   • cyclobenzaprine (FLEXERIL) 5 mg tablet TAKE 1 TABLET BY MOUTH THREE TIMES A DAY AS NEEDED FOR MUSCLE SPASMS   • fluticasone (FLONASE) 50 mcg/act nasal spray SPRAY 1 SPRAY INTO EACH NOSTRIL EVERY DAY   • Fluticasone-Salmeterol (Advair Diskus) 100-50 mcg/dose inhaler Inhale 1 puff 2 (two) times a day Rinse mouth after use. • glimepiride (AMARYL) 4 mg tablet TAKE 1 TABLET BY MOUTH TWICE A DAY   • glucose blood (FREESTYLE LITE) test strip PT TO TEST BLOOD SUGAR ONCE DAILY DX:E11.22 (Patient taking differently: Pt states he tests at least twice a day)   • glucose blood (FREESTYLE LITE) test strip USE TO TEST ONCE DAILY (DX: E11.65)   • Januvia 100 MG tablet TAKE 1 TABLET BY MOUTH EVERY DAY   • ketoconazole (NIZORAL) 2 % cream APPLY 1 APPLICATION TOPICALLY IN THE MORNING AND 1 APPLICATION IN THE EVENING. APPLIED TO FACE AND SCALP TWICE A DAY FOR 3 WEEKS REPEAT AS NEEDED.    • Lantus SoloStar 100 units/mL SOPN INJECT 12 UNITS UNDER THE SKIN DAILY   • LORazepam (ATIVAN) 1 mg tablet Take 1 tablet (1 mg total) by mouth as needed for anxiety   • metFORMIN (GLUCOPHAGE-XR) 500 mg 24 hr tablet TAKE 2 TABLETS BY MOUTH TWICE A DAY WITH FOOD   • montelukast (SINGULAIR) 10 mg tablet TAKE 1 TABLET BY MOUTH EVERYDAY AT BEDTIME   • omeprazole (PriLOSEC) 20 mg delayed release capsule TAKE 1 CAPSULE BY MOUTH EVERY DAY   • sildenafil (VIAGRA) 50 MG tablet Take 1 tablet (50 mg total) by mouth daily as needed for erectile dysfunction   • sodium chloride (OCEAN) 0.65 % nasal spray 1 spray into each nostril as needed for congestion    • triamcinolone (KENALOG) 0.025 % cream Apply topically as needed    • verapamil (VERELAN PM) 180 MG 24 hr capsule TAKE 1 CAPSULE BY MOUTH 2 TIMES A DAY. • clotrimazole-betamethasone (LOTRISONE) 1-0.05 % cream APPLY TOPICALLY 2 (TWO) TIMES A DAY AS NEEDED (RASH) (Patient not taking: Reported on 9/11/2023)   • EPINEPHrine (EPIPEN) 0.3 mg/0.3 mL SOAJ Inject 0.3 mL (0.3 mg total) into a muscle once for 1 dose   • [DISCONTINUED] benzonatate (TESSALON) 200 MG capsule Take 1 capsule (200 mg total) by mouth 3 (three) times a day as needed for cough   • [DISCONTINUED] neomycin-polymyxin-dexamethasone (MAXITROL) 0.35%-10,000 units/g-0.1% APPLY 1 A SMALL AMOUNT ON EYELID EVERY NIGHT 1 WEEK   • [DISCONTINUED] olopatadine (PATANOL) 0.1 % ophthalmic solution Administer 1 drop to both eyes daily    • [DISCONTINUED] prednisoLONE acetate (PRED FORTE) 1 % ophthalmic suspension        Objective     /80 (BP Location: Left arm, Patient Position: Sitting, Cuff Size: Standard)   Pulse 82   Temp 98.6 °F (37 °C) (Tympanic)   Resp 16   Ht 5' 4" (1.626 m)   Wt 87.2 kg (192 lb 3.2 oz)   SpO2 93%   BMI 32.99 kg/m²     Physical Exam  Vitals and nursing note reviewed. Constitutional:       General: He is not in acute distress. Appearance: Normal appearance. He is not ill-appearing, toxic-appearing or diaphoretic. HENT:      Head: Normocephalic and atraumatic. Right Ear: External ear normal.      Left Ear: External ear normal.      Nose: Nose normal.      Mouth/Throat:      Mouth: Mucous membranes are moist.   Eyes:      General: No scleral icterus. Right eye: No discharge. Left eye: No discharge. Extraocular Movements: Extraocular movements intact.       Conjunctiva/sclera: Conjunctivae normal.   Neck:      Comments: Swollen glands  Cardiovascular:      Rate and Rhythm: Normal rate and regular rhythm. Pulses: Normal pulses. Heart sounds: Normal heart sounds. Pulmonary:      Effort: Pulmonary effort is normal.      Breath sounds: Normal breath sounds. Musculoskeletal:         General: No swelling. Cervical back: Normal range of motion. Right lower leg: No edema. Left lower leg: No edema. Skin:     Capillary Refill: Capillary refill takes less than 2 seconds. Neurological:      General: No focal deficit present. Mental Status: He is alert and oriented to person, place, and time. Psychiatric:         Mood and Affect: Mood normal.         Behavior: Behavior normal.         Thought Content:  Thought content normal.       Jayme Jaime MD

## 2023-09-11 NOTE — ASSESSMENT & PLAN NOTE
History of anxiety previously taken Ativan 1 mg as needed. Requesting for refill. Discussed risks versus benefits. Discussed breathing techniques and provided reassurance. Return in 1 week if symptoms worsening. Discussed ED parameters.

## 2023-09-11 NOTE — ASSESSMENT & PLAN NOTE
Feeling not like himself for the past 3 days, exam significant for swollen glands likely secondary to viral infection. Discussed taking it easy, hydrating well with water, resting, return parameters, ED parameters. May take Benadryl at night to help sleep for the next couple nights, avoid daytime use due to risk of falls. May return in 1 week if patient interested in close monitoring. Patient reassured he does not need antibiotics, Ativan at this time.

## 2023-09-18 ENCOUNTER — OFFICE VISIT (OUTPATIENT)
Age: 73
End: 2023-09-18
Payer: MEDICARE

## 2023-09-18 VITALS
RESPIRATION RATE: 16 BRPM | DIASTOLIC BLOOD PRESSURE: 74 MMHG | OXYGEN SATURATION: 94 % | SYSTOLIC BLOOD PRESSURE: 122 MMHG | WEIGHT: 194.2 LBS | HEIGHT: 64 IN | HEART RATE: 80 BPM | TEMPERATURE: 96.4 F | BODY MASS INDEX: 33.16 KG/M2

## 2023-09-18 DIAGNOSIS — G47.62 NOCTURNAL LEG CRAMPS: Primary | ICD-10-CM

## 2023-09-18 PROCEDURE — 99213 OFFICE O/P EST LOW 20 MIN: CPT | Performed by: FAMILY MEDICINE

## 2023-09-18 NOTE — ASSESSMENT & PLAN NOTE
Intermittent, once every 2 weeks and nighttime improving on ambulation over the past 2 years. Very low water intake. Most recent labs showed normal potassium. Based on infrequent symptoms and very low water more likely secondary to dehydration, possibly secondary to medications. Due for labs. Plan  · Increase water intake, currently drinking caffeinated and sugary drinks. Goal of increasing slowly to 64 ounces between first thing in the morning to 6 PM around dinner over the next 2 weeks  · Follow-up with labs  · Has a scheduled appointment with PCP in October.

## 2023-09-18 NOTE — PROGRESS NOTES
Name: Juan Jose Aquino      : 1950      MRN: [id number]  Encounter Provider: Pavan Levy MD  Encounter Date: 2023   Encounter department: [address]     1. Nocturnal leg cramps  Assessment & Plan:  Intermittent, once every 2 weeks and nighttime improving on ambulation over the past 2 years. Very low water intake. Most recent labs showed normal potassium. Based on infrequent symptoms and very low water more likely secondary to dehydration, possibly secondary to medications. Due for labs. Plan  · Increase water intake, currently drinking caffeinated and sugary drinks. Goal of increasing slowly to 64 ounces between first thing in the morning to 6 PM around dinner over the next 2 weeks  · Follow-up with labs  · Has a scheduled appointment with PCP in October. Return for scheduled wth Dr. Carrie Castillo in October. .      Subjective      Aashish Marc with PMH of HTN, GERD, type 2 diabetes on insulin, hyperlipidemia, BERTA, stage III CKD, mitral valve disorder, anxiety disorder, history of skin cancer. [address] cardiology, endocrinology, dermatology, ENT. Today patient reports leg cramps that has been going on for two years now intermittently. It changes between right and left. It happens once every week or two that is progressively getting worse. No numbness and tingling more cramping pain and spasms. They come on at night and once patient walks around on it it improves. In terms of hydration - coffee, diet coke, water, ice tea. Seldomly drinking water. Review of Systems   Constitutional: Negative for chills, fever and unexpected weight change. HENT: Negative for congestion, rhinorrhea, sore throat and trouble swallowing. Eyes: Negative for visual disturbance. Respiratory: Negative for chest tightness, shortness of breath and wheezing. Cardiovascular: Negative for chest pain.    Gastrointestinal: Negative for abdominal pain, constipation, diarrhea, nausea and vomiting. Endocrine: Negative for polyuria. Genitourinary: Negative for frequency. Skin: Negative for color change and rash. Neurological: Negative for dizziness, weakness, light-headedness and headaches. Psychiatric/Behavioral: Negative for confusion. Current Outpatient Medications on File Prior to Visit   Medication Sig   • atorvastatin (LIPITOR) 10 mg tablet TAKE 1 TABLET BY MOUTH EVERY DAY   • BD Pen Needle Audelia U/F 32G X 4 MM MISC ONCE DAILY AT BEDTIME UNDER THE SKIN   • cyclobenzaprine (FLEXERIL) 5 mg tablet TAKE 1 TABLET BY MOUTH THREE TIMES A DAY AS NEEDED FOR MUSCLE SPASMS   • fluticasone (FLONASE) 50 mcg/act nasal spray SPRAY 1 SPRAY INTO EACH NOSTRIL EVERY DAY   • Fluticasone-Salmeterol (Advair Diskus) 100-50 mcg/dose inhaler Inhale 1 puff 2 (two) times a day Rinse mouth after use. • glimepiride (AMARYL) 4 mg tablet TAKE 1 TABLET BY MOUTH TWICE A DAY   • glucose blood (FREESTYLE LITE) test strip PT TO TEST BLOOD SUGAR ONCE DAILY DX:E11.22 (Patient taking differently: Pt states he tests at least twice a day)   • glucose blood (FREESTYLE LITE) test strip USE TO TEST ONCE DAILY (DX: E11.65)   • Januvia 100 MG tablet TAKE 1 TABLET BY MOUTH EVERY DAY   • ketoconazole (NIZORAL) 2 % cream APPLY 1 APPLICATION TOPICALLY IN THE MORNING AND 1 APPLICATION IN THE EVENING. APPLIED TO FACE AND SCALP TWICE A DAY FOR 3 WEEKS REPEAT AS NEEDED.    • Lantus SoloStar 100 units/mL SOPN INJECT 12 UNITS UNDER THE SKIN DAILY   • LORazepam (ATIVAN) 1 mg tablet Take 1 tablet (1 mg total) by mouth as needed for anxiety   • metFORMIN (GLUCOPHAGE-XR) 500 mg 24 hr tablet TAKE 2 TABLETS BY MOUTH TWICE A DAY WITH FOOD   • montelukast (SINGULAIR) 10 mg tablet TAKE 1 TABLET BY MOUTH EVERYDAY AT BEDTIME   • omeprazole (PriLOSEC) 20 mg delayed release capsule TAKE 1 CAPSULE BY MOUTH EVERY DAY   • sildenafil (VIAGRA) 50 MG tablet Take 1 tablet (50 mg total) by mouth daily as needed for erectile dysfunction   • sodium chloride (OCEAN) 0.65 % nasal spray 1 spray into each nostril as needed for congestion    • triamcinolone (KENALOG) 0.025 % cream Apply topically as needed    • verapamil (VERELAN PM) 180 MG 24 hr capsule TAKE 1 CAPSULE BY MOUTH 2 TIMES A DAY. • clotrimazole-betamethasone (LOTRISONE) 1-0.05 % cream APPLY TOPICALLY 2 (TWO) TIMES A DAY AS NEEDED (RASH) (Patient not taking: Reported on 9/11/2023)   • EPINEPHrine (EPIPEN) 0.3 mg/0.3 mL SOAJ Inject 0.3 mL (0.3 mg total) into a muscle once for 1 dose       Objective     /74 (BP Location: Left arm, Patient Position: Sitting, Cuff Size: Standard)   Pulse 80   Temp (!) 96.4 °F (35.8 °C) (Tympanic)   Resp 16   Ht 5' 4" (1.626 m)   Wt 88.1 kg (194 lb 3.2 oz)   SpO2 94%   BMI 33.33 kg/m²     Physical Exam  Vitals and nursing note reviewed. Constitutional:       General: He is not in acute distress. Appearance: Normal appearance. He is not ill-appearing, toxic-appearing or diaphoretic. HENT:      Head: Normocephalic and atraumatic. Right Ear: External ear normal.      Left Ear: External ear normal.      Nose: Nose normal.      Mouth/Throat:      Mouth: Mucous membranes are moist.   Eyes:      General: No scleral icterus. Right eye: No discharge. Left eye: No discharge. Extraocular Movements: Extraocular movements intact. Conjunctiva/sclera: Conjunctivae normal.   Cardiovascular:      Rate and Rhythm: Normal rate and regular rhythm. Pulses: Normal pulses. Pulmonary:      Effort: Pulmonary effort is normal.      Breath sounds: Normal breath sounds. Abdominal:      Palpations: Abdomen is soft. Tenderness: There is no abdominal tenderness. Musculoskeletal:         General: No swelling. Cervical back: Normal range of motion. Right lower leg: No edema. Left lower leg: No edema. Skin:     Capillary Refill: Capillary refill takes less than 2 seconds. Neurological:      General: No focal deficit present. Mental Status: He is alert and oriented to person, place, and time. Psychiatric:         Mood and Affect: Mood normal.         Behavior: Behavior normal.         Thought Content:  Thought content normal.       French Abreu MD

## 2023-10-02 ENCOUNTER — APPOINTMENT (OUTPATIENT)
Age: 73
End: 2023-10-02
Payer: MEDICARE

## 2023-10-02 DIAGNOSIS — N18.31 TYPE 2 DIABETES MELLITUS WITH STAGE 3A CHRONIC KIDNEY DISEASE, WITHOUT LONG-TERM CURRENT USE OF INSULIN (HCC): Chronic | ICD-10-CM

## 2023-10-02 DIAGNOSIS — E11.22 TYPE 2 DIABETES MELLITUS WITH STAGE 3A CHRONIC KIDNEY DISEASE, WITHOUT LONG-TERM CURRENT USE OF INSULIN (HCC): Chronic | ICD-10-CM

## 2023-10-02 LAB
ANION GAP SERPL CALCULATED.3IONS-SCNC: 12 MMOL/L
BUN SERPL-MCNC: 21 MG/DL (ref 5–25)
CALCIUM SERPL-MCNC: 9.2 MG/DL (ref 8.4–10.2)
CHLORIDE SERPL-SCNC: 104 MMOL/L (ref 96–108)
CO2 SERPL-SCNC: 26 MMOL/L (ref 21–32)
CREAT SERPL-MCNC: 1.22 MG/DL (ref 0.6–1.3)
CREAT UR-MCNC: 80 MG/DL
EST. AVERAGE GLUCOSE BLD GHB EST-MCNC: 194 MG/DL
GFR SERPL CREATININE-BSD FRML MDRD: 58 ML/MIN/1.73SQ M
GLUCOSE P FAST SERPL-MCNC: 129 MG/DL (ref 65–99)
HBA1C MFR BLD: 8.4 %
MICROALBUMIN UR-MCNC: 23.9 MG/L
MICROALBUMIN/CREAT 24H UR: 30 MG/G CREATININE (ref 0–30)
POTASSIUM SERPL-SCNC: 3.6 MMOL/L (ref 3.5–5.3)
SODIUM SERPL-SCNC: 142 MMOL/L (ref 135–147)

## 2023-10-02 PROCEDURE — 82570 ASSAY OF URINE CREATININE: CPT

## 2023-10-02 PROCEDURE — 83036 HEMOGLOBIN GLYCOSYLATED A1C: CPT

## 2023-10-02 PROCEDURE — 36415 COLL VENOUS BLD VENIPUNCTURE: CPT

## 2023-10-02 PROCEDURE — 80048 BASIC METABOLIC PNL TOTAL CA: CPT

## 2023-10-02 PROCEDURE — 82043 UR ALBUMIN QUANTITATIVE: CPT

## 2023-10-04 DIAGNOSIS — N18.31 TYPE 2 DIABETES MELLITUS WITH STAGE 3A CHRONIC KIDNEY DISEASE, WITHOUT LONG-TERM CURRENT USE OF INSULIN (HCC): Chronic | ICD-10-CM

## 2023-10-04 DIAGNOSIS — E11.22 TYPE 2 DIABETES MELLITUS WITH STAGE 3A CHRONIC KIDNEY DISEASE, WITHOUT LONG-TERM CURRENT USE OF INSULIN (HCC): Chronic | ICD-10-CM

## 2023-10-04 RX ORDER — PEN NEEDLE, DIABETIC 32GX 5/32"
NEEDLE, DISPOSABLE MISCELLANEOUS
Qty: 100 EACH | Refills: 1 | Status: SHIPPED | OUTPATIENT
Start: 2023-10-04

## 2023-10-10 ENCOUNTER — OFFICE VISIT (OUTPATIENT)
Age: 73
End: 2023-10-10
Payer: MEDICARE

## 2023-10-10 VITALS
TEMPERATURE: 96.5 F | OXYGEN SATURATION: 98 % | SYSTOLIC BLOOD PRESSURE: 122 MMHG | HEIGHT: 65 IN | BODY MASS INDEX: 32.26 KG/M2 | HEART RATE: 90 BPM | DIASTOLIC BLOOD PRESSURE: 72 MMHG | WEIGHT: 193.6 LBS

## 2023-10-10 DIAGNOSIS — E78.2 MIXED HYPERLIPIDEMIA DUE TO TYPE 2 DIABETES MELLITUS: Chronic | ICD-10-CM

## 2023-10-10 DIAGNOSIS — F41.9 ANXIETY: ICD-10-CM

## 2023-10-10 DIAGNOSIS — Z79.4 TYPE 2 DIABETES MELLITUS WITH STAGE 3A CHRONIC KIDNEY DISEASE, WITH LONG-TERM CURRENT USE OF INSULIN (HCC): Primary | ICD-10-CM

## 2023-10-10 DIAGNOSIS — I12.9 HYPERTENSIVE KIDNEY DISEASE WITH STAGE 3A CHRONIC KIDNEY DISEASE (HCC): ICD-10-CM

## 2023-10-10 DIAGNOSIS — E11.69 MIXED HYPERLIPIDEMIA DUE TO TYPE 2 DIABETES MELLITUS: Chronic | ICD-10-CM

## 2023-10-10 DIAGNOSIS — N52.1 ERECTILE DYSFUNCTION DUE TO DISEASES CLASSIFIED ELSEWHERE: ICD-10-CM

## 2023-10-10 DIAGNOSIS — N18.31 TYPE 2 DIABETES MELLITUS WITH STAGE 3A CHRONIC KIDNEY DISEASE, WITH LONG-TERM CURRENT USE OF INSULIN (HCC): Primary | ICD-10-CM

## 2023-10-10 DIAGNOSIS — E11.22 TYPE 2 DIABETES MELLITUS WITH STAGE 3A CHRONIC KIDNEY DISEASE, WITH LONG-TERM CURRENT USE OF INSULIN (HCC): Primary | ICD-10-CM

## 2023-10-10 DIAGNOSIS — N18.31 HYPERTENSIVE KIDNEY DISEASE WITH STAGE 3A CHRONIC KIDNEY DISEASE (HCC): ICD-10-CM

## 2023-10-10 PROBLEM — G47.62 NOCTURNAL LEG CRAMPS: Status: RESOLVED | Noted: 2023-09-18 | Resolved: 2023-10-10

## 2023-10-10 PROBLEM — B34.9 VIRAL INFECTION: Status: RESOLVED | Noted: 2023-09-11 | Resolved: 2023-10-10

## 2023-10-10 PROCEDURE — 99214 OFFICE O/P EST MOD 30 MIN: CPT | Performed by: INTERNAL MEDICINE

## 2023-10-10 RX ORDER — SILDENAFIL 50 MG/1
50 TABLET, FILM COATED ORAL DAILY PRN
Qty: 6 TABLET | Refills: 9 | Status: SHIPPED | OUTPATIENT
Start: 2023-10-10

## 2023-10-10 RX ORDER — LORAZEPAM 1 MG/1
1 TABLET ORAL AS NEEDED
Qty: 90 TABLET | Refills: 0 | Status: CANCELLED | OUTPATIENT
Start: 2023-10-10

## 2023-10-10 NOTE — PATIENT INSTRUCTIONS
Type 2 Diabetes Management for Adults   WHAT YOU NEED TO KNOW:   What do I need to know about type 2 diabetes management? Type 2 diabetes is a disease that affects how your body uses glucose (sugar). Either your body cannot make enough insulin, or it cannot use the insulin correctly. It is important to keep diabetes controlled to prevent damage to your heart, blood vessels, and other organs. Management will help you feel well and enjoy your daily activities. Your diabetes care team providers can help you make a plan to fit diabetes care into your schedule. Your plan can change over time to fit your needs and your family's needs. What do I need to know about high blood sugar levels? High blood sugar levels may not cause any symptoms. You may feel more thirsty or urinate more often than usual. Over time, high blood sugar levels can damage your nerves, blood vessels, tissues, and organs. The following can increase your blood sugar levels:  Large meals or large amounts of carbohydrates at one time    Less physical activity    Stress    Illness    A lower dose of diabetes medicine or insulin, or a late dose    What do I need to know about low blood sugar levels? Symptoms include feeling shaky, dizzy, irritable, or confused. You can prevent symptoms by keeping your blood sugar levels from going too low. Treat a low blood sugar level right away:      Drink 4 ounces of juice or have 1 tube of glucose gel. Check your blood sugar level again 10 to 15 minutes later. When the level goes back to normal, eat a meal or snack to prevent another decrease. Keep glucose gel, raisins, or hard candy with you at all times to treat a low blood sugar level. Your blood sugar level can get too low if you take diabetes medicine or insulin and do not eat enough food. If you use insulin, check your blood sugar level before you exercise.       If your blood sugar level is below 100 mg/dL, eat 4 crackers or 2 ounces of raisins, or drink 4 ounces of juice. Check your level every 30 minutes if you exercise longer than 1 hour. You may need a snack during or after exercise. What can I do to manage my blood sugar levels? Check your blood sugar levels as directed and as needed. Several items are available to use to check your levels. You may need to check by testing a drop of blood in a glucose monitor. You may instead be given a continuous glucose monitoring (CGM) device. The device is worn at all times. The CGM checks your blood sugar level every 5 minutes. It sends results to an electronic device such as a smart phone. A CGM can be used with or without an insulin pump. You and your diabetes care team providers will decide on the best method for you. The goal for blood sugar levels before meals  is between 80 and 130 mg/dL and 2 hours after eating  is lower than 180 mg/dL. Make healthy food choices. Work with a dietitian to create a meal plan that works for you and your schedule. A dietitian can help you learn how to eat the right amount of carbohydrates (sugar and starchy foods) during your meals and snacks. Examples of carbohydrates are breads, cereals, rice, pasta, fruit, low-fat dairy, and sweets. Carbohydrates can raise your blood sugar level if you eat too many at one time. Eat high-fiber foods as directed. Fiber helps improve blood sugar levels. Fiber also lowers your risk for heart disease and other problems diabetes can cause. Examples of high-fiber foods include vegetables, whole-grain bread, and beans such as norris beans. Your dietitian can tell you how much fiber to have each day. Get regular physical activity. Physical activity can help you get to your target blood sugar level goal and manage your weight. Get at least 150 minutes of moderate to vigorous aerobic physical activity each week. Resistance training, such as lifting weights, should be done 3 times each week.  Do not miss more than 2 days of physical activity in a row. Do not sit longer than 30 minutes at a time. Your healthcare provider can help you create an activity plan. The plan can include the best activities for you and can help you build your strength and endurance. Maintain a healthy weight. Ask your team what a healthy weight is for you. A healthy weight can help you control diabetes and prevent heart disease. Ask your team to help you create a weight-loss plan, if needed. Even a loss of 3% to 7% of your excess body weight can help make a difference in managing diabetes. Your team will help you set a weight-loss goal, such as 10 to 15 pounds, or 5% of your extra weight. Together you and your team can set manageable weight-loss goals. Take your diabetes medicine or insulin as directed. You may need diabetes medicine, insulin, or both to help control your blood sugar levels. Your provider will teach you how and when to take your diabetes medicine or insulin. You will also be taught about side effects oral diabetes medicine can cause. Insulin may be injected or given through a pump or pen. You and your providers will decide on the best method for you: An insulin pump  is an implanted device that gives your insulin 24 hours a day. An insulin pump prevents the need for multiple insulin injections in a day. An insulin pen  is a device prefilled with the right amount of insulin. You and your family members will be taught how to draw up and give insulin  if this is the best method for you. Your providers will also teach you how to dispose of needles and syringes. You will learn how much insulin you need  and when to give it. You will be taught when not to give insulin. You will also be taught what to do if your blood sugar level drops too low. This may happen if you take insulin and do not eat the right amount of carbohydrates. What else can I do to manage type 2 diabetes?    Wear medical alert identification. Wear medical alert jewelry or carry a card that says you have diabetes. Ask your care team provider where to get these items. Do not smoke. Nicotine and other chemicals in cigarettes and cigars can cause lung and blood vessel damage. It also makes it more difficult to manage your diabetes. Ask your provider for information if you currently smoke and need help to quit. Do not use e-cigarettes or smokeless tobacco in place of cigarettes or to help you quit. They still contain nicotine. Check your feet each day for cuts, scratches, calluses, or other wounds. Look for redness and swelling, and feel for warmth. Wear shoes that fit well. Check your shoes for rocks or other objects that can hurt your feet. Do not walk barefoot or wear shoes without socks. Wear cotton socks to help keep your feet dry. Ask about vaccines you may need. You have a higher risk for serious illness if you get the flu, pneumonia, COVID-19, or hepatitis. Ask your provider if you should get vaccines to prevent these or other diseases, and when to get the vaccines. Talk to your provider if you become stressed about diabetes care. Sometimes being able to fit diabetes care into your life can cause increased stress. The stress can cause you not to take care of yourself properly. Your provider can help by offering tips about self-care. A mental health provider can listen and offer help with self-care issues. Other types of counseling can help you make nutrition or physical activity changes. Have your A1c checked as directed. Your provider may check your A1c every 3 months, or 2 times each year if your diabetes is controlled. An A1c test shows the average amount of sugar in your blood over the past 2 to 3 months. Your provider will tell you what your A1c level should be. Have screening tests as directed.   Your provider may recommend screening for complications of diabetes and other conditions that may develop. Some screenings may begin right away and some may happen within the first 5 years of diagnosis:    Examples of diabetes complications  include kidney problems, high cholesterol, high blood pressure, blood vessel problems, eye problems, and sleep apnea. You may be screened for a low vitamin B level  if you take oral diabetes medicine for a long time. You may be screened for polycystic ovarian syndrome (PCOS)  if you are of childbearing age. Have someone call your local emergency number (911 in the [address]) if:   You cannot be woken. You have signs of diabetic ketoacidosis:     confusion, fatigue    vomiting    rapid heartbeat    fruity smelling breath    extreme thirst    dry mouth and skin    You have any of the following signs of a heart attack:      Squeezing, pressure, or pain in your chest    You may  also have any of the following:     Discomfort or pain in your back, neck, jaw, stomach, or arm    Shortness of breath    Nausea or vomiting    Lightheadedness or a sudden cold sweat    You have any of the following signs of a stroke:      Numbness or drooping on one side of your face     Weakness in an arm or leg    Confusion or difficulty speaking    Dizziness, a severe headache, or vision loss    When should I call my doctor or diabetes care team provider? You have a sore or wound that will not heal.    You have a change in the amount you urinate. Your blood sugar levels are higher than your target goals. You often have lower blood sugar levels than your target goals. Your skin is red, dry, warm, or swollen. You have trouble coping with diabetes, or you feel anxious or depressed. You have trouble following any part of your care plan, such as your meal plan. You have questions or concerns about your condition or care. CARE AGREEMENT:   You have the right to help plan your care. Learn about your health condition and how it may be treated.  Discuss treatment options with your healthcare providers to decide what care you want to receive. You always have the right to refuse treatment. The above information is an  only. It is not intended as medical advice for individual conditions or treatments. Talk to your doctor, nurse or pharmacist before following any medical regimen to see if it is safe and effective for you. © Copyright Alexandra Campa 2023 Information is for End User's use only and may not be sold, redistributed or otherwise used for commercial purposes.

## 2023-10-10 NOTE — ASSESSMENT & PLAN NOTE
Lab Results   Component Value Date    EGFR 58 10/02/2023    EGFR 61 07/19/2023    EGFR 61 04/06/2023    CREATININE 1.22 10/02/2023    CREATININE 1.17 07/19/2023    CREATININE 1.17 04/06/2023     Creatinine/eGFR stable at this time. No significant risk for dialysis in the future at this time. Cannot tolerate SGLT2 inhibitors. Blood pressure is well controlled in the office today and will continue current antihypertensive medications. Continue to monitor kidney function closely. Avoid excess salt. Stay well-hydrated.

## 2023-10-10 NOTE — ASSESSMENT & PLAN NOTE
Brief counseling provided. Anxiety over health, finances, state of the world/politics. Would not advise use of lorazepam. Ok to try CBD/THC.

## 2023-10-10 NOTE — PROGRESS NOTES
Name: Evie Desouza      : 1950      MRN: [id number]  Encounter Provider: Pilar Kang DO  Encounter Date: 10/10/2023   Encounter department: [address] [facility]     1. Type 2 diabetes mellitus with stage 3a chronic kidney disease, with long-term current use of insulin ([address])  Assessment & Plan:  Patient's A1c is back up due to poor dietary habits. Needs to be more active. Needs to stop stress eating. Needs to drink plenty of water throughout the day. Eat more lean protein, fish, fruits, vegetables. Avoid eating out, ordering out, processed foods/sugars. Follow-up with endocrinology as scheduled. Can go up to 15 units of Lantus. Discussed hypoglycemia risk. Lab Results   Component Value Date    HGBA1C 8.4 (H) 10/02/2023         2. Mixed hyperlipidemia due to type 2 diabetes mellitus   Assessment & Plan:  Continue statin as prescribed. We will continue to monitor lipid panel. Needs to change diet. Orders:  -     Hemoglobin A1C; Future; Expected date: 01/10/2024  -     Lipid panel; Future; Expected date: 01/10/2024  -     Albumin / creatinine urine ratio; Future; Expected date: 01/10/2024  -     Comprehensive metabolic panel; Future; Expected date: 01/10/2024    3. Hypertensive kidney disease with stage 3a chronic kidney disease [facility])  Assessment & Plan:  Lab Results   Component Value Date    EGFR 58 10/02/2023    EGFR 61 2023    EGFR 61 2023    CREATININE 1.22 10/02/2023    CREATININE 1.17 2023    CREATININE 1.17 2023     Creatinine/eGFR stable at this time. No significant risk for dialysis in the future at this time. Cannot tolerate SGLT2 inhibitors. Blood pressure is well controlled in the office today and will continue current antihypertensive medications. Continue to monitor kidney function closely. Avoid excess salt. Stay well-hydrated. 4. Anxiety  Assessment & Plan:  Brief counseling provided.  Anxiety over health, finances, state of the world/politics. Would not advise use of lorazepam. Ok to try CBD/THC. 5. Erectile dysfunction due to diseases classified elsewhere  -     sildenafil (VIAGRA) 50 MG tablet; Take 1 tablet (50 mg total) by mouth daily as needed for erectile dysfunction      Depression Screening and Follow-up Plan: Patient was screened for depression during today's encounter. They screened negative with a PHQ-2 score of 1. No follow-ups on file. Subjective      Patient presents for routine follow-up. He has been eating poorly and has increased anxiety over his health, finances, state of the world. He admits to being a stress eater. Having some rotator cuff problems as well on the left side. A1c is back up to 8.4%. Admits that he could do better with his eating. Review of Systems   Constitutional: Negative. Respiratory: Negative. Cardiovascular: Negative. Gastrointestinal: Negative. Genitourinary: Positive for frequency. ED   Musculoskeletal: Positive for arthralgias. Psychiatric/Behavioral: The patient is nervous/anxious. Objective     /72   Pulse 90   Temp (!) 96.5 °F (35.8 °C) (Tympanic)   Ht 5' 4.5" (1.638 m)   Wt 87.8 kg (193 lb 9.6 oz)   SpO2 98%   BMI 32.72 kg/m²     Physical Exam  Constitutional:       General: He is not in acute distress. Appearance: He is well-developed. He is obese. He is not diaphoretic. Neck:      Thyroid: No thyromegaly. Vascular: No JVD. Cardiovascular:      Rate and Rhythm: Normal rate and regular rhythm. Heart sounds: Normal heart sounds. No murmur heard. Pulmonary:      Effort: Pulmonary effort is normal. No respiratory distress. Breath sounds: Normal breath sounds. No wheezing or rales. Abdominal:      General: Bowel sounds are normal. There is no distension. Palpations: Abdomen is soft. There is no mass. Tenderness: There is no abdominal tenderness.  There is no guarding or rebound. Musculoskeletal:      Right lower leg: No edema. Left lower leg: No edema. Neurological:      Mental Status: He is alert.        Eliza Lilly DO

## 2023-10-10 NOTE — ASSESSMENT & PLAN NOTE
Patient's A1c is back up due to poor dietary habits. Needs to be more active. Needs to stop stress eating. Needs to drink plenty of water throughout the day. Eat more lean protein, fish, fruits, vegetables. Avoid eating out, ordering out, processed foods/sugars. Follow-up with endocrinology as scheduled. Can go up to 15 units of Lantus. Discussed hypoglycemia risk.     Lab Results   Component Value Date    HGBA1C 8.4 (H) 10/02/2023

## 2023-10-13 LAB
LEFT EYE DIABETIC RETINOPATHY: POSITIVE
RIGHT EYE DIABETIC RETINOPATHY: POSITIVE

## 2023-10-16 DIAGNOSIS — E11.22 TYPE 2 DIABETES MELLITUS WITH STAGE 3A CHRONIC KIDNEY DISEASE, WITHOUT LONG-TERM CURRENT USE OF INSULIN (HCC): Chronic | ICD-10-CM

## 2023-10-16 DIAGNOSIS — N18.31 TYPE 2 DIABETES MELLITUS WITH STAGE 3A CHRONIC KIDNEY DISEASE, WITHOUT LONG-TERM CURRENT USE OF INSULIN (HCC): Chronic | ICD-10-CM

## 2023-10-16 RX ORDER — INSULIN GLARGINE 100 [IU]/ML
12 INJECTION, SOLUTION SUBCUTANEOUS DAILY
Qty: 15 ML | Refills: 0 | Status: SHIPPED | OUTPATIENT
Start: 2023-10-16

## 2023-10-19 ENCOUNTER — APPOINTMENT (OUTPATIENT)
Age: 73
End: 2023-10-19
Payer: MEDICARE

## 2023-10-19 DIAGNOSIS — R04.0 EPISTAXIS: ICD-10-CM

## 2023-10-19 LAB
APTT PPP: 28 SECONDS (ref 23–37)
BASOPHILS # BLD AUTO: 0.04 THOUSANDS/ÂΜL (ref 0–0.1)
BASOPHILS NFR BLD AUTO: 1 % (ref 0–1)
EOSINOPHIL # BLD AUTO: 0.34 THOUSAND/ÂΜL (ref 0–0.61)
EOSINOPHIL NFR BLD AUTO: 5 % (ref 0–6)
ERYTHROCYTE [DISTWIDTH] IN BLOOD BY AUTOMATED COUNT: 12.7 % (ref 11.6–15.1)
HCT VFR BLD AUTO: 41.4 % (ref 36.5–49.3)
HGB BLD-MCNC: 14.1 G/DL (ref 12–17)
IMM GRANULOCYTES # BLD AUTO: 0.02 THOUSAND/UL (ref 0–0.2)
IMM GRANULOCYTES NFR BLD AUTO: 0 % (ref 0–2)
INR PPP: 1.11 (ref 0.84–1.19)
LYMPHOCYTES # BLD AUTO: 1.68 THOUSANDS/ÂΜL (ref 0.6–4.47)
LYMPHOCYTES NFR BLD AUTO: 23 % (ref 14–44)
MCH RBC QN AUTO: 30.7 PG (ref 26.8–34.3)
MCHC RBC AUTO-ENTMCNC: 34.1 G/DL (ref 31.4–37.4)
MCV RBC AUTO: 90 FL (ref 82–98)
MONOCYTES # BLD AUTO: 0.51 THOUSAND/ÂΜL (ref 0.17–1.22)
MONOCYTES NFR BLD AUTO: 7 % (ref 4–12)
NEUTROPHILS # BLD AUTO: 4.83 THOUSANDS/ÂΜL (ref 1.85–7.62)
NEUTS SEG NFR BLD AUTO: 64 % (ref 43–75)
NRBC BLD AUTO-RTO: 0 /100 WBCS
PLATELET # BLD AUTO: 255 THOUSANDS/UL (ref 149–390)
PMV BLD AUTO: 10.9 FL (ref 8.9–12.7)
PROTHROMBIN TIME: 14.2 SECONDS (ref 11.6–14.5)
RBC # BLD AUTO: 4.6 MILLION/UL (ref 3.88–5.62)
WBC # BLD AUTO: 7.42 THOUSAND/UL (ref 4.31–10.16)

## 2023-10-19 PROCEDURE — 85730 THROMBOPLASTIN TIME PARTIAL: CPT

## 2023-10-19 PROCEDURE — 85025 COMPLETE CBC W/AUTO DIFF WBC: CPT

## 2023-10-19 PROCEDURE — 85610 PROTHROMBIN TIME: CPT

## 2023-10-19 PROCEDURE — 36415 COLL VENOUS BLD VENIPUNCTURE: CPT

## 2023-10-25 DIAGNOSIS — Z79.4 TYPE 2 DIABETES MELLITUS WITH HYPERGLYCEMIA, WITH LONG-TERM CURRENT USE OF INSULIN (HCC): ICD-10-CM

## 2023-10-25 DIAGNOSIS — E11.65 TYPE 2 DIABETES MELLITUS WITH HYPERGLYCEMIA, WITH LONG-TERM CURRENT USE OF INSULIN (HCC): ICD-10-CM

## 2023-10-27 DIAGNOSIS — N18.2 TYPE 2 DIABETES MELLITUS WITH STAGE 2 CHRONIC KIDNEY DISEASE, WITHOUT LONG-TERM CURRENT USE OF INSULIN: Chronic | ICD-10-CM

## 2023-10-27 DIAGNOSIS — E11.22 TYPE 2 DIABETES MELLITUS WITH STAGE 2 CHRONIC KIDNEY DISEASE, WITHOUT LONG-TERM CURRENT USE OF INSULIN: Chronic | ICD-10-CM

## 2023-10-27 RX ORDER — GLIMEPIRIDE 4 MG/1
4 TABLET ORAL 2 TIMES DAILY
Qty: 180 TABLET | Refills: 1 | Status: SHIPPED | OUTPATIENT
Start: 2023-10-27

## 2023-11-02 ENCOUNTER — OFFICE VISIT (OUTPATIENT)
Dept: ENDOCRINOLOGY | Facility: CLINIC | Age: 73
End: 2023-11-02
Payer: MEDICARE

## 2023-11-02 VITALS
HEART RATE: 66 BPM | WEIGHT: 191.2 LBS | DIASTOLIC BLOOD PRESSURE: 82 MMHG | BODY MASS INDEX: 32.64 KG/M2 | HEIGHT: 64 IN | SYSTOLIC BLOOD PRESSURE: 138 MMHG

## 2023-11-02 DIAGNOSIS — I10 BENIGN ESSENTIAL HYPERTENSION: Chronic | ICD-10-CM

## 2023-11-02 DIAGNOSIS — E78.2 MIXED HYPERLIPIDEMIA DUE TO TYPE 2 DIABETES MELLITUS: Chronic | ICD-10-CM

## 2023-11-02 DIAGNOSIS — E11.22 TYPE 2 DIABETES MELLITUS WITH STAGE 3A CHRONIC KIDNEY DISEASE, WITHOUT LONG-TERM CURRENT USE OF INSULIN (HCC): Primary | Chronic | ICD-10-CM

## 2023-11-02 DIAGNOSIS — N18.31 TYPE 2 DIABETES MELLITUS WITH STAGE 3A CHRONIC KIDNEY DISEASE, WITHOUT LONG-TERM CURRENT USE OF INSULIN (HCC): Primary | Chronic | ICD-10-CM

## 2023-11-02 DIAGNOSIS — E11.69 MIXED HYPERLIPIDEMIA DUE TO TYPE 2 DIABETES MELLITUS: Chronic | ICD-10-CM

## 2023-11-02 PROCEDURE — 99214 OFFICE O/P EST MOD 30 MIN: CPT | Performed by: PHYSICIAN ASSISTANT

## 2023-11-02 NOTE — PROGRESS NOTES
Established Patient Progress Note      Chief Complaint   Patient presents with   • Diabetes Type 2          Impression & Plan:    Problem List Items Addressed This Visit        Endocrine    Mixed hyperlipidemia due to type 2 diabetes mellitus  (Chronic)     Continue atorvastatin. Lab Results   Component Value Date    HGBA1C 8.4 (H) 10/02/2023            Type 2 diabetes mellitus with stage 3a chronic kidney disease, without long-term current use of insulin ([facility]) - Primary (Chronic)     A1c has increased from 6.9-8.4. He reports that after he saw his A1c improvement to 6.9 he was less strict with his dietary habits. Since that time, he has made some dietary improvements and has been very active around the yard. Most blood sugars in the low 100s. Continue current regimen and lifestyle modification. He declines referral to dietitian at this time. He will be going back to [place] soon, advised him to make sure that he finds a way to stay active and keep up with his dietary improvements in [place]. Repeat lab testing as ordered by Dr. Melissa Feliciano. Advised him to send blood sugar readings to the office between visits if he noticed blood sugars are trending up or if he is having any hypoglycemia  Lab Results   Component Value Date    HGBA1C 8.4 (H) 10/02/2023               Cardiovascular and Mediastinum    Benign essential hypertension (Chronic)     Well-controlled on current medication            No orders of the defined types were placed in this encounter. History of Present Illness:   Riccardo Bautista is a 68 y.o. male with a history of type 2 diabetes with long term use of insulin since 1996. Reports complications of CKD and retinopathy. Denies recent illness or hospitalizations. Denies recent severe hypoglycemic or severe hyperglycemic episodes.  Denies any issues with his current regimen. home glucose monitoring: are performed regularly 3x per day and most readings are now 90s-low 100s    A1C improved to 6.9 in July and reports that he was more lax with diet after seeing that result. Since seeing recent A1C, improved diet and has been very active cutting [place] his [place] acre property. He is cutting/clearing the wood from his properly. He will be travelling to [facility] soon and returning in March. Hx ozempic intolerance (Rash)  HX SGLT2 inhibitor intolerance ( side effects)   Since last visit, he has had some trouble with nosebleeds and following with ENT.      Current regimen:   Glimepiride 4mg twice per day   Lantus 12 units daily at bedtime  Metformin ER 500mg- 2 tabs BID  Januvia 100mg daily     Last Eye Exam: UTD  Last Foot Exam: UTD    Has hypertension: Taking verapamil  Has hyperlipidemia: Taking atorvastatin        Patient Active Problem List   Diagnosis   • Actinic keratosis   • Seborrheic keratosis   • History of skin cancer   • Anxiety   • Benign essential hypertension   • Erectile dysfunction of non-organic origin   • Esophageal reflux   • Mixed hyperlipidemia due to type 2 diabetes mellitus    • Mitral valve disorder   • Rosacea   • Sleep apnea   • 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   • Thoracic radiculopathy   • Type 2 diabetes mellitus with stage 3a chronic kidney disease, without long-term current use of insulin (HCC)   • Stage 3a chronic kidney disease (HCC)   • Current use of insulin ([address])   • Hypertensive kidney disease with stage 3a chronic kidney disease ([address])      Past Medical History:   Diagnosis Date   • Actinic keratosis    • Adhesive capsulitis of unspecified shoulder    • Anxiety    • Asthma    • Atopic dermatitis    • Cardiac disease    • Cellulitis of right upper extremity    • Cervical radiculopathy    • Chest pain    • Chronic maxillary sinusitis     last assessed 01/21/14   • Diabetes mellitus ([address])    • Erectile dysfunction of non-organic origin    • Esophageal reflux    • Gout     last assessed 08/26/15   • Hearing loss, conductive    • Heart murmur     Mitral Valve disorder   • Henoch-Schonlein purpura ([address]) 8/6/2019   • Herpes zoster    • History of paroxysmal supraventricular tachycardia    • Hypertension    • 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   • Mitral valve disorder    • Nasal congestion 4/18/19   • Neoplasm of skin, malignant    • Non-melanoma skin cancer     last assessed 10/26/17   • Nosebleed 4/18/19   • Obesity    • Palpitations     last assessed 03/05/15   • Paroxysmal supraventricular tachycardia 1/21/2014   • Plantar fasciitis     last assessed 05/28/15   • PSVT (paroxysmal supraventricular tachycardia)    • Sciatica    • Sleep apnea    • Squamous cell carcinoma of skin of scalp    • Tinnitus, unspecified ear    • Type 2 diabetes mellitus with hyperglycemia ([address])     last assessed 63/08/20   • Umbilical hernia    • Worms in stool     last assessed 08/26/15      Past Surgical History:   Procedure Laterality Date   • APPENDECTOMY     • COLECTOMY      Partial, Sigmoid   • COLONOSCOPY     • HEAD & NECK SKIN LESION EXCISIONAL BIOPSY      10/11/10 SCC --  10/17/11 SCC  --  Location Scalp   • HERNIA REPAIR     • NASAL/SINUS ENDOSCOPY N/A 8/6/2019    Procedure: FUNCTIONAL ENDOSCOPIC SINUS SURGERY (FESS) IMAGED GUIDED for control of epistaxis; Surgeon: Delman Mortimer, MD;  Location: [facility];  Service: ENT   • IA ESOPHAGOGASTRODUODENOSCOPY TRANSORAL DIAGNOSTIC N/A 8/9/2017    Procedure: ESOPHAGOGASTRODUODENOSCOPY (EGD); Surgeon: Lis Lindsey MD;  Location: [facility];   Service: Gastroenterology   • SKIN BIOPSY     • TONSILLECTOMY        Family History   Problem Relation Age of Onset   • Cancer Mother         Breast + Skin   • Diabetes Mother    • Heart disease Mother    • Heart attack Mother    • Breast cancer Mother         Adenocarcinoma   • Skin cancer Mother         Adenocarcinoma   • Heart failure Mother    • Diabetes type II Mother    • Varicose Veins Mother         Lower Extremities   • Cancer Father         Prostate + Skin   • Prostate cancer Father         Adenocarcinoma   • Skin cancer Father    • Lymphoma Father         Non-Hodgkin's   • Arthritis Family      Social History     Tobacco Use   • Smoking status: Former     Packs/day: 1.00     Years: 20.00     Total pack years: 20.00     Types: Cigarettes     Quit date: 1977     Years since quittin.8   • Smokeless tobacco: Never   Substance Use Topics   • Alcohol use:  Yes     Alcohol/week: 1.0 standard drink of alcohol     Types: 1 Standard drinks or equivalent per week     Comment: only occasionally     Allergies   Allergen Reactions   • Iodinated Contrast Media Anaphylaxis   • Shellfish Allergy - Food Allergy Anaphylaxis   • Shrimp Extract Allergy Skin Test - Food Allergy Anaphylaxis   • Empagliflozin Hives   • Farxiga [Dapagliflozin] Rash     Yeast infection   • Ozempic (0.25 Or 0.5 Mg-Dose) [Semaglutide(0.25 Or 0.5mg-Dos)] Rash         Current Outpatient Medications:   •  atorvastatin (LIPITOR) 10 mg tablet, TAKE 1 TABLET BY MOUTH EVERY DAY, Disp: 90 tablet, Rfl: 3  •  BD Pen Needle Audelia 2nd Gen 32G X 4 MM MISC, ONCE DAILY AT BEDTIME UNDER THE SKIN, Disp: 100 each, Rfl: 1  •  cyclobenzaprine (FLEXERIL) 5 mg tablet, TAKE 1 TABLET BY MOUTH THREE TIMES A DAY AS NEEDED FOR MUSCLE SPASMS, Disp: 60 tablet, Rfl: 5  •  EPINEPHrine (EPIPEN) 0.3 mg/0.3 mL SOAJ, Inject 0.3 mL (0.3 mg total) into a muscle once for 1 dose, Disp: 0.6 mL, Rfl: 0  •  fluticasone (FLONASE) 50 mcg/act nasal spray, SPRAY 1 SPRAY INTO EACH NOSTRIL EVERY DAY, Disp: 24 mL, Rfl: 0  •  Fluticasone-Salmeterol (Advair Diskus) 100-50 mcg/dose inhaler, Inhale 1 puff 2 (two) times a day Rinse mouth after use., Disp: 60 blister, Rfl: 3  •  glimepiride (AMARYL) 4 mg tablet, TAKE 1 TABLET BY MOUTH TWICE A DAY, Disp: 180 tablet, Rfl: 1  •  glucose blood (FREESTYLE LITE) test strip, USE TO TEST ONCE DAILY (DX: E11.65), Disp: 100 strip, Rfl: 3  •  Insulin Glargine Solostar (Lantus SoloStar) 100 UNIT/ML SOPN, INJECT 12 UNITS UNDER THE SKIN DAILY, Disp: 15 mL, Rfl: 0  •  ketoconazole (NIZORAL) 2 % cream, APPLY 1 APPLICATION TOPICALLY IN THE MORNING AND 1 APPLICATION IN THE EVENING. APPLIED TO FACE AND SCALP TWICE A DAY FOR 3 WEEKS REPEAT AS NEEDED., Disp: 30 g, Rfl: 3  •  metFORMIN (GLUCOPHAGE-XR) 500 mg 24 hr tablet, TAKE 2 TABLETS BY MOUTH TWICE A DAY WITH FOOD, Disp: 360 tablet, Rfl: 0  •  montelukast (SINGULAIR) 10 mg tablet, TAKE 1 TABLET BY MOUTH EVERYDAY AT BEDTIME, Disp: 90 tablet, Rfl: 3  •  omeprazole (PriLOSEC) 20 mg delayed release capsule, TAKE 1 CAPSULE BY MOUTH EVERY DAY, Disp: 90 capsule, Rfl: 1  •  sildenafil (VIAGRA) 50 MG tablet, Take 1 tablet (50 mg total) by mouth daily as needed for erectile dysfunction, Disp: 6 tablet, Rfl: 9  •  sitaGLIPtin (Januvia) 100 mg tablet, Take 1 tablet (100 mg total) by mouth daily, Disp: 90 tablet, Rfl: 1  •  sodium chloride (OCEAN) 0.65 % nasal spray, 1 spray into each nostril as needed for congestion , Disp: , Rfl:   •  sulfamethoxazole-trimethoprim (BACTRIM DS) 800-160 mg per tablet, Take 1 tablet by mouth every 12 (twelve) hours for 10 days, Disp: 20 tablet, Rfl: 0  •  triamcinolone (KENALOG) 0.025 % cream, Apply topically as needed , Disp: , Rfl:   •  verapamil (VERELAN PM) 180 MG 24 hr capsule, TAKE 1 CAPSULE BY MOUTH 2 TIMES A DAY., Disp: 180 capsule, Rfl: 3    Review of Systems   Constitutional:  Negative for activity change, appetite change and fatigue. HENT:  Positive for nosebleeds. Negative for dental problem, sore throat, trouble swallowing and voice change. Eyes:  Positive for visual disturbance (difficulty reading, magnifiers help). Respiratory:  Negative for choking, chest tightness and shortness of breath. Cardiovascular:  Negative for chest pain, palpitations and leg swelling. Gastrointestinal:  Negative for abdominal pain, constipation and diarrhea. Endocrine: Negative for cold intolerance, heat intolerance, polydipsia, polyphagia and polyuria. Genitourinary:  Negative for frequency. Musculoskeletal:  Negative for arthralgias and myalgias. Skin:  Negative for rash. Neurological:  Negative for dizziness and syncope. Hematological:  Negative for adenopathy. Psychiatric/Behavioral:  Negative for sleep disturbance. All other systems reviewed and are negative. Physical Exam:  Body mass index is 32.82 kg/m². /82   Pulse 66   Ht 5' 4" (1.626 m)   Wt 86.7 kg (191 lb 3.2 oz)   BMI 32.82 kg/m²    Wt Readings from Last 3 Encounters:   11/02/23 86.7 kg (191 lb 3.2 oz)   10/30/23 87.5 kg (193 lb)   10/13/23 87.5 kg (193 lb)       Physical Exam  Vitals reviewed. Constitutional:       General: He is not in acute distress. Appearance: He is well-developed. HENT:      Head: Normocephalic and atraumatic. Eyes:      Conjunctiva/sclera: Conjunctivae normal.      Pupils: Pupils are equal, round, and reactive to light. Neck:      Thyroid: No thyromegaly. Cardiovascular:      Rate and Rhythm: Normal rate and regular rhythm. Heart sounds: Normal heart sounds. No murmur heard. Pulmonary:      Effort: Pulmonary effort is normal. No respiratory distress. Breath sounds: Normal breath sounds. No wheezing or rales. Abdominal:      General: Bowel sounds are normal. There is no distension. Palpations: Abdomen is soft. Tenderness: There is no abdominal tenderness. Musculoskeletal:         General: Normal range of motion. Cervical back: Normal range of motion and neck supple. Lymphadenopathy:      Cervical: No cervical adenopathy. Skin:     General: Skin is warm and dry. Neurological:      Mental Status: He is alert and oriented to person, place, and time.            Labs:   Lab Results   Component Value Date    HGBA1C 8.4 (H) 10/02/2023    HGBA1C 6.9 (H) 07/19/2023    HGBA1C 8.9 (H) 04/06/2023     Lab Results   Component Value Date    CREATININE 1.22 10/02/2023    CREATININE 1.17 07/19/2023    CREATININE 1.17 04/06/2023    BUN 21 10/02/2023     08/19/2015    K 3.6 10/02/2023     10/02/2023    CO2 26 10/02/2023     eGFR   Date Value Ref Range Status   10/02/2023 58 ml/min/1.73sq m Final     Lab Results   Component Value Date    CHOL 183 08/19/2015    HDL 41 04/06/2023    TRIG 119 04/06/2023     Lab Results   Component Value Date    ALT 25 04/06/2023    AST 14 04/06/2023    ALKPHOS 60 04/06/2023    BILITOT 0.8 08/19/2015     Lab Results   Component Value Date    [id number] 1.206 08/21/2019    [id number] 1.020 11/09/2017    [id number] 0.915 01/15/2016     Lab Results   Component Value Date    FREET4 0.92 08/21/2019             Discussed with the patient and all questioned fully answered. He will call me if any problems arise. Follow-up appointment in 3 months. Counseled patient on diagnostic results, prognosis, risk and benefit of treatment options, instruction for management, importance of treatment compliance, Risk  factor reduction and impressions    There are no Patient Instructions on file for this visit.     Alejandrina Chen PA-C

## 2023-11-02 NOTE — ASSESSMENT & PLAN NOTE
A1c has increased from 6.9-8.4. He reports that after he saw his A1c improvement to 6.9 he was less strict with his dietary habits. Since that time, he has made some dietary improvements and has been very active around the yard. Most blood sugars in the low 100s. Continue current regimen and lifestyle modification. He declines referral to dietitian at this time. He will be going back to [place] soon, advised him to make sure that he finds a way to stay active and keep up with his dietary improvements in [place]. Repeat lab testing as ordered by Dr. Carrie Castillo.   Advised him to send blood sugar readings to the office between visits if he noticed blood sugars are trending up or if he is having any hypoglycemia  Lab Results   Component Value Date    HGBA1C 8.4 (H) 10/02/2023

## 2023-11-21 ENCOUNTER — OFFICE VISIT (OUTPATIENT)
Age: 73
End: 2023-11-21
Payer: MEDICARE

## 2023-11-21 VITALS
SYSTOLIC BLOOD PRESSURE: 122 MMHG | TEMPERATURE: 97 F | OXYGEN SATURATION: 97 % | RESPIRATION RATE: 16 BRPM | WEIGHT: 188 LBS | BODY MASS INDEX: 32.27 KG/M2 | DIASTOLIC BLOOD PRESSURE: 78 MMHG | HEART RATE: 89 BPM

## 2023-11-21 DIAGNOSIS — J02.9 SORE THROAT: Primary | ICD-10-CM

## 2023-11-21 DIAGNOSIS — K12.2 UVULITIS: ICD-10-CM

## 2023-11-21 DIAGNOSIS — J02.9 ACUTE PHARYNGITIS, UNSPECIFIED ETIOLOGY: ICD-10-CM

## 2023-11-21 DIAGNOSIS — M79.10 MYALGIA: ICD-10-CM

## 2023-11-21 LAB — S PYO AG THROAT QL: NEGATIVE

## 2023-11-21 PROCEDURE — 87070 CULTURE OTHR SPECIMN AEROBIC: CPT | Performed by: EMERGENCY MEDICINE

## 2023-11-21 PROCEDURE — 87636 SARSCOV2 & INF A&B AMP PRB: CPT | Performed by: EMERGENCY MEDICINE

## 2023-11-21 PROCEDURE — 87880 STREP A ASSAY W/OPTIC: CPT | Performed by: EMERGENCY MEDICINE

## 2023-11-21 PROCEDURE — 99213 OFFICE O/P EST LOW 20 MIN: CPT | Performed by: EMERGENCY MEDICINE

## 2023-11-21 PROCEDURE — G0463 HOSPITAL OUTPT CLINIC VISIT: HCPCS | Performed by: EMERGENCY MEDICINE

## 2023-11-21 RX ORDER — METHYLPREDNISOLONE 4 MG/1
TABLET ORAL
Qty: 21 TABLET | Refills: 0 | Status: SHIPPED | OUTPATIENT
Start: 2023-11-21 | End: 2023-11-25

## 2023-11-21 RX ORDER — PENICILLIN V POTASSIUM 500 MG/1
500 TABLET ORAL 4 TIMES DAILY
Qty: 40 TABLET | Refills: 0 | Status: SHIPPED | OUTPATIENT
Start: 2023-11-21 | End: 2023-11-25

## 2023-11-21 NOTE — PATIENT INSTRUCTIONS
Tylenol 650 mg every 4 hours for fever  Motrin every 6 hours for fever  Check MyChart in 24 to 72 hours for your COVID/flu and throat culture results  If your COVID/flu results are positive, DON"T take the antibiotic  Follow-up with your PCP in 2 to 3 days  If symptoms get worse proceed to the ER - you may need a CT scan and if an abscess, you may need it drained in your throat  Encourage liquids and rest  Hot tea/honey  Gargle with warm salt water 3 x/day for sore throat

## 2023-11-21 NOTE — PROGRESS NOTES
[facility]        NAME: Trilby Alpers is a 68 y.o. male  : 1950    MRN: [id number]  DATE: 2023  TIME: 2:45 PM    Assessment and Plan   Sore throat [J02.9]  1. Sore throat  POCT rapid strepA    penicillin V potassium (VEETID) 500 mg tablet    Throat culture    r/o abscess post pharynx      2. Uvulitis  methylPREDNISolone (Medrol) 4 MG tablet therapy pack      3. Myalgia  Covid/Flu-Office Collect      4. Acute pharyngitis, unspecified etiology  penicillin V potassium (VEETID) 500 mg tablet        Rapid strep was negative-we will send a throat culture    A COVID/flu was also sent    Patient Instructions     Patient Instructions   Tylenol 650 mg every 4 hours for fever  Motrin every 6 hours for fever  Check MyChart in 24 to 72 hours for your COVID/flu and throat culture results  If your COVID/flu results are positive, DON"T take the antibiotic  Follow-up with your PCP in 2 to 3 days  If symptoms get worse proceed to the ER - you may need a CT scan and if an abscess, you may need it drained in your throat  Encourage liquids and rest  Hot tea/honey  Gargle with warm salt water 3 x/day for sore throat       Follow up with PCP in 3-5 days. Proceed to  ER if symptoms worsen. Chief Complaint     Chief Complaint   Patient presents with    Sore Throat     Pt states he has a sore throat and left sided earache for 4 days         History of Present Illness       71-year-old white male with a chief complaint of sore throat and left ear ache over the past 4 days. Patient is unsure of the fever however he states that his hat was soaking wet the other day. Patient has some body aches as well. Review of Systems   Review of Systems   Constitutional:  Positive for fatigue. Negative for diaphoresis and fever. HENT:  Positive for ear pain and sore throat. Negative for congestion and nosebleeds. Eyes:  Negative for photophobia, pain, discharge and visual disturbance.    Respiratory: Negative for cough, choking, chest tightness, shortness of breath and wheezing. Cardiovascular:  Negative for chest pain and palpitations. Gastrointestinal:  Negative for abdominal distention, abdominal pain, diarrhea and vomiting. Genitourinary:  Negative for dysuria, flank pain and frequency. Musculoskeletal:  Negative for back pain, gait problem and joint swelling. Skin:  Negative for color change and rash. Neurological:  Negative for dizziness, syncope and headaches. Psychiatric/Behavioral:  Negative for behavioral problems and confusion. The patient is not nervous/anxious. All other systems reviewed and are negative. Current Medications       Current Outpatient Medications:     atorvastatin (LIPITOR) 10 mg tablet, TAKE 1 TABLET BY MOUTH EVERY DAY, Disp: 90 tablet, Rfl: 3    BD Pen Needle Audelia 2nd Gen 32G X 4 MM MISC, ONCE DAILY AT BEDTIME UNDER THE SKIN, Disp: 100 each, Rfl: 1    cyclobenzaprine (FLEXERIL) 5 mg tablet, TAKE 1 TABLET BY MOUTH THREE TIMES A DAY AS NEEDED FOR MUSCLE SPASMS, Disp: 60 tablet, Rfl: 5    fluticasone (FLONASE) 50 mcg/act nasal spray, SPRAY 1 SPRAY INTO EACH NOSTRIL EVERY DAY, Disp: 24 mL, Rfl: 0    Fluticasone-Salmeterol (Advair Diskus) 100-50 mcg/dose inhaler, Inhale 1 puff 2 (two) times a day Rinse mouth after use., Disp: 60 blister, Rfl: 3    glimepiride (AMARYL) 4 mg tablet, TAKE 1 TABLET BY MOUTH TWICE A DAY, Disp: 180 tablet, Rfl: 1    glucose blood (FREESTYLE LITE) test strip, USE TO TEST ONCE DAILY (DX: E11.65), Disp: 100 strip, Rfl: 3    Insulin Glargine Solostar (Lantus SoloStar) 100 UNIT/ML SOPN, INJECT 12 UNITS UNDER THE SKIN DAILY, Disp: 15 mL, Rfl: 0    ketoconazole (NIZORAL) 2 % cream, APPLY 1 APPLICATION TOPICALLY IN THE MORNING AND 1 APPLICATION IN THE EVENING.  APPLIED TO FACE AND SCALP TWICE A DAY FOR 3 WEEKS REPEAT AS NEEDED., Disp: 30 g, Rfl: 3    metFORMIN (GLUCOPHAGE-XR) 500 mg 24 hr tablet, TAKE 2 TABLETS BY MOUTH TWICE A DAY WITH FOOD, Disp: 360 tablet, Rfl: 0    methylPREDNISolone (Medrol) 4 MG tablet therapy pack, Use as directed on package, Disp: 21 tablet, Rfl: 0    montelukast (SINGULAIR) 10 mg tablet, TAKE 1 TABLET BY MOUTH EVERYDAY AT BEDTIME, Disp: 90 tablet, Rfl: 3    omeprazole (PriLOSEC) 20 mg delayed release capsule, TAKE 1 CAPSULE BY MOUTH EVERY DAY, Disp: 90 capsule, Rfl: 1    penicillin V potassium (VEETID) 500 mg tablet, Take 1 tablet (500 mg total) by mouth 4 (four) times a day for 10 days, Disp: 40 tablet, Rfl: 0    sildenafil (VIAGRA) 50 MG tablet, Take 1 tablet (50 mg total) by mouth daily as needed for erectile dysfunction, Disp: 6 tablet, Rfl: 9    sitaGLIPtin (Januvia) 100 mg tablet, Take 1 tablet (100 mg total) by mouth daily, Disp: 90 tablet, Rfl: 1    sodium chloride (OCEAN) 0.65 % nasal spray, 1 spray into each nostril as needed for congestion , Disp: , Rfl:     triamcinolone (KENALOG) 0.025 % cream, Apply topically as needed , Disp: , Rfl:     verapamil (VERELAN PM) 180 MG 24 hr capsule, TAKE 1 CAPSULE BY MOUTH 2 TIMES A DAY., Disp: 180 capsule, Rfl: 3    EPINEPHrine (EPIPEN) 0.3 mg/0.3 mL SOAJ, Inject 0.3 mL (0.3 mg total) into a muscle once for 1 dose, Disp: 0.6 mL, Rfl: 0    Current Allergies     Allergies as of 11/21/2023 - Reviewed 11/21/2023   Allergen Reaction Noted    Iodinated contrast media Anaphylaxis 01/16/2014    Shellfish allergy - food allergy Anaphylaxis 01/16/2014    Shrimp extract allergy skin test - food allergy Anaphylaxis     Empagliflozin Hives 06/24/2019    Farxiga [dapagliflozin] Rash 10/19/2021    Ozempic (0.25 or 0.5 mg-dose) [semaglutide(0.25 or 0.5mg-dos)] Rash 11/30/2021            The following portions of the patient's history were reviewed and updated as appropriate: allergies, current medications, past family history, past medical history, past social history, past surgical history and problem list.     Past Medical History:   Diagnosis Date    Actinic keratosis     Adhesive capsulitis of unspecified shoulder     Anxiety     Asthma     Atopic dermatitis     Cardiac disease     Cellulitis of right upper extremity     Cervical radiculopathy     Chest pain     Chronic maxillary sinusitis     last assessed 01/21/14    Diabetes mellitus ([address])     Erectile dysfunction of non-organic origin     Esophageal reflux     Gout     last assessed 08/26/15    Hearing loss, conductive     Heart murmur     Mitral Valve disorder    Henoch-Schonlein purpura ([address]) 8/6/2019    Herpes zoster     History of paroxysmal supraventricular tachycardia     Hypertension     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    Mitral valve disorder     Nasal congestion 4/18/19    Neoplasm of skin, malignant     Non-melanoma skin cancer     last assessed 10/26/17    Nosebleed 4/18/19    Obesity     Palpitations     last assessed 03/05/15    Paroxysmal supraventricular tachycardia 1/21/2014    Plantar fasciitis     last assessed 05/28/15    PSVT (paroxysmal supraventricular tachycardia)     Sciatica     Sleep apnea     Squamous cell carcinoma of skin of scalp     Tinnitus, unspecified ear     Type 2 diabetes mellitus with hyperglycemia ([address])     last assessed 79/71/42    Umbilical hernia     Worms in stool     last assessed 08/26/15       Past Surgical History:   Procedure Laterality Date    APPENDECTOMY      COLECTOMY      Partial, Sigmoid    COLONOSCOPY      HEAD & NECK SKIN LESION EXCISIONAL BIOPSY      10/11/10 SCC --  10/17/11 SCC  --  Location Scalp    HERNIA REPAIR      NASAL/SINUS ENDOSCOPY N/A 8/6/2019    Procedure: FUNCTIONAL ENDOSCOPIC SINUS SURGERY (FESS) IMAGED GUIDED for control of epistaxis; Surgeon: Sapna Ann MD;  Location: [facility];  Service: ENT    AR ESOPHAGOGASTRODUODENOSCOPY TRANSORAL DIAGNOSTIC N/A 8/9/2017    Procedure: ESOPHAGOGASTRODUODENOSCOPY (EGD); Surgeon: Rona Dance, MD;  Location: [facility];   Service: Gastroenterology    SKIN BIOPSY      TONSILLECTOMY         Family History Problem Relation Age of Onset    Cancer Mother         Breast + Skin    Diabetes Mother     Heart disease Mother     Heart attack Mother     Breast cancer Mother         Adenocarcinoma    Skin cancer Mother         Adenocarcinoma    Heart failure Mother     Diabetes type II Mother     Varicose Veins Mother         Lower Extremities    Cancer Father         Prostate + Skin    Prostate cancer Father         Adenocarcinoma    Skin cancer Father     Lymphoma Father         Non-Hodgkin's    Arthritis Family          Medications have been verified. Objective   /78   Pulse 89   Temp (!) 97 °F (36.1 °C)   Resp 16   Wt 85.3 kg (188 lb)   SpO2 97%   BMI 32.27 kg/m²        Physical Exam     Physical Exam  Vitals and nursing note reviewed. Constitutional:       General: He is not in acute distress. Appearance: Normal appearance. He is not ill-appearing, toxic-appearing or diaphoretic. Comments: 66-year-old white male sitting on the exam table complaining of a sore throat and a lot of mucus production. HENT:      Head: Normocephalic and atraumatic. Right Ear: Tympanic membrane normal.      Left Ear: Tympanic membrane normal.      Nose: Nose normal.      Mouth/Throat:      Mouth: Mucous membranes are moist.      Pharynx: Pharyngeal swelling, posterior oropharyngeal erythema and uvula swelling present. No oropharyngeal exudate. Tonsils: No tonsillar exudate. 0 on the right. 3+ on the left. Comments: There is swelling of the uvula and also of the left posterior pharynx region. The posterior pharynx is also beefy red in nature. There is no swelling of the right posterior pharynx. This is consistent with a uvulitis and possibly an early abscess on the left  Eyes:      Extraocular Movements: Extraocular movements intact. Pupils: Pupils are equal, round, and reactive to light. Neck:      Vascular: No carotid bruit.    Cardiovascular:      Rate and Rhythm: Normal rate and regular rhythm. Pulses: Normal pulses. Pulmonary:      Effort: Pulmonary effort is normal.   Abdominal:      General: Abdomen is flat. Bowel sounds are normal. There is no distension. Palpations: Abdomen is soft. There is no mass. Tenderness: There is no abdominal tenderness. There is no right CVA tenderness, left CVA tenderness, guarding or rebound. Hernia: No hernia is present. Musculoskeletal:         General: No swelling, tenderness, deformity or signs of injury. Normal range of motion. Cervical back: Normal range of motion and neck supple. No rigidity. No muscular tenderness. Right lower leg: No edema. Left lower leg: No edema. Lymphadenopathy:      Cervical: No cervical adenopathy. Skin:     General: Skin is warm and dry. Coloration: Skin is not jaundiced or pale. Findings: No bruising, erythema, lesion or rash. Neurological:      General: No focal deficit present. Mental Status: He is alert and oriented to person, place, and time. Cranial Nerves: No cranial nerve deficit. Motor: No weakness.    Psychiatric:         Mood and Affect: Mood normal.

## 2023-11-23 LAB — BACTERIA THROAT CULT: NORMAL

## 2023-11-25 ENCOUNTER — TELEPHONE (OUTPATIENT)
Dept: OTHER | Facility: HOSPITAL | Age: 73
End: 2023-11-25

## 2023-11-25 ENCOUNTER — APPOINTMENT (EMERGENCY)
Dept: CT IMAGING | Facility: HOSPITAL | Age: 73
DRG: 872 | End: 2023-11-25
Payer: MEDICARE

## 2023-11-25 ENCOUNTER — HOSPITAL ENCOUNTER (INPATIENT)
Facility: HOSPITAL | Age: 73
LOS: 2 days | Discharge: HOME | DRG: 872 | End: 2023-11-27
Attending: EMERGENCY MEDICINE | Admitting: INTERNAL MEDICINE
Payer: MEDICARE

## 2023-11-25 DIAGNOSIS — J02.9 PHARYNGITIS: Primary | ICD-10-CM

## 2023-11-25 DIAGNOSIS — Z79.4 CURRENT USE OF INSULIN (HCC): ICD-10-CM

## 2023-11-25 DIAGNOSIS — J02.8 ACUTE PHARYNGITIS DUE TO OTHER SPECIFIED ORGANISMS: ICD-10-CM

## 2023-11-25 PROBLEM — E66.811 CLASS 1 OBESITY DUE TO EXCESS CALORIES WITH SERIOUS COMORBIDITY AND BODY MASS INDEX (BMI) OF 32.0 TO 32.9 IN ADULT: Status: ACTIVE | Noted: 2023-11-25

## 2023-11-25 PROBLEM — E66.09 CLASS 1 OBESITY DUE TO EXCESS CALORIES WITH SERIOUS COMORBIDITY AND BODY MASS INDEX (BMI) OF 32.0 TO 32.9 IN ADULT: Status: ACTIVE | Noted: 2023-11-25

## 2023-11-25 PROBLEM — A41.9 SEPSIS WITHOUT ACUTE ORGAN DYSFUNCTION (HCC): Status: ACTIVE | Noted: 2023-11-25

## 2023-11-25 LAB
ALBUMIN SERPL BCP-MCNC: 4.1 G/DL (ref 3.5–5)
ALP SERPL-CCNC: 50 U/L (ref 34–104)
ALT SERPL W P-5'-P-CCNC: 12 U/L (ref 7–52)
ANION GAP SERPL CALCULATED.3IONS-SCNC: 9 MMOL/L
AST SERPL W P-5'-P-CCNC: 9 U/L (ref 13–39)
BASOPHILS # BLD AUTO: 0.02 THOUSANDS/ÂΜL (ref 0–0.1)
BASOPHILS NFR BLD AUTO: 0 % (ref 0–1)
BILIRUB SERPL-MCNC: 0.52 MG/DL (ref 0.2–1)
BUN SERPL-MCNC: 25 MG/DL (ref 5–25)
CALCIUM SERPL-MCNC: 9.4 MG/DL (ref 8.4–10.2)
CHLORIDE SERPL-SCNC: 102 MMOL/L (ref 96–108)
CO2 SERPL-SCNC: 28 MMOL/L (ref 21–32)
CREAT SERPL-MCNC: 1.19 MG/DL (ref 0.6–1.3)
EOSINOPHIL # BLD AUTO: 0.11 THOUSAND/ÂΜL (ref 0–0.61)
EOSINOPHIL NFR BLD AUTO: 1 % (ref 0–6)
ERYTHROCYTE [DISTWIDTH] IN BLOOD BY AUTOMATED COUNT: 12.4 % (ref 11.6–15.1)
FLUAV RNA RESP QL NAA+PROBE: NEGATIVE
FLUBV RNA RESP QL NAA+PROBE: NEGATIVE
GFR SERPL CREATININE-BSD FRML MDRD: 60 ML/MIN/1.73SQ M
GLUCOSE SERPL-MCNC: 239 MG/DL (ref 65–140)
GLUCOSE SERPL-MCNC: 263 MG/DL (ref 65–140)
GLUCOSE SERPL-MCNC: 294 MG/DL (ref 65–140)
HCT VFR BLD AUTO: 43.4 % (ref 36.5–49.3)
HGB BLD-MCNC: 14.6 G/DL (ref 12–17)
IMM GRANULOCYTES # BLD AUTO: 0.07 THOUSAND/UL (ref 0–0.2)
IMM GRANULOCYTES NFR BLD AUTO: 1 % (ref 0–2)
LACTATE SERPL-SCNC: 0.9 MMOL/L (ref 0.5–2)
LYMPHOCYTES # BLD AUTO: 1.65 THOUSANDS/ÂΜL (ref 0.6–4.47)
LYMPHOCYTES NFR BLD AUTO: 12 % (ref 14–44)
MCH RBC QN AUTO: 30 PG (ref 26.8–34.3)
MCHC RBC AUTO-ENTMCNC: 33.6 G/DL (ref 31.4–37.4)
MCV RBC AUTO: 89 FL (ref 82–98)
MONOCYTES # BLD AUTO: 1.14 THOUSAND/ÂΜL (ref 0.17–1.22)
MONOCYTES NFR BLD AUTO: 8 % (ref 4–12)
NEUTROPHILS # BLD AUTO: 10.63 THOUSANDS/ÂΜL (ref 1.85–7.62)
NEUTS SEG NFR BLD AUTO: 78 % (ref 43–75)
NRBC BLD AUTO-RTO: 0 /100 WBCS
PLATELET # BLD AUTO: 249 THOUSANDS/UL (ref 149–390)
PLATELET # BLD AUTO: 267 THOUSANDS/UL (ref 149–390)
PMV BLD AUTO: 10.6 FL (ref 8.9–12.7)
PMV BLD AUTO: 10.7 FL (ref 8.9–12.7)
POTASSIUM SERPL-SCNC: 3.8 MMOL/L (ref 3.5–5.3)
PROCALCITONIN SERPL-MCNC: <0.05 NG/ML
PROT SERPL-MCNC: 7.6 G/DL (ref 6.4–8.4)
RBC # BLD AUTO: 4.87 MILLION/UL (ref 3.88–5.62)
RSV RNA RESP QL NAA+PROBE: NEGATIVE
S PYO DNA THROAT QL NAA+PROBE: NOT DETECTED
SARS-COV-2 RNA RESP QL NAA+PROBE: NEGATIVE
SODIUM SERPL-SCNC: 139 MMOL/L (ref 135–147)
WBC # BLD AUTO: 13.62 THOUSAND/UL (ref 4.31–10.16)

## 2023-11-25 PROCEDURE — 99285 EMERGENCY DEPT VISIT HI MDM: CPT | Performed by: EMERGENCY MEDICINE

## 2023-11-25 PROCEDURE — 99223 1ST HOSP IP/OBS HIGH 75: CPT | Performed by: INTERNAL MEDICINE

## 2023-11-25 PROCEDURE — 85049 AUTOMATED PLATELET COUNT: CPT | Performed by: INTERNAL MEDICINE

## 2023-11-25 PROCEDURE — 84145 PROCALCITONIN (PCT): CPT | Performed by: INTERNAL MEDICINE

## 2023-11-25 PROCEDURE — 0241U HB NFCT DS VIR RESP RNA 4 TRGT: CPT | Performed by: EMERGENCY MEDICINE

## 2023-11-25 PROCEDURE — 96366 THER/PROPH/DIAG IV INF ADDON: CPT

## 2023-11-25 PROCEDURE — 86308 HETEROPHILE ANTIBODY SCREEN: CPT | Performed by: EMERGENCY MEDICINE

## 2023-11-25 PROCEDURE — 85025 COMPLETE CBC W/AUTO DIFF WBC: CPT | Performed by: EMERGENCY MEDICINE

## 2023-11-25 PROCEDURE — G1004 CDSM NDSC: HCPCS

## 2023-11-25 PROCEDURE — 96365 THER/PROPH/DIAG IV INF INIT: CPT

## 2023-11-25 PROCEDURE — 87040 BLOOD CULTURE FOR BACTERIA: CPT | Performed by: INTERNAL MEDICINE

## 2023-11-25 PROCEDURE — 87651 STREP A DNA AMP PROBE: CPT | Performed by: EMERGENCY MEDICINE

## 2023-11-25 PROCEDURE — 82948 REAGENT STRIP/BLOOD GLUCOSE: CPT

## 2023-11-25 PROCEDURE — 80053 COMPREHEN METABOLIC PANEL: CPT | Performed by: EMERGENCY MEDICINE

## 2023-11-25 PROCEDURE — 99284 EMERGENCY DEPT VISIT MOD MDM: CPT

## 2023-11-25 PROCEDURE — 83605 ASSAY OF LACTIC ACID: CPT | Performed by: INTERNAL MEDICINE

## 2023-11-25 PROCEDURE — 36415 COLL VENOUS BLD VENIPUNCTURE: CPT | Performed by: EMERGENCY MEDICINE

## 2023-11-25 PROCEDURE — 96375 TX/PRO/DX INJ NEW DRUG ADDON: CPT

## 2023-11-25 PROCEDURE — 70490 CT SOFT TISSUE NECK W/O DYE: CPT

## 2023-11-25 RX ORDER — LABETALOL HYDROCHLORIDE 5 MG/ML
10 INJECTION, SOLUTION INTRAVENOUS EVERY 6 HOURS PRN
Status: DISCONTINUED | OUTPATIENT
Start: 2023-11-25 | End: 2023-11-27 | Stop reason: HOSPADM

## 2023-11-25 RX ORDER — ENOXAPARIN SODIUM 100 MG/ML
40 INJECTION SUBCUTANEOUS DAILY
Status: DISCONTINUED | OUTPATIENT
Start: 2023-11-26 | End: 2023-11-27 | Stop reason: HOSPADM

## 2023-11-25 RX ORDER — SODIUM CHLORIDE, SODIUM GLUCONATE, SODIUM ACETATE, POTASSIUM CHLORIDE, MAGNESIUM CHLORIDE, SODIUM PHOSPHATE, DIBASIC, AND POTASSIUM PHOSPHATE .53; .5; .37; .037; .03; .012; .00082 G/100ML; G/100ML; G/100ML; G/100ML; G/100ML; G/100ML; G/100ML
1000 INJECTION, SOLUTION INTRAVENOUS ONCE
Status: COMPLETED | OUTPATIENT
Start: 2023-11-25 | End: 2023-11-25

## 2023-11-25 RX ORDER — FLUTICASONE PROPIONATE 50 MCG
1 SPRAY, SUSPENSION (ML) NASAL DAILY
Status: DISCONTINUED | OUTPATIENT
Start: 2023-11-26 | End: 2023-11-27 | Stop reason: HOSPADM

## 2023-11-25 RX ORDER — DEXAMETHASONE SODIUM PHOSPHATE 10 MG/ML
10 INJECTION, SOLUTION INTRAMUSCULAR; INTRAVENOUS EVERY 8 HOURS SCHEDULED
Status: DISCONTINUED | OUTPATIENT
Start: 2023-11-25 | End: 2023-11-27 | Stop reason: HOSPADM

## 2023-11-25 RX ORDER — MONTELUKAST SODIUM 10 MG/1
10 TABLET ORAL
Status: DISCONTINUED | OUTPATIENT
Start: 2023-11-25 | End: 2023-11-27 | Stop reason: HOSPADM

## 2023-11-25 RX ORDER — INSULIN LISPRO 100 [IU]/ML
1-6 INJECTION, SOLUTION INTRAVENOUS; SUBCUTANEOUS
Status: DISCONTINUED | OUTPATIENT
Start: 2023-11-25 | End: 2023-11-27 | Stop reason: HOSPADM

## 2023-11-25 RX ORDER — ATORVASTATIN CALCIUM 10 MG/1
10 TABLET, FILM COATED ORAL DAILY
Status: DISCONTINUED | OUTPATIENT
Start: 2023-11-26 | End: 2023-11-27 | Stop reason: HOSPADM

## 2023-11-25 RX ORDER — FLUTICASONE FUROATE AND VILANTEROL 100; 25 UG/1; UG/1
1 POWDER RESPIRATORY (INHALATION)
Status: DISCONTINUED | OUTPATIENT
Start: 2023-11-26 | End: 2023-11-27 | Stop reason: HOSPADM

## 2023-11-25 RX ORDER — DOCUSATE SODIUM 100 MG/1
100 CAPSULE, LIQUID FILLED ORAL 2 TIMES DAILY
Status: DISCONTINUED | OUTPATIENT
Start: 2023-11-25 | End: 2023-11-27 | Stop reason: HOSPADM

## 2023-11-25 RX ORDER — ECHINACEA PURPUREA EXTRACT 125 MG
1 TABLET ORAL
Status: DISCONTINUED | OUTPATIENT
Start: 2023-11-25 | End: 2023-11-27 | Stop reason: HOSPADM

## 2023-11-25 RX ORDER — PANTOPRAZOLE SODIUM 40 MG/1
40 TABLET, DELAYED RELEASE ORAL
Status: DISCONTINUED | OUTPATIENT
Start: 2023-11-26 | End: 2023-11-27 | Stop reason: HOSPADM

## 2023-11-25 RX ORDER — ACETAMINOPHEN 325 MG/1
650 TABLET ORAL EVERY 6 HOURS PRN
Status: DISCONTINUED | OUTPATIENT
Start: 2023-11-25 | End: 2023-11-27 | Stop reason: HOSPADM

## 2023-11-25 RX ORDER — MAGNESIUM HYDROXIDE/ALUMINUM HYDROXICE/SIMETHICONE 120; 1200; 1200 MG/30ML; MG/30ML; MG/30ML
30 SUSPENSION ORAL EVERY 6 HOURS PRN
Status: DISCONTINUED | OUTPATIENT
Start: 2023-11-25 | End: 2023-11-27 | Stop reason: HOSPADM

## 2023-11-25 RX ORDER — HYDRALAZINE HYDROCHLORIDE 20 MG/ML
10 INJECTION INTRAMUSCULAR; INTRAVENOUS EVERY 6 HOURS PRN
Status: DISCONTINUED | OUTPATIENT
Start: 2023-11-25 | End: 2023-11-27 | Stop reason: HOSPADM

## 2023-11-25 RX ORDER — DEXAMETHASONE SODIUM PHOSPHATE 10 MG/ML
10 INJECTION, SOLUTION INTRAMUSCULAR; INTRAVENOUS ONCE
Status: COMPLETED | OUTPATIENT
Start: 2023-11-25 | End: 2023-11-25

## 2023-11-25 RX ORDER — DIPHENHYDRAMINE HYDROCHLORIDE AND LIDOCAINE HYDROCHLORIDE AND ALUMINUM HYDROXIDE AND MAGNESIUM HYDRO
10 KIT ONCE
Status: COMPLETED | OUTPATIENT
Start: 2023-11-25 | End: 2023-11-25

## 2023-11-25 RX ORDER — METRONIDAZOLE 500 MG/100ML
500 INJECTION, SOLUTION INTRAVENOUS EVERY 8 HOURS
Status: DISCONTINUED | OUTPATIENT
Start: 2023-11-25 | End: 2023-11-27 | Stop reason: HOSPADM

## 2023-11-25 RX ORDER — INSULIN GLARGINE 100 [IU]/ML
12 INJECTION, SOLUTION SUBCUTANEOUS
Status: DISCONTINUED | OUTPATIENT
Start: 2023-11-25 | End: 2023-11-26

## 2023-11-25 RX ORDER — ONDANSETRON 2 MG/ML
4 INJECTION INTRAMUSCULAR; INTRAVENOUS EVERY 6 HOURS PRN
Status: DISCONTINUED | OUTPATIENT
Start: 2023-11-25 | End: 2023-11-27 | Stop reason: HOSPADM

## 2023-11-25 RX ADMIN — DEXAMETHASONE SODIUM PHOSPHATE 10 MG: 10 INJECTION, SOLUTION INTRAMUSCULAR; INTRAVENOUS at 12:56

## 2023-11-25 RX ADMIN — INSULIN GLARGINE 12 UNITS: 100 INJECTION, SOLUTION SUBCUTANEOUS at 23:03

## 2023-11-25 RX ADMIN — SODIUM CHLORIDE, SODIUM GLUCONATE, SODIUM ACETATE, POTASSIUM CHLORIDE, MAGNESIUM CHLORIDE, SODIUM PHOSPHATE, DIBASIC, AND POTASSIUM PHOSPHATE 1000 ML: .53; .5; .37; .037; .03; .012; .00082 INJECTION, SOLUTION INTRAVENOUS at 18:48

## 2023-11-25 RX ADMIN — METRONIDAZOLE 500 MG: 500 INJECTION, SOLUTION INTRAVENOUS at 15:11

## 2023-11-25 RX ADMIN — DIPHENHYDRAMINE HYDROCHLORIDE AND LIDOCAINE HYDROCHLORIDE AND ALUMINUM HYDROXIDE AND MAGNESIUM HYDRO 10 ML: KIT at 12:30

## 2023-11-25 RX ADMIN — INSULIN LISPRO 4 UNITS: 100 INJECTION, SOLUTION INTRAVENOUS; SUBCUTANEOUS at 23:03

## 2023-11-25 RX ADMIN — MONTELUKAST 10 MG: 10 TABLET, FILM COATED ORAL at 23:24

## 2023-11-25 RX ADMIN — SODIUM CHLORIDE 1000 ML: 0.9 INJECTION, SOLUTION INTRAVENOUS at 13:00

## 2023-11-25 RX ADMIN — SODIUM CHLORIDE, SODIUM GLUCONATE, SODIUM ACETATE, POTASSIUM CHLORIDE, MAGNESIUM CHLORIDE, SODIUM PHOSPHATE, DIBASIC, AND POTASSIUM PHOSPHATE 1000 ML: .53; .5; .37; .037; .03; .012; .00082 INJECTION, SOLUTION INTRAVENOUS at 20:26

## 2023-11-25 RX ADMIN — VERAPAMIL HYDROCHLORIDE 180 MG: 180 TABLET ORAL at 18:44

## 2023-11-25 RX ADMIN — MORPHINE SULFATE 2 MG: 2 INJECTION, SOLUTION INTRAMUSCULAR; INTRAVENOUS at 12:56

## 2023-11-25 RX ADMIN — DEXAMETHASONE SODIUM PHOSPHATE 10 MG: 10 INJECTION, SOLUTION INTRAMUSCULAR; INTRAVENOUS at 23:02

## 2023-11-25 RX ADMIN — METRONIDAZOLE 500 MG: 500 INJECTION, SOLUTION INTRAVENOUS at 23:03

## 2023-11-25 RX ADMIN — ACETAMINOPHEN 650 MG: 325 TABLET, FILM COATED ORAL at 19:58

## 2023-11-25 RX ADMIN — SODIUM CHLORIDE, SODIUM GLUCONATE, SODIUM ACETATE, POTASSIUM CHLORIDE, MAGNESIUM CHLORIDE, SODIUM PHOSPHATE, DIBASIC, AND POTASSIUM PHOSPHATE 1000 ML: .53; .5; .37; .037; .03; .012; .00082 INJECTION, SOLUTION INTRAVENOUS at 17:17

## 2023-11-25 RX ADMIN — AMPICILLIN SODIUM AND SULBACTAM SODIUM 3 G: 100; 50 INJECTION, POWDER, FOR SOLUTION INTRAVENOUS at 13:01

## 2023-11-25 RX ADMIN — INSULIN LISPRO 3 UNITS: 100 INJECTION, SOLUTION INTRAVENOUS; SUBCUTANEOUS at 18:44

## 2023-11-25 NOTE — TELEPHONE ENCOUNTER
Patient with worsening sore throat after tx with abx and steroids for 10 days L>R. Urgent care recommended ED eval. Recommended proceeding to ED for evaluation since the office is closed until Monday.          Autumn Medeiros, PGY2  Otolaryngology- Head and Neck Surgery  Please contact [facility] resident role

## 2023-11-25 NOTE — ASSESSMENT & PLAN NOTE
Has been on outpatient abx Penicillin and prednisone for the past 5 days prior to admission but had minimal to no improvement  CT imaging obtained; unable to use IV contrast due to anaphylactic reaction -unable to appreciate any discrete collection but true abscess cannot be ruled out without IV contrast  Notable left-sided edema without uvular deviation and intact airway; no abnormalities in phonation noted    Consult ENT for possible drainage versus conservative management  IV Decadron every 8 hours  Antibiotics with Unasyn and Flagyl due to shortage of clindamycin

## 2023-11-25 NOTE — ASSESSMENT & PLAN NOTE
Blood Pressure: 156/75      Plan:  Continue home medications  Monitor blood pressure  PRN IV Labetalol and Hydralazine for SBP > 160

## 2023-11-25 NOTE — ASSESSMENT & PLAN NOTE
Lab Results   Component Value Date    HGBA1C 8.4 (H) 10/02/2023       No results for input(s): "POCGLU" in the last 72 hours.     Blood Sugar Average: Last 72 hrs:    Plan:  Hold home oral regimen  NPO for now until ENT evaluation  Insulin regimen  Lantus 12 units HS  Glucose checks and Insulin correction ACHS  Goal -180 while admitted, adjusting insulin regimen as appropriate  Monitor for hypoglycemia and treat per protocol

## 2023-11-25 NOTE — PLAN OF CARE
Problem: PAIN - ADULT  Goal: Verbalizes/displays adequate comfort level or baseline comfort level  Description: Interventions:  - Encourage patient to monitor pain and request assistance  - Assess pain using appropriate pain scale  - Administer analgesics based on type and severity of pain and evaluate response  - Implement non-pharmacological measures as appropriate and evaluate response  - Consider cultural and social influences on pain and pain management  - Notify physician/advanced practitioner if interventions unsuccessful or patient reports new pain  Outcome: Progressing     Problem: INFECTION - ADULT  Goal: Absence or prevention of progression during hospitalization  Description: INTERVENTIONS:  - Assess and monitor for signs and symptoms of infection  - Monitor lab/diagnostic results  - Monitor all insertion sites, i.e. indwelling lines, tubes, and drains  - Monitor endotracheal if appropriate and nasal secretions for changes in amount and color  - Hestand appropriate cooling/warming therapies per order  - Administer medications as ordered  - Instruct and encourage patient and family to use good hand hygiene technique  - Identify and instruct in appropriate isolation precautions for identified infection/condition  Outcome: Progressing

## 2023-11-25 NOTE — H&P
[address]  H&P  Name: Meghan Pringle 68 y.o. male I MRN: [id number]  Unit/Bed#: -01 I Date of Admission: 11/25/2023   Date of Service: 11/25/2023 I Hospital Day: 0      Assessment/Plan   Sepsis without acute organ dysfunction [facility])  Assessment & Plan  Concern for Sepsis POA as evidenced by Tachycardia, leukocytosis  Suspected source: Pharyngitis with possible abscess    Recent Labs     11/25/23  1246   WBC 13.62*     Lactic acid pending  WBC elevation may be 2/2 recent prednisone use    Plan:  Blood clx  Procal trend  Abx Unasyn/Flagyl per ENT reccs (shortage of clindamycin)  IV Fluid hydration  Trend fever/wbc curve      Acute pharyngitis due to other specified organisms  Assessment & Plan  Has been on outpatient abx Penicillin and prednisone for the past 5 days prior to admission but had minimal to no improvement  CT imaging obtained; unable to use IV contrast due to anaphylactic reaction -unable to appreciate any discrete collection but true abscess cannot be ruled out without IV contrast  Notable left-sided edema without uvular deviation and intact airway; no abnormalities in phonation noted    Consult ENT for possible drainage versus conservative management  IV Decadron every 8 hours  Antibiotics with Unasyn and Flagyl due to shortage of clindamycin    Type 2 diabetes mellitus without complication, with long-term current use of insulin ([facility])  Assessment & Plan  Lab Results   Component Value Date    HGBA1C 8.4 (H) 10/02/2023       No results for input(s): "POCGLU" in the last 72 hours.     Blood Sugar Average: Last 72 hrs:    Plan:  Hold home oral regimen  NPO for now until ENT evaluation  Insulin regimen  Lantus 12 units HS  Glucose checks and Insulin correction ACHS  Goal -180 while admitted, adjusting insulin regimen as appropriate  Monitor for hypoglycemia and treat per protocol        Stage 3a chronic kidney disease [facility])  Assessment & Plan  Lab Results   Component Value Date    EGFR 60 11/25/2023    EGFR 58 10/02/2023    EGFR 61 07/19/2023    CREATININE 1.19 11/25/2023    CREATININE 1.22 10/02/2023    CREATININE 1.17 07/19/2023     Appears stable at baseline  Continue to monitor    Class 1 obesity due to excess calories with serious comorbidity and body mass index (BMI) of 32.0 to 32.9 in adult  Assessment & Plan  Body mass index is 32.27 kg/m². Recommend incorporating a more whole foods plant-predominant diet along with decreasing consumption of red meats and processed foods  Per AHA guidelines, recommend moderate-vigorous intensity exercise for 30 minutes a day for 5 days a week or a total of 150 min/week      Benign essential hypertension  Assessment & Plan  Blood Pressure: 156/75      Plan:  Continue home medications  Monitor blood pressure  PRN IV Labetalol and Hydralazine for SBP > 160             VTE Pharmacologic Prophylaxis: VTE Score: 5 High Risk (Score >/= 5) - Pharmacological DVT Prophylaxis Ordered: enoxaparin (Lovenox). Sequential Compression Devices Ordered. Code Status: Level 1 - Full Code   Discussion with Patient/Family: patient, wife at bedside    Anticipated Length of Stay: Patient will be admitted on an inpatient basis with an anticipated length of stay of greater than 2 midnights secondary to meeting sepsis criteria due to acute pharyngitis and failure of outpatient antibiotics. Total Time for Visit, including Counseling / Coordination of Care: 85 minutes Greater than 50% of this total time spent on direct patient counseling and coordination of care. Chief Complaint:   Chief Complaint   Patient presents with    Sore Throat     Patient c/o sore throat and left sided ear pain x 4 days. History of Present Illness:  Jazmyne Lawrence is a 68 y.o. male who presents with worsening pharyngitis over the past few days.     PMHx of HTN, CKD 3A, DM 2, obesity    Patient had been having pharyngitis symptoms for greater than a week and was evaluated at urgent care and initiated on outpatient antibiotics of penicillin along with oral Medrol Dosepak. Unfortunately, symptoms persisted and patient does not have adequate improvement on this regimen. Patient was recommended to come to the ED for further evaluation due to concern for possible pharyngeal abscess. Patient denied any associated fevers, chills, nausea, abdominal pain, chest pain, dizziness, lightheadedness. In the ED, noted to be tachycardic and mild leukocytosis noted on admission as well. Imaging was obtained but could not be completed with IV contrast due to known history of anaphylactic allergy. ED team discussed with ENT regarding overall recommendations and patient was ultimately recommended for admission for IV Decadron and IV antibiotics with plan for ENT follow-up the following day. Review of Systems:  A 10-point review of systems was obtained. Pertinent positives and negatives are outlined in the HPI above. Remainder of review of systems are otherwise negative.      Past Medical and Surgical History:   Past Medical History:   Diagnosis Date    Actinic keratosis     Adhesive capsulitis of unspecified shoulder     Anxiety     Asthma     Atopic dermatitis     Cardiac disease     Cellulitis of right upper extremity     Cervical radiculopathy     Chest pain     Chronic maxillary sinusitis     last assessed 01/21/14    Diabetes mellitus ([address])     Erectile dysfunction of non-organic origin     Esophageal reflux     Gout     last assessed 08/26/15    Hearing loss, conductive     Heart murmur     Mitral Valve disorder    Henoch-Schonlein purpura ([address]) 8/6/2019    Herpes zoster     History of paroxysmal supraventricular tachycardia     Hypertension     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    Mitral valve disorder     Nasal congestion 4/18/19    Neoplasm of skin, malignant     Non-melanoma skin cancer     last assessed 10/26/17    Nosebleed 4/18/19    Obesity     Palpitations last assessed 03/05/15    Paroxysmal supraventricular tachycardia 1/21/2014    Plantar fasciitis     last assessed 05/28/15    PSVT (paroxysmal supraventricular tachycardia)     Sciatica     Sleep apnea     Squamous cell carcinoma of skin of scalp     Tinnitus, unspecified ear     Type 2 diabetes mellitus with hyperglycemia ([address])     last assessed 64/65/15    Umbilical hernia     Worms in stool     last assessed 08/26/15       Past Surgical History:   Procedure Laterality Date    APPENDECTOMY      COLECTOMY      Partial, Sigmoid    COLONOSCOPY      HEAD & NECK SKIN LESION EXCISIONAL BIOPSY      10/11/10 SCC --  10/17/11 SCC  --  Location Scalp    HERNIA REPAIR      NASAL/SINUS ENDOSCOPY N/A 8/6/2019    Procedure: FUNCTIONAL ENDOSCOPIC SINUS SURGERY (FESS) IMAGED GUIDED for control of epistaxis; Surgeon: Peter Gonzales MD;  Location: [facility];  Service: ENT    [place] ESOPHAGOGASTRODUODENOSCOPY TRANSORAL DIAGNOSTIC N/A 8/9/2017    Procedure: ESOPHAGOGASTRODUODENOSCOPY (EGD); Surgeon: Jeremías Solis MD;  Location: [facility]; Service: Gastroenterology    SKIN BIOPSY      TONSILLECTOMY         Meds/Allergies:  Prior to Admission medications    Medication Sig Start Date End Date Taking?  Authorizing Provider   atorvastatin (LIPITOR) 10 mg tablet TAKE 1 TABLET BY MOUTH EVERY DAY 3/9/23   David De La Garza DO   BD Pen Needle Audelia 2nd Gen 32G X 4 MM MISC ONCE DAILY AT BEDTIME UNDER THE SKIN 10/4/23   LELE Neely   cyclobenzaprine (FLEXERIL) 5 mg tablet TAKE 1 TABLET BY MOUTH THREE TIMES A DAY AS NEEDED FOR MUSCLE SPASMS 8/4/22   David De La Garza DO   EPINEPHrine (EPIPEN) 0.3 mg/0.3 mL SOAJ Inject 0.3 mL (0.3 mg total) into a muscle once for 1 dose 11/22/21 11/2/23  LELE Pratt   fluticasone (FLONASE) 50 mcg/act nasal spray SPRAY 1 SPRAY INTO EACH NOSTRIL EVERY DAY 10/6/23   David De La Garza DO   Fluticasone-Salmeterol (Advair Diskus) 100-50 mcg/dose inhaler Inhale 1 puff 2 (two) times a day Rinse mouth after use. 5/18/23   LELE Jackson   glimepiride (AMARYL) 4 mg tablet TAKE 1 TABLET BY MOUTH TWICE A DAY 10/27/23   David De La Garza DO   glucose blood (FREESTYLE LITE) test strip USE TO TEST ONCE DAILY (DX: E11.65) 4/2/23   David De La Garza DO   Insulin Glargine Solostar (Lantus SoloStar) 100 UNIT/ML SOPN INJECT 12 UNITS UNDER THE SKIN DAILY 10/16/23   LELE Eaton   ketoconazole (NIZORAL) 2 % cream APPLY 1 APPLICATION TOPICALLY IN THE MORNING AND 1 APPLICATION IN THE EVENING. APPLIED TO FACE AND SCALP TWICE A DAY FOR 3 WEEKS REPEAT AS NEEDED. 7/31/23   Anahi Cole MD   metFORMIN (GLUCOPHAGE-XR) 500 mg 24 hr tablet TAKE 2 TABLETS BY MOUTH TWICE A DAY WITH FOOD 7/30/23   David De La Garza DO   methylPREDNISolone (Medrol) 4 MG tablet therapy pack Use as directed on package 11/21/23   Heaven Hinton, DO   montelukast (SINGULAIR) 10 mg tablet TAKE 1 TABLET BY MOUTH EVERYDAY AT BEDTIME 4/1/23   David De La Garza DO   omeprazole (PriLOSEC) 20 mg delayed release capsule TAKE 1 CAPSULE BY MOUTH EVERY DAY 6/18/23   David De La Garza, DO   penicillin V potassium (VEETID) 500 mg tablet Take 1 tablet (500 mg total) by mouth 4 (four) times a day for 10 days 11/21/23 12/1/23  Heaven Hinton, DO   sildenafil (VIAGRA) 50 MG tablet Take 1 tablet (50 mg total) by mouth daily as needed for erectile dysfunction 10/10/23   David De La Garza, DO   sitaGLIPtin (Januvia) 100 mg tablet Take 1 tablet (100 mg total) by mouth daily 10/25/23   David De La Garza, DO   sodium chloride (OCEAN) 0.65 % nasal spray 1 spray into each nostril as needed for congestion     Historical Provider, MD   triamcinolone (KENALOG) 0.025 % cream Apply topically as needed     Historical Provider, MD   verapamil (VERELAN PM) 180 MG 24 hr capsule TAKE 1 CAPSULE BY MOUTH 2 TIMES A DAY. 4/28/23   David De La Garza, DO     I have reviewed home medications with patient personally. Allergies:    Allergies   Allergen Reactions    Iodinated Contrast Media Anaphylaxis    Shellfish Allergy - Food Allergy Anaphylaxis    Shrimp Extract Allergy Skin Test - Food Allergy Anaphylaxis    Empagliflozin Hives    Farxiga [Dapagliflozin] Rash     Yeast infection    Ozempic (0.25 Or 0.5 Mg-Dose) [Semaglutide(0.25 Or 0.5mg-Dos)] Rash       Social History:  Marital Status: /Civil Union     Patient Pre-hospital Living Situation: Home  Patient Pre-hospital Level of Mobility: walks  Patient Pre-hospital Diet Restrictions: none  Substance Use History:   Social History     Substance and Sexual Activity   Alcohol Use Yes    Alcohol/week: 1.0 standard drink of alcohol    Types: 1 Standard drinks or equivalent per week    Comment: only occasionally     Social History     Tobacco Use   Smoking Status Former    Packs/day: 1.00    Years: 20.00    Total pack years: 20.00    Types: Cigarettes    Quit date: 1977    Years since quittin.9   Smokeless Tobacco Never     Social History     Substance and Sexual Activity   Drug Use No       Family History:  Family History   Problem Relation Age of Onset    Cancer Mother         Breast + Skin    Diabetes Mother     Heart disease Mother     Heart attack Mother     Breast cancer Mother         Adenocarcinoma    Skin cancer Mother         Adenocarcinoma    Heart failure Mother     Diabetes type II Mother     Varicose Veins Mother         Lower Extremities    Cancer Father         Prostate + Skin    Prostate cancer Father         Adenocarcinoma    Skin cancer Father     Lymphoma Father         Non-Hodgkin's    Arthritis Family        Physical Exam:     Vitals:   Blood Pressure: 139/75 (23)  Pulse: 86 (23)  Temperature: (!) 97.4 °F (36.3 °C) (23)  Temp Source: Oral (23)  Respirations: 18 (23)  Height: 5' 4" (162.6 cm) (23)  Weight - Scale: 85.3 kg (188 lb) (23)  SpO2: 94 % (23)    Physical Exam  Vitals reviewed.    Constitutional: General: He is not in acute distress. Appearance: He is well-developed. He is ill-appearing (Chronically). He is not diaphoretic. HENT:      Head: Normocephalic and atraumatic. Nose: Nose normal.      Mouth/Throat:      Lips: Pink. No lesions. Mouth: Mucous membranes are moist. No injury or lacerations. Dentition: Normal dentition. No dental caries or gum lesions. Tongue: No lesions. Tongue does not deviate from midline. Pharynx: Pharyngeal swelling and posterior oropharyngeal erythema present. No oropharyngeal exudate or uvula swelling. Tonsils: No tonsillar exudate. Eyes:      General: No scleral icterus. Conjunctiva/sclera: Conjunctivae normal.      Pupils: Pupils are equal, round, and reactive to light. Neck:      Thyroid: No thyromegaly. Cardiovascular:      Rate and Rhythm: Normal rate and regular rhythm. Heart sounds: Normal heart sounds. No murmur heard. Pulmonary:      Effort: Pulmonary effort is normal.      Breath sounds: Normal breath sounds. No wheezing, rhonchi or rales. Abdominal:      General: Bowel sounds are normal. There is no distension. Palpations: Abdomen is soft. Tenderness: There is no abdominal tenderness. Musculoskeletal:         General: No swelling, tenderness or deformity. Cervical back: Neck supple. Skin:     General: Skin is warm and dry. Neurological:      Mental Status: He is alert and oriented to person, place, and time. Mental status is at baseline. Psychiatric:         Behavior: Behavior normal.         Thought Content:  Thought content normal.         Judgment: Judgment normal.       Additional Data:     Lab Results:  Results from last 7 days   Lab Units 11/25/23  1731 11/25/23  1246   WBC Thousand/uL  --  13.62*   HEMOGLOBIN g/dL  --  14.6   HEMATOCRIT %  --  43.4   PLATELETS Thousands/uL 249 267   NEUTROS PCT %  --  78*   LYMPHS PCT %  --  12*   MONOS PCT %  --  8   EOS PCT %  --  1     Results from last 7 days   Lab Units 11/25/23  1246   SODIUM mmol/L 139   POTASSIUM mmol/L 3.8   CHLORIDE mmol/L 102   CO2 mmol/L 28   BUN mg/dL 25   CREATININE mg/dL 1.19   ANION GAP mmol/L 9   CALCIUM mg/dL 9.4   ALBUMIN g/dL 4.1   TOTAL BILIRUBIN mg/dL 0.52   ALK PHOS U/L 50   ALT U/L 12   AST U/L 9*   GLUCOSE RANDOM mg/dL 263*         Results from last 7 days   Lab Units 11/25/23  1758   POC GLUCOSE mg/dl 239*         Results from last 7 days   Lab Units 11/25/23  1731   LACTIC ACID mmol/L 0.9   PROCALCITONIN ng/ml <0.05       Imaging Results Reviewed as noted below  CT soft tissue neck wo contrast   Final Result by Adelaida Ramirez DO (11/25 1354)      Soft tissue fullness in the region of the left [place] tonsil. Although no discrete fluid collection identified, the possibility of an underlying abscess is not excluded due to the lack of IV contrast on this examination. The study was marked in [facility] for immediate notification. Workstation performed: [id number]             CT soft tissue neck wo contrast    Result Date: 11/25/2023  Impression: Soft tissue fullness in the region of the left [place] tonsil. Although no discrete fluid collection identified, the possibility of an underlying abscess is not excluded due to the lack of IV contrast on this examination. The study was marked in [facility] for immediate notification. Workstation performed: [id number]     No Chest XR results available for this patient. EKG and Other Studies Reviewed on Admission:   EKG: no EKG available    No results for input(s): "VENTRATE" in the last 72 hours. ** Please Note: This note has been constructed using a voice recognition system.  **

## 2023-11-25 NOTE — ASSESSMENT & PLAN NOTE
Lab Results   Component Value Date    EGFR 60 11/25/2023    EGFR 58 10/02/2023    EGFR 61 07/19/2023    CREATININE 1.19 11/25/2023    CREATININE 1.22 10/02/2023    CREATININE 1.17 07/19/2023     Appears stable at baseline  Continue to monitor

## 2023-11-25 NOTE — ED PROVIDER NOTES
History  Chief Complaint   Patient presents with   • Sore Throat     Patient c/o sore throat and left sided ear pain x 4 days. Patient is a 59-year-old male past medical history of hypertension, hyperlipidemia, diabetes, HSP, SVT, skin cancer, GERD, CKD stage III, asthma, anxiety presenting with throat pain. Patient notes left greater than right throat pain for the last 10 days radiating into his ear. States he has been taking Tylenol 650 every 6 hours last dose prior to arrival without relief. Was seen 4 days ago in urgent care and put on prednisone and penicillin which he has been taking with no relief. Notes intermittent lightheadedness but denies dysphagia, chest pain, shortness breath, fevers, otorrhea. Prior to Admission Medications   Prescriptions Last Dose Informant Patient Reported? Taking? BD Pen Needle [place] 2nd Gen 32G X 4 MM MISC  Self No No   Sig: ONCE DAILY AT BEDTIME UNDER THE SKIN   EPINEPHrine (EPIPEN) 0.3 mg/0.3 mL SOAJ  Self No No   Sig: Inject 0.3 mL (0.3 mg total) into a muscle once for 1 dose   Fluticasone-Salmeterol (Advair Diskus) 100-50 mcg/dose inhaler  Self No No   Sig: Inhale 1 puff 2 (two) times a day Rinse mouth after use.    Insulin Glargine Solostar (Lantus SoloStar) 100 UNIT/ML SOPN  Self No No   Sig: INJECT 12 UNITS UNDER THE SKIN DAILY   atorvastatin (LIPITOR) 10 mg tablet  Self No No   Sig: TAKE 1 TABLET BY MOUTH EVERY DAY   cyclobenzaprine (FLEXERIL) 5 mg tablet  Self No No   Sig: TAKE 1 TABLET BY MOUTH THREE TIMES A DAY AS NEEDED FOR MUSCLE SPASMS   fluticasone (FLONASE) 50 mcg/act nasal spray  Self No No   Sig: SPRAY 1 SPRAY INTO EACH NOSTRIL EVERY DAY   glimepiride (AMARYL) 4 mg tablet  Self No No   Sig: TAKE 1 TABLET BY MOUTH TWICE A DAY   glucose blood (FREESTYLE LITE) test strip  Self No No   Sig: USE TO TEST ONCE DAILY (DX: E11.65)   ketoconazole (NIZORAL) 2 % cream  Self No No   Sig: APPLY 1 APPLICATION TOPICALLY IN THE MORNING AND 1 APPLICATION IN THE EVENING. APPLIED TO FACE AND SCALP TWICE A DAY FOR 3 WEEKS REPEAT AS NEEDED. metFORMIN (GLUCOPHAGE-XR) 500 mg 24 hr tablet  Self No No   Sig: TAKE 2 TABLETS BY MOUTH TWICE A DAY WITH FOOD   methylPREDNISolone (Medrol) 4 MG tablet therapy pack   No No   Sig: Use as directed on package   montelukast (SINGULAIR) 10 mg tablet  Self No No   Sig: TAKE 1 TABLET BY MOUTH EVERYDAY AT BEDTIME   omeprazole (PriLOSEC) 20 mg delayed release capsule  Self No No   Sig: TAKE 1 CAPSULE BY MOUTH EVERY DAY   penicillin V potassium (VEETID) 500 mg tablet   No No   Sig: Take 1 tablet (500 mg total) by mouth 4 (four) times a day for 10 days   sildenafil (VIAGRA) 50 MG tablet  Self No No   Sig: Take 1 tablet (50 mg total) by mouth daily as needed for erectile dysfunction   sitaGLIPtin (Januvia) 100 mg tablet  Self No No   Sig: Take 1 tablet (100 mg total) by mouth daily   sodium chloride (OCEAN) 0.65 % nasal spray  Self Yes No   Si spray into each nostril as needed for congestion    triamcinolone (KENALOG) 0.025 % cream  Self Yes No   Sig: Apply topically as needed    verapamil (VERELAN PM) 180 MG 24 hr capsule  Self No No   Sig: TAKE 1 CAPSULE BY MOUTH 2 TIMES A DAY.       Facility-Administered Medications: None       Past Medical History:   Diagnosis Date   • Actinic keratosis    • Adhesive capsulitis of unspecified shoulder    • Anxiety    • Asthma    • Atopic dermatitis    • Cardiac disease    • Cellulitis of right upper extremity    • Cervical radiculopathy    • Chest pain    • Chronic maxillary sinusitis     last assessed 14   • Diabetes mellitus ([address])    • Erectile dysfunction of non-organic origin    • Esophageal reflux    • Gout     last assessed 08/26/15   • Hearing loss, conductive    • Heart murmur     Mitral Valve disorder   • Henoch-Schonlein purpura ([address]) 2019   • Herpes zoster    • History of paroxysmal supraventricular tachycardia    • Hypertension    • 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   • Mitral valve disorder    • Nasal congestion 4/18/19   • Neoplasm of skin, malignant    • Non-melanoma skin cancer     last assessed 10/26/17   • Nosebleed 4/18/19   • Obesity    • Palpitations     last assessed 03/05/15   • Paroxysmal supraventricular tachycardia 1/21/2014   • Plantar fasciitis     last assessed 05/28/15   • PSVT (paroxysmal supraventricular tachycardia)    • Sciatica    • Sleep apnea    • Squamous cell carcinoma of skin of scalp    • Tinnitus, unspecified ear    • Type 2 diabetes mellitus with hyperglycemia ([address])     last assessed 45/46/31   • Umbilical hernia    • Worms in stool     last assessed 08/26/15       Past Surgical History:   Procedure Laterality Date   • APPENDECTOMY     • COLECTOMY      Partial, Sigmoid   • COLONOSCOPY     • HEAD & NECK SKIN LESION EXCISIONAL BIOPSY      10/11/10 SCC --  10/17/11 SCC  --  Location Scalp   • HERNIA REPAIR     • NASAL/SINUS ENDOSCOPY N/A 8/6/2019    Procedure: FUNCTIONAL ENDOSCOPIC SINUS SURGERY (FESS) IMAGED GUIDED for control of epistaxis; Surgeon: Zeke Dorado MD;  Location: [facility];  Service: ENT   • [place] ESOPHAGOGASTRODUODENOSCOPY TRANSORAL DIAGNOSTIC N/A 8/9/2017    Procedure: ESOPHAGOGASTRODUODENOSCOPY (EGD); Surgeon: Mi Brooke MD;  Location: [facility];   Service: Gastroenterology   • SKIN BIOPSY     • TONSILLECTOMY         Family History   Problem Relation Age of Onset   • Cancer Mother         Breast + Skin   • Diabetes Mother    • Heart disease Mother    • Heart attack Mother    • Breast cancer Mother         Adenocarcinoma   • Skin cancer Mother         Adenocarcinoma   • Heart failure Mother    • Diabetes type II Mother    • Varicose Veins Mother         Lower Extremities   • Cancer Father         Prostate + Skin   • Prostate cancer Father         Adenocarcinoma   • Skin cancer Father    • Lymphoma Father         Non-Hodgkin's   • Arthritis Family      I have reviewed and agree with the history as documented. E-Cigarette/Vaping   • E-Cigarette Use Never User      E-Cigarette/Vaping Substances   • Nicotine No    • THC No    • CBD No    • Flavoring No    • Other No    • Unknown No      Social History     Tobacco Use   • Smoking status: Former     Packs/day: 1.00     Years: 20.00     Total pack years: 20.00     Types: Cigarettes     Quit date: 1977     Years since quittin.9   • Smokeless tobacco: Never   Vaping Use   • Vaping Use: Never used   Substance Use Topics   • Alcohol use: Yes     Alcohol/week: 1.0 standard drink of alcohol     Types: 1 Standard drinks or equivalent per week     Comment: only occasionally   • Drug use: No       Review of Systems   All other systems reviewed and are negative. Physical Exam  Physical Exam  Vitals reviewed. Constitutional:       General: He is not in acute distress. Appearance: Normal appearance. He is not ill-appearing. HENT:      Right Ear: Tympanic membrane normal.      Left Ear: Tympanic membrane normal.      Mouth/Throat:      Mouth: Mucous membranes are moist.      Comments: Left-sided tonsil lymphadenopathy with exudate greater than right, no uvular deviation  Eyes:      Conjunctiva/sclera: Conjunctivae normal.   Cardiovascular:      Rate and Rhythm: Normal rate and regular rhythm. Heart sounds: Normal heart sounds. Pulmonary:      Effort: Pulmonary effort is normal.      Breath sounds: Normal breath sounds. No stridor. No wheezing. Abdominal:      General: Abdomen is flat. Palpations: Abdomen is soft. Tenderness: There is no abdominal tenderness. Musculoskeletal:         General: No swelling. Normal range of motion. Cervical back: Normal range of motion and neck supple. Skin:     General: Skin is warm and dry. Neurological:      General: No focal deficit present. Mental Status: He is alert.    Psychiatric:         Mood and Affect: Mood normal.       Vital Signs  ED Triage Vitals [23 1158]   Temperature Pulse Respirations Blood Pressure SpO2   99 °F (37.2 °C) 91 18 156/75 96 %      Temp src Heart Rate Source Patient Position - Orthostatic VS BP Location FiO2 (%)   -- Monitor -- -- --      Pain Score       --           Vitals:    11/25/23 1158   BP: 156/75   Pulse: 91         Visual Acuity      ED Medications  Medications   ampicillin-sulbactam (UNASYN) 3 g in sodium chloride 0.9 % 100 mL IVPB (has no administration in time range)   dexamethasone (PF) (DECADRON) injection 10 mg (has no administration in time range)   sodium chloride 0.9 % bolus 1,000 mL (has no administration in time range)   diphenhydramine, lidocaine, Al/Mg hydroxide, simethicone (Magic Mouthwash) oral solution 10 mL (has no administration in time range)   morphine injection 2 mg (has no administration in time range)       Diagnostic Studies  Results Reviewed       None                   No orders to display              Procedures  Procedures         ED Course  ED Course as of 11/25/23 2023   Sat Nov 25, 2023   1404 Patient with swelling adjacent to left [place] tonsil with no discrete abscess however limited due to lack of IV contrast.  Patient states he had tonsils and adenoids removed as a child. Have offered attempted drainage however patient unsure, will discuss with ENT.   1426 Have discussed with ENT and patient who states that he would prefer to be admitted to the hospital for Decadron and IV antibiotics per ENT as opposed to attempted drainage in the emergency department. SBIRT 22yo+      Flowsheet Row Most Recent Value   Initial Alcohol Screen: US AUDIT-C     1. How often do you have a drink containing alcohol? 0 Filed at: 11/25/2023 1158   2. How many drinks containing alcohol do you have on a typical day you are drinking? 0 Filed at: 11/25/2023 1158   3a. Male UNDER 65: How often do you have five or more drinks on one occasion? 0 Filed at: 11/25/2023 1158   3b. FEMALE Any Age, or MALE 65+:  How often do you have 4 or more drinks on one occassion? 0 Filed at: 11/25/2023 7502   Audit-C Score 0 Filed at: 11/25/2023 1276   BELÉN: How many times in the past year have you. .. Used an illegal drug or used a prescription medication for non-medical reasons? Never Filed at: 11/25/2023 7182                      Medical Decision Making  Patient 55-year-old male past medical [place] hypertension, hyperlipidemia, diabetes, anxiety, HSP, SVT, skin cancer, GERD, CKD stage III, mitral valve disorder, asthma presenting with sore throat. Patient is well-appearing at bedside with stable vitals and in no acute distress. He does have left greater than right tonsil lymphadenopathy with exudate without uvular deviation, no stridor or wheezing, patient speaking in full sentences with no signs of respiratory distress and no difficulty with phonation. Will give pain control, Decadron, IV antibiotics as patient is already on oral antibiotics, obtain labs and CT to assess for PTA, RPA, and continue to monitor. Amount and/or Complexity of Data Reviewed  Labs: ordered. Radiology: ordered. Risk  Prescription drug management. Disposition  Final diagnoses:   None     ED Disposition       None          Follow-up Information    None         Patient's Medications   Discharge Prescriptions    No medications on file       No discharge procedures on file.     PDMP Review       None            ED Provider  Electronically Signed by             Janay Henao DO  11/25/23 2023

## 2023-11-25 NOTE — ASSESSMENT & PLAN NOTE
Body mass index is 32.27 kg/m².     Recommend incorporating a more whole foods plant-predominant diet along with decreasing consumption of red meats and processed foods  Per AHA guidelines, recommend moderate-vigorous intensity exercise for 30 minutes a day for 5 days a week or a total of 150 min/week

## 2023-11-25 NOTE — ASSESSMENT & PLAN NOTE
Concern for Sepsis POA as evidenced by Tachycardia, leukocytosis  Suspected source: Pharyngitis with possible abscess    Recent Labs     11/25/23  1246   WBC 13.62*     Lactic acid pending  WBC elevation may be 2/2 recent prednisone use    Plan:  Blood clx  Procal trend  Abx Unasyn/Flagyl per ENT reccs (shortage of clindamycin)  IV Fluid hydration  Trend fever/wbc curve

## 2023-11-26 LAB
ALBUMIN SERPL BCP-MCNC: 3.6 G/DL (ref 3.5–5)
ALP SERPL-CCNC: 42 U/L (ref 34–104)
ALT SERPL W P-5'-P-CCNC: 9 U/L (ref 7–52)
ANION GAP SERPL CALCULATED.3IONS-SCNC: 8 MMOL/L
AST SERPL W P-5'-P-CCNC: 7 U/L (ref 13–39)
BASOPHILS # BLD AUTO: 0.01 THOUSANDS/ÂΜL (ref 0–0.1)
BASOPHILS NFR BLD AUTO: 0 % (ref 0–1)
BILIRUB SERPL-MCNC: 0.39 MG/DL (ref 0.2–1)
BUN SERPL-MCNC: 22 MG/DL (ref 5–25)
CALCIUM SERPL-MCNC: 8.8 MG/DL (ref 8.4–10.2)
CHLORIDE SERPL-SCNC: 105 MMOL/L (ref 96–108)
CO2 SERPL-SCNC: 25 MMOL/L (ref 21–32)
CREAT SERPL-MCNC: 1.02 MG/DL (ref 0.6–1.3)
EOSINOPHIL # BLD AUTO: 0 THOUSAND/ÂΜL (ref 0–0.61)
EOSINOPHIL NFR BLD AUTO: 0 % (ref 0–6)
ERYTHROCYTE [DISTWIDTH] IN BLOOD BY AUTOMATED COUNT: 12.4 % (ref 11.6–15.1)
GFR SERPL CREATININE-BSD FRML MDRD: 72 ML/MIN/1.73SQ M
GLUCOSE SERPL-MCNC: 242 MG/DL (ref 65–140)
GLUCOSE SERPL-MCNC: 251 MG/DL (ref 65–140)
GLUCOSE SERPL-MCNC: 270 MG/DL (ref 65–140)
GLUCOSE SERPL-MCNC: 285 MG/DL (ref 65–140)
GLUCOSE SERPL-MCNC: 340 MG/DL (ref 65–140)
GLUCOSE SERPL-MCNC: 409 MG/DL (ref 65–140)
HCT VFR BLD AUTO: 39.6 % (ref 36.5–49.3)
HETEROPH AB SER QL: NEGATIVE
HGB BLD-MCNC: 13.4 G/DL (ref 12–17)
IMM GRANULOCYTES # BLD AUTO: 0.07 THOUSAND/UL (ref 0–0.2)
IMM GRANULOCYTES NFR BLD AUTO: 1 % (ref 0–2)
LACTATE SERPL-SCNC: 1.2 MMOL/L (ref 0.5–2)
LYMPHOCYTES # BLD AUTO: 1.08 THOUSANDS/ÂΜL (ref 0.6–4.47)
LYMPHOCYTES NFR BLD AUTO: 9 % (ref 14–44)
MAGNESIUM SERPL-MCNC: 2.2 MG/DL (ref 1.9–2.7)
MCH RBC QN AUTO: 30.2 PG (ref 26.8–34.3)
MCHC RBC AUTO-ENTMCNC: 33.8 G/DL (ref 31.4–37.4)
MCV RBC AUTO: 89 FL (ref 82–98)
MONOCYTES # BLD AUTO: 0.26 THOUSAND/ÂΜL (ref 0.17–1.22)
MONOCYTES NFR BLD AUTO: 2 % (ref 4–12)
NEUTROPHILS # BLD AUTO: 10.46 THOUSANDS/ÂΜL (ref 1.85–7.62)
NEUTS SEG NFR BLD AUTO: 88 % (ref 43–75)
NRBC BLD AUTO-RTO: 0 /100 WBCS
PLATELET # BLD AUTO: 261 THOUSANDS/UL (ref 149–390)
PMV BLD AUTO: 10.9 FL (ref 8.9–12.7)
POTASSIUM SERPL-SCNC: 4.2 MMOL/L (ref 3.5–5.3)
PROCALCITONIN SERPL-MCNC: <0.05 NG/ML
PROT SERPL-MCNC: 6.3 G/DL (ref 6.4–8.4)
RBC # BLD AUTO: 4.44 MILLION/UL (ref 3.88–5.62)
SODIUM SERPL-SCNC: 138 MMOL/L (ref 135–147)
WBC # BLD AUTO: 11.88 THOUSAND/UL (ref 4.31–10.16)

## 2023-11-26 PROCEDURE — 82948 REAGENT STRIP/BLOOD GLUCOSE: CPT

## 2023-11-26 PROCEDURE — 80053 COMPREHEN METABOLIC PANEL: CPT | Performed by: INTERNAL MEDICINE

## 2023-11-26 PROCEDURE — 10160 PNXR ASPIR ABSC HMTMA BULLA: CPT | Performed by: OTOLARYNGOLOGY

## 2023-11-26 PROCEDURE — 83735 ASSAY OF MAGNESIUM: CPT | Performed by: INTERNAL MEDICINE

## 2023-11-26 PROCEDURE — 99221 1ST HOSP IP/OBS SF/LOW 40: CPT | Performed by: OTOLARYNGOLOGY

## 2023-11-26 PROCEDURE — 85025 COMPLETE CBC W/AUTO DIFF WBC: CPT | Performed by: INTERNAL MEDICINE

## 2023-11-26 PROCEDURE — 83605 ASSAY OF LACTIC ACID: CPT | Performed by: INTERNAL MEDICINE

## 2023-11-26 PROCEDURE — 84145 PROCALCITONIN (PCT): CPT | Performed by: INTERNAL MEDICINE

## 2023-11-26 PROCEDURE — 99232 SBSQ HOSP IP/OBS MODERATE 35: CPT | Performed by: INTERNAL MEDICINE

## 2023-11-26 RX ORDER — KETOROLAC TROMETHAMINE 30 MG/ML
15 INJECTION, SOLUTION INTRAMUSCULAR; INTRAVENOUS ONCE
Status: COMPLETED | OUTPATIENT
Start: 2023-11-26 | End: 2023-11-26

## 2023-11-26 RX ORDER — INSULIN GLARGINE 100 [IU]/ML
16 INJECTION, SOLUTION SUBCUTANEOUS
Status: DISCONTINUED | OUTPATIENT
Start: 2023-11-26 | End: 2023-11-27 | Stop reason: HOSPADM

## 2023-11-26 RX ORDER — INSULIN LISPRO 100 [IU]/ML
3 INJECTION, SOLUTION INTRAVENOUS; SUBCUTANEOUS
Status: DISCONTINUED | OUTPATIENT
Start: 2023-11-27 | End: 2023-11-27 | Stop reason: HOSPADM

## 2023-11-26 RX ORDER — INSULIN LISPRO 100 [IU]/ML
3 INJECTION, SOLUTION INTRAVENOUS; SUBCUTANEOUS
Status: DISCONTINUED | OUTPATIENT
Start: 2023-11-26 | End: 2023-11-27 | Stop reason: HOSPADM

## 2023-11-26 RX ORDER — LIDOCAINE HYDROCHLORIDE AND EPINEPHRINE 10; 10 MG/ML; UG/ML
1 INJECTION, SOLUTION INFILTRATION; PERINEURAL ONCE
Status: DISCONTINUED | OUTPATIENT
Start: 2023-11-26 | End: 2023-11-27 | Stop reason: HOSPADM

## 2023-11-26 RX ORDER — LIDOCAINE HYDROCHLORIDE 40 MG/ML
5 SOLUTION TOPICAL ONCE
Status: DISCONTINUED | OUTPATIENT
Start: 2023-11-26 | End: 2023-11-27 | Stop reason: HOSPADM

## 2023-11-26 RX ADMIN — METRONIDAZOLE 500 MG: 500 INJECTION, SOLUTION INTRAVENOUS at 14:40

## 2023-11-26 RX ADMIN — INSULIN GLARGINE 16 UNITS: 100 INJECTION, SOLUTION SUBCUTANEOUS at 22:52

## 2023-11-26 RX ADMIN — AMPICILLIN SODIUM AND SULBACTAM SODIUM 3 G: 100; 50 INJECTION, POWDER, FOR SOLUTION INTRAVENOUS at 07:10

## 2023-11-26 RX ADMIN — MONTELUKAST 10 MG: 10 TABLET, FILM COATED ORAL at 22:46

## 2023-11-26 RX ADMIN — DEXAMETHASONE SODIUM PHOSPHATE 10 MG: 10 INJECTION, SOLUTION INTRAMUSCULAR; INTRAVENOUS at 06:25

## 2023-11-26 RX ADMIN — PANTOPRAZOLE SODIUM 40 MG: 40 TABLET, DELAYED RELEASE ORAL at 06:25

## 2023-11-26 RX ADMIN — FLUTICASONE FUROATE AND VILANTEROL TRIFENATATE 1 PUFF: 100; 25 POWDER RESPIRATORY (INHALATION) at 11:33

## 2023-11-26 RX ADMIN — INSULIN LISPRO 4 UNITS: 100 INJECTION, SOLUTION INTRAVENOUS; SUBCUTANEOUS at 22:53

## 2023-11-26 RX ADMIN — KETOROLAC TROMETHAMINE 15 MG: 30 INJECTION, SOLUTION INTRAMUSCULAR; INTRAVENOUS at 00:43

## 2023-11-26 RX ADMIN — DEXAMETHASONE SODIUM PHOSPHATE 10 MG: 10 INJECTION, SOLUTION INTRAMUSCULAR; INTRAVENOUS at 14:32

## 2023-11-26 RX ADMIN — INSULIN LISPRO 3 UNITS: 100 INJECTION, SOLUTION INTRAVENOUS; SUBCUTANEOUS at 17:45

## 2023-11-26 RX ADMIN — AMPICILLIN SODIUM AND SULBACTAM SODIUM 3 G: 100; 50 INJECTION, POWDER, FOR SOLUTION INTRAVENOUS at 00:08

## 2023-11-26 RX ADMIN — METRONIDAZOLE 500 MG: 500 INJECTION, SOLUTION INTRAVENOUS at 06:25

## 2023-11-26 RX ADMIN — AMPICILLIN SODIUM AND SULBACTAM SODIUM 3 G: 100; 50 INJECTION, POWDER, FOR SOLUTION INTRAVENOUS at 22:56

## 2023-11-26 RX ADMIN — AMPICILLIN SODIUM AND SULBACTAM SODIUM 3 G: 100; 50 INJECTION, POWDER, FOR SOLUTION INTRAVENOUS at 15:16

## 2023-11-26 RX ADMIN — INSULIN LISPRO 3 UNITS: 100 INJECTION, SOLUTION INTRAVENOUS; SUBCUTANEOUS at 11:32

## 2023-11-26 RX ADMIN — ATORVASTATIN CALCIUM 10 MG: 10 TABLET, FILM COATED ORAL at 11:33

## 2023-11-26 RX ADMIN — INSULIN HUMAN 5 UNITS: 100 INJECTION, SOLUTION PARENTERAL at 17:45

## 2023-11-26 RX ADMIN — FLUTICASONE PROPIONATE 1 SPRAY: 50 SPRAY, METERED NASAL at 11:33

## 2023-11-26 RX ADMIN — METRONIDAZOLE 500 MG: 500 INJECTION, SOLUTION INTRAVENOUS at 22:47

## 2023-11-26 RX ADMIN — VERAPAMIL HYDROCHLORIDE 180 MG: 180 TABLET ORAL at 17:44

## 2023-11-26 RX ADMIN — INSULIN LISPRO 3 UNITS: 100 INJECTION, SOLUTION INTRAVENOUS; SUBCUTANEOUS at 09:51

## 2023-11-26 RX ADMIN — VERAPAMIL HYDROCHLORIDE 180 MG: 180 TABLET ORAL at 11:33

## 2023-11-26 RX ADMIN — DEXAMETHASONE SODIUM PHOSPHATE 10 MG: 10 INJECTION, SOLUTION INTRAMUSCULAR; INTRAVENOUS at 23:07

## 2023-11-26 NOTE — ASSESSMENT & PLAN NOTE
Lab Results   Component Value Date    EGFR 72 11/26/2023    EGFR 60 11/25/2023    EGFR 58 10/02/2023    CREATININE 1.02 11/26/2023    CREATININE 1.19 11/25/2023    CREATININE 1.22 10/02/2023     Appears stable at baseline  Continue to monitor

## 2023-11-26 NOTE — ASSESSMENT & PLAN NOTE
Lab Results   Component Value Date    HGBA1C 8.4 (H) 10/02/2023       Recent Labs     11/25/23  1758 11/25/23  2158 11/26/23  0621 11/26/23  1056   POCGLU 239* 294* 242* 251*       Blood Sugar Average: Last 72 hrs:  (P) 256.5  Plan:  Hold home oral regimen  Diabetic diet  Insulin regimen  Increased Lantus to 16 units HS  Glucose checks and Insulin correction ACHS  Goal -180 while admitted, adjusting insulin regimen as appropriate  Monitor for hypoglycemia and treat per protocol

## 2023-11-26 NOTE — ASSESSMENT & PLAN NOTE
Has been on outpatient abx Penicillin and prednisone for the past 5 days prior to admission but had minimal to no improvement  CT imaging obtained; unable to use IV contrast due to anaphylactic reaction -unable to appreciate any discrete collection but true abscess cannot be ruled out without IV contrast  Notable left-sided edema without uvular deviation and intact airway; no abnormalities in phonation noted    IV Decadron every 8 hours  Antibiotics with Unasyn and Flagyl due to shortage of clindamycin  S/p drainage per ENT  Patient feels better  ENT recommends Janeth may be discharged tomorrow if he is doing okay, discharge with oral antibiotics, prednisone taper 40 mg x 5 days.   Follow up in the office 1 week after discharge

## 2023-11-26 NOTE — ASSESSMENT & PLAN NOTE
Concern for Sepsis POA as evidenced by Tachycardia, leukocytosis  Suspected source: Pharyngitis with possible abscess    Recent Labs     11/25/23  1246 11/26/23  0501   WBC 13.62* 11.88*       Lactic acid pending  WBC elevation may be 2/2 recent prednisone use    Plan:  Blood clx  Procal trend  Abx Unasyn/Flagyl per ENT reccs (shortage of clindamycin)  Trend fever/wbc curve

## 2023-11-26 NOTE — ASSESSMENT & PLAN NOTE
Blood Pressure: (!) 174/95      Plan:  Continue home medications  Monitor blood pressure  One episodes of high BP, if persistent, will adjust  PRN IV Labetalol and Hydralazine for SBP > 160

## 2023-11-26 NOTE — PLAN OF CARE
Problem: PAIN - ADULT  Goal: Verbalizes/displays adequate comfort level or baseline comfort level  Description: Interventions:  - Encourage patient to monitor pain and request assistance  - Assess pain using appropriate pain scale  - Administer analgesics based on type and severity of pain and evaluate response  - Implement non-pharmacological measures as appropriate and evaluate response  - Consider cultural and social influences on pain and pain management  - Notify physician/advanced practitioner if interventions unsuccessful or patient reports new pain  Outcome: Progressing     Problem: INFECTION - ADULT  Goal: Absence or prevention of progression during hospitalization  Description: INTERVENTIONS:  - Assess and monitor for signs and symptoms of infection  - Monitor lab/diagnostic results  - Monitor all insertion sites, i.e. indwelling lines, tubes, and drains  - Monitor endotracheal if appropriate and nasal secretions for changes in amount and color  - [place] appropriate cooling/warming therapies per order  - Administer medications as ordered  - Instruct and encourage patient and family to use good hand hygiene technique  - Identify and instruct in appropriate isolation precautions for identified infection/condition  Outcome: Progressing  Goal: Absence of fever/infection during neutropenic period  Description: INTERVENTIONS:  - Monitor WBC    Outcome: Progressing     Problem: SAFETY ADULT  Goal: Patient will remain free of falls  Description: INTERVENTIONS:  - Educate patient/family on patient safety including physical limitations  - Instruct patient to call for assistance with activity   - Consult OT/PT to assist with strengthening/mobility   - Keep Call bell within reach  - Keep bed low and locked with side rails adjusted as appropriate  - Keep care items and personal belongings within reach  - Initiate and maintain comfort rounds  - Make Fall Risk Sign visible to staff  - Offer Toileting every 2 Hours, in advance of need  - Initiate/Maintain bedalarm  - Obtain necessary fall risk management equipment:   - Apply yellow socks and bracelet for high fall risk patients  - Consider moving patient to room near nurses station  Outcome: Progressing  Goal: Maintain or return to baseline ADL function  Description: INTERVENTIONS:  -  Assess patient's ability to carry out ADLs; assess patient's baseline for ADL function and identify physical deficits which impact ability to perform ADLs (bathing, care of mouth/teeth, toileting, grooming, dressing, etc.)  - Assess/evaluate cause of self-care deficits   - Assess range of motion  - Assess patient's mobility; develop plan if impaired  - Assess patient's need for assistive devices and provide as appropriate  - Encourage maximum independence but intervene and supervise when necessary  - Involve family in performance of ADLs  - Assess for home care needs following discharge   - Consider OT consult to assist with ADL evaluation and planning for discharge  - Provide patient education as appropriate  Outcome: Progressing  Goal: Maintains/Returns to pre admission functional level  Description: INTERVENTIONS:  - Perform AM-PAC 6 Click Basic Mobility/ Daily Activity assessment daily.  - Set and communicate daily mobility goal to care team and patient/family/caregiver. - Collaborate with rehabilitation services on mobility goals if consulted  - Perform Range of Motion 3 times a day. - Reposition patient every 2 hours.   - Dangle patient 3 times a day  - Stand patient 3 times a day  - Ambulate patient 3 times a day  - Out of bed to chair 3 times a day   - Out of bed for meals 3 times a day  - Out of bed for toileting  - Record patient progress and toleration of activity level   Outcome: Progressing     Problem: DISCHARGE PLANNING  Goal: Discharge to home or other facility with appropriate resources  Description: INTERVENTIONS:  - Identify barriers to discharge w/patient and caregiver  - Arrange for needed discharge resources and transportation as appropriate  - Identify discharge learning needs (meds, wound care, etc.)  - Arrange for interpretive services to assist at discharge as needed  - Refer to Case Management Department for coordinating discharge planning if the patient needs post-hospital services based on physician/advanced practitioner order or complex needs related to functional status, cognitive ability, or social support system  Outcome: Progressing     Problem: Knowledge Deficit  Goal: Patient/family/caregiver demonstrates understanding of disease process, treatment plan, medications, and discharge instructions  Description: Complete learning assessment and assess knowledge base. Interventions:  - Provide teaching at level of understanding  - Provide teaching via preferred learning methods  Outcome: Progressing     Problem: Nutrition/Hydration-ADULT  Goal: Nutrient/Hydration intake appropriate for improving, restoring or maintaining nutritional needs  Description: Monitor and assess patient's nutrition/hydration status for malnutrition. Collaborate with interdisciplinary team and initiate plan and interventions as ordered. Monitor patient's weight and dietary intake as ordered or per policy. Utilize nutrition screening tool and intervene as necessary. Determine patient's food preferences and provide high-protein, high-caloric foods as appropriate.      INTERVENTIONS:  - Monitor oral intake, urinary output, labs, and treatment plans  - Assess nutrition and hydration status and recommend course of action  - Evaluate amount of meals eaten  - Assist patient with eating if necessary   - Allow adequate time for meals  - Recommend/ encourage appropriate diets, oral nutritional supplements, and vitamin/mineral supplements  - Order, calculate, and assess calorie counts as needed  - Recommend, monitor, and adjust tube feedings and TPN/PPN based on assessed needs  - Assess need for intravenous fluids  - Provide specific nutrition/hydration education as appropriate  - Include patient/family/caregiver in decisions related to nutrition  Outcome: Progressing

## 2023-11-26 NOTE — CONSULTS
Otolaryngology (ENT) Consultation Note     Alex Port  [id number]  1950       Chief Complaint: Sore Throat    History of Present Illness: 68year old man who ENT is consulted for sore throat. Patient had sore throat for at least 10-14 days. He did go to urgent care and was given Penicillin and Medrol Dose pack. He states that it never really got any better, and symptoms progressed. He has sore throat, pain/difficulty with swallowing. He had his tonsils out when he was a child. The symptoms are left sided. He has a history of HSP and has been seen by us in the past for nosebleeds. He feels about 50% better since being started on antibiotics and steroids yesterday. He was able to tolerate a diet of meatloaf and mashed potatoes yesterday.     Allergies   Allergen Reactions    Iodinated Contrast Media Anaphylaxis    Shellfish Allergy - Food Allergy Anaphylaxis    Shrimp Extract Allergy Skin Test - Food Allergy Anaphylaxis    Empagliflozin Hives    Farxiga [Dapagliflozin] Rash     Yeast infection    Ozempic (0.25 Or 0.5 Mg-Dose) [Semaglutide(0.25 Or 0.5mg-Dos)] Rash       Past Medical History:   Diagnosis Date    Actinic keratosis     Adhesive capsulitis of unspecified shoulder     Anxiety     Asthma     Atopic dermatitis     Cardiac disease     Cellulitis of right upper extremity     Cervical radiculopathy     Chest pain     Chronic maxillary sinusitis     last assessed 01/21/14    Diabetes mellitus ([address])     Erectile dysfunction of non-organic origin     Esophageal reflux     Gout     last assessed 08/26/15    Hearing loss, conductive     Heart murmur     Mitral Valve disorder    Henoch-Schonlein purpura (HCC) 8/6/2019    Herpes zoster     History of paroxysmal supraventricular tachycardia     Hypertension     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    Mitral valve disorder     Nasal congestion 4/18/19    Neoplasm of skin, malignant     Non-melanoma skin cancer     last assessed 10/26/17    Nosebleed 19    Obesity     Palpitations     last assessed 03/05/15    Paroxysmal supraventricular tachycardia 2014    Plantar fasciitis     last assessed 05/28/15    PSVT (paroxysmal supraventricular tachycardia)     Sciatica     Sleep apnea     Squamous cell carcinoma of skin of scalp     Tinnitus, unspecified ear     Type 2 diabetes mellitus with hyperglycemia ([address])     last assessed     Umbilical hernia     Worms in stool     last assessed 08/26/15       Past Surgical History:   Procedure Laterality Date    APPENDECTOMY      COLECTOMY      Partial, Sigmoid    COLONOSCOPY      HEAD & NECK SKIN LESION EXCISIONAL BIOPSY      10/11/10 SCC --  10/17/11 SCC  --  Location [facility]    HERNIA REPAIR      NASAL/SINUS ENDOSCOPY N/A 2019    Procedure: FUNCTIONAL ENDOSCOPIC SINUS SURGERY (FESS) IMAGED GUIDED for control of epistaxis; Surgeon: Devi Garcia MD;  Location: [facility];  Service: ENT    IN ESOPHAGOGASTRODUODENOSCOPY TRANSORAL DIAGNOSTIC N/A 2017    Procedure: ESOPHAGOGASTRODUODENOSCOPY (EGD); Surgeon: Isaiah Wilder MD;  Location: [facility]; Service: Gastroenterology    SKIN BIOPSY      TONSILLECTOMY         Social History     Socioeconomic History    Marital status: /Civil Union     Spouse name: Not on file    Number of children: Not on file    Years of education: Not on file    Highest education level: Not on file   Occupational History    Occupation: Working Part Time    Tobacco Use    Smoking status: Former     Packs/day: 1.00     Years: 20.00     Total pack years: 20.00     Types: Cigarettes     Quit date: 1977     Years since quittin.9    Smokeless tobacco: Never   Vaping Use    Vaping Use: Never used   Substance and Sexual Activity    Alcohol use:  Yes     Alcohol/week: 1.0 standard drink of alcohol     Types: 1 Standard drinks or equivalent per week     Comment: only occasionally    Drug use: No    Sexual activity: Yes     Partners: Female   Other Topics Concern    Not on file   Social History Narrative    Active Advance Directive     Social Determinants of Health     Financial Resource Strain: Medium Risk (4/18/2023)    Overall Financial Resource Strain (CARDIA)     Difficulty of Paying Living Expenses: Somewhat hard   Food Insecurity: Not on file   Transportation Needs: No Transportation Needs (4/18/2023)    PRAPARE - Transportation     Lack of Transportation (Medical): No     Lack of Transportation (Non-Medical): No   Physical Activity: Inactive (4/26/2021)    Exercise Vital Sign     Days of Exercise per Week: 0 days     Minutes of Exercise per Session: 0 min   Stress: No Stress Concern Present (10/10/2023)    [facility]     Feeling of Stress : Not at all   Social Connections: Not on file   Intimate Partner Violence: Not on file   Housing Stability: Not on file       Family History   Problem Relation Age of Onset    Cancer Mother         Breast + Skin    Diabetes Mother     Heart disease Mother     Heart attack Mother     Breast cancer Mother         Adenocarcinoma    Skin cancer Mother         Adenocarcinoma    Heart failure Mother     Diabetes type II Mother     Varicose Veins Mother         Lower Extremities    Cancer Father         Prostate + Skin    Prostate cancer Father         Adenocarcinoma    Skin cancer Father     Lymphoma Father         Non-Hodgkin's    Arthritis Family        No current facility-administered medications on file prior to encounter.      Current Outpatient Medications on File Prior to Encounter   Medication Sig Dispense Refill    atorvastatin (LIPITOR) 10 mg tablet TAKE 1 TABLET BY MOUTH EVERY DAY 90 tablet 3    BD Pen Needle Audelia 2nd Gen 32G X 4 MM MISC ONCE DAILY AT BEDTIME UNDER THE SKIN 100 each 1    cyclobenzaprine (FLEXERIL) 5 mg tablet TAKE 1 TABLET BY MOUTH THREE TIMES A DAY AS NEEDED FOR MUSCLE SPASMS 60 tablet 5    fluticasone (FLONASE) 50 mcg/act nasal spray SPRAY 1 SPRAY INTO EACH NOSTRIL EVERY DAY 24 mL 0    Fluticasone-Salmeterol (Advair Diskus) 100-50 mcg/dose inhaler Inhale 1 puff 2 (two) times a day Rinse mouth after use. 60 blister 3    glucose blood (FREESTYLE LITE) test strip USE TO TEST ONCE DAILY (DX: E11.65) 100 strip 3    Insulin Glargine Solostar (Lantus SoloStar) 100 UNIT/ML SOPN INJECT 12 UNITS UNDER THE SKIN DAILY 15 mL 0    ketoconazole (NIZORAL) 2 % cream APPLY 1 APPLICATION TOPICALLY IN THE MORNING AND 1 APPLICATION IN THE EVENING. APPLIED TO FACE AND SCALP TWICE A DAY FOR 3 WEEKS REPEAT AS NEEDED. 30 g 3    montelukast (SINGULAIR) 10 mg tablet TAKE 1 TABLET BY MOUTH EVERYDAY AT BEDTIME 90 tablet 3    omeprazole (PriLOSEC) 20 mg delayed release capsule TAKE 1 CAPSULE BY MOUTH EVERY DAY 90 capsule 1    sitaGLIPtin (Januvia) 100 mg tablet Take 1 tablet (100 mg total) by mouth daily 90 tablet 1    sodium chloride (OCEAN) 0.65 % nasal spray 1 spray into each nostril as needed for congestion       verapamil (VERELAN PM) 180 MG 24 hr capsule TAKE 1 CAPSULE BY MOUTH 2 TIMES A DAY. 180 capsule 3    EPINEPHrine (EPIPEN) 0.3 mg/0.3 mL SOAJ Inject 0.3 mL (0.3 mg total) into a muscle once for 1 dose 0.6 mL 0    glimepiride (AMARYL) 4 mg tablet TAKE 1 TABLET BY MOUTH TWICE A  tablet 1    metFORMIN (GLUCOPHAGE-XR) 500 mg 24 hr tablet TAKE 2 TABLETS BY MOUTH TWICE A DAY WITH FOOD 360 tablet 0    sildenafil (VIAGRA) 50 MG tablet Take 1 tablet (50 mg total) by mouth daily as needed for erectile dysfunction 6 tablet 9    triamcinolone (KENALOG) 0.025 % cream Apply topically as needed          Review of Systems:  10-point ROS performed. Patient denies fevers or chills. All other systems reviewed and found to be negative unless otherwise noted in the HPI. Vitals:    11/26/23 0732   BP: 126/64   Pulse: 58   Resp: 18   Temp: 97.8 °F (36.6 °C)   SpO2: 91%       General Appearance: No apparent distress    Head: Normocephalic, atraumatic.      Face: Symmetric without obvious lesions. Eyes: Conjunctiva clear, extraocular movements are intact. Ears: Pinna normal shape and position. Nose: External pyramid midline. Oral cavity/Oropharynx:  Patient does have mild fullness in the posterior pharyngeal wall area on the left side. Mildly tender. Neck: No cervical lymphadenopathy or masses appreciated. Neck soft. Skin: Skin warm and dry. Neurological: Cranial nerves II to XII are intact. Respiratory: No stridor or labored breathing. Cardiovascular: Good perfusion of the upper extremities. No cyanosis of the fingers or hands. Psychiatric: Alert and oriented. Data Reviewed: CT scan without contrast shows fullness in the left pharynx, non-specific due to lack of contrast.     Procedure:  Patient gargled with 4% lidocaine. Afterwards, 1% lidocaine with epi was injected into the left tonsillar pillar and posterior pharyngeal wall. 18 gauge needed was then used for attempted drainage. Multiple areas of the fullness were attempted to aspirate but no pus identified. Assessment: Sore throat, acute pharyngitis, left pharyngeal abscess/phlegmon     Plan:  Patient feeling better on antibiotics and steroids. Attempted drainage performed at the bedside. Continue current medications, diet as tolerated. If continues feel better tomorrow, could discharge on oral antibiotics, prednisone taper 40 mg x 5 days. Follow up in the office 1 week after discharge. Please call ENT for further questions or concerns.     Steffanie Majano MD  Otolaryngology - Head & Neck Surgery  [facility]

## 2023-11-27 VITALS
TEMPERATURE: 97.4 F | RESPIRATION RATE: 18 BRPM | SYSTOLIC BLOOD PRESSURE: 132 MMHG | HEIGHT: 64 IN | WEIGHT: 188 LBS | HEART RATE: 59 BPM | OXYGEN SATURATION: 93 % | DIASTOLIC BLOOD PRESSURE: 70 MMHG | BODY MASS INDEX: 32.1 KG/M2

## 2023-11-27 LAB
ANION GAP SERPL CALCULATED.3IONS-SCNC: 8 MMOL/L
BASOPHILS # BLD AUTO: 0.02 THOUSANDS/ÂΜL (ref 0–0.1)
BASOPHILS NFR BLD AUTO: 0 % (ref 0–1)
BUN SERPL-MCNC: 29 MG/DL (ref 5–25)
CALCIUM SERPL-MCNC: 8.8 MG/DL (ref 8.4–10.2)
CHLORIDE SERPL-SCNC: 106 MMOL/L (ref 96–108)
CO2 SERPL-SCNC: 23 MMOL/L (ref 21–32)
CREAT SERPL-MCNC: 1.09 MG/DL (ref 0.6–1.3)
EOSINOPHIL # BLD AUTO: 0 THOUSAND/ÂΜL (ref 0–0.61)
EOSINOPHIL NFR BLD AUTO: 0 % (ref 0–6)
ERYTHROCYTE [DISTWIDTH] IN BLOOD BY AUTOMATED COUNT: 12.2 % (ref 11.6–15.1)
GFR SERPL CREATININE-BSD FRML MDRD: 66 ML/MIN/1.73SQ M
GLUCOSE SERPL-MCNC: 282 MG/DL (ref 65–140)
GLUCOSE SERPL-MCNC: 290 MG/DL (ref 65–140)
GLUCOSE SERPL-MCNC: 318 MG/DL (ref 65–140)
GLUCOSE SERPL-MCNC: 339 MG/DL (ref 65–140)
HCT VFR BLD AUTO: 37.6 % (ref 36.5–49.3)
HGB BLD-MCNC: 13 G/DL (ref 12–17)
IMM GRANULOCYTES # BLD AUTO: 0.12 THOUSAND/UL (ref 0–0.2)
IMM GRANULOCYTES NFR BLD AUTO: 1 % (ref 0–2)
LYMPHOCYTES # BLD AUTO: 1.27 THOUSANDS/ÂΜL (ref 0.6–4.47)
LYMPHOCYTES NFR BLD AUTO: 7 % (ref 14–44)
MCH RBC QN AUTO: 30.1 PG (ref 26.8–34.3)
MCHC RBC AUTO-ENTMCNC: 34.6 G/DL (ref 31.4–37.4)
MCV RBC AUTO: 87 FL (ref 82–98)
MONOCYTES # BLD AUTO: 0.51 THOUSAND/ÂΜL (ref 0.17–1.22)
MONOCYTES NFR BLD AUTO: 3 % (ref 4–12)
NEUTROPHILS # BLD AUTO: 15.38 THOUSANDS/ÂΜL (ref 1.85–7.62)
NEUTS SEG NFR BLD AUTO: 89 % (ref 43–75)
NRBC BLD AUTO-RTO: 0 /100 WBCS
PLATELET # BLD AUTO: 262 THOUSANDS/UL (ref 149–390)
PMV BLD AUTO: 10.6 FL (ref 8.9–12.7)
POTASSIUM SERPL-SCNC: 4.3 MMOL/L (ref 3.5–5.3)
RBC # BLD AUTO: 4.32 MILLION/UL (ref 3.88–5.62)
SODIUM SERPL-SCNC: 137 MMOL/L (ref 135–147)
WBC # BLD AUTO: 17.3 THOUSAND/UL (ref 4.31–10.16)

## 2023-11-27 PROCEDURE — 80048 BASIC METABOLIC PNL TOTAL CA: CPT | Performed by: INTERNAL MEDICINE

## 2023-11-27 PROCEDURE — 99239 HOSP IP/OBS DSCHRG MGMT >30: CPT | Performed by: FAMILY MEDICINE

## 2023-11-27 PROCEDURE — 82948 REAGENT STRIP/BLOOD GLUCOSE: CPT

## 2023-11-27 PROCEDURE — 85025 COMPLETE CBC W/AUTO DIFF WBC: CPT | Performed by: INTERNAL MEDICINE

## 2023-11-27 RX ORDER — INSULIN LISPRO 100 [IU]/ML
INJECTION, SOLUTION INTRAVENOUS; SUBCUTANEOUS
Qty: 15 ML | Refills: 0 | Status: SHIPPED | OUTPATIENT
Start: 2023-11-27

## 2023-11-27 RX ORDER — AMOXICILLIN AND CLAVULANATE POTASSIUM 875; 125 MG/1; MG/1
1 TABLET, FILM COATED ORAL EVERY 12 HOURS SCHEDULED
Qty: 14 TABLET | Refills: 0 | Status: SHIPPED | OUTPATIENT
Start: 2023-11-27 | End: 2023-12-04

## 2023-11-27 RX ORDER — INSULIN LISPRO 100 [IU]/ML
INJECTION, SOLUTION INTRAVENOUS; SUBCUTANEOUS
Qty: 3 ML | Refills: 0 | Status: SHIPPED | OUTPATIENT
Start: 2023-11-27 | End: 2023-11-27

## 2023-11-27 RX ORDER — PREDNISONE 20 MG/1
40 TABLET ORAL DAILY
Qty: 10 TABLET | Refills: 0 | Status: SHIPPED | OUTPATIENT
Start: 2023-11-27 | End: 2023-12-05 | Stop reason: ALTCHOICE

## 2023-11-27 RX ORDER — PREDNISONE 20 MG/1
40 TABLET ORAL DAILY
Qty: 5 TABLET | Refills: 0 | Status: SHIPPED | OUTPATIENT
Start: 2023-11-27 | End: 2023-11-27 | Stop reason: SDUPTHER

## 2023-11-27 RX ADMIN — VERAPAMIL HYDROCHLORIDE 180 MG: 180 TABLET ORAL at 09:18

## 2023-11-27 RX ADMIN — METRONIDAZOLE 500 MG: 500 INJECTION, SOLUTION INTRAVENOUS at 05:47

## 2023-11-27 RX ADMIN — INSULIN LISPRO 3 UNITS: 100 INJECTION, SOLUTION INTRAVENOUS; SUBCUTANEOUS at 07:44

## 2023-11-27 RX ADMIN — ACETAMINOPHEN 650 MG: 325 TABLET, FILM COATED ORAL at 02:04

## 2023-11-27 RX ADMIN — FLUTICASONE PROPIONATE 1 SPRAY: 50 SPRAY, METERED NASAL at 09:18

## 2023-11-27 RX ADMIN — DEXAMETHASONE SODIUM PHOSPHATE 10 MG: 10 INJECTION, SOLUTION INTRAMUSCULAR; INTRAVENOUS at 05:44

## 2023-11-27 RX ADMIN — INSULIN LISPRO 6 UNITS: 100 INJECTION, SOLUTION INTRAVENOUS; SUBCUTANEOUS at 11:37

## 2023-11-27 RX ADMIN — AMPICILLIN SODIUM AND SULBACTAM SODIUM 3 G: 100; 50 INJECTION, POWDER, FOR SOLUTION INTRAVENOUS at 06:48

## 2023-11-27 RX ADMIN — INSULIN LISPRO 4 UNITS: 100 INJECTION, SOLUTION INTRAVENOUS; SUBCUTANEOUS at 07:42

## 2023-11-27 RX ADMIN — ATORVASTATIN CALCIUM 10 MG: 10 TABLET, FILM COATED ORAL at 09:19

## 2023-11-27 RX ADMIN — PANTOPRAZOLE SODIUM 40 MG: 40 TABLET, DELAYED RELEASE ORAL at 05:47

## 2023-11-27 RX ADMIN — INSULIN LISPRO 3 UNITS: 100 INJECTION, SOLUTION INTRAVENOUS; SUBCUTANEOUS at 11:37

## 2023-11-27 NOTE — PROGRESS NOTES
[address]  Progress Note  Name: Savita Subramanian  MRN: [id number]  Unit/Bed#: -01 I Date of Admission: 11/25/2023   Date of Service:  11/26/2023 Hospital Day: 1    Assessment/Plan   Class 1 obesity due to excess calories with serious comorbidity and body mass index (BMI) of 32.0 to 32.9 in adult  Assessment & Plan  Body mass index is 32.27 kg/m². Recommend incorporating a more whole foods plant-predominant diet along with decreasing consumption of red meats and processed foods  Per AHA guidelines, recommend moderate-vigorous intensity exercise for 30 minutes a day for 5 days a week or a total of 150 min/week      Sepsis without acute organ dysfunction [facility])  Assessment & Plan  Concern for Sepsis POA as evidenced by Tachycardia, leukocytosis  Suspected source: Pharyngitis with possible abscess    Recent Labs     11/25/23  1246 11/26/23  0501   WBC 13.62* 11.88*       Lactic acid pending  WBC elevation may be 2/2 recent prednisone use    Plan:  Blood clx  Procal trend  Abx Unasyn/Flagyl per ENT reccs (shortage of clindamycin)  Trend fever/wbc curve      Acute pharyngitis due to other specified organisms  Assessment & Plan  Has been on outpatient abx Penicillin and prednisone for the past 5 days prior to admission but had minimal to no improvement  CT imaging obtained; unable to use IV contrast due to anaphylactic reaction -unable to appreciate any discrete collection but true abscess cannot be ruled out without IV contrast  Notable left-sided edema without uvular deviation and intact airway; no abnormalities in phonation noted    IV Decadron every 8 hours  Antibiotics with Unasyn and Flagyl due to shortage of clindamycin  S/p drainage per ENT  Patient feels better  ENT recommends Janeth may be discharged tomorrow if he is doing okay, discharge with oral antibiotics, prednisone taper 40 mg x 5 days.   Follow up in the office 1 week after discharge    Stage 3a chronic kidney disease [facility])  Assessment & Plan  Lab Results   Component Value Date    EGFR 72 11/26/2023    EGFR 60 11/25/2023    EGFR 58 10/02/2023    CREATININE 1.02 11/26/2023    CREATININE 1.19 11/25/2023    CREATININE 1.22 10/02/2023     Appears stable at baseline  Continue to monitor    Type 2 diabetes mellitus without complication, with long-term current use of insulin [facility])  Assessment & Plan  Lab Results   Component Value Date    HGBA1C 8.4 (H) 10/02/2023       Recent Labs     11/25/23  1758 11/25/23  2158 11/26/23  0621 11/26/23  1056   POCGLU 239* 294* 242* 251*       Blood Sugar Average: Last 72 hrs:  (P) 256.5  Plan:  Hold home oral regimen  Diabetic diet  Insulin regimen  Increased Lantus to 16 units HS  Glucose checks and Insulin correction ACHS  Goal -180 while admitted, adjusting insulin regimen as appropriate  Monitor for hypoglycemia and treat per protocol        Benign essential hypertension  Assessment & Plan  Blood Pressure: (!) 174/95      Plan:  Continue home medications  Monitor blood pressure  One episodes of high BP, if persistent, will adjust  PRN IV Labetalol and Hydralazine for SBP > 160                 VTE Pharmacologic Prophylaxis: VTE Score: 5 High Risk (Score >/= 5) - Pharmacological DVT Prophylaxis Ordered: enoxaparin (Lovenox). Sequential Compression Devices Ordered. Mobility:   Basic Mobility Inpatient Raw Score: 23  JH-HLM Goal: 7: Walk 25 feet or more  JH-HLM Achieved: 6: Walk 10 steps or more      Patient Centered Rounds: I performed bedside rounds with nursing staff today. Discussions with Specialists or Other Care Team Provider: ENT    Education and Discussions with Family / Patient:     Total Time Spent on Date of Encounter in care of patient: 30 mins.  This time was spent on one or more of the following: performing physical exam; counseling and coordination of care; obtaining or reviewing history; documenting in the medical record; reviewing/ordering tests, medications or procedures; communicating with other healthcare professionals and discussing with patient's family/caregivers. Current Length of Stay: 2 day(s)  Current Patient Status: Inpatient   Certification Statement: The patient will continue to require additional inpatient hospital stay due to Pharyngitis   Discharge Plan: Anticipate discharge tomorrow to home. Code Status: Level 1 - Full Code    Subjective:   Patient feels better. Symptoms improved    Objective:     Vitals:   Temp (24hrs), Av.7 °F (36.5 °C), Min:97.4 °F (36.3 °C), Max:98 °F (36.7 °C)    Temp:  [97.4 °F (36.3 °C)-98 °F (36.7 °C)] 97.4 °F (36.3 °C)  HR:  [59-84] 59  Resp:  [18] 18  BP: (116-174)/(68-95) 132/70  SpO2:  [93 %-96 %] 93 %  Body mass index is 32.27 kg/m². Input and Output Summary (last 24 hours): Intake/Output Summary (Last 24 hours) at 2023 0902  Last data filed at 2023 0810  Gross per 24 hour   Intake 400 ml   Output 0 ml   Net 400 ml       Physical Exam:   Physical Exam  Constitutional:       Appearance: He is obese. HENT:      Head: Normocephalic. Eyes:      Pupils: Pupils are equal, round, and reactive to light. Cardiovascular:      Rate and Rhythm: Normal rate and regular rhythm. Heart sounds: No murmur heard. Pulmonary:      Effort: Pulmonary effort is normal. No respiratory distress. Breath sounds: No wheezing or rales. Abdominal:      Palpations: Abdomen is soft. Tenderness: There is no abdominal tenderness. Musculoskeletal:         General: No swelling. Normal range of motion. Cervical back: Normal range of motion. Skin:     General: Skin is warm. Neurological:      Mental Status: He is alert and oriented to person, place, and time.    Psychiatric:         Mood and Affect: Mood normal.          Additional Data:     Labs:  Results from last 7 days   Lab Units 23  0501   WBC Thousand/uL 17.30*   HEMOGLOBIN g/dL 13.0   HEMATOCRIT % 37.6   PLATELETS Thousands/uL 262   NEUTROS PCT % 89* LYMPHS PCT % 7*   MONOS PCT % 3*   EOS PCT % 0     Results from last 7 days   Lab Units 11/27/23  0501 11/26/23  0501   SODIUM mmol/L 137 138   POTASSIUM mmol/L 4.3 4.2   CHLORIDE mmol/L 106 105   CO2 mmol/L 23 25   BUN mg/dL 29* 22   CREATININE mg/dL 1.09 1.02   ANION GAP mmol/L 8 8   CALCIUM mg/dL 8.8 8.8   ALBUMIN g/dL  --  3.6   TOTAL BILIRUBIN mg/dL  --  0.39   ALK PHOS U/L  --  42   ALT U/L  --  9   AST U/L  --  7*   GLUCOSE RANDOM mg/dL 290* 270*         Results from last 7 days   Lab Units 11/27/23  0608 11/26/23  2252 11/26/23  1954 11/26/23  1649 11/26/23  1056 11/26/23  0621 11/25/23  2158 11/25/23  1758   POC GLUCOSE mg/dl 282* 285* 340* 409* 251* 242* 294* 239*         Results from last 7 days   Lab Units 11/26/23  0501 11/25/23  1731   LACTIC ACID mmol/L 1.2 0.9   PROCALCITONIN ng/ml <0.05 <0.05       Lines/Drains:  Invasive Devices       Peripheral Intravenous Line  Duration             Peripheral IV 11/25/23 Right Antecubital 1 day                          Imaging: Reviewed radiology reports from this admission including: CT    Recent Cultures (last 7 days):   Results from last 7 days   Lab Units 11/25/23  1731   BLOOD CULTURE  No Growth at 24 hrs. No Growth at 24 hrs.        Last 24 Hours Medication List:   Current Facility-Administered Medications   Medication Dose Route Frequency Provider Last Rate    acetaminophen  650 mg Oral Q6H PRN Lakhwinder Hawthornewaran, DO      aluminum-magnesium hydroxide-simethicone  30 mL Oral Q6H PRN Lakhwinder Bishopan, DO      ampicillin-sulbactam  3 g Intravenous Q8H Lakhwinder Bishopan, DO 3 g (11/27/23 0612)    atorvastatin  10 mg Oral Daily Lakhwinder Darioan, DO      dexamethasone  10 mg Intravenous Q8H [address] Lakhwinder Simmons, DO      docusate sodium  100 mg Oral BID Lakhwinder Bishopan, DO      enoxaparin  40 mg Subcutaneous Daily Lakhwinder Simmons DO      fluticasone  1 spray Each Nare Daily Lakhwnider Simmons DO      Fluticasone Furoate-Vilanterol  1 puff Inhalation Daily Lakhwinder Simmons, DO      hydrALAZINE  10 mg Intravenous Q6H PRN Lakhwinder Simmons, DO      insulin glargine  16 Units Subcutaneous HS Prashant Villeda MD      insulin lispro  1-6 Units Subcutaneous TID AC Lakhwinder Simmons, DO      insulin lispro  1-6 Units Subcutaneous HS Lakhwinder Simmons, DO      insulin lispro  3 Units Subcutaneous Daily With Breakfast Prashant Villeda MD      insulin lispro  3 Units Subcutaneous Daily With Lunch Prashant Villeda MD      insulin lispro  3 Units Subcutaneous Daily With Armand Angelo MD      labetalol  10 mg Intravenous Q6H PRN Lakhwinder Simmons, DO      lidocaine  5 mL Topical Once Ilana Son MD      lidocaine-epinephrine  1 mL Infiltration Once Ilana Son MD      metroNIDAZOLE  500 mg Intravenous Q8H Lakhwinder Simmons,  mg (11/27/23 0547)    montelukast  10 mg Oral HS Lakhwinder Simmons, DO      ondansetron  4 mg Intravenous Q6H PRN Lakhwinder Simmons, DO      pantoprazole  40 mg Oral Early Morning Lakhwinder Simmons, DO      sodium chloride  1 spray Each Nare Q1H PRN Lakhwinder Simmons, DO      verapamil  180 mg Oral BID Lakhwinder Simmons, DO          Today, Patient Was Seen By: Prashant Villeda MD    **Please Note: This note may have been constructed using a voice recognition system. **

## 2023-11-27 NOTE — PLAN OF CARE
Problem: PAIN - ADULT  Goal: Verbalizes/displays adequate comfort level or baseline comfort level  Description: Interventions:  - Encourage patient to monitor pain and request assistance  - Assess pain using appropriate pain scale  - Administer analgesics based on type and severity of pain and evaluate response  - Implement non-pharmacological measures as appropriate and evaluate response  - Consider cultural and social influences on pain and pain management  - Notify physician/advanced practitioner if interventions unsuccessful or patient reports new pain  Outcome: Progressing     Problem: INFECTION - ADULT  Goal: Absence or prevention of progression during hospitalization  Description: INTERVENTIONS:  - Assess and monitor for signs and symptoms of infection  - Monitor lab/diagnostic results  - Monitor all insertion sites, i.e. indwelling lines, tubes, and drains  - Monitor endotracheal if appropriate and nasal secretions for changes in amount and color  - [place] appropriate cooling/warming therapies per order  - Administer medications as ordered  - Instruct and encourage patient and family to use good hand hygiene technique  - Identify and instruct in appropriate isolation precautions for identified infection/condition  Outcome: Progressing  Goal: Absence of fever/infection during neutropenic period  Description: INTERVENTIONS:  - Monitor WBC    Outcome: Progressing     Problem: SAFETY ADULT  Goal: Patient will remain free of falls  Description: INTERVENTIONS:  - Educate patient/family on patient safety including physical limitations  - Instruct patient to call for assistance with activity   - Consult OT/PT to assist with strengthening/mobility   - Keep Call bell within reach  - Keep bed low and locked with side rails adjusted as appropriate  - Keep care items and personal belongings within reach  - Initiate and maintain comfort rounds  - Make Fall Risk Sign visible to staff  - Offer Toileting every 2 Hours, in advance of need  - Initiate/Maintain bed alarm  - Obtain necessary fall risk management equipment  - Apply yellow socks and bracelet for high fall risk patients  - Consider moving patient to room near nurses station  Outcome: Progressing  Goal: Maintain or return to baseline ADL function  Description: INTERVENTIONS:  -  Assess patient's ability to carry out ADLs; assess patient's baseline for ADL function and identify physical deficits which impact ability to perform ADLs (bathing, care of mouth/teeth, toileting, grooming, dressing, etc.)  - Assess/evaluate cause of self-care deficits   - Assess range of motion  - Assess patient's mobility; develop plan if impaired  - Assess patient's need for assistive devices and provide as appropriate  - Encourage maximum independence but intervene and supervise when necessary  - Involve family in performance of ADLs  - Assess for home care needs following discharge   - Consider OT consult to assist with ADL evaluation and planning for discharge  - Provide patient education as appropriate  Outcome: Progressing  Goal: Maintains/Returns to pre admission functional level  Description: INTERVENTIONS:  - Perform AM-PAC 6 Click Basic Mobility/ Daily Activity assessment daily.  - Set and communicate daily mobility goal to care team and patient/family/caregiver. - Collaborate with rehabilitation services on mobility goals if consulted  - Perform Range of Motion 3 times a day. - Reposition patient every 2 hours.   - Dangle patient 3 times a day  - Stand patient 3 times a day  - Ambulate patient 3 times a day  - Out of bed to chair 3 times a day   - Out of bed for meals 3 times a day  - Out of bed for toileting  - Record patient progress and toleration of activity level   Outcome: Progressing     Problem: DISCHARGE PLANNING  Goal: Discharge to home or other facility with appropriate resources  Description: INTERVENTIONS:  - Identify barriers to discharge w/patient and caregiver  - Arrange for needed discharge resources and transportation as appropriate  - Identify discharge learning needs (meds, wound care, etc.)  - Arrange for interpretive services to assist at discharge as needed  - Refer to Case Management Department for coordinating discharge planning if the patient needs post-hospital services based on physician/advanced practitioner order or complex needs related to functional status, cognitive ability, or social support system  Outcome: Progressing     Problem: Knowledge Deficit  Goal: Patient/family/caregiver demonstrates understanding of disease process, treatment plan, medications, and discharge instructions  Description: Complete learning assessment and assess knowledge base. Interventions:  - Provide teaching at level of understanding  - Provide teaching via preferred learning methods  Outcome: Progressing     Problem: Nutrition/Hydration-ADULT  Goal: Nutrient/Hydration intake appropriate for improving, restoring or maintaining nutritional needs  Description: Monitor and assess patient's nutrition/hydration status for malnutrition. Collaborate with interdisciplinary team and initiate plan and interventions as ordered. Monitor patient's weight and dietary intake as ordered or per policy. Utilize nutrition screening tool and intervene as necessary. Determine patient's food preferences and provide high-protein, high-caloric foods as appropriate.      INTERVENTIONS:  - Monitor oral intake, urinary output, labs, and treatment plans  - Assess nutrition and hydration status and recommend course of action  - Evaluate amount of meals eaten  - Assist patient with eating if necessary   - Allow adequate time for meals  - Recommend/ encourage appropriate diets, oral nutritional supplements, and vitamin/mineral supplements  - Order, calculate, and assess calorie counts as needed  - Recommend, monitor, and adjust tube feedings and TPN/PPN based on assessed needs  - Assess need for intravenous fluids  - Provide specific nutrition/hydration education as appropriate  - Include patient/family/caregiver in decisions related to nutrition  Outcome: Progressing

## 2023-11-27 NOTE — CASE MANAGEMENT
Case Management Discharge Planning Note    Patient name Kristopher Sanders  Location /-02 MRN [id number]  : 1950 Date 2023       Current Admission Date: 2023  Current Admission Diagnosis:Acute pharyngitis due to other specified organisms   Patient Active Problem List    Diagnosis Date Noted    Acute pharyngitis due to other specified organisms 2023    Sepsis without acute organ dysfunction ([address]) 2023    Class 1 obesity due to excess calories with serious comorbidity and body mass index (BMI) of 32.0 to 32.9 in adult 2023    Hypertensive kidney disease with stage 3a chronic kidney disease ([address]) 10/10/2023    Current use of insulin ([address]) 2023    Stage 3a chronic kidney disease ([address]) 2022    Actinic keratosis 2018    Seborrheic keratosis 2018    History of skin cancer 2018    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 2017    Type 2 diabetes mellitus without complication, with long-term current use of insulin ([address]) 2016    Type 2 diabetes mellitus with stage 3a chronic kidney disease, without long-term current use of insulin ([address]) 2016    Thoracic radiculopathy 10/22/2015    Anxiety 2014    Benign essential hypertension 2014    Erectile dysfunction of non-organic origin 2014    Esophageal reflux 2014    Mixed hyperlipidemia due to type 2 diabetes mellitus  2014    Mitral valve disorder 2014    Rosacea 2014    Sleep apnea 2014      LOS (days): 2  Geometric Mean LOS (GMLOS) (days): 3.60  Days to GMLOS:1.6     OBJECTIVE:  Risk of Unplanned Readmission Score: 13.53         Current admission status: Inpatient   Preferred Pharmacy:   [facility]/pharmacy #[id number] [place], [place] - [address] [place] [address] [place] [address]  Phone: [phone] Fax: [phone]    Primary Care Provider: Lindsay Joel DO    Primary Insurance: MEDICARE  Secondary Insurance: [facility]    DISCHARGE DETAILS:  Insulin (Humalog KwikPen) called into [facility] by [facility]. Once patient went to , patient was told that prior authorization was needed by [employer] or patient would have to pay out of pocket $189.99. CM called insurance to attempt to get prior authorization on 3 separate numbers, [phone], [phone] and [phone]. Ant Bond at [facility] ran the information that had member ID # [id number] for patient with Medicare part B and stated that no prior authorization was needed. CM called [facility] back to clarify that there was no charge or prior authorization needed for the name brand Humalog Kwik pen. The pharmacy was trying to run the Lispro (generic) which confused to filling of the prescription and pricing. Shawnee Higgins at [facility] clarified and filled prescription at no cost for patient to .

## 2023-11-28 ENCOUNTER — TRANSITIONAL CARE MANAGEMENT (OUTPATIENT)
Age: 73
End: 2023-11-28

## 2023-11-28 ENCOUNTER — TELEPHONE (OUTPATIENT)
Age: 73
End: 2023-11-28

## 2023-11-28 NOTE — ASSESSMENT & PLAN NOTE
Concern for Sepsis POA as evidenced by Tachycardia, leukocytosis  Suspected source: Pharyngitis with possible abscess    Recent Labs     11/26/23  0501 11/27/23  0501   WBC 11.88* 17.30*       Sepsis currently resolved

## 2023-11-28 NOTE — TELEPHONE ENCOUNTER
Pt has abscess on Jaw; discharged from the hospital. Has TCM appt with Dr Elle Vivas. He got prednisone from the hospital; 20 in the AM and 20 in the evening  Then stopping it. Pt has 4 days left. No gradual decrese. Is that acceptable with Dr Elle Vivas?

## 2023-11-28 NOTE — TELEPHONE ENCOUNTER
Dx Acute pharyngitis 15 minTCM appt was scheduled for 12/5/23 11:00. Would you like me to try and schedule another day for 30 min?   Please advise

## 2023-11-28 NOTE — TELEPHONE ENCOUNTER
You don't need to taper off prednisone when on a short course like that. So those instructions he was given are ok.

## 2023-11-28 NOTE — DISCHARGE SUMMARY
[address]  [facility] [place] 1950, 68 y.[facility] male MRN: [id number]  Unit/Bed#: -Corie Encounter: [id number]  Primary Care Provider: Francis Lopez,    Date and time admitted to hospital: 11/25/2023 11:57 AM    Class 1 obesity due to excess calories with serious comorbidity and body mass index (BMI) of 32.0 to 32.9 in adult  Assessment & Plan  Body mass index is 32.27 kg/m².     Recommend incorporating a more whole foods plant-predominant diet along with decreasing consumption of red meats and processed foods  Per AHA guidelines, recommend moderate-vigorous intensity exercise for 30 minutes a day for 5 days a week or a total of 150 min/week      Sepsis without acute organ dysfunction [facility])  Assessment & Plan  Concern for Sepsis POA as evidenced by Tachycardia, leukocytosis  Suspected source: Pharyngitis with possible abscess    Recent Labs     11/26/23  0501 11/27/23  0501   WBC 11.88* 17.30*       Sepsis currently resolved      Stage 3a chronic kidney disease [facility])  Assessment & Plan  Lab Results   Component Value Date    EGFR 66 11/27/2023    EGFR 72 11/26/2023    EGFR 60 11/25/2023    CREATININE 1.09 11/27/2023    CREATININE 1.02 11/26/2023    CREATININE 1.19 11/25/2023     Appears stable at baseline  Continue to monitor    Type 2 diabetes mellitus without complication, with long-term current use of insulin [facility])  Assessment & Plan  Lab Results   Component Value Date    HGBA1C 8.4 (H) 10/02/2023       Recent Labs     11/26/23  2252 11/27/23  0608 11/27/23  1125 11/27/23  1127   POCGLU 285* 282* 318* 339*         Blood Sugar Average: Last 72 hrs:  (P) 299.9  Plan:  Hold home oral regimen  Diabetic diet  Insulin regimen  Increased Lantus to 16 units HS  Glucose checks and Insulin correction ACHS  Goal -180 while admitted, adjusting insulin regimen as appropriate  Noted to have hyperglycemia related to recent IV steroid administration  Will provide with short-acting Humalog insulin upon discharge and patient will resume Lantus administration at home as well. Patient expresses the ability to use insulin sliding scale at home and states that his wife is a nurse and will help him; he also deferred visiting nursing services at home        Benign essential hypertension  Assessment & Plan  Blood Pressure: 132/70      Plan:  Continue home medications  Monitor blood pressure  Blood pressure today is very well-controlled    * Acute pharyngitis due to other specified organisms  Assessment & Plan  Has been on outpatient abx Penicillin and prednisone for the past 5 days prior to admission but had minimal to no improvement  CT imaging obtained; unable to use IV contrast due to anaphylactic reaction -unable to appreciate any discrete collection but true abscess cannot be ruled out without IV contrast  Notable left-sided edema without uvular deviation and intact airway; no abnormalities in phonation noted    IV Decadron every 8 hours  Antibiotics with Unasyn and Flagyl due to shortage of clindamycin  S/p drainage per ENT  Patient feels better  ENT recommends Janeth may be discharged tomorrow if he is doing okay, discharge with oral antibiotics, prednisone taper 40 mg x 5 days. Follow up in the office 1 week after discharge        Medical Problems       Resolved Problems  Date Reviewed: 11/28/2023   None       Discharging Physician / Practitioner: Cynthia Leonard MD  PCP: Lizeth Corrales DO  Admission Date:   Admission Orders (From admission, onward)       Ordered        11/25/23 1445  [address]  Once                          Discharge Date: 11/27/23    Consultations During Hospital Stay:  ENT    Procedures Performed:   Attempted left pharyngeal abscess drainage on 11/26. Significant Findings / Test Results:   CT soft tissue neck without contrast: Soft tissue fullness in the region of the left [place] tonsil.  Although no discrete fluid collection identified, the possibility of an underlying abscess is not excluded due to the lack of IV contrast on this examination. Incidental Findings:   None    Test Results Pending at Discharge (will require follow up): None     Outpatient Tests Requested:  None    Complications:  None    Reason for Admission: Sore throat    Hospital Course:   Mal Silva is a 68 y.o. male  who presents with worsening pharyngitis over the past few days. PMHx of HTN, CKD 3A, DM 2, obesity     Patient had been having pharyngitis symptoms for greater than a week and was evaluated at urgent care and initiated on outpatient antibiotics of penicillin along with oral Medrol Dosepak. Unfortunately, symptoms persisted and patient does not have adequate improvement on this regimen. Patient was recommended to come to the ED for further evaluation due to concern for possible pharyngeal abscess. Patient denied any associated fevers, chills, nausea, abdominal pain, chest pain, dizziness, lightheadedness. In the ED, noted to be tachycardic and mild leukocytosis noted on admission as well. Imaging was obtained but could not be completed with IV contrast due to known history of anaphylactic allergy. ED team discussed with ENT regarding overall recommendations and patient was ultimately recommended for admission for IV Decadron and IV antibiotics with plan for ENT follow-up the following day. Patient was seen by ENT on 11/26/2023. Local anesthesia has been applied to the pharyngeal abscess and it was attempted to be drained, however, no pus was expressed. Patient has clinically improved and was able to tolerate regular diet. His sepsis was resolved and patient remained afebrile. All viral and bacterial workup of pharyngitis was negative . ENT recommend supportive treatment with IV antibiotics and steroids which will be tapered down to oral prednisone upon discharge.  Patient will receive 5 days of prednisone and 5 days of Augmentin after discharge. Hyperglycemia likely due to IV steroids has been noted on the day of discharge. Blood glucoses increased over 300. Patient was educated on how to use insulin sliding scale with meals and provided with short acting insulin in addition to Lantus that he has been using prior to the admission. Patient expresses understanding and states that he is comfortable using insulin at home. He states that his wife is a nurse and he deferred the visiting nursing. Please see above list of diagnoses and related plan for additional information. Condition at Discharge: good    Discharge Day Visit / Exam:   Subjective: Blane Daugherty feels well today. He requests to be discharged home as soon as possible. Vitals: Blood Pressure: 132/70 (11/27/23 0610)  Pulse: 59 (11/27/23 0610)  Temperature: (!) 97.4 °F (36.3 °C) (11/27/23 0610)  Temp Source: Oral (11/25/23 2300)  Respirations: 18 (11/27/23 0610)  Height: 5' 4" (162.6 cm) (11/25/23 1613)  Weight - Scale: 85.3 kg (188 lb) (11/25/23 1613)  SpO2: 93 % (11/27/23 0610)  Exam:   Physical Exam  Vitals and nursing note reviewed. Constitutional:       General: He is not in acute distress. Appearance: He is well-developed. HENT:      Head: Normocephalic and atraumatic. Cardiovascular:      Rate and Rhythm: Normal rate and regular rhythm. Heart sounds: No murmur heard. Pulmonary:      Effort: Pulmonary effort is normal. No respiratory distress. Breath sounds: Normal breath sounds. Abdominal:      Palpations: Abdomen is soft. Tenderness: There is no abdominal tenderness. Musculoskeletal:         General: No swelling. Cervical back: Neck supple. Skin:     General: Skin is warm and dry. Neurological:      Mental Status: He is alert. Psychiatric:         Mood and Affect: Mood normal.          Discussion with Family:  ongoing.      Discharge instructions/Information to patient and family:   See after visit summary for information provided to patient and family. Provisions for Follow-Up Care:  See after visit summary for information related to follow-up care and any pertinent home health orders. Mobility at time of Discharge:   Basic Mobility Inpatient Raw Score: 24  JH-HLM Goal: 8: Walk 250 feet or more  JH-HLM Achieved: 8: Walk 250 feet ot more  HLM Goal achieved. Continue to encourage appropriate mobility. Disposition:   Home    Planned Readmission: no     Discharge Statement:  I spent 45 minutes discharging the patient. This time was spent on the day of discharge. I had direct contact with the patient on the day of discharge. Greater than 50% of the total time was spent examining patient, answering all patient questions, arranging and discussing plan of care with patient as well as directly providing post-discharge instructions. Additional time then spent on discharge activities. Discharge Medications:  See after visit summary for reconciled discharge medications provided to patient and/or family.       **Please Note: This note may have been constructed using a voice recognition system**

## 2023-11-28 NOTE — ASSESSMENT & PLAN NOTE
Blood Pressure: 132/70      Plan:  Continue home medications  Monitor blood pressure  Blood pressure today is very well-controlled

## 2023-11-28 NOTE — ASSESSMENT & PLAN NOTE
Lab Results   Component Value Date    HGBA1C 8.4 (H) 10/02/2023       Recent Labs     11/26/23  2252 11/27/23  0608 11/27/23  1125 11/27/23  1127   POCGLU 285* 282* 318* 339*         Blood Sugar Average: Last 72 hrs:  (P) 299.9  Plan:  Hold home oral regimen  Diabetic diet  Insulin regimen  Increased Lantus to 16 units HS  Glucose checks and Insulin correction ACHS  Goal -180 while admitted, adjusting insulin regimen as appropriate  Noted to have hyperglycemia related to recent IV steroid administration  Will provide with short-acting Humalog insulin upon discharge and patient will resume Lantus administration at home as well.   Patient expresses the ability to use insulin sliding scale at home and states that his wife is a nurse and will help him; he also deferred visiting nursing services at home

## 2023-11-28 NOTE — ASSESSMENT & PLAN NOTE
Lab Results   Component Value Date    EGFR 66 11/27/2023    EGFR 72 11/26/2023    EGFR 60 11/25/2023    CREATININE 1.09 11/27/2023    CREATININE 1.02 11/26/2023    CREATININE 1.19 11/25/2023     Appears stable at baseline  Continue to monitor

## 2023-11-29 ENCOUNTER — TELEPHONE (OUTPATIENT)
Age: 73
End: 2023-11-29

## 2023-11-29 NOTE — TELEPHONE ENCOUNTER
Whatever the sliding scale is telling him to inject, would add on another 3 units. Only do this during the days where he is taking prednisone. He should be done with prednisone soon. 279 is elevated but not severely. His sugars will get better when he is off prednisone.

## 2023-11-29 NOTE — TELEPHONE ENCOUNTER
Spoke with: patient  Re:  Barbi Newell discharge    Provider's message relayed in full detail. Comments:    Pt stated BS elevated this morning; 279. *using Humalog sliding scale, took 7u last night.   *on prednisone and antibiotic  *TCM appt scheduled 12/5/23  Please advise

## 2023-12-01 LAB
BACTERIA BLD CULT: NORMAL
BACTERIA BLD CULT: NORMAL

## 2023-12-05 ENCOUNTER — OFFICE VISIT (OUTPATIENT)
Age: 73
End: 2023-12-05
Payer: MEDICARE

## 2023-12-05 VITALS
SYSTOLIC BLOOD PRESSURE: 114 MMHG | HEIGHT: 64 IN | OXYGEN SATURATION: 96 % | DIASTOLIC BLOOD PRESSURE: 74 MMHG | RESPIRATION RATE: 17 BRPM | HEART RATE: 79 BPM | WEIGHT: 187.2 LBS | BODY MASS INDEX: 31.96 KG/M2 | TEMPERATURE: 96.8 F

## 2023-12-05 DIAGNOSIS — J02.8 ACUTE PHARYNGITIS DUE TO OTHER SPECIFIED ORGANISMS: Primary | ICD-10-CM

## 2023-12-05 PROBLEM — A41.9 SEPSIS WITHOUT ACUTE ORGAN DYSFUNCTION (HCC): Status: RESOLVED | Noted: 2023-11-25 | Resolved: 2023-12-05

## 2023-12-05 PROCEDURE — 99495 TRANSJ CARE MGMT MOD F2F 14D: CPT | Performed by: INTERNAL MEDICINE

## 2023-12-05 NOTE — PROGRESS NOTES
Assessment & Plan     1. Acute pharyngitis due to other specified organisms    Hospital records were reviewed. Patient is progressing in the right direction from my standpoint. He is off prednisone and antibiotics. No evidence of swelling or erythema in the back of his throat today. He will follow up with ENT tomorrow. He can consider getting the flu shot in the next 1 to 2 weeks. His blood glucose is coming down and no longer in the 300s. He will keep routine appointment with me on 03/2024. Subjective     Transitional Care Management Review:   Farhat Lew is a 68 y.o. male here for TCM follow up. During the TCM phone call patient stated:  TCM Call       Date and time call was made  11/29/2023  9:09 AM    Hospital care reviewed  Records reviewed    Patient was hospitialized at  [id number] [facility], [facility]    Date of Admission  11/25/23    Date of discharge  11/27/23    Diagnosis  Acute pharyngitis    Disposition  Home    Current Symptoms  --  elevated blood sugar (on antibiotic and prednisone Using insulin sliding scale          TCM Call       Post hospital issues  None    Scheduled for follow up? Yes    Patients specialists  Endocrinologist; Other (comment)    Endocrinologist contact #  [phone]    Other specialists names  Radha Redding MD, [phone]    Did you obtain your prescribed medications  Yes  amoxicillin-clavulanate, insulin lispro, predniSONE    Do you need help managing your prescriptions or medications  No    Is transportation to your appointment needed  No    I have advised the patient to call PCP with any new or worsening symptoms  Román Ford LPN    Are you recieving any outpatient services  No    Are you recieving home care services  No    Interperter language line needed  No    Counseling  Patient    Counseling topics  patient and family education; Importance of RX compliance          Michel Marvin is a 70-year-old male who presents for transition of care management visit. The patient was admitted to the hospital on 11/25/2023 and discharged on 11/27/2023. He had originally been presented due to worsening sore throat and pharyngitis. He had been seen in urgent care and symptoms were worsening over a week. He was given antibiotics and a Medrol Dosepak prior to going to the ER. He had a CT soft tissue neck without contrast study done which showed soft tissue fullness in the region of the left palatine tonsil. Although no discrete fluid collection was identified, the possibility of an underlying abscess is not excluded. The patient was seen by ENT on 11/26/2023 and local anesthesia was applied to see if there was any abscess that could be drained; however, there was no pus that could be expressed. He was clinically improving according to hospital records. All viral and bacterial workup for pharyngitis was negative. He was given oral antibiotics on discharge along with 5 more days of prednisone. He does have underlying diabetes, so his blood glucose was spiking on steroids. He does have follow-up with Dr. Uriel Block, ENT, tomorrow. Pharyngitis. Today, he is feeling better. He thought it was tonsillitis, so they took his tonsils out and still had tonsil tissue. He was told it was inflamed. Until now he is not 100 percent better. He was on Flagyl and amoxicillin. He finished his steroids and amoxicillin several days ago. He has been lethargic. Diabetes mellitus. His blood glucose levels are starting to decrease after finishing steroids and amoxicillin. He is taking Humalog at mealtime. His blood glucose level was 130 mg/dL at 4:00 in the morning. His blood glucose was over 300 mg/dL when he was on prednisone. He checks his blood glucose level 5 to 7 times a day. He eats snacks at 8:00 AM and wonders if he should check his blood sugar and give himself Humalog 3 times a day. He has been drinking more water. He drinks diet Coke and coffee in the morning.     Objective     /74 (BP Location: Left arm, Patient Position: Sitting, Cuff Size: Standard)   Pulse 79   Temp (!) 96.8 °F (36 °C) (Tympanic)   Resp 17   Ht 5' 4" (1.626 m)   Wt 84.9 kg (187 lb 3.2 oz)   SpO2 96%   BMI 32.13 kg/m²      Physical Exam  Constitutional:       General: He is not in acute distress. Appearance: He is not ill-appearing. HENT:      Mouth/Throat:      Mouth: Mucous membranes are moist.      Pharynx: No oropharyngeal exudate or posterior oropharyngeal erythema. Cardiovascular:      Rate and Rhythm: Normal rate and regular rhythm. Heart sounds: No murmur heard. Pulmonary:      Effort: Pulmonary effort is normal. No respiratory distress. Breath sounds: No wheezing. Neurological:      Mental Status: He is alert. Medications have been reviewed by provider in current encounter    Transcribed for Rissa Damon DO, by Suzi Monroe on 12/05/23 at 11:34 AM. Powered by [employer].

## 2023-12-13 DIAGNOSIS — E11.22 TYPE 2 DIABETES MELLITUS WITH STAGE 3A CHRONIC KIDNEY DISEASE, WITHOUT LONG-TERM CURRENT USE OF INSULIN (HCC): Chronic | ICD-10-CM

## 2023-12-13 DIAGNOSIS — K21.9 GASTROESOPHAGEAL REFLUX DISEASE: ICD-10-CM

## 2023-12-13 DIAGNOSIS — J06.9 ACUTE UPPER RESPIRATORY INFECTION, UNSPECIFIED: ICD-10-CM

## 2023-12-13 DIAGNOSIS — L21.9 SEBORRHEIC DERMATITIS: ICD-10-CM

## 2023-12-13 DIAGNOSIS — N18.31 TYPE 2 DIABETES MELLITUS WITH STAGE 3A CHRONIC KIDNEY DISEASE, WITHOUT LONG-TERM CURRENT USE OF INSULIN (HCC): Chronic | ICD-10-CM

## 2023-12-13 RX ORDER — OMEPRAZOLE 20 MG/1
CAPSULE, DELAYED RELEASE ORAL
Qty: 90 CAPSULE | Refills: 1 | Status: SHIPPED | OUTPATIENT
Start: 2023-12-13

## 2023-12-13 RX ORDER — KETOCONAZOLE 20 MG/G
CREAM TOPICAL 2 TIMES DAILY
Qty: 30 G | Refills: 3 | Status: SHIPPED | OUTPATIENT
Start: 2023-12-13

## 2023-12-13 RX ORDER — FLUTICASONE PROPIONATE 50 MCG
SPRAY, SUSPENSION (ML) NASAL
Qty: 16 ML | Refills: 0 | Status: SHIPPED | OUTPATIENT
Start: 2023-12-13

## 2023-12-14 RX ORDER — INSULIN GLARGINE 100 [IU]/ML
16 INJECTION, SOLUTION SUBCUTANEOUS DAILY
Qty: 15 ML | Refills: 0 | Status: SHIPPED | OUTPATIENT
Start: 2023-12-14

## 2023-12-18 ENCOUNTER — OFFICE VISIT (OUTPATIENT)
Age: 73
End: 2023-12-18
Payer: MEDICARE

## 2023-12-18 ENCOUNTER — CLINICAL SUPPORT (OUTPATIENT)
Age: 73
End: 2023-12-18
Payer: MEDICARE

## 2023-12-18 VITALS — WEIGHT: 187 LBS | BODY MASS INDEX: 32.1 KG/M2 | TEMPERATURE: 98 F

## 2023-12-18 DIAGNOSIS — D22.9 NEVUS: ICD-10-CM

## 2023-12-18 DIAGNOSIS — L57.0 ACTINIC KERATOSIS: Primary | ICD-10-CM

## 2023-12-18 DIAGNOSIS — Z23 ENCOUNTER FOR IMMUNIZATION: Primary | ICD-10-CM

## 2023-12-18 DIAGNOSIS — Z85.828 HISTORY OF SKIN CANCER: ICD-10-CM

## 2023-12-18 DIAGNOSIS — D18.01 CHERRY ANGIOMA: ICD-10-CM

## 2023-12-18 DIAGNOSIS — Z23 FLU VACCINE NEED: Primary | ICD-10-CM

## 2023-12-18 DIAGNOSIS — L82.1 SEBORRHEIC KERATOSIS: ICD-10-CM

## 2023-12-18 PROCEDURE — 90662 IIV NO PRSV INCREASED AG IM: CPT

## 2023-12-18 PROCEDURE — 17003 DESTRUCT PREMALG LES 2-14: CPT | Performed by: DERMATOLOGY

## 2023-12-18 PROCEDURE — 99214 OFFICE O/P EST MOD 30 MIN: CPT | Performed by: DERMATOLOGY

## 2023-12-18 PROCEDURE — 17000 DESTRUCT PREMALG LESION: CPT | Performed by: DERMATOLOGY

## 2023-12-18 PROCEDURE — G0008 ADMIN INFLUENZA VIRUS VAC: HCPCS

## 2023-12-18 NOTE — PROGRESS NOTES
"[facility]     [facility]  Encounter Date: 12/18/2023     Have you been cared for by a [facility] Dermatologist in the last 3 years and, if so, which description applies to you?    Yes.  I have been here within the last 3 years, and my medical history has NOT changed since that time.  I am FEMALE/of child-bearing potential.    REVIEW OF SYSTEMS:  Have you recently had or currently have any of the following? No changes in my recent health.   PAST MEDICAL HISTORY:  Have you personally ever had or currently have any of the following?  If \"YES,\" then please provide more detail. No changes in my medical history.   HISTORY OF IMMUNOSUPPRESSION: Do you have a history of any of the following:  Systemic Immunosuppression such as Diabetes, Biologic or Immunotherapy, Chemotherapy, Organ Transplantation, Bone Marrow Transplantation?  No     Answering \"YES\" requires the addition of the dotphrase \"IMMUNOSUPPRESSED\" as the first diagnosis of the patient's visit.   FAMILY HISTORY:  Any \"first degree relatives\" (parent, brother, sister, or child) with the following?    No changes in my family's known health.   PATIENT EXPERIENCE:    Do you want the Dermatologist to perform a COMPLETE skin exam today including a clinical examination under the \"bra and underwear\" areas?  Yes  If necessary, do we have your permission to call and leave a detailed message on your Preferred Phone number that includes your specific medical information?  Yes      Allergies   Allergen Reactions    Iodinated Contrast Media Anaphylaxis    Shellfish Allergy - Food Allergy Anaphylaxis    Shrimp Extract Allergy Skin Test - Food Allergy Anaphylaxis    Empagliflozin Hives    Farxiga [Dapagliflozin] Rash     Yeast infection    Ozempic (0.25 Or 0.5 Mg-Dose) [Semaglutide(0.25 Or 0.5mg-Dos)] Rash      Current Outpatient Medications:     atorvastatin (LIPITOR) 10 mg tablet, TAKE 1 TABLET BY MOUTH EVERY DAY, Disp: 90 tablet, " Rfl: 3    BD Pen Needle Audelia 2nd Gen 32G X 4 MM MISC, ONCE DAILY AT BEDTIME UNDER THE SKIN, Disp: 100 each, Rfl: 1    cyclobenzaprine (FLEXERIL) 5 mg tablet, TAKE 1 TABLET BY MOUTH THREE TIMES A DAY AS NEEDED FOR MUSCLE SPASMS, Disp: 60 tablet, Rfl: 5    EPINEPHrine (EPIPEN) 0.3 mg/0.3 mL SOAJ, Inject 0.3 mL (0.3 mg total) into a muscle once for 1 dose, Disp: 0.6 mL, Rfl: 0    fluticasone (FLONASE) 50 mcg/act nasal spray, SPRAY 1 SPRAY INTO EACH NOSTRIL EVERY DAY, Disp: 16 mL, Rfl: 0    Fluticasone-Salmeterol (Advair Diskus) 100-50 mcg/dose inhaler, Inhale 1 puff 2 (two) times a day Rinse mouth after use., Disp: 60 blister, Rfl: 3    glimepiride (AMARYL) 4 mg tablet, TAKE 1 TABLET BY MOUTH TWICE A DAY, Disp: 180 tablet, Rfl: 1    glucose blood (FREESTYLE LITE) test strip, USE TO TEST ONCE DAILY (DX: E11.65), Disp: 100 strip, Rfl: 3    Insulin Glargine Solostar (Lantus SoloStar) 100 UNIT/ML SOPN, Inject 0.16 mL (16 Units total) under the skin daily, Disp: 15 mL, Rfl: 0    insulin lispro (HumaLOG KwikPen) 100 units/mL injection pen, Blood Glucose 150 - 189: 1 Unit of Insulin Blood Glucose 190 - 229: 2 Units of Insulin Blood Glucose 230 - 269: 3 Units of Insulin Blood Glucose 270 - 309: 4 Units of Insulin Blood Glucose 310 - 349: 5 Units of Insulin Blood Glucose greater than or equal to 350: 6 Units of Insulin Check your blood glucose 3 times a day and before bedtime and administer insulin as explained above, Disp: 15 mL, Rfl: 0    ketoconazole (NIZORAL) 2 % cream, APPLY 1 APPLICATION TOPICALLY IN THE MORNING AND 1 APPLICATION IN THE EVENING. APPLIED TO FACE AND SCALP TWICE A DAY FOR 3 WEEKS REPEAT AS NEEDED., Disp: 30 g, Rfl: 3    metFORMIN (GLUCOPHAGE-XR) 500 mg 24 hr tablet, TAKE 2 TABLETS BY MOUTH TWICE A DAY WITH FOOD, Disp: 360 tablet, Rfl: 0    montelukast (SINGULAIR) 10 mg tablet, TAKE 1 TABLET BY MOUTH EVERYDAY AT BEDTIME, Disp: 90 tablet, Rfl: 3    omeprazole (PriLOSEC) 20 mg delayed release capsule, TAKE 1  CAPSULE BY MOUTH EVERY DAY, Disp: 90 capsule, Rfl: 1    sildenafil (VIAGRA) 50 MG tablet, Take 1 tablet (50 mg total) by mouth daily as needed for erectile dysfunction, Disp: 6 tablet, Rfl: 9    sitaGLIPtin (Januvia) 100 mg tablet, Take 1 tablet (100 mg total) by mouth daily, Disp: 90 tablet, Rfl: 1    sodium chloride (OCEAN) 0.65 % nasal spray, 1 spray into each nostril as needed for congestion , Disp: , Rfl:     triamcinolone (KENALOG) 0.025 % cream, Apply topically as needed , Disp: , Rfl:     verapamil (VERELAN PM) 180 MG 24 hr capsule, TAKE 1 CAPSULE BY MOUTH 2 TIMES A DAY., Disp: 180 capsule, Rfl: 3          Whom besides the patient is providing clinical information about today's encounter?   NO ADDITIONAL HISTORIAN (patient alone provided history)    Physical Exam and Assessment/Plan by Diagnosis:   Chief Complaint   Patient presents with    Follow-up     Yearly skin exam spot concerns right side of the eye onset a while.          HISTORY OF SQUAMOUS CELL CARCINOMA     Physical Exam:  Anatomic Location Affected:  frontal scalp 10/20/2022   Morphological Description of Scar:  well healed   Suspected Recurrence: no  Regional adenopathy: no    Additional History of Present Condition:  present on exam     Assessment and Plan:  Based on a thorough discussion of this condition and the management approach to it (including a comprehensive discussion of the known risks, side effects and potential benefits of treatment), the patient (family) agrees to implement the following specific plan:  Continue monitor     ACTINIC KERATOSIS    Physical Exam:  Anatomic Location: face , scalp   Morphologic Description: Scaly pink papules  Pertinent Positives:  Pertinent Negatives:    Additional History of Present Condition:  present for while.    Physical Exam  HENT:      Head:          Plan:  Cryotherapy performed in the office (See Procedure Note). We discussed that a hypopigmentation or scar may form in the region following  "cryotherapy.  We discussed the pre-cancerous nature of the condition. Actinic keratosis is found on sun-damaged skin and there is small risk that the condition could turn into a skin cancer called squamous cell carcinoma. There is no risk of actinic keratosis turning into melanoma.  We discussed sun protection measures, including using sunscreen with an SPF 50+ year round, avoiding the sun, and wearing protective clothing such as long sleeves and pants when out in the sun.  Continue to monitor clinically for signs of recurrence. Discussed with patient the importance of keeping up to date with full body skin exams.     PROCEDURES PERFORMED TODAY ASSOCIATED WITH THIS CONDITION:          Cryotherapy: PROCEDURE:  DESTRUCTION OF PRE-MALIGNANT LESIONS  After a thorough discussion of treatment options and risk/benefits/alternatives (including but not limited to local pain, scarring, dyspigmentation, blistering, and possible superinfection), verbal and written consent were obtained and the aforementioned lesions were treated on with cryotherapy using liquid nitrogen x 1 cycle for 5-10 seconds.    TOTAL NUMBER of 4 pre-malignant lesions were treated today on the ANATOMIC LOCATION: scalp and face .     The patient tolerated the procedure well, and after-care instructions were provided.          MELANOCYTIC NEVI (\"Moles\")    Physical Exam:  Anatomic Location Affected: Mostly on sun-exposed areas of the body.  Morphological Description:  Scattered, 1-4mm round to ovoid, symmetrical-appearing, even bordered, skin colored to dark brown macules/papules, mostly in sun-exposed areas  Pertinent Positives:  Pertinent Negatives:    Additional History of Present Condition:  present on exam     Assessment and Plan:  Based on a thorough discussion of this condition and the management approach to it (including a comprehensive discussion of the known risks, side effects and potential benefits of treatment), the patient (family) agrees to " "implement the following specific plan:  Provided handout with information regarding the ABCDE's of moles   Recommend routine skin exams every year.   Sun avoidance, protective clothing (known as UPF clothing), and the use of at least SPF 30 sunscreens is advised. Sunscreen should be reapplied every two hours when outside.       SEBORRHEIC KERATOSIS; NON-INFLAMED    Physical Exam:  Anatomic Location Affected:  scattered across trunk, extremities,  face  Morphological Description:  Flat and raised, waxy, smooth to warty textured, yellow to brownish-grey to dark brown to blackish, discrete, \"stuck-on\" appearing papules.  Pertinent Positives:  Pertinent Negatives:    Additional History of Present Condition:  Patient reports new bumps on the skin.  Denies itch, burn, pain, bleeding or ulceration.  Present constantly; nothing seems to make it worse or better.  No prior treatment.      Assessment and Plan:  Based on a thorough discussion of this condition and the management approach to it (including a comprehensive discussion of the known risks, side effects and potential benefits of treatment), the patient (family) agrees to implement the following specific plan:  Reassured benign        ANGIOMA (\"CHERRY ANGIOMA\")    Physical Exam:  Anatomic Location: scattered across sun exposed areas of the trunk and extremities   Morphologic Description: Firm red to reddish-blue discrete papules  Pertinent Positives:  Pertinent Negatives:    Additional History of Present Condition:  Present on exam.     Assessment and Plan:  Reassured benign               Scribe Attestation      I,:  Lacey Ruiz am acting as a scribe while in the presence of the attending physician.:       I,:  Nikita Martin MD personally performed the services described in this documentation    as scribed in my presence.:            "

## 2023-12-18 NOTE — PATIENT INSTRUCTIONS
ACTINIC KERATOSIS    Actinic keratoses are very common on sites repeatedly exposed to the sun, especially the backs of the hands and the face, most often affecting the ears, nose, cheeks, upper lip, vermilion of the lower lip, temples, forehead and balding scalp. In severely chronically sun-damaged individuals, they may also be found on the upper trunk, upper and lower limbs, and dorsum of feet.    We discussed the theoretical premalignant (“pre-cancerous”) nature and etiology of these growths.  We discussed the prevailing notion that actinic keratoses are a reflection of abnormal skin cell development due to DNA damage by short wavelength UVB.  They are more likely to appear if the immune function is poor, due to aging, recent sun exposure, predisposing disease or certain drugs.    We discussed that the main concern is that actinic keratoses may predispose to squamous cell carcinoma. It is rare for a solitary actinic keratosis to evolve to squamous cell carcinoma (SCC), but the risk of SCC occurring at some stage in a patient with more than 10 actinic keratoses is thought to be about 10 to 15%. A tender, thickened, ulcerated or enlarging actinic keratosis is suspicious of SCC.    Actinic keratoses may be prevented by strict sun protection. If already present, keratoses may improve with a very high sun protection factor (50+) broad-spectrum sunscreen applied at least daily to affected areas, year-round.  We recommend that UPF-rated clothing and hats and sunglasses be worn whenever possible and that a sunscreen-moisturizer combination product such as Neutrogena Daily Defense be applied at least three times a day.    We performed a thorough discussion of treatment options and specific risk/benefits/alternatives including but not limited to medical “field” treatment with medications such as the following:    Topical “field area” medications such as 5-fluorouracil or Aldara (specifically, the trouble with long-term  compliance, blistering and local skin reaction versus the convenience of at-home therapy and that field therapy “gets what is not yet seen”).    Cryotherapy (specifically, local pain, scarring, dyspigmentation, blistering, possible superinfection, and treats “only what we see” versus directed treatment today).    Photodynamic therapy (specifically, local pain, scarring, dyspigmentation, blistering, possible superinfection, need to schedule for a later date, and time spent in the office versus field therapy that “gets what is not yet seen”).      BASAL CELL CARCINOMA    What is basal cell carcinoma?  Basal cell carcinoma (BCC) is a common, locally invasive, keratinocytic, or non-melanoma, skin cancer. It is also known as rodent ulcer and basalioma. Patients with BCC often develop multiple primary tumours over time.    Who gets basal cell carcinoma?  Risk factors for BCC include:  Age and sex: BCCs are particularly prevalent in elderly males. However, they also affect females and younger adults   Previous BCC or other form of skin cancer (squamous cell carcinoma, melanoma)   Sun damage (photoaging, actinic keratoses)   Repeated prior episodes of sunburn   Fair skin, blue eyes and blond or red hair--note; BCC can also affect darker skin types   Previous cutaneous injury, thermal burn, disease (eg cutaneous lupus, sebaceous naevus)   Inherited syndromes: BCC is a particular problem for families with basal cell naevus syndrome (Gorlin syndrome), Chqpo-Ewmbé-Uaialvjx syndrome, Rombo syndrome, Oley syndrome and xeroderma pigmentosum   Other risk factors include ionising radiation, exposure to arsenic, and immune suppression due to disease or medicines    What causes basal cell carcinoma?  The cause of BCC is multifactorial.  Most often, there are DNA mutations in the patched (PTCH) tumour suppressor gene, part of hedgehog signaling pathway   These may be triggered by exposure to ultraviolet radiation   Various spontaneous  and inherited gene defects predispose to BCC    What are the clinical features of basal cell carcinoma?  BCC is a locally invasive skin tumour. The main characteristics are:  Slowly growing plaque or nodule   Skin coloured, pink or pigmented   Varies in size from a few millimetres to several centimetres in diameter   Spontaneous bleeding or ulceration  BCC is very rarely a threat to life. A tiny proportion of BCCs grow rapidly, invade deeply, and/or metastasise to local lymph nodes.    Types of basal cell carcinoma  There are several distinct clinical types of BCC, and over 20 histological growth patterns of BCC.  Nodular BCC  Most common type of facial BCC   Shiny or pearly nodule with a smooth surface   May have central depression or ulceration, so its edges appear rolled   Blood vessels cross its surface   Cystic variant is soft, with jelly-like contents   Micronodular, microcystic and infiltrative types are potentially aggressive subtypes   Also known as nodulocystic carcinoma  Superficial BCC  Most common type in younger adults   Most common type on upper trunk and shoulders   Slightly scaly, irregular plaque   Thin, translucent rolled border   Multiple microerosions  Morphoeaform BCC  Usually found in mid-facial sites   Waxy, scar-like plaque with indistinct borders   Wide and deep subclinical extension   May infiltrate cutaneous nerves (perineural spread)   Also known as morpheic, morphoeiform or sclerosing BCC  Basosquamous carcinoma  Mixed basal cell carcinoma (BCC) and squamous cell carcinoma (SCC)   Infiltrative growth pattern   Potentially more aggressive than other forms of BCC   Also known as basisquamous carcinoma and mixed basal-squamous cell carcinoma       Complications of basal cell carcinoma    Recurrent BCC  Recurrence of BCC after initial treatment is not uncommon. Characteristics of recurrent BCC often include:  Incomplete excision or narrow margins at primary excision   Morphoeic,  micronodular, and infiltrative subtypes   Location on head and neck    Advanced BCC  Advanced BCCs are large, often neglected tumours.  They may be several centimetres in diameter   They may be deeply infiltrating into tissues below the skin   They are difficult or impossible to treat surgically    Metastatic BCC  Very rare   Primary tumour is often large, neglected or recurrent, located on head and neck, with aggressive subtype   May have had multiple prior treatments   May arise in site exposed to ionising radiation   Can be fatal    How is basal cell carcinoma diagnosed?  BCC is diagnosed clinically by the presence of a slowly enlarging skin lesion with typical appearance. The diagnosis and  by a diagnostic biopsy or following excision.  Some typical superficial BCCs on trunk and limbs are clinically diagnosed and have non-surgical treatment without histology.    What is the treatment for primary basal cell carcinoma?  The treatment for a BCC depends on its type, size and location, the number to be treated, patient factors, and the preference or expertise of the doctor. Most BCCs are treated surgically. Long-term follow-up is recommended to check for new lesions and recurrence; the latter may be unnecessary if histology has reported wide clear margins.    Excision biopsy  Excision means the lesion is cut out and the skin stitched up.  Most appropriate treatment for nodular, infiltrative and morphoeic BCCs   Should include 3 to 5 mm margin of normal skin around the tumour   Very large lesions may require flap or skin graft to repair the defect   Pathologist will report deep and lateral margins   Further surgery is recommended for lesions that are incompletely excised    Mohs micrographically controlled excision  Mohs micrographically controlled surgery involves examining carefully marked excised tissue under the microscope, layer by layer, to ensure complete excision.  Very high cure rates achieved by trained Mohs  surgeons   Used in high-risk areas of the face around eyes, lips and nose   Suitable for ill-defined, morphoeic, infiltrative and recurrent subtypes   Large defects are repaired by flap or skin graft    Superficial skin surgery  Superficial skin surgery comprises shave, curettage, and electrocautery. It is a rapid technique using local anaesthesia and does not require sutures.  Suitable for small, well-defined nodular or superficial BCCs   Lesions are usually located on trunk or limbs   Wound is left open to heal by secondary intention   Moist wound dressings lead to healing within a few weeks   Eventual scar quality variable    Cryotherapy  Cryotherapy is the treatment of a superficial skin lesion by freezing it, usually with liquid nitrogen.  Suitable for small superficial BCCs on covered areas of trunk and limbs   Best avoided for BCCs on head and neck, and distal to knees   Double freeze-thaw technique   Results in a blister that crusts over and heals within several weeks.   Leaves permanent white zo    Photodynamic therapy  Photodynamic therapy (PDT) refers to a technique in which BCC is treated with a photosensitising chemical, and exposed to light several hours later.  Topical photosensitisers include aminolevulinic acid lotion and methyl aminolevulinate cream   Suitable for low-risk small, superficial BCCs   Best avoided if tumour in site at high risk of recurrence   Results in inflammatory reaction, maximal 3-4 days after procedure   Treatment repeated 7 days after initial treatment   Excellent cosmetic results    Imiquimod cream  Imiquimod is an immune response modifier.  Best used for superficial BCCs less than 2 cm diameter   Applied three to five times each week, for 6-16 weeks   Results in a variable inflammatory reaction, maximal at three weeks   Minimal scarring is usual    Fluorouracil cream  5-Fluorouracil cream is a topical cytotoxic agent.  Used to treat small superficial basal cell carcinomas    Requires prolonged course, eg twice daily for 6-12 weeks   Causes inflammatory reaction   Has high recurrence rates    Radiotherapy  Radiotherapy or X-ray treatment can be used to treat primary BCCs or as adjunctive treatment if margins are incomplete.  Mainly used if surgery is not suitable   Best avoided in young patients and in genetic conditions predisposing to skin cancer   Best cosmetic results achieved using multiple fractions   Typically, patient attends once-weekly for several weeks   Causes inflammatory reaction followed by scar   Risk of radiodermatitis, late recurrence, and new tumours    What is the treatment for advanced or metastatic basal cell carcinoma?  Locally advanced primary, recurrent or metastatic BCC requires multidisciplinary consultation. Often a combination of treatments is used.  Surgery   Radiotherapy   Targeted therapy  Targeted therapy refers to the hedgehog signalling pathway inhibitors, vismodegib and sonidegib. These drugs have some important risks and side effects.    How can basal cell carcinoma be prevented?  The most important way to prevent BCC is to avoid sunburn. This is especially important in childhood and early life. Fair skinned individuals and those with a personal or family history of BCC should protect their skin from sun exposure daily, year-round and lifelong.  Stay indoors or under the shade in the middle of the day   Wear covering clothing   Apply high protection factor SPF50+ broad-spectrum sunscreens generously to exposed skin if outdoors   Avoid indoor tanning (sun beds, solaria)  Oral nicotinamide (vitamin B3) in a dose of 500 mg twice daily may reduce the number and severity of BCCs.    What is the outlook for basal cell carcinoma?  Most BCCs are cured by treatment. Cure is most likely if treatment is undertaken when the lesion is small.  About 50% of people with BCC develop a second one within 3 years of the first. They are also at increased risk of other  skin cancers, especially melanoma. Regular self-skin examinations and long-term annual skin checks by an experienced health professional are recommended.  SQUAMOUS CELL CARCINOMA    What is cutaneous squamous cell carcinoma?  Cutaneous squamous cell carcinoma (SCC) is a common type of keratinocyte or non-melanoma skin cancer. It is derived from cells within the epidermis that make keratin -- the horny protein that makes up skin, hair and nails.  Cutaneous SCC is an invasive disease, referring to cancer cells that have grown beyond the epidermis. SCC can sometimes metastasise and may prove fatal.  Intraepidermal carcinoma (cutaneous SCC in situ) and mucosal SCC are considered elsewhere.    Who gets cutaneous squamous cell carcinoma?  Risk factors for cutaneous SCC include:  Age and sex: SCCs are particularly prevalent in elderly males. However, they also affect females and younger adults.   Previous SCC or another form of skin cancer (basal cell carcinoma, melanoma) are a strong predictor for further skin cancers.   Actinic keratoses   Outdoor occupation or recreation   Smoking   Fair skin, blue eyes and blond or red hair   Previous cutaneous injury, thermal burn, disease (eg cutaneous lupus, epidermolysis bullosa, leg ulcer)   Inherited syndromes: SCC is a particular problem for families with xeroderma pigmentosum and albinism   Other risk factors include ionising radiation, exposure to arsenic, and immune suppression due to disease (eg chronic lymphocytic leukaemia) or medicines. Organ transplant recipients have a massively increased risk of developing SCC.    What causes cutaneous squamous cell carcinoma?  More than 90% of cases of SCC are associated with numerous DNA mutations in multiple somatic genes. Mutations in the p53 tumour suppressor gene are caused by exposure to ultraviolet radiation (UV), especially UVB (known as signature 7). Other signature mutations relate to cigarette smoking, ageing and immune  suppression (eg, to drugs such as azathioprine). Mutations in signalling pathways affect the epidermal growth factor receptor, CARMELITA, Fyn, and [id number] signalling.   Beta-genus human papillomaviruses (wart virus) are thought to play a role in SCC arising in immune-suppressed populations. ?-HPV and HPV subtypes 5, 8, 17, 20, 24, and 38 have also been associated with an increased risk of cutaneous SCC in immunocompetent individuals.     What are the clinical features of cutaneous squamous cell carcinoma?  Cutaneous SCCs present as enlarging scaly or crusted lumps. They usually arise within pre-existing actinic keratosis or intraepidermal carcinoma.  They grow over weeks to months   They may ulcerate   They are often tender or painful   Located on sun-exposed sites, particularly the face, lips, ears, hands, forearms and lower legs   Size varies from a few millimetres to several centimetres in diameter.    Types of cutaneous squamous cell carcinoma  Distinct clinical types of invasive cutaneous SCC include:  Cutaneous horn -- the horn is due to excessive production of keratin   Keratoacanthoma (KA) -- a rapidly growing keratinising nodule that may resolve without treatment   Carcinoma cuniculatum (‘verrucous carcinoma’), a slow-growing, warty tumour on the sole of the foot.   Multiple eruptive SCC/KA-like lesions arising in syndromes, such as multiple self-healing squamous epitheliomas of García-Smith and Grzybowski syndrome  The pathologist may classify a tumour as well differentiated, moderately well differentiated, poorly differentiated or anaplastic cutaneous SCC. There are other variants.    Classification of squamous cell carcinoma by risk  Cutaneous SCC is classified as low-risk or high-risk, depending on the chance of tumour recurrence and metastasis. Characteristics of high-risk SCC include:  High-risk cutaneous squamous cell carcinoma has the following characteristics:  Diameter greater than or equal to 2 cm    Location on the ear, vermilion of the lip, central face, hands, feet, genitalia   Arising in elderly or immune suppressed patient   Histological thickness greater than 2 mm, poorly differentiated histology, or with the invasion of the subcutaneous tissue, nerves and blood vessels  Metastatic SCC is found in regional lymph nodes (80%), lungs, liver, brain, bones and skin.    Staging cutaneous squamous cell carcinoma  In 2011, the American Joint Committee on Cancer (AJCC) published a new staging systemic for cutaneous SCC for the 7th Edition of the AJCC manual. This evaluates the dimensions of the original primary tumour (T) and its metastases to lymph nodes (N).    Tumour staging for cutaneous SCC  TX: Th Primary tumour cannot be assessed  T0: No evidence of a primary tumour  Tis: Carcinoma in situ  T1: Tumour ? 2cm without high-risk features  T2: Tumour ? 2cm; or; Tumour ? 2 cm with high-risk features  T3: Tumour with the invasion of maxilla, mandible, orbit or temporal bone  T4: Tumour with the invasion of axial or appendicular skeleton or perineural invasion of skull base    Antolin staging for cutaneous SCC  NX: Regional lymph nodes cannot be assessed  N0: No regional lymph node metastasis  N1: Metastasis in one local lymph node ? 3cm  N2: Metastasis in one local lymph node ? 3cm; or; Metastasis in >1 local lymph node ? 6cm  N3: Metastasis in lymph node ? 6cm    How is squamous cell carcinoma diagnosed?  Diagnosis of cutaneous SCC is based on clinical features. The diagnosis and histological subtype are confirmed pathologically by diagnostic biopsy or following excision. See squamous cell carcinoma - pathology.  Patients with high-risk SCC may also undergo staging investigations to determine whether it has spread to lymph nodes or elsewhere. These may include:  Imaging using ultrasound scan, X-rays, CT scans, MRI scans   Lymph node or other tissue biopsies    What is the treatment for cutaneous squamous cell  carcinoma?  Cutaneous SCC is nearly always treated surgically. Most cases are excised with a 3-10 mm margin of normal tissue around a visible tumour. A flap or skin graft may be needed to repair the defect.  Other methods of removal include:  Shave, curettage, and electrocautery for low-risk tumours on trunk and limbs   Aggressive cryotherapy for very small, thin, low-risk tumours   Mohs micrographic surgery for large facial lesions with indistinct margins or recurrent tumours   Radiotherapy for an inoperable tumour, patients unsuitable for surgery, or as adjuvant    What is the treatment for advanced or metastatic squamous cell carcinoma?  Locally advanced primary, recurrent or metastatic SCC requires multidisciplinary consultation. Often a combination of treatments is used.  Surgery   Radiotherapy   Cemiplimab   Experimental targeted therapy using epidermal growth factor receptor inhibitors    How can cutaneous squamous cell carcinoma be prevented?  There is a great deal of evidence to show that very careful sun protection at any time of life reduces the number of SCCs. This is particularly important in ageing, sun-damaged, fair skin; in patients that are immune suppressed; and in those who already have actinic keratoses or previous SCC.  Stay indoors or under the shade in the middle of the day   Wear covering clothing   Apply high protection factor SPF50+ broad-spectrum sunscreens generously to exposed skin if outdoors   Avoid indoor tanning (sun beds, solaria)    Oral nicotinamide (vitamin B3) in a dose of 500 mg twice daily may reduce the number and severity of SCCs in people at high risk.  Patients with multiple squamous cell carcinomas may be prescribed an oral retinoid (acitretin or isotretinoin). These reduce the number of tumours but have some nuisance side effects.    What is the outlook for cutaneous squamous cell carcinoma?  Most SCCs are cured by treatment. A cure is most likely if treatment is  "undertaken when the lesion is small. The risk of recurrence or disease-associated death is greater for tumours that are > 20 mm in diameter and/or > 2 mm in thickness at the time of surgical excision.  About 50% of people at high risk of SCC develop a second one within 5 years of the first. They are also at increased risk of other skin cancers, especially melanoma. Regular self-skin examinations and long-term annual skin checks by an experienced health professional are recommended.  Treatment with Cryotherapy    The doctor has treated your skin with nitrogen, which is 320 degrees Fahrenheit below zero.  He has given the treated area \"frostbite.\"    Stinging should subside within a few hours.  You can take Tylenol for pain, if needed.    Over the next few days, it is normal if the area becomes reddened, a blood blister, or swollen with fluid.  If the lesion treated was near the eye - you could get a swollen eye over the next few days.  Do not panic!  This is all temporary, and will resolve with time.    There is no special treatment - just keep the area clean.  Makeup and BandAids can be used, if preferred.    When the area starts to dry up and peel off, using Vaseline can help healing.    It usually takes up to a month for it to heal.  Some lesions are recurrent and may require repeat treatments.  If a lesion has not healed in one month, please don't hesitate to contact us.      If you have any further questions that are not answered here, please call us.  [phone].    Thank you for allowing us to care for you.      "

## 2023-12-29 DIAGNOSIS — E11.22 TYPE 2 DIABETES MELLITUS WITH STAGE 3A CHRONIC KIDNEY DISEASE, WITHOUT LONG-TERM CURRENT USE OF INSULIN (HCC): Chronic | ICD-10-CM

## 2023-12-29 DIAGNOSIS — J06.9 ACUTE UPPER RESPIRATORY INFECTION, UNSPECIFIED: ICD-10-CM

## 2023-12-29 DIAGNOSIS — N18.31 TYPE 2 DIABETES MELLITUS WITH STAGE 3A CHRONIC KIDNEY DISEASE, WITHOUT LONG-TERM CURRENT USE OF INSULIN (HCC): Chronic | ICD-10-CM

## 2023-12-29 RX ORDER — PEN NEEDLE, DIABETIC 32GX 5/32"
NEEDLE, DISPOSABLE MISCELLANEOUS
Qty: 100 EACH | Refills: 1 | Status: SHIPPED | OUTPATIENT
Start: 2023-12-29 | End: 2024-01-04

## 2023-12-29 RX ORDER — FLUTICASONE PROPIONATE 50 MCG
SPRAY, SUSPENSION (ML) NASAL
Qty: 16 ML | Refills: 0 | Status: SHIPPED | OUTPATIENT
Start: 2023-12-29

## 2024-01-02 ENCOUNTER — TELEPHONE (OUTPATIENT)
Age: 74
End: 2024-01-02

## 2024-01-02 DIAGNOSIS — E11.22 TYPE 2 DIABETES MELLITUS WITH STAGE 3A CHRONIC KIDNEY DISEASE, WITHOUT LONG-TERM CURRENT USE OF INSULIN (HCC): Chronic | ICD-10-CM

## 2024-01-02 DIAGNOSIS — N18.31 TYPE 2 DIABETES MELLITUS WITH STAGE 3A CHRONIC KIDNEY DISEASE, WITHOUT LONG-TERM CURRENT USE OF INSULIN (HCC): Chronic | ICD-10-CM

## 2024-01-02 NOTE — TELEPHONE ENCOUNTER
Patient needs a new prescription for Audelia pen needles - is now taking Humalog 3 times a day in addition to lantus. -     Is running low on needles and the pharm won't fill his recently sent script due to the instructions stating that he only uses 1 x daily and is not due for a refill    Please change RX instructions so patient can  script.    [facility]

## 2024-01-03 RX ORDER — PEN NEEDLE, DIABETIC 32GX 5/32"
NEEDLE, DISPOSABLE MISCELLANEOUS
Qty: 400 EACH | Refills: 1 | Status: CANCELLED | OUTPATIENT
Start: 2024-01-03

## 2024-01-04 DIAGNOSIS — E11.22 TYPE 2 DIABETES MELLITUS WITH STAGE 3A CHRONIC KIDNEY DISEASE, WITHOUT LONG-TERM CURRENT USE OF INSULIN (HCC): Chronic | ICD-10-CM

## 2024-01-04 DIAGNOSIS — N18.31 TYPE 2 DIABETES MELLITUS WITH STAGE 3A CHRONIC KIDNEY DISEASE, WITHOUT LONG-TERM CURRENT USE OF INSULIN (HCC): Chronic | ICD-10-CM

## 2024-01-04 RX ORDER — PEN NEEDLE, DIABETIC 32GX 5/32"
NEEDLE, DISPOSABLE MISCELLANEOUS
Qty: 100 EACH | Refills: 2 | Status: SHIPPED | OUTPATIENT
Start: 2024-01-04

## 2024-01-26 PROBLEM — J02.8 ACUTE PHARYNGITIS DUE TO OTHER SPECIFIED ORGANISMS: Status: RESOLVED | Noted: 2023-11-25 | Resolved: 2024-01-26

## 2024-01-28 DIAGNOSIS — E11.22 TYPE 2 DIABETES MELLITUS WITH STAGE 3A CHRONIC KIDNEY DISEASE, WITHOUT LONG-TERM CURRENT USE OF INSULIN (HCC): Chronic | ICD-10-CM

## 2024-01-28 DIAGNOSIS — N18.31 TYPE 2 DIABETES MELLITUS WITH STAGE 3A CHRONIC KIDNEY DISEASE, WITHOUT LONG-TERM CURRENT USE OF INSULIN (HCC): Chronic | ICD-10-CM

## 2024-01-29 RX ORDER — PEN NEEDLE, DIABETIC 32GX 5/32"
NEEDLE, DISPOSABLE MISCELLANEOUS
Qty: 200 EACH | Refills: 1 | Status: SHIPPED | OUTPATIENT
Start: 2024-01-29

## 2024-02-04 DIAGNOSIS — E11.22 TYPE 2 DIABETES MELLITUS WITH STAGE 2 CHRONIC KIDNEY DISEASE, WITHOUT LONG-TERM CURRENT USE OF INSULIN: Chronic | ICD-10-CM

## 2024-02-04 DIAGNOSIS — N18.2 TYPE 2 DIABETES MELLITUS WITH STAGE 2 CHRONIC KIDNEY DISEASE, WITHOUT LONG-TERM CURRENT USE OF INSULIN: Chronic | ICD-10-CM

## 2024-02-04 RX ORDER — METFORMIN HYDROCHLORIDE 500 MG/1
1000 TABLET, EXTENDED RELEASE ORAL 2 TIMES DAILY WITH MEALS
Qty: 360 TABLET | Refills: 1 | Status: SHIPPED | OUTPATIENT
Start: 2024-02-04

## 2024-02-15 ENCOUNTER — OFFICE VISIT (OUTPATIENT)
Age: 74
End: 2024-02-15
Payer: MEDICARE

## 2024-02-15 VITALS
TEMPERATURE: 97.6 F | WEIGHT: 193.8 LBS | HEIGHT: 64 IN | HEART RATE: 85 BPM | DIASTOLIC BLOOD PRESSURE: 78 MMHG | BODY MASS INDEX: 33.09 KG/M2 | OXYGEN SATURATION: 96 % | RESPIRATION RATE: 18 BRPM | SYSTOLIC BLOOD PRESSURE: 132 MMHG

## 2024-02-15 DIAGNOSIS — R05.3 PERSISTENT COUGH FOR 3 WEEKS OR LONGER: Primary | ICD-10-CM

## 2024-02-15 DIAGNOSIS — M70.22 OLECRANON BURSITIS OF LEFT ELBOW: ICD-10-CM

## 2024-02-15 PROBLEM — N18.31 STAGE 3A CHRONIC KIDNEY DISEASE (HCC): Status: RESOLVED | Noted: 2022-04-12 | Resolved: 2024-02-15

## 2024-02-15 PROCEDURE — 99214 OFFICE O/P EST MOD 30 MIN: CPT | Performed by: INTERNAL MEDICINE

## 2024-02-15 RX ORDER — BENZONATATE 200 MG/1
CAPSULE ORAL
COMMUNITY
Start: 2024-02-05 | End: 2024-02-15 | Stop reason: CLARIF

## 2024-02-15 NOTE — PROGRESS NOTES
Name: Armand Rocha      : 1950      MRN: [id number]  Encounter Provider: David De La Garza DO  Encounter Date: 2/15/2024   Encounter department: [facility] [place]    Assessment & Plan     Assessment & Plan  1. Persistent cough  No rales, rhonchi, wheezing noted on exam. He likely contracted a virus and the symptoms he continues to have are related to that viral URI. Do no feel he requires prednisone at this time even though his lungs are a little tight. Continue to use medication for his rhinorrhea/post-nasal drip. Can start using advair as well to see if that helps.    2. Olecranon bursitis  No signs of infection. He was advised to keep pressure off his elbow. He can use topical Voltaren gel. Can use an elbow pad. Ice prn to reduce pain and swelling. Avoid overuse. If it remains persistent in 2 to 3 months, we will refer him to orthopedics.      Depression Screening and Follow-up Plan: Patient was screened for depression during today's encounter. They screened negative with a PHQ-2 score of 0.      Subjective      History of Present Illness  The patient presents for evaluation of multiple medical concerns.    He got a cough when he was in [place]. He was sick at that time with runny nose and cough. He went to the doctor who put him on amoxicillin for 7 days. He finished it on , but he still has the cough. He spits up yellow stuff. He denies any difficulty breathing, but coughing takes his breath away a little bit. He has some wheezing. He uses an inhaler occasionally. He has not had a chest x-ray. His symptoms have been going on for about 3 weeks. He was tested for COVID-19 and influenza, which were negative. His wife tested positive for COVID-19 yesterday. He is isolating away from her. He has Advair inhaler, but he has not used it. He is taking montelukast 10 mg. He is also using saline. He has never had COVID-19 infection that he is aware of.    He has a growth on his elbow  "that started about 4 weeks ago. He puts his elbows on the table a lot when he is working.    He noticed tingling in 2 of his fingers after driving home from [place]. They are somewhat numb. It just started.     Review of Systems   Constitutional:  Negative for chills and fever.   HENT:  Positive for congestion and postnasal drip.    Respiratory:  Positive for cough. Negative for wheezing.    Cardiovascular: Negative.    Gastrointestinal: Negative.    Musculoskeletal:  Positive for arthralgias (elbow).   Neurological:  Positive for numbness.     Objective     /78 (BP Location: Left arm, Patient Position: Sitting, Cuff Size: Standard)   Pulse 85   Temp 97.6 °F (36.4 °C) (Tympanic)   Resp 18   Ht 5' 4\" (1.626 m)   Wt 87.9 kg (193 lb 12.8 oz) Comment: with shoes on  SpO2 96%   BMI 33.27 kg/m²     Physical Exam  Constitutional:       General: He is not in acute distress.     Appearance: He is well-developed. He is not diaphoretic.   Neck:      Thyroid: No thyromegaly.      Vascular: No JVD.   Cardiovascular:      Rate and Rhythm: Normal rate and regular rhythm.      Heart sounds: Normal heart sounds. No murmur heard.  Pulmonary:      Effort: Pulmonary effort is normal. No respiratory distress.      Breath sounds: Normal breath sounds. Decreased air movement present. No wheezing or rales.   Musculoskeletal:         General: Deformity (olecranon bursitis) present.      Right lower leg: No edema.      Left lower leg: No edema.   Neurological:      Mental Status: He is alert.       David De La Garza DO       "

## 2024-02-16 PROBLEM — I10 PRIMARY HYPERTENSION: Chronic | Status: ACTIVE | Noted: 2023-10-10

## 2024-02-16 PROBLEM — Z79.4 CURRENT USE OF INSULIN (HCC): Status: RESOLVED | Noted: 2023-04-17 | Resolved: 2024-02-16

## 2024-02-16 PROBLEM — E66.811 CLASS 1 OBESITY DUE TO EXCESS CALORIES WITH SERIOUS COMORBIDITY AND BODY MASS INDEX (BMI) OF 33.0 TO 33.9 IN ADULT: Chronic | Status: ACTIVE | Noted: 2023-11-25

## 2024-02-16 PROBLEM — E66.09 CLASS 1 OBESITY DUE TO EXCESS CALORIES WITH SERIOUS COMORBIDITY AND BODY MASS INDEX (BMI) OF 33.0 TO 33.9 IN ADULT: Chronic | Status: ACTIVE | Noted: 2023-11-25

## 2024-03-04 ENCOUNTER — RA CDI HCC (OUTPATIENT)
Dept: OTHER | Facility: HOSPITAL | Age: 74
End: 2024-03-04

## 2024-03-04 PROBLEM — I12.9 HYPERTENSIVE KIDNEY DISEASE WITH CHRONIC KIDNEY DISEASE: Status: ACTIVE | Noted: 2024-03-04

## 2024-03-04 NOTE — PROGRESS NOTES
HCC coding opportunities          Chart Reviewed number of suggestions sent to Provider: 3  E11.3293  E11.65  I12.9  Z79.4     Patients Insurance     Medicare Insurance: [facility] Medicare Advantage

## 2024-03-07 ENCOUNTER — APPOINTMENT (OUTPATIENT)
Age: 74
End: 2024-03-07
Payer: COMMERCIAL

## 2024-03-07 DIAGNOSIS — E78.2 MIXED HYPERLIPIDEMIA DUE TO TYPE 2 DIABETES MELLITUS: Chronic | ICD-10-CM

## 2024-03-07 DIAGNOSIS — E11.69 MIXED HYPERLIPIDEMIA DUE TO TYPE 2 DIABETES MELLITUS: Chronic | ICD-10-CM

## 2024-03-07 LAB
ALBUMIN SERPL BCP-MCNC: 4.3 G/DL (ref 3.5–5)
ALP SERPL-CCNC: 46 U/L (ref 34–104)
ALT SERPL W P-5'-P-CCNC: 17 U/L (ref 7–52)
ANION GAP SERPL CALCULATED.3IONS-SCNC: 11 MMOL/L
AST SERPL W P-5'-P-CCNC: 17 U/L (ref 13–39)
BILIRUB SERPL-MCNC: 0.69 MG/DL (ref 0.2–1)
BUN SERPL-MCNC: 23 MG/DL (ref 5–25)
CALCIUM SERPL-MCNC: 9.9 MG/DL (ref 8.4–10.2)
CHLORIDE SERPL-SCNC: 103 MMOL/L (ref 96–108)
CHOLEST SERPL-MCNC: 133 MG/DL
CO2 SERPL-SCNC: 29 MMOL/L (ref 21–32)
CREAT SERPL-MCNC: 1.09 MG/DL (ref 0.6–1.3)
CREAT UR-MCNC: 51.1 MG/DL
EST. AVERAGE GLUCOSE BLD GHB EST-MCNC: 180 MG/DL
GFR SERPL CREATININE-BSD FRML MDRD: 66 ML/MIN/1.73SQ M
GLUCOSE P FAST SERPL-MCNC: 65 MG/DL (ref 65–99)
HBA1C MFR BLD: 7.9 %
HDLC SERPL-MCNC: 45 MG/DL
LDLC SERPL CALC-MCNC: 66 MG/DL (ref 0–100)
MICROALBUMIN UR-MCNC: 9.7 MG/L
MICROALBUMIN/CREAT 24H UR: 19 MG/G CREATININE (ref 0–30)
NONHDLC SERPL-MCNC: 88 MG/DL
POTASSIUM SERPL-SCNC: 3.8 MMOL/L (ref 3.5–5.3)
PROT SERPL-MCNC: 7.1 G/DL (ref 6.4–8.4)
SODIUM SERPL-SCNC: 143 MMOL/L (ref 135–147)
TRIGL SERPL-MCNC: 112 MG/DL

## 2024-03-07 PROCEDURE — 82570 ASSAY OF URINE CREATININE: CPT

## 2024-03-07 PROCEDURE — 80053 COMPREHEN METABOLIC PANEL: CPT

## 2024-03-07 PROCEDURE — 80061 LIPID PANEL: CPT

## 2024-03-07 PROCEDURE — 36415 COLL VENOUS BLD VENIPUNCTURE: CPT

## 2024-03-07 PROCEDURE — 82043 UR ALBUMIN QUANTITATIVE: CPT

## 2024-03-07 PROCEDURE — 83036 HEMOGLOBIN GLYCOSYLATED A1C: CPT

## 2024-03-10 DIAGNOSIS — N18.2 TYPE 2 DIABETES MELLITUS WITH STAGE 2 CHRONIC KIDNEY DISEASE, WITHOUT LONG-TERM CURRENT USE OF INSULIN: Chronic | ICD-10-CM

## 2024-03-10 DIAGNOSIS — J30.2 SEASONAL ALLERGIES: ICD-10-CM

## 2024-03-10 DIAGNOSIS — Z87.892 HISTORY OF ANAPHYLAXIS: ICD-10-CM

## 2024-03-10 DIAGNOSIS — M62.838 MUSCLE SPASM: ICD-10-CM

## 2024-03-10 DIAGNOSIS — E11.22 TYPE 2 DIABETES MELLITUS WITH STAGE 2 CHRONIC KIDNEY DISEASE, WITHOUT LONG-TERM CURRENT USE OF INSULIN: Chronic | ICD-10-CM

## 2024-03-10 DIAGNOSIS — N52.1 ERECTILE DYSFUNCTION DUE TO DISEASES CLASSIFIED ELSEWHERE: ICD-10-CM

## 2024-03-10 DIAGNOSIS — Z79.4 TYPE 2 DIABETES MELLITUS WITH HYPERGLYCEMIA, WITH LONG-TERM CURRENT USE OF INSULIN (HCC): ICD-10-CM

## 2024-03-10 DIAGNOSIS — I10 BENIGN ESSENTIAL HYPERTENSION: Chronic | ICD-10-CM

## 2024-03-10 DIAGNOSIS — L21.9 SEBORRHEIC DERMATITIS: ICD-10-CM

## 2024-03-10 DIAGNOSIS — K21.9 GASTROESOPHAGEAL REFLUX DISEASE: ICD-10-CM

## 2024-03-10 DIAGNOSIS — N18.31 TYPE 2 DIABETES MELLITUS WITH STAGE 3A CHRONIC KIDNEY DISEASE, WITHOUT LONG-TERM CURRENT USE OF INSULIN (HCC): Chronic | ICD-10-CM

## 2024-03-10 DIAGNOSIS — E11.22 TYPE 2 DIABETES MELLITUS WITH STAGE 3A CHRONIC KIDNEY DISEASE, WITHOUT LONG-TERM CURRENT USE OF INSULIN (HCC): Chronic | ICD-10-CM

## 2024-03-10 DIAGNOSIS — Z79.4 CURRENT USE OF INSULIN (HCC): ICD-10-CM

## 2024-03-10 DIAGNOSIS — E11.65 TYPE 2 DIABETES MELLITUS WITH HYPERGLYCEMIA, WITH LONG-TERM CURRENT USE OF INSULIN (HCC): ICD-10-CM

## 2024-03-10 DIAGNOSIS — J20.9 ACUTE BRONCHITIS, UNSPECIFIED ORGANISM: ICD-10-CM

## 2024-03-10 DIAGNOSIS — J06.9 ACUTE UPPER RESPIRATORY INFECTION, UNSPECIFIED: ICD-10-CM

## 2024-03-10 RX ORDER — GLIMEPIRIDE 4 MG/1
4 TABLET ORAL 2 TIMES DAILY
Qty: 180 TABLET | Refills: 3 | Status: SHIPPED | OUTPATIENT
Start: 2024-03-10

## 2024-03-10 RX ORDER — ECHINACEA PURPUREA EXTRACT 125 MG
1-2 TABLET ORAL DAILY PRN
Qty: 90 ML | Refills: 3 | Status: SHIPPED | OUTPATIENT
Start: 2024-03-10

## 2024-03-10 RX ORDER — FLUTICASONE PROPIONATE 50 MCG
2 SPRAY, SUSPENSION (ML) NASAL DAILY
Qty: 48 ML | Refills: 3 | Status: SHIPPED | OUTPATIENT
Start: 2024-03-10

## 2024-03-10 RX ORDER — CYCLOBENZAPRINE HCL 5 MG
5 TABLET ORAL 3 TIMES DAILY PRN
Qty: 60 TABLET | Refills: 3 | Status: SHIPPED | OUTPATIENT
Start: 2024-03-10

## 2024-03-10 RX ORDER — METFORMIN HYDROCHLORIDE 500 MG/1
1000 TABLET, EXTENDED RELEASE ORAL 2 TIMES DAILY WITH MEALS
Qty: 360 TABLET | Refills: 0 | Status: SHIPPED | OUTPATIENT
Start: 2024-03-10

## 2024-03-10 RX ORDER — VERAPAMIL HYDROCHLORIDE 180 MG/1
180 CAPSULE, EXTENDED RELEASE ORAL 2 TIMES DAILY
Qty: 180 CAPSULE | Refills: 3 | Status: SHIPPED | OUTPATIENT
Start: 2024-03-10

## 2024-03-10 RX ORDER — BLOOD-GLUCOSE METER
KIT MISCELLANEOUS
Qty: 100 STRIP | Refills: 0 | Status: SHIPPED | OUTPATIENT
Start: 2024-03-10 | End: 2024-03-11 | Stop reason: SDUPTHER

## 2024-03-10 RX ORDER — MONTELUKAST SODIUM 10 MG/1
TABLET ORAL
Qty: 90 TABLET | Refills: 3 | Status: SHIPPED | OUTPATIENT
Start: 2024-03-10

## 2024-03-10 RX ORDER — MONTELUKAST SODIUM 10 MG/1
10 TABLET ORAL
Qty: 90 TABLET | Refills: 0 | OUTPATIENT
Start: 2024-03-10

## 2024-03-10 RX ORDER — OMEPRAZOLE 20 MG/1
20 CAPSULE, DELAYED RELEASE ORAL DAILY
Qty: 90 CAPSULE | Refills: 3 | Status: SHIPPED | OUTPATIENT
Start: 2024-03-10

## 2024-03-10 RX ORDER — ATORVASTATIN CALCIUM 10 MG/1
TABLET, FILM COATED ORAL
Qty: 90 TABLET | Refills: 3 | Status: SHIPPED | OUTPATIENT
Start: 2024-03-10

## 2024-03-10 RX ORDER — SILDENAFIL 50 MG/1
50 TABLET, FILM COATED ORAL DAILY PRN
Qty: 6 TABLET | Refills: 5 | Status: SHIPPED | OUTPATIENT
Start: 2024-03-10

## 2024-03-10 RX ORDER — ATORVASTATIN CALCIUM 10 MG/1
10 TABLET, FILM COATED ORAL DAILY
Qty: 90 TABLET | Refills: 0 | OUTPATIENT
Start: 2024-03-10

## 2024-03-11 ENCOUNTER — OFFICE VISIT (OUTPATIENT)
Age: 74
End: 2024-03-11
Payer: COMMERCIAL

## 2024-03-11 VITALS
RESPIRATION RATE: 18 BRPM | OXYGEN SATURATION: 99 % | BODY MASS INDEX: 32.91 KG/M2 | HEIGHT: 64 IN | TEMPERATURE: 96.8 F | DIASTOLIC BLOOD PRESSURE: 74 MMHG | WEIGHT: 192.8 LBS | HEART RATE: 66 BPM | SYSTOLIC BLOOD PRESSURE: 128 MMHG

## 2024-03-11 DIAGNOSIS — Z79.4 TYPE 2 DIABETES MELLITUS WITH HYPERGLYCEMIA, WITH LONG-TERM CURRENT USE OF INSULIN (HCC): Primary | Chronic | ICD-10-CM

## 2024-03-11 DIAGNOSIS — Z00.00 MEDICARE ANNUAL WELLNESS VISIT, SUBSEQUENT: ICD-10-CM

## 2024-03-11 DIAGNOSIS — E11.65 TYPE 2 DIABETES MELLITUS WITH HYPERGLYCEMIA, WITH LONG-TERM CURRENT USE OF INSULIN (HCC): Primary | Chronic | ICD-10-CM

## 2024-03-11 DIAGNOSIS — E78.2 MIXED HYPERLIPIDEMIA DUE TO TYPE 2 DIABETES MELLITUS: ICD-10-CM

## 2024-03-11 DIAGNOSIS — I10 PRIMARY HYPERTENSION: Chronic | ICD-10-CM

## 2024-03-11 DIAGNOSIS — E11.69 MIXED HYPERLIPIDEMIA DUE TO TYPE 2 DIABETES MELLITUS: ICD-10-CM

## 2024-03-11 PROCEDURE — 99214 OFFICE O/P EST MOD 30 MIN: CPT | Performed by: INTERNAL MEDICINE

## 2024-03-11 PROCEDURE — G2211 COMPLEX E/M VISIT ADD ON: HCPCS | Performed by: INTERNAL MEDICINE

## 2024-03-11 RX ORDER — EPINEPHRINE 0.3 MG/.3ML
0.3 INJECTION SUBCUTANEOUS ONCE
Qty: 0.6 ML | Refills: 0 | Status: SHIPPED | OUTPATIENT
Start: 2024-03-11 | End: 2024-03-11

## 2024-03-11 RX ORDER — KETOCONAZOLE 20 MG/G
CREAM TOPICAL 2 TIMES DAILY
Qty: 30 G | Refills: 0 | Status: SHIPPED | OUTPATIENT
Start: 2024-03-11

## 2024-03-11 RX ORDER — BLOOD-GLUCOSE METER
KIT MISCELLANEOUS
Qty: 300 STRIP | Refills: 3 | Status: SHIPPED | OUTPATIENT
Start: 2024-03-11 | End: 2024-03-12

## 2024-03-11 RX ORDER — FLUTICASONE PROPIONATE AND SALMETEROL 100; 50 UG/1; UG/1
1 POWDER RESPIRATORY (INHALATION) 2 TIMES DAILY
Qty: 60 BLISTER | Refills: 0 | Status: SHIPPED | OUTPATIENT
Start: 2024-03-11

## 2024-03-11 RX ORDER — INSULIN LISPRO 100 [IU]/ML
INJECTION, SOLUTION INTRAVENOUS; SUBCUTANEOUS
Qty: 15 ML | Refills: 0 | Status: SHIPPED | OUTPATIENT
Start: 2024-03-11

## 2024-03-11 NOTE — PATIENT INSTRUCTIONS
Type 2 Diabetes Management for Adults   WHAT YOU NEED TO KNOW:   What do I need to know about type 2 diabetes management?  Type 2 diabetes is a disease that affects how your body uses glucose (sugar). Either your body cannot make enough insulin, or it cannot use the insulin correctly. It is important to keep diabetes controlled to prevent damage to your heart, blood vessels, and other organs. Management will help you feel well and enjoy your daily activities. Your diabetes care team providers can help you make a plan to fit diabetes care into your schedule. Your plan can change over time to fit your needs and your family's needs.       What do I need to know about high blood sugar levels?  High blood sugar levels may not cause any symptoms. You may feel more thirsty or urinate more often than usual. Over time, high blood sugar levels can damage your nerves, blood vessels, tissues, and organs. The following can increase your blood sugar levels:  Large meals or large amounts of carbohydrates at one time    Less physical activity    Stress    Illness    A lower dose of diabetes medicine or insulin, or a late dose    What do I need to know about low blood sugar levels?  Symptoms include feeling shaky, dizzy, irritable, or confused. You can prevent symptoms by keeping your blood sugar levels from going too low.  Treat a low blood sugar level right away:      Drink 4 ounces of juice or have 1 tube of glucose gel.    Check your blood sugar level again 10 to 15 minutes later.    When the level goes back to normal, eat a meal or snack to prevent another decrease.       Keep glucose gel, raisins, or hard candy with you at all times to treat a low blood sugar level.     Your blood sugar level can get too low if you take diabetes medicine or insulin and do not eat enough food.     If you use insulin, check your blood sugar level before you exercise.      If your blood sugar level is below 100 mg/dL, eat 4 crackers or 2 ounces  of raisins, or drink 4 ounces of juice.    Check your level every 30 minutes if you exercise longer than 1 hour.    You may need a snack during or after exercise.    What can I do to manage my blood sugar levels?   Check your blood sugar levels as directed and as needed.  Several items are available to use to check your levels. You may need to check by testing a drop of blood in a glucose monitor. You may instead be given a continuous glucose monitoring (CGM) device. The device is worn at all times. The CGM checks your blood sugar level every 5 minutes. It sends results to an electronic device such as a smart phone. A CGM can be used with or without an insulin pump. You and your diabetes care team providers will decide on the best method for you. The goal for blood sugar levels before meals  is between 80 and 130 mg/dL and 2 hours after eating  is lower than 180 mg/dL.            Make healthy food choices.  Work with a dietitian to create a meal plan that works for you and your schedule. A dietitian can help you learn how to eat the right amount of carbohydrates (sugar and starchy foods) during your meals and snacks. Examples of carbohydrates are breads, cereals, rice, pasta, fruit, low-fat dairy, and sweets. Carbohydrates can raise your blood sugar level if you eat too many at one time.         Eat high-fiber foods as directed.  Fiber helps improve blood sugar levels. Fiber also lowers your risk for heart disease and other problems diabetes can cause. Examples of high-fiber foods include vegetables, whole-grain bread, and beans such as norris beans. Your dietitian can tell you how much fiber to have each day.         Get regular physical activity.  Physical activity can help you get to your target blood sugar level goal and manage your weight. Get at least 150 minutes of moderate to vigorous aerobic physical activity each week. Resistance training, such as lifting weights, should be done 3 times each week. Do not  miss more than 2 days of physical activity in a row. Do not sit longer than 30 minutes at a time. Your healthcare provider can help you create an activity plan. The plan can include the best activities for you and can help you build your strength and endurance.            Maintain a healthy weight.  Ask your team what a healthy weight is for you. A healthy weight can help you control diabetes and prevent heart disease. Ask your team to help you create a weight-loss plan, if needed. Even a loss of 3% to 7% of your excess body weight can help make a difference in managing diabetes. Your team will help you set a weight-loss goal, such as 10 to 15 pounds, or 5% of your extra weight. Together you and your team can set manageable weight-loss goals.    Take your diabetes medicine or insulin as directed.  You may need diabetes medicine, insulin, or both to help control your blood sugar levels. Your provider will teach you how and when to take your diabetes medicine or insulin. You will also be taught about side effects oral diabetes medicine can cause. Insulin may be injected or given through a pump or pen. You and your providers will decide on the best method for you:    An insulin pump  is an implanted device that gives your insulin 24 hours a day. An insulin pump prevents the need for multiple insulin injections in a day.         An insulin pen  is a device prefilled with the right amount of insulin.         You and your family members will be taught how to draw up and give insulin  if this is the best method for you. Your providers will also teach you how to dispose of needles and syringes.    You will learn how much insulin you need  and when to give it. You will be taught when not to give insulin. You will also be taught what to do if your blood sugar level drops too low. This may happen if you take insulin and do not eat the right amount of carbohydrates.    What else can I do to manage type 2 diabetes?   Wear  medical alert identification.  Wear medical alert jewelry or carry a card that says you have diabetes. Ask your care team provider where to get these items.         Do not smoke.  Nicotine and other chemicals in cigarettes and cigars can cause lung and blood vessel damage. It also makes it more difficult to manage your diabetes. Ask your provider for information if you currently smoke and need help to quit. Do not use e-cigarettes or smokeless tobacco in place of cigarettes or to help you quit. They still contain nicotine.    Check your feet each day for cuts, scratches, calluses, or other wounds.  Look for redness and swelling, and feel for warmth. Wear shoes that fit well. Check your shoes for rocks or other objects that can hurt your feet. Do not walk barefoot or wear shoes without socks. Wear cotton socks to help keep your feet dry.         Ask about vaccines you may need.  You have a higher risk for serious illness if you get the flu, pneumonia, COVID-19, or hepatitis. Ask your provider if you should get vaccines to prevent these or other diseases, and when to get the vaccines.    Talk to your provider if you become stressed about diabetes care.  Sometimes being able to fit diabetes care into your life can cause increased stress. The stress can cause you not to take care of yourself properly. Your provider can help by offering tips about self-care. A mental health provider can listen and offer help with self-care issues. Other types of counseling can help you make nutrition or physical activity changes.    Have your A1c checked as directed.  Your provider may check your A1c every 3 months, or 2 times each year if your diabetes is controlled. An A1c test shows the average amount of sugar in your blood over the past 2 to 3 months. Your provider will tell you what your A1c level should be.    Have screening tests as directed.  Your provider may recommend screening for complications of diabetes and other conditions  that may develop. Some screenings may begin right away and some may happen within the first 5 years of diagnosis:    Examples of diabetes complications  include kidney problems, high cholesterol, high blood pressure, blood vessel problems, eye problems, and sleep apnea.    You may be screened for a low vitamin B level  if you take oral diabetes medicine for a long time.    You may be screened for polycystic ovarian syndrome (PCOS)  if you are of childbearing age.    Have someone call your local emergency number (911 in the [place]) if:   You cannot be woken.    You have signs of diabetic ketoacidosis:     confusion, fatigue    vomiting    rapid heartbeat    fruity smelling breath    extreme thirst    dry mouth and skin    You have any of the following signs of a heart attack:      Squeezing, pressure, or pain in your chest    You may  also have any of the following:     Discomfort or pain in your back, neck, jaw, stomach, or arm    Shortness of breath    Nausea or vomiting    Lightheadedness or a sudden cold sweat    You have any of the following signs of a stroke:      Numbness or drooping on one side of your face     Weakness in an arm or leg    Confusion or difficulty speaking    Dizziness, a severe headache, or vision loss    When should I call my doctor or diabetes care team provider?   You have a sore or wound that will not heal.    You have a change in the amount you urinate.    Your blood sugar levels are higher than your target goals.    You often have lower blood sugar levels than your target goals.    Your skin is red, dry, warm, or swollen.    You have trouble coping with diabetes, or you feel anxious or depressed.    You have trouble following any part of your care plan, such as your meal plan.    You have questions or concerns about your condition or care.    CARE AGREEMENT:   You have the right to help plan your care. Learn about your health condition and how it may be treated. Discuss treatment options  with your healthcare providers to decide what care you want to receive. You always have the right to refuse treatment. The above information is an  only. It is not intended as medical advice for individual conditions or treatments. Talk to your doctor, nurse or pharmacist before following any medical regimen to see if it is safe and effective for you.  © Copyright [employer] 2023 Information is for End User's use only and may not be sold, redistributed or otherwise used for commercial purposes.

## 2024-03-11 NOTE — PROGRESS NOTES
Name: Armand Rocha      : 1950      MRN: [id number]  Encounter Provider: David De La Garza DO  Encounter Date: 3/11/2024   Encounter department: [facility] [place]    Assessment & Plan     Assessment & Plan  1. Type 2 diabetes mellitus with hyperglycemia, with long-term current use of insulin (HCC)  2. Mixed hyperlipidemia due to type 2 diabetes mellitus     Most recent A1c was 7.9 % on 3/7/2024. Sugars slowly moving in right direction. Follow-up with endocrinology on . Bring a log of your blood sugars to that appointment. Consider CGM as he is testing 4-5 times per day sometimes. His insurance won't cover test strips that frequently so he ends up paying out of pocket. Continue statin for HLD. No evidence of proteinuria on labs. Regular cardiovascular exercise and strength training encouraged.    - Hemoglobin A1C; Future  - Lipid Panel with Direct LDL reflex; Future  - CBC; Future  - Basic metabolic panel; Future    3. Primary hypertension    Blood pressure stable and controlled in the office. Continue anti-HTN regimn.    4. Right hand numbness and left elbow olecranon fullness    Getting EMG and left elbow MRI as ordered by Dr. Eden ([facility]).         Subjective      History of Present Illness  The patient presents for evaluation of multiple medical concerns.    His elbow pain has not improved since his last visit. He saw Dr. Eden at [facility] for his elbows and got an appointment last week. Dr. Eden ordered a nerve conduction study and MRI of his hand. He continues to have numbness in his hand.    His blood sugars have been improving. He has an appointment with endocrinology at the end of 2024. He was taking Humalog and Solostar. He takes Humalog 15 minutes after he eats. When his blood sugar is high, it is sometimes in the 200s and rarely in the 300s. This morning, his blood sugar was 144 and he did not take Humalog. He checks his blood  "sugar right after lunch and right after dinner, but not after breakfast. He tries to stay away from bread. He does not get enough test strips. He checks his blood sugar at least 3 to 5 times a day. He drinks orange juice if his blood sugar is low.    Review of Systems   Constitutional: Negative.    Respiratory: Negative.     Cardiovascular: Negative.    Gastrointestinal: Negative.    Musculoskeletal:  Positive for arthralgias (elbow pain).   Neurological:  Positive for numbness (right hand/fingers).     Objective     /74 (BP Location: Left arm, Patient Position: Sitting, Cuff Size: Large)   Pulse 66   Temp (!) 96.8 °F (36 °C) (Tympanic)   Resp 18   Ht 5' 4\" (1.626 m)   Wt 87.5 kg (192 lb 12.8 oz)   SpO2 99%   BMI 33.09 kg/m²     Physical Exam  Constitutional:       General: He is not in acute distress.     Appearance: He is not ill-appearing.   Cardiovascular:      Rate and Rhythm: Normal rate and regular rhythm.      Heart sounds: No murmur heard.  Pulmonary:      Effort: Pulmonary effort is normal. No respiratory distress.      Breath sounds: No wheezing.   Abdominal:      General: Bowel sounds are normal. There is no distension.      Tenderness: There is no abdominal tenderness.   Musculoskeletal:      Right lower leg: No edema.      Left lower leg: No edema.   Neurological:      Mental Status: He is alert.       David De La Garza DO     "

## 2024-03-12 DIAGNOSIS — E11.65 TYPE 2 DIABETES MELLITUS WITH HYPERGLYCEMIA, WITH LONG-TERM CURRENT USE OF INSULIN (HCC): Primary | Chronic | ICD-10-CM

## 2024-03-12 DIAGNOSIS — Z79.4 TYPE 2 DIABETES MELLITUS WITH HYPERGLYCEMIA, WITH LONG-TERM CURRENT USE OF INSULIN (HCC): Primary | Chronic | ICD-10-CM

## 2024-03-12 RX ORDER — BLOOD SUGAR DIAGNOSTIC
STRIP MISCELLANEOUS
Qty: 100 STRIP | Refills: 3 | Status: SHIPPED | OUTPATIENT
Start: 2024-03-12

## 2024-03-12 RX ORDER — BLOOD-GLUCOSE METER
EACH MISCELLANEOUS
Qty: 1 KIT | Refills: 0 | Status: SHIPPED | OUTPATIENT
Start: 2024-03-12

## 2024-03-12 RX ORDER — LANCETS
EACH MISCELLANEOUS
Qty: 102 EACH | Refills: 3 | Status: SHIPPED | OUTPATIENT
Start: 2024-03-12

## 2024-03-12 RX ORDER — INSULIN GLARGINE 100 [IU]/ML
16 INJECTION, SOLUTION SUBCUTANEOUS DAILY
Qty: 15 ML | Refills: 0 | Status: SHIPPED | OUTPATIENT
Start: 2024-03-12

## 2024-03-12 RX ORDER — PEN NEEDLE, DIABETIC 32GX 5/32"
NEEDLE, DISPOSABLE MISCELLANEOUS
Qty: 200 EACH | Refills: 0 | Status: SHIPPED | OUTPATIENT
Start: 2024-03-12

## 2024-03-12 NOTE — TELEPHONE ENCOUNTER
Prior-auth started for  glucose blood (FREESTYLE LITE) test strip. Sent to Plan ([facility]) KEY : [id number]

## 2024-03-13 RX ORDER — BLOOD-GLUCOSE METER
KIT MISCELLANEOUS
Qty: 450 STRIP | Refills: 3 | Status: SHIPPED | OUTPATIENT
Start: 2024-03-13

## 2024-03-20 ENCOUNTER — TELEPHONE (OUTPATIENT)
Age: 74
End: 2024-03-20

## 2024-03-25 ENCOUNTER — OFFICE VISIT (OUTPATIENT)
Age: 74
End: 2024-03-25
Payer: COMMERCIAL

## 2024-03-25 DIAGNOSIS — Z01.818 PRE-OP EXAMINATION: Primary | ICD-10-CM

## 2024-03-25 DIAGNOSIS — G56.21 CUBITAL TUNNEL SYNDROME ON RIGHT: ICD-10-CM

## 2024-03-25 PROCEDURE — 93000 ELECTROCARDIOGRAM COMPLETE: CPT

## 2024-03-25 PROCEDURE — 99214 OFFICE O/P EST MOD 30 MIN: CPT

## 2024-03-25 NOTE — PROGRESS NOTES
Presurgical Evaluation    Subjective:      Patient ID: Armand Rocha is a 74 y.o. male.    No chief complaint on file.      HPI    The following portions of the patient's history were reviewed and updated as appropriate: allergies, current medications, past family history, past medical history, past social history, past surgical history, and problem list.    Presents for pre op exam. Claims does not need clearance, only and EKG to be done. Will be done at [facility].    Procedure date: April 1, 2024    Surgeon:  Dr Floyd Eden  Planned procedure:  Cubital tunnel release  Diagnosis for procedure:  Cubital tunnel syndrome    Prior anesthesia: Yes   General; Complications:  None / Tolerated well    CAD History: None   NOTE: Patient should see Cardiology if time available before surgery, and if appropriate.    Pulmonary History: None    Renal history: None    Diabetes History:  Type 2  Controlled     Neurological History: None     On Immunosuppressant meds/biologics: No      Review of Systems   Constitutional:  Negative for activity change, diaphoresis, fatigue and fever.   HENT: Negative.     Eyes: Negative.    Respiratory:  Negative for cough, chest tightness and shortness of breath.    Cardiovascular:  Negative for chest pain, palpitations and leg swelling.   Gastrointestinal: Negative.    Endocrine: Negative for polydipsia, polyphagia and polyuria.   Genitourinary:  Negative for dysuria, flank pain, frequency, hematuria and urgency.   Musculoskeletal:  Positive for arthralgias (right olecranon area, chronic assoc w pressure). Negative for back pain, joint swelling, neck pain and neck stiffness.   Skin: Negative.    Neurological:  Negative for dizziness, weakness, light-headedness and headaches.   Hematological:  Negative for adenopathy.   Psychiatric/Behavioral:  Negative for confusion and sleep disturbance. The patient is not nervous/anxious.          Current Outpatient Medications   Medication Sig  Dispense Refill    atorvastatin (LIPITOR) 10 mg tablet TAKE 1 TABLET BY MOUTH EVERY DAY 90 tablet 3    Blood Glucose Monitoring Suppl (Accu-Chek Elisa Plus) w/Device KIT Check blood sugars 3 times daily 1 kit 0    Cyanocobalamin (VITAMIN B-12 PO) Take by mouth daily      cyclobenzaprine (FLEXERIL) 5 mg tablet Take 1 tablet (5 mg total) by mouth 3 (three) times a day as needed for muscle spasms 60 tablet 3    EPINEPHrine (EPIPEN) 0.3 mg/0.3 mL SOAJ Inject 0.3 mL (0.3 mg total) into a muscle once for 1 dose 0.6 mL 0    fluticasone (FLONASE) 50 mcg/act nasal spray 2 sprays into each nostril daily 48 mL 3    Fluticasone-Salmeterol (Advair Diskus) 100-50 mcg/dose inhaler Inhale 1 puff 2 (two) times a day Rinse mouth after use. 60 blister 0    glimepiride (AMARYL) 4 mg tablet Take 1 tablet (4 mg total) by mouth 2 (two) times a day 180 tablet 3    glucose blood (Accu-Chek Elisa Plus) test strip Check blood sugar 3 times daily 100 strip 3    glucose blood (FREESTYLE LITE) test strip USE TO TEST FIVE TIMES DAILY (DX: E11.65, on insulin) 450 strip 3    Insulin Glargine Solostar (Lantus SoloStar) 100 UNIT/ML SOPN Inject 0.16 mL (16 Units total) under the skin daily 15 mL 0    insulin lispro (HumaLOG KwikPen) 100 units/mL injection pen Blood Glucose 150 - 189: 1 Unit of Insulin Blood Glucose 190 - 229: 2 Units of Insulin Blood Glucose 230 - 269: 3 Units of Insulin Blood Glucose 270 - 309: 4 Units of Insulin Blood Glucose 310 - 349: 5 Units of Insulin Blood Glucose greater than or equal to 350: 6 Units of Insulin Check your blood glucose 3 times a day and before bedtime and administer insulin as explained above 15 mL 0    Insulin Pen Needle (BD Pen Needle Audelia 2nd Gen) 32G X 4 MM MISC USE TO INJECT INSULIN UP TO 4 TIMES A  each 0    ketoconazole (NIZORAL) 2 % cream Apply topically 2 (two) times a day Applied to face and scalp twice a day for 3 weeks repeat as needed 30 g 0    Lancets (accu-chek multiclix) lancets Please  check Blood sugars 3 times daily 102 each 3    metFORMIN (GLUCOPHAGE-XR) 500 mg 24 hr tablet Take 2 tablets (1,000 mg total) by mouth 2 (two) times a day with meals 360 tablet 0    montelukast (SINGULAIR) 10 mg tablet TAKE 1 TABLET BY MOUTH EVERYDAY AT BEDTIME 90 tablet 3    omeprazole (PriLOSEC) 20 mg delayed release capsule Take 1 capsule (20 mg total) by mouth daily 90 capsule 3    Pyridoxine HCl (VITAMIN B-6 PO) Take 25 mg by mouth daily      sildenafil (VIAGRA) 50 MG tablet Take 1 tablet (50 mg total) by mouth daily as needed for erectile dysfunction 6 tablet 5    sitaGLIPtin (Januvia) 100 mg tablet Take 1 tablet (100 mg total) by mouth daily 90 tablet 3    sodium chloride (OCEAN) 0.65 % nasal spray 1-2 sprays into each nostril daily as needed for congestion 90 mL 3    triamcinolone (KENALOG) 0.025 % cream Apply topically as needed       verapamil (Verelan) 180 MG 24 hr capsule Take 1 capsule (180 mg total) by mouth 2 (two) times a day 180 capsule 3     No current facility-administered medications for this visit.       Allergies on file:   Iodinated contrast media, Shellfish allergy - food allergy, Shrimp extract allergy skin test - food allergy, Empagliflozin, Farxiga [dapagliflozin], and Ozempic (0.25 or 0.5 mg-dose) [semaglutide(0.25 or 0.5mg-dos)]    Patient Active Problem List   Diagnosis    Actinic keratosis    Seborrheic keratosis    History of skin cancer    Anxiety    Erectile dysfunction of non-organic origin    Esophageal reflux    Mixed hyperlipidemia due to type 2 diabetes mellitus     Mitral valve disorder    Rosacea    Sleep apnea    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    Type 2 diabetes mellitus with hyperglycemia, with long-term current use of insulin ([facility])    Primary hypertension    Class 1 obesity due to excess calories with serious comorbidity and body mass index (BMI) of 33.0 to 33.9 in adult        Past Medical History:   Diagnosis Date    Actinic keratosis     Adhesive capsulitis of  unspecified shoulder     Anxiety     Asthma     Atopic dermatitis     Cardiac disease     Cellulitis of right upper extremity     Cervical radiculopathy     Chest pain     Chronic maxillary sinusitis     last assessed 01/21/14    Diabetes mellitus (HCC)     Erectile dysfunction of non-organic origin     Esophageal reflux     Gout     last assessed 08/26/15    Hearing loss, conductive     Heart murmur     Mitral Valve disorder    Henoch-Schonlein purpura (HCC) 08/06/2019    Herpes zoster     History of paroxysmal supraventricular tachycardia     Hypertension     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    Mitral valve disorder     Nasal congestion 04/18/2019    Neoplasm of skin, malignant     Non-melanoma skin cancer     last assessed 10/26/17    Nosebleed 04/18/2019    Obesity     Palpitations     last assessed 03/05/15    Paroxysmal supraventricular tachycardia 01/21/2014    Plantar fasciitis     last assessed 05/28/15    PSVT (paroxysmal supraventricular tachycardia)     Sciatica     Sleep apnea     Squamous cell carcinoma of skin of scalp     Thoracic radiculopathy 10/22/2015    Tinnitus, unspecified ear     Type 2 diabetes mellitus with hyperglycemia (HCC)     last assessed 08/26/15    Umbilical hernia     Worms in stool     last assessed 08/26/15       Past Surgical History:   Procedure Laterality Date    APPENDECTOMY      COLECTOMY      Partial, Sigmoid    COLONOSCOPY      HEAD & NECK SKIN LESION EXCISIONAL BIOPSY      10/11/10 SCC --  10/17/11 SCC  --  Location Scalp    HERNIA REPAIR      NASAL/SINUS ENDOSCOPY N/A 8/6/2019    Procedure: FUNCTIONAL ENDOSCOPIC SINUS SURGERY (FESS) IMAGED GUIDED for control of epistaxis;  Surgeon: Leonel Bo MD;  Location: [facility];  Service: ENT    CT ESOPHAGOGASTRODUODENOSCOPY TRANSORAL DIAGNOSTIC N/A 8/9/2017    Procedure: ESOPHAGOGASTRODUODENOSCOPY (EGD);  Surgeon: Anjel Steven MD;  Location: [facility];  Service: Gastroenterology    SKIN BIOPSY       TONSILLECTOMY         Family History   Problem Relation Age of Onset    Cancer Mother         Breast + Skin    Diabetes Mother     Heart disease Mother     Heart attack Mother     Breast cancer Mother         Adenocarcinoma    Skin cancer Mother         Adenocarcinoma    Heart failure Mother     Diabetes type II Mother     Varicose Veins Mother         Lower Extremities    Cancer Father         Prostate + Skin    Prostate cancer Father         Adenocarcinoma    Skin cancer Father     Lymphoma Father         Non-Hodgkin's    Arthritis Family        Social History     Tobacco Use    Smoking status: Former     Current packs/day: 0.00     Average packs/day: 1 pack/day for 20.0 years (20.0 ttl pk-yrs)     Types: Cigarettes     Start date: 1957     Quit date: 1977     Years since quittin.2    Smokeless tobacco: Never   Vaping Use    Vaping status: Never Used   Substance Use Topics    Alcohol use: Yes     Alcohol/week: 1.0 standard drink of alcohol     Types: 1 Standard drinks or equivalent per week     Comment: only occasionally    Drug use: No       Objective:    There were no vitals filed for this visit.     Physical Exam  Vitals and nursing note reviewed.   Constitutional:       General: He is not in acute distress.     Appearance: Normal appearance. He is obese. He is not ill-appearing, toxic-appearing or diaphoretic.   HENT:      Head: Normocephalic and atraumatic.      Nose: Nose normal. No congestion or rhinorrhea.      Mouth/Throat:      Mouth: Mucous membranes are moist.      Pharynx: No oropharyngeal exudate or posterior oropharyngeal erythema.   Eyes:      General: No scleral icterus.        Right eye: No discharge.         Left eye: No discharge.      Extraocular Movements: Extraocular movements intact.      Conjunctiva/sclera: Conjunctivae normal.   Neck:      Vascular: No carotid bruit.   Cardiovascular:      Rate and Rhythm: Normal rate and regular rhythm.      Pulses: Normal pulses.      Heart  sounds: Normal heart sounds. No murmur heard.     No friction rub. No gallop.   Pulmonary:      Effort: Pulmonary effort is normal. No respiratory distress.      Breath sounds: Normal breath sounds. No stridor. No wheezing, rhonchi or rales.   Chest:      Chest wall: No tenderness.   Abdominal:      General: Abdomen is flat. Bowel sounds are normal. There is no distension.      Palpations: There is no mass.      Tenderness: There is no abdominal tenderness. There is no right CVA tenderness, left CVA tenderness or rebound.   Musculoskeletal:         General: Tenderness (Right cubital area when pressure applied. Radiated to 4/5 digit) present. No swelling, deformity or signs of injury. Normal range of motion.      Cervical back: Normal range of motion and neck supple. No tenderness.      Right lower leg: No edema.      Left lower leg: No edema.   Lymphadenopathy:      Cervical: No cervical adenopathy.   Skin:     General: Skin is warm and dry.      Capillary Refill: Capillary refill takes less than 2 seconds.      Coloration: Skin is not jaundiced or pale.      Findings: No bruising, erythema, lesion or rash.   Neurological:      Mental Status: He is alert. Mental status is at baseline.      Motor: No weakness.   Psychiatric:         Mood and Affect: Mood normal.         Behavior: Behavior normal.         Thought Content: Thought content normal.         Judgment: Judgment normal.           Preop labs/testing available and reviewed: yes    eGFR   Date Value Ref Range Status   03/07/2024 66 ml/min/1.73sq m Final             EKG yes    Echo no    Stress test/cath no    PFT/David no    Functional capacity: Shovel snow                          6 Mets   Pick the highest level patient can comfortably perform   4 mets or greater for surgery    RCRI  High Risk surgery?         1 Point  CAD History:         1 Point   MI; Positive Stress Test; CP due to Mi;  Nitrate Usage to control Angina; Pathologic Q wave on EKG  CHF  Active:         1 Point   Pulm Edema; Paroxysmal Nocturnal Dyspnea;  Bibasilar Rales (crackles);S3; CHF on CXR  Cerebrovascular Disease (TIA or CVA):     1 Point  DM on Insulin:        1 Point  Serum Creat >2.0 mg/dl:       1 Point          Total Points: 0     Scorin: Class I, Very Low Risk (0.4%)     1: Class II, Low risk (0.9%)     2: Class III Moderate (6.6%)     3: Class IV High (>11%)      OC Risk:  GFR:   eGFR   Date Value Ref Range Status   2024 66 ml/min/1.73sq m Final         For PCP:  If GFR>60, Hold ACE/ARB/Diuretic on the day of surgery, and NSAIDS 10 days before.    If GFR<45, Consider PRE and POST op Nephrology Consult.    If 46 <GFR> 59 : Has Patient had OC in last 6 Months? yes   If YES: Preop Nephrology consult   If No:  Post Op Nephrology consult.           Assessment/Plan:    Patient is medically optimized (cleared) for the planned procedure.    Further testing/evaluation is not required.    Postop concerns: no    Problem List Items Addressed This Visit    None  Visit Diagnoses       Pre-op examination    -  Primary    Reports he needs an EKG not clearance. Surgical team to handle medications perioperatively    Relevant Orders    POCT ECG (Completed)    Cubital tunnel syndrome on right        Chronic ongoing nerve like pain from pressure on the elbow. Failed conservative measures and not getting cubital tunnel release             Diagnoses and all orders for this visit:    Pre-op examination  Comments:  Reports he needs an EKG not clearance. Surgical team to handle medications perioperatively  Orders:  -     POCT ECG    Cubital tunnel syndrome on right  Comments:  Chronic ongoing nerve like pain from pressure on the elbow. Failed conservative measures and not getting cubital tunnel release            POCT ECG     Date/Time  3/25/2024 1:00 PM     Performed by  Travis Mock PA-C   Authorized by  Travis Mock PA-C     Universal Protocol   Consent: Verbal consent  "obtained. Written consent not obtained.  Risks and benefits: risks, benefits and alternatives were discussed  Consent given by: patient  Time out: Immediately prior to procedure a \"time out\" was called to verify the correct patient, procedure, equipment, support staff and site/side marked as required.  Patient understanding: patient states understanding of the procedure being performed  Patient consent: the patient's understanding of the procedure matches consent given  Procedure consent: procedure consent matches procedure scheduled  Relevant documents: relevant documents present and verified  Test results: test results available and properly labeled  Site marked: the operative site was marked  Radiology Images displayed and confirmed. If images not available, report reviewed: imaging studies available  Patient identity confirmed: verbally with patient      Local anesthesia used: no     Anesthesia   Local anesthesia used: no     Sedation   Patient sedated: no        Specimen: no    Culture: no   Procedure Details   Patient tolerance: patient tolerated the procedure well with no immediate complications            No outpatient medications have been marked as taking for the 3/25/24 encounter (Appointment) with Travis Mock PA-C.        NOTE: Please use the above to review important meds for your specialty, the remainder \"per anesthesia Guidelines.\"    NOTE: Please place an Inbasket message for \"SOC\" pool for complicated patients.     "

## 2024-03-26 LAB
LEFT EYE DIABETIC RETINOPATHY: POSITIVE
RIGHT EYE DIABETIC RETINOPATHY: POSITIVE

## 2024-03-27 ENCOUNTER — OFFICE VISIT (OUTPATIENT)
Dept: ENDOCRINOLOGY | Facility: CLINIC | Age: 74
End: 2024-03-27
Payer: COMMERCIAL

## 2024-03-27 VITALS
HEIGHT: 64 IN | WEIGHT: 193.8 LBS | BODY MASS INDEX: 33.09 KG/M2 | SYSTOLIC BLOOD PRESSURE: 124 MMHG | DIASTOLIC BLOOD PRESSURE: 76 MMHG

## 2024-03-27 DIAGNOSIS — N18.31 TYPE 2 DIABETES MELLITUS WITH STAGE 3A CHRONIC KIDNEY DISEASE, WITHOUT LONG-TERM CURRENT USE OF INSULIN (HCC): Primary | ICD-10-CM

## 2024-03-27 DIAGNOSIS — E11.22 TYPE 2 DIABETES MELLITUS WITH STAGE 3A CHRONIC KIDNEY DISEASE, WITHOUT LONG-TERM CURRENT USE OF INSULIN (HCC): Primary | ICD-10-CM

## 2024-03-27 PROCEDURE — 99214 OFFICE O/P EST MOD 30 MIN: CPT | Performed by: INTERNAL MEDICINE

## 2024-03-27 NOTE — PATIENT INSTRUCTIONS
The night before your procedure:  Take 1/2 glimepiride with dinner  Take the who metformin  Take 8units of Lantus    Do not use Humalog   You would restart your medications once you are eating again.     The competitors to Ozempic: Trulicity and Mounjaro

## 2024-03-27 NOTE — PROGRESS NOTES
Established Patient Progress Note      Chief Complaint   Patient presents with    Diabetes Type 2        History of Present Illness:   Armand Rocha is a 74 y.o. male with a history of type 2 diabetes with long term use of insulin since 1996 seen in f/u.  He was last in the office in 11/2023 with Monse Singleton.   He is got Humalog during a Nov 2023 admission in which he received Prednisone. He continues on a correctional insulin scale as ordere by Dr. De La Garza.      Reports complications of CKD and retinopathy. Denies recent illness or hospitalizations. Denies recent severe hypoglycemic or severe hyperglycemic episodes. Denies any issues with his current regimen. Started insulin in 2022    Less exercise in the winter, with better Bgs in the summer.  Diet has room for improvement  Hx ozempic intolerance (Rash). He started on Ozempic and around the same time had Henoch Schonlein Purpura. It is unclear if the Ozempic was related.   HX SGLT2 inhibitor intolerance ( side effects, yeast infections)     Checks BGs 3-5x daily. Home blood glucose readings:   These will be scanned. He notes the times when he takes Humalog which can be 0-3 times  a day.   Offered CGM today which he declines.     Current regimen:   Lantus 16 units day bedtime.   Glimepiride 4mg with breakfast and dinner  Januvia 100mg daily   Metformin ER 500mg- 2 tabs twice per day  Humalog correction 1:40 beginning at 150 as a correction before meals.    Ophthal: 10/2023 (Dr. Gary)  Pod:  yearly  COVID: received 5     Has hypertension: Taking verapamil  Has hyperlipidemia: Taking atorvastatin 10mg        Patient Active Problem List   Diagnosis    Actinic keratosis    Seborrheic keratosis    History of skin cancer    Anxiety    Erectile dysfunction of non-organic origin    Esophageal reflux    Mixed hyperlipidemia due to type 2 diabetes mellitus     Mitral valve disorder    Rosacea    Sleep apnea    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     Type 2 diabetes mellitus with hyperglycemia, with long-term current use of insulin ([facility])    Primary hypertension    Class 1 obesity due to excess calories with serious comorbidity and body mass index (BMI) of 33.0 to 33.9 in adult      Past Medical History:   Diagnosis Date    Actinic keratosis     Adhesive capsulitis of unspecified shoulder     Anxiety     Asthma     Atopic dermatitis     Cardiac disease     Cellulitis of right upper extremity     Cervical radiculopathy     Chest pain     Chronic maxillary sinusitis     last assessed 01/21/14    Diabetes mellitus ([facility])     Erectile dysfunction of non-organic origin     Esophageal reflux     Gout     last assessed 08/26/15    Hearing loss, conductive     Heart murmur     Mitral Valve disorder    Henoch-Schonlein purpura ([facility]) 08/06/2019    Herpes zoster     History of paroxysmal supraventricular tachycardia     Hypertension     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    Mitral valve disorder     Nasal congestion 04/18/2019    Neoplasm of skin, malignant     Non-melanoma skin cancer     last assessed 10/26/17    Nosebleed 04/18/2019    Obesity     Palpitations     last assessed 03/05/15    Paroxysmal supraventricular tachycardia 01/21/2014    Plantar fasciitis     last assessed 05/28/15    PSVT (paroxysmal supraventricular tachycardia)     Sciatica     Sleep apnea     Squamous cell carcinoma of skin of scalp     Thoracic radiculopathy 10/22/2015    Tinnitus, unspecified ear     Type 2 diabetes mellitus with hyperglycemia ([facility])     last assessed 08/26/15    Umbilical hernia     Worms in stool     last assessed 08/26/15      Past Surgical History:   Procedure Laterality Date    APPENDECTOMY      COLECTOMY      Partial, Sigmoid    COLONOSCOPY      HEAD & NECK SKIN LESION EXCISIONAL BIOPSY      10/11/10 SCC --  10/17/11 SCC  --  Location Scalp    HERNIA REPAIR      NASAL/SINUS ENDOSCOPY N/A 8/6/2019    Procedure: FUNCTIONAL ENDOSCOPIC SINUS SURGERY  (FESS) IMAGED GUIDED for control of epistaxis;  Surgeon: Leonel Bo MD;  Location: [facility];  Service: ENT    CA ESOPHAGOGASTRODUODENOSCOPY TRANSORAL DIAGNOSTIC N/A 2017    Procedure: ESOPHAGOGASTRODUODENOSCOPY (EGD);  Surgeon: Anjel Steven MD;  Location: [facility];  Service: Gastroenterology    SKIN BIOPSY      TONSILLECTOMY        Family History   Problem Relation Age of Onset    Cancer Mother         Breast + Skin    Diabetes Mother     Heart disease Mother     Heart attack Mother     Breast cancer Mother         Adenocarcinoma    Skin cancer Mother         Adenocarcinoma    Heart failure Mother     Diabetes type II Mother     Varicose Veins Mother         Lower Extremities    Cancer Father         Prostate + Skin    Prostate cancer Father         Adenocarcinoma    Skin cancer Father     Lymphoma Father         Non-Hodgkin's    Arthritis Family      Social History     Tobacco Use    Smoking status: Former     Current packs/day: 0.00     Average packs/day: 1 pack/day for 20.0 years (20.0 ttl pk-yrs)     Types: Cigarettes     Start date: 1957     Quit date: 1977     Years since quittin.2    Smokeless tobacco: Never   Substance Use Topics    Alcohol use: Yes     Alcohol/week: 1.0 standard drink of alcohol     Types: 1 Standard drinks or equivalent per week     Comment: only occasionally     Allergies   Allergen Reactions    Iodinated Contrast Media Anaphylaxis    Shellfish Allergy - Food Allergy Anaphylaxis    Shrimp Extract Allergy Skin Test - Food Allergy Anaphylaxis    Empagliflozin Hives    Farxiga [Dapagliflozin] Rash     Yeast infection    Ozempic (0.25 Or 0.5 Mg-Dose) [Semaglutide(0.25 Or 0.5mg-Dos)] Rash         Current Outpatient Medications:     atorvastatin (LIPITOR) 10 mg tablet, TAKE 1 TABLET BY MOUTH EVERY DAY, Disp: 90 tablet, Rfl: 3    Blood Glucose Monitoring Suppl (Accu-Chek Elisa Plus) w/Device KIT, Check blood sugars 3 times daily, Disp: 1 kit, Rfl: 0    Cyanocobalamin  (VITAMIN B-12 PO), Take by mouth daily, Disp: , Rfl:     cyclobenzaprine (FLEXERIL) 5 mg tablet, Take 1 tablet (5 mg total) by mouth 3 (three) times a day as needed for muscle spasms, Disp: 60 tablet, Rfl: 3    EPINEPHrine (EPIPEN) 0.3 mg/0.3 mL SOAJ, Inject 0.3 mL (0.3 mg total) into a muscle once for 1 dose, Disp: 0.6 mL, Rfl: 0    fluticasone (FLONASE) 50 mcg/act nasal spray, 2 sprays into each nostril daily, Disp: 48 mL, Rfl: 3    Fluticasone-Salmeterol (Advair Diskus) 100-50 mcg/dose inhaler, Inhale 1 puff 2 (two) times a day Rinse mouth after use., Disp: 60 blister, Rfl: 0    glimepiride (AMARYL) 4 mg tablet, Take 1 tablet (4 mg total) by mouth 2 (two) times a day, Disp: 180 tablet, Rfl: 3    glucose blood (Accu-Chek Elisa Plus) test strip, Check blood sugar 3 times daily, Disp: 100 strip, Rfl: 3    glucose blood (FREESTYLE LITE) test strip, USE TO TEST FIVE TIMES DAILY (DX: E11.65, on insulin), Disp: 450 strip, Rfl: 3    Insulin Glargine Solostar (Lantus SoloStar) 100 UNIT/ML SOPN, Inject 0.16 mL (16 Units total) under the skin daily, Disp: 15 mL, Rfl: 0    insulin lispro (HumaLOG KwikPen) 100 units/mL injection pen, Blood Glucose 150 - 189: 1 Unit of Insulin Blood Glucose 190 - 229: 2 Units of Insulin Blood Glucose 230 - 269: 3 Units of Insulin Blood Glucose 270 - 309: 4 Units of Insulin Blood Glucose 310 - 349: 5 Units of Insulin Blood Glucose greater than or equal to 350: 6 Units of Insulin Check your blood glucose 3 times a day and before bedtime and administer insulin as explained above, Disp: 15 mL, Rfl: 0    Insulin Pen Needle (BD Pen Needle Audelia 2nd Gen) 32G X 4 MM MISC, USE TO INJECT INSULIN UP TO 4 TIMES A DAY, Disp: 200 each, Rfl: 0    ketoconazole (NIZORAL) 2 % cream, Apply topically 2 (two) times a day Applied to face and scalp twice a day for 3 weeks repeat as needed, Disp: 30 g, Rfl: 0    Lancets (accu-chek multiclix) lancets, Please check Blood sugars 3 times daily, Disp: 102 each, Rfl: 3     "metFORMIN (GLUCOPHAGE-XR) 500 mg 24 hr tablet, Take 2 tablets (1,000 mg total) by mouth 2 (two) times a day with meals, Disp: 360 tablet, Rfl: 0    montelukast (SINGULAIR) 10 mg tablet, TAKE 1 TABLET BY MOUTH EVERYDAY AT BEDTIME, Disp: 90 tablet, Rfl: 3    omeprazole (PriLOSEC) 20 mg delayed release capsule, Take 1 capsule (20 mg total) by mouth daily, Disp: 90 capsule, Rfl: 3    Pyridoxine HCl (VITAMIN B-6 PO), Take 25 mg by mouth daily, Disp: , Rfl:     sildenafil (VIAGRA) 50 MG tablet, Take 1 tablet (50 mg total) by mouth daily as needed for erectile dysfunction, Disp: 6 tablet, Rfl: 5    sitaGLIPtin (Januvia) 100 mg tablet, Take 1 tablet (100 mg total) by mouth daily, Disp: 90 tablet, Rfl: 3    sodium chloride (OCEAN) 0.65 % nasal spray, 1-2 sprays into each nostril daily as needed for congestion, Disp: 90 mL, Rfl: 3    triamcinolone (KENALOG) 0.025 % cream, Apply topically as needed , Disp: , Rfl:     verapamil (Verelan) 180 MG 24 hr capsule, Take 1 capsule (180 mg total) by mouth 2 (two) times a day, Disp: 180 capsule, Rfl: 3    Review of Systems   Constitutional:  Negative for fatigue.   HENT:  Negative for trouble swallowing and voice change.    Gastrointestinal:  Negative for abdominal pain and diarrhea.   Endocrine: Negative for polydipsia and polyuria.   Musculoskeletal:  Negative for myalgias.   Skin:  Negative for rash.   Psychiatric/Behavioral:  Negative for sleep disturbance.        Physical Exam:  Body mass index is 33.27 kg/m².  /76 (BP Location: Left arm, Patient Position: Sitting, Cuff Size: Adult)   Ht 5' 4\" (1.626 m)   Wt 87.9 kg (193 lb 12.8 oz)   BMI 33.27 kg/m²    Wt Readings from Last 3 Encounters:   03/27/24 87.9 kg (193 lb 12.8 oz)   03/11/24 87.5 kg (192 lb 12.8 oz)   02/15/24 87.9 kg (193 lb 12.8 oz)       Physical Exam   Gen: appears well-developed and well-nourished. No apparent distress.   Head: Normocephalic and atraumatic.   Eyes: no stare or proptosis, no periorbital " edema  E/N/M nl facies, hearing grossly intact  Neck: range of motion nl.   Pulmonary/Chest: breathing  comfortably, no accessory muscle use, effort normal.   Musculoskeletal: moves all 4 extremities, gait nl  Neurological: alert and oriented to person, place, and time. No upper ext tremor appreciated  Skin: does not appear diaphoretic, no facial plethora  Psychiatric: normal mood and affect; behavior is normal; no gross lapses in memory, answer questions appropriately          Labs:   Lab Results   Component Value Date    HGBA1C 7.9 (H) 03/07/2024    HGBA1C 8.4 (H) 10/02/2023    HGBA1C 6.9 (H) 07/19/2023     Lab Results   Component Value Date    CREATININE 1.09 03/07/2024    CREATININE 1.09 11/27/2023    CREATININE 1.02 11/26/2023    BUN 23 03/07/2024     08/19/2015    K 3.8 03/07/2024     03/07/2024    CO2 29 03/07/2024     eGFR   Date Value Ref Range Status   03/07/2024 66 ml/min/1.73sq m Final     Lab Results   Component Value Date    CHOL 183 08/19/2015    HDL 45 03/07/2024    TRIG 112 03/07/2024     Lab Results   Component Value Date    ALT 17 03/07/2024    AST 17 03/07/2024    ALKPHOS 46 03/07/2024    BILITOT 0.8 08/19/2015     Lab Results   Component Value Date    XKW0DZGXNWRP 1.206 08/21/2019    XME8EYVTKDLE 1.020 11/09/2017    FFW8XWTMEUWG 0.915 01/15/2016     Lab Results   Component Value Date    FREET4 0.92 08/21/2019       Impression & Plan:      1. Type 2 diabetes mellitus with stage 3a chronic kidney disease, without long-term current use of insulin (HCC)  Basic metabolic panel    Hemoglobin A1C            1. T2DM: He is focused today on his weight. He had Ozempic at the time he had the Henoch Schonlein Purpura, and it is unclear if the two were reated. Discussed alternatives in the class made by Kemi. Also discussed basal bolus insulin regimen in detail including with correctional scale. He would like to continue his current regimen. Instructions provided at his request for the meds  prior to the cubital release scheduled for 4/1/24        Discussed with the patient and all questioned fully answered. He will call me if any problems arise.       Counseled patient on diagnostic results, prognosis, risk and benefit of treatment options, instruction for management, importance of treatment compliance, Risk  factor reduction and impressions    Indiana Smith MD

## 2024-03-29 DIAGNOSIS — Z79.4 CURRENT USE OF INSULIN (HCC): ICD-10-CM

## 2024-03-29 RX ORDER — INSULIN LISPRO 100 [IU]/ML
INJECTION, SOLUTION INTRAVENOUS; SUBCUTANEOUS
Qty: 15 ML | Refills: 2 | Status: SHIPPED | OUTPATIENT
Start: 2024-03-29

## 2024-04-11 DIAGNOSIS — J20.9 ACUTE BRONCHITIS, UNSPECIFIED ORGANISM: ICD-10-CM

## 2024-04-11 RX ORDER — FLUTICASONE PROPIONATE AND SALMETEROL 100; 50 UG/1; UG/1
POWDER RESPIRATORY (INHALATION)
Qty: 180 BLISTER | Refills: 3 | Status: SHIPPED | OUTPATIENT
Start: 2024-04-11

## 2024-04-25 DIAGNOSIS — N18.2 TYPE 2 DIABETES MELLITUS WITH STAGE 2 CHRONIC KIDNEY DISEASE, WITHOUT LONG-TERM CURRENT USE OF INSULIN  (HCC): Chronic | ICD-10-CM

## 2024-04-25 DIAGNOSIS — E11.22 TYPE 2 DIABETES MELLITUS WITH STAGE 2 CHRONIC KIDNEY DISEASE, WITHOUT LONG-TERM CURRENT USE OF INSULIN  (HCC): Chronic | ICD-10-CM

## 2024-04-25 RX ORDER — METFORMIN HYDROCHLORIDE 500 MG/1
1000 TABLET, EXTENDED RELEASE ORAL 2 TIMES DAILY WITH MEALS
Qty: 360 TABLET | Refills: 1 | Status: SHIPPED | OUTPATIENT
Start: 2024-04-25

## 2024-05-21 ENCOUNTER — TELEPHONE (OUTPATIENT)
Dept: OTHER | Facility: OTHER | Age: 74
End: 2024-05-21

## 2024-05-21 DIAGNOSIS — S30.861A TICK BITE OF GROIN, INITIAL ENCOUNTER: Primary | ICD-10-CM
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
## 2024-05-21 RX ORDER — DOXYCYCLINE HYCLATE 100 MG/1
200 CAPSULE ORAL ONCE
Qty: 2 CAPSULE | Refills: 0 | Status: SHIPPED | OUTPATIENT
Start: 2024-05-21 | End: 2024-05-21

## 2024-05-21 NOTE — TELEPHONE ENCOUNTER
Patient is stating that it is in his groin area and there is a red ring and it is very irritated . Still wants doxy

## 2024-05-21 NOTE — TELEPHONE ENCOUNTER
Pt called stating he mowed the grass for 3 hours last night and then found a tiny tick on himself.  He removed the tick but states he was told to call in for a prescription for Doxycycline.  Pt uses [facility] at [phone] and he would like a prescription for Doxycycline to be called in for him.

## 2024-05-29 ENCOUNTER — PREP FOR PROCEDURE (OUTPATIENT)
Age: 74
End: 2024-05-29

## 2024-05-29 ENCOUNTER — OFFICE VISIT (OUTPATIENT)
Age: 74
End: 2024-05-29
Payer: COMMERCIAL

## 2024-05-29 VITALS
HEART RATE: 85 BPM | HEIGHT: 64 IN | BODY MASS INDEX: 32.95 KG/M2 | OXYGEN SATURATION: 94 % | SYSTOLIC BLOOD PRESSURE: 119 MMHG | WEIGHT: 193 LBS | DIASTOLIC BLOOD PRESSURE: 76 MMHG

## 2024-05-29 DIAGNOSIS — Z86.010 HX OF ADENOMATOUS COLONIC POLYPS: Primary | ICD-10-CM

## 2024-05-29 DIAGNOSIS — E78.2 MIXED HYPERLIPIDEMIA DUE TO TYPE 2 DIABETES MELLITUS  (HCC): Chronic | ICD-10-CM

## 2024-05-29 DIAGNOSIS — E11.65 TYPE 2 DIABETES MELLITUS WITH HYPERGLYCEMIA, WITH LONG-TERM CURRENT USE OF INSULIN (HCC): Chronic | ICD-10-CM

## 2024-05-29 DIAGNOSIS — G47.30 SLEEP APNEA, UNSPECIFIED TYPE: ICD-10-CM

## 2024-05-29 DIAGNOSIS — Z12.11 SCREENING FOR COLON CANCER: Primary | ICD-10-CM

## 2024-05-29 DIAGNOSIS — I10 PRIMARY HYPERTENSION: Chronic | ICD-10-CM

## 2024-05-29 DIAGNOSIS — F41.9 ANXIETY: ICD-10-CM

## 2024-05-29 DIAGNOSIS — Z79.4 TYPE 2 DIABETES MELLITUS WITH HYPERGLYCEMIA, WITH LONG-TERM CURRENT USE OF INSULIN (HCC): Chronic | ICD-10-CM

## 2024-05-29 DIAGNOSIS — E11.69 MIXED HYPERLIPIDEMIA DUE TO TYPE 2 DIABETES MELLITUS  (HCC): Chronic | ICD-10-CM

## 2024-05-29 PROCEDURE — 99202 OFFICE O/P NEW SF 15 MIN: CPT | Performed by: COLON & RECTAL SURGERY

## 2024-05-29 NOTE — H&P (VIEW-ONLY)
"Ambulatory Visit  Name: Armand Rocha      : 1950      MRN: [id number]  Encounter Provider: Trey Payton MD  Encounter Date: 2024   Encounter department: [facility] [place]    Assessment & Plan   1. Hx of adenomatous colonic polyps  -     Colonoscopy; Future; Expected date: 2024  2. Primary hypertension  3. Sleep apnea, unspecified type  4. Mixed hyperlipidemia due to type 2 diabetes mellitus  (HCC)  5. Type 2 diabetes mellitus with hyperglycemia, with long-term current use of insulin (HCC)  6. Anxiety      History of Present Illness     Armand Rocha is a 74 y.o. male who presents patient is a 74-year-old man with history of colon and rectal polyps adenomatous due for surveillance colonoscopy.  Patient's last colonoscopic evaluation was     Review of Systems   Constitutional:  Negative for chills and fever.   HENT:  Negative for ear pain and sore throat.    Eyes:  Negative for pain and visual disturbance.   Respiratory:  Negative for cough and shortness of breath.    Cardiovascular:  Negative for chest pain and palpitations.   Gastrointestinal:  Negative for abdominal pain and vomiting.   Genitourinary:  Negative for dysuria and hematuria.   Musculoskeletal:  Negative for arthralgias and back pain.   Skin:  Negative for color change and rash.   Neurological:  Negative for seizures and syncope.   All other systems reviewed and are negative.    Medical History Reviewed by provider this encounter:  Tobacco  Allergies  Meds  Problems  Med Hx  Surg Hx  Fam Hx       Objective     /76   Pulse 85   Ht 5' 4\" (1.626 m)   Wt 87.5 kg (193 lb)   SpO2 94%   BMI 33.13 kg/m²     Physical Exam  Vitals and nursing note reviewed.   Constitutional:       Appearance: Normal appearance. He is obese.   HENT:      Head: Normocephalic and atraumatic.   Eyes:      Conjunctiva/sclera: Conjunctivae normal.   Cardiovascular:      Rate and Rhythm: Normal " rate and regular rhythm.      Heart sounds: Normal heart sounds.   Pulmonary:      Effort: Pulmonary effort is normal.      Breath sounds: Normal breath sounds.   Abdominal:      General: Bowel sounds are normal.      Palpations: Abdomen is soft.   Musculoskeletal:      Cervical back: Neck supple.   Skin:     General: Skin is warm and dry.   Neurological:      Mental Status: He is alert and oriented to person, place, and time.   Psychiatric:         Mood and Affect: Mood normal.         Behavior: Behavior normal.         Thought Content: Thought content normal.         Judgment: Judgment normal.       Administrative Statements

## 2024-05-29 NOTE — PATIENT INSTRUCTIONS
Scheduled date of colonoscopy (as of today): 6/20/24  Physician performing colonoscopy: Tata  Location of colonoscopy: [place]  Bowel prep reviewed with patient: 2 day Heena  Instructions reviewed with patient by: Jackie JACKMAN  Clearances:

## 2024-05-29 NOTE — PROGRESS NOTES
"Ambulatory Visit  Name: Armand Rocha      : 1950      MRN: [id number]  Encounter Provider: Trey Payton MD  Encounter Date: 2024   Encounter department: [facility] [place]    Assessment & Plan   1. Hx of adenomatous colonic polyps  -     Colonoscopy; Future; Expected date: 2024  2. Primary hypertension  3. Sleep apnea, unspecified type  4. Mixed hyperlipidemia due to type 2 diabetes mellitus  (HCC)  5. Type 2 diabetes mellitus with hyperglycemia, with long-term current use of insulin (HCC)  6. Anxiety      History of Present Illness     Armand Rocha is a 74 y.o. male who presents patient is a 74-year-old man with history of colon and rectal polyps adenomatous due for surveillance colonoscopy.  Patient's last colonoscopic evaluation was     Review of Systems   Constitutional:  Negative for chills and fever.   HENT:  Negative for ear pain and sore throat.    Eyes:  Negative for pain and visual disturbance.   Respiratory:  Negative for cough and shortness of breath.    Cardiovascular:  Negative for chest pain and palpitations.   Gastrointestinal:  Negative for abdominal pain and vomiting.   Genitourinary:  Negative for dysuria and hematuria.   Musculoskeletal:  Negative for arthralgias and back pain.   Skin:  Negative for color change and rash.   Neurological:  Negative for seizures and syncope.   All other systems reviewed and are negative.    Medical History Reviewed by provider this encounter:  Tobacco  Allergies  Meds  Problems  Med Hx  Surg Hx  Fam Hx       Objective     /76   Pulse 85   Ht 5' 4\" (1.626 m)   Wt 87.5 kg (193 lb)   SpO2 94%   BMI 33.13 kg/m²     Physical Exam  Vitals and nursing note reviewed.   Constitutional:       Appearance: Normal appearance. He is obese.   HENT:      Head: Normocephalic and atraumatic.   Eyes:      Conjunctiva/sclera: Conjunctivae normal.   Cardiovascular:      Rate and Rhythm: Normal " rate and regular rhythm.      Heart sounds: Normal heart sounds.   Pulmonary:      Effort: Pulmonary effort is normal.      Breath sounds: Normal breath sounds.   Abdominal:      General: Bowel sounds are normal.      Palpations: Abdomen is soft.   Musculoskeletal:      Cervical back: Neck supple.   Skin:     General: Skin is warm and dry.   Neurological:      Mental Status: He is alert and oriented to person, place, and time.   Psychiatric:         Mood and Affect: Mood normal.         Behavior: Behavior normal.         Thought Content: Thought content normal.         Judgment: Judgment normal.       Administrative Statements

## 2024-06-10 ENCOUNTER — APPOINTMENT (OUTPATIENT)
Age: 74
End: 2024-06-10
Payer: COMMERCIAL

## 2024-06-10 DIAGNOSIS — R04.0 RECURRENT EPISTAXIS: ICD-10-CM

## 2024-06-10 LAB
BASOPHILS # BLD AUTO: 0.03 THOUSANDS/ÂΜL (ref 0–0.1)
BASOPHILS NFR BLD AUTO: 0 % (ref 0–1)
EOSINOPHIL # BLD AUTO: 0.39 THOUSAND/ÂΜL (ref 0–0.61)
EOSINOPHIL NFR BLD AUTO: 4 % (ref 0–6)
ERYTHROCYTE [DISTWIDTH] IN BLOOD BY AUTOMATED COUNT: 12.6 % (ref 11.6–15.1)
HCT VFR BLD AUTO: 43.3 % (ref 36.5–49.3)
HGB BLD-MCNC: 14.5 G/DL (ref 12–17)
IMM GRANULOCYTES # BLD AUTO: 0.04 THOUSAND/UL (ref 0–0.2)
IMM GRANULOCYTES NFR BLD AUTO: 0 % (ref 0–2)
LYMPHOCYTES # BLD AUTO: 2.12 THOUSANDS/ÂΜL (ref 0.6–4.47)
LYMPHOCYTES NFR BLD AUTO: 22 % (ref 14–44)
MCH RBC QN AUTO: 29.9 PG (ref 26.8–34.3)
MCHC RBC AUTO-ENTMCNC: 33.5 G/DL (ref 31.4–37.4)
MCV RBC AUTO: 89 FL (ref 82–98)
MONOCYTES # BLD AUTO: 0.73 THOUSAND/ÂΜL (ref 0.17–1.22)
MONOCYTES NFR BLD AUTO: 8 % (ref 4–12)
NEUTROPHILS # BLD AUTO: 6.42 THOUSANDS/ÂΜL (ref 1.85–7.62)
NEUTS SEG NFR BLD AUTO: 66 % (ref 43–75)
NRBC BLD AUTO-RTO: 0 /100 WBCS
PLATELET # BLD AUTO: 238 THOUSANDS/UL (ref 149–390)
PMV BLD AUTO: 11.6 FL (ref 8.9–12.7)
RBC # BLD AUTO: 4.85 MILLION/UL (ref 3.88–5.62)
WBC # BLD AUTO: 9.73 THOUSAND/UL (ref 4.31–10.16)

## 2024-06-10 PROCEDURE — 85610 PROTHROMBIN TIME: CPT

## 2024-06-10 PROCEDURE — 36415 COLL VENOUS BLD VENIPUNCTURE: CPT

## 2024-06-10 PROCEDURE — 85025 COMPLETE CBC W/AUTO DIFF WBC: CPT

## 2024-06-10 PROCEDURE — 85730 THROMBOPLASTIN TIME PARTIAL: CPT

## 2024-06-11 LAB
APTT PPP: 28 SECONDS (ref 23–37)
INR PPP: 1.07 (ref 0.84–1.19)
PROTHROMBIN TIME: 13.8 SECONDS (ref 11.6–14.5)

## 2024-06-16 DIAGNOSIS — N18.31 TYPE 2 DIABETES MELLITUS WITH STAGE 3A CHRONIC KIDNEY DISEASE, WITHOUT LONG-TERM CURRENT USE OF INSULIN (HCC): Chronic | ICD-10-CM

## 2024-06-16 DIAGNOSIS — E11.22 TYPE 2 DIABETES MELLITUS WITH STAGE 3A CHRONIC KIDNEY DISEASE, WITHOUT LONG-TERM CURRENT USE OF INSULIN (HCC): Chronic | ICD-10-CM

## 2024-06-16 RX ORDER — INSULIN GLARGINE 100 [IU]/ML
INJECTION, SOLUTION SUBCUTANEOUS
Qty: 15 ML | Refills: 5 | Status: SHIPPED | OUTPATIENT
Start: 2024-06-16

## 2024-06-18 ENCOUNTER — OFFICE VISIT (OUTPATIENT)
Age: 74
End: 2024-06-18
Payer: COMMERCIAL

## 2024-06-18 VITALS — TEMPERATURE: 97.8 F | WEIGHT: 193 LBS | HEIGHT: 64 IN | BODY MASS INDEX: 32.95 KG/M2

## 2024-06-18 DIAGNOSIS — L82.1 SEBORRHEIC KERATOSIS: ICD-10-CM

## 2024-06-18 DIAGNOSIS — D18.01 CHERRY ANGIOMA: ICD-10-CM

## 2024-06-18 DIAGNOSIS — L98.9 UNKNOWN SKIN LESION: ICD-10-CM

## 2024-06-18 DIAGNOSIS — C44.41 BASAL CELL CARCINOMA OF RIGHT SIDE OF NECK: Primary | ICD-10-CM

## 2024-06-18 DIAGNOSIS — Z13.89 SCREENING FOR SKIN CONDITION: ICD-10-CM

## 2024-06-18 DIAGNOSIS — Z85.828 HISTORY OF SKIN CANCER: ICD-10-CM

## 2024-06-18 DIAGNOSIS — D22.9 NEVUS: ICD-10-CM

## 2024-06-18 PROCEDURE — 17003 DESTRUCT PREMALG LES 2-14: CPT | Performed by: DERMATOLOGY

## 2024-06-18 PROCEDURE — 11102 TANGNTL BX SKIN SINGLE LES: CPT | Performed by: DERMATOLOGY

## 2024-06-18 PROCEDURE — 99214 OFFICE O/P EST MOD 30 MIN: CPT | Performed by: DERMATOLOGY

## 2024-06-18 PROCEDURE — 88305 TISSUE EXAM BY PATHOLOGIST: CPT | Performed by: STUDENT IN AN ORGANIZED HEALTH CARE EDUCATION/TRAINING PROGRAM

## 2024-06-18 PROCEDURE — 17000 DESTRUCT PREMALG LESION: CPT | Performed by: DERMATOLOGY

## 2024-06-18 NOTE — PROGRESS NOTES
"[facility] Dermatology Clinic Note     [facility]  Encounter Date: 06/18/2024     Have you been cared for by a [facility] Dermatologist in the last 3 years and, if so, which description applies to you?    Yes.  I have been here within the last 3 years, and my medical history has NOT changed since that time.  I am MALE/not capable of bearing children.    REVIEW OF SYSTEMS:  Have you recently had or currently have any of the following? No changes in my recent health.   PAST MEDICAL HISTORY:  Have you personally ever had or currently have any of the following?  If \"YES,\" then please provide more detail. No changes in my medical history.   HISTORY OF IMMUNOSUPPRESSION: Do you have a history of any of the following:  Systemic Immunosuppression such as Diabetes, Biologic or Immunotherapy, Chemotherapy, Organ Transplantation, Bone Marrow Transplantation?  YES, diabetes      Answering \"YES\" requires the addition of the dotphrase \"IMMUNOSUPPRESSED\" as the first diagnosis of the patient's visit.   FAMILY HISTORY:  Any \"first degree relatives\" (parent, brother, sister, or child) with the following?    No changes in my family's known health.   PATIENT EXPERIENCE:    Do you want the Dermatologist to perform a COMPLETE skin exam today including a clinical examination under the \"bra and underwear\" areas?  Yes  If necessary, do we have your permission to call and leave a detailed message on your Preferred Phone number that includes your specific medical information?  Yes      Allergies   Allergen Reactions    Iodinated Contrast Media Anaphylaxis    Shellfish Allergy - Food Allergy Anaphylaxis    Shrimp Extract Allergy Skin Test - Food Allergy Anaphylaxis    Empagliflozin Hives    Farxiga [Dapagliflozin] Rash     Yeast infection    Ozempic (0.25 Or 0.5 Mg-Dose) [Semaglutide(0.25 Or 0.5mg-Dos)] Rash      Current Outpatient Medications:     atorvastatin (LIPITOR) 10 mg tablet, TAKE 1 TABLET BY MOUTH EVERY DAY " (Patient taking differently: Take 10 mg by mouth daily), Disp: 90 tablet, Rfl: 3    Blood Glucose Monitoring Suppl (Accu-Chek Elisa Plus) w/Device KIT, Check blood sugars 3 times daily, Disp: 1 kit, Rfl: 0    Cyanocobalamin (VITAMIN B-12 PO), Take by mouth daily, Disp: , Rfl:     cyclobenzaprine (FLEXERIL) 5 mg tablet, Take 1 tablet (5 mg total) by mouth 3 (three) times a day as needed for muscle spasms, Disp: 60 tablet, Rfl: 3    EPINEPHrine (EPIPEN) 0.3 mg/0.3 mL SOAJ, Inject 0.3 mL (0.3 mg total) into a muscle once for 1 dose, Disp: 0.6 mL, Rfl: 0    fluticasone (FLONASE) 50 mcg/act nasal spray, 2 sprays into each nostril daily (Patient not taking: Reported on 6/12/2024), Disp: 48 mL, Rfl: 3    Fluticasone-Salmeterol (Wixela Inhub) 100-50 mcg/dose inhaler, INHALE 1 PUFF 2 TIMES A DAY RINSE MOUTH AFTER USE. (Patient taking differently: 1 puff if needed), Disp: 180 blister, Rfl: 3    glimepiride (AMARYL) 4 mg tablet, Take 1 tablet (4 mg total) by mouth 2 (two) times a day, Disp: 180 tablet, Rfl: 3    glucose blood (Accu-Chek Elisa Plus) test strip, Check blood sugar 3 times daily, Disp: 100 strip, Rfl: 3    glucose blood (FREESTYLE LITE) test strip, USE TO TEST FIVE TIMES DAILY (DX: E11.65, on insulin), Disp: 450 strip, Rfl: 3    Insulin Glargine Solostar (Lantus SoloStar) 100 UNIT/ML SOPN, INJECT 0.16 ML (16 UNITS TOTAL) UNDER THE SKIN DAILY, Disp: 15 mL, Rfl: 5    insulin lispro (HumaLOG) 100 units/mL injection pen, BLOOD GLUCOSE 150 - 189: 1 UNIT OF INSULIN BLOOD GLUCOSE 190 - 229: 2 UNITS OF INSULIN BLOOD GLUCOSE 230 - 269: 3 UNITS OF INSULIN BLOOD GLUCOSE 270 - 309: 4 UNITS OF INSULIN BLOOD GLUCOSE 310 - 349: 5 UNITS OF INSULIN BLOOD GLUCOSE GREATER THAN OR EQUAL : 6 UNITS OF INSULIN CHECK YOUR BLOOD GLUCOSE 3 TIMES A DAY AND BEFORE BEDTIME AND ADMINISTER INSULIN AS EXPLAINED ABOVE, Disp: 15 mL, Rfl: 2    Insulin Pen Needle (BD Pen Needle Audelia 2nd Gen) 32G X 4 MM MISC, USE TO INJECT INSULIN UP TO 4 TIMES  A DAY, Disp: 200 each, Rfl: 0    ketoconazole (NIZORAL) 2 % cream, Apply topically 2 (two) times a day Applied to face and scalp twice a day for 3 weeks repeat as needed, Disp: 30 g, Rfl: 0    Lancets (accu-chek multiclix) lancets, Please check Blood sugars 3 times daily, Disp: 102 each, Rfl: 3    metFORMIN (GLUCOPHAGE-XR) 500 mg 24 hr tablet, TAKE 2 TABLETS BY MOUTH TWICE A DAY WITH FOOD, Disp: 360 tablet, Rfl: 1    montelukast (SINGULAIR) 10 mg tablet, TAKE 1 TABLET BY MOUTH EVERYDAY AT BEDTIME, Disp: 90 tablet, Rfl: 3    omeprazole (PriLOSEC) 20 mg delayed release capsule, Take 1 capsule (20 mg total) by mouth daily, Disp: 90 capsule, Rfl: 3    polyethylene glycol (GOLYTELY) 4000 mL solution, Take 4,000 mL by mouth once for 1 dose, Disp: 4000 mL, Rfl: 0    Pyridoxine HCl (VITAMIN B-6 PO), Take 25 mg by mouth daily, Disp: , Rfl:     sildenafil (VIAGRA) 50 MG tablet, Take 1 tablet (50 mg total) by mouth daily as needed for erectile dysfunction, Disp: 6 tablet, Rfl: 5    sitaGLIPtin (Januvia) 100 mg tablet, Take 1 tablet (100 mg total) by mouth daily, Disp: 90 tablet, Rfl: 3    sodium chloride (OCEAN) 0.65 % nasal spray, 1-2 sprays into each nostril daily as needed for congestion, Disp: 90 mL, Rfl: 3    triamcinolone (KENALOG) 0.025 % cream, Apply topically as needed , Disp: , Rfl:     verapamil (Verelan) 180 MG 24 hr capsule, Take 1 capsule (180 mg total) by mouth 2 (two) times a day, Disp: 180 capsule, Rfl: 3          Whom besides the patient is providing clinical information about today's encounter?   NO ADDITIONAL HISTORIAN (patient alone provided history)    74 year old male with history of skin cancer present for six months follow up skin exam.     Physical Exam and Assessment/Plan by Diagnosis:    HISTORY OF SQUAMOUS CELL CARCINOMA      Physical Exam:  Anatomic Location Affected:  frontal scalp 08/2022   Morphological Description of Scar:  well healed   Suspected Recurrence: no  Regional adenopathy: no      Additional History of Present Condition:  present on exam      Assessment and Plan:  Based on a thorough discussion of this condition and the management approach to it (including a comprehensive discussion of the known risks, side effects and potential benefits of treatment), the patient (family) agrees to implement the following specific plan:  Continue monitor   When outside we recommend using a wide brim hat, sunglasses, long sleeve and pants, sunscreen with SPF 30+ with reapplication every 2 hours, or SPF specific clothing        ACTINIC KERATOSIS    Physical Exam:  Anatomic Location: face, left ear and scalp  Morphologic Description: Scaly pink papules  Physical Exam  HENT:      Head:           Plan:  Cryotherapy performed in the office (See Procedure Note). We discussed that a hypopigmentation or scar may form in the region following cryotherapy.  We discussed the pre-cancerous nature of the condition. Actinic keratosis is found on sun-damaged skin and there is small risk that the condition could turn into a skin cancer called squamous cell carcinoma. There is no risk of actinic keratosis turning into melanoma.  We discussed sun protection measures, including using sunscreen with an SPF 50+ year round, avoiding the sun, and wearing protective clothing such as long sleeves and pants when out in the sun.  Continue to monitor clinically for signs of recurrence. Discussed with patient the importance of keeping up to date with full body skin exams.     PROCEDURES PERFORMED TODAY ASSOCIATED WITH THIS CONDITION:          Cryotherapy: PROCEDURE:  DESTRUCTION OF PRE-MALIGNANT LESIONS  After a thorough discussion of treatment options and risk/benefits/alternatives (including but not limited to local pain, scarring, dyspigmentation, blistering, and possible superinfection), verbal and written consent were obtained and the aforementioned lesions were treated on with cryotherapy using liquid nitrogen x 1 cycle for 5-10  "seconds.    TOTAL NUMBER of 8 pre-malignant lesions were treated today on the ANATOMIC LOCATION: face, left ear and scalp.     The patient tolerated the procedure well, and after-care instructions were provided.           MELANOCYTIC NEVI (\"Moles\")     Physical Exam:  Anatomic Location Affected: Mostly on sun-exposed areas of the body.  Morphological Description:  Scattered, 1-4mm round to ovoid, symmetrical-appearing, even bordered, skin colored to dark brown macules/papules, mostly in sun-exposed areas     Additional History of Present Condition:  present on exam      Assessment and Plan:  Based on a thorough discussion of this condition and the management approach to it (including a comprehensive discussion of the known risks, side effects and potential benefits of treatment), the patient (family) agrees to implement the following specific plan:  Provided handout with information regarding the ABCDE's of moles   Recommend routine skin exams every year.   Sun avoidance, protective clothing (known as UPF clothing), and the use of at least SPF 30 sunscreens is advised. Sunscreen should be reapplied every two hours when outside.        SEBORRHEIC KERATOSIS; NON-INFLAMED     Physical Exam:  Anatomic Location Affected:  scattered across trunk, extremities,  face  Morphological Description:  Flat and raised, waxy, smooth to warty textured, yellow to brownish-grey to dark brown to blackish, discrete, \"stuck-on\" appearing papules.     Additional History of Present Condition:  Patient reports new bumps on the skin.  Denies itch, burn, pain, bleeding or ulceration.  Present constantly; nothing seems to make it worse or better.  No prior treatment.       Assessment and Plan:  Based on a thorough discussion of this condition and the management approach to it (including a comprehensive discussion of the known risks, side effects and potential benefits of treatment), the patient (family) agrees to implement the following " "specific plan:  Reassured benign          ANGIOMA (\"CHERRY ANGIOMA\")    Physical Exam:  Anatomic Location: scattered across sun exposed areas of the trunk and extremities   Morphologic Description: Firm red to reddish-blue discrete papules     Additional History of Present Condition:  Present on exam.      Assessment and Plan:  Reassured benign    NEOPLASM OF UNCERTAIN BEHAVIOR OF SKIN    Physical Exam:  (Anatomic Location); (Size and Morphological Description); (Differential Diagnosis):  Specimen A; right postauricular scalp, 7 mm pearly nodule, rule out basal cell carcinoma.      Additional History of Present Condition:  present on exam.     Assessment and Plan:  I have discussed with the patient that a sample of skin via a \"skin biopsy” would be potentially helpful to further make a specific diagnosis under the microscope.  Based on a thorough discussion of this condition and the management approach to it (including a comprehensive discussion of the known risks, side effects and potential benefits of treatment), the patient (family) agrees to implement the following specific plan:    Procedure:  Skin Biopsy.  After a thorough discussion of treatment options and risk/benefits/alternatives (including but not limited to local pain, scarring, dyspigmentation, blistering, possible superinfection, and inability to confirm a diagnosis via histopathology), verbal and written consent were obtained and portion of the rash was biopsied for tissue sample.  See below for consent that was obtained from patient and subsequent Procedure Note.          PROCEDURE TANGENTIAL (SHAVE) BIOPSY NOTE:    Performing Physician:   Anatomic Location; Clinical Description with size (cm); Pre-Op Diagnosis:   Specimen A; right postauricular scalp, 7 mm pearly nodule, rule out basal cell carcinoma.    Post-op diagnosis: Same     Local anesthesia: 3:1 1% xylocaine with epi and 1-100,000 buffered     Topical anesthesia: " "None    Hemostasis: Aluminum chloride       After obtaining informed consent  at which time there was a discussion about the purpose of biopsy  and low risks of infection and bleeding.  The area was prepped and draped in the usual fashion. Anesthesia was obtained with 1% lidocaine with epinephrine. A shave biopsy to an appropriate sampling depth was obtained by Shave (Dermablade or 15 blade) The resulting wound was covered with surgical ointment and bandaged appropriately.     The patient tolerated the procedure well without complications and was without signs of functional compromise.      Specimen has been sent for review by Dermatopathology.    Standard post-procedure care has been explained and has been included in written form within the patient's copy of Informed Consent.    INFORMED CONSENT DISCUSSION AND POST-OPERATIVE INSTRUCTIONS FOR PATIENT    I.  RATIONALE FOR PROCEDURE  I understand that a skin biopsy allows the Dermatologist to test a lesion or rash under the microscope to obtain a diagnosis.  It usually involves numbing the area with numbing medication and removing a small piece of skin; sometimes the area will be closed with sutures. In this specific procedure, sutures are not usually needed.  If any sutures are placed, then they are usually need to be removed in 2 weeks or less.    I understand that my Dermatologist recommends that a skin \"shave\" biopsy be performed today.  A local anesthetic, similar to the kind that a dentist uses when filling a cavity, will be injected with a very small needle into the skin area to be sampled.  The injected skin and tissue underneath \"will go to sleep” and become numb so no pain should be felt afterwards.  An instrument shaped like a tiny \"razor blade\" (shave biopsy instrument) will be used to cut a small piece of tissue and skin from the area so that a sample of tissue can be taken and examined more closely under the microscope.  A slight amount of bleeding will " "occur, but it will be stopped with direct pressure and a pressure bandage and any other appropriate methods.  I understands that a scar will form where the wound was created.  Surgical ointment will be applied to help protect the wound.  Sutures are not usually needed.    II.  RISKS AND POTENTIAL COMPLICATIONS   I understand the risks and potential complications of a skin biopsy include but are not limited to the following:  Bleeding  Infection  Pain  Scar/keloid  Skin discoloration  Incomplete Removal  Recurrence  Nerve Damage/Numbness/Loss of Function  Allergic Reaction to Anesthesia  Biopsies are diagnostic procedures and based on findings additional treatment or evaluation may be required  Loss or destruction of specimen resulting in no additional findings    My Dermatologist has explained to me the nature of the condition, the nature of the procedure, and the benefits to be reasonably expected compared with alternative approaches.  My Dermatologist has discussed the likelihood of major risks or complications of this procedure including the specific risks listed above, such as bleeding, infection, and scarring/keloid.  I understand that a scar is expected after this procedure.  I understand that my physician cannot predict if the scar will form a \"keloid,\" which extends beyond the borders of the wound that is created.  A keloid is a thick, painful, and bumpy scar.  A keloid can be difficult to treat, as it does not always respond well to therapy, which includes injecting cortisone directly into the keloid every few weeks.  While this usually reduces the pain and size of the scar, it does not eliminate it.      I understand that photographs may be taken before and after the procedure.  These will be maintained as part of the medical providers confidential records and may not be made available to me.  I further authorize the medical provider to use the photographs for teaching purposes or to illustrate scientific " "papers, books, or lectures if in his/her judgment, medical research, education, or science may benefit from its use.    I have had an opportunity to fully inquire about the risks and benefits of this procedure and its alternatives.   I have been given ample time and opportunity to ask questions and to seek a second opinion if I wished to do so.  I acknowledge that there have specifically been no guarantees as to the cosmetic results from the procedure.  I am aware that with any procedure there is always the possibility of an unexpected complication.    III. POST-PROCEDURAL CARE (WHAT YOU WILL NEED TO DO \"AFTER THE BIOPSY\" TO OPTIMIZE HEALING)    Keep the area clean and dry.  Try NOT to remove the bandage or get it wet for the first 24 hours.    Gently clean the area and apply surgical ointment (such as Vaseline petrolatum ointment, which is available \"over the counter\" and not a prescription) to the biopsy site for up to 2 weeks straight.  This acts to protect the wound from the outside world.      Sutures are not usually placed in this procedure.  If any sutures were placed, return for suture removal as instructed (generally 1 week for the face, 2 weeks for the body).      Take Acetaminophen (Tylenol) for discomfort, if no contraindications.  Ibuprofen or aspirin could make bleeding worse.    Call our office immediately for signs of infection: fever, chills, increased redness, warmth, tenderness, discomfort/pain, or pus or foul smell coming from the wound.    WHAT TO DO IF THERE IS ANY BLEEDING?  If a small amount of bleeding is noticed, place a clean cloth over the area and apply firm pressure for ten minutes.  Check the wound after 10 minutes of direct pressure.  If bleeding persists, try one more time for an additional 10 minutes of direct pressure on the area.  If the bleeding becomes heavier or does not stop after the second attempt, or if you have any other questions about this procedure, then please call " your [facility] Dermatologist by calling [phone] (SKIN).     I hereby acknowledge that I have reviewed and verified the site with my Dermatologist and have requested and authorized my Dermatologist to proceed with the procedure.     Scribe Attestation      I,:  Ju Mann am acting as a scribe while in the presence of the attending physician.:       I,:  Nikita Martin MD personally performed the services described in this documentation    as scribed in my presence.:

## 2024-06-18 NOTE — PATIENT INSTRUCTIONS
"MELANOCYTIC NEVI (\"Moles\")    Melanocytic nevi (\"moles\") are tan or brown, raised or flat areas of the skin which have an increased number of melanocytes. Melanocytes are the cells in our body which make pigment and account for skin color.    Some moles are present at birth (I.e., \"congenital nevi\"), while others come up later in life (i.e., \"acquired nevi\").  The sun can stimulate the body to make more moles.  Sunburns are not the only thing that triggers more moles.  Chronic sun exposure can do it too.     Clinically distinguishing a healthy mole from melanoma may be difficult, even for experienced dermatologists. The \"ABCDE's\" of moles have been suggested as a means of helping to alert a person to a suspicious mole and the possible increased risk of melanoma.  The suggestions for raising alert are as follows:    Asymmetry: Healthy moles tend to be symmetric, while melanomas are often asymmetric.  Asymmetry means if you draw a line through the mole, the two halves do not match in color, size, shape, or surface texture. Asymmetry can be a result of rapid enlargement of a mole, the development of a raised area on a previously flat lesion, scaling, ulceration, bleeding or scabbing within the mole.  Any mole that starts to demonstrate \"asymmetry\" should be examined promptly by a board certified dermatologist.     Border: Healthy moles tend to have discrete, even borders.  The border of a melanoma often blends into the normal skin and does not sharply delineate the mole from normal skin.  Any mole that starts to demonstrate \"uneven borders\" should be examined promptly by a board certified dermatologist.     Color: Healthy moles tend to be one color throughout.  Melanomas tend to be made up of different colors ranging from dark black, blue, white, or red.  Any mole that demonstrates a color change should be examined promptly by a board certified dermatologist.     Diameter: Healthy moles tend to be smaller than 0.6 cm " "in size; an exception are \"congenital nevi\" that can be larger.  Melanomas tend to grow and can often be greater than 0.6 cm (1/4 of an inch, or the size of a pencil eraser). This is only a guideline, and many normal moles may be larger than 0.6 cm without being unhealthy.  Any mole that starts to change in size (small to bigger or bigger to smaller) should be examined promptly by a board certified dermatologist.     Evolving: Healthy moles tend to \"stay the same.\"  Melanomas may often show signs of change or evolution such as a change in size, shape, color, or elevation.  Any mole that starts to itch, bleed, crust, burn, hurt, or ulcerate or demonstrate a change or evolution should be examined promptly by a board certified dermatologist.      Dysplastic Nevi  Dysplastic moles are moles that fit the ABCDE rules of melanoma but are not identified as melanomas when examined under the microscope.  They may indicate an increased risk of melanoma in that person. If there is a family history of melanoma, most experts agree that the person may be at an increased risk for developing a melanoma.  Experts still do not agree on what dysplastic moles mean in patients without a personal or family history of melanoma.  Dysplastic moles are usually larger than common moles and have different colors within it with irregular borders. The appearance can be very similar to a melanoma. Biopsies of dysplastic moles may show abnormalities which are different from a regular mole.      Melanoma  Malignant melanoma is a type of skin cancer that can be deadly if it spreads throughout the body. The incidence of melanoma in the [place] is growing faster than any other cancer. Melanoma usually grows near the surface of the skin for a period of time, and then begins to grow deeper into the skin. Once it grows deeper into the skin, the risk of spread to other organs greatly increases. Therefore, early detection and removal of a malignant " "melanoma may result in a better chance at a complete cure; removal after the tumor has spread may not be as effective, leading to worse clinical outcomes such as death.    The true rate of nevus transformation into a melanoma is unknown. It has been estimated that the lifetime risk for any acquired melanocytic nevus on any 20-year-old individual transforming into melanoma by age 80 is 0.03% (1 in 3,164) for men and 0.009% (1 in 10,800) for women.     The appearance of a \"new mole\" remains one of the most reliable methods for identifying a malignant melanoma.  Occasionally, melanomas appear as rapidly growing, blue-black, dome-shaped bumps within a previous mole or previous area of normal skin.  Other times, melanomas are suspected when a mole suddenly appears or changes. Itching, burning, or pain in a pigmented lesion should increase suspicion, but most patients with early melanoma have no skin discomfort whatsoever.  Melanoma can occur anywhere on the skin, including areas that are difficult for self-examination. Many melanomas are first noticed by other family members.  Suspicious-looking moles may be removed for microscopic examination.       You may be able to prevent death from melanoma by doing two simple things:    Try to avoid unnecessary sun exposure and protect your skin when it is exposed to the sun.  People who live near the equator, people who have intermittent exposures to large amounts of sun, and people who have had sunburns in childhood or adolescence have an increased risk for melanoma. Sun sense and vigilant sun protection may be keys to helping to prevent melanoma.  We recommend wearing UPF-rated sun protective clothing and sunglasses whenever possible and applying a moisturizer-sunscreen combination product (SPF 50+) such as Neutrogena Daily Defense to sun exposed areas of skin at least three times a day.    Have your moles regularly examined by a board certified dermatologist AND by yourself or " "a family member/friend at home.  We recommend that you have your moles examined at least once a year by a board certified dermatologist.  Use your birthday as an annual reminder to have your \"Birthday Suit\" (I.e., your skin) examined; it is a nice birthday gift to yourself to know that your skin is healthy appearing!  Additionally, at-home self examinations may be helpful for detecting a possible melanoma.  Use the ABCDEs we discussed and check your moles once a month at home.        SEBORRHEIC KERATOSIS    A seborrheic keratosis is a harmless warty spot that appears during adult life as a common sign of skin aging.  Seborrheic keratoses can arise on any area of skin, covered or uncovered, with the usual exception of the palms and soles. They do not arise from mucous membranes. Seborrheic keratoses can have highly variable appearance.      Seborrheic keratoses are extremely common. It has been estimated that over 90% of adults over the age of 60 years have one or more of them. They occur in males and females of all races, typically beginning to erupt in the 30s or 40s. They are uncommon under the age of 20 years.  The precise cause of seborrhoeic keratoses is not known.  Seborrhoeic keratoses are considered degenerative in nature. As time goes by, seborrheic keratoses tend to become more numerous. Some people inherit a tendency to develop a very large number of them; some people may have hundreds of them.    The name \"seborrheic keratosis\" is misleading, because these lesions are not limited to a seborrhoeic distribution (scalp, mid-face, chest, upper back), nor are they formed from sebaceous glands, nor are they associated with sebum -- which is greasy.  Seborrheic keratosis may also be called \"SK,\" \"Seb K,\" \"basal cell papilloma,\" \"senile wart,\" or \"barnacle.\"      There is no easy way to remove multiple lesions on a single occasion.  Unless a specific lesion is \"inflamed\" and is causing pain or stinging/burning " "or is bleeding, most insurance companies do not authorize treatment.      ANGIOMA (\"CHERRY ANGIOMA\")    Buckley angiomas markedly increase in number from about the age of 40, so it has been estimated that 75% of people over 75 years of age have them. Although they also called \"senile angiomas,\" they can occur in young people too - 5% of adolescents have been found to have them.     Cherry angiomas are very common in males and females of any age or race, with no difference in sexes or races affected. They are however more noticeable in white skin than in skin of colour.  There may be a family history of similar lesions. Eruptive (very large number appearing in a short period of time) cherry angiomas have been rarely reported to be associated with internal malignancy and pregnancy.   "

## 2024-06-20 ENCOUNTER — ANESTHESIA (OUTPATIENT)
Dept: GASTROENTEROLOGY | Facility: HOSPITAL | Age: 74
End: 2024-06-20

## 2024-06-20 ENCOUNTER — HOSPITAL ENCOUNTER (OUTPATIENT)
Dept: GASTROENTEROLOGY | Facility: HOSPITAL | Age: 74
Setting detail: OUTPATIENT SURGERY
End: 2024-06-20
Attending: COLON & RECTAL SURGERY
Payer: COMMERCIAL

## 2024-06-20 ENCOUNTER — ANESTHESIA EVENT (OUTPATIENT)
Dept: GASTROENTEROLOGY | Facility: HOSPITAL | Age: 74
End: 2024-06-20

## 2024-06-20 VITALS
BODY MASS INDEX: 31.99 KG/M2 | DIASTOLIC BLOOD PRESSURE: 68 MMHG | HEART RATE: 69 BPM | RESPIRATION RATE: 16 BRPM | OXYGEN SATURATION: 93 % | HEIGHT: 64 IN | SYSTOLIC BLOOD PRESSURE: 112 MMHG | WEIGHT: 187.39 LBS | TEMPERATURE: 99.2 F

## 2024-06-20 DIAGNOSIS — Z86.010 HX OF ADENOMATOUS COLONIC POLYPS: ICD-10-CM

## 2024-06-20 LAB — GLUCOSE SERPL-MCNC: 105 MG/DL (ref 65–140)

## 2024-06-20 PROCEDURE — 45385 COLONOSCOPY W/LESION REMOVAL: CPT | Performed by: COLON & RECTAL SURGERY

## 2024-06-20 PROCEDURE — 82948 REAGENT STRIP/BLOOD GLUCOSE: CPT

## 2024-06-20 PROCEDURE — 88305 TISSUE EXAM BY PATHOLOGIST: CPT | Performed by: PATHOLOGY

## 2024-06-20 RX ORDER — PROPOFOL 10 MG/ML
INJECTION, EMULSION INTRAVENOUS AS NEEDED
Status: DISCONTINUED | OUTPATIENT
Start: 2024-06-20 | End: 2024-06-20

## 2024-06-20 RX ORDER — SODIUM CHLORIDE, SODIUM LACTATE, POTASSIUM CHLORIDE, CALCIUM CHLORIDE 600; 310; 30; 20 MG/100ML; MG/100ML; MG/100ML; MG/100ML
INJECTION, SOLUTION INTRAVENOUS CONTINUOUS PRN
Status: DISCONTINUED | OUTPATIENT
Start: 2024-06-20 | End: 2024-06-20

## 2024-06-20 RX ORDER — LIDOCAINE HYDROCHLORIDE 20 MG/ML
INJECTION, SOLUTION EPIDURAL; INFILTRATION; INTRACAUDAL; PERINEURAL AS NEEDED
Status: DISCONTINUED | OUTPATIENT
Start: 2024-06-20 | End: 2024-06-20

## 2024-06-20 RX ADMIN — PROPOFOL 75 MG: 10 INJECTION, EMULSION INTRAVENOUS at 08:14

## 2024-06-20 RX ADMIN — SODIUM CHLORIDE, SODIUM LACTATE, POTASSIUM CHLORIDE, AND CALCIUM CHLORIDE: .6; .31; .03; .02 INJECTION, SOLUTION INTRAVENOUS at 07:37

## 2024-06-20 RX ADMIN — PROPOFOL 25 MG: 10 INJECTION, EMULSION INTRAVENOUS at 08:18

## 2024-06-20 RX ADMIN — PROPOFOL 25 MG: 10 INJECTION, EMULSION INTRAVENOUS at 08:21

## 2024-06-20 RX ADMIN — PROPOFOL 25 MG: 10 INJECTION, EMULSION INTRAVENOUS at 08:28

## 2024-06-20 RX ADMIN — PROPOFOL 75 MG: 10 INJECTION, EMULSION INTRAVENOUS at 08:15

## 2024-06-20 RX ADMIN — LIDOCAINE HYDROCHLORIDE 100 MG: 20 INJECTION, SOLUTION EPIDURAL; INFILTRATION; INTRACAUDAL at 08:13

## 2024-06-20 RX ADMIN — PROPOFOL 25 MG: 10 INJECTION, EMULSION INTRAVENOUS at 08:33

## 2024-06-20 RX ADMIN — PROPOFOL 25 MG: 10 INJECTION, EMULSION INTRAVENOUS at 08:31

## 2024-06-20 RX ADMIN — PROPOFOL 25 MG: 10 INJECTION, EMULSION INTRAVENOUS at 08:25

## 2024-06-20 NOTE — ANESTHESIA POSTPROCEDURE EVALUATION
Post-Op Assessment Note    CV Status:  Stable  Pain Score: 0    Pain management: adequate       Mental Status:  Alert and awake   Hydration Status:  Euvolemic   PONV Controlled:  Controlled   Airway Patency:  Patent     Post Op Vitals Reviewed: Yes    No anethesia notable event occurred.    Staff: CRNA               BP 99/56 (06/20/24 0840)    Temp 99.2 °F (37.3 °C) (06/20/24 0840)    Pulse 62 (06/20/24 0840)   Resp 17 (06/20/24 0840)    SpO2 92 % (06/20/24 0840)

## 2024-06-20 NOTE — INTERVAL H&P NOTE
H&P reviewed. After examining the patient I find no changes in the patients condition since the H&P had been written.    Vitals:    06/20/24 0714   BP: 143/76   Pulse: 76   Resp: 16   Temp: 98.1 °F (36.7 °C)   SpO2: 97%

## 2024-06-20 NOTE — ANESTHESIA PREPROCEDURE EVALUATION
Procedure:  COLONOSCOPY    Relevant Problems   CARDIO   (+) Mixed hyperlipidemia due to type 2 diabetes mellitus  (HCC)   (+) Primary hypertension      ENDO   (+) Type 2 diabetes mellitus with hyperglycemia, with long-term current use of insulin (HCC)      GI/HEPATIC   (+) Esophageal reflux      NEURO/PSYCH   (+) Anxiety      PULMONARY   (+) Sleep apnea        Physical Exam    Airway    Mallampati score: I  TM Distance: >3 FB  Neck ROM: full     Dental       Cardiovascular  Cardiovascular exam normal    Pulmonary  Pulmonary exam normal     Other Findings        Anesthesia Plan  ASA Score- 2     Anesthesia Type- IV sedation with anesthesia with ASA Monitors.         Additional Monitors:     Airway Plan:            Plan Factors-Exercise tolerance (METS): >4 METS.    Chart reviewed. EKG reviewed. Imaging results reviewed. Existing labs reviewed. Patient summary reviewed.                  Induction- intravenous.    Postoperative Plan- Plan for postoperative opioid use. Planned trial extubation        Informed Consent- Anesthetic plan and risks discussed with patient.  I personally reviewed this patient with the CRNA. Discussed and agreed on the Anesthesia Plan with the CRNA..

## 2024-06-24 ENCOUNTER — OFFICE VISIT (OUTPATIENT)
Dept: CARDIOLOGY CLINIC | Facility: CLINIC | Age: 74
End: 2024-06-24
Payer: COMMERCIAL

## 2024-06-24 VITALS
HEART RATE: 71 BPM | SYSTOLIC BLOOD PRESSURE: 126 MMHG | OXYGEN SATURATION: 95 % | WEIGHT: 191 LBS | RESPIRATION RATE: 16 BRPM | HEIGHT: 64 IN | BODY MASS INDEX: 32.61 KG/M2 | DIASTOLIC BLOOD PRESSURE: 70 MMHG

## 2024-06-24 DIAGNOSIS — E11.22 TYPE 2 DIABETES MELLITUS WITH STAGE 2 CHRONIC KIDNEY DISEASE, WITHOUT LONG-TERM CURRENT USE OF INSULIN  (HCC): Chronic | ICD-10-CM

## 2024-06-24 DIAGNOSIS — Z79.4 TYPE 2 DIABETES MELLITUS WITH HYPERGLYCEMIA, WITH LONG-TERM CURRENT USE OF INSULIN (HCC): Chronic | ICD-10-CM

## 2024-06-24 DIAGNOSIS — N18.31 TYPE 2 DIABETES MELLITUS WITH STAGE 3A CHRONIC KIDNEY DISEASE, WITHOUT LONG-TERM CURRENT USE OF INSULIN (HCC): Chronic | ICD-10-CM

## 2024-06-24 DIAGNOSIS — E78.00 HYPERCHOLESTEROLEMIA: ICD-10-CM

## 2024-06-24 DIAGNOSIS — E11.22 TYPE 2 DIABETES MELLITUS WITH STAGE 3A CHRONIC KIDNEY DISEASE, WITHOUT LONG-TERM CURRENT USE OF INSULIN (HCC): Chronic | ICD-10-CM

## 2024-06-24 DIAGNOSIS — I47.10 PAROXYSMAL SUPRAVENTRICULAR TACHYCARDIA: ICD-10-CM

## 2024-06-24 DIAGNOSIS — I10 BENIGN ESSENTIAL HYPERTENSION: Primary | ICD-10-CM

## 2024-06-24 DIAGNOSIS — L21.9 SEBORRHEIC DERMATITIS: ICD-10-CM

## 2024-06-24 DIAGNOSIS — N18.2 TYPE 2 DIABETES MELLITUS WITH STAGE 2 CHRONIC KIDNEY DISEASE, WITHOUT LONG-TERM CURRENT USE OF INSULIN  (HCC): Chronic | ICD-10-CM

## 2024-06-24 DIAGNOSIS — E11.65 TYPE 2 DIABETES MELLITUS WITH HYPERGLYCEMIA, WITH LONG-TERM CURRENT USE OF INSULIN (HCC): Chronic | ICD-10-CM

## 2024-06-24 PROCEDURE — 88305 TISSUE EXAM BY PATHOLOGIST: CPT | Performed by: STUDENT IN AN ORGANIZED HEALTH CARE EDUCATION/TRAINING PROGRAM

## 2024-06-24 PROCEDURE — 99213 OFFICE O/P EST LOW 20 MIN: CPT | Performed by: INTERNAL MEDICINE

## 2024-06-24 NOTE — PROGRESS NOTES
[facility] [place]  [address]  [place] [address]  Cardiology Follow Up    [facility]  1950  [id number]      1. Benign essential hypertension        2. Paroxysmal supraventricular tachycardia        3. Hypercholesterolemia            Chief Complaint   Patient presents with    Follow-up       Interval History: Patient presents for follow-up visit.  Patient denies any history of chest pain shortness of breath.  Patient denies any history of leg edema or orthopnea PND.  No history of presyncope syncope.  Patient states compliance with the present list of medications.  Patient denies any history of palpitations.    Patient Active Problem List   Diagnosis    Actinic keratosis    Seborrheic keratosis    History of skin cancer    Anxiety    Erectile dysfunction of non-organic origin    Esophageal reflux    Mixed hyperlipidemia due to type 2 diabetes mellitus  ([facility])    Mitral valve disorder    Rosacea    Sleep apnea    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    Type 2 diabetes mellitus with hyperglycemia, with long-term current use of insulin ([facility])    Primary hypertension    Class 1 obesity due to excess calories with serious comorbidity and body mass index (BMI) of 33.0 to 33.9 in adult     Past Medical History:   Diagnosis Date    Actinic keratosis     Adhesive capsulitis of unspecified shoulder     Anxiety     Asthma     Atopic dermatitis     Cardiac disease     Cellulitis of right upper extremity     Cervical radiculopathy     Chest pain     Chronic maxillary sinusitis     last assessed 01/21/14    Diabetes mellitus (HCC)     Endocrine disease     Erectile dysfunction of non-organic origin     Esophageal reflux     GERD (gastroesophageal reflux disease)     Gout     last assessed 08/26/15    Hearing loss, conductive     Heart disease     Heart murmur     Mitral Valve disorder    Henoch-Schonlein purpura (HCC) 08/06/2019    Herpes zoster     History of colon polyps      History of diverticulitis     History of paroxysmal supraventricular tachycardia     History of rectal polyps     Hypertension     IgA mediated leukocytoclastic vasculitis (HCC) 2019    Irregular heart rate     Mitral valve disorder     Nasal congestion 2019    Neoplasm of skin, malignant     Non-melanoma skin cancer     last assessed 10/26/17    Nosebleed 2019    Obesity     Palpitations     last assessed 03/05/15    Paroxysmal supraventricular tachycardia 2014    Plantar fasciitis     last assessed 05/28/15    PSVT (paroxysmal supraventricular tachycardia)     Sciatica     Sleep apnea     Squamous cell carcinoma of skin of scalp     Thoracic radiculopathy 10/22/2015    Tinnitus, unspecified ear     Type 2 diabetes mellitus with hyperglycemia (HCC)     last assessed 08/26/15    Umbilical hernia     Worms in stool     last assessed 08/26/15     Social History     Socioeconomic History    Marital status: /Civil Union     Spouse name: Not on file    Number of children: Not on file    Years of education: Not on file    Highest education level: Not on file   Occupational History    Occupation: Working Part Time    Tobacco Use    Smoking status: Former     Current packs/day: 0.00     Average packs/day: 1 pack/day for 20.0 years (20.0 ttl pk-yrs)     Types: Cigarettes     Start date: 1957     Quit date: 1977     Years since quittin.5    Smokeless tobacco: Never   Vaping Use    Vaping status: Never Used   Substance and Sexual Activity    Alcohol use: Yes     Alcohol/week: 1.0 standard drink of alcohol     Types: 1 Standard drinks or equivalent per week     Comment: only occasionally    Drug use: No    Sexual activity: Yes     Partners: Female   Other Topics Concern    Not on file   Social History Narrative    Active Advance Directive     Social Determinants of Health     Financial Resource Strain: Medium Risk (2023)    Overall Financial Resource Strain (CARDIA)      Difficulty of Paying Living Expenses: Somewhat hard   Food Insecurity: Not on file   Transportation Needs: No Transportation Needs (4/18/2023)    PRAPARE - Transportation     Lack of Transportation (Medical): No     Lack of Transportation (Non-Medical): No   Physical Activity: Inactive (4/26/2021)    Exercise Vital Sign     Days of Exercise per Week: 0 days     Minutes of Exercise per Session: 0 min   Stress: No Stress Concern Present (10/10/2023)    [employer] - Occupational Stress Questionnaire     Feeling of Stress : Not at all   Social Connections: Not on file   Intimate Partner Violence: Not on file   Housing Stability: Not on file      Family History   Problem Relation Age of Onset    COPD Mother     Asthma Mother     Cancer Mother         Breast + Skin    Diabetes Mother     Heart disease Mother     Heart attack Mother     Breast cancer Mother         Adenocarcinoma    Skin cancer Mother         Adenocarcinoma    Heart failure Mother     Diabetes type II Mother     Varicose Veins Mother         Lower Extremities    Colon polyps Mother     Heart disease Father     Cancer Father         Prostate + Skin    Prostate cancer Father         Adenocarcinoma    Skin cancer Father     Lymphoma Father         Non-Hodgkin's    Allergies Father     Colon polyps Sister     Allergies Sister     Arthritis Family      Past Surgical History:   Procedure Laterality Date    APPENDECTOMY  1968    COLECTOMY  1993    Partial, Sigmoid for diverticulitis    COLONOSCOPY  01/04/2016    HEAD & NECK SKIN LESION EXCISIONAL BIOPSY      10/11/10 SCC --  10/17/11 SCC  --  Location Scalp    HERNIA REPAIR      1998 & 2013    NASAL/SINUS ENDOSCOPY N/A 08/06/2019    Procedure: FUNCTIONAL ENDOSCOPIC SINUS SURGERY (FESS) IMAGED GUIDED for control of epistaxis;  Surgeon: Leonel Bo MD;  Location: [facility];  Service: ENT    NE ESOPHAGOGASTRODUODENOSCOPY TRANSORAL DIAGNOSTIC N/A 08/09/2017    Procedure:  ESOPHAGOGASTRODUODENOSCOPY (EGD);  Surgeon: Anjel Steven MD;  Location: [facility];  Service: Gastroenterology    SKIN BIOPSY      TONSILLECTOMY  1960       Current Outpatient Medications:     Blood Glucose Monitoring Suppl (Accu-Chek Eilsa Plus) w/Device KIT, Check blood sugars 3 times daily, Disp: 1 kit, Rfl: 0    Cyanocobalamin (VITAMIN B-12 PO), Take by mouth daily, Disp: , Rfl:     cyclobenzaprine (FLEXERIL) 5 mg tablet, Take 1 tablet (5 mg total) by mouth 3 (three) times a day as needed for muscle spasms, Disp: 60 tablet, Rfl: 3    EPINEPHrine (EPIPEN) 0.3 mg/0.3 mL SOAJ, Inject 0.3 mL (0.3 mg total) into a muscle once for 1 dose, Disp: 0.6 mL, Rfl: 0    glimepiride (AMARYL) 4 mg tablet, Take 1 tablet (4 mg total) by mouth 2 (two) times a day, Disp: 180 tablet, Rfl: 3    glucose blood (Accu-Chek Elisa Plus) test strip, Check blood sugar 3 times daily, Disp: 100 strip, Rfl: 3    Insulin Glargine Solostar (Lantus SoloStar) 100 UNIT/ML SOPN, INJECT 0.16 ML (16 UNITS TOTAL) UNDER THE SKIN DAILY, Disp: 15 mL, Rfl: 5    insulin lispro (HumaLOG) 100 units/mL injection pen, BLOOD GLUCOSE 150 - 189: 1 UNIT OF INSULIN BLOOD GLUCOSE 190 - 229: 2 UNITS OF INSULIN BLOOD GLUCOSE 230 - 269: 3 UNITS OF INSULIN BLOOD GLUCOSE 270 - 309: 4 UNITS OF INSULIN BLOOD GLUCOSE 310 - 349: 5 UNITS OF INSULIN BLOOD GLUCOSE GREATER THAN OR EQUAL : 6 UNITS OF INSULIN CHECK YOUR BLOOD GLUCOSE 3 TIMES A DAY AND BEFORE BEDTIME AND ADMINISTER INSULIN AS EXPLAINED ABOVE, Disp: 15 mL, Rfl: 2    Insulin Pen Needle (BD Pen Needle Audelia 2nd Gen) 32G X 4 MM MISC, USE TO INJECT INSULIN UP TO 4 TIMES A DAY, Disp: 200 each, Rfl: 0    ketoconazole (NIZORAL) 2 % cream, Apply topically 2 (two) times a day Applied to face and scalp twice a day for 3 weeks repeat as needed, Disp: 30 g, Rfl: 0    Lancets (accu-chek multiclix) lancets, Please check Blood sugars 3 times daily, Disp: 102 each, Rfl: 3    metFORMIN (GLUCOPHAGE-XR) 500 mg 24 hr tablet,  TAKE 2 TABLETS BY MOUTH TWICE A DAY WITH FOOD, Disp: 360 tablet, Rfl: 1    montelukast (SINGULAIR) 10 mg tablet, TAKE 1 TABLET BY MOUTH EVERYDAY AT BEDTIME, Disp: 90 tablet, Rfl: 3    omeprazole (PriLOSEC) 20 mg delayed release capsule, Take 1 capsule (20 mg total) by mouth daily, Disp: 90 capsule, Rfl: 3    Pyridoxine HCl (VITAMIN B-6 PO), Take 25 mg by mouth daily, Disp: , Rfl:     sildenafil (VIAGRA) 50 MG tablet, Take 1 tablet (50 mg total) by mouth daily as needed for erectile dysfunction, Disp: 6 tablet, Rfl: 5    sitaGLIPtin (Januvia) 100 mg tablet, Take 1 tablet (100 mg total) by mouth daily, Disp: 90 tablet, Rfl: 3    sodium chloride (OCEAN) 0.65 % nasal spray, 1-2 sprays into each nostril daily as needed for congestion, Disp: 90 mL, Rfl: 3    triamcinolone (KENALOG) 0.025 % cream, Apply topically as needed , Disp: , Rfl:     verapamil (Verelan) 180 MG 24 hr capsule, Take 1 capsule (180 mg total) by mouth 2 (two) times a day, Disp: 180 capsule, Rfl: 3    polyethylene glycol (GOLYTELY) 4000 mL solution, Take 4,000 mL by mouth once for 1 dose, Disp: 4000 mL, Rfl: 0  Allergies   Allergen Reactions    Iodinated Contrast Media Anaphylaxis    Shellfish Allergy - Food Allergy Anaphylaxis    Shrimp Extract Allergy Skin Test - Food Allergy Anaphylaxis    Empagliflozin Hives    Farxiga [Dapagliflozin] Rash     Yeast infection    Ozempic (0.25 Or 0.5 Mg-Dose) [Semaglutide(0.25 Or 0.5mg-Dos)] Rash       Labs:  Hospital Outpatient Visit on 06/20/2024   Component Date Value    POC Glucose 06/20/2024 105    Appointment on 06/10/2024   Component Date Value    WBC 06/10/2024 9.73     RBC 06/10/2024 4.85     Hemoglobin 06/10/2024 14.5     Hematocrit 06/10/2024 43.3     MCV 06/10/2024 89     MCH 06/10/2024 29.9     MCHC 06/10/2024 33.5     RDW 06/10/2024 12.6     MPV 06/10/2024 11.6     Platelets 06/10/2024 238     nRBC 06/10/2024 0     Segmented % 06/10/2024 66     Immature Grans % 06/10/2024 0     Lymphocytes % 06/10/2024  22     Monocytes % 06/10/2024 8     Eosinophils Relative 06/10/2024 4     Basophils Relative 06/10/2024 0     Absolute Neutrophils 06/10/2024 6.42     Absolute Immature Grans 06/10/2024 0.04     Absolute Lymphocytes 06/10/2024 2.12     Absolute Monocytes 06/10/2024 0.73     Eosinophils Absolute 06/10/2024 0.39     Basophils Absolute 06/10/2024 0.03     Protime 06/10/2024 13.8     INR 06/10/2024 1.07     PTT 06/10/2024 28      Imaging: Colonoscopy    Result Date: 6/20/2024  Narrative: Table formatting from the original result was not included.  [facility] [address] [address] [phone] DATE OF SERVICE: 6/20/24 PHYSICIAN(S): Attending: Trey Payton MD Fellow: No Staff Documented INDICATION: Hx of adenomatous colonic polyps POST-OP DIAGNOSIS: See the impression below. HISTORY: Prior colonoscopy: 3 years ago. BOWEL PREPARATION: Miralax/Dulcolax PREPROCEDURE: Informed consent was obtained for the procedure, including sedation. Risks including but not limited to bleeding, infection, perforation, adverse drug reaction and aspiration were explained in detail. Also explained about less than 100% sensitivity with the exam and other alternatives. The patient was placed in the left lateral decubitus position. Procedure: Colonoscopy DETAILS OF PROCEDURE: Patient was taken to the procedure room where a time out was performed to confirm correct patient and correct procedure. The patient underwent monitored anesthesia care, which was administered by an anesthesia professional. The patient's blood pressure, ECG, ETCO2, heart rate, level of consciousness, oxygen and respirations were monitored throughout the procedure. A digital rectal exam was performed. The scope was introduced through the anus and advanced to the cecum. Retroflexion was performed in the rectum. The quality of bowel preparation was evaluated using the [place] Bowel Preparation Scale with scores of: right colon =  2, transverse colon = 2, left colon = 2. The total BBPS score was 6. Bowel prep was adequate. The patient experienced no blood loss. The procedure was not difficult. The patient tolerated the procedure well. There were no apparent adverse events. ANESTHESIA INFORMATION: ASA: II Anesthesia Type: IV Sedation with Anesthesia MEDICATIONS: No administrations occurring from 0810 to 0835 on 06/20/24 FINDINGS: Healthy side-to-side anastomosis in the descending colon and sigmoid colon One sessile and adenomatous-appearing polyp measuring 5-9 mm in the distal transverse colon; performed cold snare with complete en bloc removal and retrieved specimen The ileocecal valve, cecum, proximal ascending colon, mid ascending colon, hepatic flexure, transverse colon, rectosigmoid and rectum appeared normal. EVENTS: Procedure Events Event Event Time ENDO CECUM REACHED 6/20/2024  8:21 AM ENDO SCOPE OUT TIME 6/20/2024  8:34 AM SPECIMENS: ID Type Source Tests Collected by Time Destination 1 : Distal Transverse Tissue Polyp, Colorectal TISSUE EXAM Trey Payton MD 6/20/2024  8:34 AM  EQUIPMENT: Colonoscope -[id number]     Impression: Healthy side-to-side anastomosis in the descending colon and sigmoid colon Subcentimeter polyp in the distal transverse colon was removed with cold snare The ileocecal valve, cecum, proximal ascending colon, mid ascending colon, hepatic flexure, transverse colon, rectosigmoid and rectum appeared normal. RECOMMENDATION: Await pathology results  Annual FIT testing  Trey Payton MD       Review of Systems:  Review of Systems  REVIEW OF SYSTEMS:  Constitutional:  Denies fever or chills   Eyes:  Denies change in visual acuity   HENT:  Denies nasal congestion or sore throat   Respiratory:  Denies cough or shortness of breath   Cardiovascular:  Denies chest pain or edema   GI:  Denies abdominal pain, nausea, vomiting, bloody stools or diarrhea   :  Denies dysuria, frequency, difficulty in micturition and  "nocturia  Musculoskeletal:  Denies back pain or joint pain   Neurologic:  Denies headache, focal weakness or sensory changes   Endocrine:  Denies polyuria or polydipsia   Lymphatic:  Denies swollen glands   Psychiatric:  Denies depression or anxiety    Physical Exam:    /70 (BP Location: Left arm, Patient Position: Sitting, Cuff Size: Standard)   Pulse 71   Resp 16   Ht 5' 4\" (1.626 m)   Wt 86.6 kg (191 lb)   SpO2 95%   BMI 32.79 kg/m²     Physical Exam  PHYSICAL EXAM:  General:  Patient is not in acute distress   Head: Normocephalic, Atraumatic.  HEENT:  Both pupils normal-size atraumatic, normocephalic, nonicteric  Neck:  JVP not raised. Trachea central. No carotid bruit  Respiratory:  normal breath sounds no crackles. no rhonchi  Cardiovascular:  Regular rate and rhythm no S3 no murmurs  GI:  Abdomen soft nontender. No organomegaly.   Lymphatic:  No cervical or inguinal lymphadenopathy  Neurologic:  Patient is awake alert, oriented . Grossly nonfocal extremities no edema    Discussion/Summary:    Patient with multiple medical problems who seems to be doing reasonably well from cardiac standpoint. Previous studies reviewed with patient. Medications reviewed and possible side effects discussed. concepts of cardiovascular disease , signs and symptoms of heart disease. Dietary and risk factor modification reinforced. All questions answered.  Safety measures reviewed. Patient advised to report any problems prompting medical attention.    Patient has history of supraventricular tachycardia and has been doing well on verapamil.    Symptoms to watch out from cardiac standpoint which would indicate the need for further cardiac evaluation discussed with patient and family.  Follow-up in 1 year.  Follow-up with primary care physician.  Patient had blood work through primary care physician which was reviewed.  "

## 2024-06-25 ENCOUNTER — TELEPHONE (OUTPATIENT)
Dept: DERMATOLOGY | Facility: CLINIC | Age: 74
End: 2024-06-25

## 2024-06-25 ENCOUNTER — APPOINTMENT (OUTPATIENT)
Age: 74
End: 2024-06-25
Payer: COMMERCIAL

## 2024-06-25 DIAGNOSIS — E11.69 MIXED HYPERLIPIDEMIA DUE TO TYPE 2 DIABETES MELLITUS  (HCC): ICD-10-CM

## 2024-06-25 DIAGNOSIS — E11.22 TYPE 2 DIABETES MELLITUS WITH STAGE 3A CHRONIC KIDNEY DISEASE, WITHOUT LONG-TERM CURRENT USE OF INSULIN (HCC): ICD-10-CM

## 2024-06-25 DIAGNOSIS — N18.31 TYPE 2 DIABETES MELLITUS WITH STAGE 3A CHRONIC KIDNEY DISEASE, WITHOUT LONG-TERM CURRENT USE OF INSULIN (HCC): ICD-10-CM

## 2024-06-25 DIAGNOSIS — E78.2 MIXED HYPERLIPIDEMIA DUE TO TYPE 2 DIABETES MELLITUS  (HCC): ICD-10-CM

## 2024-06-25 LAB
ANION GAP SERPL CALCULATED.3IONS-SCNC: 8 MMOL/L (ref 4–13)
BUN SERPL-MCNC: 19 MG/DL (ref 5–25)
CALCIUM SERPL-MCNC: 9.3 MG/DL (ref 8.4–10.2)
CHLORIDE SERPL-SCNC: 105 MMOL/L (ref 96–108)
CHOLEST SERPL-MCNC: 109 MG/DL
CO2 SERPL-SCNC: 30 MMOL/L (ref 21–32)
CREAT SERPL-MCNC: 1.1 MG/DL (ref 0.6–1.3)
ERYTHROCYTE [DISTWIDTH] IN BLOOD BY AUTOMATED COUNT: 12.7 % (ref 11.6–15.1)
EST. AVERAGE GLUCOSE BLD GHB EST-MCNC: 177 MG/DL
GFR SERPL CREATININE-BSD FRML MDRD: 65 ML/MIN/1.73SQ M
GLUCOSE P FAST SERPL-MCNC: 102 MG/DL (ref 65–99)
HBA1C MFR BLD: 7.8 %
HCT VFR BLD AUTO: 44.7 % (ref 36.5–49.3)
HDLC SERPL-MCNC: 43 MG/DL
HGB BLD-MCNC: 14.6 G/DL (ref 12–17)
LDLC SERPL CALC-MCNC: 50 MG/DL (ref 0–100)
MCH RBC QN AUTO: 30 PG (ref 26.8–34.3)
MCHC RBC AUTO-ENTMCNC: 32.7 G/DL (ref 31.4–37.4)
MCV RBC AUTO: 92 FL (ref 82–98)
PLATELET # BLD AUTO: 241 THOUSANDS/UL (ref 149–390)
PMV BLD AUTO: 11.5 FL (ref 8.9–12.7)
POTASSIUM SERPL-SCNC: 4 MMOL/L (ref 3.5–5.3)
RBC # BLD AUTO: 4.86 MILLION/UL (ref 3.88–5.62)
SODIUM SERPL-SCNC: 143 MMOL/L (ref 135–147)
TRIGL SERPL-MCNC: 78 MG/DL
WBC # BLD AUTO: 8.88 THOUSAND/UL (ref 4.31–10.16)

## 2024-06-25 PROCEDURE — 80061 LIPID PANEL: CPT

## 2024-06-25 PROCEDURE — 85027 COMPLETE CBC AUTOMATED: CPT

## 2024-06-25 PROCEDURE — 83036 HEMOGLOBIN GLYCOSYLATED A1C: CPT

## 2024-06-25 PROCEDURE — 88305 TISSUE EXAM BY PATHOLOGIST: CPT | Performed by: PATHOLOGY

## 2024-06-25 PROCEDURE — 36415 COLL VENOUS BLD VENIPUNCTURE: CPT

## 2024-06-25 PROCEDURE — 80048 BASIC METABOLIC PNL TOTAL CA: CPT

## 2024-06-25 RX ORDER — PEN NEEDLE, DIABETIC 32GX 5/32"
NEEDLE, DISPOSABLE MISCELLANEOUS
Qty: 200 EACH | Refills: 5 | Status: SHIPPED | OUTPATIENT
Start: 2024-06-25

## 2024-06-25 RX ORDER — SITAGLIPTIN 100 MG/1
100 TABLET, FILM COATED ORAL DAILY
Qty: 90 TABLET | Refills: 3 | Status: SHIPPED | OUTPATIENT
Start: 2024-06-25 | End: 2024-07-03 | Stop reason: ALTCHOICE

## 2024-06-25 RX ORDER — BLOOD SUGAR DIAGNOSTIC
STRIP MISCELLANEOUS 3 TIMES DAILY
Qty: 100 STRIP | Refills: 3 | Status: SHIPPED | OUTPATIENT
Start: 2024-06-25

## 2024-06-25 RX ORDER — KETOCONAZOLE 20 MG/G
CREAM TOPICAL
Qty: 30 G | Refills: 0 | Status: SHIPPED | OUTPATIENT
Start: 2024-06-25

## 2024-06-25 RX ORDER — GLIMEPIRIDE 4 MG/1
4 TABLET ORAL 2 TIMES DAILY
Qty: 180 TABLET | Refills: 3 | Status: SHIPPED | OUTPATIENT
Start: 2024-06-25

## 2024-06-25 RX ORDER — METFORMIN HYDROCHLORIDE 500 MG/1
1000 TABLET, EXTENDED RELEASE ORAL 2 TIMES DAILY WITH MEALS
Qty: 360 TABLET | Refills: 3 | Status: SHIPPED | OUTPATIENT
Start: 2024-06-25

## 2024-06-25 NOTE — LETTER
Armand Rocha     1950    [address]  [place] [facility] [address]    Dear Armand Rocha,    You are scheduled to have the MOHS procedure on October 25, 2024 at 9:30 am for postauricular scalp with Dr.Ryan Buchanan. Our office is located in [facility] at the [facility] our address is [address] [place] [address]. Once you arrive please check in with our front staff in [address] and they will escort you to the MOHS waiting room.  If you have someone bringing you to your appointment they may wait in the waiting room or accompany you in your visit.      Below you will find some pre-op instructions along with some information regarding the MOHS procedure.     If you have any questions please call our office at [phone].       Thank you,    [facility]         PRE-OPERATIVE INSTRUCTIONS - MOHS    Before your scheduled surgery, there are a number of important precautions and positive steps you should take to help prepare yourself for a successful treatment and speedy recovery.    Some of the steps, which are listed below, may seem unnecessary and inconvenient, but they are important. For example, when you stop smoking, you increase your ability to heal. Occasionally, there may be valid reasons, personal or medical, why you can't comply. In such cases, please call the office so we can discuss possible ways to overcome any obstacles you may be encountering.    If you have any questions about the surgery, or remember additional medical information that you forgot to mention to our staff, please contact the office prior to your surgery.    GENERAL INFORMATION REGARDING MOHS MICROGRAPHIC SURGERY    Mohs surgery is a specialized technique for the removal of skin cancer developed by Dr. Frederick Mohs over 50 years ago to improve the cure rates of skin cancer. Traditionally, skin cancers are treated by destructive methods (radiation, freezing,  scraping, and burning) or excision (cutting out the tissue with standards margins and sending it to an outside laboratory for testing). These methods all yield cure rates between 65%-94%. However, for cancers located in cosmetically sensitive areas, large tumors, or tumors unsuccessfully treated by other means, Mohs surgery offers a higher cure rate. In most cases, Mohs surgery provides you with a 99% cure rate for primary (previously untreated) basal cell cancer and a 95% cure rate for primary squamous cell cancer. In Mohs surgery, tissue is removed and processed in a way that we are able to check 100% of the margins, giving the highest cure rate for any method of treating skin cancers while providing maximal preservation of normal skin. This allows the surgeon to produce an optimal cosmetic result for the patient by maximizing the amount of tissue removed yielding as small a scar as possible    On the day of surgery, you will be given local anesthesia only (similar to what was given to you during your initial biopsy). You will remain awake. You will verify the location of the skin cancer prior to the onset of the surgery. Once the area is numb, the tissue containing the skin cancer will be removed, taking a small safety margin. This margin is usually smaller than what would be taken with a standard excision. Once the tissue is removed, it is marked and oriented. The first layer (“Stage I”) will be processed in our laboratory. The wound will be treated for bleeding and a bandage will be placed to keep you comfortable while you wait an approximate 45 minutes-1 hour (for the processing of the tissue) in your room. Your Mohs surgeon will examine the pathology in the lab, checking all the margins. If any tumor remains, you will need to take a second layer of skin (“Stage 2”). The area will be re-anesthetized and your Mohs surgeon will remove more skin only in the area where the tumor exists. This process will continue  until all the skin cancer is removed. Unfortunately, there is no method to predict how many layers or stages will be taken.    Once the tumor has been removed completely, we will discuss the best ways to close the defect. Most wounds may be closed with stitches. A larger wound may require a skin graft or a flap. In rare instances, especially for cancers around the eye or for larger cancers, we may work with another surgeon (oculoplastic, ENT, plastics) with special skills to assist with reconstruction.  If the surgery is coordinated with another specialist, you will have the Mohs portion of the procedure first and see the coordinated specialist after the skin cancer has been removed.  Always follow the pre-operative instructions of the surgeon doing the closure.      Medications: Please take all your normal medications the morning of your surgery. If you are a diabetic, please bring your insulin or medications with you, as well as a snack to avoid having low blood sugar during your day with us.    1) Blood Thinners    VERY IMPORTANT: We do NOT stop or hold prescribed blood thinners (such as Coumadin/Warfarin, Plavix, Eliquis, Pradaxa, Brilinta, Apixaban, Xarelto, Lovonox, Rivaroxaban, or Aggrenox) before Mohs surgery. Additionally, If you take aspirin because you have had a stroke, heart attack, heart disease, other condition, or your physician has prescribed you to take it, please continue your aspirin.    Although there is a risk of increased bruising and bleeding, we are still able to safely perform surgery while continuing these medications. Please NEVER stop your prescribed blood thinner without the managing doctor's (the doctor that prescribed the medication) permission or knowledge. If you have any concerns about not holding your blood thinner, please address these with your Mohs surgeon.    Most people should stop all non-steroidal anti-inflammatory medications (Motrin, Naproxen, Advil, Midol, Aleve, etc.)  for 7 days prior to your scheduled surgery and 2 days after (unless instructed otherwise after surgery).  You may take Tylenol for pain.     Vitamins and Supplements  Avoid taking any supplements with Vitamin E, Fish Oil, Gingko, Ginseng, and Garlic for 2 weeks before and 2 days after your surgery. These thin your blood.    Lidocaine Patches  Avoid wearing any over the counter or prescribed Lidocaine patches the day of the surgery.    Alcohol: Avoid drinking alcohol for 2 days prior to your surgery, and for 2 days afterwards (it thins the blood and causes more bruising and swelling).    Smoking: Try to STOP or reduce smoking significantly the week before your surgery, and especially the week afterwards (it greatly improves how well you heal). Tobacco smoke deprives the blood of oxygen, which is urgently needed by the wound during the healing process.    Contact Lenses: Do not wear them on the day of the surgery. Instead, wear glasses and bring your case, in case we need to remove them.    Clothing: Do not wear your nicest clothing on your surgery day. We recommend wearing a button down shirt that will not disrupt your post-operative dressing when changing later that night. Please avoid wearing jeans during the procedure to help prevent damage to our equipment.     Bathing: On the morning of your surgery, you may bathe or shower normally. If you get your hair done on a weekly basis, remember to get your hair washed the day before surgery.   - You will need to keep your surgical site dry for a minimum of 48 hours after surgery.    Makeup: If your surgery is on the face, please do not wear any makeup on the day of the surgery.    Jewelry: Please try to avoid wearing jewelry on the day of surgery.    Food: On the morning of surgery, have breakfast but limit your intake of caffeinated beverages. They are diuretic and may inconvenience you during surgery. If you are following up with another surgeon the same day as your  Mohs surgery, you must receive permission to eat breakfast from that surgeon.      What to bring with you on the day of your surgery:  Bring snacks - Since you could be at the office long, you may bring snacks and/or lunch with you. Some snacks and drinks are available at the office as well.   Bring a sweater - Bring a sweater or jacket that buttons or zips down the front and will not disturb your wound dressing during removal.  Bring something to do - You will be spending much of the day in our office. There will be 45-60 minute waiting periods  between layers/stages, and while there is a television with cable in every room, it is nice to have something to keep you occupied such as books, magazines, knitting, music, or work.     Planning Ahead:  Other Appointments - It is important to realize that no matter how small the skin cancer appears to be, looks can be deceiving. Since your surgery may last the entire day, you should not schedule any other appointments that day.  Special Occasions - Surgery often creates swelling and bruising. Also, the post-op dressing may be rather large and obvious. Keep this in mind as you arrange your social/work schedule. If an important event is already planned, please check with your referring physician or your Mohs surgeon to see if the surgery can be postponed.  Activity Limits after Surgery - If surgery was performed on your face, we recommend that you keep your activity level to a minimum for 2-3 days (the blood pressure elevation related to exercise can lead to bleeding). If you have stitches in an area that will be under tension or significant movement (neck, back, arms, legs), you will need to avoid heavy lifting (anything over 5 lbs) or exercise for at least 2 weeks and possibly longer. We also advise that you limit out of town travel for the first 7 days after surgery. You should also wait at least 7 days before going into a pool or the ocean.  Housework - Since you will  need to minimize activity after surgery, plan to do your groceries, laundry, gardening, and other heavy household chores prior to your surgery. Please make arrangements for assistance during the post-op period. If surgery is around your mouth area, you may need to eat soft foods, such as soup, milkshakes, or yogurt for 48 hours.    Purchasing bandage supplies: Prior to surgery, please purchase the following items to care for your surgical wound properly.  Cotton swabs (Q-tips)  Vaseline or Aquaphor  Telfa pads (or any non-stick dressing)  Paper tape or Hypafix tape  Gauze pads (3x3)    Transportation: It is often reassuring and comforting to have a  drive you to and from the surgery. He or she is welcome to wait in the office during the surgery. If you do not have a , you may drive to and from your procedure (unless stated otherwise). If the site being treated is near your eye, be aware that the final bandage may cause some obstruction of vision.     Rescheduling: If you need to reschedule your surgery, please notify the office as soon as possible.

## 2024-06-25 NOTE — TELEPHONE ENCOUNTER
Pre- operative Mohs Telephone Scheduling Note    Do you have a pacemaker, defibrillator, spinal or brain stimulator? no    Do you take antibiotics before skin or dental procedures? no  If yes, will likely require pre-operative antibiotics. Ask  the patient why they take the antibiotics (usually because of joint replacement).    Do you have a history of a joint replacements within the past 2 years? no   If yes, will likely require pre-operative antibiotics. Ask if orthopaedic surgeon has prescribed pre-operative antibiotics to take before procedures/dental work?    Do you take any OTC medications that thin your blood (Aspirin, Aleve, Ibuprofen) or supplements that thin your blood (fish oil, garlic, vitamin E, Ginko Biloba)? no    Do you take any prescribed medications that thin your blood (Coumadin, Plavix, Xarelto, Eliquis or another prescribed blood thinner)? no    Do you have an allergy to lidocaine or epinephrine? no    Do you have allergies to Iodine? yes: ivp dye     Do you wear a lidocaine patch? no    Have you ever been diagnosed with HIV, AIDS, Hep B and Hep C? no    Do you use a cane, walker or wheelchair? no    Is the patient from a nursing home? no If yes, Is there any special accommodations that is needed for patient n/a    Do you smoke? no      If yes,  patient to try and stop 2 days before surgery and 7 days after the surgery. Minimizing smoking as much as possible during this time will improve healing and the cosmetic result after surgery.    Do you use supplemental oxygen? If so, how many liters and can you be off it for a short period of time? N/a    Date scheduled: October 25, 2024 at 9:30 am with Dr. Buchanan    Coordination of Care with other provider (Oculoplastics, Plastics, ENT) required? no   IF YES, PLEASE FORWARD TO APPROPRIATE PERSONNEL TO HELP COORDINATE.    Are there remaining tumors to be scheduled? no    Was Prior Authorization obtained? No (please use [email or web address] to document  prior auth)

## 2024-06-30 DIAGNOSIS — T78.40XD ALLERGY, SUBSEQUENT ENCOUNTER: Primary | ICD-10-CM

## 2024-06-30 DIAGNOSIS — J02.8 ACUTE PHARYNGITIS DUE TO OTHER SPECIFIED ORGANISMS: ICD-10-CM

## 2024-06-30 DIAGNOSIS — E11.65 TYPE 2 DIABETES MELLITUS WITH HYPERGLYCEMIA, WITH LONG-TERM CURRENT USE OF INSULIN (HCC): Chronic | ICD-10-CM
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
## 2024-06-30 DIAGNOSIS — Z79.4 TYPE 2 DIABETES MELLITUS WITH HYPERGLYCEMIA, WITH LONG-TERM CURRENT USE OF INSULIN (HCC): Chronic | ICD-10-CM

## 2024-06-30 DIAGNOSIS — Z87.892 HISTORY OF ANAPHYLAXIS: ICD-10-CM

## 2024-06-30 DIAGNOSIS — S30.861A TICK BITE OF GROIN, INITIAL ENCOUNTER: ICD-10-CM

## 2024-06-30 RX ORDER — DOXYCYCLINE HYCLATE 100 MG/1
CAPSULE ORAL
Qty: 2 CAPSULE | Refills: 0 | Status: SHIPPED | OUTPATIENT
Start: 2024-06-30 | End: 2024-07-01

## 2024-06-30 RX ORDER — EPINEPHRINE 0.3 MG/.3ML
0.3 INJECTION SUBCUTANEOUS ONCE
Qty: 2 EACH | Refills: 0 | Status: SHIPPED | OUTPATIENT
Start: 2024-06-30 | End: 2024-07-03

## 2024-06-30 RX ORDER — BLOOD-GLUCOSE METER
EACH MISCELLANEOUS
Qty: 1 KIT | Refills: 0 | Status: SHIPPED | OUTPATIENT
Start: 2024-06-30

## 2024-07-01 RX ORDER — AMOXICILLIN AND CLAVULANATE POTASSIUM 875; 125 MG/1; MG/1
TABLET, FILM COATED ORAL
Qty: 14 TABLET | Refills: 0 | OUTPATIENT
Start: 2024-07-01

## 2024-07-01 RX ORDER — FLUTICASONE PROPIONATE 50 MCG
SPRAY, SUSPENSION (ML) NASAL
Qty: 16 ML | Refills: 1 | Status: SHIPPED | OUTPATIENT
Start: 2024-07-01

## 2024-07-03 ENCOUNTER — OFFICE VISIT (OUTPATIENT)
Dept: ENDOCRINOLOGY | Facility: CLINIC | Age: 74
End: 2024-07-03
Payer: COMMERCIAL

## 2024-07-03 VITALS
HEIGHT: 64 IN | BODY MASS INDEX: 33.43 KG/M2 | OXYGEN SATURATION: 98 % | HEART RATE: 72 BPM | SYSTOLIC BLOOD PRESSURE: 120 MMHG | DIASTOLIC BLOOD PRESSURE: 80 MMHG | WEIGHT: 195.8 LBS

## 2024-07-03 DIAGNOSIS — E11.22 TYPE 2 DIABETES MELLITUS WITH STAGE 3A CHRONIC KIDNEY DISEASE, WITHOUT LONG-TERM CURRENT USE OF INSULIN (HCC): Primary | ICD-10-CM

## 2024-07-03 DIAGNOSIS — Z79.4 TYPE 2 DIABETES MELLITUS WITH HYPERGLYCEMIA, WITH LONG-TERM CURRENT USE OF INSULIN (HCC): Chronic | ICD-10-CM

## 2024-07-03 DIAGNOSIS — E11.65 TYPE 2 DIABETES MELLITUS WITH HYPERGLYCEMIA, WITH LONG-TERM CURRENT USE OF INSULIN (HCC): Chronic | ICD-10-CM

## 2024-07-03 DIAGNOSIS — Z12.11 SCREENING FOR COLON CANCER: Primary | ICD-10-CM

## 2024-07-03 DIAGNOSIS — I10 PRIMARY HYPERTENSION: Chronic | ICD-10-CM

## 2024-07-03 DIAGNOSIS — N18.31 TYPE 2 DIABETES MELLITUS WITH STAGE 3A CHRONIC KIDNEY DISEASE, WITHOUT LONG-TERM CURRENT USE OF INSULIN (HCC): Primary | ICD-10-CM

## 2024-07-03 PROCEDURE — 99214 OFFICE O/P EST MOD 30 MIN: CPT | Performed by: PHYSICIAN ASSISTANT

## 2024-07-03 NOTE — ASSESSMENT & PLAN NOTE
"Diabetes remains uncontrolled  HE is interested in trial of another GLP1 agonist  Discussed Trulicity vs Mounjaro  He would like to start Mounjaro   Reviewed pen device at visit  Will start Mounjaro 2.5mg weekly  Discussed problems with cost, coverage, and medication shortages  If he does start Mounjaro, he can stop Januvia  Advised against taking the \"extra\" humalog and stick to scale as prescribed due to increased risk of hypoglycemia when starting Mounjaro  Send BG log  a few weeks after starting Mounjaro for additional med adjustments and mounjaro dose increase if needed  Discussed CGM for personal use but he declines.   Lab Results   Component Value Date    HGBA1C 7.8 (H) 06/25/2024     "

## 2024-07-03 NOTE — PROGRESS NOTES
"    Established Patient Progress Note    Chief Complaint:  Diabetes follow up visit    Impression & Plan:    Problem List Items Addressed This Visit        Cardiovascular and Mediastinum    Primary hypertension (Chronic)     Well controlled.             Endocrine    Type 2 diabetes mellitus with hyperglycemia, with long-term current use of insulin (formerly [facility]) (Chronic)     Diabetes remains uncontrolled  HE is interested in trial of another GLP1 agonist  Discussed Trulicity vs Mounjaro  He would like to start Mounjaro   Reviewed pen device at visit  Will start Mounjaro 2.5mg weekly  Discussed problems with cost, coverage, and medication shortages  If he does start Mounjaro, he can stop Januvia  Advised against taking the \"extra\" humalog and stick to scale as prescribed due to increased risk of hypoglycemia when starting Mounjaro  Send BG log  a few weeks after starting Mounjaro for additional med adjustments and mounjaro dose increase if needed  Discussed CGM for personal use but he declines.   Lab Results   Component Value Date    HGBA1C 7.8 (H) 06/25/2024          Relevant Medications    tirzepatide (Mounjaro) 2.5 MG/0.5ML   Other Visit Diagnoses     Type 2 diabetes mellitus with stage 3a chronic kidney disease, without long-term current use of insulin (formerly [facility])    -  Primary    Relevant Medications    tirzepatide (Mounjaro) 2.5 MG/0.5ML            History of Present Illness:   Armand Rocha is a 74 y.o. male with a history of type 2 diabetes  since 1996. Reports complications of CKD and retinopathy.      Hx ozempic intolerance (Rash?? 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 Smith discussed starting something similar to ozempic and he is interested in this.   HX SGLT2 inhibitor intolerance ( side effects)     His glucometer was recalled  Diet \"OK\" , saw RD in 2022. Knows what to do.   Checking blood sugar about 5x per day and most readings are in the 100s with occasional excursions over 200, hypoglycemia is rare, " lowest 73.      Current regimen:   Metfromin ER 500mg-- 2 tabs twice per day   Januvia 100mg daily   Humalog sliding scale--- but takes 1 extra unit than prescribed by scale  Glimepiride 4mg twice per day     Has hypertension: Taking Verapamil      Patient Active Problem List   Diagnosis   • Actinic keratosis   • Seborrheic keratosis   • History of skin cancer   • Anxiety   • Erectile dysfunction of non-organic origin   • Esophageal reflux   • Mixed hyperlipidemia due to type 2 diabetes mellitus  ([facility])   • Mitral valve disorder   • Rosacea   • Sleep apnea   • 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   • Type 2 diabetes mellitus with hyperglycemia, with long-term current use of insulin ([facility])   • Primary hypertension   • Class 1 obesity due to excess calories with serious comorbidity and body mass index (BMI) of 33.0 to 33.9 in adult      Past Medical History:   Diagnosis Date   • Actinic keratosis    • Adhesive capsulitis of unspecified shoulder    • Anxiety    • Asthma    • Atopic dermatitis    • Cardiac disease    • Cellulitis of right upper extremity    • Cervical radiculopathy    • Chest pain    • Chronic maxillary sinusitis     last assessed 01/21/14   • Diabetes mellitus ([facility])    • Endocrine disease    • Erectile dysfunction of non-organic origin    • Esophageal reflux    • GERD (gastroesophageal reflux disease)    • Gout     last assessed 08/26/15   • Hearing loss, conductive    • Heart disease    • Heart murmur     Mitral Valve disorder   • Henoch-Schonlein purpura ([facility]) 08/06/2019   • Herpes zoster    • History of colon polyps    • History of diverticulitis    • History of paroxysmal supraventricular tachycardia    • History of rectal polyps    • HL (hearing loss)    • Hypertension    • 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   • Irregular heart rate    • Mitral valve disorder    • Nasal congestion 04/18/2019   • Neoplasm of skin, malignant    • Non-melanoma skin cancer     last assessed  10/26/17   • Nosebleed 04/18/2019   • Obesity    • Palpitations     last assessed 03/05/15   • Paroxysmal supraventricular tachycardia 01/21/2014   • Plantar fasciitis     last assessed 05/28/15   • PSVT (paroxysmal supraventricular tachycardia)    • Sciatica    • Sleep apnea    • Squamous cell carcinoma of skin of scalp    • Thoracic radiculopathy 10/22/2015   • Tinnitus, unspecified ear    • Tonsillitis    • Type 2 diabetes mellitus with hyperglycemia (HCC)     last assessed 08/26/15   • Umbilical hernia    • Worms in stool     last assessed 08/26/15      Past Surgical History:   Procedure Laterality Date   • APPENDECTOMY  1968   • COLECTOMY  1993    Partial, Sigmoid for diverticulitis   • COLONOSCOPY  01/04/2016   • HEAD & NECK SKIN LESION EXCISIONAL BIOPSY      10/11/10 SCC --  10/17/11 SCC  --  Location Scalp   • HERNIA REPAIR      1998 & 2013   • NASAL/SINUS ENDOSCOPY N/A 08/06/2019    Procedure: FUNCTIONAL ENDOSCOPIC SINUS SURGERY (FESS) IMAGED GUIDED for control of epistaxis;  Surgeon: Leonel Bo MD;  Location: [facility];  Service: ENT   • VT ESOPHAGOGASTRODUODENOSCOPY TRANSORAL DIAGNOSTIC N/A 08/09/2017    Procedure: ESOPHAGOGASTRODUODENOSCOPY (EGD);  Surgeon: Anjel Steven MD;  Location: [facility];  Service: Gastroenterology   • SKIN BIOPSY     • TONSILLECTOMY  1960      Family History   Problem Relation Age of Onset   • COPD Mother    • Asthma Mother    • Cancer Mother         Breast + Skin   • Diabetes Mother    • Heart disease Mother    • Heart attack Mother    • Breast cancer Mother         Adenocarcinoma   • Skin cancer Mother         Adenocarcinoma   • Heart failure Mother    • Diabetes type II Mother    • Varicose Veins Mother         Lower Extremities   • Colon polyps Mother    • Heart disease Father    • Cancer Father         Prostate + Skin   • Prostate cancer Father         Adenocarcinoma   • Skin cancer Father    • Lymphoma Father         Non-Hodgkin's   • Allergies Father    • Colon  polyps Sister    • Allergies Sister    • Arthritis Family      Social History     Tobacco Use   • Smoking status: Former     Current packs/day: 0.00     Average packs/day: 1 pack/day for 20.0 years (20.0 ttl pk-yrs)     Types: Cigarettes     Start date: 1957     Quit date: 1977     Years since quittin.5   • Smokeless tobacco: Never   Substance Use Topics   • Alcohol use: Yes     Alcohol/week: 1.0 standard drink of alcohol     Types: 1 Standard drinks or equivalent per week     Comment: only occasionally     Allergies   Allergen Reactions   • Iodinated Contrast Media Anaphylaxis   • Shellfish Allergy - Food Allergy Anaphylaxis   • Shrimp Extract Allergy Skin Test - Food Allergy Anaphylaxis   • Empagliflozin Hives   • Farxiga [Dapagliflozin] Rash     Yeast infection   • Ozempic (0.25 Or 0.5 Mg-Dose) [Semaglutide(0.25 Or 0.5mg-Dos)] Rash         Current Outpatient Medications:   •  Accu-Chek Guide test strip, CHECK BLOOD SUGAR 3 TIMES DAILY, Disp: 100 strip, Rfl: 3  •  Blood Glucose Monitoring Suppl (Accu-Chek Guide) w/Device KIT, CHECK BLOOD SUGARS 3 TIMES DAILY **INSURANCE ONLY COVERS ACCU CHEK GUIDE SYSTEM**, Disp: 1 kit, Rfl: 0  •  Cyanocobalamin (VITAMIN B-12 PO), Take by mouth daily, Disp: , Rfl:   •  cyclobenzaprine (FLEXERIL) 5 mg tablet, Take 1 tablet (5 mg total) by mouth 3 (three) times a day as needed for muscle spasms, Disp: 60 tablet, Rfl: 3  •  EPINEPHrine (EPIPEN) 0.3 mg/0.3 mL SOAJ, INJECT 0.3 ML (0.3 MG TOTAL) INTO A MUSCLE ONCE FOR 1 DOSE, Disp: 2 each, Rfl: 0  •  fluticasone (FLONASE) 50 mcg/act nasal spray, SPRAY 1 SPRAY INTO EACH NOSTRIL EVERY DAY, Disp: 16 mL, Rfl: 1  •  glimepiride (AMARYL) 4 mg tablet, TAKE 1 TABLET BY MOUTH 2 TIMES A DAY., Disp: 180 tablet, Rfl: 3  •  Insulin Glargine Solostar (Lantus SoloStar) 100 UNIT/ML SOPN, INJECT 0.16 ML (16 UNITS TOTAL) UNDER THE SKIN DAILY, Disp: 15 mL, Rfl: 5  •  insulin lispro (HumaLOG) 100 units/mL injection pen, BLOOD GLUCOSE 150 -  189: 1 UNIT OF INSULIN BLOOD GLUCOSE 190 - 229: 2 UNITS OF INSULIN BLOOD GLUCOSE 230 - 269: 3 UNITS OF INSULIN BLOOD GLUCOSE 270 - 309: 4 UNITS OF INSULIN BLOOD GLUCOSE 310 - 349: 5 UNITS OF INSULIN BLOOD GLUCOSE GREATER THAN OR EQUAL : 6 UNITS OF INSULIN CHECK YOUR BLOOD GLUCOSE 3 TIMES A DAY AND BEFORE BEDTIME AND ADMINISTER INSULIN AS EXPLAINED ABOVE, Disp: 15 mL, Rfl: 2  •  Insulin Pen Needle (BD Pen Needle Audelia 2nd Gen) 32G X 4 MM MISC, USE TO INJECT INSULIN UP TO 4 TIMES A DAY, Disp: 200 each, Rfl: 5  •  ketoconazole (NIZORAL) 2 % cream, APPLY TOPICALLY 2 TIMES A DAY APPLIED TO FACE AND SCALP TWICE A DAY FOR 3 WEEKS REPEAT AS NEEDED, Disp: 30 g, Rfl: 0  •  Lancets (accu-chek multiclix) lancets, Please check Blood sugars 3 times daily, Disp: 102 each, Rfl: 3  •  metFORMIN (GLUCOPHAGE-XR) 500 mg 24 hr tablet, TAKE 2 TABLETS BY MOUTH TWICE A DAY WITH FOOD, Disp: 360 tablet, Rfl: 3  •  montelukast (SINGULAIR) 10 mg tablet, TAKE 1 TABLET BY MOUTH EVERYDAY AT BEDTIME, Disp: 90 tablet, Rfl: 3  •  pantoprazole (PROTONIX) 40 mg tablet, Take 1 tablet (40 mg total) by mouth daily, Disp: 30 tablet, Rfl: 1  •  Pyridoxine HCl (VITAMIN B-6 PO), Take 25 mg by mouth daily, Disp: , Rfl:   •  sildenafil (VIAGRA) 50 MG tablet, Take 1 tablet (50 mg total) by mouth daily as needed for erectile dysfunction, Disp: 6 tablet, Rfl: 5  •  sodium chloride (OCEAN) 0.65 % nasal spray, 1-2 sprays into each nostril daily as needed for congestion, Disp: 90 mL, Rfl: 3  •  tirzepatide (Mounjaro) 2.5 MG/0.5ML, Inject 0.5 mL (2.5 mg total) under the skin every 7 days, Disp: 2 mL, Rfl: 1  •  triamcinolone (KENALOG) 0.025 % cream, Apply topically as needed , Disp: , Rfl:   •  verapamil (Verelan) 180 MG 24 hr capsule, Take 1 capsule (180 mg total) by mouth 2 (two) times a day, Disp: 180 capsule, Rfl: 3  •  omeprazole (PriLOSEC) 20 mg delayed release capsule, Take 1 capsule (20 mg total) by mouth daily (Patient not taking: Reported on 7/3/2024),  "Disp: 90 capsule, Rfl: 3  •  polyethylene glycol (GOLYTELY) 4000 mL solution, Take 4,000 mL by mouth once for 1 dose (Patient not taking: Reported on 7/3/2024), Disp: 4000 mL, Rfl: 0    Review of Systems   Constitutional:  Negative for activity change, appetite change and fatigue.   HENT:  Negative for sore throat, trouble swallowing and voice change.    Eyes:  Negative for visual disturbance.   Respiratory:  Negative for choking, chest tightness and shortness of breath.    Cardiovascular:  Negative for chest pain, palpitations and leg swelling.   Gastrointestinal:  Negative for abdominal pain, constipation and diarrhea.   Endocrine: Negative for cold intolerance, heat intolerance, polydipsia, polyphagia and polyuria.   Genitourinary:  Negative for frequency.   Musculoskeletal:  Negative for arthralgias and myalgias.   Skin:  Negative for rash.   Neurological:  Negative for dizziness and syncope.   Hematological:  Negative for adenopathy.   Psychiatric/Behavioral:  Negative for sleep disturbance.    All other systems reviewed and are negative.      Physical Exam:  Body mass index is 33.61 kg/m².  /80   Pulse 72   Ht 5' 4\" (1.626 m)   Wt 88.8 kg (195 lb 12.8 oz)   SpO2 98%   BMI 33.61 kg/m²    Wt Readings from Last 3 Encounters:   07/03/24 88.8 kg (195 lb 12.8 oz)   06/26/24 86.6 kg (191 lb)   06/24/24 86.6 kg (191 lb)       Physical Exam  Vitals reviewed.   Constitutional:       General: He is not in acute distress.     Appearance: He is well-developed.   HENT:      Head: Normocephalic and atraumatic.   Eyes:      Conjunctiva/sclera: Conjunctivae normal.      Pupils: Pupils are equal, round, and reactive to light.   Neck:      Thyroid: No thyromegaly.   Cardiovascular:      Rate and Rhythm: Normal rate and regular rhythm.      Heart sounds: Normal heart sounds. No murmur heard.  Pulmonary:      Effort: Pulmonary effort is normal. No respiratory distress.      Breath sounds: Normal breath sounds. No " wheezing or rales.   Abdominal:      General: Bowel sounds are normal. There is no distension.      Palpations: Abdomen is soft.      Tenderness: There is no abdominal tenderness.   Musculoskeletal:         General: Normal range of motion.      Cervical back: Normal range of motion and neck supple.   Lymphadenopathy:      Cervical: No cervical adenopathy.   Skin:     General: Skin is warm and dry.   Neurological:      Mental Status: He is alert and oriented to person, place, and time.           Labs:   Lab Results   Component Value Date    HGBA1C 7.8 (H) 06/25/2024    HGBA1C 7.9 (H) 03/07/2024    HGBA1C 8.4 (H) 10/02/2023     Lab Results   Component Value Date    CREATININE 1.10 06/25/2024    CREATININE 1.09 03/07/2024    CREATININE 1.09 11/27/2023    BUN 19 06/25/2024     08/19/2015    K 4.0 06/25/2024     06/25/2024    CO2 30 06/25/2024     eGFR   Date Value Ref Range Status   06/25/2024 65 ml/min/1.73sq m Final     Lab Results   Component Value Date    CHOL 183 08/19/2015    HDL 43 06/25/2024    TRIG 78 06/25/2024     Lab Results   Component Value Date    ALT 17 03/07/2024    AST 17 03/07/2024    ALKPHOS 46 03/07/2024    BILITOT 0.8 08/19/2015     Lab Results   Component Value Date    RVY6CVJSZJTH 1.206 08/21/2019    RWK7SGANHIVJ 1.020 11/09/2017    [id number] 0.915 01/15/2016     Lab Results   Component Value Date    FREET4 0.92 08/21/2019       Discussed with the patient and all questioned fully answered. He will call me if any problems arise.    Follow-up appointment in 3 months.     Counseled patient on diagnostic results, prognosis, risk and benefit of treatment options, instruction for management, importance of treatment compliance, Risk  factor reduction and impressions    Gillian Weinberg PA-C

## 2024-07-05 ENCOUNTER — TELEPHONE (OUTPATIENT)
Age: 74
End: 2024-07-05

## 2024-07-10 DIAGNOSIS — E11.22 TYPE 2 DIABETES MELLITUS WITH STAGE 3A CHRONIC KIDNEY DISEASE, WITHOUT LONG-TERM CURRENT USE OF INSULIN (HCC): Primary | ICD-10-CM

## 2024-07-10 DIAGNOSIS — N18.31 TYPE 2 DIABETES MELLITUS WITH STAGE 3A CHRONIC KIDNEY DISEASE, WITHOUT LONG-TERM CURRENT USE OF INSULIN (HCC): Primary | ICD-10-CM

## 2024-07-10 RX ORDER — DULAGLUTIDE 0.75 MG/.5ML
0.75 INJECTION, SOLUTION SUBCUTANEOUS
Qty: 2 ML | Refills: 3 | Status: SHIPPED | OUTPATIENT
Start: 2024-07-10 | End: 2024-07-11 | Stop reason: ALTCHOICE

## 2024-07-10 NOTE — TELEPHONE ENCOUNTER
Please let patient know Mounjaro is on back order  I will Try Trulicity 0.75mg weekly instead  If this is also on back order, let us know.

## 2024-07-11 DIAGNOSIS — E11.22 TYPE 2 DIABETES MELLITUS WITH STAGE 3A CHRONIC KIDNEY DISEASE, WITHOUT LONG-TERM CURRENT USE OF INSULIN (HCC): Primary | ICD-10-CM

## 2024-07-11 DIAGNOSIS — N18.31 TYPE 2 DIABETES MELLITUS WITH STAGE 3A CHRONIC KIDNEY DISEASE, WITHOUT LONG-TERM CURRENT USE OF INSULIN (HCC): Primary | ICD-10-CM

## 2024-07-11 NOTE — TELEPHONE ENCOUNTER
OK, If he was able to get Mounjaro I will remove trulicity from his list.    I will send RX for Mounjaro 5mg weekly to pharmacy, please let him know he can start that after 1 month on the Mounjaro 2.5mg dose if no side effects. If any low sugars, let us know so insulin can be reduced.

## 2024-07-12 DIAGNOSIS — Z79.4 CURRENT USE OF INSULIN (HCC): ICD-10-CM

## 2024-07-12 RX ORDER — INSULIN LISPRO 100 [IU]/ML
INJECTION, SOLUTION INTRAVENOUS; SUBCUTANEOUS
Qty: 15 ML | Refills: 2 | Status: SHIPPED | OUTPATIENT
Start: 2024-07-12

## 2024-07-17 ENCOUNTER — PATIENT MESSAGE (OUTPATIENT)
Dept: ENDOCRINOLOGY | Facility: CLINIC | Age: 74
End: 2024-07-17

## 2024-07-17 NOTE — PATIENT COMMUNICATION
Patient also states he has been taking the mounjaro 2.5mg for 2 weeks now. He will start 5mg after 1 month.

## 2024-07-17 NOTE — PATIENT COMMUNICATION
Patient calling back, relayed above message and he expressed understanding. No further action needed.

## 2024-07-23 ENCOUNTER — TELEPHONE (OUTPATIENT)
Dept: ENDOCRINOLOGY | Facility: CLINIC | Age: 74
End: 2024-07-23

## 2024-07-24 DIAGNOSIS — E11.22 TYPE 2 DIABETES MELLITUS WITH STAGE 3A CHRONIC KIDNEY DISEASE, WITHOUT LONG-TERM CURRENT USE OF INSULIN (HCC): Chronic | ICD-10-CM

## 2024-07-24 DIAGNOSIS — N18.31 TYPE 2 DIABETES MELLITUS WITH STAGE 3A CHRONIC KIDNEY DISEASE, WITHOUT LONG-TERM CURRENT USE OF INSULIN (HCC): Chronic | ICD-10-CM

## 2024-07-24 RX ORDER — INSULIN GLARGINE 100 [IU]/ML
INJECTION, SOLUTION SUBCUTANEOUS
Qty: 15 ML | Refills: 5 | Status: SHIPPED | OUTPATIENT
Start: 2024-07-24

## 2024-07-24 NOTE — TELEPHONE ENCOUNTER
Morning blood sugars are on the low side  Reduce lantus from 15 units daily to 10 units daily.   I will update list  Send log again in 2 weeks.

## 2024-07-25 ENCOUNTER — OFFICE VISIT (OUTPATIENT)
Age: 74
End: 2024-07-25
Payer: COMMERCIAL

## 2024-07-25 VITALS
BODY MASS INDEX: 32.27 KG/M2 | TEMPERATURE: 97.2 F | OXYGEN SATURATION: 94 % | RESPIRATION RATE: 18 BRPM | DIASTOLIC BLOOD PRESSURE: 76 MMHG | SYSTOLIC BLOOD PRESSURE: 116 MMHG | HEIGHT: 64 IN | HEART RATE: 90 BPM | WEIGHT: 189 LBS

## 2024-07-25 DIAGNOSIS — E11.69 MIXED HYPERLIPIDEMIA DUE TO TYPE 2 DIABETES MELLITUS  (HCC): Chronic | ICD-10-CM

## 2024-07-25 DIAGNOSIS — Z79.4 TYPE 2 DIABETES MELLITUS WITH HYPERGLYCEMIA, WITH LONG-TERM CURRENT USE OF INSULIN (HCC): Primary | Chronic | ICD-10-CM

## 2024-07-25 DIAGNOSIS — R21 PENILE RASH: ICD-10-CM

## 2024-07-25 DIAGNOSIS — E11.65 TYPE 2 DIABETES MELLITUS WITH HYPERGLYCEMIA, WITH LONG-TERM CURRENT USE OF INSULIN (HCC): Primary | Chronic | ICD-10-CM

## 2024-07-25 DIAGNOSIS — E78.2 MIXED HYPERLIPIDEMIA DUE TO TYPE 2 DIABETES MELLITUS  (HCC): Chronic | ICD-10-CM

## 2024-07-25 DIAGNOSIS — I10 PRIMARY HYPERTENSION: Chronic | ICD-10-CM

## 2024-07-25 DIAGNOSIS — Z00.00 MEDICARE ANNUAL WELLNESS VISIT, SUBSEQUENT: ICD-10-CM

## 2024-07-25 PROCEDURE — G0439 PPPS, SUBSEQ VISIT: HCPCS | Performed by: INTERNAL MEDICINE

## 2024-07-25 PROCEDURE — 99214 OFFICE O/P EST MOD 30 MIN: CPT | Performed by: INTERNAL MEDICINE

## 2024-07-25 RX ORDER — CLOTRIMAZOLE 1 %
CREAM (GRAM) TOPICAL 2 TIMES DAILY
Qty: 45 G | Refills: 3 | Status: SHIPPED | OUTPATIENT
Start: 2024-07-25

## 2024-07-25 RX ORDER — ATORVASTATIN CALCIUM 10 MG/1
10 TABLET, FILM COATED ORAL DAILY
Qty: 90 TABLET | Refills: 3 | Status: SHIPPED | OUTPATIENT
Start: 2024-07-25

## 2024-07-25 NOTE — PROGRESS NOTES
Ambulatory Visit  Name: Armand Rocha      : 1950      MRN: [id number]  Encounter Provider: David De La Garza DO  Encounter Date: 2024   Encounter department: [facility] PRIMARY CARE [place]    Assessment & Plan   1. Type 2 diabetes mellitus with hyperglycemia, with long-term current use of insulin (HCC)  2. Mixed hyperlipidemia due to type 2 diabetes mellitus  (HCC)    Most recent A1c was 7.8 % on 2024. He is already seeing an improvement in his blood sugars on 2.5 mg of mounjaro. He will be going up to the 5 mg dose soon. His lantus dose has been dropped down to 10 units. Continue to monitor sugars closely. Continue statin for HLD. Get labs done before appt with endocrinology in October.    - Hemoglobin A1C; Future  - Basic metabolic panel; Future    3. Primary hypertension    Stable and BP is controlled. Continue current anti-HTN regimen.    4. Penile rash  - clotrimazole (LOTRIMIN) 1 % cream; Apply topically 2 (two) times a day  Dispense: 45 g; Refill: 3    5. Medicare annual wellness visit, subsequent      Depression Screening and Follow-up Plan: Patient was screened for depression during today's encounter. They screened negative with a PHQ-2 score of 2.      Preventive health issues were discussed with patient, and age appropriate screening tests were ordered as noted in patient's After Visit Summary. Personalized health advice and appropriate referrals for health education or preventive services given if needed, as noted in patient's After Visit Summary.    History of Present Illness     History of Present Illness  The patient presents for routine follow-up and Medicare wellness visit.    He reports a rash on his penis that began 5 days ago. Despite extensive gardening work on his hands, he suspects a contamination of his hands may have caused the rash. He has been applying an antifungal cream three times a day, which has improved the redness. His A1c levels have decreased since his  last visit. He has been on Mounjaro for 3 weeks, which has reduced his appetite and reduced his blood sugar levels. He has reduced his Lantus dosage from 16 to 12 due to low blood sugar levels, and his blood sugar was 95 this morning. He also takes Humalog if his blood sugar exceeds 150. He discontinued pantoprazole 2 days ago and is unsure if the rash is related to Mounjaro or pantoprazole.     Patient Care Team:  David De La Garza DO as PCP - General (Internal Medicine)  Indiana Smith MD as PCP - Endocrinology (Endocrinology)  MD Alfreda Luciano MD Robert J Malcolm III, MD (Gastroenterology)  [facility] (Ophthalmology)  Indiana Velasquez RN as Registered Nurse (Diabetes Services)  Charan Juan as Diabetes Educator (Dietician)  Zahida Weinberg PA-C as Physician Assistant (Endocrinology)    Review of Systems   Constitutional: Negative.    Respiratory: Negative.     Cardiovascular: Negative.    Gastrointestinal: Negative.    Skin:  Positive for rash (penis).     Medical History Reviewed by provider this encounter:  Tobacco  Allergies  Meds  Problems  Med Hx  Surg Hx  Fam Hx       Annual Wellness Visit Questionnaire   Armand is here for his Subsequent Wellness visit. Last Medicare Wellness visit information reviewed, patient interviewed and updates made to the record today.      Health Risk Assessment:   Patient rates overall health as very good. Patient feels that their physical health rating is same. Patient is satisfied with their life. Eyesight was rated as slightly worse. Hearing was rated as slightly worse. Patient feels that their emotional and mental health rating is same. Patients states they are never, rarely angry. Patient states they are sometimes unusually tired/fatigued. Pain experienced in the last 7 days has been some. Patient's pain rating has been 1/10. Patient states that he has experienced no weight loss or gain in last 6 months.     Depression Screening:   PHQ-2  Score: 2      Fall Risk Screening:   In the past year, patient has experienced: no history of falling in past year      Home Safety:  Patient does not have trouble with stairs inside or outside of their home. Patient has working smoke alarms and has working carbon monoxide detector. Home safety hazards include: none.     Nutrition:   Current diet is Diabetic.     Medications:   Patient is currently taking over-the-counter supplements. OTC medications include: see medication list. Patient is able to manage medications.     Activities of Daily Living (ADLs)/Instrumental Activities of Daily Living (IADLs):   Walk and transfer into and out of bed and chair?: Yes  Dress and groom yourself?: Yes    Bathe or shower yourself?: Yes    Feed yourself? Yes  Do your laundry/housekeeping?: Yes  Manage your money, pay your bills and track your expenses?: Yes  Make your own meals?: Yes    Do your own shopping?: Yes    Previous Hospitalizations:   Any hospitalizations or ED visits within the last 12 months?: Yes    How many hospitalizations have you had in the last year?: 1-2    Advance Care Planning:   Living will: Yes    Durable POA for healthcare: Yes    Advanced directive: Yes    Five wishes given: No      Cognitive Screening:   Provider or family/friend/caregiver concerned regarding cognition?: No    PREVENTIVE SCREENINGS      Cardiovascular Screening:    General: Screening Not Indicated and History Lipid Disorder      Diabetes Screening:     General: Screening Not Indicated and History Diabetes      Colorectal Cancer Screening:     General: Screening Current      Prostate Cancer Screening:    General: Screening Not Indicated      Osteoporosis Screening:    General: Screening Not Indicated      Abdominal Aortic Aneurysm (AAA) Screening:    Risk factors include: age between 65-76 yo and tobacco use        General: Screening Current      Lung Cancer Screening:     General: Screening Not Indicated      Hepatitis C Screening:     "General: Screening Current    Screening, Brief Intervention, and Referral to Treatment (SBIRT)    Screening  Typical number of drinks in a day: 0  Typical number of drinks in a week: 0  Interpretation: Low risk drinking behavior.    AUDIT-C Screenin) How often did you have a drink containing alcohol in the past year? monthly or less  2) How many drinks did you have on a typical day when you were drinking in the past year? 0  3) How often did you have 6 or more drinks on one occasion in the past year? never    AUDIT-C Score: 1  Interpretation: Score 0-3 (male): Negative screen for alcohol misuse    Single Item Drug Screening:  How often have you used an illegal drug (including marijuana) or a prescription medication for non-medical reasons in the past year? less than monthly    Single Item Drug Screen Score: 1  Interpretation: POSITIVE screen for possible drug use disorder    Drug Abuse Screening Test (DAST-10):  1) Have you used drugs other than those required for medical reasons? Yes  2) Do you abuse more than one drug at a time? No  3) Are you always able to stop using drugs when you want to? Yes  4) Have you had \"blackouts\" or \"flashbacks\" as a result of drug use? No  5) Do you ever feel bad or guilty about your drug use? No  6) Does your spouse (or parents) ever complain about your involvement with drugs? No  7) Have you neglected your family because of your use of drugs? No  8) Have you engaged in illegal activities in order to obtain drugs? No  9) Have you ever experienced withdrawal symptoms (felt sick) when you stopped taking drugs? No  10) Have you had medical problems as a result of your drug use (e.g., memory loss, hepatitis, convulsions, bleeding, etc.)? No    DAST-10 Score: 1  Interpretation: Low level problems related to drug abuse    Brief Intervention  Alcohol & drug use screenings were reviewed. No concerns regarding substance use disorder identified.     Other Counseling Topics:   Car/seat " "belt/driving safety, skin self-exam, sunscreen and regular weightbearing exercise.     Social Determinants of Health     Financial Resource Strain: Medium Risk (4/18/2023)    Overall Financial Resource Strain (CARDIA)     Difficulty of Paying Living Expenses: Somewhat hard   Food Insecurity: No Food Insecurity (7/23/2024)    Hunger Vital Sign     Worried About Running Out of Food in the Last Year: Never true     Ran Out of Food in the Last Year: Never true   Transportation Needs: No Transportation Needs (7/23/2024)    PRAPARE - Transportation     Lack of Transportation (Medical): No     Lack of Transportation (Non-Medical): No   Housing Stability: Low Risk  (7/23/2024)    Housing Stability Vital Sign     Unable to Pay for Housing in the Last Year: No     Number of Times Moved in the Last Year: 0     Homeless in the Last Year: No   Utilities: Not At Risk (7/23/2024)    [facility] Utilities     Threatened with loss of utilities: No     Objective     /76 (BP Location: Left arm, Patient Position: Sitting, Cuff Size: Standard)   Pulse 90   Temp (!) 97.2 °F (36.2 °C) (Tympanic)   Resp 18   Ht 5' 4\" (1.626 m)   Wt 85.7 kg (189 lb)   SpO2 94%   BMI 32.44 kg/m²     Physical Exam  Constitutional:       General: He is not in acute distress.     Appearance: He is not ill-appearing.   Cardiovascular:      Rate and Rhythm: Normal rate and regular rhythm.      Heart sounds: No murmur heard.  Pulmonary:      Effort: Pulmonary effort is normal. No respiratory distress.      Breath sounds: No wheezing.   Abdominal:      General: Bowel sounds are normal. There is no distension.      Tenderness: There is no abdominal tenderness.   Musculoskeletal:      Right lower leg: No edema.      Left lower leg: No edema.   Skin:     Findings: Rash (penile) present.   Neurological:      Mental Status: He is alert.       David De La Garza, DO   "

## 2024-07-26 DIAGNOSIS — Z79.4 TYPE 2 DIABETES MELLITUS WITH HYPERGLYCEMIA, WITH LONG-TERM CURRENT USE OF INSULIN (HCC): Chronic | ICD-10-CM

## 2024-07-26 DIAGNOSIS — E11.65 TYPE 2 DIABETES MELLITUS WITH HYPERGLYCEMIA, WITH LONG-TERM CURRENT USE OF INSULIN (HCC): Chronic | ICD-10-CM

## 2024-07-26 RX ORDER — BLOOD-GLUCOSE METER
EACH MISCELLANEOUS
Qty: 1 KIT | Refills: 0 | Status: SHIPPED | OUTPATIENT
Start: 2024-07-26

## 2024-08-01 DIAGNOSIS — N18.2 TYPE 2 DIABETES MELLITUS WITH STAGE 2 CHRONIC KIDNEY DISEASE, WITHOUT LONG-TERM CURRENT USE OF INSULIN  (HCC): Chronic | ICD-10-CM

## 2024-08-01 DIAGNOSIS — E11.22 TYPE 2 DIABETES MELLITUS WITH STAGE 2 CHRONIC KIDNEY DISEASE, WITHOUT LONG-TERM CURRENT USE OF INSULIN  (HCC): Chronic | ICD-10-CM

## 2024-08-01 DIAGNOSIS — L21.9 SEBORRHEIC DERMATITIS: ICD-10-CM

## 2024-08-02 ENCOUNTER — TELEPHONE (OUTPATIENT)
Age: 74
End: 2024-08-02

## 2024-08-02 RX ORDER — KETOCONAZOLE 20 MG/G
CREAM TOPICAL
Qty: 30 G | Refills: 0 | Status: SHIPPED | OUTPATIENT
Start: 2024-08-02

## 2024-08-02 RX ORDER — METFORMIN HYDROCHLORIDE 500 MG/1
1000 TABLET, EXTENDED RELEASE ORAL 2 TIMES DAILY WITH MEALS
Qty: 360 TABLET | Refills: 1 | Status: SHIPPED | OUTPATIENT
Start: 2024-08-02

## 2024-08-02 NOTE — TELEPHONE ENCOUNTER
Patient states the insurance company faxed a form over to the office yesterday. They will not let him get his mounjaro until the office shows proof that he stopped Januvia. He states his dose is due Tuesday, he is on 2.5mg right now but will be moving up to 5mg once the insurance company receives the information from the office. Patient is asking if someone can call insurance to give a verbal or provide a sample so he can at least get his dose on Tuesday until this is done.  Please advise

## 2024-08-05 NOTE — TELEPHONE ENCOUNTER
Patient called back- he hasn't heard anything from his message on Friday. I did verify that we usually have a timeframe for a response of 24-48 hours, but since it was the weekend, it would be a little longer.    He needs the provider to show proof that he is not longer taking Januvia, or they will not give him his Mounjaro. He is due for a shot tomorrow, please advise on what he should do because the pharmacy said it will take awhile for them to get it in anyway.     He would like a call back with updates or when it is has been completed.

## 2024-08-06 ENCOUNTER — TELEPHONE (OUTPATIENT)
Dept: ENDOCRINOLOGY | Facility: CLINIC | Age: 74
End: 2024-08-06

## 2024-08-06 NOTE — TELEPHONE ENCOUNTER
Patient is out of his Mounjaro and wants to speak with the doctor on what he needs to do. He has been calling since Friday and is upset nobody has reached out to him.

## 2024-08-06 NOTE — TELEPHONE ENCOUNTER
Patient calling back again, he spoke with insurance company who state they sent the fax on Friday. He is completely out of medication now. Can someone call his insurance company for him at [phone]? They need proof that he stopped Januvia in order for them to send his Mounjaro.   Patient requesting call back today with update.  Please advise

## 2024-08-06 NOTE — TELEPHONE ENCOUNTER
Rony from [employer] with [facility] called in stating that the member is trying to get mounjaro 2.5mg/0.5 mL pen -but they cannot fill it because they did the PA form sent over to us that's attached belowe stating that he is no longer on januva and then it can be approved. Notes they faxed the form back on 8/1b ut have not recieved a response yet.    [facility] clinical pharmacy review team  call back: [phone]   fax: [phone]    I did make patient and representative aware that the turn around time for Pas are 7-21 business days.    Patient is completely out and is supposed to have his next Mounjaro today which he noted he explained to us on Friday. Rony is asking if we have samples for the patient until he gets his medication?

## 2024-08-07 ENCOUNTER — TELEPHONE (OUTPATIENT)
Dept: ENDOCRINOLOGY | Facility: CLINIC | Age: 74
End: 2024-08-07

## 2024-08-07 NOTE — TELEPHONE ENCOUNTER
Patient called in, he spoke with Lona who told him the form was faxed to the wrong number. They need this prior auth to be faxed to [phone] with clinical chart notes. He also states they told him the office should call Lona directly at [phone].  Patient wants to know what he is supposed to do since he has no mounjaro?  Please advise

## 2024-08-07 NOTE — TELEPHONE ENCOUNTER
Patient called- he was beginning to get annoyed because he has been calling since 8/2 to get his letter sent to [facility] for his Mounjaro,    I verified that we sent the letter today around 1:30 pm. He verbalized understanding and will call them sometime today to see if they received it. No further actions needed.

## 2024-09-03 DIAGNOSIS — N18.31 TYPE 2 DIABETES MELLITUS WITH STAGE 3A CHRONIC KIDNEY DISEASE, WITHOUT LONG-TERM CURRENT USE OF INSULIN (HCC): ICD-10-CM

## 2024-09-03 DIAGNOSIS — E11.22 TYPE 2 DIABETES MELLITUS WITH STAGE 3A CHRONIC KIDNEY DISEASE, WITHOUT LONG-TERM CURRENT USE OF INSULIN (HCC): ICD-10-CM

## 2024-09-03 PROCEDURE — 3066F NEPHROPATHY DOC TX: CPT | Performed by: INTERNAL MEDICINE

## 2024-09-04 RX ORDER — TIRZEPATIDE 2.5 MG/.5ML
INJECTION, SOLUTION SUBCUTANEOUS
Qty: 2 ML | Refills: 1 | OUTPATIENT
Start: 2024-09-04

## 2024-09-04 NOTE — TELEPHONE ENCOUNTER
Patient called the RX Refill Line. Message is being forwarded to the office.     Patient is requesting - Pt called requesting refill for mounjaro 2.5 mg. Pt was informed that only the mounjaro 5 mg was active on his med list. Pt stated he was unsure if his dose was being increase because he was not aware of. Please review and send refill to pharmacy, Thank you.      [facility]/pharmacy #[id number] - [facility] [address] - [address] [place] [address]

## 2024-09-05 ENCOUNTER — OFFICE VISIT (OUTPATIENT)
Age: 74
End: 2024-09-05
Payer: COMMERCIAL

## 2024-09-05 VITALS
HEART RATE: 78 BPM | WEIGHT: 188.4 LBS | OXYGEN SATURATION: 97 % | TEMPERATURE: 97.1 F | RESPIRATION RATE: 18 BRPM | DIASTOLIC BLOOD PRESSURE: 74 MMHG | BODY MASS INDEX: 31.39 KG/M2 | SYSTOLIC BLOOD PRESSURE: 118 MMHG | HEIGHT: 65 IN

## 2024-09-05 DIAGNOSIS — Z79.4 TYPE 2 DIABETES MELLITUS WITH HYPERGLYCEMIA, WITH LONG-TERM CURRENT USE OF INSULIN (HCC): Chronic | ICD-10-CM

## 2024-09-05 DIAGNOSIS — E11.65 TYPE 2 DIABETES MELLITUS WITH HYPERGLYCEMIA, WITH LONG-TERM CURRENT USE OF INSULIN (HCC): Chronic | ICD-10-CM

## 2024-09-05 DIAGNOSIS — L23.7 POISON IVY DERMATITIS: Primary | ICD-10-CM

## 2024-09-05 PROCEDURE — 3078F DIAST BP <80 MM HG: CPT | Performed by: INTERNAL MEDICINE

## 2024-09-05 PROCEDURE — 99213 OFFICE O/P EST LOW 20 MIN: CPT | Performed by: INTERNAL MEDICINE

## 2024-09-05 PROCEDURE — 3074F SYST BP LT 130 MM HG: CPT | Performed by: INTERNAL MEDICINE

## 2024-09-05 PROCEDURE — 1159F MED LIST DOCD IN RCRD: CPT | Performed by: INTERNAL MEDICINE

## 2024-09-05 PROCEDURE — 1160F RVW MEDS BY RX/DR IN RCRD: CPT | Performed by: INTERNAL MEDICINE

## 2024-09-05 PROCEDURE — G2211 COMPLEX E/M VISIT ADD ON: HCPCS | Performed by: INTERNAL MEDICINE

## 2024-09-05 RX ORDER — TRIAMCINOLONE ACETONIDE 1 MG/G
CREAM TOPICAL 2 TIMES DAILY
Qty: 453.6 G | Refills: 1 | Status: SHIPPED | OUTPATIENT
Start: 2024-09-05

## 2024-09-05 RX ORDER — LANCETS
EACH MISCELLANEOUS
Qty: 100 EACH | Refills: 3 | Status: SHIPPED | OUTPATIENT
Start: 2024-09-05

## 2024-09-05 NOTE — PROGRESS NOTES
"Ambulatory Visit  Name: Armand Rocha      : 1950      MRN: [id number]  Encounter Provider: David De La Garza DO  Encounter Date: 2024   Encounter department: [facility] PRIMARY CARE [place]    Assessment & Plan   1. Poison ivy dermatitis  -     triamcinolone (KENALOG) 0.1 % cream; Apply topically 2 (two) times a day    Not consistent with shingles. More consistent with poison ivy. He has diabetes and will try to avoid oral prednisone at this time.     History of Present Illness     History of Present Illness  The patient presents for evaluation of a rash.    He has developed a rash on his arm and abdomen, which he suspects might be due to contact with poison ivy while weeding. The rash is itchy and has spread to his genital area. He also notes redness around his fingers. He has been using Benadryl at night and initially applied cortisone, but it did not provide significant relief. Poison ivy soap has provided some relief by drying out the rash.       Review of Systems   Skin:  Positive for rash.     Objective     /74 (BP Location: Left arm, Patient Position: Sitting, Cuff Size: Standard)   Pulse 78   Temp (!) 97.1 °F (36.2 °C) (Tympanic)   Resp 18   Ht 5' 4.8\" (1.646 m)   Wt 85.5 kg (188 lb 6.4 oz)   SpO2 97%   BMI 31.55 kg/m²     Physical Exam  Constitutional:       General: He is not in acute distress.     Appearance: He is not ill-appearing.   Cardiovascular:      Pulses: no weak pulses.           Dorsalis pedis pulses are 2+ on the right side and 2+ on the left side.        Posterior tibial pulses are 2+ on the right side and 2+ on the left side.   Feet:      Right foot:      Skin integrity: Callus and dry skin present. No ulcer, skin breakdown, erythema or warmth.      Left foot:      Skin integrity: Callus and dry skin present. No ulcer, skin breakdown, erythema or warmth.   Skin:     Findings: Rash (right arm, stomach. Raised maculopapular rash) present.   Neurological:      " Mental Status: He is alert.       Diabetic Foot Exam    Patient's shoes and socks removed.    Right Foot/Ankle   Right Foot Inspection  Skin Exam: skin normal, skin intact, dry skin, callus and callus. No warmth, no erythema, no maceration, no abnormal color, no pre-ulcer and no ulcer.     Sensory   Monofilament testing: intact    Vascular  The right DP pulse is 2+. The right PT pulse is 2+.     Left Foot/Ankle  Left Foot Inspection  Skin Exam: skin normal, skin intact, dry skin and callus. No warmth, no erythema, no maceration, normal color, no pre-ulcer and no ulcer.     Sensory   Monofilament testing: intact    Vascular  The left DP pulse is 2+. The left PT pulse is 2+.     Assign Risk Category  No deformity present  No loss of protective sensation  No weak pulses  Risk: 0    David De La Garza,

## 2024-09-06 DIAGNOSIS — L21.9 SEBORRHEIC DERMATITIS: ICD-10-CM

## 2024-09-06 RX ORDER — KETOCONAZOLE 20 MG/G
CREAM TOPICAL
Qty: 30 G | Refills: 0 | Status: SHIPPED | OUTPATIENT
Start: 2024-09-06

## 2024-09-18 PROBLEM — J30.1 CHRONIC SEASONAL ALLERGIC RHINITIS DUE TO POLLEN: Status: ACTIVE | Noted: 2024-09-18

## 2024-09-18 PROBLEM — J30.89 ALLERGIC RHINITIS DUE TO HOUSE DUST MITE: Status: ACTIVE | Noted: 2024-09-18

## 2024-09-18 PROBLEM — T50.995A: Status: ACTIVE | Noted: 2024-09-18

## 2024-10-04 DIAGNOSIS — L21.9 SEBORRHEIC DERMATITIS: ICD-10-CM

## 2024-10-07 RX ORDER — KETOCONAZOLE 20 MG/G
CREAM TOPICAL 2 TIMES DAILY
Qty: 30 G | Refills: 0 | Status: SHIPPED | OUTPATIENT
Start: 2024-10-07

## 2024-10-22 ENCOUNTER — APPOINTMENT (OUTPATIENT)
Age: 74
End: 2024-10-22
Payer: COMMERCIAL

## 2024-10-22 DIAGNOSIS — E11.69 MIXED HYPERLIPIDEMIA DUE TO TYPE 2 DIABETES MELLITUS  (HCC): Chronic | ICD-10-CM

## 2024-10-22 DIAGNOSIS — E78.2 MIXED HYPERLIPIDEMIA DUE TO TYPE 2 DIABETES MELLITUS  (HCC): Chronic | ICD-10-CM

## 2024-10-22 LAB
ANION GAP SERPL CALCULATED.3IONS-SCNC: 11 MMOL/L (ref 4–13)
BUN SERPL-MCNC: 18 MG/DL (ref 5–25)
CALCIUM SERPL-MCNC: 9.6 MG/DL (ref 8.4–10.2)
CHLORIDE SERPL-SCNC: 104 MMOL/L (ref 96–108)
CO2 SERPL-SCNC: 27 MMOL/L (ref 21–32)
CREAT SERPL-MCNC: 1.03 MG/DL (ref 0.6–1.3)
GFR SERPL CREATININE-BSD FRML MDRD: 71 ML/MIN/1.73SQ M
GLUCOSE P FAST SERPL-MCNC: 93 MG/DL (ref 65–99)
POTASSIUM SERPL-SCNC: 4.2 MMOL/L (ref 3.5–5.3)
SODIUM SERPL-SCNC: 142 MMOL/L (ref 135–147)

## 2024-10-22 PROCEDURE — 83036 HEMOGLOBIN GLYCOSYLATED A1C: CPT

## 2024-10-22 PROCEDURE — 80048 BASIC METABOLIC PNL TOTAL CA: CPT

## 2024-10-22 PROCEDURE — 36415 COLL VENOUS BLD VENIPUNCTURE: CPT

## 2024-10-23 LAB
EST. AVERAGE GLUCOSE BLD GHB EST-MCNC: 143 MG/DL
HBA1C MFR BLD: 6.6 %

## 2024-10-25 ENCOUNTER — PROCEDURE VISIT (OUTPATIENT)
Dept: DERMATOLOGY | Facility: CLINIC | Age: 74
End: 2024-10-25
Payer: COMMERCIAL

## 2024-10-25 VITALS
HEART RATE: 77 BPM | HEIGHT: 64 IN | WEIGHT: 182 LBS | BODY MASS INDEX: 31.07 KG/M2 | SYSTOLIC BLOOD PRESSURE: 138 MMHG | DIASTOLIC BLOOD PRESSURE: 68 MMHG | OXYGEN SATURATION: 95 % | TEMPERATURE: 98 F

## 2024-10-25 DIAGNOSIS — C44.41 BASAL CELL CARCINOMA OF RIGHT SIDE OF NECK: ICD-10-CM

## 2024-10-25 PROCEDURE — 17311 MOHS 1 STAGE H/N/HF/G: CPT | Performed by: DERMATOLOGY

## 2024-10-25 PROCEDURE — 12031 INTMD RPR S/A/T/EXT 2.5 CM/<: CPT | Performed by: DERMATOLOGY

## 2024-10-25 NOTE — PATIENT INSTRUCTIONS
"Mohs Microscopic Surgery After Care    WOUND CARE AFTER SURGERY:    Do NOT to remove the pressure bandage for 48 hours. Keep the area clean and dry while this bandage is on.    After removing the bandage for the first time, gently clean the area with soap and water. If the bandage is difficult to remove, getting the bandage wet in the shower will sometimes help soften the adhesive and allow it to be removed more easily.     You will now need to cleanse this area daily in the shower with gentle soap. There is no need to scrub the area. You will need to apply plain Vaseline ointment (this is over the counter and not a prescription) to the site for up to 2 weeks followed by a clean appropriately sized bandage to area.  Non stick dressing and paper tape (or Hypafix) are recommended for sensitive skin but a bandaid is fine if it covers the area well.    All your stitches will dissolve over the next two weeks. You will need to keep these moist with Vaseline and covered with a bandage over the next 2 weeks for them to dissolve appropriately.    RESTRICTIONS:     For two DAYS:   - You will need to take it very easy as this time is highest risk for bleeding. Being a \"couch potato\" during these two days is generally recommended.   - For surgeries on the face/neck/scalp: Avoid leaning down to pick things up off the floor as this brings blood up to your head. Instead, squat down to pick things up.     For two WEEKS:   - No heavy lifting (anything greater than 10 pounds)   - You can start to do slow, gentle activities such as slow walking but nothing to increase your heart rate and blood pressure too much (such as cardiovascular exercise). It is important to take it easy as there is still a risk for bleeding and a high risk popping of stitches open during this time.     If we did surgery near the eyes (including the nose, forehead, front part of your scalp, cheeks): It is VERY common to get a large amount of swelling around your " eyes (puffy eyes). Although less frequent, this can be enough to swell your eyes shut and can also come along with bruising. This should not hurt and is very expected and normal. It is typically worst at ~ 3 days out from your surgery and dramatically better 1 week post-operatively.         MANAGING YOUR PAIN AFTER SURGERY     You can expect to have some pain after surgery. This is normal. The pain is typically worse the first two days after surgery, and quickly begins to get better.     The best strategy for controlling your pain after surgery is around the clock pain control. You can take over the counter Acetaminophen (Tylenol) for discomfort, if no contraindications.     If you are taking this at the maximum dose, you can alternate this with Motrin (ibuprofen or Advil) as well. Alternating these medications with each other allows you to maximize your pain control. In addition to Tylenol and Motrin, you can use heating pads or ice packs on your incisions to help reduce your pain.     How will I alternate your regular strength over-the-counter pain medication?  You will take a dose of pain medication every three hours.   Start by taking 650 mg of Tylenol (2 pills of 325 mg)   3 hours later take 600 mg of Motrin (3 pills of 200 mg)   3 hours after taking the Motrin take 650 mg of Tylenol   3 hours after that take 600 mg of Motrin.    See example - if your first dose of Tylenol is at 12:00 PM     12:00 PM  Tylenol 650 mg (2 pills of 325 mg)    3:00 PM  Motrin 600 mg (3 pills of 200 mg)    6:00 PM  Tylenol 650 mg (2 pills of 325 mg)    9:00 PM  Motrin 600 mg (3 pills of 200 mg)    Continue alternating every 3 hours      Important:   Do not take more than 4000mg of Tylenol or 3200mg of Motrin in a 24-hour period.     What if I still have pain?   If you have pain that is not controlled with the over-the-counter pain medications (Tylenol and Motrin or Advil), don't hesitate to call our staff using the number provided.  We will help make sure you are managing your pain in the best way possible, and if necessary, we can provide a prescription for additional pain medication.       CALL OUR OFFICE IMMEDIATELY FOR ANY SIGNS OF INFECTION:    This includes fever, chills, increased redness, warmth, tenderness, severe discomfort/pain, or pus or foul smell coming from the wound. If you are experiencing any of the above, please call [facility]'s Mohs Department directly at [phone].    IF BLEEDING IS NOTICED:    Place a clean cloth over the area and apply firm pressure for thirty minutes.  Check the wound ONLY after 30 minutes of direct pressure; do not cheat and sneak a peak, as that does not count.  If bleeding persists after 30 minutes of legitimate direct pressure, then try one more round of direct pressure to the area.  Should the bleeding become heavier or not stop after the second attempt, call [facility] directly at [phone]. Your call will get routed to the dermatology surgeon on call even after hours.

## 2024-10-25 NOTE — PROGRESS NOTES
MOHS Procedure Note    Patient: Armand VALDES [facility]  : 1950  MRN: [id number]  Date: 10/25/2024    History of Present Illness: The patient is a 74 y.o. male who presents with complaints of biopsy proven basal cell carcinoma on the right postauricular scalp.    Past Medical History:   Diagnosis Date    Actinic keratosis     Adhesive capsulitis of unspecified shoulder     Allergic 1985    Anxiety     Asthma     Atopic dermatitis     Basal cell carcinoma     BCC right postauricular scalp    Cancer ([facility]) 2018    skin    Cardiac disease     Cellulitis of right upper extremity     Cervical radiculopathy     Chest pain     Chronic maxillary sinusitis     last assessed 14    Clotting disorder ([facility]) 2019    Diabetes mellitus ([facility])     Diverticulitis of colon 93    sigmoidectomy    Endocrine disease     Erectile dysfunction of non-organic origin     Esophageal reflux     Frequent nosebleeds     GERD (gastroesophageal reflux disease)     Gout     last assessed 08/26/15    Hearing loss, conductive     Heart disease     Heart murmur     Mitral Valve disorder    Henoch-Schonlein purpura ([facility]) 2019    Herpes zoster     History of chickenpox     History of colon polyps     History of diverticulitis     History of measles     History of mumps     History of paroxysmal supraventricular tachycardia     History of rectal polyps     Hives     HL (hearing loss)     Hypertension     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    Irregular heart rate     Mitral valve disorder     Nasal congestion 2019    Neoplasm of skin, malignant     Non-melanoma skin cancer     last assessed 10/26/17    Nosebleed 2019    Obesity     Palpitations     last assessed 03/05/15    Paroxysmal supraventricular tachycardia ([facility]) 2014    Plantar fasciitis     last assessed 05/28/15    PSVT (paroxysmal supraventricular tachycardia) ([facility])     Sciatica     Shingles     Sleep apnea     Squamous cell carcinoma of  skin of scalp     Thoracic radiculopathy 10/22/2015    Tinnitus, unspecified ear     Tonsillitis     Type 2 diabetes mellitus with hyperglycemia (HCC)     last assessed 08/26/15    Umbilical hernia     Wears glasses     Worms in stool     last assessed 08/26/15       Past Surgical History:   Procedure Laterality Date    APPENDECTOMY  1968    CIRCUMCISION      COLECTOMY  1993    Partial, Sigmoid for diverticulitis    COLONOSCOPY  01/04/2016    HEAD & NECK SKIN LESION EXCISIONAL BIOPSY      10/11/10 SCC --  10/17/11 SCC  --  Location Scalp    HERNIA REPAIR      1998 & 2013    MOHS SURGERY Right 10/25/2024    BCC right postauriuclar scalp- Dr. Buchanan    NASAL/SINUS ENDOSCOPY N/A 08/06/2019    Procedure: FUNCTIONAL ENDOSCOPIC SINUS SURGERY (FESS) IMAGED GUIDED for control of epistaxis;  Surgeon: Leonel Bo MD;  Location: [facility];  Service: ENT    OH ESOPHAGOGASTRODUODENOSCOPY TRANSORAL DIAGNOSTIC N/A 08/09/2017    Procedure: ESOPHAGOGASTRODUODENOSCOPY (EGD);  Surgeon: Anjel Steven MD;  Location: [facility];  Service: Gastroenterology    SKIN BIOPSY      TONSILLECTOMY  1960         Current Outpatient Medications:     Accu-Chek Guide test strip, CHECK BLOOD SUGAR 3 TIMES DAILY, Disp: 100 strip, Rfl: 3    Accu-Chek Softclix Lancets lancets, PLEASE CHECK BLOOD SUGARS 3 TIMES DAILY, Disp: 100 each, Rfl: 3    atorvastatin (LIPITOR) 10 mg tablet, Take 1 tablet (10 mg total) by mouth daily, Disp: 90 tablet, Rfl: 3    Blood Glucose Monitoring Suppl (Accu-Chek Guide Me) w/Device KIT, CHECK BLOOD SUGARS 3 TIMES DAILY **INSURANCE ONLY COVERS ACCU CHEK GUIDE SYSTEM**, Disp: 1 kit, Rfl: 0    clotrimazole (LOTRIMIN) 1 % cream, Apply topically 2 (two) times a day, Disp: 45 g, Rfl: 3    Cyanocobalamin (VITAMIN B-12 PO), Take by mouth daily, Disp: , Rfl:     cyclobenzaprine (FLEXERIL) 5 mg tablet, Take 1 tablet (5 mg total) by mouth 3 (three) times a day as needed for muscle spasms, Disp: 60 tablet, Rfl: 3    glimepiride  (AMARYL) 4 mg tablet, TAKE 1 TABLET BY MOUTH 2 TIMES A DAY., Disp: 180 tablet, Rfl: 3    insulin lispro (HumaLOG) 100 units/mL injection pen, BLOOD GLUCOSE 150 - 189: 1 UNIT OF INSULIN BLOOD GLUCOSE 190 - 229: 2 UNITS OF INSULIN BLOOD GLUCOSE 230 - 269: 3 UNITS OF INSULIN BLOOD GLUCOSE 270 - 309: 4 UNITS OF INSULIN BLOOD GLUCOSE 310 - 349: 5 UNITS OF INSULIN BLOOD GLUCOSE GREATER THAN OR EQUAL : 6 UNITS OF INSULIN CHECK YOUR BLOOD GLUCOSE 3 TIMES A DAY AND BEFORE BEDTIME AND ADMINISTER INSULIN AS EXPLAINED ABOVE, Disp: 15 mL, Rfl: 2    Insulin Pen Needle (BD Pen Needle Audelia 2nd Gen) 32G X 4 MM MISC, USE TO INJECT INSULIN UP TO 4 TIMES A DAY, Disp: 200 each, Rfl: 5    ketoconazole (NIZORAL) 2 % cream, Apply topically 2 (two) times a day, Disp: 30 g, Rfl: 0    metFORMIN (GLUCOPHAGE-XR) 500 mg 24 hr tablet, TAKE 2 TABLETS BY MOUTH TWICE A DAY WITH FOOD, Disp: 360 tablet, Rfl: 1    montelukast (SINGULAIR) 10 mg tablet, TAKE 1 TABLET BY MOUTH EVERYDAY AT BEDTIME, Disp: 90 tablet, Rfl: 3    pantoprazole (PROTONIX) 40 mg tablet, TAKE 1 TABLET BY MOUTH EVERY DAY, Disp: 90 tablet, Rfl: 1    Pyridoxine HCl (VITAMIN B-6 PO), Take 25 mg by mouth daily, Disp: , Rfl:     sildenafil (VIAGRA) 50 MG tablet, Take 1 tablet (50 mg total) by mouth daily as needed for erectile dysfunction, Disp: 6 tablet, Rfl: 5    sodium chloride (OCEAN) 0.65 % nasal spray, 1-2 sprays into each nostril daily as needed for congestion, Disp: 90 mL, Rfl: 3    triamcinolone (KENALOG) 0.1 % cream, Apply topically 2 (two) times a day, Disp: 453.6 g, Rfl: 1    verapamil (Verelan) 180 MG 24 hr capsule, Take 1 capsule (180 mg total) by mouth 2 (two) times a day, Disp: 180 capsule, Rfl: 3    EPINEPHrine (EPIPEN) 0.3 mg/0.3 mL SOAJ, INJECT 0.3 ML (0.3 MG TOTAL) INTO A MUSCLE ONCE FOR 1 DOSE, Disp: 2 each, Rfl: 0    fluticasone (FLONASE) 50 mcg/act nasal spray, SPRAY 1 SPRAY INTO EACH NOSTRIL EVERY DAY, Disp: 48 mL, Rfl: 1    Tirzepatide 5 MG/0.5ML SOAJ,  Inject 5mg subcut once per week, Disp: 2 mL, Rfl: 5    Allergies   Allergen Reactions    Iodinated Contrast Media Anaphylaxis    Shellfish Allergy - Food Allergy Anaphylaxis    Shrimp Extract Allergy Skin Test - Food Allergy Anaphylaxis    Empagliflozin Hives    Farxiga [Dapagliflozin] Rash     Yeast infection    Ozempic (0.25 Or 0.5 Mg-Dose) [Semaglutide(0.25 Or 0.5mg-Dos)] Rash       Physical Exam:   Vitals:    10/25/24 0926   BP: 138/68   Pulse: 77   Temp: 98 °F (36.7 °C)   SpO2: 95%     General: Awake, Alert, Oriented x 3, Mood and affect appropriate  Respiratory: Respirations even and unlabored  Cardiovascular: Peripheral pulses intact; no edema  Musculoskeletal Exam: n/a    Skin: 0.7 cm x 0.7 cm pink pearly papule on the right postauricular scalp.    Assessment: Biopsy proven to be nodular and superficial basal cell carcinoma.    Plan: Mohs    Time of H&P Completion:0945    MOHS Procedure Timeout      Flowsheet Row Most Recent Value   Timeout: 1000   Patient Identity Verified: Yes   Correct Site Verified: Yes   Correct Procedure Verified: Yes            MOHS Diagnosis/Indication/Location/ID      Flowsheet Row Most Recent Value   Pathology Type Basal cell carcinoma   Anatomic Site right postauricular area (mastoid)  [right postauricular scalp]   Indications for MOHS tumor location   MOHS ID [id number]            MOHS Site/Accession/Pre-Post      Flowsheet Row Most Recent Value   Original Site Identified (as submitted by referring clinician) Photo   Biopsy Accession/Specimen # (as submitted by referring clincian) [id number]   Pre-MOHS Size Length (cm) 0.7   Pre-MOHS Size Width (cm) 0.7   Post-MOHS Size-Length (cm) 0.8   Post MOHS Size-Width (cm) 1.1   Repair Type Intermediate layered closure   Suture Type Vicryl, Fast absorbing gut   Fast Absorbing Suture Size 5   Vicryl Suture Size 4   Final repair length (cm): 2.3   Anesthetic Used 1% Lidocaine with epinephrine  [buffered]            MOHS Tumor Stage 1  Information      Flowsheet Row Most Recent Value   Tissue Sections (blocks) 2   Microscopic Exam Section 1: No tumor identified in section.   Microscopic Exam Section 2: No tumor identified in section.   Tumor Clear After Stage I? Yes                        Patient identified, procedure verified, site identified and verified. Time out completed. Surgical removal of the lesion discussed with the patient (risks and benefits, including possibility of scarring, infection, recurrence or potential for further treatment)  I have specifically identified the site with the patient. I have discussed the fact that the patient will have a scar after the procedure regardless of granulation or repair with sutures. I have discussed that the repair options can range from granulation in some cases to linear or curvilinear closures to larger flaps or grafts.  There are sometimes flaps or grafts used that require multiples stages of surgery and will not be completed today, rather be completed over a series of appointments. I have discussed that occasionally due to location, size or depth of the lesion I may recommend consultation with and transfer of care for further removal or the reconstruction to another provider such as ophthalmology surgery, plastic surgery, ENT surgery, or surgical oncology. There are cases in which other testing such as imaging with MRI or CT scan or testing of lymph nodes is recommended because of the nature/depth/location of tumor seen during the removal. There is a risk of injury to nerves causing temporary or permanent numbness or the inability to move muscles full such as the inability to lift eyebrows. Questions answered and verbal and written consent was obtained.    The tumor qualifies for Mohs based on AUC criteria. Dr. Buchanan served as the surgeon and pathologist during the procedure.    With the patient in the supine position and under adequate local anesthesia with 1% lidocaine with epinephrine  1:100,000, the defect was scrubbed with Chlorhexidine. Sterile drapes were placed from the sterile tray.  Because of the location of the surgical defect, an intermediate closure was judged to give the best possible cosmetic and functional result.  The edges of the defect were carefully debrided removing any dead or coagulated tissue.      Hemostasis was obtained by pinpoint electrocoagulation.  Careful planning of removal of redundant tissue at either end of the defect was drawn out so that the suture lines would fall in the optimal orientation with regard to the relaxed skin tension lines.  These were then removed with a #15 blade scalpel.  The wound was then approximated by a deep layer of buried vertical mattress sutures and the cutaneous margins were approximated and closed by superficial sutures as noted above.  Estimated blood loss was less than 5 mL.      The patient tolerated the procedure well.  The wound was dressed with petrolatum, a non-stick pad, and a compression dressing.     Lionel Buchanan MD served as the surgeon and pathologist during the procedure.    Postoperative care: Wound care discussed at length.  I urged the patient to call us if any problems or question should arise.     Complications: none  Post-op medications: none  Patient condition after procedure: stable  Discharge plans: Plan for follow up as planned with general dermatologist for skin checks or sooner if needed for healing surgical site.     BCC cleared with 1 stage of mohs and repaired with 2.3cm closure.  Well tolerated.  S/R not needed as all dissolving sutures used.    Scribe Attestation      I,:  Nicole Santacruz RN am acting as a scribe while in the presence of the attending physician.:       I,:  Lionel Buchanan MD personally performed the services described in this documentation    as scribed in my presence.:

## 2024-10-28 ENCOUNTER — OFFICE VISIT (OUTPATIENT)
Dept: ENDOCRINOLOGY | Facility: CLINIC | Age: 74
End: 2024-10-28
Payer: COMMERCIAL

## 2024-10-28 VITALS
BODY MASS INDEX: 31.12 KG/M2 | OXYGEN SATURATION: 97 % | WEIGHT: 182.3 LBS | HEIGHT: 64 IN | DIASTOLIC BLOOD PRESSURE: 72 MMHG | HEART RATE: 62 BPM | SYSTOLIC BLOOD PRESSURE: 140 MMHG

## 2024-10-28 DIAGNOSIS — T78.40XD ALLERGY, SUBSEQUENT ENCOUNTER: ICD-10-CM

## 2024-10-28 DIAGNOSIS — Z79.4 TYPE 2 DIABETES MELLITUS WITH HYPERGLYCEMIA, WITH LONG-TERM CURRENT USE OF INSULIN (HCC): Primary | Chronic | ICD-10-CM

## 2024-10-28 DIAGNOSIS — E11.22 TYPE 2 DIABETES MELLITUS WITH STAGE 3A CHRONIC KIDNEY DISEASE, WITHOUT LONG-TERM CURRENT USE OF INSULIN (HCC): ICD-10-CM

## 2024-10-28 DIAGNOSIS — N18.31 TYPE 2 DIABETES MELLITUS WITH STAGE 3A CHRONIC KIDNEY DISEASE, WITHOUT LONG-TERM CURRENT USE OF INSULIN (HCC): ICD-10-CM

## 2024-10-28 DIAGNOSIS — E11.65 TYPE 2 DIABETES MELLITUS WITH HYPERGLYCEMIA, WITH LONG-TERM CURRENT USE OF INSULIN (HCC): Primary | Chronic | ICD-10-CM

## 2024-10-28 PROCEDURE — 99214 OFFICE O/P EST MOD 30 MIN: CPT | Performed by: INTERNAL MEDICINE

## 2024-10-28 RX ORDER — FLUTICASONE PROPIONATE 50 MCG
SPRAY, SUSPENSION (ML) NASAL
Qty: 48 ML | Refills: 1 | Status: SHIPPED | OUTPATIENT
Start: 2024-10-28

## 2024-10-28 NOTE — ASSESSMENT & PLAN NOTE
Lab Results   Component Value Date    HGBA1C 6.6 (H) 10/22/2024       I've asked that he stop the Lantus with hypoglycemia. He will continue the Mounjaro 5mg weekly and not increase this dose. Continue glimiperide, metformin, and correctional isnulin as needed

## 2024-10-28 NOTE — PROGRESS NOTES
Established Patient Progress Note      Chief Complaint   Patient presents with    Diabetes Type 2        History of Present Illness:   Armand Rocha is a 74 y.o. male with a history of type 2 diabetes with long term use of insulin since 1996 seen in f/u.  He was last in the office in 3/2024          Reports complications of CKD and retinopathy. Denies recent illness or hospitalizations. Denies recent severe hypoglycemic or severe hyperglycemic episodes. Denies any issues with his current regimen. Started insulin in 2022    Less exercise in the winter, with better Bgs in the summer.  Diet has room for improvement  Hx ozempic intolerance (Rash). He started on Ozempic and around the same time had Henoch Schonlein Purpura. It is unclear if the Ozempic was related.   HX SGLT2 inhibitor intolerance ( side effects, yeast infections).  He feels that the Mounjaro 5mg weekly has been helpful though he has noted more GERD symptoms. He does not want to increase to a higher dose of Mounjaro at this time. Lost ~15# and feels more easily fully    Checks BGs 3-5x daily. Home blood glucose readings:   These will be scanned. He notes the times when he takes Humalog.  There are frequent low Bgs recently in the morning ~4-5am. Offered CGM in the past which he declined    Current regimen:   Lantus 10 units day bedtime is ordered, but is taking with 5 or 7units  Glimepiride 4mg with breakfast and dinner  Mounjaro 5mg weekly  Metformin ER 500mg- 2 tabs twice per day  Humalog correction 1:40 beginning at 150 as a correction before meals.    Ophthal: 9/2024 (Dr. Gary)  Pod:  yearly       Has hypertension: Taking verapamil  Has hyperlipidemia: Taking atorvastatin 10mg        Patient Active Problem List   Diagnosis    Actinic keratosis    Seborrheic keratosis    History of skin cancer    Anxiety    Erectile dysfunction of non-organic origin    Esophageal reflux    Mixed hyperlipidemia due to type 2 diabetes mellitus  (HCC)     Mitral valve disorder    Rosacea    Sleep apnea    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    Type 2 diabetes mellitus with hyperglycemia, with long-term current use of insulin ([facility])    Primary hypertension    Class 1 obesity due to excess calories with serious comorbidity and body mass index (BMI) of 33.0 to 33.9 in adult    Allergic rhinitis due to house dust mite    Chronic seasonal allergic rhinitis due to pollen    Allergy to IVP dye, initial encounter      Past Medical History:   Diagnosis Date    Actinic keratosis     Adhesive capsulitis of unspecified shoulder     Allergic 1985    Anxiety     Asthma     Atopic dermatitis     Basal cell carcinoma     BCC right postauricular scalp    Cancer ([facility]) 2018    skin    Cardiac disease     Cellulitis of right upper extremity     Cervical radiculopathy     Chest pain     Chronic maxillary sinusitis     last assessed 01/21/14    Clotting disorder ([facility]) 2019    Diabetes mellitus ([facility])     Diverticulitis of colon 8/4/93    sigmoidectomy    Endocrine disease     Erectile dysfunction of non-organic origin     Esophageal reflux     Frequent nosebleeds     GERD (gastroesophageal reflux disease)     Gout     last assessed 08/26/15    Hearing loss, conductive     Heart disease     Heart murmur     Mitral Valve disorder    Henoch-Schonlein purpura ([facility]) 08/06/2019    Herpes zoster     History of chickenpox     History of colon polyps     History of diverticulitis     History of measles     History of mumps     History of paroxysmal supraventricular tachycardia     History of rectal polyps     Hives     HL (hearing loss)     Hypertension     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    Irregular heart rate     Mitral valve disorder     Nasal congestion 04/18/2019    Neoplasm of skin, malignant     Non-melanoma skin cancer     last assessed 10/26/17    Nosebleed 04/18/2019    Obesity     Palpitations     last assessed 03/05/15    Paroxysmal supraventricular  tachycardia (HCC) 01/21/2014    Plantar fasciitis     last assessed 05/28/15    PSVT (paroxysmal supraventricular tachycardia) ([facility])     Sciatica     Shingles     Sleep apnea     Squamous cell carcinoma of skin of scalp     Thoracic radiculopathy 10/22/2015    Tinnitus, unspecified ear     Tonsillitis     Type 2 diabetes mellitus with hyperglycemia (HCC)     last assessed 08/26/15    Umbilical hernia     Wears glasses     Worms in stool     last assessed 08/26/15      Past Surgical History:   Procedure Laterality Date    APPENDECTOMY  1968    CIRCUMCISION      COLECTOMY  1993    Partial, Sigmoid for diverticulitis    COLONOSCOPY  01/04/2016    HEAD & NECK SKIN LESION EXCISIONAL BIOPSY      10/11/10 SCC --  10/17/11 SCC  --  Location Scalp    HERNIA REPAIR      1998 & 2013    MOHS SURGERY Right 10/25/2024    BCC right postauriuclar scalp- Dr. Buchanan    NASAL/SINUS ENDOSCOPY N/A 08/06/2019    Procedure: FUNCTIONAL ENDOSCOPIC SINUS SURGERY (FESS) IMAGED GUIDED for control of epistaxis;  Surgeon: Leonel Bo MD;  Location: [facility];  Service: ENT    HI ESOPHAGOGASTRODUODENOSCOPY TRANSORAL DIAGNOSTIC N/A 08/09/2017    Procedure: ESOPHAGOGASTRODUODENOSCOPY (EGD);  Surgeon: Anjel Steven MD;  Location: [facility];  Service: Gastroenterology    SKIN BIOPSY      TONSILLECTOMY  1960      Family History   Problem Relation Age of Onset    COPD Mother     Asthma Mother     Cancer Mother         Breast + Skin    Diabetes Mother     Heart disease Mother     Heart attack Mother     Breast cancer Mother         Adenocarcinoma    Skin cancer Mother         Adenocarcinoma    Heart failure Mother     Diabetes type II Mother     Varicose Veins Mother         Lower Extremities    Colon polyps Mother     Arthritis Mother     Heart disease Father     Cancer Father         Prostate + Skin    Prostate cancer Father         Adenocarcinoma    Skin cancer Father     Lymphoma Father         Non-Hodgkin's    Allergies Father     Colon  polyps Sister     Allergies Sister     Irregular heart beat Sister     Cushing syndrome Daughter     Varicose Veins Daughter     Obesity Daughter     Diverticulitis Daughter         12 inches colon removed    Migraines Daughter     Obesity Daughter     Sinusitis Daughter         surgey    Schizophrenia Son     Arthritis Family      Social History     Tobacco Use    Smoking status: Former     Current packs/day: 0.00     Average packs/day: 1 pack/day for 20.0 years (20.0 ttl pk-yrs)     Types: Cigarettes     Start date: 1957     Quit date: 1977     Years since quittin.8    Smokeless tobacco: Never   Substance Use Topics    Alcohol use: Yes     Alcohol/week: 1.0 standard drink of alcohol     Types: 1 Standard drinks or equivalent per week     Comment: only occasionally     Allergies   Allergen Reactions    Iodinated Contrast Media Anaphylaxis    Shellfish Allergy - Food Allergy Anaphylaxis    Shrimp Extract Allergy Skin Test - Food Allergy Anaphylaxis    Empagliflozin Hives    Farxiga [Dapagliflozin] Rash     Yeast infection    Ozempic (0.25 Or 0.5 Mg-Dose) [Semaglutide(0.25 Or 0.5mg-Dos)] Rash         Current Outpatient Medications:     Accu-Chek Guide test strip, CHECK BLOOD SUGAR 3 TIMES DAILY, Disp: 100 strip, Rfl: 3    Accu-Chek Softclix Lancets lancets, PLEASE CHECK BLOOD SUGARS 3 TIMES DAILY, Disp: 100 each, Rfl: 3    atorvastatin (LIPITOR) 10 mg tablet, Take 1 tablet (10 mg total) by mouth daily, Disp: 90 tablet, Rfl: 3    Blood Glucose Monitoring Suppl (Accu-Chek Guide Me) w/Device KIT, CHECK BLOOD SUGARS 3 TIMES DAILY **INSURANCE ONLY COVERS ACCU CHEK GUIDE SYSTEM**, Disp: 1 kit, Rfl: 0    clotrimazole (LOTRIMIN) 1 % cream, Apply topically 2 (two) times a day, Disp: 45 g, Rfl: 3    Cyanocobalamin (VITAMIN B-12 PO), Take by mouth daily, Disp: , Rfl:     cyclobenzaprine (FLEXERIL) 5 mg tablet, Take 1 tablet (5 mg total) by mouth 3 (three) times a day as needed for muscle spasms, Disp: 60 tablet,  Rfl: 3    EPINEPHrine (EPIPEN) 0.3 mg/0.3 mL SOAJ, INJECT 0.3 ML (0.3 MG TOTAL) INTO A MUSCLE ONCE FOR 1 DOSE, Disp: 2 each, Rfl: 0    fluticasone (FLONASE) 50 mcg/act nasal spray, SPRAY 1 SPRAY INTO EACH NOSTRIL EVERY DAY, Disp: 48 mL, Rfl: 1    glimepiride (AMARYL) 4 mg tablet, TAKE 1 TABLET BY MOUTH 2 TIMES A DAY., Disp: 180 tablet, Rfl: 3    insulin lispro (HumaLOG) 100 units/mL injection pen, BLOOD GLUCOSE 150 - 189: 1 UNIT OF INSULIN BLOOD GLUCOSE 190 - 229: 2 UNITS OF INSULIN BLOOD GLUCOSE 230 - 269: 3 UNITS OF INSULIN BLOOD GLUCOSE 270 - 309: 4 UNITS OF INSULIN BLOOD GLUCOSE 310 - 349: 5 UNITS OF INSULIN BLOOD GLUCOSE GREATER THAN OR EQUAL : 6 UNITS OF INSULIN CHECK YOUR BLOOD GLUCOSE 3 TIMES A DAY AND BEFORE BEDTIME AND ADMINISTER INSULIN AS EXPLAINED ABOVE, Disp: 15 mL, Rfl: 2    Insulin Pen Needle (BD Pen Needle Audelia 2nd Gen) 32G X 4 MM MISC, USE TO INJECT INSULIN UP TO 4 TIMES A DAY, Disp: 200 each, Rfl: 5    ketoconazole (NIZORAL) 2 % cream, Apply topically 2 (two) times a day, Disp: 30 g, Rfl: 0    metFORMIN (GLUCOPHAGE-XR) 500 mg 24 hr tablet, TAKE 2 TABLETS BY MOUTH TWICE A DAY WITH FOOD, Disp: 360 tablet, Rfl: 1    montelukast (SINGULAIR) 10 mg tablet, TAKE 1 TABLET BY MOUTH EVERYDAY AT BEDTIME, Disp: 90 tablet, Rfl: 3    pantoprazole (PROTONIX) 40 mg tablet, TAKE 1 TABLET BY MOUTH EVERY DAY, Disp: 90 tablet, Rfl: 1    Pyridoxine HCl (VITAMIN B-6 PO), Take 25 mg by mouth daily, Disp: , Rfl:     sildenafil (VIAGRA) 50 MG tablet, Take 1 tablet (50 mg total) by mouth daily as needed for erectile dysfunction, Disp: 6 tablet, Rfl: 5    sodium chloride (OCEAN) 0.65 % nasal spray, 1-2 sprays into each nostril daily as needed for congestion, Disp: 90 mL, Rfl: 3    Tirzepatide 5 MG/0.5ML SOAJ, Inject 5mg subcut once per week, Disp: 2 mL, Rfl: 5    triamcinolone (KENALOG) 0.1 % cream, Apply topically 2 (two) times a day, Disp: 453.6 g, Rfl: 1    verapamil (Verelan) 180 MG 24 hr capsule, Take 1 capsule  "(180 mg total) by mouth 2 (two) times a day, Disp: 180 capsule, Rfl: 3    Review of Systems   Constitutional:  Negative for fatigue.   HENT:  Negative for trouble swallowing and voice change.    Gastrointestinal:  Negative for abdominal pain and diarrhea.   Endocrine: Negative for polydipsia and polyuria.   Musculoskeletal:  Negative for myalgias.   Skin:  Negative for rash.   Psychiatric/Behavioral:  Negative for sleep disturbance.        Physical Exam:  Body mass index is 31.29 kg/m².  /72   Pulse 62   Ht 5' 4\" (1.626 m)   Wt 82.7 kg (182 lb 4.8 oz)   SpO2 97%   BMI 31.29 kg/m²    Wt Readings from Last 3 Encounters:   10/28/24 82.7 kg (182 lb 4.8 oz)   10/25/24 82.6 kg (182 lb)   09/16/24 84.4 kg (186 lb)       Physical Exam   Gen: appears well-developed and well-nourished. No apparent distress.   Head: Normocephalic and atraumatic.   Eyes: no stare or proptosis, no periorbital edema  E/N/M nl facies, hearing grossly intact  Neck: range of motion nl.   Pulmonary/Chest: breathing  comfortably, no accessory muscle use, effort normal.   Musculoskeletal: moves all 4 extremities, gait nl  Neurological: alert and oriented to person, place, and time. No upper ext tremor appreciated  Skin: does not appear diaphoretic, no facial plethora  Psychiatric: normal mood and affect; behavior is normal; no gross lapses in memory, answer questions appropriately          Labs:   Lab Results   Component Value Date    HGBA1C 6.6 (H) 10/22/2024    HGBA1C 7.8 (H) 06/25/2024    HGBA1C 7.9 (H) 03/07/2024     Lab Results   Component Value Date    CREATININE 1.03 10/22/2024    CREATININE 1.10 06/25/2024    CREATININE 1.09 03/07/2024    BUN 18 10/22/2024     08/19/2015    K 4.2 10/22/2024     10/22/2024    CO2 27 10/22/2024     eGFR   Date Value Ref Range Status   10/22/2024 71 ml/min/1.73sq m Final     Lab Results   Component Value Date    CHOL 183 08/19/2015    HDL 43 06/25/2024    TRIG 78 06/25/2024     Lab Results "   Component Value Date    ALT 17 03/07/2024    AST 17 03/07/2024    ALKPHOS 46 03/07/2024    BILITOT 0.8 08/19/2015     Lab Results   Component Value Date    [id number] 1.206 08/21/2019    [id number] 1.020 11/09/2017    [id number] 0.915 01/15/2016     Lab Results   Component Value Date    FREET4 0.92 08/21/2019       Impression & Plan:      Problem List Items Addressed This Visit       Type 2 diabetes mellitus with hyperglycemia, with long-term current use of insulin (HCC) - Primary (Chronic)       Lab Results   Component Value Date    HGBA1C 6.6 (H) 10/22/2024       I've asked that he stop the Lantus with hypoglycemia. He will continue the Mounjaro 5mg weekly and not increase this dose. Continue glimiperide, metformin, and correctional isnulin as needed         Relevant Medications    Tirzepatide 5 MG/0.5ML SOAJ    Other Relevant Orders    Albumin / creatinine urine ratio    Comprehensive metabolic panel    Hemoglobin A1C     Other Visit Diagnoses       Type 2 diabetes mellitus with stage 3a chronic kidney disease, without long-term current use of insulin (HCC)        Relevant Medications    Tirzepatide 5 MG/0.5ML SOAJ                     Discussed with the patient and all questioned fully answered. He will call me if any problems arise.       Counseled patient on diagnostic results, prognosis, risk and benefit of treatment options, instruction for management, importance of treatment compliance, Risk  factor reduction and impressions    Indiana Smith MD

## 2024-11-03 DIAGNOSIS — Z79.4 TYPE 2 DIABETES MELLITUS WITH HYPERGLYCEMIA, WITH LONG-TERM CURRENT USE OF INSULIN (HCC): Chronic | ICD-10-CM

## 2024-11-03 DIAGNOSIS — E11.65 TYPE 2 DIABETES MELLITUS WITH HYPERGLYCEMIA, WITH LONG-TERM CURRENT USE OF INSULIN (HCC): Chronic | ICD-10-CM

## 2024-11-04 RX ORDER — BLOOD SUGAR DIAGNOSTIC
STRIP MISCELLANEOUS 3 TIMES DAILY
Qty: 100 STRIP | Refills: 3 | Status: SHIPPED | OUTPATIENT
Start: 2024-11-04

## 2024-11-05 ENCOUNTER — OFFICE VISIT (OUTPATIENT)
Age: 74
End: 2024-11-05

## 2024-11-05 VITALS
TEMPERATURE: 98.3 F | WEIGHT: 180 LBS | DIASTOLIC BLOOD PRESSURE: 86 MMHG | BODY MASS INDEX: 30.73 KG/M2 | HEART RATE: 91 BPM | SYSTOLIC BLOOD PRESSURE: 120 MMHG | HEIGHT: 64 IN | OXYGEN SATURATION: 96 %

## 2024-11-05 DIAGNOSIS — Z13.89 SCREENING FOR SKIN CONDITION: Primary | ICD-10-CM

## 2024-11-05 DIAGNOSIS — Z85.828 HISTORY OF SKIN CANCER: ICD-10-CM

## 2024-11-05 DIAGNOSIS — D22.9 MULTIPLE NEVI: ICD-10-CM

## 2024-11-05 DIAGNOSIS — D18.01 CHERRY ANGIOMA: ICD-10-CM

## 2024-11-05 DIAGNOSIS — L82.1 SEBORRHEIC KERATOSIS: ICD-10-CM

## 2024-11-05 DIAGNOSIS — L57.0 KERATOSIS, ACTINIC: ICD-10-CM

## 2024-11-05 NOTE — PROGRESS NOTES
" [facility] Dermatology [facility] Note     [facility]  Encounter Date: November 5, 2024     Have you been cared for by a [facility] Dermatologist in the last 3 years and, if so, which description applies to you?    Yes.  I have been here within the last 3 years, and my medical history has NOT changed since that time.  I am MALE/not capable of bearing children.    REVIEW OF SYSTEMS:  Have you recently had or currently have any of the following? No changes in my recent health.   PAST MEDICAL HISTORY:  Have you personally ever had or currently have any of the following?  If \"YES,\" then please provide more detail. No changes in my medical history.   HISTORY OF IMMUNOSUPPRESSION: Do you have a history of any of the following:  Systemic Immunosuppression such as Diabetes, Biologic or Immunotherapy, Chemotherapy, Organ Transplantation, Bone Marrow Transplantation or Prednisone?  No     Answering \"YES\" requires the addition of the dotphrase \"IMMUNOSUPPRESSED\" as the first diagnosis of the patient's visit.   FAMILY HISTORY:  Any \"first degree relatives\" (parent, brother, sister, or child) with the following?    No changes in my family's known health.   PATIENT EXPERIENCE:    Do you want the Dermatologist to perform a COMPLETE skin exam today including a clinical examination under the \"bra and underwear\" areas?  Yes  If necessary, do we have your permission to call and leave a detailed message on your Preferred Phone number that includes your specific medical information?  Yes      Allergies   Allergen Reactions    Iodinated Contrast Media Anaphylaxis    Shellfish Allergy - Food Allergy Anaphylaxis    Shrimp Extract Allergy Skin Test - Food Allergy Anaphylaxis    Empagliflozin Hives    Farxiga [Dapagliflozin] Rash     Yeast infection    Ozempic (0.25 Or 0.5 Mg-Dose) [Semaglutide(0.25 Or 0.5mg-Dos)] Rash      Current Outpatient Medications:     Accu-Chek Guide test strip, CHECK BLOOD SUGAR 3 TIMES DAILY, Disp: " 100 strip, Rfl: 3    Accu-Chek Softclix Lancets lancets, PLEASE CHECK BLOOD SUGARS 3 TIMES DAILY, Disp: 100 each, Rfl: 3    atorvastatin (LIPITOR) 10 mg tablet, Take 1 tablet (10 mg total) by mouth daily, Disp: 90 tablet, Rfl: 3    Blood Glucose Monitoring Suppl (Accu-Chek Guide Me) w/Device KIT, CHECK BLOOD SUGARS 3 TIMES DAILY **INSURANCE ONLY COVERS ACCU CHEK GUIDE SYSTEM**, Disp: 1 kit, Rfl: 0    clotrimazole (LOTRIMIN) 1 % cream, Apply topically 2 (two) times a day, Disp: 45 g, Rfl: 3    Cyanocobalamin (VITAMIN B-12 PO), Take by mouth daily, Disp: , Rfl:     cyclobenzaprine (FLEXERIL) 5 mg tablet, Take 1 tablet (5 mg total) by mouth 3 (three) times a day as needed for muscle spasms, Disp: 60 tablet, Rfl: 3    fluticasone (FLONASE) 50 mcg/act nasal spray, SPRAY 1 SPRAY INTO EACH NOSTRIL EVERY DAY, Disp: 48 mL, Rfl: 1    glimepiride (AMARYL) 4 mg tablet, TAKE 1 TABLET BY MOUTH 2 TIMES A DAY., Disp: 180 tablet, Rfl: 3    insulin lispro (HumaLOG) 100 units/mL injection pen, BLOOD GLUCOSE 150 - 189: 1 UNIT OF INSULIN BLOOD GLUCOSE 190 - 229: 2 UNITS OF INSULIN BLOOD GLUCOSE 230 - 269: 3 UNITS OF INSULIN BLOOD GLUCOSE 270 - 309: 4 UNITS OF INSULIN BLOOD GLUCOSE 310 - 349: 5 UNITS OF INSULIN BLOOD GLUCOSE GREATER THAN OR EQUAL : 6 UNITS OF INSULIN CHECK YOUR BLOOD GLUCOSE 3 TIMES A DAY AND BEFORE BEDTIME AND ADMINISTER INSULIN AS EXPLAINED ABOVE, Disp: 15 mL, Rfl: 2    Insulin Pen Needle (BD Pen Needle Audelia 2nd Gen) 32G X 4 MM MISC, USE TO INJECT INSULIN UP TO 4 TIMES A DAY, Disp: 200 each, Rfl: 5    ketoconazole (NIZORAL) 2 % cream, Apply topically 2 (two) times a day, Disp: 30 g, Rfl: 0    metFORMIN (GLUCOPHAGE-XR) 500 mg 24 hr tablet, TAKE 2 TABLETS BY MOUTH TWICE A DAY WITH FOOD, Disp: 360 tablet, Rfl: 1    montelukast (SINGULAIR) 10 mg tablet, TAKE 1 TABLET BY MOUTH EVERYDAY AT BEDTIME, Disp: 90 tablet, Rfl: 3    pantoprazole (PROTONIX) 40 mg tablet, TAKE 1 TABLET BY MOUTH EVERY DAY, Disp: 90 tablet, Rfl:  1    Pyridoxine HCl (VITAMIN B-6 PO), Take 25 mg by mouth daily, Disp: , Rfl:     sildenafil (VIAGRA) 50 MG tablet, Take 1 tablet (50 mg total) by mouth daily as needed for erectile dysfunction, Disp: 6 tablet, Rfl: 5    sodium chloride (OCEAN) 0.65 % nasal spray, 1-2 sprays into each nostril daily as needed for congestion, Disp: 90 mL, Rfl: 3    Tirzepatide 5 MG/0.5ML SOAJ, Inject 5mg subcut once per week, Disp: 2 mL, Rfl: 5    triamcinolone (KENALOG) 0.1 % cream, Apply topically 2 (two) times a day, Disp: 453.6 g, Rfl: 1    verapamil (Verelan) 180 MG 24 hr capsule, Take 1 capsule (180 mg total) by mouth 2 (two) times a day, Disp: 180 capsule, Rfl: 3    EPINEPHrine (EPIPEN) 0.3 mg/0.3 mL SOAJ, INJECT 0.3 ML (0.3 MG TOTAL) INTO A MUSCLE ONCE FOR 1 DOSE, Disp: 2 each, Rfl: 0          Whom besides the patient is providing clinical information about today's encounter?   NO ADDITIONAL HISTORIAN (patient alone provided history)    Physical Exam and Assessment/Plan by Diagnosis:    Chief complaint: Patient is a 75 y/o male present for a routine skin exam with history of non-melanoma skin cancer and has some spots of concern on the scalp, he states spots that have been treated with Cryo in the past may still be present.    HISTORY OF BASAL CELL CARCINOMA    Physical Exam:  Anatomic Location Affected:  Right Post-Auricular Scalp - 06/2024  Morphological Description of scar:  well healed  Suspected Recurrence: No  Pertinent Positives:  Pertinent Negatives:    Additional History of Present Condition:  History of basal cell carcinoma with no sign of recurrence    Assessment and Plan:  Based on a thorough discussion of this condition and the management approach to it (including a comprehensive discussion of the known risks, side effects and potential benefits of treatment), the patient (family) agrees to implement the following specific plan:  Monitor for changes  When outside we recommend using a wide brim hat, sunglasses,  long sleeve and pants, sunscreen with SPF 30+ with reapplication every 2 hours, or SPF specific clothing     HISTORY OF SQUAMOUS CELL CARCINOMA     Physical Exam:  Anatomic Location Affected:  Frontal Scalp - 2022  Morphological Description of Scar:  well healed  Suspected Recurrence: no  Regional adenopathy: no    Additional History of Present Condition:  History of Squamous Cell Carcinoma, treated    Assessment and Plan:  Based on a thorough discussion of this condition and the management approach to it (including a comprehensive discussion of the known risks, side effects and potential benefits of treatment), the patient (family) agrees to implement the following specific plan:  Monitor for changes  When outside we recommend using a wide brim hat, sunglasses, long sleeve and pants, sunscreen with SPF 30+ with reapplication every 2 hours, or SPF specific clothing     ACTINIC KERATOSIS    Physical Exam:  Anatomic Location: Scalp and Forehead, Temples to Pre-Auricular  Morphologic Description: Scaly pink papules  Pertinent Positives:  Pertinent Negatives:    Additional History of Present Condition:  present on exam  History of bleeding from this lesion? NO  History of pain, tenderness, and/or itching within this lesion? NO  Personal history of skin cancer? YES, BCC and SCC  Family history of skin cancer? NO  Previous treatment to the same site? NO    Plan:  5-Fluorouracil: Apply twice a day to Forehead to Temples to Pre-Auricular for 2 weeks. We discussed that 5-Fluorouracil will result in skin redness and irritation, which is expected. Begin 5-Fluorouracil treatment once regions that have been sprayed with liquid nitrogen are healed.  We discussed the pre-cancerous nature of the condition. Actinic keratosis is found on sun-damaged skin and there is small risk that the condition could turn into a skin cancer called squamous cell carcinoma. There is no risk of actinic keratosis turning into melanoma.  We discussed  "sun protection measures, including using sunscreen with an SPF 50+ year round, avoiding the sun, and wearing protective clothing such as long sleeves and pants when out in the sun.  Continue to monitor clinically for signs of recurrence. Discussed with patient the importance of keeping up to date with full body skin exams.  After completion and pt returns from [place]. To start Efudex on the Scalp     PROCEDURES PERFORMED TODAY ASSOCIATED WITH THIS CONDITION:          NONE       MELANOCYTIC NEVI (\"Moles\")    Physical Exam:  Anatomic Location Affected: Mostly on sun-exposed areas of the trunk and extremities  Morphological Description:  Scattered, 1-4mm round to ovoid, symmetrical-appearing, even bordered, skin colored to dark brown macules/papules, mostly in sun-exposed areas  Pertinent Positives:  Pertinent Negatives:    Additional History of Present Condition:  present on exam    Assessment and Plan:  Based on a thorough discussion of this condition and the management approach to it (including a comprehensive discussion of the known risks, side effects and potential benefits of treatment), the patient (family) agrees to implement the following specific plan:  Provided handout with information regarding the ABCDE's of moles   Recommend routine skin exams every 6 months   Sun avoidance, protective clothing (known as UPF clothing), and the use of at least SPF 30 sunscreens is advised. Sunscreen should be reapplied every two hours when outside.     SEBORRHEIC KERATOSIS; NON-INFLAMED    Physical Exam:  Anatomic Location Affected:  scattered across trunk, extremities, face  Morphological Description:  Flat and raised, waxy, smooth to warty textured, yellow to brownish-grey to dark brown to blackish, discrete, \"stuck-on\" appearing papules.  Pertinent Positives:  Pertinent Negatives:    Additional History of Present Condition:  Patient reports new bumps on the skin.  Denies itch, burn, pain, bleeding or ulceration.  Present " "constantly; nothing seems to make it worse or better.  No prior treatment.      Assessment and Plan:  Based on a thorough discussion of this condition and the management approach to it (including a comprehensive discussion of the known risks, side effects and potential benefits of treatment), the patient (family) agrees to implement the following specific plan:  Reassured benign    ANGIOMA (\"CHERRY ANGIOMA\")    Physical Exam:  Anatomic Location: scattered across sun exposed areas of the trunk and extremities   Morphologic Description: Firm red to reddish-blue discrete papules  Pertinent Positives:  Pertinent Negatives:    Additional History of Present Condition:  Present on exam.     Assessment and Plan:  Reassured benign    Scribe Attestation      I,:  Rut Carvalho MA am acting as a scribe while in the presence of the attending physician.:       I,:  Pancho Penny DO personally performed the services described in this documentation    as scribed in my presence.:               "

## 2024-11-05 NOTE — PATIENT INSTRUCTIONS
ACTINIC KERATOSIS    Physical Exam:  Anatomic Location: Scalp and Forehead, Temples to Pre-Auricular  Morphologic Description: Scaly pink papules  Pertinent Positives:  Pertinent Negatives:    Additional History of Present Condition:  present on exam  History of bleeding from this lesion? NO  History of pain, tenderness, and/or itching within this lesion? NO  Personal history of skin cancer? YES, BCC and SCC  Family history of skin cancer? NO  Previous treatment to the same site? NO    Plan:  5-Fluorouracil: Apply twice a day to Forehead to Temples to Pre-Auricular for 2 weeks. We discussed that 5-Fluorouracil will result in skin redness and irritation, which is expected. Begin 5-Fluorouracil treatment once regions that have been sprayed with liquid nitrogen are healed.  We discussed the pre-cancerous nature of the condition. Actinic keratosis is found on sun-damaged skin and there is small risk that the condition could turn into a skin cancer called squamous cell carcinoma. There is no risk of actinic keratosis turning into melanoma.  We discussed sun protection measures, including using sunscreen with an SPF 50+ year round, avoiding the sun, and wearing protective clothing such as long sleeves and pants when out in the sun.  Continue to monitor clinically for signs of recurrence. Discussed with patient the importance of keeping up to date with full body skin exams.  After completion and pt returns from [place]. To start Efudex on the Scalp    ACTINIC KERATOSIS    Actinic keratoses are very common on sites repeatedly exposed to the sun, especially the backs of the hands and the face, most often affecting the ears, nose, cheeks, upper lip, vermilion of the lower lip, temples, forehead and balding scalp. In severely chronically sun-damaged individuals, they may also be found on the upper trunk, upper and lower limbs, and dorsum of feet.    We discussed the theoretical premalignant (“pre-cancerous”) nature and etiology of  these growths.  We discussed the prevailing notion that actinic keratoses are a reflection of abnormal skin cell development due to DNA damage by short wavelength UVB.  They are more likely to appear if the immune function is poor, due to aging, recent sun exposure, predisposing disease or certain drugs.    We discussed that the main concern is that actinic keratoses may predispose to squamous cell carcinoma. It is rare for a solitary actinic keratosis to evolve to squamous cell carcinoma (SCC), but the risk of SCC occurring at some stage in a patient with more than 10 actinic keratoses is thought to be about 10 to 15%. A tender, thickened, ulcerated or enlarging actinic keratosis is suspicious of SCC.    Actinic keratoses may be prevented by strict sun protection. If already present, keratoses may improve with a very high sun protection factor (50+) broad-spectrum sunscreen applied at least daily to affected areas, year-round.  We recommend that UPF-rated clothing and hats and sunglasses be worn whenever possible and that a sunscreen-moisturizer combination product such as Neutrogena Daily Defense be applied at least three times a day.    We performed a thorough discussion of treatment options and specific risk/benefits/alternatives including but not limited to medical “field” treatment with medications such as the following:    Topical “field area” medications such as 5-fluorouracil or Aldara (specifically, the trouble with long-term compliance, blistering and local skin reaction versus the convenience of at-home therapy and that field therapy “gets what is not yet seen”).    Cryotherapy (specifically, local pain, scarring, dyspigmentation, blistering, possible superinfection, and treats “only what we see” versus directed treatment today).    Photodynamic therapy (specifically, local pain, scarring, dyspigmentation, blistering, possible superinfection, need to schedule for a later date, and time spent in the  office versus field therapy that “gets what is not yet seen”).    BASAL CELL CARCINOMA    What is basal cell carcinoma?  Basal cell carcinoma (BCC) is a common, locally invasive, keratinocytic, or non-melanoma, skin cancer. It is also known as rodent ulcer and basalioma. Patients with BCC often develop multiple primary tumours over time.    Who gets basal cell carcinoma?  Risk factors for BCC include:  Age and sex: BCCs are particularly prevalent in elderly males. However, they also affect females and younger adults   Previous BCC or other form of skin cancer (squamous cell carcinoma, melanoma)   Sun damage (photoaging, actinic keratoses)   Repeated prior episodes of sunburn   Fair skin, blue eyes and blond or red hair--note; BCC can also affect darker skin types   Previous cutaneous injury, thermal burn, disease (eg cutaneous lupus, sebaceous naevus)   Inherited syndromes: BCC is a particular problem for families with basal cell naevus syndrome (Gorlin syndrome), Cjrrb-Gknné-Udrroygg syndrome, Rombo syndrome, Oley syndrome and xeroderma pigmentosum   Other risk factors include ionising radiation, exposure to arsenic, and immune suppression due to disease or medicines    What causes basal cell carcinoma?  The cause of BCC is multifactorial.  Most often, there are DNA mutations in the patched (PTCH) tumour suppressor gene, part of hedgehog signaling pathway   These may be triggered by exposure to ultraviolet radiation   Various spontaneous and inherited gene defects predispose to BCC    What are the clinical features of basal cell carcinoma?  BCC is a locally invasive skin tumour. The main characteristics are:  Slowly growing plaque or nodule   Skin coloured, pink or pigmented   Varies in size from a few millimetres to several centimetres in diameter   Spontaneous bleeding or ulceration  BCC is very rarely a threat to life. A tiny proportion of BCCs grow rapidly, invade deeply, and/or metastasise to local lymph  nodes.    Types of basal cell carcinoma  There are several distinct clinical types of BCC, and over 20 histological growth patterns of BCC.  Nodular BCC  Most common type of facial BCC   Shiny or pearly nodule with a smooth surface   May have central depression or ulceration, so its edges appear rolled   Blood vessels cross its surface   Cystic variant is soft, with jelly-like contents   Micronodular, microcystic and infiltrative types are potentially aggressive subtypes   Also known as nodulocystic carcinoma  Superficial BCC  Most common type in younger adults   Most common type on upper trunk and shoulders   Slightly scaly, irregular plaque   Thin, translucent rolled border   Multiple microerosions  Morphoeaform BCC  Usually found in mid-facial sites   Waxy, scar-like plaque with indistinct borders   Wide and deep subclinical extension   May infiltrate cutaneous nerves (perineural spread)   Also known as morpheic, morphoeiform or sclerosing BCC  Basosquamous carcinoma  Mixed basal cell carcinoma (BCC) and squamous cell carcinoma (SCC)   Infiltrative growth pattern   Potentially more aggressive than other forms of BCC   Also known as basisquamous carcinoma and mixed basal-squamous cell carcinoma       Complications of basal cell carcinoma    Recurrent BCC  Recurrence of BCC after initial treatment is not uncommon. Characteristics of recurrent BCC often include:  Incomplete excision or narrow margins at primary excision   Morphoeic, micronodular, and infiltrative subtypes   Location on head and neck    Advanced BCC  Advanced BCCs are large, often neglected tumours.  They may be several centimetres in diameter   They may be deeply infiltrating into tissues below the skin   They are difficult or impossible to treat surgically    Metastatic BCC  Very rare   Primary tumour is often large, neglected or recurrent, located on head and neck, with aggressive subtype   May have had multiple prior treatments   May arise in  site exposed to ionising radiation   Can be fatal    How is basal cell carcinoma diagnosed?  BCC is diagnosed clinically by the presence of a slowly enlarging skin lesion with typical appearance. The diagnosis and  by a diagnostic biopsy or following excision.  Some typical superficial BCCs on trunk and limbs are clinically diagnosed and have non-surgical treatment without histology.    What is the treatment for primary basal cell carcinoma?  The treatment for a BCC depends on its type, size and location, the number to be treated, patient factors, and the preference or expertise of the doctor. Most BCCs are treated surgically. Long-term follow-up is recommended to check for new lesions and recurrence; the latter may be unnecessary if histology has reported wide clear margins.    Excision biopsy  Excision means the lesion is cut out and the skin stitched up.  Most appropriate treatment for nodular, infiltrative and morphoeic BCCs   Should include 3 to 5 mm margin of normal skin around the tumour   Very large lesions may require flap or skin graft to repair the defect   Pathologist will report deep and lateral margins   Further surgery is recommended for lesions that are incompletely excised    Mohs micrographically controlled excision  Mohs micrographically controlled surgery involves examining carefully marked excised tissue under the microscope, layer by layer, to ensure complete excision.  Very high cure rates achieved by trained Mohs surgeons   Used in high-risk areas of the face around eyes, lips and nose   Suitable for ill-defined, morphoeic, infiltrative and recurrent subtypes   Large defects are repaired by flap or skin graft    Superficial skin surgery  Superficial skin surgery comprises shave, curettage, and electrocautery. It is a rapid technique using local anaesthesia and does not require sutures.  Suitable for small, well-defined nodular or superficial BCCs   Lesions are usually located on trunk or  limbs   Wound is left open to heal by secondary intention   Moist wound dressings lead to healing within a few weeks   Eventual scar quality variable    Cryotherapy  Cryotherapy is the treatment of a superficial skin lesion by freezing it, usually with liquid nitrogen.  Suitable for small superficial BCCs on covered areas of trunk and limbs   Best avoided for BCCs on head and neck, and distal to knees   Double freeze-thaw technique   Results in a blister that crusts over and heals within several weeks.   Leaves permanent white zo    Photodynamic therapy  Photodynamic therapy (PDT) refers to a technique in which BCC is treated with a photosensitising chemical, and exposed to light several hours later.  Topical photosensitisers include aminolevulinic acid lotion and methyl aminolevulinate cream   Suitable for low-risk small, superficial BCCs   Best avoided if tumour in site at high risk of recurrence   Results in inflammatory reaction, maximal 3-4 days after procedure   Treatment repeated 7 days after initial treatment   Excellent cosmetic results    Imiquimod cream  Imiquimod is an immune response modifier.  Best used for superficial BCCs less than 2 cm diameter   Applied three to five times each week, for 6-16 weeks   Results in a variable inflammatory reaction, maximal at three weeks   Minimal scarring is usual    Fluorouracil cream  5-Fluorouracil cream is a topical cytotoxic agent.  Used to treat small superficial basal cell carcinomas   Requires prolonged course, eg twice daily for 6-12 weeks   Causes inflammatory reaction   Has high recurrence rates    Radiotherapy  Radiotherapy or X-ray treatment can be used to treat primary BCCs or as adjunctive treatment if margins are incomplete.  Mainly used if surgery is not suitable   Best avoided in young patients and in genetic conditions predisposing to skin cancer   Best cosmetic results achieved using multiple fractions   Typically, patient attends once-weekly for  several weeks   Causes inflammatory reaction followed by scar   Risk of radiodermatitis, late recurrence, and new tumours    What is the treatment for advanced or metastatic basal cell carcinoma?  Locally advanced primary, recurrent or metastatic BCC requires multidisciplinary consultation. Often a combination of treatments is used.  Surgery   Radiotherapy   Targeted therapy  Targeted therapy refers to the hedgehog signalling pathway inhibitors, vismodegib and sonidegib. These drugs have some important risks and side effects.    How can basal cell carcinoma be prevented?  The most important way to prevent BCC is to avoid sunburn. This is especially important in childhood and early life. Fair skinned individuals and those with a personal or family history of BCC should protect their skin from sun exposure daily, year-round and lifelong.  Stay indoors or under the shade in the middle of the day   Wear covering clothing   Apply high protection factor SPF50+ broad-spectrum sunscreens generously to exposed skin if outdoors   Avoid indoor tanning (sun beds, solaria)  Oral nicotinamide (vitamin B3) in a dose of 500 mg twice daily may reduce the number and severity of BCCs.    What is the outlook for basal cell carcinoma?  Most BCCs are cured by treatment. Cure is most likely if treatment is undertaken when the lesion is small.  About 50% of people with BCC develop a second one within 3 years of the first. They are also at increased risk of other skin cancers, especially melanoma. Regular self-skin examinations and long-term annual skin checks by an experienced health professional are recommended.    SQUAMOUS CELL CARCINOMA    What is cutaneous squamous cell carcinoma?  Cutaneous squamous cell carcinoma (SCC) is a common type of keratinocyte or non-melanoma skin cancer. It is derived from cells within the epidermis that make keratin -- the horny protein that makes up skin, hair and nails.  Cutaneous SCC is an invasive  disease, referring to cancer cells that have grown beyond the epidermis. SCC can sometimes metastasise and may prove fatal.  Intraepidermal carcinoma (cutaneous SCC in situ) and mucosal SCC are considered elsewhere.    Who gets cutaneous squamous cell carcinoma?  Risk factors for cutaneous SCC include:  Age and sex: SCCs are particularly prevalent in elderly males. However, they also affect females and younger adults.   Previous SCC or another form of skin cancer (basal cell carcinoma, melanoma) are a strong predictor for further skin cancers.   Actinic keratoses   Outdoor occupation or recreation   Smoking   Fair skin, blue eyes and blond or red hair   Previous cutaneous injury, thermal burn, disease (eg cutaneous lupus, epidermolysis bullosa, leg ulcer)   Inherited syndromes: SCC is a particular problem for families with xeroderma pigmentosum and albinism   Other risk factors include ionising radiation, exposure to arsenic, and immune suppression due to disease (eg chronic lymphocytic leukaemia) or medicines. Organ transplant recipients have a massively increased risk of developing SCC.    What causes cutaneous squamous cell carcinoma?  More than 90% of cases of SCC are associated with numerous DNA mutations in multiple somatic genes. Mutations in the p53 tumour suppressor gene are caused by exposure to ultraviolet radiation (UV), especially UVB (known as signature 7). Other signature mutations relate to cigarette smoking, ageing and immune suppression (eg, to drugs such as azathioprine). Mutations in signalling pathways affect the epidermal growth factor receptor, CARMELITA, Fyn, and [id number] signalling.   Beta-genus human papillomaviruses (wart virus) are thought to play a role in SCC arising in immune-suppressed populations. ?-HPV and HPV subtypes 5, 8, 17, 20, 24, and 38 have also been associated with an increased risk of cutaneous SCC in immunocompetent individuals.     What are the clinical features of cutaneous  squamous cell carcinoma?  Cutaneous SCCs present as enlarging scaly or crusted lumps. They usually arise within pre-existing actinic keratosis or intraepidermal carcinoma.  They grow over weeks to months   They may ulcerate   They are often tender or painful   Located on sun-exposed sites, particularly the face, lips, ears, hands, forearms and lower legs   Size varies from a few millimetres to several centimetres in diameter.    Types of cutaneous squamous cell carcinoma  Distinct clinical types of invasive cutaneous SCC include:  Cutaneous horn -- the horn is due to excessive production of keratin   Keratoacanthoma (KA) -- a rapidly growing keratinising nodule that may resolve without treatment   Carcinoma cuniculatum (‘verrucous carcinoma’), a slow-growing, warty tumour on the sole of the foot.   Multiple eruptive SCC/KA-like lesions arising in syndromes, such as multiple self-healing squamous epitheliomas of García-Smith and Grzybowski syndrome  The pathologist may classify a tumour as well differentiated, moderately well differentiated, poorly differentiated or anaplastic cutaneous SCC. There are other variants.    Classification of squamous cell carcinoma by risk  Cutaneous SCC is classified as low-risk or high-risk, depending on the chance of tumour recurrence and metastasis. Characteristics of high-risk SCC include:  High-risk cutaneous squamous cell carcinoma has the following characteristics:  Diameter greater than or equal to 2 cm   Location on the ear, vermilion of the lip, central face, hands, feet, genitalia   Arising in elderly or immune suppressed patient   Histological thickness greater than 2 mm, poorly differentiated histology, or with the invasion of the subcutaneous tissue, nerves and blood vessels  Metastatic SCC is found in regional lymph nodes (80%), lungs, liver, brain, bones and skin.    Staging cutaneous squamous cell carcinoma  In 2011, the American Joint Committee on Cancer (AJCC)  published a new staging systemic for cutaneous SCC for the 7th Edition of the AJCC manual. This evaluates the dimensions of the original primary tumour (T) and its metastases to lymph nodes (N).    Tumour staging for cutaneous SCC  TX: Th Primary tumour cannot be assessed  T0: No evidence of a primary tumour  Tis: Carcinoma in situ  T1: Tumour <= 2cm without high-risk features  T2: Tumour >= 2cm; or; Tumour <= 2 cm with high-risk features  T3: Tumour with the invasion of maxilla, mandible, orbit or temporal bone  T4: Tumour with the invasion of axial or appendicular skeleton or perineural invasion of skull base    Antolin staging for cutaneous SCC  NX: Regional lymph nodes cannot be assessed  N0: No regional lymph node metastasis  N1: Metastasis in one local lymph node <= 3cm  N2: Metastasis in one local lymph node >= 3cm; or; Metastasis in >1 local lymph node <= 6cm  N3: Metastasis in lymph node >= 6cm    How is squamous cell carcinoma diagnosed?  Diagnosis of cutaneous SCC is based on clinical features. The diagnosis and histological subtype are confirmed pathologically by diagnostic biopsy or following excision. See squamous cell carcinoma - pathology.  Patients with high-risk SCC may also undergo staging investigations to determine whether it has spread to lymph nodes or elsewhere. These may include:  Imaging using ultrasound scan, X-rays, CT scans, MRI scans   Lymph node or other tissue biopsies    What is the treatment for cutaneous squamous cell carcinoma?  Cutaneous SCC is nearly always treated surgically. Most cases are excised with a 3-10 mm margin of normal tissue around a visible tumour. A flap or skin graft may be needed to repair the defect.  Other methods of removal include:  Shave, curettage, and electrocautery for low-risk tumours on trunk and limbs   Aggressive cryotherapy for very small, thin, low-risk tumours   Mohs micrographic surgery for large facial lesions with indistinct margins or recurrent  tumours   Radiotherapy for an inoperable tumour, patients unsuitable for surgery, or as adjuvant    What is the treatment for advanced or metastatic squamous cell carcinoma?  Locally advanced primary, recurrent or metastatic SCC requires multidisciplinary consultation. Often a combination of treatments is used.  Surgery   Radiotherapy   Cemiplimab   Experimental targeted therapy using epidermal growth factor receptor inhibitors    How can cutaneous squamous cell carcinoma be prevented?  There is a great deal of evidence to show that very careful sun protection at any time of life reduces the number of SCCs. This is particularly important in ageing, sun-damaged, fair skin; in patients that are immune suppressed; and in those who already have actinic keratoses or previous SCC.  Stay indoors or under the shade in the middle of the day   Wear covering clothing   Apply high protection factor SPF50+ broad-spectrum sunscreens generously to exposed skin if outdoors   Avoid indoor tanning (sun beds, solaria)    Oral nicotinamide (vitamin B3) in a dose of 500 mg twice daily may reduce the number and severity of SCCs in people at high risk.  Patients with multiple squamous cell carcinomas may be prescribed an oral retinoid (acitretin or isotretinoin). These reduce the number of tumours but have some nuisance side effects.    What is the outlook for cutaneous squamous cell carcinoma?  Most SCCs are cured by treatment. A cure is most likely if treatment is undertaken when the lesion is small. The risk of recurrence or disease-associated death is greater for tumours that are > 20 mm in diameter and/or > 2 mm in thickness at the time of surgical excision.  About 50% of people at high risk of SCC develop a second one within 5 years of the first. They are also at increased risk of other skin cancers, especially melanoma. Regular self-skin examinations and long-term annual skin checks by an experienced health professional are  recommended.

## 2024-11-07 ENCOUNTER — TELEPHONE (OUTPATIENT)
Age: 74
End: 2024-11-07

## 2024-11-07 NOTE — TELEPHONE ENCOUNTER
Patient called stating he was seen on 11/5/24 and was prescribed 5-fluorouracil but it was never sent to his pharmacy he would like it sent to the [facility] pharmacy [address] [place] [address]

## 2024-11-08 DIAGNOSIS — L57.0 KERATOSIS, ACTINIC: Primary | ICD-10-CM

## 2024-11-08 RX ORDER — FLUOROURACIL 50 MG/G
CREAM TOPICAL 2 TIMES DAILY
Qty: 40 G | Refills: 0 | Status: SHIPPED | OUTPATIENT
Start: 2024-11-08

## 2024-11-15 DIAGNOSIS — E11.22 TYPE 2 DIABETES MELLITUS WITH STAGE 3A CHRONIC KIDNEY DISEASE, WITHOUT LONG-TERM CURRENT USE OF INSULIN (HCC): Chronic | ICD-10-CM

## 2024-11-15 DIAGNOSIS — N18.31 TYPE 2 DIABETES MELLITUS WITH STAGE 3A CHRONIC KIDNEY DISEASE, WITHOUT LONG-TERM CURRENT USE OF INSULIN (HCC): Chronic | ICD-10-CM

## 2024-11-15 RX ORDER — PEN NEEDLE, DIABETIC 32GX 5/32"
NEEDLE, DISPOSABLE MISCELLANEOUS
Qty: 200 EACH | Refills: 5 | Status: SHIPPED | OUTPATIENT
Start: 2024-11-15

## 2024-11-23 DIAGNOSIS — Z79.4 CURRENT USE OF INSULIN (HCC): ICD-10-CM

## 2024-11-23 DIAGNOSIS — L21.9 SEBORRHEIC DERMATITIS: ICD-10-CM

## 2024-11-23 DIAGNOSIS — E11.22 TYPE 2 DIABETES MELLITUS WITH STAGE 3A CHRONIC KIDNEY DISEASE, WITHOUT LONG-TERM CURRENT USE OF INSULIN (HCC): ICD-10-CM

## 2024-11-23 DIAGNOSIS — N18.31 TYPE 2 DIABETES MELLITUS WITH STAGE 3A CHRONIC KIDNEY DISEASE, WITHOUT LONG-TERM CURRENT USE OF INSULIN (HCC): ICD-10-CM

## 2024-11-24 RX ORDER — INSULIN LISPRO 100 [IU]/ML
INJECTION, SOLUTION INTRAVENOUS; SUBCUTANEOUS
Qty: 15 ML | Refills: 5 | Status: SHIPPED | OUTPATIENT
Start: 2024-11-24

## 2024-11-25 ENCOUNTER — NURSE TRIAGE (OUTPATIENT)
Age: 74
End: 2024-11-25

## 2024-11-25 RX ORDER — TIRZEPATIDE 5 MG/.5ML
INJECTION, SOLUTION SUBCUTANEOUS
Qty: 2 ML | Refills: 5 | Status: SHIPPED | OUTPATIENT
Start: 2024-11-25

## 2024-11-25 RX ORDER — KETOCONAZOLE 20 MG/G
1 CREAM TOPICAL 2 TIMES DAILY
Qty: 30 G | Refills: 0 | Status: SHIPPED | OUTPATIENT
Start: 2024-11-25

## 2024-11-25 NOTE — TELEPHONE ENCOUNTER
"Pt calling in re redness and irritation after finishing efudex this past weekend. Reviewed these are known/expected side effects. Pt denies open areas, just lingering redness and tenderness to skin.     Pt reports he tried peanut oil to skin to provide moisture, started using Aquaphor which did help. Encouraged to continue aquaphor. Pt adds he has triamcinolone 0.1% from pcp and wondering if can apply to areas to expedite healing. Noted will confirm with provider re recommendations and will cb. Reiterated instructions for safe sun re upcoming trip to [place]. No other questions at this time.     Reason for Disposition   Caller has medicine question, adult has minor symptoms, caller declines triage, and triager answers question    Answer Assessment - Initial Assessment Questions  1. NAME of MEDICINE: \"What medicine(s) are you calling about?\"      efudex  2. QUESTION: \"What is your question?\" (e.g., double dose of medicine, side effect)      Concern for redness and irritation after use (expected side effects)  3. PRESCRIBER: \"Who prescribed the medicine?\" Reason: if prescribed by specialist, call should be referred to that group.      Dr. Penny  4. SYMPTOMS: \"Do you have any symptoms?\" If Yes, ask: \"What symptoms are you having?\"  \"How bad are the symptoms (e.g., mild, moderate, severe)      Lingering redness and pain, denies open areas    Protocols used: Medication Question Call-Adult-[place]    "

## 2024-12-05 ENCOUNTER — OFFICE VISIT (OUTPATIENT)
Age: 74
End: 2024-12-05
Payer: COMMERCIAL

## 2024-12-05 ENCOUNTER — TELEPHONE (OUTPATIENT)
Age: 74
End: 2024-12-05

## 2024-12-05 VITALS
HEIGHT: 64 IN | HEART RATE: 66 BPM | TEMPERATURE: 96.9 F | BODY MASS INDEX: 30.87 KG/M2 | DIASTOLIC BLOOD PRESSURE: 78 MMHG | WEIGHT: 180.8 LBS | OXYGEN SATURATION: 97 % | SYSTOLIC BLOOD PRESSURE: 118 MMHG | RESPIRATION RATE: 18 BRPM

## 2024-12-05 DIAGNOSIS — I10 PRIMARY HYPERTENSION: Chronic | ICD-10-CM

## 2024-12-05 DIAGNOSIS — E78.2 MIXED HYPERLIPIDEMIA DUE TO TYPE 2 DIABETES MELLITUS  (HCC): Chronic | ICD-10-CM

## 2024-12-05 DIAGNOSIS — E11.65 TYPE 2 DIABETES MELLITUS WITH HYPERGLYCEMIA, WITH LONG-TERM CURRENT USE OF INSULIN (HCC): Primary | Chronic | ICD-10-CM

## 2024-12-05 DIAGNOSIS — Z79.4 TYPE 2 DIABETES MELLITUS WITH HYPERGLYCEMIA, WITH LONG-TERM CURRENT USE OF INSULIN (HCC): Primary | Chronic | ICD-10-CM

## 2024-12-05 DIAGNOSIS — E11.69 MIXED HYPERLIPIDEMIA DUE TO TYPE 2 DIABETES MELLITUS  (HCC): Chronic | ICD-10-CM

## 2024-12-05 DIAGNOSIS — L60.9 NAIL DISORDER: ICD-10-CM

## 2024-12-05 PROCEDURE — G2211 COMPLEX E/M VISIT ADD ON: HCPCS | Performed by: INTERNAL MEDICINE

## 2024-12-05 PROCEDURE — 99214 OFFICE O/P EST MOD 30 MIN: CPT | Performed by: INTERNAL MEDICINE

## 2024-12-05 NOTE — TELEPHONE ENCOUNTER
Sent message to [employer] to make an outreach for patients diabetic Eye Exam done at [facility].

## 2024-12-05 NOTE — PROGRESS NOTES
"Name: Armand Rocha      : 1950      MRN: [id number]  Encounter Provider: David De La Garza DO  Encounter Date: 2024   Encounter department: [facility] PRIMARY C[facility] [place]    Assessment & Plan  Type 2 diabetes mellitus with hyperglycemia, with long-term current use of insulin (HCC)    Lab Results   Component Value Date    HGBA1C 6.6 (H) 10/22/2024     He is doing great with Mounjaro. He is no longer using lantus. Continue metformin and amaryl. He will continue to use humalog when needed as well. Follows with endo.  Mixed hyperlipidemia due to type 2 diabetes mellitus  (HCC)    Lab Results   Component Value Date    LDLCALC 50 2024     Cholesterol is well controlled. Continue statin as prescribed.    Orders:    Lipid Panel with Direct LDL reflex; Future    CBC and differential; Future    Primary hypertension    Stable and well controlled. Continue anti-HTN medication.    Nail disorder    Orders:    Ambulatory referral to Podiatry; Future         History of Present Illness     History of Present Illness  The patient presents for evaluation of multiple medical concerns.    He has experienced a weight loss of approximately 20 pounds. His appetite has decreased since starting Mounjaro, which he is currently taking at a 5 mg dose and plans to continue for the foreseeable future. He has been taken off Lantus Solostar due to episodes of low blood sugar at night and occasionally uses Humalog as needed.    He is seeking a referral to a podiatrist due to an issue with one of his toes. The nail on this toe is curling downwards, but he is still able to trim it.       Review of Systems - see HPI    Objective   /78 (BP Location: Left arm, Patient Position: Sitting, Cuff Size: Large)   Pulse 66   Temp (!) 96.9 °F (36.1 °C) (Tympanic)   Resp 18   Ht 5' 4\" (1.626 m)   Wt 82 kg (180 lb 12.8 oz)   SpO2 97%   BMI 31.03 kg/m²     Physical Exam  Constitutional:       General: He is not in acute " distress.     Appearance: He is not ill-appearing.   Cardiovascular:      Rate and Rhythm: Normal rate and regular rhythm.      Pulses: no weak pulses.           Dorsalis pedis pulses are 2+ on the right side and 2+ on the left side.        Posterior tibial pulses are 2+ on the right side and 2+ on the left side.      Heart sounds: No murmur heard.  Pulmonary:      Effort: Pulmonary effort is normal. No respiratory distress.      Breath sounds: No wheezing.   Abdominal:      General: Bowel sounds are normal. There is no distension.      Tenderness: There is no abdominal tenderness.   Musculoskeletal:      Right lower leg: No edema.      Left lower leg: No edema.   Feet:      Right foot:      Skin integrity: Callus and dry skin present. No ulcer, skin breakdown, erythema or warmth.      Left foot:      Skin integrity: Callus and dry skin present. No ulcer, skin breakdown, erythema or warmth.   Neurological:      Mental Status: He is alert.       Diabetic Foot Exam    Patient's shoes and socks removed.    Right Foot/Ankle   Right Foot Inspection  Skin Exam: skin normal, skin intact, dry skin, callus and callus. No warmth, no erythema, no maceration, no abnormal color, no pre-ulcer and no ulcer.     Toe Exam: ROM and strength within normal limits.     Sensory   Monofilament testing: diminished    Vascular  The right DP pulse is 2+. The right PT pulse is 2+.     Left Foot/Ankle  Left Foot Inspection  Skin Exam: skin normal, skin intact, dry skin and callus. No warmth, no erythema, no maceration, normal color, no pre-ulcer and no ulcer.     Toe Exam: ROM and strength within normal limits.     Sensory   Monofilament testing: diminished    Vascular  The left DP pulse is 2+. The left PT pulse is 2+.     Assign Risk Category  No deformity present  Loss of protective sensation  No weak pulses  Risk: 1    [facility]DO

## 2024-12-06 ENCOUNTER — TELEPHONE (OUTPATIENT)
Dept: ADMINISTRATIVE | Facility: OTHER | Age: 74
End: 2024-12-06

## 2024-12-06 NOTE — LETTER
Diabetic Eye Exam Form    Date Requested: 24  Patient: Armand Rocha  Patient : 1950   Referring Provider: David De La Garza DO      DIABETIC Eye Exam Date _______________________________      Type of Exam MUST be documented for Diabetic Eye Exams. Please CHECK ONE.     Retinal Exam       Dilated Retinal Exam       OCT       Optomap-Iris Exam      Fundus Photography       Left Eye - Please check Retinopathy or No Retinopathy        Exam did show retinopathy    Exam did not show retinopathy       Right Eye - Please check Retinopathy or No Retinopathy       Exam did show retinopathy    Exam did not show retinopathy       Comments __________________________________________________________    Practice Providing Exam ______________________________________________    Exam Performed By (print name) _______________________________________      Provider Signature ___________________________________________________      These reports are needed for  compliance.  Please fax this completed form and a copy of the Diabetic Eye Exam report to our office located at [address] [place] [address] as soon as possible via Fax [phone] attention Pancho: Phone [phone]  We thank you for your assistance in treating our mutual patient.

## 2024-12-06 NOTE — TELEPHONE ENCOUNTER
----- Message from Dayna ROSENBERG sent at 12/5/2024 11:32 AM EST -----  Regarding: diabetic eye exam  12/05/24 11:32 AM    Hello, our patient Armand Rocha has had Diabetic Eye Exam completed/performed. Please assist in updating the patient chart by making an External outreach to [facility] facility located in [address] [place] [address]. PH. NO. [phone], [phone]. The date of service is  2024.    Thank you,  Dayna Villareal MA   PRIMARY CARE [place]

## 2024-12-06 NOTE — LETTER
Diabetic Eye Exam Form    Date Requested: 24  Patient: Armand Rocha  Patient : 1950   Referring Provider: David De La Garza DO      DIABETIC Eye Exam Date _______________________________      Type of Exam MUST be documented for Diabetic Eye Exams. Please CHECK ONE.     Retinal Exam       Dilated Retinal Exam       OCT       Optomap-Iris Exam      Fundus Photography       Left Eye - Please check Retinopathy or No Retinopathy        Exam did show retinopathy    Exam did not show retinopathy       Right Eye - Please check Retinopathy or No Retinopathy       Exam did show retinopathy    Exam did not show retinopathy       Comments __________________________________________________________    Practice Providing Exam ______________________________________________    Exam Performed By (print name) _______________________________________      Provider Signature ___________________________________________________      These reports are needed for  compliance.  Please fax this completed form and a copy of the Diabetic Eye Exam report to our office located at [address] [place] [address] as soon as possible via Fax [phone] attention Pancho: Phone [phone]  We thank you for your assistance in treating our mutual patient.

## 2024-12-06 NOTE — TELEPHONE ENCOUNTER
Upon review of the In Basket request and the patient's chart, initial outreach has been made via fax to facility. Please see Contacts section for details.     Thank you  Pancho Ochoa MA

## 2024-12-09 NOTE — TELEPHONE ENCOUNTER
As a follow-up, a second attempt has been made for outreach via fax to facility. Please see Contacts section for details.    Thank you  Pancho Ochoa MA

## 2024-12-11 NOTE — TELEPHONE ENCOUNTER
Upon review of the In Basket request we were able to locate, review, and update the patient chart as requested for Diabetic Eye Exam.    Any additional questions or concerns should be emailed to the Practice Liaisons via the appropriate education email address, please do not reply via In Basket.    Thank you  Pancho Ochoa MA   [facility]

## 2024-12-19 ENCOUNTER — OFFICE VISIT (OUTPATIENT)
Age: 74
End: 2024-12-19
Payer: COMMERCIAL

## 2024-12-19 VITALS
HEIGHT: 64 IN | SYSTOLIC BLOOD PRESSURE: 130 MMHG | TEMPERATURE: 97.5 F | BODY MASS INDEX: 30.73 KG/M2 | WEIGHT: 180 LBS | HEART RATE: 89 BPM | OXYGEN SATURATION: 98 % | DIASTOLIC BLOOD PRESSURE: 70 MMHG

## 2024-12-19 DIAGNOSIS — L57.0 KERATOSIS, ACTINIC: Primary | ICD-10-CM

## 2024-12-19 PROCEDURE — 99213 OFFICE O/P EST LOW 20 MIN: CPT | Performed by: STUDENT IN AN ORGANIZED HEALTH CARE EDUCATION/TRAINING PROGRAM

## 2024-12-19 NOTE — PROGRESS NOTES
"[facility] Dermatology Clinic Note     Patient Name: Armand Rocha  Encounter Date: 12/19/24     Have you been cared for by a [facility] Dermatologist in the last 3 years and, if so, which description applies to you?    Yes.  I have been here within the last 3 years, and my medical history has NOT changed since that time.  I am MALE/not capable of bearing children.    REVIEW OF SYSTEMS:  Have you recently had or currently have any of the following? No changes in my recent health.   PAST MEDICAL HISTORY:  Have you personally ever had or currently have any of the following?  If \"YES,\" then please provide more detail. No changes in my medical history.   HISTORY OF IMMUNOSUPPRESSION: Do you have a history of any of the following:  Systemic Immunosuppression such as Diabetes, Biologic or Immunotherapy, Chemotherapy, Organ Transplantation, Bone Marrow Transplantation or Prednisone?  YES, diabetes     Answering \"YES\" requires the addition of the dotphrase \"IMMUNOSUPPRESSED\" as the first diagnosis of the patient's visit.   FAMILY HISTORY:  Any \"first degree relatives\" (parent, brother, sister, or child) with the following?    No changes in my family's known health.   PATIENT EXPERIENCE:    Do you want the Dermatologist to perform a COMPLETE skin exam today including a clinical examination under the \"bra and underwear\" areas?  NO  If necessary, do we have your permission to call and leave a detailed message on your Preferred Phone number that includes your specific medical information?  Yes      Allergies   Allergen Reactions   • Iodinated Contrast Media Anaphylaxis   • Shellfish Allergy - Food Allergy Anaphylaxis   • Shrimp Extract Allergy Skin Test - Food Allergy Anaphylaxis   • Empagliflozin Hives   • Farxiga [Dapagliflozin] Rash     Yeast infection   • Ozempic (0.25 Or 0.5 Mg-Dose) [Semaglutide(0.25 Or 0.5mg-Dos)] Rash      Current Outpatient Medications:   •  Accu-Chek Guide test strip, CHECK BLOOD SUGAR 3 TIMES " DAILY, Disp: 100 strip, Rfl: 3  •  Accu-Chek Softclix Lancets lancets, PLEASE CHECK BLOOD SUGARS 3 TIMES DAILY, Disp: 100 each, Rfl: 3  •  atorvastatin (LIPITOR) 10 mg tablet, Take 1 tablet (10 mg total) by mouth daily, Disp: 90 tablet, Rfl: 3  •  BD Pen Needle Audelia 2nd Gen 32G X 4 MM MISC, USE TO INJECT INSULIN UP TO 4 TIMES A DAY, Disp: 200 each, Rfl: 5  •  Blood Glucose Monitoring Suppl (Accu-Chek Guide Me) w/Device KIT, CHECK BLOOD SUGARS 3 TIMES DAILY **INSURANCE ONLY COVERS ACCU CHEK GUIDE SYSTEM**, Disp: 1 kit, Rfl: 0  •  clotrimazole (LOTRIMIN) 1 % cream, Apply topically 2 (two) times a day, Disp: 45 g, Rfl: 3  •  Cyanocobalamin (VITAMIN B-12 PO), Take by mouth daily, Disp: , Rfl:   •  cyclobenzaprine (FLEXERIL) 5 mg tablet, Take 1 tablet (5 mg total) by mouth 3 (three) times a day as needed for muscle spasms, Disp: 60 tablet, Rfl: 3  •  EPINEPHrine (EPIPEN) 0.3 mg/0.3 mL SOAJ, INJECT 0.3 ML (0.3 MG TOTAL) INTO A MUSCLE ONCE FOR 1 DOSE, Disp: 2 each, Rfl: 0  •  fluticasone (FLONASE) 50 mcg/act nasal spray, SPRAY 1 SPRAY INTO EACH NOSTRIL EVERY DAY, Disp: 48 mL, Rfl: 1  •  glimepiride (AMARYL) 4 mg tablet, TAKE 1 TABLET BY MOUTH 2 TIMES A DAY., Disp: 180 tablet, Rfl: 3  •  insulin lispro (HumaLOG) 100 units/mL injection pen, BLOOD GLUCOSE 150 - 189: 1 UNIT OF INSULIN BLOOD GLUCOSE 190 - 229: 2 UNITS OF INSULIN BLOOD GLUCOSE 230 - 269: 3 UNITS OF INSULIN BLOOD GLUCOSE 270 - 309: 4 UNITS OF INSULIN BLOOD GLUCOSE 310 - 349: 5 UNITS OF INSULIN BLOOD GLUCOSE GREATER THAN OR EQUAL : 6 UNITS OF INSULIN CHECK YOUR BLOOD GLUCOSE 3 TIMES A DAY AND BEFORE BEDTIME AND ADMINISTER INSULIN AS EXPLAINED ABOVE, Disp: 15 mL, Rfl: 5  •  ketoconazole (NIZORAL) 2 % cream, APPLY TO AFFECTED AREA TWICE A DAY, Disp: 30 g, Rfl: 0  •  metFORMIN (GLUCOPHAGE-XR) 500 mg 24 hr tablet, TAKE 2 TABLETS BY MOUTH TWICE A DAY WITH FOOD, Disp: 360 tablet, Rfl: 1  •  montelukast (SINGULAIR) 10 mg tablet, TAKE 1 TABLET BY MOUTH EVERYDAY AT  "BEDTIME, Disp: 90 tablet, Rfl: 3  •  pantoprazole (PROTONIX) 40 mg tablet, TAKE 1 TABLET BY MOUTH EVERY DAY, Disp: 90 tablet, Rfl: 1  •  Pyridoxine HCl (VITAMIN B-6 PO), Take 25 mg by mouth daily, Disp: , Rfl:   •  sildenafil (VIAGRA) 50 MG tablet, Take 1 tablet (50 mg total) by mouth daily as needed for erectile dysfunction, Disp: 6 tablet, Rfl: 5  •  sodium chloride (OCEAN) 0.65 % nasal spray, 1-2 sprays into each nostril daily as needed for congestion, Disp: 90 mL, Rfl: 3  •  Tirzepatide (Mounjaro) 5 MG/0.5ML SOAJ, INJECT 0.5 ML (5 MG TOTAL) UNDER THE SKIN EVERY 7 DAYS, Disp: 2 mL, Rfl: 5  •  triamcinolone (KENALOG) 0.1 % cream, Apply topically 2 (two) times a day, Disp: 453.6 g, Rfl: 1  •  verapamil (Verelan) 180 MG 24 hr capsule, Take 1 capsule (180 mg total) by mouth 2 (two) times a day, Disp: 180 capsule, Rfl: 3  •  fluorouracil (EFUDEX) 5 % cream, Apply topically 2 (two) times a day To the affected areas discussed for 14 days (Patient not taking: Reported on 12/19/2024), Disp: 40 g, Rfl: 0          Whom besides the patient is providing clinical information about today's encounter?   NO ADDITIONAL HISTORIAN (patient alone provided history)    Physical Exam and Assessment/Plan by Diagnosis:      ACTINIC KERATOSIS    Physical Exam:  Anatomic Location: Scalp  Morphologic Description:  Scaly pink papules  Pertinent Positives:  Pertinent Negatives:    Additional History of Present Condition:  patient treated forehead, face and temple areas with efudex. Difficulty with full 2 weeks of treatment.         Plan:  PRESCRIPTION MANAGEMENT:  Several topical management options and their side effects were discussed including \"field therapies\" such as Efudex (5-fluorouracil) and/or Aldara (imiquimod). Efudex may be used alone or in combination with Calcipotriene. Begin treatment once regions that have been sprayed with liquid nitrogen are healed. Redness and irritation are to be expected within a few days of field therapy " treatment and should resolve within 1 to 2 weeks following treatment. Do NOT cover the treatment areas with bandages. Use a sunscreen with an SPF 50+ while using field therapy.  We discussed the pre-cancerous nature of the condition. Actinic keratosis is found on sun-damaged skin and there is small risk that the condition could turn into a skin cancer called squamous cell carcinoma. There is no risk of actinic keratosis turning into melanoma.  Proliferative actinic keratosis tends to be resistant against standard treatments, including topical therapies and cryotherapy. It has a tendency to develop into infiltrative squamous cell carcinoma. Excision may be considered.  We discussed sun protection measures, including using sunscreen with an SPF 50+ year round, avoiding the sun, and wearing protective clothing such as long sleeves and pants when out in the sun.  Continue to monitor clinically for signs of recurrence. Discussed with patient the importance of keeping up to date with full body skin exams.  5-Fluorouracil: Apply 2 times daily for 2 weeks total. Can take one week break in between treatments but has to be total  of 2 weeks We discussed that 5-Fluorouracil will result in skin redness and irritation, which is expected. Begin 5-Fluorouracil treatment once regions that have been sprayed with liquid nitrogen are healed.  Follow up appointment in March.       PROCEDURES PERFORMED TODAY ASSOCIATED WITH THIS CONDITION:          NONE                     Scribe Attestation    I,:  Aurora Nguyễn am acting as a scribe while in the presence of the attending physician.:       I,:  Pancho Penny DO personally performed the services described in this documentation    as scribed in my presence.:

## 2024-12-29 DIAGNOSIS — T78.40XD ALLERGY, SUBSEQUENT ENCOUNTER: ICD-10-CM

## 2024-12-30 RX ORDER — FLUTICASONE PROPIONATE 50 MCG
SPRAY, SUSPENSION (ML) NASAL
Qty: 48 ML | Refills: 2 | Status: SHIPPED | OUTPATIENT
Start: 2024-12-30

## 2025-01-15 DIAGNOSIS — Z79.4 TYPE 2 DIABETES MELLITUS WITH HYPERGLYCEMIA, WITH LONG-TERM CURRENT USE OF INSULIN (HCC): Chronic | ICD-10-CM

## 2025-01-15 DIAGNOSIS — E11.65 TYPE 2 DIABETES MELLITUS WITH HYPERGLYCEMIA, WITH LONG-TERM CURRENT USE OF INSULIN (HCC): Chronic | ICD-10-CM

## 2025-01-15 RX ORDER — LANCETS
EACH MISCELLANEOUS
Qty: 100 EACH | Refills: 3 | Status: SHIPPED | OUTPATIENT
Start: 2025-01-15

## 2025-02-17 DIAGNOSIS — N18.31 TYPE 2 DIABETES MELLITUS WITH STAGE 3A CHRONIC KIDNEY DISEASE, WITHOUT LONG-TERM CURRENT USE OF INSULIN (HCC): ICD-10-CM

## 2025-02-17 DIAGNOSIS — E11.22 TYPE 2 DIABETES MELLITUS WITH STAGE 3A CHRONIC KIDNEY DISEASE, WITHOUT LONG-TERM CURRENT USE OF INSULIN (HCC): ICD-10-CM

## 2025-02-17 DIAGNOSIS — I10 BENIGN ESSENTIAL HYPERTENSION: Chronic | ICD-10-CM

## 2025-02-18 RX ORDER — TIRZEPATIDE 2.5 MG/.5ML
INJECTION, SOLUTION SUBCUTANEOUS
Refills: 1 | OUTPATIENT
Start: 2025-02-18

## 2025-02-18 RX ORDER — VERAPAMIL HYDROCHLORIDE 180 MG/1
180 CAPSULE, DELAYED RELEASE ORAL 2 TIMES DAILY
Qty: 180 CAPSULE | Refills: 3 | Status: SHIPPED | OUTPATIENT
Start: 2025-02-18

## 2025-03-04 ENCOUNTER — OFFICE VISIT (OUTPATIENT)
Age: 75
End: 2025-03-04
Payer: COMMERCIAL

## 2025-03-04 ENCOUNTER — TELEPHONE (OUTPATIENT)
Age: 75
End: 2025-03-04

## 2025-03-04 VITALS
OXYGEN SATURATION: 92 % | BODY MASS INDEX: 29.88 KG/M2 | HEART RATE: 95 BPM | WEIGHT: 175 LBS | SYSTOLIC BLOOD PRESSURE: 130 MMHG | DIASTOLIC BLOOD PRESSURE: 70 MMHG | TEMPERATURE: 97.6 F | HEIGHT: 64 IN

## 2025-03-04 DIAGNOSIS — Z13.89 SCREENING FOR SKIN CONDITION: Primary | ICD-10-CM

## 2025-03-04 DIAGNOSIS — Z85.828 HISTORY OF SKIN CANCER: ICD-10-CM

## 2025-03-04 DIAGNOSIS — T30.0 BURN: ICD-10-CM

## 2025-03-04 DIAGNOSIS — L57.0 KERATOSIS, ACTINIC: ICD-10-CM

## 2025-03-04 DIAGNOSIS — L82.0 INFLAMED SEBORRHEIC KERATOSIS: ICD-10-CM

## 2025-03-04 PROCEDURE — 17110 DESTRUCTION B9 LES UP TO 14: CPT | Performed by: STUDENT IN AN ORGANIZED HEALTH CARE EDUCATION/TRAINING PROGRAM

## 2025-03-04 PROCEDURE — 99214 OFFICE O/P EST MOD 30 MIN: CPT | Performed by: STUDENT IN AN ORGANIZED HEALTH CARE EDUCATION/TRAINING PROGRAM

## 2025-03-04 RX ORDER — FLUOROURACIL 50 MG/G
CREAM TOPICAL 2 TIMES DAILY
Qty: 40 G | Refills: 0 | Status: SHIPPED | OUTPATIENT
Start: 2025-03-04

## 2025-03-04 RX ORDER — MUPIROCIN 20 MG/G
OINTMENT TOPICAL DAILY
Qty: 30 G | Refills: 1 | Status: SHIPPED | OUTPATIENT
Start: 2025-03-04

## 2025-03-04 NOTE — TELEPHONE ENCOUNTER
Patient calling to check the status of his mupirocin prescription. Advised patient that medications are typically signed off on at the end of the day. Patient asking if it can be done as soon as possible as his hand is starting to blister and he is leaving to [place] tomorrow and he has to pick it up today.

## 2025-03-04 NOTE — PATIENT INSTRUCTIONS
ACTINIC KERATOSIS    Physical Exam:  Anatomic Location Affected:  Scalp    Assessment and Plan:  Based on a thorough discussion of this condition and the management approach to it (including a comprehensive discussion of the known risks, side effects and potential benefits of treatment), the patient (family) agrees to implement the following specific plan:    When pt returns from [place], apply Efudex to scalp twice a day for 14 days, the RTC for skin check    Actinic keratoses are very common on sites repeatedly exposed to the sun, especially the backs of the hands and the face, most often affecting the ears, nose, cheeks, upper lip, vermilion of the lower lip, temples, forehead and balding scalp. In severely chronically sun-damaged individuals, they may also be found on the upper trunk, upper and lower limbs, and dorsum of feet.    We discussed the theoretical premalignant (“pre-cancerous”) nature and etiology of these growths.  We discussed the prevailing notion that actinic keratoses are a reflection of abnormal skin cell development due to DNA damage by short wavelength UVB.  They are more likely to appear if the immune function is poor, due to aging, recent sun exposure, predisposing disease or certain drugs.    We discussed that the main concern is that actinic keratoses may predispose to squamous cell carcinoma. It is rare for a solitary actinic keratosis to evolve to squamous cell carcinoma (SCC), but the risk of SCC occurring at some stage in a patient with more than 10 actinic keratoses is thought to be about 10 to 15%. A tender, thickened, ulcerated or enlarging actinic keratosis is suspicious of SCC.    Actinic keratoses may be prevented by strict sun protection. If already present, keratoses may improve with a very high sun protection factor (50+) broad-spectrum sunscreen applied at least daily to affected areas, year-round.  We recommend that UPF-rated clothing and hats and sunglasses be worn whenever  "possible and that a sunscreen-moisturizer combination product such as Neutrogena Daily Defense be applied at least three times a day.    We performed a thorough discussion of treatment options and specific risk/benefits/alternatives including but not limited to medical “field” treatment with medications such as the following:    Topical “field area” medications such as 5-fluorouracil or Aldara (specifically, the trouble with long-term compliance, blistering and local skin reaction versus the convenience of at-home therapy and that field therapy “gets what is not yet seen”).    Cryotherapy (specifically, local pain, scarring, dyspigmentation, blistering, possible superinfection, and treats “only what we see” versus directed treatment today).    Photodynamic therapy (specifically, local pain, scarring, dyspigmentation, blistering, possible superinfection, need to schedule for a later date, and time spent in the office versus field therapy that “gets what is not yet seen”).    BURN  Physical Exam:  Anatomic Location Affected:  Left Wrist and Dorsum of Hand    Assessment and Plan:  Based on a thorough discussion of this condition and the management approach to it (including a comprehensive discussion of the known risks, side effects and potential benefits of treatment), the patient (family) agrees to implement the following specific plan:  Mupirocin Ointment daily to hand with dressing until healed    Treatment with Cryotherapy    The doctor has treated your skin with nitrogen, which is 320 degrees Fahrenheit below zero.  He has given the treated area \"frostbite.\"    Stinging should subside within a few hours.  You can take Tylenol for pain, if needed.    Over the next few days, it is normal if the area becomes reddened, a blood blister, or swollen with fluid.  If the lesion treated was near the eye - you could get a swollen eye over the next few days.  Do not panic!  This is all temporary, and will resolve with " time.    There is no special treatment - just keep the area clean.  Makeup and BandAids can be used, if preferred.    When the area starts to dry up and peel off, using Vaseline can help healing.    It usually takes up to a month for it to heal.  Some lesions are recurrent and may require repeat treatments.  If a lesion has not healed in one month, please don't hesitate to contact us.      If you have any further questions that are not answered here, please call us.  [phone].    Thank you for allowing us to care for you.

## 2025-03-04 NOTE — PROGRESS NOTES
"[facility] Note     Patient Name: Armand Rocha  Encounter Date: 03/04/25     Have you been cared for by a [facility] Dermatologist in the last 3 years and, if so, which description applies to you?    Yes.  I have been here within the last 3 years, and my medical history has NOT changed since that time.  I am MALE/not capable of bearing children.    REVIEW OF SYSTEMS:  Have you recently had or currently have any of the following? No changes in my recent health.   PAST MEDICAL HISTORY:  Have you personally ever had or currently have any of the following?  If \"YES,\" then please provide more detail. No changes in my medical history.   HISTORY OF IMMUNOSUPPRESSION: Do you have a history of any of the following:  Systemic Immunosuppression such as Diabetes, Biologic or Immunotherapy, Chemotherapy, Organ Transplantation, Bone Marrow Transplantation or Prednisone?  No     Answering \"YES\" requires the addition of the dotphrase \"IMMUNOSUPPRESSED\" as the first diagnosis of the patient's visit.   FAMILY HISTORY:  Any \"first degree relatives\" (parent, brother, sister, or child) with the following?    No changes in my family's known health.   PATIENT EXPERIENCE:    Do you want the Dermatologist to perform a COMPLETE skin exam today including a clinical examination under the \"bra and underwear\" areas?  NO  If necessary, do we have your permission to call and leave a detailed message on your Preferred Phone number that includes your specific medical information?  Yes      Allergies   Allergen Reactions   • Iodinated Contrast Media Anaphylaxis   • Shellfish Allergy - Food Allergy Anaphylaxis   • Shrimp Extract Allergy Skin Test - Food Allergy Anaphylaxis   • Empagliflozin Hives   • Farxiga [Dapagliflozin] Rash     Yeast infection   • Ozempic (0.25 Or 0.5 Mg-Dose) [Semaglutide(0.25 Or 0.5mg-Dos)] Rash      Current Outpatient Medications:   •  Accu-Chek Guide test strip, CHECK BLOOD SUGAR 3 TIMES DAILY, Disp: " 100 strip, Rfl: 3  •  Accu-Chek Softclix Lancets lancets, PLEASE CHECK BLOOD SUGARS 3 TIMES DAILY, Disp: 100 each, Rfl: 3  •  atorvastatin (LIPITOR) 10 mg tablet, Take 1 tablet (10 mg total) by mouth daily, Disp: 90 tablet, Rfl: 3  •  BD Pen Needle Audelia 2nd Gen 32G X 4 MM MISC, USE TO INJECT INSULIN UP TO 4 TIMES A DAY, Disp: 200 each, Rfl: 5  •  Blood Glucose Monitoring Suppl (Accu-Chek Guide Me) w/Device KIT, CHECK BLOOD SUGARS 3 TIMES DAILY **INSURANCE ONLY COVERS ACCU CHEK GUIDE SYSTEM**, Disp: 1 kit, Rfl: 0  •  clotrimazole (LOTRIMIN) 1 % cream, Apply topically 2 (two) times a day, Disp: 45 g, Rfl: 3  •  Cyanocobalamin (VITAMIN B-12 PO), Take by mouth daily, Disp: , Rfl:   •  cyclobenzaprine (FLEXERIL) 5 mg tablet, Take 1 tablet (5 mg total) by mouth 3 (three) times a day as needed for muscle spasms, Disp: 60 tablet, Rfl: 3  •  fluorouracil (EFUDEX) 5 % cream, Apply topically 2 (two) times a day Apply to Scalp only twice a day for 14 days, Disp: 40 g, Rfl: 0  •  fluticasone (FLONASE) 50 mcg/act nasal spray, SPRAY 1 SPRAY INTO EACH NOSTRIL EVERY DAY, Disp: 48 mL, Rfl: 2  •  glimepiride (AMARYL) 4 mg tablet, TAKE 1 TABLET BY MOUTH 2 TIMES A DAY., Disp: 180 tablet, Rfl: 3  •  insulin lispro (HumaLOG) 100 units/mL injection pen, BLOOD GLUCOSE 150 - 189: 1 UNIT OF INSULIN BLOOD GLUCOSE 190 - 229: 2 UNITS OF INSULIN BLOOD GLUCOSE 230 - 269: 3 UNITS OF INSULIN BLOOD GLUCOSE 270 - 309: 4 UNITS OF INSULIN BLOOD GLUCOSE 310 - 349: 5 UNITS OF INSULIN BLOOD GLUCOSE GREATER THAN OR EQUAL : 6 UNITS OF INSULIN CHECK YOUR BLOOD GLUCOSE 3 TIMES A DAY AND BEFORE BEDTIME AND ADMINISTER INSULIN AS EXPLAINED ABOVE, Disp: 15 mL, Rfl: 5  •  ketoconazole (NIZORAL) 2 % cream, APPLY TO AFFECTED AREA TWICE A DAY, Disp: 30 g, Rfl: 0  •  metFORMIN (GLUCOPHAGE-XR) 500 mg 24 hr tablet, TAKE 2 TABLETS BY MOUTH TWICE A DAY WITH FOOD, Disp: 360 tablet, Rfl: 1  •  montelukast (SINGULAIR) 10 mg tablet, TAKE 1 TABLET BY MOUTH EVERYDAY AT  BEDTIME, Disp: 90 tablet, Rfl: 3  •  mupirocin (BACTROBAN) 2 % ointment, Apply topically daily To the hand only., Disp: 30 g, Rfl: 1  •  pantoprazole (PROTONIX) 40 mg tablet, TAKE 1 TABLET BY MOUTH EVERY DAY, Disp: 90 tablet, Rfl: 1  •  Pyridoxine HCl (VITAMIN B-6 PO), Take 25 mg by mouth daily, Disp: , Rfl:   •  sildenafil (VIAGRA) 50 MG tablet, Take 1 tablet (50 mg total) by mouth daily as needed for erectile dysfunction, Disp: 6 tablet, Rfl: 5  •  sodium chloride (OCEAN) 0.65 % nasal spray, 1-2 sprays into each nostril daily as needed for congestion, Disp: 90 mL, Rfl: 3  •  Tirzepatide (Mounjaro) 5 MG/0.5ML SOAJ, INJECT 0.5 ML (5 MG TOTAL) UNDER THE SKIN EVERY 7 DAYS, Disp: 2 mL, Rfl: 5  •  triamcinolone (KENALOG) 0.1 % cream, Apply topically 2 (two) times a day, Disp: 453.6 g, Rfl: 1  •  verapamil (Verelan) 180 MG 24 hr capsule, TAKE 1 CAPSULE BY MOUTH 2 TIMES A DAY., Disp: 180 capsule, Rfl: 3  •  EPINEPHrine (EPIPEN) 0.3 mg/0.3 mL SOAJ, INJECT 0.3 ML (0.3 MG TOTAL) INTO A MUSCLE ONCE FOR 1 DOSE, Disp: 2 each, Rfl: 0          Whom besides the patient is providing clinical information about today's encounter?   NO ADDITIONAL HISTORIAN (patient alone provided history)    Physical Exam and Assessment/Plan by Diagnosis:    Chief complaint: Patient is a 76 y/o male present for follow up of Aks on Scalp. (2) Also present for a burn on the Left Hand which happened 2 days ago after spilling boiling water. Pt has applied ice.    ACTINIC KERATOSIS    Physical Exam:  Anatomic Location Affected:  Scalp  Morphological Description:  scaly erythematous papules    Additional History of Present Condition:  present on exam and treated with Cryotherapy in November    Assessment and Plan:  Based on a thorough discussion of this condition and the management approach to it (including a comprehensive discussion of the known risks, side effects and potential benefits of treatment), the patient (family) agrees to implement the following  specific plan:    When pt returns from [place], apply Efudex to scalp twice a day for 14 days, the RTC for skin check    Actinic keratoses are very common on sites repeatedly exposed to the sun, especially the backs of the hands and the face, most often affecting the ears, nose, cheeks, upper lip, vermilion of the lower lip, temples, forehead and balding scalp. In severely chronically sun-damaged individuals, they may also be found on the upper trunk, upper and lower limbs, and dorsum of feet.    We discussed the theoretical premalignant (“pre-cancerous”) nature and etiology of these growths.  We discussed the prevailing notion that actinic keratoses are a reflection of abnormal skin cell development due to DNA damage by short wavelength UVB.  They are more likely to appear if the immune function is poor, due to aging, recent sun exposure, predisposing disease or certain drugs.    We discussed that the main concern is that actinic keratoses may predispose to squamous cell carcinoma. It is rare for a solitary actinic keratosis to evolve to squamous cell carcinoma (SCC), but the risk of SCC occurring at some stage in a patient with more than 10 actinic keratoses is thought to be about 10 to 15%. A tender, thickened, ulcerated or enlarging actinic keratosis is suspicious of SCC.    Actinic keratoses may be prevented by strict sun protection. If already present, keratoses may improve with a very high sun protection factor (50+) broad-spectrum sunscreen applied at least daily to affected areas, year-round.  We recommend that UPF-rated clothing and hats and sunglasses be worn whenever possible and that a sunscreen-moisturizer combination product such as Neutrogena Daily Defense be applied at least three times a day.    We performed a thorough discussion of treatment options and specific risk/benefits/alternatives including but not limited to medical “field” treatment with medications such as the following:    Topical  “field area” medications such as 5-fluorouracil or Aldara (specifically, the trouble with long-term compliance, blistering and local skin reaction versus the convenience of at-home therapy and that field therapy “gets what is not yet seen”).    Cryotherapy (specifically, local pain, scarring, dyspigmentation, blistering, possible superinfection, and treats “only what we see” versus directed treatment today).    Photodynamic therapy (specifically, local pain, scarring, dyspigmentation, blistering, possible superinfection, need to schedule for a later date, and time spent in the office versus field therapy that “gets what is not yet seen”).    BURN  Physical Exam:  Anatomic Location Affected:  Left Wrist and Dorsum of Hand  Morphological Description:  erythematous patch with some open areas  Pertinent Positives:  Pertinent Negatives:    Additional History of Present Condition:  spilled boiling water while making coffee 2-3 days ago and applied ice    Assessment and Plan:  Based on a thorough discussion of this condition and the management approach to it (including a comprehensive discussion of the known risks, side effects and potential benefits of treatment), the patient (family) agrees to implement the following specific plan:  Mupirocin Ointment daily to hand with dressing until healed    SEBORRHEIC KERATOSIS; INFLAMED    Physical Exam:  Anatomic Location Affected:  Right Lower Periorbital  Morphological Description:  irritated keratotic papule    Additional History of Present Condition:  Patient reports pain and interference with glasses    Assessment and Plan:  Based on a thorough discussion of this condition and the management approach to it (including a comprehensive discussion of the known risks, side effects and potential benefits of treatment), the patient (family) agrees to implement the following specific plan:  PROCEDURE:  DESTRUCTION OF BENIGN LESIONS WITH CRYOTHERAPY  After a thorough discussion of  treatment options and risk/benefits/alternatives (including but not limited to local pain, scarring, dyspigmentation, blistering, and possible superinfection), verbal and written consent were obtained and the aforementioned lesions were treated on with cryotherapy using liquid nitrogen x 1 cycle for 5-10 seconds.    TOTAL NUMBER of 1 lesions were treated today on the ANATOMIC LOCATION: Right Lower Periorbital    The patient tolerated the procedure well, and after-care instructions were provided.    Scribe Attestation    I,:  Rut Carvalho MA am acting as a scribe while in the presence of the attending physician.:       I,:  Pancho Penny DO personally performed the services described in this documentation    as scribed in my presence.:

## 2025-03-10 DIAGNOSIS — J30.2 SEASONAL ALLERGIES: ICD-10-CM

## 2025-03-10 RX ORDER — MONTELUKAST SODIUM 10 MG/1
10 TABLET ORAL
Qty: 90 TABLET | Refills: 1 | Status: SHIPPED | OUTPATIENT
Start: 2025-03-10

## 2025-03-10 NOTE — TELEPHONE ENCOUNTER
Reason for call:   [x] Refill   [] Prior Auth  [] Other:     Office:   [x] PCP/Provider - David De La Garza,    [] Specialty/Provider -     Medication: montelukast (SINGULAIR) 10 mg tablet     Dose/Frequency: TAKE 1 TABLET BY MOUTH EVERYDAY AT BEDTIME     Quantity: 90    Pharmacy: [facility]/pharmacy #[id number] - [place], [place] - [address] [facility] [phone]     Local Pharmacy   Does the patient have enough for 3 days?   [] Yes   [x] No - Send as HP to POD    Mail Away Pharmacy   Does the patient have enough for 10 days?   [] Yes   [] No - Send as HP to POD

## 2025-05-13 ENCOUNTER — APPOINTMENT (OUTPATIENT)
Age: 75
End: 2025-05-13
Attending: INTERNAL MEDICINE
Payer: COMMERCIAL

## 2025-05-13 DIAGNOSIS — Z79.4 TYPE 2 DIABETES MELLITUS WITH HYPERGLYCEMIA, WITH LONG-TERM CURRENT USE OF INSULIN (HCC): Chronic | ICD-10-CM

## 2025-05-13 DIAGNOSIS — E11.69 MIXED HYPERLIPIDEMIA DUE TO TYPE 2 DIABETES MELLITUS  (HCC): Chronic | ICD-10-CM

## 2025-05-13 DIAGNOSIS — E11.65 TYPE 2 DIABETES MELLITUS WITH HYPERGLYCEMIA, WITH LONG-TERM CURRENT USE OF INSULIN (HCC): Chronic | ICD-10-CM

## 2025-05-13 DIAGNOSIS — E78.2 MIXED HYPERLIPIDEMIA DUE TO TYPE 2 DIABETES MELLITUS  (HCC): Chronic | ICD-10-CM

## 2025-05-13 LAB
ALBUMIN SERPL BCG-MCNC: 4.3 G/DL (ref 3.5–5)
ALP SERPL-CCNC: 56 U/L (ref 34–104)
ALT SERPL W P-5'-P-CCNC: 21 U/L (ref 7–52)
ANION GAP SERPL CALCULATED.3IONS-SCNC: 11 MMOL/L (ref 4–13)
AST SERPL W P-5'-P-CCNC: 21 U/L (ref 13–39)
BASOPHILS # BLD AUTO: 0.04 THOUSANDS/ÂΜL (ref 0–0.1)
BASOPHILS NFR BLD AUTO: 1 % (ref 0–1)
BILIRUB SERPL-MCNC: 0.68 MG/DL (ref 0.2–1)
BUN SERPL-MCNC: 20 MG/DL (ref 5–25)
CALCIUM SERPL-MCNC: 9.5 MG/DL (ref 8.4–10.2)
CHLORIDE SERPL-SCNC: 104 MMOL/L (ref 96–108)
CHOLEST SERPL-MCNC: 108 MG/DL (ref ?–200)
CO2 SERPL-SCNC: 27 MMOL/L (ref 21–32)
CREAT SERPL-MCNC: 1.17 MG/DL (ref 0.6–1.3)
CREAT UR-MCNC: 144.7 MG/DL
EOSINOPHIL # BLD AUTO: 0.41 THOUSAND/ÂΜL (ref 0–0.61)
EOSINOPHIL NFR BLD AUTO: 5 % (ref 0–6)
ERYTHROCYTE [DISTWIDTH] IN BLOOD BY AUTOMATED COUNT: 12.8 % (ref 11.6–15.1)
GFR SERPL CREATININE-BSD FRML MDRD: 60 ML/MIN/1.73SQ M
GLUCOSE P FAST SERPL-MCNC: 186 MG/DL (ref 65–99)
HCT VFR BLD AUTO: 44.5 % (ref 36.5–49.3)
HDLC SERPL-MCNC: 41 MG/DL
HGB BLD-MCNC: 14.7 G/DL (ref 12–17)
IMM GRANULOCYTES # BLD AUTO: 0.03 THOUSAND/UL (ref 0–0.2)
IMM GRANULOCYTES NFR BLD AUTO: 0 % (ref 0–2)
LDLC SERPL CALC-MCNC: 48 MG/DL (ref 0–100)
LYMPHOCYTES # BLD AUTO: 1.85 THOUSANDS/ÂΜL (ref 0.6–4.47)
LYMPHOCYTES NFR BLD AUTO: 23 % (ref 14–44)
MCH RBC QN AUTO: 30.5 PG (ref 26.8–34.3)
MCHC RBC AUTO-ENTMCNC: 33 G/DL (ref 31.4–37.4)
MCV RBC AUTO: 92 FL (ref 82–98)
MICROALBUMIN UR-MCNC: 23.9 MG/L
MICROALBUMIN/CREAT 24H UR: 17 MG/G CREATININE (ref 0–30)
MONOCYTES # BLD AUTO: 0.54 THOUSAND/ÂΜL (ref 0.17–1.22)
MONOCYTES NFR BLD AUTO: 7 % (ref 4–12)
NEUTROPHILS # BLD AUTO: 5.19 THOUSANDS/ÂΜL (ref 1.85–7.62)
NEUTS SEG NFR BLD AUTO: 64 % (ref 43–75)
NRBC BLD AUTO-RTO: 0 /100 WBCS
PLATELET # BLD AUTO: 251 THOUSANDS/UL (ref 149–390)
PMV BLD AUTO: 10.6 FL (ref 8.9–12.7)
POTASSIUM SERPL-SCNC: 4 MMOL/L (ref 3.5–5.3)
PROT SERPL-MCNC: 6.7 G/DL (ref 6.4–8.4)
RBC # BLD AUTO: 4.82 MILLION/UL (ref 3.88–5.62)
SODIUM SERPL-SCNC: 142 MMOL/L (ref 135–147)
TRIGL SERPL-MCNC: 93 MG/DL (ref ?–150)
WBC # BLD AUTO: 8.06 THOUSAND/UL (ref 4.31–10.16)

## 2025-05-13 PROCEDURE — 82043 UR ALBUMIN QUANTITATIVE: CPT

## 2025-05-13 PROCEDURE — 80053 COMPREHEN METABOLIC PANEL: CPT

## 2025-05-13 PROCEDURE — 85025 COMPLETE CBC W/AUTO DIFF WBC: CPT

## 2025-05-13 PROCEDURE — 80061 LIPID PANEL: CPT

## 2025-05-13 PROCEDURE — 83036 HEMOGLOBIN GLYCOSYLATED A1C: CPT

## 2025-05-13 PROCEDURE — 82570 ASSAY OF URINE CREATININE: CPT

## 2025-05-13 PROCEDURE — 36415 COLL VENOUS BLD VENIPUNCTURE: CPT

## 2025-05-14 ENCOUNTER — RESULTS FOLLOW-UP (OUTPATIENT)
Dept: ENDOCRINOLOGY | Facility: CLINIC | Age: 75
End: 2025-05-14

## 2025-05-14 LAB
EST. AVERAGE GLUCOSE BLD GHB EST-MCNC: 137 MG/DL
HBA1C MFR BLD: 6.4 %

## 2025-05-15 ENCOUNTER — TELEPHONE (OUTPATIENT)
Age: 75
End: 2025-05-15

## 2025-05-15 NOTE — TELEPHONE ENCOUNTER
Patient states he saw ENT and he believes GERD is worsening due to Mounjaro. Patient asking if dose should be reduced. Please advise, thank you.

## 2025-05-16 DIAGNOSIS — Z79.4 TYPE 2 DIABETES MELLITUS WITH HYPERGLYCEMIA, WITH LONG-TERM CURRENT USE OF INSULIN (HCC): Primary | Chronic | ICD-10-CM

## 2025-05-16 DIAGNOSIS — E11.65 TYPE 2 DIABETES MELLITUS WITH HYPERGLYCEMIA, WITH LONG-TERM CURRENT USE OF INSULIN (HCC): Primary | Chronic | ICD-10-CM

## 2025-05-16 RX ORDER — TIRZEPATIDE 2.5 MG/.5ML
INJECTION, SOLUTION SUBCUTANEOUS
Qty: 2 ML | Refills: 4 | Status: SHIPPED | OUTPATIENT
Start: 2025-05-16

## 2025-05-27 LAB
LEFT EYE DIABETIC RETINOPATHY: POSITIVE
RIGHT EYE DIABETIC RETINOPATHY: POSITIVE

## 2025-05-29 ENCOUNTER — RA CDI HCC (OUTPATIENT)
Dept: OTHER | Facility: HOSPITAL | Age: 75
End: 2025-05-29

## 2025-05-29 NOTE — PROGRESS NOTES
HCC coding opportunities          Chart Reviewed number of suggestions sent to Provider: 2  E11.3293  Z79.4     Patients Insurance     Medicare Insurance: [employer]

## 2025-06-05 ENCOUNTER — OFFICE VISIT (OUTPATIENT)
Age: 75
End: 2025-06-05
Payer: COMMERCIAL

## 2025-06-05 VITALS
WEIGHT: 170.4 LBS | DIASTOLIC BLOOD PRESSURE: 72 MMHG | SYSTOLIC BLOOD PRESSURE: 118 MMHG | RESPIRATION RATE: 18 BRPM | BODY MASS INDEX: 29.09 KG/M2 | TEMPERATURE: 97.5 F | HEIGHT: 64 IN | HEART RATE: 81 BPM | OXYGEN SATURATION: 95 %

## 2025-06-05 DIAGNOSIS — E11.69 MIXED HYPERLIPIDEMIA DUE TO TYPE 2 DIABETES MELLITUS  (HCC): Primary | Chronic | ICD-10-CM

## 2025-06-05 DIAGNOSIS — E78.2 MIXED HYPERLIPIDEMIA DUE TO TYPE 2 DIABETES MELLITUS  (HCC): Primary | Chronic | ICD-10-CM

## 2025-06-05 DIAGNOSIS — N43.3 HYDROCELE IN ADULT: ICD-10-CM

## 2025-06-05 DIAGNOSIS — I10 PRIMARY HYPERTENSION: Chronic | ICD-10-CM

## 2025-06-05 DIAGNOSIS — Z12.5 SCREENING FOR PROSTATE CANCER: ICD-10-CM

## 2025-06-05 PROCEDURE — G2211 COMPLEX E/M VISIT ADD ON: HCPCS | Performed by: INTERNAL MEDICINE

## 2025-06-05 PROCEDURE — 99214 OFFICE O/P EST MOD 30 MIN: CPT | Performed by: INTERNAL MEDICINE

## 2025-06-05 NOTE — ASSESSMENT & PLAN NOTE
Lab Results   Component Value Date    HGBA1C 6.4 (H) 05/13/2025     Sugars are very well controlled. Mounjaro was decreased to 2.5 mg which is appropriate. Continue statin therapy. Follow up with endo.    Orders:  •  CBC; Future  •  Basic metabolic panel; Future  •  Lipid Panel with Direct LDL reflex; Future  •  Hemoglobin A1C; Future

## 2025-06-05 NOTE — PROGRESS NOTES
Name: Armand Rocha      : 1950      MRN: [id number]  Encounter Provider: David De La Garza DO  Encounter Date: 2025   Encounter department: [facility] [place]  :  Assessment & Plan  Mixed hyperlipidemia due to type 2 diabetes mellitus  (HCC)    Lab Results   Component Value Date    HGBA1C 6.4 (H) 2025     Sugars are very well controlled. Mounjaro was decreased to 2.5 mg which is appropriate. Continue statin therapy. Follow up with endo.    Orders:  •  CBC; Future  •  Basic metabolic panel; Future  •  Lipid Panel with Direct LDL reflex; Future  •  Hemoglobin A1C; Future    Primary hypertension    Blood pressure is stable and controlled. Continue current anti-HTN therapy.    Hydrocele in adult    Check updated US scrotum and testicles. Not having any significant pain/discomfort at this time.    Orders:  •  US scrotum and testicles; Future    Screening for prostate cancer    Orders:  •  PSA, Total Screen; Future         History of Present Illness   Armand presents for follow up. His mounjaro dose was dropped due to weight loss and acid reflux. His sugars are well controlled. Feels like his testicles have gotten bigger over the years, especially the left one. Previous US in  did show small left hydrocele.      Review of Systems   Constitutional:  Negative for activity change, appetite change and fatigue.   Respiratory:  Negative for apnea, cough, chest tightness, shortness of breath and wheezing.    Cardiovascular:  Negative for chest pain, palpitations and leg swelling.   Gastrointestinal:  Negative for abdominal distention, abdominal pain, blood in stool, constipation, diarrhea, nausea and vomiting.   Genitourinary:         Testicles -- especially the left -- feels bigger but no pain   Neurological:  Negative for dizziness, weakness, light-headedness, numbness and headaches.   Psychiatric/Behavioral:  Negative for behavioral problems, confusion, hallucinations, sleep  "disturbance and suicidal ideas. The patient is not nervous/anxious.      Objective   /72 (BP Location: Left arm, Patient Position: Sitting, Cuff Size: Large)   Pulse 81   Temp 97.5 °F (36.4 °C) (Tympanic)   Resp 18   Ht 5' 4\" (1.626 m)   Wt 77.3 kg (170 lb 6.4 oz)   SpO2 95%   BMI 29.25 kg/m²      Physical Exam  Constitutional:       General: He is not in acute distress.     Appearance: He is not ill-appearing.     Cardiovascular:      Rate and Rhythm: Normal rate and regular rhythm.      Heart sounds: No murmur heard.  Pulmonary:      Effort: Pulmonary effort is normal. No respiratory distress.      Breath sounds: No wheezing.   Abdominal:      General: Bowel sounds are normal. There is no distension.      Tenderness: There is no abdominal tenderness.     Musculoskeletal:      Right lower leg: No edema.      Left lower leg: No edema.     Neurological:      Mental Status: He is alert.       David De La Garza, DO   "

## 2025-06-06 ENCOUNTER — TELEPHONE (OUTPATIENT)
Dept: ADMINISTRATIVE | Facility: OTHER | Age: 75
End: 2025-06-06

## 2025-06-06 NOTE — LETTER
Diabetic Eye Exam Form    Date Requested: 25     Please complete form  Patient: Armand Rocha  Patient : 1950   Referring Provider: David De La Garza,       DIABETIC Eye Exam Date _______________________________      Type of Exam MUST be documented for Diabetic Eye Exams. Please CHECK ONE.     Retinal Exam       Dilated Retinal Exam       OCT       Optomap-Iris Exam      Fundus Photography       Left Eye - Please check Retinopathy or No Retinopathy        Exam did show retinopathy    Exam did not show retinopathy       Right Eye - Please check Retinopathy or No Retinopathy       Exam did show retinopathy    Exam did not show retinopathy       Comments __________________________________________________________    Practice Providing Exam ______________________________________________    Exam Performed By (print name) _______________________________________      Provider Signature ___________________________________________________      These reports are needed for  compliance.    Please fax this completed form and a copy of the Diabetic Eye Exam report to the [facility] Based Department as soon as possible via Fax [phone], attention Navdeep: Phone [phone]. Our office is located at [address] [place] [address].     We thank you for your assistance in treating our mutual patient.

## 2025-06-06 NOTE — TELEPHONE ENCOUNTER
----- Message from Dayna ROSENBERG sent at 6/5/2025  3:01 PM EDT -----  Regarding: diabetic eye exam  06/05/25 3:01 PMHello, our patient Armand Rocha has had Diabetic Eye Exam completed/performed. Please assist in updating the patient chart by making an External outreach to [facility] facility located in [address] [place] [address].PH. NO. [phone], [phone].  The date of service is 2025.Thank you,Dayna Villareal, [facility] [place]

## 2025-06-06 NOTE — TELEPHONE ENCOUNTER
Upon review of the In Basket request and the patient's chart, initial outreach has been made via fax to facility. Please see Contacts section for details.     Thank you  Navdeep Hall MA

## 2025-06-06 NOTE — LETTER
Diabetic Eye Exam Form    Date Requested: 25     2nd Request  Patient: Armand Rocha     Please complete form  Patient : 1950   Referring Provider: David De La Garza,       DIABETIC Eye Exam Date _______________________________      Type of Exam MUST be documented for Diabetic Eye Exams. Please CHECK ONE.     Retinal Exam       Dilated Retinal Exam       OCT       Optomap-Iris Exam      Fundus Photography       Left Eye - Please check Retinopathy or No Retinopathy        Exam did show retinopathy    Exam did not show retinopathy       Right Eye - Please check Retinopathy or No Retinopathy       Exam did show retinopathy    Exam did not show retinopathy       Comments __________________________________________________________    Practice Providing Exam ______________________________________________    Exam Performed By (print name) _______________________________________      Provider Signature ___________________________________________________      These reports are needed for  compliance.    Please fax this completed form and a copy of the Diabetic Eye Exam report to the [facility] Based Department as soon as possible via Fax [phone], attention Navdeep: Phone [phone]. Our office is located at [address] [place] [address].     We thank you for your assistance in treating our mutual patient.

## 2025-06-08 DIAGNOSIS — N18.2 TYPE 2 DIABETES MELLITUS WITH STAGE 2 CHRONIC KIDNEY DISEASE, WITHOUT LONG-TERM CURRENT USE OF INSULIN  (HCC): Chronic | ICD-10-CM

## 2025-06-08 DIAGNOSIS — E11.22 TYPE 2 DIABETES MELLITUS WITH STAGE 2 CHRONIC KIDNEY DISEASE, WITHOUT LONG-TERM CURRENT USE OF INSULIN  (HCC): Chronic | ICD-10-CM

## 2025-06-08 RX ORDER — METFORMIN HYDROCHLORIDE 500 MG/1
1000 TABLET, EXTENDED RELEASE ORAL 2 TIMES DAILY WITH MEALS
Qty: 360 TABLET | Refills: 1 | Status: SHIPPED | OUTPATIENT
Start: 2025-06-08

## 2025-06-09 NOTE — TELEPHONE ENCOUNTER
As a follow-up, a second attempt has been made for outreach via fax to facility. Please see Contacts section for details.    Thank you  Navdeep Hall MA

## 2025-06-10 NOTE — TELEPHONE ENCOUNTER
Upon review of the In Basket request we were able to locate, review, and update the patient chart as requested for Diabetic Eye Exam.    Any additional questions or concerns should be emailed to the Practice Liaisons via the appropriate education email address, please do not reply via In Basket.    Thank you  Navdeep Hall MA   [facility]

## 2025-06-16 ENCOUNTER — OFFICE VISIT (OUTPATIENT)
Dept: CARDIOLOGY CLINIC | Facility: CLINIC | Age: 75
End: 2025-06-16
Payer: COMMERCIAL

## 2025-06-16 VITALS
OXYGEN SATURATION: 97 % | BODY MASS INDEX: 29.19 KG/M2 | SYSTOLIC BLOOD PRESSURE: 126 MMHG | HEIGHT: 64 IN | WEIGHT: 171 LBS | HEART RATE: 75 BPM | RESPIRATION RATE: 16 BRPM | DIASTOLIC BLOOD PRESSURE: 74 MMHG

## 2025-06-16 DIAGNOSIS — I10 BENIGN ESSENTIAL HYPERTENSION: Primary | ICD-10-CM

## 2025-06-16 DIAGNOSIS — E78.00 HYPERCHOLESTEROLEMIA: ICD-10-CM

## 2025-06-16 DIAGNOSIS — F41.9 ANXIETY DISORDER, UNSPECIFIED TYPE: ICD-10-CM

## 2025-06-16 DIAGNOSIS — I47.10 PAROXYSMAL SUPRAVENTRICULAR TACHYCARDIA (HCC): ICD-10-CM

## 2025-06-16 DIAGNOSIS — R06.02 SHORTNESS OF BREATH: ICD-10-CM

## 2025-06-16 PROCEDURE — 93000 ELECTROCARDIOGRAM COMPLETE: CPT | Performed by: INTERNAL MEDICINE

## 2025-06-16 PROCEDURE — 99214 OFFICE O/P EST MOD 30 MIN: CPT | Performed by: INTERNAL MEDICINE

## 2025-06-16 NOTE — PROGRESS NOTES
[facility] [place]  [address]  [place] [address]  Cardiology Follow Up    [facility]  1950  [id number]      Assessment & Plan  Benign essential hypertension  Importance of salt restriction reinforced.  Continue present medications which were reviewed.  Paroxysmal supraventricular tachycardia (HCC)  No recurrence of palpitations.  Patient has done well on verapamil 180 mg p.o. daily.  Hypercholesterolemia  Continue atorvastatin 10 mg p.o. daily.  Continue diet and risk factor modification.  Anxiety disorder, unspecified type  Followed by PCP.  Shortness of breath  Patient has symptoms of shortness of breath and fatigue.  No recent functional cardiac evaluation.  Patient will be scheduled for a stress echocardiogram to assess for exercise capacity as well as ischemia.  Patient does have risk factor for CAD including advanced age, hypertension, hyperlipidemia, diabetes mellitus.    Sensitivity and specificity as well as limitations of different modalities of cardiac imaging discussed at length with patient.  Symptoms to watch out from cardiac standpoint which would indicate the need for further cardiac evaluation also discussed with patient.    Follow-up in 1 year or earlier as needed.  Follow-up with primary care physician.  Patient is agreeable with the plan of care.       Chief Complaint   Patient presents with    Follow-up       Interval History:   Patient presents for follow-up visit.  Patient does have shortness of breath with exertion and fatigue.  No recent functional cardiac evaluation.  Patient denies any history of leg edema orthopnea PND.  No history of palpitations or dizziness.  He states that he has been compliant with all his present medications.  He has usual blood work from primary care physician.    Problem List[1]  Past Medical History[2]  Social History     Socioeconomic History    Marital status: /Civil Union     Spouse name: Gonzalo     Number of children: 3    Years of education: Not on file    Highest education level: Not on file   Occupational History    Occupation: Retired     Comment:    Tobacco Use    Smoking status: Former     Current packs/day: 0.00     Average packs/day: 1 pack/day for 20.0 years (20.0 ttl pk-yrs)     Types: Cigarettes     Start date: 1957     Quit date: 1977     Years since quittin.4    Smokeless tobacco: Never   Vaping Use    Vaping status: Never Used   Substance and Sexual Activity    Alcohol use: Yes     Alcohol/week: 1.0 standard drink of alcohol     Types: 1 Standard drinks or equivalent per week     Comment: only occasionally    Drug use: No    Sexual activity: Yes     Partners: Female     Birth control/protection: None   Other Topics Concern    Not on file   Social History Narrative    Active Advance Directive            Who lives in your home: Wife    What type of home do you live in: Single house    Age of your home:     How long have you been living there:     Type of heat: Forced hot air and Other heat pump    Type of fuel: Oil and Electric    What type of ana is in your bedroom: Hardwood floor    Do you have the following in or near your home:    Air products: Central air and Dehumidifier    Pests: None    Pets: Dog    Are pets allowed in bedroom: Yes    Open fields, wooded areas nearby: Open fields and Wooded areas    Basement: Dry and Unfinished    Exposure to second hand smoke: No        Habits:    Caffeine: coffee 1.5 cups daily-soda 1-2 daily-    Chocolate: occasionally chocolate chip cookies     Other:              Social Drivers of Health     Financial Resource Strain: Medium Risk (2023)    Overall Financial Resource Strain (CARDIA)     Difficulty of Paying Living Expenses: Somewhat hard   Food Insecurity: No Food Insecurity (2024)    Nursing - Inadequate Food Risk Classification     Worried About Running Out of Food in the Last Year: Never true     Ran Out of  Food in the Last Year: Never true     Ran Out of Food in the Last Year: Not on file   Transportation Needs: No Transportation Needs (7/23/2024)    PRAPARE - Transportation     Lack of Transportation (Medical): No     Lack of Transportation (Non-Medical): No   Physical Activity: Inactive (4/26/2021)    Exercise Vital Sign     Days of Exercise per Week: 0 days     Minutes of Exercise per Session: 0 min   Stress: No Stress Concern Present (10/10/2023)    [employer] [place] [employer] - Occupational Stress Questionnaire     Feeling of Stress : Not at all   Social Connections: Not on file   Intimate Partner Violence: Not on file   Housing Stability: Low Risk  (7/23/2024)    Housing Stability Vital Sign     Unable to Pay for Housing in the Last Year: No     Number of Times Moved in the Last Year: 0     Homeless in the Last Year: No      Family History[3]  Past Surgical History[4]  Current Medications[5]  Allergies   Allergen Reactions    Iodinated Contrast Media Anaphylaxis    Shellfish Allergy - Food Allergy Anaphylaxis    Shrimp Extract Allergy Skin Test - Food Allergy Anaphylaxis    Empagliflozin Hives    Farxiga [Dapagliflozin] Rash     Yeast infection    Ozempic (0.25 Or 0.5 Mg-Dose) [Semaglutide(0.25 Or 0.5mg-Dos)] Rash       Labs:  Telephone on 06/06/2025   Component Date Value    Right Eye Diabetic Retin* 05/27/2025 Positive     Left Eye Diabetic Retino* 05/27/2025 Positive    Appointment on 05/13/2025   Component Date Value    Creatinine, Ur 05/13/2025 144.7     Albumin,U,Random 05/13/2025 23.9 (H)     Albumin Creat Ratio 05/13/2025 17     Sodium 05/13/2025 142     Potassium 05/13/2025 4.0     Chloride 05/13/2025 104     CO2 05/13/2025 27     ANION GAP 05/13/2025 11     BUN 05/13/2025 20     Creatinine 05/13/2025 1.17     Glucose, Fasting 05/13/2025 186 (H)     Calcium 05/13/2025 9.5     AST 05/13/2025 21     ALT 05/13/2025 21     Alkaline Phosphatase 05/13/2025 56     Total Protein 05/13/2025 6.7      "Albumin 05/13/2025 4.3     Total Bilirubin 05/13/2025 0.68     eGFR 05/13/2025 60     Hemoglobin A1C 05/13/2025 6.4 (H)     EAG 05/13/2025 137     Cholesterol 05/13/2025 108     Triglycerides 05/13/2025 93     HDL, Direct 05/13/2025 41     LDL Calculated 05/13/2025 48     WBC 05/13/2025 8.06     RBC 05/13/2025 4.82     Hemoglobin 05/13/2025 14.7     Hematocrit 05/13/2025 44.5     MCV 05/13/2025 92     MCH 05/13/2025 30.5     MCHC 05/13/2025 33.0     RDW 05/13/2025 12.8     MPV 05/13/2025 10.6     Platelets 05/13/2025 251     nRBC 05/13/2025 0     Segmented % 05/13/2025 64     Immature Grans % 05/13/2025 0     Lymphocytes % 05/13/2025 23     Monocytes % 05/13/2025 7     Eosinophils Relative 05/13/2025 5     Basophils Relative 05/13/2025 1     Absolute Neutrophils 05/13/2025 5.19     Absolute Immature Grans 05/13/2025 0.03     Absolute Lymphocytes 05/13/2025 1.85     Absolute Monocytes 05/13/2025 0.54     Eosinophils Absolute 05/13/2025 0.41     Basophils Absolute 05/13/2025 0.04      Imaging: No results found.    Review of Systems:  Review of Systems  REVIEW OF SYSTEMS:  Constitutional:  Denies fever or chills   Eyes:  Denies change in visual acuity   HENT:  Denies nasal congestion or sore throat   Respiratory:  shortness of breath   Cardiovascular:  Denies chest pain or edema   GI:  Denies abdominal pain, nausea, vomiting, bloody stools or diarrhea   :  Denies dysuria, frequency, difficulty in micturition and nocturia  Musculoskeletal:  Denies back pain or joint pain   Neurologic:  Denies headache, focal weakness or sensory changes   Endocrine:  Denies polyuria or polydipsia   Lymphatic:  Denies swollen glands   Psychiatric:  anxiety    Physical Exam:    /74 (BP Location: Right arm, Patient Position: Sitting, Cuff Size: Standard)   Pulse 75   Resp 16   Ht 5' 4\" (1.626 m)   Wt 77.6 kg (171 lb)   SpO2 97%   BMI 29.35 kg/m²     Physical Exam  PHYSICAL EXAM:  General:  Patient is not in acute distress "   Head: Normocephalic, Atraumatic.  HEENT:  Both pupils normal-size atraumatic, normocephalic, nonicteric  Neck:  JVP not raised. Trachea central. No carotid bruit  Respiratory:  normal breath sounds no crackles. no rhonchi  Cardiovascular:  Regular rate and rhythm no S3 no murmurs  GI:  Abdomen soft nontender. No organomegaly.   Lymphatic:  No cervical or inguinal lymphadenopathy  Neurologic:  Patient is awake alert, oriented . Grossly nonfocal  Extremities no edema             [1]   Patient Active Problem List  Diagnosis    Actinic keratosis    Seborrheic keratosis    History of skin cancer    Anxiety    Erectile dysfunction of non-organic origin    Esophageal reflux    Mixed hyperlipidemia due to type 2 diabetes mellitus  ([facility])    Mitral valve disorder    Rosacea    Sleep apnea    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    Type 2 diabetes mellitus with hyperglycemia, with long-term current use of insulin ([facility])    Primary hypertension    Class 1 obesity due to excess calories with serious comorbidity and body mass index (BMI) of 31.0 to 31.9 in adult    Allergic rhinitis due to house dust mite    Chronic seasonal allergic rhinitis due to pollen    Allergy to IVP dye, initial encounter   [2]   Past Medical History:  Diagnosis Date    Actinic keratosis     Adhesive capsulitis of unspecified shoulder     Allergic 1985    Anxiety 1993    Asthma 2002    Atopic dermatitis     Basal cell carcinoma     BCC right postauricular scalp    Cancer ([facility]) 2018    skin    Cardiac disease     Cellulitis of right upper extremity     Cervical radiculopathy     Chest pain     Chronic maxillary sinusitis     last assessed 01/21/14    Clotting disorder ([facility]) 2019    Diabetes mellitus ([facility]) 1994    Diverticulitis of colon 8/4/93    sigmoidectomy    Endocrine disease     Erectile dysfunction of non-organic origin     Esophageal reflux     Frequent nosebleeds     GERD (gastroesophageal reflux disease)     Gout     last assessed 08/26/15     Hearing loss, conductive     Heart disease     Heart murmur 2002    Mitral Valve disorder    Henoch-Schonlein purpura (HCC) 08/06/2019    Herpes zoster     History of chickenpox     History of colon polyps     History of diverticulitis     History of measles     History of mumps     History of paroxysmal supraventricular tachycardia     History of rectal polyps     Hives     HL (hearing loss)     Hypertension     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    Irregular heart rate     Mitral valve disorder     Nasal congestion 4/18/19    Neoplasm of skin, malignant     Non-melanoma skin cancer     last assessed 10/26/17    Nosebleed 4/18/19    Obesity     Palpitations     last assessed 03/05/15    Paroxysmal supraventricular tachycardia (HCC) 01/21/2014    Plantar fasciitis     last assessed 05/28/15    PSVT (paroxysmal supraventricular tachycardia) ([facility])     Sciatica     Shingles     Sleep apnea     Squamous cell carcinoma of skin of scalp     Squamous cell skin cancer 2018    Thoracic radiculopathy 10/22/2015    Tinnitus, unspecified ear     Tonsillitis     Type 2 diabetes mellitus with hyperglycemia ([facility])     last assessed 08/26/15    Umbilical hernia     Varicella     Wears glasses     Worms in stool     last assessed 08/26/15   [3]   Family History  Problem Relation Name Age of Onset    COPD Mother Madeleine     Asthma Mother Madeleine     Cancer Mother Madeleine         Breast + Skin    Diabetes Mother Madeleine     Heart disease Mother Madeleine     Heart attack Mother Madeleine     Breast cancer Mother Madeleine         Adenocarcinoma    Skin cancer Mother Madeleine         Adenocarcinoma    Heart failure Mother Madeleine     Diabetes type II Mother Madeleine     Varicose Veins Mother Madeleine         Lower Extremities    Colon polyps Mother Madeleine     Arthritis Mother Madeleine     Heart disease Father Jose G     Cancer Father Jose G         Prostate + Skin    Prostate cancer Father Jose G         Adenocarcinoma     Skin cancer Father Jose G     Lymphoma Father Jose G         Non-Hodgkin's    Allergies Father Jose G     Colon polyps Sister Aida     Allergies Sister Aida     Irregular heart beat Sister Aida     Cushing syndrome Daughter Chantale     Varicose Veins Daughter Chantale     Obesity Daughter Miriama     Diverticulitis Daughter Chantale         12 inches colon removed    Migraines Daughter Miriama     Obesity Daughter Iris     Sinusitis Daughter Iris         surgey    Schizophrenia Son Gera     Arthritis Family     [4]   Past Surgical History:  Procedure Laterality Date    APPENDECTOMY  7/69    CIRCUMCISION      COLECTOMY  1993    Partial, Sigmoid for diverticulitis    COLONOSCOPY  01/04/2016    HEAD & NECK SKIN LESION EXCISIONAL BIOPSY      10/11/10 SCC --  10/17/11 SCC  --  Location Scalp    HERNIA REPAIR  7/98 + 9/2004 1998 & 2013    MOHS SURGERY Right 10/25/2024    BCC right postauriuclar scalp- Dr. Buchanan    NASAL/SINUS ENDOSCOPY N/A 08/06/2019    Procedure: FUNCTIONAL ENDOSCOPIC SINUS SURGERY (FESS) IMAGED GUIDED for control of epistaxis;  Surgeon: Leonel Bo MD;  Location: [facility];  Service: ENT    MS ESOPHAGOGASTRODUODENOSCOPY TRANSORAL DIAGNOSTIC N/A 08/09/2017    Procedure: ESOPHAGOGASTRODUODENOSCOPY (EGD);  Surgeon: Anjel Steven MD;  Location: [facility];  Service: Gastroenterology    SKIN BIOPSY  6/25/19    TONSILLECTOMY  1960    But grew back   [5]   Current Outpatient Medications:     Accu-Chek Guide test strip, CHECK BLOOD SUGAR 3 TIMES DAILY, Disp: 100 strip, Rfl: 3    Accu-Chek Softclix Lancets lancets, PLEASE CHECK BLOOD SUGARS 3 TIMES DAILY, Disp: 100 each, Rfl: 3    atorvastatin (LIPITOR) 10 mg tablet, Take 1 tablet (10 mg total) by mouth daily, Disp: 90 tablet, Rfl: 3    BD Pen Needle Audelia 2nd Gen 32G X 4 MM MISC, USE TO INJECT INSULIN UP TO 4 TIMES A DAY, Disp: 200 each, Rfl: 5    Blood Glucose Monitoring Suppl (Accu-Chek Guide Me) w/Device KIT, CHECK BLOOD SUGARS 3  TIMES DAILY **INSURANCE ONLY COVERS ACCU CHEK GUIDE SYSTEM**, Disp: 1 kit, Rfl: 0    clotrimazole (LOTRIMIN) 1 % cream, Apply topically 2 (two) times a day, Disp: 45 g, Rfl: 3    Cyanocobalamin (VITAMIN B-12 PO), Take by mouth in the morning., Disp: , Rfl:     cyclobenzaprine (FLEXERIL) 5 mg tablet, Take 1 tablet (5 mg total) by mouth 3 (three) times a day as needed for muscle spasms, Disp: 60 tablet, Rfl: 3    EPINEPHrine (EPIPEN) 0.3 mg/0.3 mL SOAJ, INJECT 0.3 ML (0.3 MG TOTAL) INTO A MUSCLE ONCE FOR 1 DOSE, Disp: 2 each, Rfl: 0    fluorouracil (EFUDEX) 5 % cream, Apply topically 2 (two) times a day Apply to Scalp only twice a day for 14 days, Disp: 40 g, Rfl: 0    fluticasone (FLONASE) 50 mcg/act nasal spray, SPRAY 1 SPRAY INTO EACH NOSTRIL EVERY DAY, Disp: 48 mL, Rfl: 2    glimepiride (AMARYL) 4 mg tablet, TAKE 1 TABLET BY MOUTH 2 TIMES A DAY., Disp: 180 tablet, Rfl: 3    insulin lispro (HumaLOG) 100 units/mL injection pen, BLOOD GLUCOSE 150 - 189: 1 UNIT OF INSULIN BLOOD GLUCOSE 190 - 229: 2 UNITS OF INSULIN BLOOD GLUCOSE 230 - 269: 3 UNITS OF INSULIN BLOOD GLUCOSE 270 - 309: 4 UNITS OF INSULIN BLOOD GLUCOSE 310 - 349: 5 UNITS OF INSULIN BLOOD GLUCOSE GREATER THAN OR EQUAL : 6 UNITS OF INSULIN CHECK YOUR BLOOD GLUCOSE 3 TIMES A DAY AND BEFORE BEDTIME AND ADMINISTER INSULIN AS EXPLAINED ABOVE, Disp: 15 mL, Rfl: 5    ketoconazole (NIZORAL) 2 % cream, APPLY TO AFFECTED AREA TWICE A DAY, Disp: 30 g, Rfl: 0    metFORMIN (GLUCOPHAGE-XR) 500 mg 24 hr tablet, TAKE 2 TABLETS BY MOUTH TWICE A DAY WITH FOOD, Disp: 360 tablet, Rfl: 1    montelukast (SINGULAIR) 10 mg tablet, Take 1 tablet (10 mg total) by mouth daily at bedtime, Disp: 90 tablet, Rfl: 1    mupirocin (BACTROBAN) 2 % ointment, Apply topically daily To the hand only., Disp: 30 g, Rfl: 1    pantoprazole (PROTONIX) 40 mg tablet, Take 1 tablet (40 mg total) by mouth daily, Disp: 90 tablet, Rfl: 1    Pyridoxine HCl (VITAMIN B-6 PO), Take 25 mg by mouth in the  morning., Disp: , Rfl:     sildenafil (VIAGRA) 50 MG tablet, Take 1 tablet (50 mg total) by mouth daily as needed for erectile dysfunction, Disp: 6 tablet, Rfl: 5    sodium chloride (OCEAN) 0.65 % nasal spray, 1-2 sprays into each nostril daily as needed for congestion, Disp: 90 mL, Rfl: 3    Tirzepatide (Mounjaro) 2.5 MG/0.5ML SOAJ, Inject 2.5mg subcut once per week, Disp: 2 mL, Rfl: 4    triamcinolone (KENALOG) 0.1 % cream, Apply topically 2 (two) times a day, Disp: 453.6 g, Rfl: 1    verapamil (Verelan) 180 MG 24 hr capsule, TAKE 1 CAPSULE BY MOUTH 2 TIMES A DAY., Disp: 180 capsule, Rfl: 3

## 2025-07-03 ENCOUNTER — RESULTS FOLLOW-UP (OUTPATIENT)
Dept: CARDIOLOGY CLINIC | Facility: CLINIC | Age: 75
End: 2025-07-03

## 2025-07-03 ENCOUNTER — HOSPITAL ENCOUNTER (OUTPATIENT)
Dept: NON INVASIVE DIAGNOSTICS | Facility: CLINIC | Age: 75
Discharge: HOME | End: 2025-07-03
Attending: INTERNAL MEDICINE
Payer: COMMERCIAL

## 2025-07-03 VITALS
WEIGHT: 171 LBS | HEIGHT: 64 IN | DIASTOLIC BLOOD PRESSURE: 78 MMHG | SYSTOLIC BLOOD PRESSURE: 144 MMHG | OXYGEN SATURATION: 97 % | HEART RATE: 64 BPM | BODY MASS INDEX: 29.19 KG/M2

## 2025-07-03 DIAGNOSIS — R06.02 SHORTNESS OF BREATH: ICD-10-CM

## 2025-07-03 LAB
CHEST PAIN STATEMENT: NORMAL
E WAVE DECELERATION TIME: 237 MS
E/A RATIO: 1.49
MAX DIASTOLIC BP: 80 MMHG
MAX HR PERCENT: 90 %
MAX HR: 131 BPM
MAX PREDICTED HEART RATE: 145 BPM
MV PEAK A VEL: 0.55 M/S
MV PEAK E VEL: 82 CM/S
PROTOCOL NAME: NORMAL
RATE PRESSURE PRODUCT: NORMAL
SL CV LV EF: 60
SL CV STRESS RECOVERY BP: NORMAL MMHG
SL CV STRESS RECOVERY HR: 83 BPM
SL CV STRESS RECOVERY O2 SAT: 99 %
SL CV STRESS STAGE REACHED: 3
STRESS ANGINA INDEX: 0
STRESS BASELINE BP: NORMAL MMHG
STRESS BASELINE HR: 64 BPM
STRESS O2 SAT REST: 97 %
STRESS PEAK HR: 131 BPM
STRESS POST ESTIMATED WORKLOAD: 9.5 METS
STRESS POST EXERCISE DUR MIN: 6 MIN
STRESS POST EXERCISE DUR MIN: 6 MIN
STRESS POST EXERCISE DUR SEC: 49 SEC
STRESS POST EXERCISE DUR SEC: 49 SEC
STRESS POST O2 SAT PEAK: 95 %
STRESS POST PEAK BP: 168 MMHG
STRESS POST PEAK HR: 131 BPM
STRESS POST PEAK SYSTOLIC BP: 168 MMHG
TARGET HR FORMULA: NORMAL
TEST INDICATION: NORMAL

## 2025-07-03 PROCEDURE — 93350 STRESS TTE ONLY: CPT

## 2025-07-03 PROCEDURE — 93350 STRESS TTE ONLY: CPT | Performed by: STUDENT IN AN ORGANIZED HEALTH CARE EDUCATION/TRAINING PROGRAM

## 2025-07-07 LAB
CHEST PAIN STATEMENT: NORMAL
MAX DIASTOLIC BP: 80 MMHG
MAX PREDICTED HEART RATE: 145 BPM
PROTOCOL NAME: NORMAL
STRESS POST EXERCISE DUR MIN: 6 MIN
STRESS POST EXERCISE DUR SEC: 49 SEC
STRESS POST PEAK HR: 131 BPM
STRESS POST PEAK SYSTOLIC BP: 168 MMHG
TARGET HR FORMULA: NORMAL
TEST INDICATION: NORMAL

## 2025-07-09 NOTE — TELEPHONE ENCOUNTER
----- Message from Alfreda Ceballos MD sent at 7/8/2025  1:00 PM EDT -----  Patient can try verapamil 180 mg 1 tablet daily instead of twice a day and see how he does.    To monitor BPs.  He does have a history of SVT and to watch for any symptoms of palpitations after he decreases the dose.  If he has recurrence of palpitations, he should go back to twice a day.  ----- Message -----  From: Ania Silva MA  Sent: 7/8/2025  11:40 AM EDT  To: Alfreda Ceballos MD    Spoke to pt and relayed Dr. Ceballos's response, pt wanted to know since his results are good, should pt continue medication Verapamil or reduce it.  ----- Message -----  From: Alfreda Ceballos MD  Sent: 7/3/2025   7:45 PM EDT  To: [facility] [place] [facility]    Please call the patient.  Stress test overall good with no definite evidence of ischemia.  Ejection fraction normal at 60%.  To continue present medications and report any symptoms out of the ordinary.  ----- Message -----  From: Dominguez Bashir MD  Sent: 7/3/2025   5:09 PM EDT  To: Alfreda Ceballos MD

## 2025-07-13 DIAGNOSIS — N18.31 TYPE 2 DIABETES MELLITUS WITH STAGE 3A CHRONIC KIDNEY DISEASE, WITHOUT LONG-TERM CURRENT USE OF INSULIN (HCC): Primary | ICD-10-CM

## 2025-07-13 DIAGNOSIS — E11.22 TYPE 2 DIABETES MELLITUS WITH STAGE 2 CHRONIC KIDNEY DISEASE, WITHOUT LONG-TERM CURRENT USE OF INSULIN  (HCC): Chronic | ICD-10-CM

## 2025-07-13 DIAGNOSIS — E11.22 TYPE 2 DIABETES MELLITUS WITH STAGE 3A CHRONIC KIDNEY DISEASE, WITHOUT LONG-TERM CURRENT USE OF INSULIN (HCC): Primary | ICD-10-CM

## 2025-07-13 DIAGNOSIS — N18.2 TYPE 2 DIABETES MELLITUS WITH STAGE 2 CHRONIC KIDNEY DISEASE, WITHOUT LONG-TERM CURRENT USE OF INSULIN  (HCC): Chronic | ICD-10-CM

## 2025-07-15 RX ORDER — GLIMEPIRIDE 4 MG/1
4 TABLET ORAL 2 TIMES DAILY
Qty: 180 TABLET | Refills: 2 | Status: SHIPPED | OUTPATIENT
Start: 2025-07-15

## 2025-07-16 RX ORDER — TIRZEPATIDE 5 MG/.5ML
INJECTION, SOLUTION SUBCUTANEOUS
Qty: 2 ML | Refills: 2 | Status: SHIPPED | OUTPATIENT
Start: 2025-07-16

## 2025-08-06 ENCOUNTER — OFFICE VISIT (OUTPATIENT)
Dept: ENDOCRINOLOGY | Facility: CLINIC | Age: 75
End: 2025-08-06
Payer: COMMERCIAL

## 2025-08-06 VITALS
HEART RATE: 59 BPM | DIASTOLIC BLOOD PRESSURE: 82 MMHG | OXYGEN SATURATION: 97 % | WEIGHT: 173.8 LBS | BODY MASS INDEX: 29.67 KG/M2 | SYSTOLIC BLOOD PRESSURE: 130 MMHG | HEIGHT: 64 IN

## 2025-08-06 DIAGNOSIS — Z79.4 TYPE 2 DIABETES MELLITUS WITH HYPERGLYCEMIA, WITH LONG-TERM CURRENT USE OF INSULIN (HCC): Chronic | ICD-10-CM

## 2025-08-06 DIAGNOSIS — E11.22 TYPE 2 DIABETES MELLITUS WITH STAGE 3A CHRONIC KIDNEY DISEASE, WITHOUT LONG-TERM CURRENT USE OF INSULIN (HCC): Chronic | ICD-10-CM

## 2025-08-06 DIAGNOSIS — E11.22 TYPE 2 DIABETES MELLITUS WITH STAGE 2 CHRONIC KIDNEY DISEASE, WITHOUT LONG-TERM CURRENT USE OF INSULIN  (HCC): Primary | Chronic | ICD-10-CM

## 2025-08-06 DIAGNOSIS — N18.2 TYPE 2 DIABETES MELLITUS WITH STAGE 2 CHRONIC KIDNEY DISEASE, WITHOUT LONG-TERM CURRENT USE OF INSULIN  (HCC): Primary | Chronic | ICD-10-CM

## 2025-08-06 DIAGNOSIS — N18.31 TYPE 2 DIABETES MELLITUS WITH STAGE 3A CHRONIC KIDNEY DISEASE, WITHOUT LONG-TERM CURRENT USE OF INSULIN (HCC): Chronic | ICD-10-CM

## 2025-08-06 DIAGNOSIS — E11.65 TYPE 2 DIABETES MELLITUS WITH HYPERGLYCEMIA, WITH LONG-TERM CURRENT USE OF INSULIN (HCC): Chronic | ICD-10-CM

## 2025-08-06 PROCEDURE — G2211 COMPLEX E/M VISIT ADD ON: HCPCS | Performed by: INTERNAL MEDICINE

## 2025-08-06 PROCEDURE — 99214 OFFICE O/P EST MOD 30 MIN: CPT | Performed by: INTERNAL MEDICINE

## 2025-08-06 RX ORDER — TIRZEPATIDE 2.5 MG/.5ML
INJECTION, SOLUTION SUBCUTANEOUS
Qty: 2 ML | Refills: 4 | Status: SHIPPED | OUTPATIENT
Start: 2025-08-06

## 2025-08-06 RX ORDER — METFORMIN HYDROCHLORIDE 500 MG/1
1000 TABLET, EXTENDED RELEASE ORAL 2 TIMES DAILY WITH MEALS
Qty: 360 TABLET | Refills: 1 | Status: SHIPPED | OUTPATIENT
Start: 2025-08-06

## 2025-08-06 RX ORDER — PEN NEEDLE, DIABETIC 32GX 5/32"
NEEDLE, DISPOSABLE MISCELLANEOUS
Start: 2025-08-06

## (undated) DEVICE — ANTI-FOG SOLUTION WITH FOAM PAD: Brand: DEVON

## (undated) DEVICE — STERILE NASAL PACK: Brand: CARDINAL HEALTH

## (undated) DEVICE — TUBING 1895522 5PK STRAIGHTSHOT TO XPS: Brand: STRAIGHTSHOT®

## (undated) DEVICE — 3000CC GUARDIAN II: Brand: GUARDIAN

## (undated) DEVICE — PATIENT TRACKER 9734887XOM NON-INVASIVE

## (undated) DEVICE — SPECIMEN CONTAINER STERILE PEEL PACK

## (undated) DEVICE — MAYO STAND COVER: Brand: CONVERTORS

## (undated) DEVICE — SUCTION BOVIE ENT

## (undated) DEVICE — GLOVE SRG BIOGEL ORTHOPEDIC 7

## (undated) DEVICE — NEEDLE SPINAL 22G X 3.5IN  QUINCKE

## (undated) DEVICE — SCD SEQUENTIAL COMPRESSION COMFORT SLEEVE MEDIUM KNEE LENGTH: Brand: KENDALL SCD

## (undated) DEVICE — ARISTA AH ABSORBABLE HEMOSTATIC PARTICLES: Brand: ARISTA™ AH

## (undated) DEVICE — BLADE 1884004HR TRICUT 5PK M4 4MM ROTATE: Brand: TRICUT

## (undated) DEVICE — NEURO PATTIES 1/2 X 1 1/2

## (undated) DEVICE — NEEDLE 25G X 1 1/2

## (undated) DEVICE — TUBING SUCTION 5MM X 12 FT